# Patient Record
Sex: MALE | Race: WHITE | NOT HISPANIC OR LATINO | Employment: OTHER | URBAN - METROPOLITAN AREA
[De-identification: names, ages, dates, MRNs, and addresses within clinical notes are randomized per-mention and may not be internally consistent; named-entity substitution may affect disease eponyms.]

---

## 2022-09-20 ENCOUNTER — HOSPITAL ENCOUNTER (OUTPATIENT)
Facility: HOSPITAL | Age: 37
Setting detail: OBSERVATION
Discharge: HOME | End: 2022-09-23
Attending: EMERGENCY MEDICINE | Admitting: INTERNAL MEDICINE

## 2022-09-20 DIAGNOSIS — R74.8 ELEVATED LIVER ENZYMES: Primary | ICD-10-CM

## 2022-09-20 DIAGNOSIS — R60.1 ANASARCA: ICD-10-CM

## 2022-09-20 LAB
ALBUMIN SERPL-MCNC: 3 G/DL (ref 3.5–5.2)
ALBUMIN/GLOB SERPL: 1.7 G/DL
ALP SERPL-CCNC: 166 U/L (ref 39–117)
ALT SERPL W P-5'-P-CCNC: 32 U/L (ref 1–41)
AMPHET+METHAMPHET UR QL: POSITIVE
ANION GAP SERPL CALCULATED.3IONS-SCNC: 10 MMOL/L (ref 5–15)
AST SERPL-CCNC: 50 U/L (ref 1–40)
BACTERIA UR QL AUTO: ABNORMAL /HPF
BARBITURATES UR QL SCN: NEGATIVE
BASOPHILS # BLD AUTO: 0.1 10*3/MM3 (ref 0–0.2)
BASOPHILS NFR BLD AUTO: 0.7 % (ref 0–1.5)
BENZODIAZ UR QL SCN: POSITIVE
BILIRUB SERPL-MCNC: 8.1 MG/DL (ref 0–1.2)
BILIRUB UR QL STRIP: ABNORMAL
BUN SERPL-MCNC: 7 MG/DL (ref 6–20)
BUN/CREAT SERPL: 9.2 (ref 7–25)
CALCIUM SPEC-SCNC: 7.9 MG/DL (ref 8.6–10.5)
CANNABINOIDS SERPL QL: POSITIVE
CHLORIDE SERPL-SCNC: 103 MMOL/L (ref 98–107)
CLARITY UR: CLEAR
CO2 SERPL-SCNC: 21 MMOL/L (ref 22–29)
COCAINE UR QL: NEGATIVE
COD CRY URNS QL: ABNORMAL /HPF
COLOR UR: ABNORMAL
CREAT SERPL-MCNC: 0.76 MG/DL (ref 0.76–1.27)
DEPRECATED RDW RBC AUTO: 53.4 FL (ref 37–54)
EGFRCR SERPLBLD CKD-EPI 2021: 118.7 ML/MIN/1.73
EOSINOPHIL # BLD AUTO: 0.3 10*3/MM3 (ref 0–0.4)
EOSINOPHIL NFR BLD AUTO: 2.7 % (ref 0.3–6.2)
ERYTHROCYTE [DISTWIDTH] IN BLOOD BY AUTOMATED COUNT: 14.3 % (ref 12.3–15.4)
ETHANOL UR QL: <0.01 %
GLOBULIN UR ELPH-MCNC: 1.8 GM/DL
GLUCOSE SERPL-MCNC: 124 MG/DL (ref 65–99)
GLUCOSE UR STRIP-MCNC: ABNORMAL MG/DL
HCT VFR BLD AUTO: 41.5 % (ref 37.5–51)
HGB BLD-MCNC: 14 G/DL (ref 13–17.7)
HGB UR QL STRIP.AUTO: NEGATIVE
HYALINE CASTS UR QL AUTO: ABNORMAL /LPF
INR PPP: 1.46 (ref 0.93–1.1)
KETONES UR QL STRIP: ABNORMAL
LEUKOCYTE ESTERASE UR QL STRIP.AUTO: NEGATIVE
LIPASE SERPL-CCNC: 72 U/L (ref 13–60)
LYMPHOCYTES # BLD AUTO: 1.5 10*3/MM3 (ref 0.7–3.1)
LYMPHOCYTES NFR BLD AUTO: 16.2 % (ref 19.6–45.3)
MAGNESIUM SERPL-MCNC: 2 MG/DL (ref 1.6–2.6)
MCH RBC QN AUTO: 36.1 PG (ref 26.6–33)
MCHC RBC AUTO-ENTMCNC: 33.8 G/DL (ref 31.5–35.7)
MCV RBC AUTO: 106.8 FL (ref 79–97)
METHADONE UR QL SCN: POSITIVE
MONOCYTES # BLD AUTO: 1.3 10*3/MM3 (ref 0.1–0.9)
MONOCYTES NFR BLD AUTO: 13.8 % (ref 5–12)
MUCOUS THREADS URNS QL MICRO: ABNORMAL /HPF
NEUTROPHILS NFR BLD AUTO: 6.2 10*3/MM3 (ref 1.7–7)
NEUTROPHILS NFR BLD AUTO: 66.6 % (ref 42.7–76)
NITRITE UR QL STRIP: POSITIVE
NRBC BLD AUTO-RTO: 0.1 /100 WBC (ref 0–0.2)
NT-PROBNP SERPL-MCNC: 101.4 PG/ML (ref 0–450)
OPIATES UR QL: POSITIVE
OXYCODONE UR QL SCN: POSITIVE
PH UR STRIP.AUTO: 5.5 [PH] (ref 5–8)
PLATELET # BLD AUTO: 94 10*3/MM3 (ref 140–450)
PMV BLD AUTO: 10.8 FL (ref 6–12)
POTASSIUM SERPL-SCNC: 3.5 MMOL/L (ref 3.5–5.2)
PROT SERPL-MCNC: 4.8 G/DL (ref 6–8.5)
PROT UR QL STRIP: ABNORMAL
PROTHROMBIN TIME: 14.7 SECONDS (ref 9.6–11.7)
RBC # BLD AUTO: 3.88 10*6/MM3 (ref 4.14–5.8)
RBC # UR STRIP: ABNORMAL /HPF
REF LAB TEST METHOD: ABNORMAL
SODIUM SERPL-SCNC: 134 MMOL/L (ref 136–145)
SP GR UR STRIP: >=1.03 (ref 1–1.03)
SQUAMOUS #/AREA URNS HPF: ABNORMAL /HPF
UROBILINOGEN UR QL STRIP: ABNORMAL
WBC # UR STRIP: ABNORMAL /HPF
WBC NRBC COR # BLD: 9.2 10*3/MM3 (ref 3.4–10.8)

## 2022-09-20 PROCEDURE — 80307 DRUG TEST PRSMV CHEM ANLYZR: CPT | Performed by: NURSE PRACTITIONER

## 2022-09-20 PROCEDURE — 80053 COMPREHEN METABOLIC PANEL: CPT | Performed by: NURSE PRACTITIONER

## 2022-09-20 PROCEDURE — 99284 EMERGENCY DEPT VISIT MOD MDM: CPT

## 2022-09-20 PROCEDURE — 85025 COMPLETE CBC W/AUTO DIFF WBC: CPT | Performed by: NURSE PRACTITIONER

## 2022-09-20 PROCEDURE — 83690 ASSAY OF LIPASE: CPT | Performed by: NURSE PRACTITIONER

## 2022-09-20 PROCEDURE — 85610 PROTHROMBIN TIME: CPT | Performed by: NURSE PRACTITIONER

## 2022-09-20 PROCEDURE — G0378 HOSPITAL OBSERVATION PER HR: HCPCS

## 2022-09-20 PROCEDURE — 82077 ASSAY SPEC XCP UR&BREATH IA: CPT | Performed by: NURSE PRACTITIONER

## 2022-09-20 PROCEDURE — 36415 COLL VENOUS BLD VENIPUNCTURE: CPT

## 2022-09-20 PROCEDURE — 83880 ASSAY OF NATRIURETIC PEPTIDE: CPT | Performed by: NURSE PRACTITIONER

## 2022-09-20 PROCEDURE — 96374 THER/PROPH/DIAG INJ IV PUSH: CPT

## 2022-09-20 PROCEDURE — 81001 URINALYSIS AUTO W/SCOPE: CPT | Performed by: NURSE PRACTITIONER

## 2022-09-20 PROCEDURE — 83735 ASSAY OF MAGNESIUM: CPT | Performed by: NURSE PRACTITIONER

## 2022-09-20 PROCEDURE — 25010000002 FUROSEMIDE PER 20 MG: Performed by: NURSE PRACTITIONER

## 2022-09-20 RX ORDER — ACETAMINOPHEN 325 MG/1
650 TABLET ORAL EVERY 4 HOURS PRN
Status: DISCONTINUED | OUTPATIENT
Start: 2022-09-20 | End: 2022-09-23 | Stop reason: HOSPADM

## 2022-09-20 RX ORDER — HYDROMORPHONE HYDROCHLORIDE 8 MG/1
8 TABLET ORAL EVERY 6 HOURS PRN
COMMUNITY

## 2022-09-20 RX ORDER — FUROSEMIDE 10 MG/ML
80 INJECTION INTRAMUSCULAR; INTRAVENOUS ONCE
Status: COMPLETED | OUTPATIENT
Start: 2022-09-20 | End: 2022-09-20

## 2022-09-20 RX ORDER — OXYCODONE HYDROCHLORIDE 20 MG/1
20 TABLET ORAL
COMMUNITY
End: 2022-09-23 | Stop reason: HOSPADM

## 2022-09-20 RX ORDER — SODIUM CHLORIDE 0.9 % (FLUSH) 0.9 %
10 SYRINGE (ML) INJECTION EVERY 12 HOURS SCHEDULED
Status: DISCONTINUED | OUTPATIENT
Start: 2022-09-20 | End: 2022-09-23 | Stop reason: HOSPADM

## 2022-09-20 RX ORDER — ONDANSETRON 2 MG/ML
4 INJECTION INTRAMUSCULAR; INTRAVENOUS EVERY 6 HOURS PRN
Status: DISCONTINUED | OUTPATIENT
Start: 2022-09-20 | End: 2022-09-23 | Stop reason: HOSPADM

## 2022-09-20 RX ORDER — ALPRAZOLAM 1 MG/1
1 TABLET ORAL 2 TIMES DAILY PRN
COMMUNITY

## 2022-09-20 RX ORDER — CHOLECALCIFEROL (VITAMIN D3) 125 MCG
5 CAPSULE ORAL NIGHTLY PRN
COMMUNITY

## 2022-09-20 RX ORDER — SODIUM CHLORIDE 0.9 % (FLUSH) 0.9 %
10 SYRINGE (ML) INJECTION AS NEEDED
Status: DISCONTINUED | OUTPATIENT
Start: 2022-09-20 | End: 2022-09-23 | Stop reason: HOSPADM

## 2022-09-20 RX ORDER — CHOLECALCIFEROL (VITAMIN D3) 125 MCG
5 CAPSULE ORAL NIGHTLY PRN
Status: DISCONTINUED | OUTPATIENT
Start: 2022-09-20 | End: 2022-09-23 | Stop reason: HOSPADM

## 2022-09-20 RX ORDER — GABAPENTIN 400 MG/1
400 CAPSULE ORAL 4 TIMES DAILY
COMMUNITY

## 2022-09-20 RX ORDER — METHADONE HYDROCHLORIDE 10 MG/1
10 TABLET ORAL EVERY 8 HOURS
COMMUNITY
End: 2022-09-23 | Stop reason: HOSPADM

## 2022-09-20 RX ADMIN — Medication 10 ML: at 21:45

## 2022-09-20 RX ADMIN — FUROSEMIDE 80 MG: 10 INJECTION, SOLUTION INTRAMUSCULAR; INTRAVENOUS at 16:54

## 2022-09-20 NOTE — ED NOTES
Pt reports abdominal distention, shortness of breath, swelling in bilateral lower extremities. 2+ pitting edema noted on bilateral lower extremities. sclera show yellow appearance.

## 2022-09-20 NOTE — CASE MANAGEMENT/SOCIAL WORK
Discharge Planning Assessment  [facility]     Patient Name: Daquan Xiong  MRN: [id number]  Today's Date: 9/20/2022    Admit Date: 9/20/2022    Plan: Anticipate Routine Home   Discharge Needs Assessment     Row Name 09/20/22 1820       Living Environment    People in Home spouse    Current Living Arrangements home    Primary Care Provided by self    Provides Primary Care For no one    Able to Return to Prior Arrangements yes       Resource/Environmental Concerns    Resource/Environmental Concerns none    Transportation Concerns none       Transition Planning    Patient/Family Anticipates Transition to home with family    Patient/Family Anticipated Services at Transition none    Transportation Anticipated family or friend will provide       Discharge Needs Assessment    Equipment Currently Used at Home cane, straight    Concerns to be Addressed discharge planning    Anticipated Changes Related to Illness none    Equipment Needed After Discharge none               Discharge Plan     Row Name 09/20/22 1820       Plan    Plan Anticipate Routine Home    Patient/Family in Agreement with Plan yes    Plan Comments Met with Patient at bedside Lives at home with family. IADL's Mother will provide transportation. PCP (Dr Lacey in [place], in) and Pharmacy verified, able to afford medications. Wants to use meds to beds program. d/c barriers: GI Consult                  Expected Discharge Date and Time     Expected Discharge Date Expected Discharge Time    Sep 21, 2022          Demographic Summary     Row Name 09/20/22 1820       General Information    Admission Type observation    Arrived From emergency department    Referral Source admission list    Reason for Consult discharge planning    Preferred Language English               Functional Status     Row Name 09/20/22 1820       Functional Status    Usual Activity Tolerance good    Current Activity Tolerance good       Functional Status, IADL    Medications independent     Meal Preparation independent    Housekeeping independent    Laundry independent    Shopping independent       Mental Status    General Appearance WDL WDL       Mental Status Summary    Recent Changes in Mental Status/Cognitive Functioning no changes              Met with patient at bedside wearing mask and goggles, Spent less than 15 minutes in room at greater than 6 feet distance.       Loren Hensley RN

## 2022-09-20 NOTE — ED PROVIDER NOTES
"Subjective   History of Present Illness  Patient is a 36-year-old white male who presents with complaints of abdominal swelling, lower extremity swelling and general unwell feeling.  States he has been ill for couple of weeks.  He states he was just released from [facility].  He states he was admitted there for the same complaints.  He states he was told that he had a bad liver and a bad gallbladder.  He states he was told that he also had hepatitis.  He states he was told he also needed his gallbladder removed.  He states he was awaiting transfer to a hospital in [place] to see \"a liver specialist\" but states that they did not have a room for him so they discharged him.  States he has been out of the hospital for 4 days and continues to have the symptoms.  He denies any significant abdominal pain.  He denies any nausea vomiting diarrhea black or bloody stools.  He denies any urinary complaint.  He states prior to his admission to the hospital he would drink approximately 1 pint of hard liquor daily.  He admits to occasional marijuana use but denies any other illicit drug use.        Review of Systems   Constitutional: Negative for chills and fever.   HENT: Negative.    Eyes: Negative.    Respiratory: Negative.    Cardiovascular: Positive for leg swelling. Negative for chest pain and palpitations.   Gastrointestinal: Positive for abdominal distention. Negative for abdominal pain, blood in stool, constipation, diarrhea, nausea and vomiting.   Genitourinary: Negative.    Musculoskeletal: Negative.    Skin: Negative.    Neurological: Positive for weakness.       No past medical history on file.    No Known Allergies    No past surgical history on file.    No family history on file.    Social History     Socioeconomic History   • Marital status:            Objective   Physical Exam  Vital signs and triage nurse note reviewed.  Constitutional: Awake, alert; well-developed and well-nourished. No acute " distress is noted.  Obese.  HEENT: Normocephalic, atraumatic; pupils are PERRL with intact EOM; oropharynx is pink and moist without exudate or erythema.  No drooling or pooling of oral secretions.  Scleral icterus.  Neck: Supple, full range of motion without pain; no cervical lymphadenopathy. Normal phonation.  Cardiovascular: Mildly tachycardic rate and rhythm, normal S1-S2.  No murmur noted.  Pulmonary: Respiratory effort regular nonlabored, breath sounds clear to auscultation all fields.  Abdomen: Soft, nontender, mildly distended with normoactive bowel sounds; no rebound or guarding.  There is pitting edema noted to the abdominal wall.  There is no erythema.  Musculoskeletal: Independent range of motion of all extremities with no palpable tenderness.  There is pitting edema noted in both lower extremities from the waist down through the feet.  There is no redness.  Negative Edward.  Neuro: Alert oriented x3, speech is clear and appropriate, GCS 15.    Skin: Flesh tone, warm, dry, intact; no erythematous or petechial rash or lesion.      Procedures           ED Course      Labs Reviewed   COMPREHENSIVE METABOLIC PANEL - Abnormal; Notable for the following components:       Result Value    Glucose 124 (*)     Sodium 134 (*)     CO2 21.0 (*)     Calcium 7.9 (*)     Total Protein 4.8 (*)     Albumin 3.00 (*)     AST (SGOT) 50 (*)     Alkaline Phosphatase 166 (*)     Total Bilirubin 8.1 (*)     All other components within normal limits    Narrative:     GFR Normal >60  Chronic Kidney Disease <60  Kidney Failure <15     PROTIME-INR - Abnormal; Notable for the following components:    Protime 14.7 (*)     INR 1.46 (*)     All other components within normal limits   LIPASE - Abnormal; Notable for the following components:    Lipase 72 (*)     All other components within normal limits   URINALYSIS W/ MICROSCOPIC IF INDICATED (NO CULTURE) - Abnormal; Notable for the following components:    Color, UA Shyla (*)      Glucose,  mg/dL (Trace) (*)     Ketones, UA Trace (*)     Bilirubin, UA Large (3+) (*)     Protein, UA 30 mg/dL (1+) (*)     Nitrite, UA Positive (*)     Urobilinogen, UA 4.0 E.U./dL (*)     All other components within normal limits   URINE DRUG SCREEN - Abnormal; Notable for the following components:    Amphet/Methamphet, Screen Positive (*)     Benzodiazepine Screen, Urine Positive (*)     Opiate Screen Positive (*)     THC, Screen, Urine Positive (*)     Methadone Screen, Urine Positive (*)     Oxycodone Screen, Urine Positive (*)     All other components within normal limits    Narrative:     Negative Thresholds Per Drugs Screened:    Amphetamines                 500 ng/ml  Barbiturates                 200 ng/ml  Benzodiazepines              100 ng/ml  Cocaine                      300 ng/ml  Methadone                    300 ng/ml  Opiates                      300 ng/ml  Oxycodone                    100 ng/ml  THC                           50 ng/ml    The Normal Value for all drugs tested is negative. This report includes final unconfirmed screening results to be used for medical treatment purposes only. Unconfirmed results must not be used for non-medical purposes such as employment or legal testing. Clinical consideration should be applied to any drug of abuse test, particularly when unconfirmed results are used.          All urine drugs of abuse requests without chain of custody are for medical screening purposes only.  False positives are possible.     CBC WITH AUTO DIFFERENTIAL - Abnormal; Notable for the following components:    RBC 3.88 (*)     .8 (*)     MCH 36.1 (*)     Platelets 94 (*)     Lymphocyte % 16.2 (*)     Monocyte % 13.8 (*)     Monocytes, Absolute 1.30 (*)     All other components within normal limits   URINALYSIS, MICROSCOPIC ONLY - Abnormal; Notable for the following components:    WBC, UA 0-2 (*)     Mucus, UA Small/1+ (*)     All other components within normal limits    MAGNESIUM - Normal   BNP (IN-HOUSE) - Normal    Narrative:     Among patients with dyspnea, NT-proBNP is highly sensitive for the detection of acute congestive heart failure. In addition NT-proBNP of <300 pg/ml effectively rules out acute congestive heart failure with 99% negative predictive value.    Results may be falsely decreased if patient taking Biotin.     ETHANOL    Narrative:     Plasma Ethanol Clinical Symptoms:    ETOH (%)               Clinical Symptom  .01-.05              No apparent influence  .03-.12              Euphoria, Diminished judgment and attention   .09-.25              Impaired comprehension, Muscle incoordination  .18-.30              Confusion, Staggered gait, Slurred speech  .25-.40              Markedly decreased response to stimuli, unable to stand or                        walk, vomitting, sleep or stupor  .35-.50              Comatose, Anesthesia, Subnormal body temperature       CBC AND DIFFERENTIAL    Narrative:     The following orders were created for panel order CBC & Differential.  Procedure                               Abnormality         Status                     ---------                               -----------         ------                     CBC Auto Differential[[id number]]        Abnormal            Final result               Scan Slide[[id number]]                                                                    Please view results for these tests on the individual orders.     No radiology results for the last day  Medications   sodium chloride 0.9 % flush 10 mL (has no administration in time range)   sodium chloride 0.9 % flush 10 mL (has no administration in time range)   sodium chloride 0.9 % flush 10 mL (has no administration in time range)   acetaminophen (TYLENOL) tablet 650 mg (has no administration in time range)   ondansetron (ZOFRAN) injection 4 mg (has no administration in time range)   melatonin tablet 5 mg (has no administration in time range)    furosemide (LASIX) injection 80 mg (80 mg Intravenous Given 9/20/22 7807)                                          MDM  Number of Diagnoses or Management Options  Anasarca  Elevated liver enzymes  Diagnosis management comments: Comorbidities: Hepatitis, chronic back pain  Differentials: Hepatitis, liver cirrhosis and failure, cholecystitis, cholelithiasis, pancreatitis, ascites;this list is not all inclusive and does not constitute the entirety of considered causes  Discussion with provider:  Radiology interpretation: X-rays reviewed by me and interpreted by radiologist:   Lab interpretation: Labs viewed by me significant for: As above    Patient had IV established.  He had labs obtained.    Work-up: CBC significant for platelets of 94.  Metabolic panel significant for AST 50, alk phos 166, total bili 8.1.  proBNP within normal limits.  Urinalysis shows sakina-colored urine with 100 glucose, trace ketones, moderate bilirubin, trace protein, positive nitrites, 0-2 WBCs.  Urine drug screen positive for amphetamines, benzodiazepines, opiates, THC, methadone, oxycodone.    Medical records were obtained from patient's visit at [facility] and revealed the following results: Platelets 71, potassium 3.2, alk phos 219, AST 70, total bili 12.1.  CT of the abdomen pelvis performed on 9/14/2022 showed gallbladder distention and intrahepatic biliary ductal dilation suspicious for acute cholecystitis with biliary obstruction in the setting of jaundice.  Moderate volume ascites.  Edematous appearing small and large bowel in the upper abdomen which is likely secondary to the presence of ascites.  MRCP performed on 9/15/2022 showed mild parenchymal heterogeneity in the liver with mild steatosis.  No evidence for common duct stone.  Mild to moderate ascites.    On reexamination, the patient is resting comfortably and in no distress.  He has no new complaints.  His abdomen is soft and nontender.  He has a normal white blood cell  count.  He is afebrile and hemodynamically stable.  States has not had any alcohol in 8 days.  He reports no history of alcohol withdrawal though states he is on chronic benzodiazepines and narcotics for his chronic back pain.  He has no complaints of feeling anxious.  He is not tremulous.  Mildly tachycardic with a heart rate around 110.  Blood pressure stable and he is afebrile.    Discussed patient's findings with him today.  Lab work appears improved from labs that were done at outlying facility a few days ago.  He was given a dose of IV Lasix for his edema which appears to be his greatest complaint here today.  He has no chest pain or shortness of breath.    He will be placed in observation for GI consultation and diuretics.      Diagnosis and treatment plan discussed with patient.  Patient agreeable to plan.            Amount and/or Complexity of Data Reviewed  Clinical lab tests: ordered and reviewed    Patient Progress  Patient progress: stable      Final diagnoses:   Elevated liver enzymes   Anasarca       ED Disposition  ED Disposition     ED Disposition   Decision to Admit    Condition   --    Comment   --             No follow-up provider specified.       Medication List      No changes were made to your prescriptions during this visit.          Karen Godinez, MONICA  09/20/22 7297

## 2022-09-21 ENCOUNTER — INPATIENT HOSPITAL (OUTPATIENT)
Dept: URBAN - METROPOLITAN AREA HOSPITAL 84 | Facility: HOSPITAL | Age: 37
End: 2022-09-21
Payer: COMMERCIAL

## 2022-09-21 ENCOUNTER — APPOINTMENT (OUTPATIENT)
Dept: ULTRASOUND IMAGING | Facility: HOSPITAL | Age: 37
End: 2022-09-21

## 2022-09-21 DIAGNOSIS — R17 UNSPECIFIED JAUNDICE: ICD-10-CM

## 2022-09-21 DIAGNOSIS — K59.00 CONSTIPATION, UNSPECIFIED: ICD-10-CM

## 2022-09-21 DIAGNOSIS — K70.31 ALCOHOLIC CIRRHOSIS OF LIVER WITH ASCITES: ICD-10-CM

## 2022-09-21 DIAGNOSIS — F19.10 OTHER PSYCHOACTIVE SUBSTANCE ABUSE, UNCOMPLICATED: ICD-10-CM

## 2022-09-21 DIAGNOSIS — K70.11 ALCOHOLIC HEPATITIS WITH ASCITES: ICD-10-CM

## 2022-09-21 DIAGNOSIS — E87.1 HYPO-OSMOLALITY AND HYPONATREMIA: ICD-10-CM

## 2022-09-21 DIAGNOSIS — R11.0 NAUSEA: ICD-10-CM

## 2022-09-21 DIAGNOSIS — R74.01 ELEVATION OF LEVELS OF LIVER TRANSAMINASE LEVELS: ICD-10-CM

## 2022-09-21 DIAGNOSIS — F10.10 ALCOHOL ABUSE, UNCOMPLICATED: ICD-10-CM

## 2022-09-21 DIAGNOSIS — D69.59 OTHER SECONDARY THROMBOCYTOPENIA: ICD-10-CM

## 2022-09-21 DIAGNOSIS — D53.9 NUTRITIONAL ANEMIA, UNSPECIFIED: ICD-10-CM

## 2022-09-21 DIAGNOSIS — E87.6 HYPOKALEMIA: ICD-10-CM

## 2022-09-21 LAB
ALBUMIN SERPL-MCNC: 2.9 G/DL (ref 3.5–5.2)
ALBUMIN/GLOB SERPL: 1.5 G/DL
ALP SERPL-CCNC: 160 U/L (ref 39–117)
ALPHA-FETOPROTEIN: 8.04 NG/ML (ref 0–8.3)
ALPHA1 GLOB MFR UR ELPH: 187 MG/DL (ref 90–200)
ALT SERPL W P-5'-P-CCNC: 31 U/L (ref 1–41)
ANION GAP SERPL CALCULATED.3IONS-SCNC: 9 MMOL/L (ref 5–15)
AST SERPL-CCNC: 47 U/L (ref 1–40)
BASOPHILS # BLD AUTO: 0 10*3/MM3 (ref 0–0.2)
BASOPHILS NFR BLD AUTO: 0.6 % (ref 0–1.5)
BILIRUB SERPL-MCNC: 8 MG/DL (ref 0–1.2)
BUN SERPL-MCNC: 8 MG/DL (ref 6–20)
BUN/CREAT SERPL: 8.8 (ref 7–25)
CALCIUM SPEC-SCNC: 7.9 MG/DL (ref 8.6–10.5)
CERULOPLASMIN SERPL-MCNC: 13 MG/DL (ref 16–31)
CHLORIDE SERPL-SCNC: 99 MMOL/L (ref 98–107)
CO2 SERPL-SCNC: 27 MMOL/L (ref 22–29)
CREAT SERPL-MCNC: 0.91 MG/DL (ref 0.76–1.27)
DEPRECATED RDW RBC AUTO: 56 FL (ref 37–54)
EGFRCR SERPLBLD CKD-EPI 2021: 111.3 ML/MIN/1.73
EOSINOPHIL # BLD AUTO: 0.4 10*3/MM3 (ref 0–0.4)
EOSINOPHIL NFR BLD AUTO: 4.5 % (ref 0.3–6.2)
ERYTHROCYTE [DISTWIDTH] IN BLOOD BY AUTOMATED COUNT: 14.7 % (ref 12.3–15.4)
FERRITIN SERPL-MCNC: 666.8 NG/ML (ref 30–400)
GGT SERPL-CCNC: 68 U/L (ref 8–61)
GLOBULIN UR ELPH-MCNC: 1.9 GM/DL
GLUCOSE SERPL-MCNC: 101 MG/DL (ref 65–99)
HAV IGM SERPL QL IA: NORMAL
HBV CORE IGM SERPL QL IA: NORMAL
HBV SURFACE AG SERPL QL IA: NORMAL
HCT VFR BLD AUTO: 37 % (ref 37.5–51)
HCV AB SER DONR QL: NORMAL
HGB BLD-MCNC: 12.4 G/DL (ref 13–17.7)
HOLD SPECIMEN: NORMAL
LYMPHOCYTES # BLD AUTO: 2.3 10*3/MM3 (ref 0.7–3.1)
LYMPHOCYTES NFR BLD AUTO: 27.6 % (ref 19.6–45.3)
MCH RBC QN AUTO: 36.6 PG (ref 26.6–33)
MCHC RBC AUTO-ENTMCNC: 33.5 G/DL (ref 31.5–35.7)
MCV RBC AUTO: 109.3 FL (ref 79–97)
MONOCYTES # BLD AUTO: 1.2 10*3/MM3 (ref 0.1–0.9)
MONOCYTES NFR BLD AUTO: 14.4 % (ref 5–12)
NEUTROPHILS NFR BLD AUTO: 4.3 10*3/MM3 (ref 1.7–7)
NEUTROPHILS NFR BLD AUTO: 52.9 % (ref 42.7–76)
NRBC BLD AUTO-RTO: 0.1 /100 WBC (ref 0–0.2)
PLATELET # BLD AUTO: 64 10*3/MM3 (ref 140–450)
PMV BLD AUTO: 10.8 FL (ref 6–12)
POTASSIUM SERPL-SCNC: 3.3 MMOL/L (ref 3.5–5.2)
PROT SERPL-MCNC: 4.8 G/DL (ref 6–8.5)
RBC # BLD AUTO: 3.38 10*6/MM3 (ref 4.14–5.8)
SODIUM SERPL-SCNC: 135 MMOL/L (ref 136–145)
WBC NRBC COR # BLD: 8.2 10*3/MM3 (ref 3.4–10.8)

## 2022-09-21 PROCEDURE — 76705 ECHO EXAM OF ABDOMEN: CPT

## 2022-09-21 PROCEDURE — G0378 HOSPITAL OBSERVATION PER HR: HCPCS

## 2022-09-21 PROCEDURE — 86381 MITOCHONDRIAL ANTIBODY EACH: CPT | Performed by: NURSE PRACTITIONER

## 2022-09-21 PROCEDURE — 82390 ASSAY OF CERULOPLASMIN: CPT | Performed by: NURSE PRACTITIONER

## 2022-09-21 PROCEDURE — 82105 ALPHA-FETOPROTEIN SERUM: CPT | Performed by: NURSE PRACTITIONER

## 2022-09-21 PROCEDURE — 82103 ALPHA-1-ANTITRYPSIN TOTAL: CPT | Performed by: NURSE PRACTITIONER

## 2022-09-21 PROCEDURE — 99223 1ST HOSP IP/OBS HIGH 75: CPT | Performed by: NURSE PRACTITIONER

## 2022-09-21 PROCEDURE — 86015 ACTIN ANTIBODY EACH: CPT | Performed by: NURSE PRACTITIONER

## 2022-09-21 PROCEDURE — 86038 ANTINUCLEAR ANTIBODIES: CPT | Performed by: NURSE PRACTITIONER

## 2022-09-21 PROCEDURE — 82728 ASSAY OF FERRITIN: CPT | Performed by: NURSE PRACTITIONER

## 2022-09-21 PROCEDURE — 36415 COLL VENOUS BLD VENIPUNCTURE: CPT | Performed by: EMERGENCY MEDICINE

## 2022-09-21 PROCEDURE — 82977 ASSAY OF GGT: CPT | Performed by: NURSE PRACTITIONER

## 2022-09-21 PROCEDURE — 85025 COMPLETE CBC W/AUTO DIFF WBC: CPT | Performed by: EMERGENCY MEDICINE

## 2022-09-21 PROCEDURE — 63710000001 PREDNISOLONE PER 5 MG: Performed by: NURSE PRACTITIONER

## 2022-09-21 PROCEDURE — 80074 ACUTE HEPATITIS PANEL: CPT | Performed by: NURSE PRACTITIONER

## 2022-09-21 PROCEDURE — 80053 COMPREHEN METABOLIC PANEL: CPT | Performed by: EMERGENCY MEDICINE

## 2022-09-21 RX ORDER — LACTULOSE 10 G/15ML
30 SOLUTION ORAL 2 TIMES DAILY
Status: DISCONTINUED | OUTPATIENT
Start: 2022-09-21 | End: 2022-09-22

## 2022-09-21 RX ORDER — PREDNISOLONE SODIUM PHOSPHATE 15 MG/5ML
40 SOLUTION ORAL DAILY
Status: DISCONTINUED | OUTPATIENT
Start: 2022-09-21 | End: 2022-09-23 | Stop reason: HOSPADM

## 2022-09-21 RX ORDER — NICOTINE 21 MG/24HR
1 PATCH, TRANSDERMAL 24 HOURS TRANSDERMAL EVERY 24 HOURS
Status: DISCONTINUED | OUTPATIENT
Start: 2022-09-21 | End: 2022-09-23 | Stop reason: HOSPADM

## 2022-09-21 RX ORDER — FUROSEMIDE 40 MG/1
40 TABLET ORAL DAILY
Status: DISCONTINUED | OUTPATIENT
Start: 2022-09-21 | End: 2022-09-23 | Stop reason: HOSPADM

## 2022-09-21 RX ORDER — FOLIC ACID 1 MG/1
1 TABLET ORAL DAILY
Status: DISCONTINUED | OUTPATIENT
Start: 2022-09-21 | End: 2022-09-23 | Stop reason: HOSPADM

## 2022-09-21 RX ORDER — ZINC SULFATE 50(220)MG
220 CAPSULE ORAL DAILY
Status: DISCONTINUED | OUTPATIENT
Start: 2022-09-21 | End: 2022-09-23 | Stop reason: HOSPADM

## 2022-09-21 RX ORDER — GABAPENTIN 400 MG/1
400 CAPSULE ORAL 4 TIMES DAILY
Status: DISCONTINUED | OUTPATIENT
Start: 2022-09-21 | End: 2022-09-23 | Stop reason: HOSPADM

## 2022-09-21 RX ORDER — DIPHENOXYLATE HYDROCHLORIDE AND ATROPINE SULFATE 2.5; .025 MG/1; MG/1
1 TABLET ORAL DAILY
Status: DISCONTINUED | OUTPATIENT
Start: 2022-09-21 | End: 2022-09-23 | Stop reason: HOSPADM

## 2022-09-21 RX ORDER — LACTULOSE 10 G/15ML
30 SOLUTION ORAL 2 TIMES DAILY
Qty: 2700 ML | Refills: 0 | Status: CANCELLED | OUTPATIENT
Start: 2022-09-21 | End: 2022-10-21

## 2022-09-21 RX ORDER — PANTOPRAZOLE SODIUM 40 MG/1
40 TABLET, DELAYED RELEASE ORAL
Status: DISCONTINUED | OUTPATIENT
Start: 2022-09-21 | End: 2022-09-23 | Stop reason: HOSPADM

## 2022-09-21 RX ORDER — ALPRAZOLAM 1 MG/1
1 TABLET ORAL 2 TIMES DAILY PRN
Status: DISCONTINUED | OUTPATIENT
Start: 2022-09-21 | End: 2022-09-23 | Stop reason: HOSPADM

## 2022-09-21 RX ORDER — HYDROMORPHONE HYDROCHLORIDE 4 MG/1
8 TABLET ORAL EVERY 6 HOURS PRN
Status: DISCONTINUED | OUTPATIENT
Start: 2022-09-21 | End: 2022-09-23 | Stop reason: HOSPADM

## 2022-09-21 RX ORDER — SPIRONOLACTONE 100 MG/1
100 TABLET, FILM COATED ORAL DAILY
Status: DISCONTINUED | OUTPATIENT
Start: 2022-09-21 | End: 2022-09-23 | Stop reason: HOSPADM

## 2022-09-21 RX ADMIN — HYDROMORPHONE HYDROCHLORIDE 8 MG: 4 TABLET ORAL at 07:15

## 2022-09-21 RX ADMIN — PREDNISOLONE SODIUM PHOSPHATE 40 MG: 15 SOLUTION ORAL at 15:06

## 2022-09-21 RX ADMIN — PANTOPRAZOLE SODIUM 40 MG: 40 TABLET, DELAYED RELEASE ORAL at 16:30

## 2022-09-21 RX ADMIN — LACTULOSE 30 G: 20 SOLUTION ORAL at 19:51

## 2022-09-21 RX ADMIN — ZINC SULFATE 220 MG (50 MG) CAPSULE 220 MG: CAPSULE at 11:14

## 2022-09-21 RX ADMIN — HYDROMORPHONE HYDROCHLORIDE 8 MG: 4 TABLET ORAL at 16:25

## 2022-09-21 RX ADMIN — LACTULOSE 30 G: 20 SOLUTION ORAL at 11:13

## 2022-09-21 RX ADMIN — THERA TABS 1 TABLET: TAB at 11:14

## 2022-09-21 RX ADMIN — FOLIC ACID 1 MG: 1 TABLET ORAL at 11:14

## 2022-09-21 RX ADMIN — FUROSEMIDE 40 MG: 40 TABLET ORAL at 11:13

## 2022-09-21 RX ADMIN — Medication 10 ML: at 19:50

## 2022-09-21 RX ADMIN — RIFAXIMIN 550 MG: 550 TABLET ORAL at 19:50

## 2022-09-21 RX ADMIN — RIFAXIMIN 550 MG: 550 TABLET ORAL at 11:13

## 2022-09-21 RX ADMIN — Medication 10 ML: at 08:22

## 2022-09-21 RX ADMIN — SPIRONOLACTONE 100 MG: 100 TABLET ORAL at 11:14

## 2022-09-21 RX ADMIN — Medication 100 MG: at 11:13

## 2022-09-21 RX ADMIN — ALPRAZOLAM 1 MG: 1 TABLET ORAL at 17:13

## 2022-09-21 RX ADMIN — NICOTINE 1 PATCH: 21 PATCH, EXTENDED RELEASE TRANSDERMAL at 14:29

## 2022-09-21 RX ADMIN — ALPRAZOLAM 1 MG: 1 TABLET ORAL at 07:15

## 2022-09-21 NOTE — H&P
"On license of [facility] Observation Unit H&P    Patient Name: Daquan Xiong  : 1985  MRN: [id number]  Primary Care Physician: Provider, No Known  Date of admission: 2022     Patient Care Team:  Provider, No Known as PCP - General          Subjective   History Present Illness     Chief Complaint:   Chief Complaint   Patient presents with   • Abdominal Pain     Abd swelling, leg swelling, soa states he was in [facility] and they didn't have beds so he was released.  Pt states his liver/glallbladder is going bad.           History of Present Illness  Obtained from ED provider HPI on 2022:  Patient is a 36-year-old white male who presents with complaints of abdominal swelling, lower extremity swelling and general unwell feeling.  States he has been ill for couple of weeks.  He states he was just released from [facility].  He states he was admitted there for the same complaints.  He states he was told that he had a bad liver and a bad gallbladder.  He states he was told that he also had hepatitis.  He states he was told he also needed his gallbladder removed.  He states he was awaiting transfer to a hospital in [place] to see \"a liver specialist\" but states that they did not have a room for him so they discharged him.  States he has been out of the hospital for 4 days and continues to have the symptoms.  He denies any significant abdominal pain.  He denies any nausea vomiting diarrhea black or bloody stools.  He denies any urinary complaint.  He states prior to his admission to the hospital he would drink approximately 1 pint of hard liquor daily.  He admits to occasional marijuana use but denies any other illicit drug use.     2022:  Patient confirms the HPI noted above including a several week history of increasing edema primarily in his lower extremities with some abdominal distention present as well.  He was seen at an outlying facility for this with initial plans for transfer to tertiary " care facility however no bed was available and patient was discharged with minimal improvement noted so I decided to seek help at this facility.  He notes that he has had nothing to drink for approximately the past week and a half and intends to remain abstinent from alcohol.  He does continue to smoke approximately half pack of cigarettes per day.  His lower extremity edema remains present though he feels it is improved significantly following treatment in the ED and he also feels his abdominal distention is improving somewhat.  He has had a significant amount of urination following treatment.  He does report a sensation of abdominal fullness as well as bloating and also notes that his distention had caused some difficulty with respiration which is improved following treatment.      ROS   Review of Systems   Constitutional: Negative.   HENT: Negative.    Eyes: Negative.    Cardiovascular: Positive for leg swelling.   Respiratory: Positive for shortness of breath.    Skin: Negative.    Musculoskeletal: Negative.    Gastrointestinal: Positive for bloating and abdominal pain.   Genitourinary: Negative.    Neurological: Negative.    Psychiatric/Behavioral: Negative        Personal History     Past Medical History:   Past Medical History:   Diagnosis Date   • Alcohol abuse     1/2-1 pint a day, sober since 9/12/2022   • Arthritis    • Hypertension    • Sleep apnea        Surgical History:      Past Surgical History:   Procedure Laterality Date   • BACK SURGERY     • WRIST SURGERY             Family History: family history is not on file. Otherwise pertinent FHx was reviewed and unremarkable.     Social History:  reports that he has been smoking. He has been smoking about 0.50 packs per day. He has never used smokeless tobacco. He reports previous alcohol use. He reports previous drug use. Drug: Marijuana.      Medications:  Prior to Admission medications    Medication Sig Start Date End Date Taking? Authorizing Provider    ALPRAZolam (XANAX) 1 MG tablet Take 1 mg by mouth 2 (Two) Times a Day As Needed for Anxiety.   Yes Nathaly Pop MD   HYDROmorphone (DILAUDID) 8 MG tablet Take 8 mg by mouth Every 6 (Six) Hours As Needed for Moderate Pain.   Yes Nathaly Pop MD   gabapentin (NEURONTIN) 400 MG capsule Take 400 mg by mouth 4 (Four) Times a Day.    Nathaly Pop MD   melatonin 5 MG tablet tablet Take 5 mg by mouth At Night As Needed.    Nathaly Pop MD   methadone (DOLOPHINE) 10 MG tablet Take 10 mg by mouth Every 8 (Eight) Hours,    Nathaly Pop MD   oxyCODONE (ROXICODONE) 20 MG tablet Take 20 mg by mouth 5 (Five) Times a Day As Needed for Moderate Pain.    Nathaly Pop MD       Allergies:    Allergies   Allergen Reactions   • Bee Venom Anaphylaxis   • Scopolamine Anaphylaxis       Objective   Objective     Vital Signs  Temp:  [97.9 °F (36.6 °C)-98.6 °F (37 °C)] 98.4 °F (36.9 °C)  Heart Rate:  [] 95  Resp:  [14-17] 16  BP: (115-151)/(64-82) 149/70  SpO2:  [93 %-98 %] 94 %  on   ;   Device (Oxygen Therapy): room air  Body mass index is 34.28 kg/m².    Physical Exam  Physical Exam  Vitals reviewed.   Constitutional:       General: He is not in acute distress.     Appearance: Normal appearance. He is obese. He is not ill-appearing, toxic-appearing or diaphoretic.   HENT:      Head: Normocephalic.      Right Ear: External ear normal.      Left Ear: External ear normal.      Nose: Nose normal.      Mouth/Throat:      Mouth: Mucous membranes are moist.   Eyes:      General: Scleral icterus present.      Extraocular Movements: Extraocular movements intact.   Cardiovascular:      Rate and Rhythm: Normal rate and regular rhythm.      Pulses: Normal pulses.      Heart sounds: Normal heart sounds.   Pulmonary:      Effort: Pulmonary effort is normal.      Breath sounds: Normal breath sounds.   Abdominal:      General: Bowel sounds are normal. There is distension.      Palpations:  Abdomen is soft.      Tenderness: There is abdominal tenderness.   Musculoskeletal:         General: Normal range of motion.      Cervical back: Normal range of motion.      Right lower leg: Edema present.      Left lower leg: Edema present.   Skin:     General: Skin is warm and dry.      Capillary Refill: Capillary refill takes less than 2 seconds.      Coloration: Skin is jaundiced.   Neurological:      General: No focal deficit present.      Mental Status: He is alert and oriented to person, place, and time.   Psychiatric:         Mood and Affect: Mood normal.         Behavior: Behavior normal.         Thought Content: Thought content normal.         Judgment: Judgment normal.     Results Review:  I have personally reviewed most recent lab results and radiology images and interpretations and agree with findings, most notably: CMP, CBC, urine tox and ultrasound of abdomen.    Results from last 7 days   Lab Units 09/21/22  0223 09/20/22  1509   WBC 10*3/mm3 8.20 9.20   HEMOGLOBIN g/dL 12.4* 14.0   HEMATOCRIT % 37.0* 41.5   PLATELETS 10*3/mm3 64* 94*   INR   --  1.46*     Results from last 7 days   Lab Units 09/21/22  0223 09/20/22  1555 09/20/22  1509   SODIUM mmol/L 135*   < >  --    POTASSIUM mmol/L 3.3*   < >  --    CHLORIDE mmol/L 99   < >  --    CO2 mmol/L 27.0   < >  --    BUN mg/dL 8   < >  --    CREATININE mg/dL 0.91   < >  --    GLUCOSE mg/dL 101*   < >  --    CALCIUM mg/dL 7.9*   < >  --    ALT (SGPT) U/L 31   < >  --    AST (SGOT) U/L 47*   < >  --    PROBNP pg/mL  --   --  101.4    < > = values in this interval not displayed.     Estimated Creatinine Clearance: 149.5 mL/min (by C-G formula based on SCr of 0.91 mg/dL).  Brief Urine Lab Results  (Last result in the past 365 days)      Color   Clarity   Blood   Leuk Est   Nitrite   Protein   CREAT   Urine HCG        09/20/22 1536 Shyla   Clear   Negative   Negative   Positive   30 mg/dL (1+)                 Microbiology Results (last 10 days)     ** No  results found for the last 240 hours. **          ECG/EMG Results (most recent)     None                  US Liver    Result Date: 9/21/2022   1. Cirrhotic liver morphology. No focal liver lesion is identified. 2. Turbulent flow is documented within the portal vein. The portal vein remains patent. 3. Small quantity ascites in the right upper quadrant.  Electronically Signed By-Chanda Clark MD On:9/21/2022 12:05 PM This report was finalized on [id number] by  Chanda Clark MD.        Estimated Creatinine Clearance: 149.5 mL/min (by C-G formula based on SCr of 0.91 mg/dL).    Assessment & Plan   Assessment/Plan       Active Hospital Problems    Diagnosis  POA   • Elevated liver enzymes [R74.8]  Yes      Resolved Hospital Problems   No resolved problems to display.     Cirrhosis with acute alcoholic hepatitis  -Sodium: 135, potassium: 3.3  -ALT: 31, AST: 47, albumin: 2.90, bilirubin: 8.0  -Urine tox screen positive for amphetamines, benzodiazepines, methadone, opiates, oxycodone and THC  -Ultrasound of the liver showed a cirrhotic liver morphology with no focal lesion identified and turbulent flow documented within the portal vein though the portal vein was noted as patent with small quantity of ascites in the right upper quadrant  -INR: 1.46  -Patient given IV Lasix in the ED  -GI consulted who recommended initiation of p.o. Lasix as well as spironolactone along with lactulose, zinc, folic acid, thiamine, multivitamin and PPI with a low-sodium diet also recommended  -EGD planned as outpatient            VTE Prophylaxis -   Mechanical Order History:      Ordered        09/20/22 1758  Place Sequential Compression Device  Once            09/20/22 1758  Maintain Sequential Compression Device  Continuous                    Pharmalogical Order History:     None          CODE STATUS:    Code Status and Medical Interventions:   Ordered at: 09/21/22 0651     Code Status (Patient has no pulse and is not breathing):    CPR  (Attempt to Resuscitate)     Medical Interventions (Patient has pulse or is breathing):    Full Support     Release to patient:    Routine Release       This patient has been examined wearing personal protective equipment.     I discussed the patient's findings and my recommendations with patient and nursing staff.      Signature:Electronically signed by Greg Marcial PA-C, 09/21/22, 2:37 PM EDT.

## 2022-09-21 NOTE — CASE MANAGEMENT/SOCIAL WORK
Social Work Assessment   Nathan     Patient Name: Daquan Xiong  MRN: [id number]  Today's Date: 9/21/2022    Admit Date: 9/20/2022     Substance Abuse     Row Name 09/21/22 1418       Substance Use    Substance Use Status current alcohol use;current tobacco use;current street drug/inhalant/medication abuse    Substance Use Comment SW was consulted 2/2 drug abuse. SW met with pt in room 111 to complete MH and drug screen. Pt lying in bed, no family present. SW introduced self and role. Pt reports being diagnosed with Bipolar (in 5th - 6th grade?), but does not currently take meds. He reports previously being on Wellbutrin, Depakote, and Vraylar. He is also diagnosed with Anxiety and takes Xanax. He lives in [place], [place], but sees a PCP in [facility] [place]. In regards to UDS, pt reports being Rx’d Methadone and Oxycodone, but the Oxycodone was d/c’d within the past week and he was switched to Dilaudid. He is waiting to hear back on whether he should continue taking the methadone or not. These meds are Rx’d for chronic back pain, as he has had several back surgeries. Pt reports smoking marijuana 1-2 times/month for restlessness, but prefers to use the CBD oil. 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 Pt drink 0.5-1 pint of liquor/day for the past 8 years, with preference for bourbon and/or whiskey. He has been in AA and drug court after receiving 2 DUIs - one at 14 y.o. and the other at 15 y.o. His DL is currently in tact per pt report. Pt reports previously quitting etoh from age [age] y.o., but reports starting again 2/2 depression, stating, “I hit a slump,” and described issues within his family at that time. Pt declined a list of local treatment centers, stating he would prefer to quit on his own as he had done before; however, he is aware of the treatment centers close to him in his  [place].  Pt began smoking at 10 y.o. and now smokes 1ppd. Pt stated, “I plan to (quit) one day.” SW provided pt with a Quit Now card. His main priority is getting his SSDI reinstated once he is d/c’d. Pt stated it was suspended several months ago when pt failed to show up for a medical evaluation, but his family is assisting him with this endeavor. Pt denies further needs at this time.              Met with patient in room wearing PPE: mask.      Maintained distance greater than six feet and spent less than 15 minutes in the room.      DAKOTAH Burciaga, [facility]  Medical Social Worker  Ph [phone]  Fax [phone]  [email or web address]

## 2022-09-21 NOTE — CONSULTS
GI CONSULT  NOTE:    Referring Provider:  Dr. Rogers    Chief complaint: Jaundice, elevated LFTs    Subjective .     History of present illness: Daquan Xiong is a 36 y.o. male with history of EtOH abuse, hypertension, substance abuse, sleep apnea, and multiple back surgeries who presents with complaints of abdominal swelling and lower extremity edema.  The patient had a recent admission at [facility] for the same complaints.  He did undergo a fairly extensive work-up at that time which included CT abdomen/pelvis, paracentesis, and MRCP.  Results are below.  The patient states that they tried to transfer him to [place] to see a liver specialist, but states that no bed was available so they discharged him.  He was discharged approximately 4 days ago.  He states that he has continued to feel generally unwell which prompted him to present to [facility].  The patient reports that he had no previous knowledge of liver disease prior to his admission at [facility].  He states that he is believes that he may have had some jaundice last year, but did not have any further evaluation at that time.  He has been drinking one half to 1 pint of liquor daily for the past 8 years.  He states that his last drink was on 2022.  He denies any recent new medications.  He does have tattoos.  He believes that he may have been told that he had hepatitis C during his admission at [facility], but we do not have any record of an acute hepatitis panel.  He states that his father  from alcoholic liver disease.  The patient reports that his last bowel movement occurred approximately 8 days ago.  He has seen scant amounts of bright red blood per rectum, but denies any melena.  He does complain of some mild generalized abdominal pain that he describes as sore in nature.  Also complains of abdominal distention.  He has had intermittent nausea, but denies any vomiting.  No heartburn or dysphagia.    9/15/2022 MRCP at [facility]  -liver appears normal, mild hepatic steatosis, no hepatic mass, negative for acute cholecystitis, CBD appears normal however, no evidence of choledocholithiasis, normal pancreas, mild splenomegaly  9/15/2022 paracentesis -unsuccessful due to small volume of ascites  9/14/2022 CT abdomen/pelvis at [facility] -gallbladder distention and intrahepatic biliary duct dilation suspicious for acute cholecystitis, moderate volume ascites, edematous appearing small and large bowel in the upper abdomen which is likely due to ascites      Endo History:  No record of previous endoscopy.    Past Medical History:  Past Medical History:   Diagnosis Date   • Alcohol abuse     1/2-1 pint a day, sober since 9/12/2022   • Arthritis    • Hypertension    • Sleep apnea        Past Surgical History:  Past Surgical History:   Procedure Laterality Date   • BACK SURGERY     • WRIST SURGERY         Social History:  Social History     Tobacco Use   • Smoking status: Current Every Day Smoker     Packs/day: 0.50   • Smokeless tobacco: Never Used   Vaping Use   • Vaping Use: Never used   Substance Use Topics   • Alcohol use: Not Currently     Comment: sober since 9/12/22   • Drug use: Not Currently     Types: Marijuana       Family History:  History reviewed. No pertinent family history.    Medications:  Medications Prior to Admission   Medication Sig Dispense Refill Last Dose   • ALPRAZolam (XANAX) 1 MG tablet Take 1 mg by mouth 2 (Two) Times a Day As Needed for Anxiety.   9/20/2022 at 2100   • HYDROmorphone (DILAUDID) 8 MG tablet Take 8 mg by mouth Every 6 (Six) Hours As Needed for Moderate Pain.   9/20/2022 at 1800   • gabapentin (NEURONTIN) 400 MG capsule Take 400 mg by mouth 4 (Four) Times a Day.   9/16/2022   • melatonin 5 MG tablet tablet Take 5 mg by mouth At Night As Needed.   9/13/2022   • methadone (DOLOPHINE) 10 MG tablet Take 10 mg by mouth Every 8 (Eight) Hours,   9/15/2022   • oxyCODONE (ROXICODONE) 20 MG tablet Take 20 mg by mouth 5  (Five) Times a Day As Needed for Moderate Pain.          Scheduled Meds:gabapentin, 400 mg, Oral, 4x Daily  sodium chloride, 10 mL, Intravenous, Q12H      Continuous Infusions:   PRN Meds:.•  acetaminophen  •  ALPRAZolam  •  HYDROmorphone  •  melatonin  •  ondansetron  •  [COMPLETED] Insert peripheral IV **AND** sodium chloride  •  sodium chloride    ALLERGIES:  Bee venom and Scopolamine    ROS:  The following systems were reviewed and negative;   Constitution:  No fevers, chills, no unintentional weight loss  Skin: no rash, jaundice  Eyes:  No blurry vision, no eye pain  HENT:  No change in hearing or smell  Resp:  No dyspnea or cough  CV:  No chest pain or palpitations  :  No dysuria, hematuria  Musculoskeletal:  No leg cramps or arthralgias  Neuro:  No tremor, no numbness  Psych:  No depression or confusion    Objective     Vital Signs:   Vitals:    09/20/22 1900 09/20/22 2047 09/20/22 2240 09/21/22 0550   BP: 141/77 132/78 128/69 115/69   BP Location:  Right arm Right arm Right arm   Patient Position:  Lying Lying Lying   Pulse: 113 102 97 89   Resp:  17 17 15   Temp:  98.6 °F (37 °C) 98.6 °F (37 °C) 98.1 °F (36.7 °C)   TempSrc:  Oral Oral Oral   SpO2: 94% 95% 93% 94%   Weight:  118 kg (259 lb 12.8 oz)     Height:           Physical Exam:       General Appearance:    Awake and alert, in no acute distress   Head:    Normocephalic, without obvious abnormality, atraumatic   Throat:   No oral lesions, no thrush, oral mucosa moist   Lungs:     Respirations regular, even and unlabored   Chest Wall:    No abnormalities observed   Abdomen:     Soft, non-tender, no rebound or guarding, mild distention   Rectal:     Deferred   Extremities:   Moves all extremities, bilateral lower extremity edema, no cyanosis   Pulses:   Pulses palpable and equal bilaterally   Skin:   No rash, jaundice, normal palpation   Lymph nodes:   No cervical, supraclavicular or submandibular palpable adenopathy   Neurologic:   Cranial nerves 2 -  12 grossly intact, no asterixis       Results Review:   I reviewed the patient's labs and imaging.  CBC    Results from last 7 days   Lab Units 09/21/22  0223 09/20/22  1509   WBC 10*3/mm3 8.20 9.20   HEMOGLOBIN g/dL 12.4* 14.0   PLATELETS 10*3/mm3 64* 94*     CMP   Results from last 7 days   Lab Units 09/21/22  0223 09/20/22  1555 09/20/22  1509   SODIUM mmol/L 135* 134*  --    POTASSIUM mmol/L 3.3* 3.5  --    CHLORIDE mmol/L 99 103  --    CO2 mmol/L 27.0 21.0*  --    BUN mg/dL 8 7  --    CREATININE mg/dL 0.91 0.76  --    GLUCOSE mg/dL 101* 124*  --    ALBUMIN g/dL 2.90* 3.00*  --    BILIRUBIN mg/dL 8.0* 8.1*  --    ALK PHOS U/L 160* 166*  --    AST (SGOT) U/L 47* 50*  --    ALT (SGPT) U/L 31 32  --    MAGNESIUM mg/dL  --   --  2.0   LIPASE U/L  --   --  72*     Cr Clearance Estimated Creatinine Clearance: 149.5 mL/min (by C-G formula based on SCr of 0.91 mg/dL).  Coag   Results from last 7 days   Lab Units 09/20/22  1509   INR  1.46*     HbA1C No results found for: HGBA1C  Blood Glucose No results found for: POCGLU  Infection     UA    Results from last 7 days   Lab Units 09/20/22  1536   NITRITE UA  Positive*   WBC UA /HPF 0-2*   BACTERIA UA /HPF None Seen   SQUAM EPITHEL UA /HPF 0-2     Radiology(recent) No radiology results for the last day       ASSESSMENT:  -Acute EtOH hepatitis  -Decompensated cirrhosis -suspect due to EtOH use.  Rule out other etiology.  -Ascites  -Jaundice/elevated LFTs  -Nausea  -Constipation  -Hyponatremia/hypokalemia  -Mild macrocytic anemia  -Thrombocytopenia -suspect due to cirrhosis  -EtOH abuse  -Substance abuse  -Hypertension  -Sleep apnea  -History of multiple back surgeries    PLAN:  Patient is a 36-year-old male with history of EtOH abuse who presents with complaints of abdominal distention, lower extremity edema, and jaundice.  The patient was recently seen during an admission at [facility].  When he was unable to be transferred to [place] to see a liver specialist, he was  discharged.  MRCP at that time was negative for choledocholithiasis.    Review of records shows that total bilirubin and LFTs are trending down from his previous admission at [facility].  Continue to monitor.  He reports that last EtOH use was on 9/12/2022.  Complete EtOH cessation indefinitely has been recommended.  We will plan RUQ US with Doppler.  Check AFP.  Send full liver serologies and check acute hepatitis panel.  Paracentesis was attempted at [facility] on 9/15, but was not completed due to insufficient ascites fluid present.  Start 2 g sodium diet.  Start Lasix and spironolactone.  Monitor renal function closely.  Creatinine is currently normal at 0.91.  We will start lactulose, zinc, folic acid, thiamine, multivitamin, and PPI.  Nutrition was encouraged.  MELD-NA 21. Maddrey's DF 24.  No need for prednisolone at this time.  Patient will need outpatient EGD to screen for esophageal varices.  Supportive care.      I discussed the patients findings and my recommendations with the patient.  I will discuss case with Dr. Calderon and change the plan accordingly.    We appreciate the referral.    Electronically signed by MONICA Avalos, 09/21/22, 9:45 AM EDT.

## 2022-09-21 NOTE — PLAN OF CARE
Problem: Adult Inpatient Plan of Care  Goal: Plan of Care Review  Outcome: Ongoing, Progressing  Flowsheets (Taken 9/21/2022 1343)  Plan of Care Reviewed With: patient  Outcome Evaluation: Pt admitted for elevated liver enzymes, VSS, awaiting test results per GI may discharge home tomorrow, pt requested nicotine patch, PO dilaudid for pain control PRN. Liver US showed cirrhotic liver, with small ascites.  Goal: Patient-Specific Goal (Individualized)  Outcome: Ongoing, Progressing  Goal: Absence of Hospital-Acquired Illness or Injury  Outcome: Ongoing, Progressing  Intervention: Identify and Manage Fall Risk  Recent Flowsheet Documentation  Taken 9/21/2022 1200 by Suzy Olivares RN  Safety Promotion/Fall Prevention: safety round/check completed  Taken 9/21/2022 1000 by Suzy Olivares RN  Safety Promotion/Fall Prevention: safety round/check completed  Taken 9/21/2022 0800 by Suzy Olivares RN  Safety Promotion/Fall Prevention: safety round/check completed  Intervention: Prevent Skin Injury  Recent Flowsheet Documentation  Taken 9/21/2022 0800 by Suzy Olivares RN  Body Position:   supine   position changed independently  Intervention: Prevent and Manage VTE (Venous Thromboembolism) Risk  Recent Flowsheet Documentation  Taken 9/21/2022 0800 by Suzy Olivares RN  Activity Management:   up ad elisha   activity adjusted per tolerance  Goal: Optimal Comfort and Wellbeing  Outcome: Ongoing, Progressing  Intervention: Monitor Pain and Promote Comfort  Recent Flowsheet Documentation  Taken 9/21/2022 0800 by Suzy Olivares RN  Pain Management Interventions: see MAR  Intervention: Provide Person-Centered Care  Recent Flowsheet Documentation  Taken 9/21/2022 1200 by Suzy Olivares RN  Trust Relationship/Rapport: care explained  Taken 9/21/2022 0800 by Suzy Olivares RN  Trust Relationship/Rapport: care explained  Goal: Readiness for Transition of Care  Outcome: Ongoing, Progressing     Problem: Hypertension  Comorbidity  Goal: Blood Pressure in Desired Range  Outcome: Ongoing, Progressing  Intervention: Maintain Blood Pressure Management  Recent Flowsheet Documentation  Taken 9/21/2022 0800 by Suzy Olivares RN  Medication Review/Management: medications reviewed     Problem: Pain Chronic (Persistent) (Comorbidity Management)  Goal: Acceptable Pain Control and Functional Ability  Outcome: Ongoing, Progressing  Intervention: Manage Persistent Pain  Recent Flowsheet Documentation  Taken 9/21/2022 0800 by Suzy Olivares RN  Medication Review/Management: medications reviewed  Intervention: Develop Pain Management Plan  Recent Flowsheet Documentation  Taken 9/21/2022 0800 by Suzy Olivares RN  Pain Management Interventions: see MAR  Intervention: Optimize Psychosocial Wellbeing  Recent Flowsheet Documentation  Taken 9/21/2022 1200 by Suzy Olivares RN  Supportive Measures: active listening utilized  Diversional Activities:   television   smartphone  Family/Support System Care: self-care encouraged  Taken 9/21/2022 0800 by Suzy Olivares RN  Supportive Measures: active listening utilized  Diversional Activities:   smartphone   television     Problem: Pain Chronic (Persistent)  Goal: Acceptable Pain Control and Functional Ability  Outcome: Ongoing, Progressing  Intervention: Manage Persistent Pain  Recent Flowsheet Documentation  Taken 9/21/2022 0800 by Suzy Olivares RN  Medication Review/Management: medications reviewed  Intervention: Develop Pain Management Plan  Recent Flowsheet Documentation  Taken 9/21/2022 0800 by Suzy Olivares RN  Pain Management Interventions: see MAR  Intervention: Optimize Psychosocial Wellbeing  Recent Flowsheet Documentation  Taken 9/21/2022 1200 by Suzy Olivares RN  Supportive Measures: active listening utilized  Diversional Activities:   television   smartphone  Family/Support System Care: self-care encouraged  Taken 9/21/2022 0800 by Suzy Olivares RN  Supportive Measures: active  listening utilized  Diversional Activities:   smartphone   television     Problem: Fluid Volume Excess  Goal: Fluid Balance  Outcome: Ongoing, Progressing   Goal Outcome Evaluation:  Plan of Care Reviewed With: patient           Outcome Evaluation: Pt admitted for elevated liver enzymes, VSS, awaiting test results per GI may discharge home tomorrow, pt requested nicotine patch, PO dilaudid for pain control PRN. Liver US showed cirrhotic liver, with small ascites.

## 2022-09-21 NOTE — PLAN OF CARE
Problem: Adult Inpatient Plan of Care  Goal: Plan of Care Review  Outcome: Ongoing, Not Progressing  Flowsheets (Taken 9/21/2022 0056)  Progress: no change  Plan of Care Reviewed With: patient  Outcome Evaluation: patient to have GI consult in the AM, VSS, not on tele, home meds in pharmacy, will monitor, pt stable  Goal: Patient-Specific Goal (Individualized)  Outcome: Ongoing, Not Progressing  Goal: Absence of Hospital-Acquired Illness or Injury  Outcome: Ongoing, Not Progressing  Intervention: Identify and Manage Fall Risk  Recent Flowsheet Documentation  Taken 9/21/2022 0000 by Jada Kennedy, RN  Safety Promotion/Fall Prevention: safety round/check completed  Taken 9/20/2022 2200 by Jada Kennedy RN  Safety Promotion/Fall Prevention: safety round/check completed  Taken 9/20/2022 1955 by Jada Kennedy, RN  Safety Promotion/Fall Prevention:   safety round/check completed   assistive device/personal items within reach   clutter free environment maintained   lighting adjusted   nonskid shoes/slippers when out of bed   room organization consistent  Intervention: Prevent Skin Injury  Recent Flowsheet Documentation  Taken 9/20/2022 1955 by Jada Kennedy, RN  Body Position:   supine   sitting up in bed   position changed independently  Intervention: Prevent and Manage VTE (Venous Thromboembolism) Risk  Recent Flowsheet Documentation  Taken 9/20/2022 1955 by Jada Kennedy RN  Activity Management:   up ad elisha   activity adjusted per tolerance  Goal: Optimal Comfort and Wellbeing  Outcome: Ongoing, Not Progressing  Intervention: Provide Person-Centered Care  Recent Flowsheet Documentation  Taken 9/20/2022 1955 by Jada Kennedy RN  Trust Relationship/Rapport:   care explained   choices provided   emotional support provided   empathic listening provided   questions answered   questions encouraged   reassurance provided   thoughts/feelings acknowledged  Goal: Readiness for Transition of Care  Outcome:  Ongoing, Not Progressing  Intervention: Mutually Develop Transition Plan  Recent Flowsheet Documentation  Taken 9/20/2022 2223 by Jada Kennedy RN  Transportation Anticipated: family or friend will provide  Patient/Family Anticipated Services at Transition: none  Patient/Family Anticipates Transition to: home with family  Taken 9/20/2022 2221 by Jada Kennedy RN  Equipment Currently Used at Home: cane, straight     Problem: Hypertension Comorbidity  Goal: Blood Pressure in Desired Range  Outcome: Ongoing, Not Progressing  Intervention: Maintain Blood Pressure Management  Recent Flowsheet Documentation  Taken 9/20/2022 1955 by Jada Kennedy RN  Medication Review/Management: medications reviewed     Problem: Pain Chronic (Persistent) (Comorbidity Management)  Goal: Acceptable Pain Control and Functional Ability  Outcome: Ongoing, Not Progressing  Intervention: Manage Persistent Pain  Recent Flowsheet Documentation  Taken 9/20/2022 1955 by Jada Kennedy RN  Sleep/Rest Enhancement: awakenings minimized  Medication Review/Management: medications reviewed  Intervention: Optimize Psychosocial Wellbeing  Recent Flowsheet Documentation  Taken 9/20/2022 1955 by Jada Kennedy RN  Supportive Measures: active listening utilized  Diversional Activities:   smartphone   tablet   television     Problem: Pain Chronic (Persistent)  Goal: Acceptable Pain Control and Functional Ability  Outcome: Ongoing, Not Progressing  Intervention: Manage Persistent Pain  Recent Flowsheet Documentation  Taken 9/20/2022 1955 by Jada Kennedy RN  Sleep/Rest Enhancement: awakenings minimized  Medication Review/Management: medications reviewed  Intervention: Optimize Psychosocial Wellbeing  Recent Flowsheet Documentation  Taken 9/20/2022 1955 by Jada Kennedy RN  Supportive Measures: active listening utilized  Diversional Activities:   smartphone   tablet   television     Problem: Fluid Volume Excess  Goal: Fluid Balance  Outcome:  Ongoing, Not Progressing   Goal Outcome Evaluation:  Plan of Care Reviewed With: patient        Progress: no change  Outcome Evaluation: patient to have GI consult in the AM, VSS, not on tele, home meds in pharmacy, will monitor, pt stable

## 2022-09-21 NOTE — NURSING NOTE
2 nurse count of home prescription narcotics with this RN--Jada STARR And Rosi FLOWERS In front of patient, sent to pharmacy in secured bag and will be brought up at discharge:    PO Dilaudid 106 pills  PO Methadone 81.5 pills  PO Alprazolam 27.5 pills

## 2022-09-22 ENCOUNTER — INPATIENT HOSPITAL (OUTPATIENT)
Dept: URBAN - METROPOLITAN AREA HOSPITAL 84 | Facility: HOSPITAL | Age: 37
End: 2022-09-22
Payer: COMMERCIAL

## 2022-09-22 DIAGNOSIS — E87.1 HYPO-OSMOLALITY AND HYPONATREMIA: ICD-10-CM

## 2022-09-22 DIAGNOSIS — E87.6 HYPOKALEMIA: ICD-10-CM

## 2022-09-22 DIAGNOSIS — D69.59 OTHER SECONDARY THROMBOCYTOPENIA: ICD-10-CM

## 2022-09-22 DIAGNOSIS — K70.31 ALCOHOLIC CIRRHOSIS OF LIVER WITH ASCITES: ICD-10-CM

## 2022-09-22 DIAGNOSIS — F19.10 OTHER PSYCHOACTIVE SUBSTANCE ABUSE, UNCOMPLICATED: ICD-10-CM

## 2022-09-22 DIAGNOSIS — D53.9 NUTRITIONAL ANEMIA, UNSPECIFIED: ICD-10-CM

## 2022-09-22 DIAGNOSIS — R74.01 ELEVATION OF LEVELS OF LIVER TRANSAMINASE LEVELS: ICD-10-CM

## 2022-09-22 DIAGNOSIS — K70.11 ALCOHOLIC HEPATITIS WITH ASCITES: ICD-10-CM

## 2022-09-22 DIAGNOSIS — R17 UNSPECIFIED JAUNDICE: ICD-10-CM

## 2022-09-22 DIAGNOSIS — K59.00 CONSTIPATION, UNSPECIFIED: ICD-10-CM

## 2022-09-22 DIAGNOSIS — F10.10 ALCOHOL ABUSE, UNCOMPLICATED: ICD-10-CM

## 2022-09-22 DIAGNOSIS — R11.0 NAUSEA: ICD-10-CM

## 2022-09-22 PROBLEM — F31.9 BIPOLAR AFFECTIVE DISORDER: Chronic | Status: ACTIVE | Noted: 2022-09-22

## 2022-09-22 PROBLEM — K74.60 CIRRHOSIS OF LIVER: Status: ACTIVE | Noted: 2022-09-22

## 2022-09-22 PROBLEM — M19.90 OSTEOARTHRITIS: Status: ACTIVE | Noted: 2022-09-22

## 2022-09-22 PROBLEM — I10 HYPERTENSION, BENIGN: Status: ACTIVE | Noted: 2022-09-22

## 2022-09-22 PROBLEM — F41.9 ANXIETY: Chronic | Status: ACTIVE | Noted: 2022-09-22

## 2022-09-22 PROBLEM — F41.9 ANXIETY: Status: ACTIVE | Noted: 2022-09-22

## 2022-09-22 PROBLEM — F31.9 BIPOLAR AFFECTIVE DISORDER: Status: ACTIVE | Noted: 2022-09-22

## 2022-09-22 PROBLEM — M54.50 LOW BACK PAIN: Chronic | Status: ACTIVE | Noted: 2022-09-22

## 2022-09-22 PROBLEM — K21.9 GERD (GASTROESOPHAGEAL REFLUX DISEASE): Status: ACTIVE | Noted: 2022-09-22

## 2022-09-22 PROBLEM — M19.90 OSTEOARTHRITIS: Chronic | Status: ACTIVE | Noted: 2022-09-22

## 2022-09-22 PROBLEM — G47.33 OSA (OBSTRUCTIVE SLEEP APNEA): Chronic | Status: ACTIVE | Noted: 2022-09-22

## 2022-09-22 PROBLEM — K21.9 GERD (GASTROESOPHAGEAL REFLUX DISEASE): Chronic | Status: ACTIVE | Noted: 2022-09-22

## 2022-09-22 PROBLEM — M54.50 LOW BACK PAIN: Status: ACTIVE | Noted: 2022-09-22

## 2022-09-22 PROBLEM — F17.200 TOBACCO USE DISORDER: Chronic | Status: ACTIVE | Noted: 2022-09-22

## 2022-09-22 PROBLEM — F17.200 TOBACCO USE DISORDER: Status: ACTIVE | Noted: 2022-09-22

## 2022-09-22 PROBLEM — I10 HYPERTENSION, BENIGN: Chronic | Status: ACTIVE | Noted: 2022-09-22

## 2022-09-22 LAB
ALBUMIN SERPL-MCNC: 3 G/DL (ref 3.5–5.2)
ALBUMIN/GLOB SERPL: 1.4 G/DL
ALP SERPL-CCNC: 171 U/L (ref 39–117)
ALT SERPL W P-5'-P-CCNC: 33 U/L (ref 1–41)
AMMONIA BLD-SCNC: 75 UMOL/L (ref 16–60)
ANION GAP SERPL CALCULATED.3IONS-SCNC: 11 MMOL/L (ref 5–15)
AST SERPL-CCNC: 50 U/L (ref 1–40)
BASOPHILS # BLD AUTO: 0 10*3/MM3 (ref 0–0.2)
BASOPHILS NFR BLD AUTO: 0.4 % (ref 0–1.5)
BILIRUB SERPL-MCNC: 6.7 MG/DL (ref 0–1.2)
BUN SERPL-MCNC: 8 MG/DL (ref 6–20)
BUN/CREAT SERPL: 8.4 (ref 7–25)
CALCIUM SPEC-SCNC: 8.3 MG/DL (ref 8.6–10.5)
CHLORIDE SERPL-SCNC: 99 MMOL/L (ref 98–107)
CO2 SERPL-SCNC: 24 MMOL/L (ref 22–29)
CREAT SERPL-MCNC: 0.95 MG/DL (ref 0.76–1.27)
DEPRECATED RDW RBC AUTO: 54.3 FL (ref 37–54)
EGFRCR SERPLBLD CKD-EPI 2021: 105.7 ML/MIN/1.73
EOSINOPHIL # BLD AUTO: 0 10*3/MM3 (ref 0–0.4)
EOSINOPHIL NFR BLD AUTO: 0.2 % (ref 0.3–6.2)
ERYTHROCYTE [DISTWIDTH] IN BLOOD BY AUTOMATED COUNT: 14.5 % (ref 12.3–15.4)
GLOBULIN UR ELPH-MCNC: 2.2 GM/DL
GLUCOSE SERPL-MCNC: 155 MG/DL (ref 65–99)
HCT VFR BLD AUTO: 36.9 % (ref 37.5–51)
HGB BLD-MCNC: 12.5 G/DL (ref 13–17.7)
INR PPP: 1.5 (ref 0.93–1.1)
LIPASE SERPL-CCNC: 50 U/L (ref 13–60)
LYMPHOCYTES # BLD AUTO: 0.7 10*3/MM3 (ref 0.7–3.1)
LYMPHOCYTES NFR BLD AUTO: 13 % (ref 19.6–45.3)
MCH RBC QN AUTO: 36 PG (ref 26.6–33)
MCHC RBC AUTO-ENTMCNC: 33.8 G/DL (ref 31.5–35.7)
MCV RBC AUTO: 106.5 FL (ref 79–97)
MITOCHONDRIA M2 IGG SER-ACNC: <20 UNITS (ref 0–20)
MONOCYTES # BLD AUTO: 0.6 10*3/MM3 (ref 0.1–0.9)
MONOCYTES NFR BLD AUTO: 11.5 % (ref 5–12)
NEUTROPHILS NFR BLD AUTO: 3.8 10*3/MM3 (ref 1.7–7)
NEUTROPHILS NFR BLD AUTO: 74.9 % (ref 42.7–76)
NRBC BLD AUTO-RTO: 0.1 /100 WBC (ref 0–0.2)
PLATELET # BLD AUTO: 67 10*3/MM3 (ref 140–450)
PMV BLD AUTO: 10.3 FL (ref 6–12)
POTASSIUM SERPL-SCNC: 4.1 MMOL/L (ref 3.5–5.2)
PROT SERPL-MCNC: 5.2 G/DL (ref 6–8.5)
PROTHROMBIN TIME: 15.1 SECONDS (ref 9.6–11.7)
RBC # BLD AUTO: 3.46 10*6/MM3 (ref 4.14–5.8)
SMA IGG SER-ACNC: 7 UNITS (ref 0–19)
SODIUM SERPL-SCNC: 134 MMOL/L (ref 136–145)
WBC NRBC COR # BLD: 5 10*3/MM3 (ref 3.4–10.8)

## 2022-09-22 PROCEDURE — G0378 HOSPITAL OBSERVATION PER HR: HCPCS

## 2022-09-22 PROCEDURE — 99232 SBSQ HOSP IP/OBS MODERATE 35: CPT | Performed by: NURSE PRACTITIONER

## 2022-09-22 PROCEDURE — 82140 ASSAY OF AMMONIA: CPT | Performed by: NURSE PRACTITIONER

## 2022-09-22 PROCEDURE — 83690 ASSAY OF LIPASE: CPT | Performed by: NURSE PRACTITIONER

## 2022-09-22 PROCEDURE — 80053 COMPREHEN METABOLIC PANEL: CPT | Performed by: NURSE PRACTITIONER

## 2022-09-22 PROCEDURE — 85025 COMPLETE CBC W/AUTO DIFF WBC: CPT | Performed by: NURSE PRACTITIONER

## 2022-09-22 PROCEDURE — 85610 PROTHROMBIN TIME: CPT | Performed by: NURSE PRACTITIONER

## 2022-09-22 PROCEDURE — 63710000001 PREDNISOLONE PER 5 MG: Performed by: NURSE PRACTITIONER

## 2022-09-22 RX ORDER — MIDODRINE HYDROCHLORIDE 5 MG/1
10 TABLET ORAL
Status: DISCONTINUED | OUTPATIENT
Start: 2022-09-22 | End: 2022-09-23 | Stop reason: HOSPADM

## 2022-09-22 RX ORDER — LACTULOSE 10 G/15ML
30 SOLUTION ORAL 3 TIMES DAILY
Status: DISCONTINUED | OUTPATIENT
Start: 2022-09-22 | End: 2022-09-23 | Stop reason: HOSPADM

## 2022-09-22 RX ADMIN — FOLIC ACID 1 MG: 1 TABLET ORAL at 08:13

## 2022-09-22 RX ADMIN — SPIRONOLACTONE 100 MG: 100 TABLET ORAL at 08:07

## 2022-09-22 RX ADMIN — NICOTINE 1 PATCH: 21 PATCH, EXTENDED RELEASE TRANSDERMAL at 15:15

## 2022-09-22 RX ADMIN — LACTULOSE 30 G: 20 SOLUTION ORAL at 21:43

## 2022-09-22 RX ADMIN — Medication 100 MG: at 08:07

## 2022-09-22 RX ADMIN — RIFAXIMIN 550 MG: 550 TABLET ORAL at 21:44

## 2022-09-22 RX ADMIN — RIFAXIMIN 550 MG: 550 TABLET ORAL at 08:06

## 2022-09-22 RX ADMIN — PREDNISOLONE SODIUM PHOSPHATE 40 MG: 15 SOLUTION ORAL at 08:04

## 2022-09-22 RX ADMIN — PANTOPRAZOLE SODIUM 40 MG: 40 TABLET, DELAYED RELEASE ORAL at 17:37

## 2022-09-22 RX ADMIN — HYDROMORPHONE HYDROCHLORIDE 8 MG: 4 TABLET ORAL at 01:21

## 2022-09-22 RX ADMIN — LACTULOSE 30 G: 20 SOLUTION ORAL at 17:37

## 2022-09-22 RX ADMIN — FUROSEMIDE 40 MG: 40 TABLET ORAL at 08:07

## 2022-09-22 RX ADMIN — MIDODRINE HYDROCHLORIDE 10 MG: 5 TABLET ORAL at 17:37

## 2022-09-22 RX ADMIN — THERA TABS 1 TABLET: TAB at 08:06

## 2022-09-22 RX ADMIN — ALPRAZOLAM 1 MG: 1 TABLET ORAL at 08:06

## 2022-09-22 RX ADMIN — PANTOPRAZOLE SODIUM 40 MG: 40 TABLET, DELAYED RELEASE ORAL at 08:06

## 2022-09-22 RX ADMIN — MIDODRINE HYDROCHLORIDE 10 MG: 5 TABLET ORAL at 12:16

## 2022-09-22 RX ADMIN — LACTULOSE 30 G: 20 SOLUTION ORAL at 08:02

## 2022-09-22 RX ADMIN — HYDROMORPHONE HYDROCHLORIDE 8 MG: 4 TABLET ORAL at 21:44

## 2022-09-22 RX ADMIN — Medication 10 ML: at 08:14

## 2022-09-22 RX ADMIN — Medication 10 ML: at 21:44

## 2022-09-22 RX ADMIN — HYDROMORPHONE HYDROCHLORIDE 8 MG: 4 TABLET ORAL at 15:16

## 2022-09-22 RX ADMIN — HYDROMORPHONE HYDROCHLORIDE 8 MG: 4 TABLET ORAL at 08:06

## 2022-09-22 RX ADMIN — ALPRAZOLAM 1 MG: 1 TABLET ORAL at 21:44

## 2022-09-22 RX ADMIN — ZINC SULFATE 220 MG (50 MG) CAPSULE 220 MG: CAPSULE at 08:08

## 2022-09-22 NOTE — PLAN OF CARE
Goal Outcome Evaluation:  Plan of Care Reviewed With: patient           Outcome Evaluation: Pt followed by GI and continues on medications.  PO dilaudid every 6 hours.  Vitals WNL.  Will continue to monitor.

## 2022-09-22 NOTE — PAYOR COMM NOTE
This is a clinical information for Daquan Xiong ERMA  Reference/Auth # [id number]    INPATIENT AUTHORIZATION PENDING:     Please call or fax determination to contact below.   Thank you.    Brandy Mercedes RN, BSN  Utilization Review Nurse  [facility]  Direct & confidential phone # [phone]  Fax # [phone]  ===================================  Liver Disease Complications RRG - Inpatient Care         Criteria Review      Inpatient Care    Most Recent : Brandy Mercedes Most Recent Date: 9/22/2022 13:21:09 EDST    (X) Admission is indicated for  1 or more  of the following :       (X) Alcoholic hepatitis [A] requiring treatment, as indicated by  ALL  of the following :          (X) Alcohol as most likely etiology of liver disease (eg, history of heavy alcohol use [A])          (X) Manifestations of liver disease, as indicated by  1 or more  of the following :             (X) Maddrey Discriminant Function score greater than or equal to 32 [B]       ( ) Acute exacerbation of chronic liver disease (liver disease of any type for more than 26 weeks       or cirrhosis), as indicated by  ALL  of the following :          (X) Total serum bilirubin of 5 mg/dL (86 micromoles/L) or more          (X) Acute hepatic decompensation, as indicated by  1 or more  of the following :             (X) Acute (recent) onset of ascites      ========================================    OBSERVATION STATUS ON 9/20 AND CHANGED TO INPATIENT STATUS ON 9/22      Daquan Xiong (36 y.o. Male)             Date of Birth   1985    Social Security Number       Address   [address] [place] [address] [place] [facility]    Home Phone   [phone]    MRN   [id number]       [facility]    Marital Status                               Admission Date   9/20/22    Admission Type   Emergency    Admitting Provider   Phill Rogers MD    Attending Provider   Chris Lees MD    Department, Room/Bed  "  [facility]BS[facility], 111/       Discharge Date       Discharge Disposition       Discharge Destination                               Attending Provider: Chris Lees MD    Allergies: Bee Venom, Scopolamine    Isolation: None   Infection: None   Code Status: CPR   Advance Care Planning Activity    Ht: 185.4 cm (73\")   Wt: 119 kg (263 lb)    Admission Cmt: None   Principal Problem: Cirrhosis of liver (HCC) [K74.60]                 Active Insurance as of 2022     Primary Coverage     Payor Plan Insurance Group Employer/Plan Group    [employer]HEM BLUE CROSS ANTHEM BLUE CROSS BLUE SHIELD PPO [id number]     Payor Plan Address Payor Plan Phone Number Payor Plan Fax Number Effective Dates    [address] [phone]  2015 - None Entered    [place] [address]       Subscriber Name Subscriber Birth Date Member ID       XIONGANA 3/25/1986 [id number]                 Emergency Contacts      (Rel.) Home Phone Work Phone Mobile Phone    Ana Xiong (Spouse) [phone] -- [phone]               History & Physical      Greg Marcial PA-C at 22 1434          [facility] H&P    Patient Name: Daquan Xiong  : 1985  MRN: [id number]  Primary Care Physician: Provider, No Known  Date of admission: 2022     Patient Care Team:  Provider, No Known as PCP - General          Subjective   History Present Illness     Chief Complaint:   Chief Complaint   Patient presents with   • Abdominal Pain     Abd swelling, leg swelling, soa states he was in [facility] and they didn't have beds so he was released.  Pt states his liver/glallbladder is going bad.           History of Present Illness  Obtained from ED provider HPI on 2022:  Patient is a 36-year-old white male who presents with complaints of abdominal swelling, lower extremity swelling and general unwell feeling.  States he has been ill for couple of weeks.  He states he was just " "released from [facility].  He states he was admitted there for the same complaints.  He states he was told that he had a bad liver and a bad gallbladder.  He states he was told that he also had hepatitis.  He states he was told he also needed his gallbladder removed.  He states he was awaiting transfer to a hospital in [place] to see \"a liver specialist\" but states that they did not have a room for him so they discharged him.  States he has been out of the hospital for 4 days and continues to have the symptoms.  He denies any significant abdominal pain.  He denies any nausea vomiting diarrhea black or bloody stools.  He denies any urinary complaint.  He states prior to his admission to the hospital he would drink approximately 1 pint of hard liquor daily.  He admits to occasional marijuana use but denies any other illicit drug use.     9/21/2022:  Patient confirms the HPI noted above including a several week history of increasing edema primarily in his lower extremities with some abdominal distention present as well.  He was seen at an outlying facility for this with initial plans for transfer to tertiary care facility however no bed was available and patient was discharged with minimal improvement noted so I decided to seek help at this facility.  He notes that he has had nothing to drink for approximately the past week and a half and intends to remain abstinent from alcohol.  He does continue to smoke approximately half pack of cigarettes per day.  His lower extremity edema remains present though he feels it is improved significantly following treatment in the ED and he also feels his abdominal distention is improving somewhat.  He has had a significant amount of urination following treatment.  He does report a sensation of abdominal fullness as well as bloating and also notes that his distention had caused some difficulty with respiration which is improved following treatment.      ROS   Review of " Systems   Constitutional: Negative.   HENT: Negative.    Eyes: Negative.    Cardiovascular: Positive for leg swelling.   Respiratory: Positive for shortness of breath.    Skin: Negative.    Musculoskeletal: Negative.    Gastrointestinal: Positive for bloating and abdominal pain.   Genitourinary: Negative.    Neurological: Negative.    Psychiatric/Behavioral: Negative        Personal History     Past Medical History:   Past Medical History:   Diagnosis Date   • Alcohol abuse     1/2-1 pint a day, sober since 9/12/2022   • Arthritis    • Hypertension    • Sleep apnea        Surgical History:      Past Surgical History:   Procedure Laterality Date   • BACK SURGERY     • WRIST SURGERY             Family History: family history is not on file. Otherwise pertinent FHx was reviewed and unremarkable.     Social History:  reports that he has been smoking. He has been smoking about 0.50 packs per day. He has never used smokeless tobacco. He reports previous alcohol use. He reports previous drug use. Drug: Marijuana.      Medications:  Prior to Admission medications    Medication Sig Start Date End Date Taking? Authorizing Provider   ALPRAZolam (XANAX) 1 MG tablet Take 1 mg by mouth 2 (Two) Times a Day As Needed for Anxiety.   Yes Nathaly Pop MD   HYDROmorphone (DILAUDID) 8 MG tablet Take 8 mg by mouth Every 6 (Six) Hours As Needed for Moderate Pain.   Yes Nathaly Pop MD   gabapentin (NEURONTIN) 400 MG capsule Take 400 mg by mouth 4 (Four) Times a Day.    Nathaly Pop MD   melatonin 5 MG tablet tablet Take 5 mg by mouth At Night As Needed.    Nathaly Pop MD   methadone (DOLOPHINE) 10 MG tablet Take 10 mg by mouth Every 8 (Eight) Hours,    Nathaly Pop MD   oxyCODONE (ROXICODONE) 20 MG tablet Take 20 mg by mouth 5 (Five) Times a Day As Needed for Moderate Pain.    Nathaly Pop MD       Allergies:    Allergies   Allergen Reactions   • Bee Venom Anaphylaxis   • Scopolamine  Anaphylaxis       Objective   Objective     Vital Signs  Temp:  [97.9 °F (36.6 °C)-98.6 °F (37 °C)] 98.4 °F (36.9 °C)  Heart Rate:  [] 95  Resp:  [14-17] 16  BP: (115-151)/(64-82) 149/70  SpO2:  [93 %-98 %] 94 %  on   ;   Device (Oxygen Therapy): room air  Body mass index is 34.28 kg/m².    Physical Exam  Physical Exam  Vitals reviewed.   Constitutional:       General: He is not in acute distress.     Appearance: Normal appearance. He is obese. He is not ill-appearing, toxic-appearing or diaphoretic.   HENT:      Head: Normocephalic.      Right Ear: External ear normal.      Left Ear: External ear normal.      Nose: Nose normal.      Mouth/Throat:      Mouth: Mucous membranes are moist.   Eyes:      General: Scleral icterus present.      Extraocular Movements: Extraocular movements intact.   Cardiovascular:      Rate and Rhythm: Normal rate and regular rhythm.      Pulses: Normal pulses.      Heart sounds: Normal heart sounds.   Pulmonary:      Effort: Pulmonary effort is normal.      Breath sounds: Normal breath sounds.   Abdominal:      General: Bowel sounds are normal. There is distension.      Palpations: Abdomen is soft.      Tenderness: There is abdominal tenderness.   Musculoskeletal:         General: Normal range of motion.      Cervical back: Normal range of motion.      Right lower leg: Edema present.      Left lower leg: Edema present.   Skin:     General: Skin is warm and dry.      Capillary Refill: Capillary refill takes less than 2 seconds.      Coloration: Skin is jaundiced.   Neurological:      General: No focal deficit present.      Mental Status: He is alert and oriented to person, place, and time.   Psychiatric:         Mood and Affect: Mood normal.         Behavior: Behavior normal.         Thought Content: Thought content normal.         Judgment: Judgment normal.     Results Review:  I have personally reviewed most recent lab results and radiology images and interpretations and agree with  findings, most notably: CMP, CBC, urine tox and ultrasound of abdomen.    Results from last 7 days   Lab Units 09/21/22  0223 09/20/22  1509   WBC 10*3/mm3 8.20 9.20   HEMOGLOBIN g/dL 12.4* 14.0   HEMATOCRIT % 37.0* 41.5   PLATELETS 10*3/mm3 64* 94*   INR   --  1.46*     Results from last 7 days   Lab Units 09/21/22  0223 09/20/22  1555 09/20/22  1509   SODIUM mmol/L 135*   < >  --    POTASSIUM mmol/L 3.3*   < >  --    CHLORIDE mmol/L 99   < >  --    CO2 mmol/L 27.0   < >  --    BUN mg/dL 8   < >  --    CREATININE mg/dL 0.91   < >  --    GLUCOSE mg/dL 101*   < >  --    CALCIUM mg/dL 7.9*   < >  --    ALT (SGPT) U/L 31   < >  --    AST (SGOT) U/L 47*   < >  --    PROBNP pg/mL  --   --  101.4    < > = values in this interval not displayed.     Estimated Creatinine Clearance: 149.5 mL/min (by C-G formula based on SCr of 0.91 mg/dL).  Brief Urine Lab Results  (Last result in the past 365 days)      Color   Clarity   Blood   Leuk Est   Nitrite   Protein   CREAT   Urine HCG        09/20/22 1536 Shyla   Clear   Negative   Negative   Positive   30 mg/dL (1+)                 Microbiology Results (last 10 days)     ** No results found for the last 240 hours. **          ECG/EMG Results (most recent)     None                  US Liver    Result Date: 9/21/2022   1. Cirrhotic liver morphology. No focal liver lesion is identified. 2. Turbulent flow is documented within the portal vein. The portal vein remains patent. 3. Small quantity ascites in the right upper quadrant.  Electronically Signed By-Chanda Clark MD On:9/21/2022 12:05 PM This report was finalized on [id number] by  Chanda Clark MD.        Estimated Creatinine Clearance: 149.5 mL/min (by C-G formula based on SCr of 0.91 mg/dL).    Assessment & Plan   Assessment/Plan       Active Hospital Problems    Diagnosis  POA   • Elevated liver enzymes [R74.8]  Yes      Resolved Hospital Problems   No resolved problems to display.     Cirrhosis with acute alcoholic  hepatitis  -Sodium: 135, potassium: 3.3  -ALT: 31, AST: 47, albumin: 2.90, bilirubin: 8.0  -Urine tox screen positive for amphetamines, benzodiazepines, methadone, opiates, oxycodone and THC  -Ultrasound of the liver showed a cirrhotic liver morphology with no focal lesion identified and turbulent flow documented within the portal vein though the portal vein was noted as patent with small quantity of ascites in the right upper quadrant  -INR: 1.46  -Patient given IV Lasix in the ED  -GI consulted who recommended initiation of p.o. Lasix as well as spironolactone along with lactulose, zinc, folic acid, thiamine, multivitamin and PPI with a low-sodium diet also recommended  -EGD planned as outpatient            VTE Prophylaxis -   Mechanical Order History:      Ordered        09/20/22 1758  Place Sequential Compression Device  Once            09/20/22 1758  Maintain Sequential Compression Device  Continuous                    Pharmalogical Order History:     None          CODE STATUS:    Code Status and Medical Interventions:   Ordered at: 09/21/22 0651     Code Status (Patient has no pulse and is not breathing):    CPR (Attempt to Resuscitate)     Medical Interventions (Patient has pulse or is breathing):    Full Support     Release to patient:    Routine Release       This patient has been examined wearing personal protective equipment.     I discussed the patient's findings and my recommendations with patient and nursing staff.      Signature:Electronically signed by Greg Marcial PA-C, 09/21/22, 2:37 PM EDT.              Electronically signed by Greg Marcial PA-C at 09/21/22 1438          Emergency Department Notes      Karen Godinez APRN at 09/20/22 1445          Subjective   History of Present Illness  Patient is a 36-year-old white male who presents with complaints of abdominal swelling, lower extremity swelling and general unwell feeling.  States he has been ill for couple of weeks.  He states  "he was just released from [facility] [place]note 2[facility]  He states he was admitted there for the same complaints.  He states he was told that he had a bad liver and a bad gallbladder.  He states he was told that he also had hepatitis.  He states he was told he also needed his gallbladder removed.  He states he was awaiting transfer to a hospital in [place] to see \"a liver specialist\" but states that they did not have a room for him so they discharged him.  States he has been out of the hospital for 4 days and continues to have the symptoms.  He denies any significant abdominal pain.  He denies any nausea vomiting diarrhea black or bloody stools.  He denies any urinary complaint.  He states prior to his admission to the hospital he would drink approximately 1 pint of hard liquor daily.  He admits to occasional marijuana use but denies any other illicit drug use.        Review of Systems   Constitutional: Negative for chills and fever.   HENT: Negative.    Eyes: Negative.    Respiratory: Negative.    Cardiovascular: Positive for leg swelling. Negative for chest pain and palpitations.   Gastrointestinal: Positive for abdominal distention. Negative for abdominal pain, blood in stool, constipation, diarrhea, nausea and vomiting.   Genitourinary: Negative.    Musculoskeletal: Negative.    Skin: Negative.    Neurological: Positive for weakness.       No past medical history on file.    No Known Allergies    No past surgical history on file.    No family history on file.    Social History     Socioeconomic History   • Marital status:            Objective   Physical Exam  Vital signs and triage nurse note reviewed.  Constitutional: Awake, alert; well-developed and well-nourished. No acute distress is noted.  Obese.  HEENT: Normocephalic, atraumatic; pupils are PERRL with intact EOM; oropharynx is pink and moist without exudate or erythema.  No drooling or pooling of oral secretions.  Scleral icterus.  Neck: Supple, full " range of motion without pain; no cervical lymphadenopathy. Normal phonation.  Cardiovascular: Mildly tachycardic rate and rhythm, normal S1-S2.  No murmur noted.  Pulmonary: Respiratory effort regular nonlabored, breath sounds clear to auscultation all fields.  Abdomen: Soft, nontender, mildly distended with normoactive bowel sounds; no rebound or guarding.  There is pitting edema noted to the abdominal wall.  There is no erythema.  Musculoskeletal: Independent range of motion of all extremities with no palpable tenderness.  There is pitting edema noted in both lower extremities from the waist down through the feet.  There is no redness.  Negative Edward.  Neuro: Alert oriented x3, speech is clear and appropriate, GCS 15.    Skin: Flesh tone, warm, dry, intact; no erythematous or petechial rash or lesion.      Procedures          ED Course      Labs Reviewed   COMPREHENSIVE METABOLIC PANEL - Abnormal; Notable for the following components:       Result Value    Glucose 124 (*)     Sodium 134 (*)     CO2 21.0 (*)     Calcium 7.9 (*)     Total Protein 4.8 (*)     Albumin 3.00 (*)     AST (SGOT) 50 (*)     Alkaline Phosphatase 166 (*)     Total Bilirubin 8.1 (*)     All other components within normal limits    Narrative:     GFR Normal >60  Chronic Kidney Disease <60  Kidney Failure <15     PROTIME-INR - Abnormal; Notable for the following components:    Protime 14.7 (*)     INR 1.46 (*)     All other components within normal limits   LIPASE - Abnormal; Notable for the following components:    Lipase 72 (*)     All other components within normal limits   URINALYSIS W/ MICROSCOPIC IF INDICATED (NO CULTURE) - Abnormal; Notable for the following components:    Color, UA Shyla (*)     Glucose,  mg/dL (Trace) (*)     Ketones, UA Trace (*)     Bilirubin, UA Large (3+) (*)     Protein, UA 30 mg/dL (1+) (*)     Nitrite, UA Positive (*)     Urobilinogen, UA 4.0 E.U./dL (*)     All other components within normal limits    URINE DRUG SCREEN - Abnormal; Notable for the following components:    Amphet/Methamphet, Screen Positive (*)     Benzodiazepine Screen, Urine Positive (*)     Opiate Screen Positive (*)     THC, Screen, Urine Positive (*)     Methadone Screen, Urine Positive (*)     Oxycodone Screen, Urine Positive (*)     All other components within normal limits    Narrative:     Negative Thresholds Per Drugs Screened:    Amphetamines                 500 ng/ml  Barbiturates                 200 ng/ml  Benzodiazepines              100 ng/ml  Cocaine                      300 ng/ml  Methadone                    300 ng/ml  Opiates                      300 ng/ml  Oxycodone                    100 ng/ml  THC                           50 ng/ml    The Normal Value for all drugs tested is negative. This report includes final unconfirmed screening results to be used for medical treatment purposes only. Unconfirmed results must not be used for non-medical purposes such as employment or legal testing. Clinical consideration should be applied to any drug of abuse test, particularly when unconfirmed results are used.          All urine drugs of abuse requests without chain of custody are for medical screening purposes only.  False positives are possible.     CBC WITH AUTO DIFFERENTIAL - Abnormal; Notable for the following components:    RBC 3.88 (*)     .8 (*)     MCH 36.1 (*)     Platelets 94 (*)     Lymphocyte % 16.2 (*)     Monocyte % 13.8 (*)     Monocytes, Absolute 1.30 (*)     All other components within normal limits   URINALYSIS, MICROSCOPIC ONLY - Abnormal; Notable for the following components:    WBC, UA 0-2 (*)     Mucus, UA Small/1+ (*)     All other components within normal limits   MAGNESIUM - Normal   BNP (IN-HOUSE) - Normal    Narrative:     Among patients with dyspnea, NT-proBNP is highly sensitive for the detection of acute congestive heart failure. In addition NT-proBNP of <300 pg/ml effectively rules out acute  congestive heart failure with 99% negative predictive value.    Results may be falsely decreased if patient taking Biotin.     ETHANOL    Narrative:     Plasma Ethanol Clinical Symptoms:    ETOH (%)               Clinical Symptom  .01-.05              No apparent influence  .03-.12              Euphoria, Diminished judgment and attention   .09-.25              Impaired comprehension, Muscle incoordination  .18-.30              Confusion, Staggered gait, Slurred speech  .25-.40              Markedly decreased response to stimuli, unable to stand or                        walk, vomitting, sleep or stupor  .35-.50              Comatose, Anesthesia, Subnormal body temperature       CBC AND DIFFERENTIAL    Narrative:     The following orders were created for panel order CBC & Differential.  Procedure                               Abnormality         Status                     ---------                               -----------         ------                     CBC Auto Differential[[id number]]        Abnormal            Final result               Scan Slide[[id number]]                                                                    Please view results for these tests on the individual orders.     No radiology results for the last day  Medications   sodium chloride 0.9 % flush 10 mL (has no administration in time range)   sodium chloride 0.9 % flush 10 mL (has no administration in time range)   sodium chloride 0.9 % flush 10 mL (has no administration in time range)   acetaminophen (TYLENOL) tablet 650 mg (has no administration in time range)   ondansetron (ZOFRAN) injection 4 mg (has no administration in time range)   melatonin tablet 5 mg (has no administration in time range)   furosemide (LASIX) injection 80 mg (80 mg Intravenous Given 9/20/22 8894)                                          MDM  Number of Diagnoses or Management Options  Anasarca  Elevated liver enzymes  Diagnosis management comments: Comorbidities:  Hepatitis, chronic back pain  Differentials: Hepatitis, liver cirrhosis and failure, cholecystitis, cholelithiasis, pancreatitis, ascites;this list is not all inclusive and does not constitute the entirety of considered causes  Discussion with provider:  Radiology interpretation: X-rays reviewed by me and interpreted by radiologist:   Lab interpretation: Labs viewed by me significant for: As above    Patient had IV established.  He had labs obtained.    Work-up: CBC significant for platelets of 94.  Metabolic panel significant for AST 50, alk phos 166, total bili 8.1.  proBNP within normal limits.  Urinalysis shows sakina-colored urine with 100 glucose, trace ketones, moderate bilirubin, trace protein, positive nitrites, 0-2 WBCs.  Urine drug screen positive for amphetamines, benzodiazepines, opiates, THC, methadone, oxycodone.    Medical records were obtained from patient's visit at [facility] and revealed the following results: Platelets 71, potassium 3.2, alk phos 219, AST 70, total bili 12.1.  CT of the abdomen pelvis performed on 9/14/2022 showed gallbladder distention and intrahepatic biliary ductal dilation suspicious for acute cholecystitis with biliary obstruction in the setting of jaundice.  Moderate volume ascites.  Edematous appearing small and large bowel in the upper abdomen which is likely secondary to the presence of ascites.  MRCP performed on 9/15/2022 showed mild parenchymal heterogeneity in the liver with mild steatosis.  No evidence for common duct stone.  Mild to moderate ascites.    On reexamination, the patient is resting comfortably and in no distress.  He has no new complaints.  His abdomen is soft and nontender.  He has a normal white blood cell count.  He is afebrile and hemodynamically stable.  States has not had any alcohol in 8 days.  He reports no history of alcohol withdrawal though states he is on chronic benzodiazepines and narcotics for his chronic back pain.  He has no complaints  of feeling anxious.  He is not tremulous.  Mildly tachycardic with a heart rate around 110.  Blood pressure stable and he is afebrile.    Discussed patient's findings with him today.  Lab work appears improved from labs that were done at outlying facility a few days ago.  He was given a dose of IV Lasix for his edema which appears to be his greatest complaint here today.  He has no chest pain or shortness of breath.    He will be placed in observation for GI consultation and diuretics.      Diagnosis and treatment plan discussed with patient.  Patient agreeable to plan.            Amount and/or Complexity of Data Reviewed  Clinical lab tests: ordered and reviewed    Patient Progress  Patient progress: stable      Final diagnoses:   Elevated liver enzymes   Anasarca       ED Disposition  ED Disposition     ED Disposition   Decision to Admit    Condition   --    Comment   --             No follow-up provider specified.       Medication List      No changes were made to your prescriptions during this visit.          Kaern Godinez APRN  09/20/22 1908      Electronically signed by Karen Godinez APRN at 09/20/22 1908     Stacey Steward RN at 09/20/22 1541        Pt reports abdominal distention, shortness of breath, swelling in bilateral lower extremities. 2+ pitting edema noted on bilateral lower extremities. sclera show yellow appearance.     Electronically signed by Stacey Steward RN at 09/20/22 1915       Vital Signs (last 2 days)     Date/Time Temp Temp src Pulse Resp BP Patient Position SpO2    09/22/22 1424 98.3 (36.8) Oral 103 16 133/67 Lying 96    09/22/22 1000 97.7 (36.5) Oral 96 16 134/68 Lying 94    09/22/22 0559 97.7 (36.5) Oral 92 17 131/74 Lying 95    09/21/22 2216 98.6 (37) Oral 92 17 130/68 Lying 94    09/21/22 1817 98.3 (36.8) Oral 86 16 141/73 Lying 95    09/21/22 1427 98.4 (36.9) Oral 95 16 149/70 Lying 94    09/21/22 1035 97.9 (36.6) Oral 90 14 116/64 Lying 98    09/21/22 0550 98.1 (36.7)  Oral 89 15 115/69 Lying 94    09/20/22 2240 98.6 (37) Oral 97 17 128/69 Lying 93    09/20/22 2047 98.6 (37) Oral 102 17 132/78 Lying 95    09/20/22 1900 -- -- 113 -- 141/77 -- 94    09/20/22 18:08:43 -- -- 108 -- 151/82 -- 96    09/20/22 16:53:47 -- -- 105 -- 134/73 -- 95    09/20/22 15:37:05 -- -- 107 -- 132/68 -- 96    09/20/22 14:40:31 -- -- 117 -- 130/70 -- 97    09/20/22 1331 98.2 (36.8) Oral 132 26 149/84 Sitting 96        Oxygen Therapy (last 2 days)     Date/Time SpO2 Device (Oxygen Therapy) Flow (L/min) Oxygen Concentration (%) ETCO2 (mmHg)    09/22/22 1600 -- room air -- -- --    09/22/22 1424 96 -- -- -- --    09/22/22 1000 94 -- -- -- --    09/22/22 0800 -- room air -- -- --    09/22/22 0559 95 room air -- -- --    09/22/22 0400 -- room air -- -- --    09/22/22 0000 -- room air -- -- --    09/21/22 2216 94 room air -- -- --    09/21/22 2000 -- room air -- -- --    09/21/22 1817 95 room air -- -- --    09/21/22 1600 -- room air -- -- --    09/21/22 1427 94 room air -- -- --    09/21/22 1200 -- room air -- -- --    09/21/22 1035 98 room air -- -- --    09/21/22 0800 -- room air -- -- --    09/21/22 0550 94 room air -- -- --    09/21/22 0400 -- room air -- -- --    09/21/22 0000 -- room air -- -- --    09/20/22 2240 93 room air -- -- --    09/20/22 2047 95 room air -- -- --    09/20/22 1955 -- room air -- -- --    09/20/22 1900 94 room air -- -- --    09/20/22 18:08:43 96 room air -- -- --    09/20/22 16:53:47 95 -- -- -- --    09/20/22 15:37:05 96 -- -- -- --    09/20/22 14:41:11 -- room air -- -- --    09/20/22 14:40:31 97 room air -- -- --    09/20/22 1331 96 -- -- -- --        Lab Results (last 72 hours)     Procedure Component Value Units Date/Time    Anti-Smooth Muscle Antibody Titer [[id number]] Collected: 09/21/22 1023    Specimen: Blood Updated: 09/22/22 1411     Smooth Muscle Ab 7 Units      Comment:                  Negative                     0 - 19                   Weak positive                20 - 30                   Moderate to strong positive     >30   Actin Antibodies are found in 52-85% of patients with   autoimmune hepatitis or chronic active hepatitis and   in 22% of patients with primary biliary cirrhosis.       Narrative:      Performed at:  01 - [facility]  [id number]  : Michael Ward PhD, Phone:  [phone]    Mitochondrial Antibodies, M2 [[id number]] Collected: 09/21/22 1023    Specimen: Blood Updated: 09/22/22 1411     Mitochondrial Ab <20.0 Units      Comment:                                 Negative    0.0 - 20.0                                  Equivocal  20.1 - 24.9                                  Positive         >24.9  Mitochondrial (M2) Antibodies are found in 90-96% of  patients with primary biliary cirrhosis.       Narrative:      Performed at:  01 - [facility] [address]  [id number]  : Michael Ward PhD, Phone:  [phone]    Copper, Urine - Urine, Clean Catch [[id number]] Updated: 09/22/22 1102    Specimen: 24 Hour Urine from Urine, Clean Catch     Comprehensive Metabolic Panel [[id number]]  (Abnormal) Collected: 09/22/22 0332    Specimen: Blood Updated: 09/22/22 0423     Glucose 155 mg/dL      BUN 8 mg/dL      Creatinine 0.95 mg/dL      Sodium 134 mmol/L      Potassium 4.1 mmol/L      Chloride 99 mmol/L      CO2 24.0 mmol/L      Calcium 8.3 mg/dL      Total Protein 5.2 g/dL      Albumin 3.00 g/dL      ALT (SGPT) 33 U/L      AST (SGOT) 50 U/L      Alkaline Phosphatase 171 U/L      Total Bilirubin 6.7 mg/dL      Globulin 2.2 gm/dL      A/G Ratio 1.4 g/dL      BUN/Creatinine Ratio 8.4     Anion Gap 11.0 mmol/L      eGFR 105.7 mL/min/1.73      Comment: National Kidney Foundation and American Society of Nephrology (ASN) Task Force recommended calculation based on the Chronic Kidney Disease Epidemiology Collaboration (CKD-EPI) equation refit without adjustment for race.       Narrative:      GFR Normal  >60  Chronic Kidney Disease <60  Kidney Failure <15      Ammonia [[id number]]  (Abnormal) Collected: 09/22/22 0332    Specimen: Blood Updated: 09/22/22 0415     Ammonia 75 umol/L     Lipase [[id number]]  (Normal) Collected: 09/22/22 0332    Specimen: Blood Updated: 09/22/22 0413     Lipase 50 U/L     Protime-INR [[id number]]  (Abnormal) Collected: 09/22/22 0332    Specimen: Blood Updated: 09/22/22 0359     Protime 15.1 Seconds      INR 1.50    CBC & Differential [[id number]]  (Abnormal) Collected: 09/22/22 0332    Specimen: Blood Updated: 09/22/22 0353    Narrative:      The following orders were created for panel order CBC & Differential.  Procedure                               Abnormality         Status                     ---------                               -----------         ------                     CBC Auto Differential[[id number]]        Abnormal            Final result               Scan Slide[[id number]]                                                                    Please view results for these tests on the individual orders.    CBC Auto Differential [[id number]]  (Abnormal) Collected: 09/22/22 0332    Specimen: Blood Updated: 09/22/22 0353     WBC 5.00 10*3/mm3      RBC 3.46 10*6/mm3      Hemoglobin 12.5 g/dL      Hematocrit 36.9 %      .5 fL      MCH 36.0 pg      MCHC 33.8 g/dL      RDW 14.5 %      RDW-SD 54.3 fl      MPV 10.3 fL      Platelets 67 10*3/mm3      Neutrophil % 74.9 %      Lymphocyte % 13.0 %      Monocyte % 11.5 %      Eosinophil % 0.2 %      Basophil % 0.4 %      Neutrophils, Absolute 3.80 10*3/mm3      Lymphocytes, Absolute 0.70 10*3/mm3      Monocytes, Absolute 0.60 10*3/mm3      Eosinophils, Absolute 0.00 10*3/mm3      Basophils, Absolute 0.00 10*3/mm3      nRBC 0.1 /100 WBC     Hepatitis Panel, Acute [[id number]]  (Normal) Collected: 09/21/22 1023    Specimen: Blood Updated: 09/21/22 1921     Hepatitis B Surface Ag Non-Reactive     Hep A IgM Non-Reactive     Hep B C IgM  Non-Reactive     Hepatitis C Ab Non-Reactive    Narrative:      Results may be falsely decreased if patient taking Biotin.     AFP Tumor Marker [[id number]]  (Normal) Collected: 09/21/22 1023    Specimen: Blood Updated: 09/21/22 1702     ALPHA-FETOPROTEIN 8.04 ng/mL     Narrative:      Alpha Fetoprotein Tumor Marker Reference Range:    0.0-8.3 ng/mL    Note: Normal values apply only to males and nonpregnant females. These results are not interpretable for pregnant females.    Results may be falsely decreased if patient taking Biotin.      Alpha - 1 - Antitrypsin [[id number]]  (Normal) Collected: 09/21/22 0850    Specimen: Blood Updated: 09/21/22 1643     ALPHA -1 ANTITRYPSIN 187 mg/dL     Ceruloplasmin [[id number]]  (Abnormal) Collected: 09/21/22 0223    Specimen: Blood Updated: 09/21/22 1633     Ceruloplasmin 13 mg/dL     Gamma GT [[id number]]  (Abnormal) Collected: 09/21/22 0223    Specimen: Blood Updated: 09/21/22 1633     GGT 68 U/L     Ferritin [[id number]]  (Abnormal) Collected: 09/21/22 0223    Specimen: Blood Updated: 09/21/22 1135     Ferritin 666.80 ng/mL     Narrative:      Results may be falsely decreased if patient taking Biotin.      Extra Tubes [[id number]] Collected: 09/21/22 0850    Specimen: Blood Updated: 09/21/22 1102    Narrative:      The following orders were created for panel order Extra Tubes.  Procedure                               Abnormality         Status                     ---------                               -----------         ------                     Gold Top - SST[[id number]]                                   Final result                 Please view results for these tests on the individual orders.    Gold Top - SST [[id number]] Collected: 09/21/22 0850    Specimen: Blood Updated: 09/21/22 1102     Extra Tube Hold for add-ons.     Comment: Auto resulted.       MELITON [[id number]] Collected: 09/21/22 1023    Specimen: Blood Updated: 09/21/22 1047    Comprehensive Metabolic Panel  [[id number]]  (Abnormal) Collected: 09/21/22 0223    Specimen: Blood Updated: 09/21/22 0317     Glucose 101 mg/dL      BUN 8 mg/dL      Creatinine 0.91 mg/dL      Sodium 135 mmol/L      Potassium 3.3 mmol/L      Chloride 99 mmol/L      CO2 27.0 mmol/L      Calcium 7.9 mg/dL      Total Protein 4.8 g/dL      Albumin 2.90 g/dL      ALT (SGPT) 31 U/L      AST (SGOT) 47 U/L      Alkaline Phosphatase 160 U/L      Total Bilirubin 8.0 mg/dL      Globulin 1.9 gm/dL      A/G Ratio 1.5 g/dL      BUN/Creatinine Ratio 8.8     Anion Gap 9.0 mmol/L      eGFR 111.3 mL/min/1.73      Comment: National Kidney Foundation and American Society of Nephrology (ASN) Task Force recommended calculation based on the Chronic Kidney Disease Epidemiology Collaboration (CKD-EPI) equation refit without adjustment for race.       Narrative:      GFR Normal >60  Chronic Kidney Disease <60  Kidney Failure <15      CBC Auto Differential [[id number]]  (Abnormal) Collected: 09/21/22 0223    Specimen: Blood Updated: 09/21/22 0315     WBC 8.20 10*3/mm3      RBC 3.38 10*6/mm3      Hemoglobin 12.4 g/dL      Hematocrit 37.0 %      .3 fL      MCH 36.6 pg      MCHC 33.5 g/dL      RDW 14.7 %      RDW-SD 56.0 fl      MPV 10.8 fL      Platelets 64 10*3/mm3      Neutrophil % 52.9 %      Lymphocyte % 27.6 %      Monocyte % 14.4 %      Eosinophil % 4.5 %      Basophil % 0.6 %      Neutrophils, Absolute 4.30 10*3/mm3      Lymphocytes, Absolute 2.30 10*3/mm3      Monocytes, Absolute 1.20 10*3/mm3      Eosinophils, Absolute 0.40 10*3/mm3      Basophils, Absolute 0.00 10*3/mm3      nRBC 0.1 /100 WBC     Urine Drug Screen - Urine, Clean Catch [[id number]]  (Abnormal) Collected: 09/20/22 1536    Specimen: Urine, Clean Catch Updated: 09/20/22 1720     Amphet/Methamphet, Screen Positive     Barbiturates Screen, Urine Negative     Benzodiazepine Screen, Urine Positive     Cocaine Screen, Urine Negative     Opiate Screen Positive     THC, Screen, Urine Positive      Methadone Screen, Urine Positive     Oxycodone Screen, Urine Positive    Narrative:      Negative Thresholds Per Drugs Screened:    Amphetamines                 500 ng/ml  Barbiturates                 200 ng/ml  Benzodiazepines              100 ng/ml  Cocaine                      300 ng/ml  Methadone                    300 ng/ml  Opiates                      300 ng/ml  Oxycodone                    100 ng/ml  THC                           50 ng/ml    The Normal Value for all drugs tested is negative. This report includes final unconfirmed screening results to be used for medical treatment purposes only. Unconfirmed results must not be used for non-medical purposes such as employment or legal testing. Clinical consideration should be applied to any drug of abuse test, particularly when unconfirmed results are used.          All urine drugs of abuse requests without chain of custody are for medical screening purposes only.  False positives are possible.      Comprehensive Metabolic Panel [[id number]]  (Abnormal) Collected: 09/20/22 1555    Specimen: Blood Updated: 09/20/22 1622     Glucose 124 mg/dL      BUN 7 mg/dL      Creatinine 0.76 mg/dL      Sodium 134 mmol/L      Potassium 3.5 mmol/L      Chloride 103 mmol/L      CO2 21.0 mmol/L      Calcium 7.9 mg/dL      Total Protein 4.8 g/dL      Albumin 3.00 g/dL      ALT (SGPT) 32 U/L      AST (SGOT) 50 U/L      Alkaline Phosphatase 166 U/L      Total Bilirubin 8.1 mg/dL      Globulin 1.8 gm/dL      A/G Ratio 1.7 g/dL      BUN/Creatinine Ratio 9.2     Anion Gap 10.0 mmol/L      eGFR 118.7 mL/min/1.73      Comment: National Kidney Foundation and American Society of Nephrology (ASN) Task Force recommended calculation based on the Chronic Kidney Disease Epidemiology Collaboration (CKD-EPI) equation refit without adjustment for race.       Narrative:      GFR Normal >60  Chronic Kidney Disease <60  Kidney Failure <15      Urinalysis, Microscopic Only - Urine, Clean Catch  [[id number]]  (Abnormal) Collected: 09/20/22 1536    Specimen: Urine, Clean Catch Updated: 09/20/22 1609     RBC, UA None Seen /HPF      WBC, UA 0-2 /HPF      Bacteria, UA None Seen /HPF      Squamous Epithelial Cells, UA 0-2 /HPF      Hyaline Casts, UA None Seen /LPF      Calcium Oxalate Crystals, UA Moderate/2+ /HPF      Mucus, UA Small/1+ /HPF      Methodology Manual Light Microscopy    BNP [[id number]]  (Normal) Collected: 09/20/22 1509    Specimen: Blood Updated: 09/20/22 1559     proBNP 101.4 pg/mL     Narrative:      Among patients with dyspnea, NT-proBNP is highly sensitive for the detection of acute congestive heart failure. In addition NT-proBNP of <300 pg/ml effectively rules out acute congestive heart failure with 99% negative predictive value.    Results may be falsely decreased if patient taking Biotin.      Urinalysis With Microscopic If Indicated (No Culture) - Urine, Clean Catch [[id number]]  (Abnormal) Collected: 09/20/22 1536    Specimen: Urine, Clean Catch Updated: 09/20/22 1551     Color, UA Shyla     Appearance, UA Clear     pH, UA 5.5     Specific Gravity, UA >=1.030     Glucose,  mg/dL (Trace)     Ketones, UA Trace     Bilirubin, UA Large (3+)     Comment: Confirmation testing is unavailable.  A serum bilirubin is recommended for further assessment.        Blood, UA Negative     Protein, UA 30 mg/dL (1+)     Leuk Esterase, UA Negative     Nitrite, UA Positive     Urobilinogen, UA 4.0 E.U./dL    CBC & Differential [[id number]]  (Abnormal) Collected: 09/20/22 1509    Specimen: Blood Updated: 09/20/22 1538    Narrative:      The following orders were created for panel order CBC & Differential.  Procedure                               Abnormality         Status                     ---------                               -----------         ------                     CBC Auto Differential[[id number]]        Abnormal            Final result               Scan Slide[[id number]]                                                                     Please view results for these tests on the individual orders.    CBC Auto Differential [[id number]]  (Abnormal) Collected: 09/20/22 1509    Specimen: Blood Updated: 09/20/22 1538     WBC 9.20 10*3/mm3      RBC 3.88 10*6/mm3      Hemoglobin 14.0 g/dL      Hematocrit 41.5 %      .8 fL      MCH 36.1 pg      MCHC 33.8 g/dL      RDW 14.3 %      RDW-SD 53.4 fl      MPV 10.8 fL      Platelets 94 10*3/mm3      Neutrophil % 66.6 %      Lymphocyte % 16.2 %      Monocyte % 13.8 %      Eosinophil % 2.7 %      Basophil % 0.7 %      Neutrophils, Absolute 6.20 10*3/mm3      Lymphocytes, Absolute 1.50 10*3/mm3      Monocytes, Absolute 1.30 10*3/mm3      Eosinophils, Absolute 0.30 10*3/mm3      Basophils, Absolute 0.10 10*3/mm3      nRBC 0.1 /100 WBC     Lipase [[id number]]  (Abnormal) Collected: 09/20/22 1509    Specimen: Blood Updated: 09/20/22 1538     Lipase 72 U/L     Ethanol [[id number]] Collected: 09/20/22 1509    Specimen: Blood Updated: 09/20/22 1538     Ethanol % <0.010 %     Narrative:      Plasma Ethanol Clinical Symptoms:    ETOH (%)               Clinical Symptom  .01-.05              No apparent influence  .03-.12              Euphoria, Diminished judgment and attention   .09-.25              Impaired comprehension, Muscle incoordination  .18-.30              Confusion, Staggered gait, Slurred speech  .25-.40              Markedly decreased response to stimuli, unable to stand or                        walk, vomitting, sleep or stupor  .35-.50              Comatose, Anesthesia, Subnormal body temperature        Magnesium [[id number]]  (Normal) Collected: 09/20/22 1509    Specimen: Blood Updated: 09/20/22 1538     Magnesium 2.0 mg/dL     Protime-INR [[id number]]  (Abnormal) Collected: 09/20/22 1509    Specimen: Blood Updated: 09/20/22 1526     Protime 14.7 Seconds      INR 1.46          Imaging Results (Last 48 Hours)     Procedure Component Value Units Date/Time    US  Liver [[id number]] Collected: 22 1204     Updated: 22 1207    Narrative:      DATE OF EXAM:  2022 10:41 AM     PROCEDURE:  US LIVER-     INDICATIONS:  cirrhosis, assess for portal vein thrombus; R74.8-Abnormal levels of  other serum enzymes; R60.1-Generalized edema     COMPARISON:  No Comparisons Available     TECHNIQUE:   Grayscale and color Doppler ultrasound evaluation of the limited abdomen  was performed.     FINDINGS:  The liver size is normal, 17.37 m. The liver echotexture is coarsened  with a surface nodular contour, compatible with the provided history of  cirrhosis. No focal liver lesion is identified. Small quantity ascites  is seen in the right upper quadrant of the abdomen.     There is turbulent flow within the main portal vein. Color flow is seen  within the main portal vein. Portal vein remains patent. The imaged  hepatic veins remain patent.     Common bile duct caliber is normal at 4 mm. No intrahepatic biliary  ductal dilation is seen.        Impression:         1. Cirrhotic liver morphology. No focal liver lesion is identified.  2. Turbulent flow is documented within the portal vein. The portal vein  remains patent.  3. Small quantity ascites in the right upper quadrant.     Electronically Signed By-Chanda Clark MD On:2022 12:05 PM  This report was finalized on  by  Chanda Clark MD.           Physician Progress Notes (all)      Greg Marcial PA-C at 22 1311          [facility]    Patient Name: Daquan Xiong  : 1985  MRN: [id number]  Primary Care Physician: Provider, No Known  Date of admission: 2022     Patient Care Team:  Provider, No Known as PCP - General          Subjective   History Present Illness     Chief Complaint:   Chief Complaint   Patient presents with   • Abdominal Pain     Abd swelling, leg swelling, soa states he was in [facility] and they didn't have beds so he was released.  Pt  "states his liver/glallbladder is going bad.           History of Present Illness  History of Present Illness  Obtained from ED provider HPI on 9/20/2022:  Patient is a 36-year-old white male who presents with complaints of abdominal swelling, lower extremity swelling and general unwell feeling.  States he has been ill for couple of weeks.  He states he was just released from [facility].  He states he was admitted there for the same complaints.  He states he was told that he had a bad liver and a bad gallbladder.  He states he was told that he also had hepatitis.  He states he was told he also needed his gallbladder removed.  He states he was awaiting transfer to a hospital in [place] to see \"a liver specialist\" but states that they did not have a room for him so they discharged him.  States he has been out of the hospital for 4 days and continues to have the symptoms.  He denies any significant abdominal pain.  He denies any nausea vomiting diarrhea black or bloody stools.  He denies any urinary complaint.  He states prior to his admission to the hospital he would drink approximately 1 pint of hard liquor daily.  He admits to occasional marijuana use but denies any other illicit drug use.     9/21/2022:  Patient confirms the HPI noted above including a several week history of increasing edema primarily in his lower extremities with some abdominal distention present as well.  He was seen at an outlying facility for this with initial plans for transfer to tertiary care facility however no bed was available and patient was discharged with minimal improvement noted so I decided to seek help at this facility.  He notes that he has had nothing to drink for approximately the past week and a half and intends to remain abstinent from alcohol.  He does continue to smoke approximately half pack of cigarettes per day.  His lower extremity edema remains present though he feels it is improved significantly following " treatment in the ED and he also feels his abdominal distention is improving somewhat.  He has had a significant amount of urination following treatment.  He does report a sensation of abdominal fullness as well as bloating and also notes that his distention had caused some difficulty with respiration which is improved following treatment.     9/20/2022:  Patient reports he did generally well throughout the night with some improvement in edema felt and no new or acute complaints noted        ROS   ROS   Review of Systems   Constitutional: Negative.   HENT: Negative.    Eyes: Negative.    Cardiovascular: Positive for leg swelling.   Respiratory: Positive for shortness of breath.    Skin: Negative.    Musculoskeletal: Negative.    Gastrointestinal: Positive for bloating and abdominal pain.   Genitourinary: Negative.    Neurological: Negative.    Psychiatric/Behavioral: Negative        Personal History     Past Medical History:   Past Medical History:   Diagnosis Date   • Alcohol abuse     1/2-1 pint a day, sober since 9/12/2022   • Arthritis    • Hypertension    • Sleep apnea        Surgical History:      Past Surgical History:   Procedure Laterality Date   • BACK SURGERY     • WRIST SURGERY             Family History: family history is not on file. Otherwise pertinent FHx was reviewed and unremarkable.     Social History:  reports that he has been smoking. He has been smoking about 0.50 packs per day. He has never used smokeless tobacco. He reports previous alcohol use. He reports previous drug use. Drug: Marijuana.      Medications:  Prior to Admission medications    Medication Sig Start Date End Date Taking? Authorizing Provider   ALPRAZolam (XANAX) 1 MG tablet Take 1 mg by mouth 2 (Two) Times a Day As Needed for Anxiety.   Yes ProviderNathaly MD   HYDROmorphone (DILAUDID) 8 MG tablet Take 8 mg by mouth Every 6 (Six) Hours As Needed for Moderate Pain.   Yes ProviderNathaly MD   gabapentin (NEURONTIN) 400  MG capsule Take 400 mg by mouth 4 (Four) Times a Day.    Provider, MD Nathaly   melatonin 5 MG tablet tablet Take 5 mg by mouth At Night As Needed.    ProviderNathaly MD   methadone (DOLOPHINE) 10 MG tablet Take 10 mg by mouth Every 8 (Eight) Hours,    Nathaly Pop MD   oxyCODONE (ROXICODONE) 20 MG tablet Take 20 mg by mouth 5 (Five) Times a Day As Needed for Moderate Pain.    ProviderNathaly MD       Allergies:    Allergies   Allergen Reactions   • Bee Venom Anaphylaxis   • Scopolamine Anaphylaxis       Objective   Objective     Vital Signs  Temp:  [97.7 °F (36.5 °C)-98.6 °F (37 °C)] 97.7 °F (36.5 °C)  Heart Rate:  [86-96] 96  Resp:  [16-17] 16  BP: (130-149)/(68-74) 134/68  SpO2:  [94 %-95 %] 94 %  on   ;   Device (Oxygen Therapy): room air  Body mass index is 34.7 kg/m².    Physical Exam  Vitals reviewed.   Constitutional:       General: He is not in acute distress.     Appearance: Normal appearance. He is obese. He is not ill-appearing, toxic-appearing or diaphoretic.   HENT:      Head: Normocephalic.      Right Ear: External ear normal.      Left Ear: External ear normal.      Nose: Nose normal.      Mouth/Throat:      Mouth: Mucous membranes are moist.   Eyes:      General: Scleral icterus present.      Extraocular Movements: Extraocular movements intact.   Cardiovascular:      Rate and Rhythm: Normal rate and regular rhythm.      Pulses: Normal pulses.      Heart sounds: Normal heart sounds.   Pulmonary:      Effort: Pulmonary effort is normal.      Breath sounds: Normal breath sounds.   Abdominal:      General: Bowel sounds are normal. There is distension.      Palpations: Abdomen is soft.      Tenderness: There is no abdominal tenderness.   Musculoskeletal:         General: Normal range of motion.      Cervical back: Normal range of motion.      Right lower leg: Edema present.      Left lower leg: Edema present.   Skin:     General: Skin is warm and dry.      Capillary Refill:  Capillary refill takes less than 2 seconds.   Neurological:      General: No focal deficit present.      Mental Status: He is alert and oriented to person, place, and time.   Psychiatric:         Mood and Affect: Mood normal.         Behavior: Behavior normal.         Thought Content: Thought content normal.         Judgment: Judgment normal.           Results Review:  I have personally reviewed most recent  lab results and radiology images and interpretations and agree with findings, most notably: CMP, CBC, urine tox and ultrasound of abdomen.    Results from last 7 days   Lab Units 09/22/22  0332   WBC 10*3/mm3 5.00   HEMOGLOBIN g/dL 12.5*   HEMATOCRIT % 36.9*   PLATELETS 10*3/mm3 67*   INR  1.50*     Results from last 7 days   Lab Units 09/22/22  0332 09/20/22  1555 09/20/22  1509   SODIUM mmol/L 134*   < >  --    POTASSIUM mmol/L 4.1   < >  --    CHLORIDE mmol/L 99   < >  --    CO2 mmol/L 24.0   < >  --    BUN mg/dL 8   < >  --    CREATININE mg/dL 0.95   < >  --    GLUCOSE mg/dL 155*   < >  --    CALCIUM mg/dL 8.3*   < >  --    ALT (SGPT) U/L 33   < >  --    AST (SGOT) U/L 50*   < >  --    PROBNP pg/mL  --   --  101.4    < > = values in this interval not displayed.     Estimated Creatinine Clearance: 143.8 mL/min (by C-G formula based on SCr of 0.95 mg/dL).  Brief Urine Lab Results  (Last result in the past 365 days)      Color   Clarity   Blood   Leuk Est   Nitrite   Protein   CREAT   Urine HCG        09/20/22 1536 Shyla   Clear   Negative   Negative   Positive   30 mg/dL (1+)                 Microbiology Results (last 10 days)     ** No results found for the last 240 hours. **          ECG/EMG Results (most recent)     None                  US Liver    Result Date: 9/21/2022   1. Cirrhotic liver morphology. No focal liver lesion is identified. 2. Turbulent flow is documented within the portal vein. The portal vein remains patent. 3. Small quantity ascites in the right upper quadrant.  Electronically Signed  By-Chanda Clark MD On:9/21/2022 12:05 PM This report was finalized on [id number] by  Chanda Clark MD.        Estimated Creatinine Clearance: 143.8 mL/min (by C-G formula based on SCr of 0.95 mg/dL).    Assessment & Plan   Assessment/Plan       Active Hospital Problems    Diagnosis  POA   • Elevated liver enzymes [R74.8]  Yes      Resolved Hospital Problems   No resolved problems to display.     Cirrhosis with acute alcoholic hepatitis  -Sodium: 135, potassium: 3.3  -ALT: 31, AST: 47, albumin: 2.90, bilirubin: 8.0  -Urine tox screen positive for amphetamines, benzodiazepines, methadone, opiates, oxycodone and THC  -Ultrasound of the liver showed a cirrhotic liver morphology with no focal lesion identified and turbulent flow documented within the portal vein though the portal vein was noted as patent with small quantity of ascites in the right upper quadrant  -INR: 1.46  -Patient given IV Lasix in the ED  -GI consulted who recommended initiation of p.o. Lasix as well as spironolactone along with lactulose, zinc, folic acid, thiamine, multivitamin and PPI with a low-sodium diet also recommended  -EGD planned as outpatient  -GI initiated prednisone which they recommend for a 3-week course and also ordered 24-hour urine to be collected due to low ceruloplasmin with lactulose also added    Obesity (BMI: 34.70)  -Encourage diet lifestyle modifications            VTE Prophylaxis -   Mechanical Order History:      Ordered        09/20/22 1758  Place Sequential Compression Device  Once            09/20/22 1758  Maintain Sequential Compression Device  Continuous                    Pharmalogical Order History:     None          CODE STATUS:    Code Status and Medical Interventions:   Ordered at: 09/21/22 0651     Code Status (Patient has no pulse and is not breathing):    CPR (Attempt to Resuscitate)     Medical Interventions (Patient has pulse or is breathing):    Full Support     Release to patient:    Routine Release        This patient has been examined wearing personal protective equipment.     I discussed the patient's findings and my recommendations with patient and nursing staff.      Signature:Electronically signed by Greg Marcial PA-C, 09/22/22, 1:15 PM EDT.              Electronically signed by Greg Marcial PA-C at 09/22/22 1315     Leilani Mata APRN at 09/22/22 0917     Attestation signed by Haley Calderon MD at 09/22/22 1118    I, the attending physician, have performed the medical decision making for this patient.  Patient seen and examined.  Nurse practitioner findings verified.  Feeling much better.  Appetite has significantly improved ate almost 100% of his food yesterday.  Denying any nausea vomiting.    On examination abdomen is soft nondistended minimally tender.  Labs showed WBC 5 hemoglobin 12 platelets 67.  Bilirubin down to 6.7.  Alk phos 171 AST 50.  Ammonia level 75.  INR 1.5.    Patient with a severe alcoholic hepatitis with underlying end-stage liver disease, fluid negative for SBP.  Clinically doing much better improving on prednisone which will be continued total 3-week 40 mg and subsequently once a day.  Because of a low ceruloplasmin, we will check 24-hour urine once that is collected till tomorrow patient will be discharged on prednisone.  Add lactulose to 3 times a day.  Continue with zinc folic acid thiamine and multivitamin along with the PPI.  We will follow.                   LOS: 0 days   Patient Care Team:  Provider, No Known as PCP - General      Subjective     Interval History:     Subjective: Patient reports that he is feeling some better today.  Denies any abdominal pain.  Reports that he has been eating well without nausea/vomiting.  No bowel movement.      ROS:   No chest pain, shortness of breath, or cough.        Medication Review:     Current Facility-Administered Medications:   •  acetaminophen (TYLENOL) tablet 650 mg, 650 mg, Oral, Q4H PRN, Phill Rogers MD  •   ALPRAZolam (XANAX) tablet 1 mg, 1 mg, Oral, BID PRN, Greg Marcial PA-C, 1 mg at 09/22/22 0806  •  folic acid (FOLVITE) tablet 1 mg, 1 mg, Oral, Daily, Yasmin Matasa D, APRN, 1 mg at 09/22/22 0813  •  furosemide (LASIX) tablet 40 mg, 40 mg, Oral, Daily, Yasmin Matasa D, APRN, 40 mg at 09/22/22 0807  •  gabapentin (NEURONTIN) capsule 400 mg, 400 mg, Oral, 4x Daily, Greg Marcial PA-C  •  HYDROmorphone (DILAUDID) tablet 8 mg, 8 mg, Oral, Q6H PRN, Greg Marcial PA-C, 8 mg at 09/22/22 0806  •  lactulose (CHRONULAC) 10 GM/15ML solution 30 g, 30 g, Oral, BID, Leilani Mata, APRN, 30 g at 09/22/22 0802  •  melatonin tablet 5 mg, 5 mg, Oral, Nightly PRN, Phill Rogers MD  •  multivitamin (THERAGRAN) tablet 1 tablet, 1 tablet, Oral, Daily, Leilani Mata, APRN, 1 tablet at 09/22/22 0806  •  nicotine (NICODERM CQ) 21 MG/24HR patch 1 patch, 1 patch, Transdermal, Q24H, Greg Marcial PA-C, 1 patch at 09/21/22 1429  •  ondansetron (ZOFRAN) injection 4 mg, 4 mg, Intravenous, Q6H PRN, Phill Rogers MD  •  pantoprazole (PROTONIX) EC tablet 40 mg, 40 mg, Oral, BID AC, Flo Matayssa D, APRN, 40 mg at 09/22/22 0806  •  prednisoLONE (ORAPRED) 15 MG/5ML oral solution 40 mg, 40 mg, Oral, Daily, Flo Matayssa D, APRN, 40 mg at 09/22/22 0804  •  riFAXIMin (XIFAXAN) tablet 550 mg, 550 mg, Oral, Q12H, Leilani Mata, APRN, 550 mg at 09/22/22 0806  •  [COMPLETED] Insert peripheral IV, , , Once **AND** sodium chloride 0.9 % flush 10 mL, 10 mL, Intravenous, PRN, Karen Godinez APRN  •  sodium chloride 0.9 % flush 10 mL, 10 mL, Intravenous, Q12H, Phill Rogers MD, 10 mL at 09/22/22 0814  •  sodium chloride 0.9 % flush 10 mL, 10 mL, Intravenous, PRN, Phill Rogers MD  •  spironolactone (ALDACTONE) tablet 100 mg, 100 mg, Oral, Daily, Leilani Mata APRN, 100 mg at 09/22/22 0807  •  thiamine (VITAMIN B-1) tablet 100 mg, 100 mg, Oral, Daily, Leilani Mata APRN, 100 mg at 09/22/22 0807  •  zinc  sulfate (ZINCATE) capsule 220 mg, 220 mg, Oral, Daily, Leilani Mata, APRN, 220 mg at 09/22/22 0808      Objective     Vital Signs  Vitals:    09/21/22 1427 09/21/22 1817 09/21/22 2216 09/22/22 0559   BP: 149/70 141/73 130/68 131/74   BP Location: Right arm Right arm Right arm Right arm   Patient Position: Lying Lying Lying Lying   Pulse: 95 86 92 92   Resp: 16 16 17 17   Temp: 98.4 °F (36.9 °C) 98.3 °F (36.8 °C) 98.6 °F (37 °C) 97.7 °F (36.5 °C)   TempSrc: Oral Oral Oral Oral   SpO2: 94% 95% 94% 95%   Weight:    119 kg (263 lb)   Height:           Physical Exam:     General Appearance:    Awake and alert, in no acute distress   Head:    Normocephalic, without obvious abnormality   Eyes:          Conjunctivae normal, icteric sclera   Throat:   No oral lesions, no thrush, oral mucosa moist   Neck:   No adenopathy, supple, no JVD   Lungs:     respirations regular, even and unlabored   Abdomen:     Soft, non-tender, no rebound or guarding, non-distended   Rectal:     Deferred   Extremities:   No edema, no cyanosis   Skin:   No bruising or rash,  jaundice        Results Review:    CBC    Results from last 7 days   Lab Units 09/22/22  0332 09/21/22  0223 09/20/22  1509   WBC 10*3/mm3 5.00 8.20 9.20   HEMOGLOBIN g/dL 12.5* 12.4* 14.0   PLATELETS 10*3/mm3 67* 64* 94*     CMP   Results from last 7 days   Lab Units 09/22/22  0332 09/21/22  0223 09/20/22  1555 09/20/22  1509   SODIUM mmol/L 134* 135* 134*  --    POTASSIUM mmol/L 4.1 3.3* 3.5  --    CHLORIDE mmol/L 99 99 103  --    CO2 mmol/L 24.0 27.0 21.0*  --    BUN mg/dL 8 8 7  --    CREATININE mg/dL 0.95 0.91 0.76  --    GLUCOSE mg/dL 155* 101* 124*  --    ALBUMIN g/dL 3.00* 2.90* 3.00*  --    BILIRUBIN mg/dL 6.7* 8.0* 8.1*  --    ALK PHOS U/L 171* 160* 166*  --    AST (SGOT) U/L 50* 47* 50*  --    ALT (SGPT) U/L 33 31 32  --    MAGNESIUM mg/dL  --   --   --  2.0   LIPASE U/L 50  --   --  72*   AMMONIA umol/L 75*  --   --   --      Cr Clearance Estimated Creatinine  Clearance: 143.8 mL/min (by C-G formula based on SCr of 0.95 mg/dL).  Coag   Results from last 7 days   Lab Units 09/22/22  0332 09/20/22  1509   INR  1.50* 1.46*     HbA1C No results found for: HGBA1C  Blood Glucose No results found for: POCGLU  Infection     UA    Results from last 7 days   Lab Units 09/20/22  1536   NITRITE UA  Positive*   WBC UA /HPF 0-2*   BACTERIA UA /HPF None Seen   SQUAM EPITHEL UA /HPF 0-2     Radiology(recent) US Liver    Result Date: 9/21/2022   1. Cirrhotic liver morphology. No focal liver lesion is identified. 2. Turbulent flow is documented within the portal vein. The portal vein remains patent. 3. Small quantity ascites in the right upper quadrant.  Electronically Signed By-Chanda Clark MD On:9/21/2022 12:05 PM This report was finalized on [id number] by  Chanda Clark MD.         Assessment & Plan     ASSESSMENT:  -Acute EtOH hepatitis  -Decompensated cirrhosis -suspect due to EtOH use.  Rule out other etiology.  -Ascites  -Jaundice/elevated LFTs  -Nausea  -Constipation  -Hyponatremia/hypokalemia  -Mild macrocytic anemia  -Thrombocytopenia -suspect due to cirrhosis  -EtOH abuse  -Substance abuse  -Hypertension  -Sleep apnea  -History of multiple back surgeries     PLAN:  Patient is a 36-year-old male with history of EtOH abuse who presents with complaints of abdominal distention, lower extremity edema, and jaundice.  The patient was recently seen during an admission at [facility].  When he was unable to be transferred to [place] to see a liver specialist, he was discharged.  MRCP at that time was negative for choledocholithiasis.    Patient reports that he is feeling some better today.  Denies any nausea/vomiting.  Has been eating well.  No bowel movements.  Total bilirubin down to 6.7 today from 8.0 yesterday.  INR 1.5 today from 1.46 yesterday.  Continue prednisolone as patient had been on this during recent admission at [facility].  RUQ US shows cirrhosis, no focal  "liver lesion, small amount of ascites, and patent portal vein.  AFP normal.  Ceruloplasmin is low at 13.  We will plan 24-hour urine copper.  Acute hepatitis panel negative.  Further liver serologies pending.  Await results.  Continue 2 g sodium diet.  Creatinine normal at 0.95.  Continue Lasix, spironolactone, and midodrine.  Ammonia level slightly elevated.  Increase lactulose to 3 times daily dosing as the patient denies any bowel movements overnight.  Continue zinc, folic acid, thiamine, multivitamin, and PPI.  Patient will need outpatient EGD to screen for esophageal varices.  Continue supportive care.      Electronically signed by MONICA Avalos, 22, 9:45 AM EDT.             Electronically signed by Haley Calderon MD at 22 1118          Consult Notes (all)      Denise Gutierrez APRN at 22 1408              [facility] Medicine Services - Consult Note    Patient Name: Daquan Xiong  : 1985  MRN: [id number]  Primary Care Physician:  Provider, No Known  Referring Physician: No Known Provider  Date of admission: 2022    Consults    Subjective      Reason for Consult/ Chief Complaint:abdominal swelling    History of Present Illness: Patient is a 36-year-old white male with a past medical history of GERD, bipolar, anxiety, osteoarthritis, polysubstance abuse, and tobacco abuse who presented to [facility] on 2022 with complaints of abdominal swelling, lower extremity swelling and general unwell feeling.  Stated he has been ill for couple of weeks.  He stated he was just released from [facility].  He stated he was admitted there for the same complaints.  He states he was told that he had a bad liver and a bad gallbladder.  He states he was told that he also had hepatitis.  He states he was told he also needed his gallbladder removed.  He states he was awaiting transfer to a hospital in [place] to see \"a liver specialist\" but states " that they did not have a room for him so they discharged him.  States he has been out of the hospital for 4 days and continues to have the symptoms.  He denies any significant abdominal pain.  He denies any nausea vomiting diarrhea black or bloody stools.  He denies any urinary complaint.  He states prior to his admission to the hospital he would drink approximately 1 pint of hard liquor daily.  He admits to occasional marijuana use but denies any other illicit drug use.    9/21/2022: GI was consulted.  Right upper quadrant ultrasound was ordered.  Patient was started on Lasix and spironolactone.  Plan for outpatient EGD to screen for esophageal varices.    9/22/2022: Hospitalist were contacted to admit patient for further care and management.  Patient currently undergoing urine copper study per GI.  Patient denies any pain at this time.    Review of Systems   Constitutional: Negative.   HENT: Negative.    Cardiovascular: Negative.    Respiratory: Negative.    Skin: Negative.    Musculoskeletal: Negative.    Gastrointestinal: Negative.    Genitourinary: Negative.    Neurological: Negative.    Psychiatric/Behavioral: Negative.         Personal History     Past Medical History:   Diagnosis Date   • Alcohol abuse     1/2-1 pint a day, sober since 9/12/2022   • Arthritis    • Hypertension    • Sleep apnea        Past Surgical History:   Procedure Laterality Date   • BACK SURGERY     • WRIST SURGERY         Family History: family history includes Cirrhosis in his father; Diabetes in his maternal grandfather, maternal grandmother, paternal grandfather, and paternal grandmother; Heart disease in his maternal grandmother. Otherwise pertinent FHx was reviewed and not pertinent to current issue.    Social History:  reports that he has been smoking. He has been smoking about 0.50 packs per day. He has never used smokeless tobacco. He reports previous alcohol use. He reports previous drug use. Drug: Marijuana.    Home  Medications:   ALPRAZolam, HYDROmorphone, gabapentin, melatonin, methadone, and oxyCODONE    Allergies:  Allergies   Allergen Reactions   • Bee Venom Anaphylaxis   • Scopolamine Anaphylaxis         Objective      Vitals:  Temp:  [97.7 °F (36.5 °C)-98.6 °F (37 °C)] 98.3 °F (36.8 °C)  Heart Rate:  [] 103  Resp:  [16-17] 16  BP: (130-141)/(67-74) 133/67    Physical Exam  Vitals reviewed.   Constitutional:       Appearance: Normal appearance. He is obese.   HENT:      Head: Normocephalic and atraumatic.      Nose: Nose normal.      Mouth/Throat:      Mouth: Mucous membranes are moist.      Pharynx: Oropharynx is clear.   Eyes:      Extraocular Movements: Extraocular movements intact.      Conjunctiva/sclera: Conjunctivae normal.      Pupils: Pupils are equal, round, and reactive to light.   Cardiovascular:      Rate and Rhythm: Normal rate and regular rhythm.      Pulses: Normal pulses.      Heart sounds: Normal heart sounds.      Comments: S1, S2 audible  Pulmonary:      Effort: Pulmonary effort is normal.      Breath sounds: Normal breath sounds.      Comments: On room air   Abdominal:      General: Bowel sounds are normal. There is distension.      Palpations: Abdomen is soft.   Musculoskeletal:         General: Normal range of motion.      Cervical back: Normal range of motion.   Skin:     General: Skin is warm and dry.   Neurological:      General: No focal deficit present.      Mental Status: He is alert and oriented to person, place, and time. Mental status is at baseline.   Psychiatric:         Mood and Affect: Mood normal.         Behavior: Behavior normal.         Thought Content: Thought content normal.         Judgment: Judgment normal.          Result Review    Result Review:  I have personally reviewed the results from the time of this admission to 9/22/2022 16:11 EDT and agree with these findings:  [x]  Laboratory  []  Microbiology  []  Radiology  []  EKG/Telemetry   []  Cardiology/Vascular   []   Pathology  []  Old records  []  Other:        Assessment & Plan        Active Hospital Problems:  Active Hospital Problems    Diagnosis    • **Cirrhosis of liver (HCC)    • Elevated liver enzymes    • Tobacco use disorder    • Osteoarthritis    • GERD (gastroesophageal reflux disease)      Doing well with pepcid.     • Anxiety    • Bipolar affective disorder (HCC)    • Polysubstance abuse (HCC)    • BASILIO (obstructive sleep apnea)      Plan:   New onset cirrhosis with acute alcoholic hepatitis   - Alcohol  induced  - Liver US reviewed   -Monitor LFTs  -Ammonia 75, monitor  -Continue Lasix, prednisolone, lactulose, midodrine, Xifaxan, spironolactone, thiamine, and zinc  -EGD planned as outpatient  - 24 hour urine copper ordered per GI   - GI following    Anxiety with bipolar  - Stable  -Continue Xanax    GERD  -Continue PPI    Osteoarthritis  Chronic back pain   - Fall risk precautions   -Continue gabapentin and Dilaudid    Polysubstance abuse  -Encourage cessation  -Patient's urine drug screen positive for amphetamine/methamphetamine, benzos, methadone, opioids, oxycodone, and THC    Obesity  - BMI 34.7  - Ascites likely contributing     This patient has been examined wearing appropriate Personal Protective Equipment. 09/22/22      Signature: Electronically signed by MONICA Clayton, 09/22/22, 4:09 PM EDT.    Electronically signed by Denise Gutierrez APRN at 09/22/22 1611     Leilani Mata APRN at 09/21/22 0945      Consult Orders    1. Inpatient Gastroenterology Consult [[id number]] ordered by Phill Rogers MD at 09/20/22 1750          Attestation signed by Haley Calderon MD at 09/21/22 1336    I, the attending physician, have performed the medical decision making for this patient.  Patient seen and examined.  Nurse practitioner findings verified.  36 years old male with a history of alcohol abuse has been drinking full 8 to 9-year 1 pint almost every day or so initially got admitted at [facility] and have undergone fairly extensive work-up including CT scan of the abdomen MRCP showing ascites with negative SBP as well as MRCP without any biliary ductal dilation was started on a steroid due to increased DF factor.  Patient reports no previous history of any liver disease has not taken any antibiotic or other medication.    Today he is feeling somewhat better still very fatigued tired and nauseous.  On examination abdomen is significantly distended he is tender right upper quadrant area with marked hepatomegaly.  Labs showed WBC 8 hemoglobin 12 platelets 64 AST 47 ALT 37 lipase 72.  INR 1.4.    36 years old male who has end-stage liver disease due to drinking alcohol decompensated with ascites now got admitted with alcoholic hepatitis with elevated DF of more than 42, he has been appropriately started on a steroid which will be continued.  We will check right upper quadrant Doppler.  His ascites note has been negative for SBP.  He will be placed on low-sodium diet and will be started on Lasix and Aldactone as well as we will add midodrine.  Patient was encouraged to avoid drinking in future and he will be started on nutritional supplements.  Follow-up with the labs.                  GI CONSULT  NOTE:    Referring Provider:  Dr. Rogers    Chief complaint: Jaundice, elevated LFTs    Subjective .     History of present illness: Daquan Xiong is a 36 y.o. male with history of EtOH abuse, hypertension, substance abuse, sleep apnea, and multiple back surgeries who presents with complaints of abdominal swelling and lower extremity edema.  The patient had a recent admission at [facility] for the same complaints.  He did undergo a fairly extensive work-up at that time which included CT abdomen/pelvis, paracentesis, and MRCP.  Results are below.  The patient states that they tried to transfer him to [place] to see a liver specialist, but states that no bed was available so they discharged him.  He was  discharged approximately 4 days ago.  He states that he has continued to feel generally unwell which prompted him to present to [facility].  The patient reports that he had no previous knowledge of liver disease prior to his admission at [facility].  He states that he is believes that he may have had some jaundice last year, but did not have any further evaluation at that time.  He has been drinking one half to 1 pint of liquor daily for the past 8 years.  He states that his last drink was on 2022.  He denies any recent new medications.  He does have tattoos.  He believes that he may have been told that he had hepatitis C during his admission at [facility], but we do not have any record of an acute hepatitis panel.  He states that his father  from alcoholic liver disease.  The patient reports that his last bowel movement occurred approximately 8 days ago.  He has seen scant amounts of bright red blood per rectum, but denies any melena.  He does complain of some mild generalized abdominal pain that he describes as sore in nature.  Also complains of abdominal distention.  He has had intermittent nausea, but denies any vomiting.  No heartburn or dysphagia.    9/15/2022 MRCP at [facility] -liver appears normal, mild hepatic steatosis, no hepatic mass, negative for acute cholecystitis, CBD appears normal however, no evidence of choledocholithiasis, normal pancreas, mild splenomegaly  9/15/2022 paracentesis -unsuccessful due to small volume of ascites  2022 CT abdomen/pelvis at [facility] -gallbladder distention and intrahepatic biliary duct dilation suspicious for acute cholecystitis, moderate volume ascites, edematous appearing small and large bowel in the upper abdomen which is likely due to ascites      Endo History:  No record of previous endoscopy.    Past Medical History:  Past Medical History:   Diagnosis Date   • Alcohol abuse     1/2-1 pint a day, sober since 2022   • Arthritis    •  Hypertension    • Sleep apnea        Past Surgical History:  Past Surgical History:   Procedure Laterality Date   • BACK SURGERY     • WRIST SURGERY         Social History:  Social History     Tobacco Use   • Smoking status: Current Every Day Smoker     Packs/day: 0.50   • Smokeless tobacco: Never Used   Vaping Use   • Vaping Use: Never used   Substance Use Topics   • Alcohol use: Not Currently     Comment: sober since 9/12/22   • Drug use: Not Currently     Types: Marijuana       Family History:  History reviewed. No pertinent family history.    Medications:  Medications Prior to Admission   Medication Sig Dispense Refill Last Dose   • ALPRAZolam (XANAX) 1 MG tablet Take 1 mg by mouth 2 (Two) Times a Day As Needed for Anxiety.   9/20/2022 at 2100   • HYDROmorphone (DILAUDID) 8 MG tablet Take 8 mg by mouth Every 6 (Six) Hours As Needed for Moderate Pain.   9/20/2022 at 1800   • gabapentin (NEURONTIN) 400 MG capsule Take 400 mg by mouth 4 (Four) Times a Day.   9/16/2022   • melatonin 5 MG tablet tablet Take 5 mg by mouth At Night As Needed.   9/13/2022   • methadone (DOLOPHINE) 10 MG tablet Take 10 mg by mouth Every 8 (Eight) Hours,   9/15/2022   • oxyCODONE (ROXICODONE) 20 MG tablet Take 20 mg by mouth 5 (Five) Times a Day As Needed for Moderate Pain.          Scheduled Meds:gabapentin, 400 mg, Oral, 4x Daily  sodium chloride, 10 mL, Intravenous, Q12H      Continuous Infusions:   PRN Meds:.•  acetaminophen  •  ALPRAZolam  •  HYDROmorphone  •  melatonin  •  ondansetron  •  [COMPLETED] Insert peripheral IV **AND** sodium chloride  •  sodium chloride    ALLERGIES:  Bee venom and Scopolamine    ROS:  The following systems were reviewed and negative;   Constitution:  No fevers, chills, no unintentional weight loss  Skin: no rash, jaundice  Eyes:  No blurry vision, no eye pain  HENT:  No change in hearing or smell  Resp:  No dyspnea or cough  CV:  No chest pain or palpitations  :  No dysuria, hematuria  Musculoskeletal:   No leg cramps or arthralgias  Neuro:  No tremor, no numbness  Psych:  No depression or confusion    Objective     Vital Signs:   Vitals:    09/20/22 1900 09/20/22 2047 09/20/22 2240 09/21/22 0550   BP: 141/77 132/78 128/69 115/69   BP Location:  Right arm Right arm Right arm   Patient Position:  Lying Lying Lying   Pulse: 113 102 97 89   Resp:  17 17 15   Temp:  98.6 °F (37 °C) 98.6 °F (37 °C) 98.1 °F (36.7 °C)   TempSrc:  Oral Oral Oral   SpO2: 94% 95% 93% 94%   Weight:  118 kg (259 lb 12.8 oz)     Height:           Physical Exam:       General Appearance:    Awake and alert, in no acute distress   Head:    Normocephalic, without obvious abnormality, atraumatic   Throat:   No oral lesions, no thrush, oral mucosa moist   Lungs:     Respirations regular, even and unlabored   Chest Wall:    No abnormalities observed   Abdomen:     Soft, non-tender, no rebound or guarding, mild distention   Rectal:     Deferred   Extremities:   Moves all extremities, bilateral lower extremity edema, no cyanosis   Pulses:   Pulses palpable and equal bilaterally   Skin:   No rash, jaundice, normal palpation   Lymph nodes:   No cervical, supraclavicular or submandibular palpable adenopathy   Neurologic:   Cranial nerves 2 - 12 grossly intact, no asterixis       Results Review:   I reviewed the patient's labs and imaging.  CBC    Results from last 7 days   Lab Units 09/21/22  0223 09/20/22  1509   WBC 10*3/mm3 8.20 9.20   HEMOGLOBIN g/dL 12.4* 14.0   PLATELETS 10*3/mm3 64* 94*     CMP   Results from last 7 days   Lab Units 09/21/22  0223 09/20/22  1555 09/20/22  1509   SODIUM mmol/L 135* 134*  --    POTASSIUM mmol/L 3.3* 3.5  --    CHLORIDE mmol/L 99 103  --    CO2 mmol/L 27.0 21.0*  --    BUN mg/dL 8 7  --    CREATININE mg/dL 0.91 0.76  --    GLUCOSE mg/dL 101* 124*  --    ALBUMIN g/dL 2.90* 3.00*  --    BILIRUBIN mg/dL 8.0* 8.1*  --    ALK PHOS U/L 160* 166*  --    AST (SGOT) U/L 47* 50*  --    ALT (SGPT) U/L 31 32  --    MAGNESIUM  mg/dL  --   --  2.0   LIPASE U/L  --   --  72*     Cr Clearance Estimated Creatinine Clearance: 149.5 mL/min (by C-G formula based on SCr of 0.91 mg/dL).  Coag   Results from last 7 days   Lab Units 09/20/22  1509   INR  1.46*     HbA1C No results found for: HGBA1C  Blood Glucose No results found for: POCGLU  Infection     UA    Results from last 7 days   Lab Units 09/20/22  1536   NITRITE UA  Positive*   WBC UA /HPF 0-2*   BACTERIA UA /HPF None Seen   SQUAM EPITHEL UA /HPF 0-2     Radiology(recent) No radiology results for the last day       ASSESSMENT:  -Acute EtOH hepatitis  -Decompensated cirrhosis -suspect due to EtOH use.  Rule out other etiology.  -Ascites  -Jaundice/elevated LFTs  -Nausea  -Constipation  -Hyponatremia/hypokalemia  -Mild macrocytic anemia  -Thrombocytopenia -suspect due to cirrhosis  -EtOH abuse  -Substance abuse  -Hypertension  -Sleep apnea  -History of multiple back surgeries    PLAN:  Patient is a 36-year-old male with history of EtOH abuse who presents with complaints of abdominal distention, lower extremity edema, and jaundice.  The patient was recently seen during an admission at [facility].  When he was unable to be transferred to [place] to see a liver specialist, he was discharged.  MRCP at that time was negative for choledocholithiasis.    Review of records shows that total bilirubin and LFTs are trending down from his previous admission at [facility].  Continue to monitor.  He reports that last EtOH use was on 9/12/2022.  Complete EtOH cessation indefinitely has been recommended.  We will plan RUQ US with Doppler.  Check AFP.  Send full liver serologies and check acute hepatitis panel.  Paracentesis was attempted at [facility] on 9/15, but was not completed due to insufficient ascites fluid present.  Start 2 g sodium diet.  Start Lasix and spironolactone.  Monitor renal function closely.  Creatinine is currently normal at 0.91.  We will start lactulose, zinc, folic acid,  thiamine, multivitamin, and PPI.  Nutrition was encouraged.  MELD-NA 21. Maddrey's DF 24.  No need for prednisolone at this time.  Patient will need outpatient EGD to screen for esophageal varices.  Supportive care.      I discussed the patients findings and my recommendations with the patient.  I will discuss case with Dr. Calderon and change the plan accordingly.    We appreciate the referral.    Electronically signed by MONICA Avalos, 09/21/22, 9:45 AM EDT.                  Electronically signed by Haley Calderon MD at 09/21/22 5123

## 2022-09-22 NOTE — CONSULTS
"    [facility] Medicine Services - Consult Note    Patient Name: Daquan Xiong  : 1985  MRN: [id number]  Primary Care Physician:  Provider, No Known  Referring Physician: No Known Provider  Date of admission: 2022    Consults    Subjective      Reason for Consult/ Chief Complaint:abdominal swelling    History of Present Illness: Patient is a 36-year-old white male with a past medical history of GERD, bipolar, anxiety, osteoarthritis, polysubstance abuse, and tobacco abuse who presented to [facility] on 2022 with complaints of abdominal swelling, lower extremity swelling and general unwell feeling.  Stated he has been ill for couple of weeks.  He stated he was just released from [facility].  He stated he was admitted there for the same complaints.  He states he was told that he had a bad liver and a bad gallbladder.  He states he was told that he also had hepatitis.  He states he was told he also needed his gallbladder removed.  He states he was awaiting transfer to a hospital in [place] to see \"a liver specialist\" but states that they did not have a room for him so they discharged him.  States he has been out of the hospital for 4 days and continues to have the symptoms.  He denies any significant abdominal pain.  He denies any nausea vomiting diarrhea black or bloody stools.  He denies any urinary complaint.  He states prior to his admission to the hospital he would drink approximately 1 pint of hard liquor daily.  He admits to occasional marijuana use but denies any other illicit drug use.    2022: GI was consulted.  Right upper quadrant ultrasound was ordered.  Patient was started on Lasix and spironolactone.  Plan for outpatient EGD to screen for esophageal varices.    2022: Hospitalist were contacted to admit patient for further care and management.  Patient currently undergoing urine copper study per GI.  Patient denies any pain at this " time.    Review of Systems   Constitutional: Negative.   HENT: Negative.    Cardiovascular: Negative.    Respiratory: Negative.    Skin: Negative.    Musculoskeletal: Negative.    Gastrointestinal: Negative.    Genitourinary: Negative.    Neurological: Negative.    Psychiatric/Behavioral: Negative.         Personal History     Past Medical History:   Diagnosis Date   • Alcohol abuse     1/2-1 pint a day, sober since 9/12/2022   • Arthritis    • Hypertension    • Sleep apnea        Past Surgical History:   Procedure Laterality Date   • BACK SURGERY     • WRIST SURGERY         Family History: family history includes Cirrhosis in his father; Diabetes in his maternal grandfather, maternal grandmother, paternal grandfather, and paternal grandmother; Heart disease in his maternal grandmother. Otherwise pertinent FHx was reviewed and not pertinent to current issue.    Social History:  reports that he has been smoking. He has been smoking about 0.50 packs per day. He has never used smokeless tobacco. He reports previous alcohol use. He reports previous drug use. Drug: Marijuana.    Home Medications:   ALPRAZolam, HYDROmorphone, gabapentin, melatonin, methadone, and oxyCODONE    Allergies:  Allergies   Allergen Reactions   • Bee Venom Anaphylaxis   • Scopolamine Anaphylaxis         Objective      Vitals:  Temp:  [97.7 °F (36.5 °C)-98.6 °F (37 °C)] 98.3 °F (36.8 °C)  Heart Rate:  [] 103  Resp:  [16-17] 16  BP: (130-141)/(67-74) 133/67    Physical Exam  Vitals reviewed.   Constitutional:       Appearance: Normal appearance. He is obese.   HENT:      Head: Normocephalic and atraumatic.      Nose: Nose normal.      Mouth/Throat:      Mouth: Mucous membranes are moist.      Pharynx: Oropharynx is clear.   Eyes:      Extraocular Movements: Extraocular movements intact.      Conjunctiva/sclera: Conjunctivae normal.      Pupils: Pupils are equal, round, and reactive to light.   Cardiovascular:      Rate and Rhythm: Normal rate  and regular rhythm.      Pulses: Normal pulses.      Heart sounds: Normal heart sounds.      Comments: S1, S2 audible  Pulmonary:      Effort: Pulmonary effort is normal.      Breath sounds: Normal breath sounds.      Comments: On room air   Abdominal:      General: Bowel sounds are normal. There is distension.      Palpations: Abdomen is soft.   Musculoskeletal:         General: Normal range of motion.      Cervical back: Normal range of motion.   Skin:     General: Skin is warm and dry.   Neurological:      General: No focal deficit present.      Mental Status: He is alert and oriented to person, place, and time. Mental status is at baseline.   Psychiatric:         Mood and Affect: Mood normal.         Behavior: Behavior normal.         Thought Content: Thought content normal.         Judgment: Judgment normal.          Result Review    Result Review:  I have personally reviewed the results from the time of this admission to 9/22/2022 16:11 EDT and agree with these findings:  [x]  Laboratory  []  Microbiology  []  Radiology  []  EKG/Telemetry   []  Cardiology/Vascular   []  Pathology  []  Old records  []  Other:        Assessment & Plan        Active Hospital Problems:  Active Hospital Problems    Diagnosis    • **Cirrhosis of liver (HCC)    • Elevated liver enzymes    • Tobacco use disorder    • Osteoarthritis    • GERD (gastroesophageal reflux disease)      Doing well with pepcid.     • Anxiety    • Bipolar affective disorder (HCC)    • Polysubstance abuse (HCC)    • BASILIO (obstructive sleep apnea)      Plan:   New onset cirrhosis with acute alcoholic hepatitis   - Alcohol  induced  - Liver US reviewed   -Monitor LFTs  -Ammonia 75, monitor  -Continue Lasix, prednisolone, lactulose, midodrine, Xifaxan, spironolactone, thiamine, and zinc  -EGD planned as outpatient  - 24 hour urine copper ordered per GI   - GI following    Anxiety with bipolar  - Stable  -Continue Xanax    GERD  -Continue  PPI    Osteoarthritis  Chronic back pain   - Fall risk precautions   -Continue gabapentin and Dilaudid    Polysubstance abuse  -Encourage cessation  -Patient's urine drug screen positive for amphetamine/methamphetamine, benzos, methadone, opioids, oxycodone, and THC    Obesity  - BMI 34.7  - Ascites likely contributing     This patient has been examined wearing appropriate Personal Protective Equipment. 09/22/22      Signature: Electronically signed by MONICA Clayton, 09/22/22, 4:09 PM EDT.

## 2022-09-22 NOTE — PROGRESS NOTES
"[facility] Observation Unit progress note    Patient Name: Daquan Xiong  : 1985  MRN: [id number]  Primary Care Physician: Provider, No Known  Date of admission: 2022     Patient Care Team:  Provider, No Known as PCP - General          Subjective   History Present Illness     Chief Complaint:   Chief Complaint   Patient presents with   • Abdominal Pain     Abd swelling, leg swelling, soa states he was in [facility] and they didn't have beds so he was released.  Pt states his liver/glallbladder is going bad.           History of Present Illness  History of Present Illness  Obtained from ED provider HPI on 2022:  Patient is a 36-year-old white male who presents with complaints of abdominal swelling, lower extremity swelling and general unwell feeling.  States he has been ill for couple of weeks.  He states he was just released from [facility].  He states he was admitted there for the same complaints.  He states he was told that he had a bad liver and a bad gallbladder.  He states he was told that he also had hepatitis.  He states he was told he also needed his gallbladder removed.  He states he was awaiting transfer to a hospital in [place] to see \"a liver specialist\" but states that they did not have a room for him so they discharged him.  States he has been out of the hospital for 4 days and continues to have the symptoms.  He denies any significant abdominal pain.  He denies any nausea vomiting diarrhea black or bloody stools.  He denies any urinary complaint.  He states prior to his admission to the hospital he would drink approximately 1 pint of hard liquor daily.  He admits to occasional marijuana use but denies any other illicit drug use.     2022:  Patient confirms the HPI noted above including a several week history of increasing edema primarily in his lower extremities with some abdominal distention present as well.  He was seen at an outlying facility for this with " initial plans for transfer to tertiary care facility however no bed was available and patient was discharged with minimal improvement noted so I decided to seek help at this facility.  He notes that he has had nothing to drink for approximately the past week and a half and intends to remain abstinent from alcohol.  He does continue to smoke approximately half pack of cigarettes per day.  His lower extremity edema remains present though he feels it is improved significantly following treatment in the ED and he also feels his abdominal distention is improving somewhat.  He has had a significant amount of urination following treatment.  He does report a sensation of abdominal fullness as well as bloating and also notes that his distention had caused some difficulty with respiration which is improved following treatment.     9/20/2022:  Patient reports he did generally well throughout the night with some improvement in edema felt and no new or acute complaints noted        ROS   ROS   Review of Systems   Constitutional: Negative.   HENT: Negative.    Eyes: Negative.    Cardiovascular: Positive for leg swelling.   Respiratory: Positive for shortness of breath.    Skin: Negative.    Musculoskeletal: Negative.    Gastrointestinal: Positive for bloating and abdominal pain.   Genitourinary: Negative.    Neurological: Negative.    Psychiatric/Behavioral: Negative        Personal History     Past Medical History:   Past Medical History:   Diagnosis Date   • Alcohol abuse     1/2-1 pint a day, sober since 9/12/2022   • Arthritis    • Hypertension    • Sleep apnea        Surgical History:      Past Surgical History:   Procedure Laterality Date   • BACK SURGERY     • WRIST SURGERY             Family History: family history is not on file. Otherwise pertinent FHx was reviewed and unremarkable.     Social History:  reports that he has been smoking. He has been smoking about 0.50 packs per day. He has never used smokeless tobacco.  He reports previous alcohol use. He reports previous drug use. Drug: Marijuana.      Medications:  Prior to Admission medications    Medication Sig Start Date End Date Taking? Authorizing Provider   ALPRAZolam (XANAX) 1 MG tablet Take 1 mg by mouth 2 (Two) Times a Day As Needed for Anxiety.   Yes Nathaly Pop MD   HYDROmorphone (DILAUDID) 8 MG tablet Take 8 mg by mouth Every 6 (Six) Hours As Needed for Moderate Pain.   Yes Nathaly Pop MD   gabapentin (NEURONTIN) 400 MG capsule Take 400 mg by mouth 4 (Four) Times a Day.    Nathaly Pop MD   melatonin 5 MG tablet tablet Take 5 mg by mouth At Night As Needed.    Nathaly Pop MD   methadone (DOLOPHINE) 10 MG tablet Take 10 mg by mouth Every 8 (Eight) Hours,    Nathaly Pop MD   oxyCODONE (ROXICODONE) 20 MG tablet Take 20 mg by mouth 5 (Five) Times a Day As Needed for Moderate Pain.    Nathaly Pop MD       Allergies:    Allergies   Allergen Reactions   • Bee Venom Anaphylaxis   • Scopolamine Anaphylaxis       Objective   Objective     Vital Signs  Temp:  [97.7 °F (36.5 °C)-98.6 °F (37 °C)] 97.7 °F (36.5 °C)  Heart Rate:  [86-96] 96  Resp:  [16-17] 16  BP: (130-149)/(68-74) 134/68  SpO2:  [94 %-95 %] 94 %  on   ;   Device (Oxygen Therapy): room air  Body mass index is 34.7 kg/m².    Physical Exam  Vitals reviewed.   Constitutional:       General: He is not in acute distress.     Appearance: Normal appearance. He is obese. He is not ill-appearing, toxic-appearing or diaphoretic.   HENT:      Head: Normocephalic.      Right Ear: External ear normal.      Left Ear: External ear normal.      Nose: Nose normal.      Mouth/Throat:      Mouth: Mucous membranes are moist.   Eyes:      General: Scleral icterus present.      Extraocular Movements: Extraocular movements intact.   Cardiovascular:      Rate and Rhythm: Normal rate and regular rhythm.      Pulses: Normal pulses.      Heart sounds: Normal heart sounds.   Pulmonary:       Effort: Pulmonary effort is normal.      Breath sounds: Normal breath sounds.   Abdominal:      General: Bowel sounds are normal. There is distension.      Palpations: Abdomen is soft.      Tenderness: There is no abdominal tenderness.   Musculoskeletal:         General: Normal range of motion.      Cervical back: Normal range of motion.      Right lower leg: Edema present.      Left lower leg: Edema present.   Skin:     General: Skin is warm and dry.      Capillary Refill: Capillary refill takes less than 2 seconds.   Neurological:      General: No focal deficit present.      Mental Status: He is alert and oriented to person, place, and time.   Psychiatric:         Mood and Affect: Mood normal.         Behavior: Behavior normal.         Thought Content: Thought content normal.         Judgment: Judgment normal.           Results Review:  I have personally reviewed most recent lab results and radiology images and interpretations and agree with findings, most notably: CMP, CBC, urine tox and ultrasound of abdomen.    Results from last 7 days   Lab Units 09/22/22  0332   WBC 10*3/mm3 5.00   HEMOGLOBIN g/dL 12.5*   HEMATOCRIT % 36.9*   PLATELETS 10*3/mm3 67*   INR  1.50*     Results from last 7 days   Lab Units 09/22/22  0332 09/20/22  1555 09/20/22  1509   SODIUM mmol/L 134*   < >  --    POTASSIUM mmol/L 4.1   < >  --    CHLORIDE mmol/L 99   < >  --    CO2 mmol/L 24.0   < >  --    BUN mg/dL 8   < >  --    CREATININE mg/dL 0.95   < >  --    GLUCOSE mg/dL 155*   < >  --    CALCIUM mg/dL 8.3*   < >  --    ALT (SGPT) U/L 33   < >  --    AST (SGOT) U/L 50*   < >  --    PROBNP pg/mL  --   --  101.4    < > = values in this interval not displayed.     Estimated Creatinine Clearance: 143.8 mL/min (by C-G formula based on SCr of 0.95 mg/dL).  Brief Urine Lab Results  (Last result in the past 365 days)      Color   Clarity   Blood   Leuk Est   Nitrite   Protein   CREAT   Urine HCG        09/20/22 1536 Shyla   Clear    Negative   Negative   Positive   30 mg/dL (1+)                 Microbiology Results (last 10 days)     ** No results found for the last 240 hours. **          ECG/EMG Results (most recent)     None                  US Liver    Result Date: 9/21/2022   1. Cirrhotic liver morphology. No focal liver lesion is identified. 2. Turbulent flow is documented within the portal vein. The portal vein remains patent. 3. Small quantity ascites in the right upper quadrant.  Electronically Signed By-Chanda Clark MD On:9/21/2022 12:05 PM This report was finalized on [id number] by  Chanda Clark MD.        Estimated Creatinine Clearance: 143.8 mL/min (by C-G formula based on SCr of 0.95 mg/dL).    Assessment & Plan   Assessment/Plan       Active Hospital Problems    Diagnosis  POA   • Elevated liver enzymes [R74.8]  Yes      Resolved Hospital Problems   No resolved problems to display.     Cirrhosis with acute alcoholic hepatitis  -Sodium: 135, potassium: 3.3  -ALT: 31, AST: 47, albumin: 2.90, bilirubin: 8.0  -Urine tox screen positive for amphetamines, benzodiazepines, methadone, opiates, oxycodone and THC  -Ultrasound of the liver showed a cirrhotic liver morphology with no focal lesion identified and turbulent flow documented within the portal vein though the portal vein was noted as patent with small quantity of ascites in the right upper quadrant  -INR: 1.46  -Patient given IV Lasix in the ED  -GI consulted who recommended initiation of p.o. Lasix as well as spironolactone along with lactulose, zinc, folic acid, thiamine, multivitamin and PPI with a low-sodium diet also recommended  -EGD planned as outpatient  -GI initiated prednisone which they recommend for a 3-week course and also ordered 24-hour urine to be collected due to low ceruloplasmin with lactulose also added    Obesity (BMI: 34.70)  -Encourage diet lifestyle modifications            VTE Prophylaxis -   Mechanical Order History:      Ordered        09/20/22  1758  Place Sequential Compression Device  Once            09/20/22 1758  Maintain Sequential Compression Device  Continuous                    Pharmalogical Order History:     None          CODE STATUS:    Code Status and Medical Interventions:   Ordered at: 09/21/22 0651     Code Status (Patient has no pulse and is not breathing):    CPR (Attempt to Resuscitate)     Medical Interventions (Patient has pulse or is breathing):    Full Support     Release to patient:    Routine Release       This patient has been examined wearing personal protective equipment.     I discussed the patient's findings and my recommendations with patient and nursing staff.      Signature:Electronically signed by Greg Marcial PA-C, 09/22/22, 1:15 PM EDT.

## 2022-09-22 NOTE — PLAN OF CARE
Problem: Adult Inpatient Plan of Care  Goal: Plan of Care Review  Outcome: Ongoing, Not Progressing  Flowsheets (Taken 9/22/2022 0055)  Progress: no change  Plan of Care Reviewed With: patient  Outcome Evaluation: Patient being followed by GI, started on new meds, PRN PO dilaudid-pt takes at home, home meds in pharmacy when pt is discharged, VSS, not on tele, will monitor, VSS, pt stable  Goal: Patient-Specific Goal (Individualized)  Outcome: Ongoing, Not Progressing  Goal: Absence of Hospital-Acquired Illness or Injury  Outcome: Ongoing, Not Progressing  Intervention: Identify and Manage Fall Risk  Recent Flowsheet Documentation  Taken 9/22/2022 0000 by Jada Kennedy, RN  Safety Promotion/Fall Prevention: safety round/check completed  Taken 9/21/2022 2200 by Jada Kennedy RN  Safety Promotion/Fall Prevention: safety round/check completed  Taken 9/21/2022 2000 by Jada Kennedy RN  Safety Promotion/Fall Prevention:   safety round/check completed   assistive device/personal items within reach   clutter free environment maintained   lighting adjusted   nonskid shoes/slippers when out of bed   room organization consistent  Intervention: Prevent Skin Injury  Recent Flowsheet Documentation  Taken 9/21/2022 2000 by Jada Kennedy RN  Body Position:   supine   sitting up in bed   position changed independently  Intervention: Prevent and Manage VTE (Venous Thromboembolism) Risk  Recent Flowsheet Documentation  Taken 9/21/2022 2000 by Jada Kennedy RN  Activity Management:   up ad elisha   activity adjusted per tolerance   activity encouraged  Goal: Optimal Comfort and Wellbeing  Outcome: Ongoing, Not Progressing  Intervention: Provide Person-Centered Care  Recent Flowsheet Documentation  Taken 9/21/2022 2000 by Jada Kennedy RN  Trust Relationship/Rapport:   care explained   choices provided   emotional support provided   empathic listening provided   questions answered   questions encouraged   reassurance  provided   thoughts/feelings acknowledged  Goal: Readiness for Transition of Care  Outcome: Ongoing, Not Progressing     Problem: Hypertension Comorbidity  Goal: Blood Pressure in Desired Range  Outcome: Ongoing, Not Progressing  Intervention: Maintain Blood Pressure Management  Recent Flowsheet Documentation  Taken 9/21/2022 2000 by Jada Kennedy RN  Medication Review/Management: medications reviewed     Problem: Pain Chronic (Persistent) (Comorbidity Management)  Goal: Acceptable Pain Control and Functional Ability  Outcome: Ongoing, Not Progressing  Intervention: Manage Persistent Pain  Recent Flowsheet Documentation  Taken 9/21/2022 2000 by Jada Kennedy RN  Sleep/Rest Enhancement: awakenings minimized  Medication Review/Management: medications reviewed  Intervention: Optimize Psychosocial Wellbeing  Recent Flowsheet Documentation  Taken 9/21/2022 2000 by Jada Kennedy RN  Supportive Measures: active listening utilized  Diversional Activities:   television   smartphone     Problem: Pain Chronic (Persistent)  Goal: Acceptable Pain Control and Functional Ability  Outcome: Ongoing, Not Progressing  Intervention: Manage Persistent Pain  Recent Flowsheet Documentation  Taken 9/21/2022 2000 by Jada Kennedy RN  Sleep/Rest Enhancement: awakenings minimized  Medication Review/Management: medications reviewed  Intervention: Optimize Psychosocial Wellbeing  Recent Flowsheet Documentation  Taken 9/21/2022 2000 by Jada Kennedy RN  Supportive Measures: active listening utilized  Diversional Activities:   television   smartphone     Problem: Fluid Volume Excess  Goal: Fluid Balance  Outcome: Ongoing, Not Progressing   Goal Outcome Evaluation:  Plan of Care Reviewed With: patient        Progress: no change  Outcome Evaluation: Patient being followed by GI, started on new meds, PRN PO dilaudid-pt takes at home, home meds in pharmacy when pt is discharged, VSS, not on tele, will monitor, VSS, pt stable

## 2022-09-22 NOTE — CASE MANAGEMENT/SOCIAL WORK
Continued Stay Note  REJI Evans     Patient Name: Daquan Xiong  MRN: [id number]  Today's Date: 9/22/2022    Admit Date: 9/20/2022    Plan: Home with spouse   Discharge Plan     Row Name 09/22/22 1343       Plan    Plan Home with spouse    Plan Comments Pt denies dc needs at this time,but does voice concern about potential cost of discharge medications. Unknown at this time what those will entail. Secure chat sent to  Karolina and PA Chema Marcial to inform them of  patient's concerns.               Discharge Codes    No documentation.               Expected Discharge Date and Time     Expected Discharge Date Expected Discharge Time    Sep 23, 2022         Thea Kang RN, [facility]  Office: [phone]  Fax: [phone]  [email or web address]      I met with patient in room wearing PPE: mask and goggles.     Maintained distance greater than six feet and spent </=15 minutes in the room      Thea Kang RN

## 2022-09-22 NOTE — PROGRESS NOTES
LOS: 0 days   Patient Care Team:  Provider, No Known as PCP - General      Subjective     Interval History:     Subjective: Patient reports that he is feeling some better today.  Denies any abdominal pain.  Reports that he has been eating well without nausea/vomiting.  No bowel movement.      ROS:   No chest pain, shortness of breath, or cough.        Medication Review:     Current Facility-Administered Medications:   •  acetaminophen (TYLENOL) tablet 650 mg, 650 mg, Oral, Q4H PRN, Phill Rogers MD  •  ALPRAZolam (XANAX) tablet 1 mg, 1 mg, Oral, BID PRN, Greg Marcial PA-C, 1 mg at 09/22/22 0806  •  folic acid (FOLVITE) tablet 1 mg, 1 mg, Oral, Daily, Leilani Mata, APRN, 1 mg at 09/22/22 0813  •  furosemide (LASIX) tablet 40 mg, 40 mg, Oral, Daily, Leilani Mata, APRN, 40 mg at 09/22/22 0807  •  gabapentin (NEURONTIN) capsule 400 mg, 400 mg, Oral, 4x Daily, Greg Marcial PA-C  •  HYDROmorphone (DILAUDID) tablet 8 mg, 8 mg, Oral, Q6H PRN, Greg Marcial PA-C, 8 mg at 09/22/22 0806  •  lactulose (CHRONULAC) 10 GM/15ML solution 30 g, 30 g, Oral, BID, Leilani Mata, APRN, 30 g at 09/22/22 0802  •  melatonin tablet 5 mg, 5 mg, Oral, Nightly PRN, Phill Rogers MD  •  multivitamin (THERAGRAN) tablet 1 tablet, 1 tablet, Oral, Daily, Leilani Mata, APRN, 1 tablet at 09/22/22 0806  •  nicotine (NICODERM CQ) 21 MG/24HR patch 1 patch, 1 patch, Transdermal, Q24H, Greg Marcial PA-C, 1 patch at 09/21/22 1429  •  ondansetron (ZOFRAN) injection 4 mg, 4 mg, Intravenous, Q6H PRN, Phill Rogers MD  •  pantoprazole (PROTONIX) EC tablet 40 mg, 40 mg, Oral, BID AC, Leilani Mata D, APRN, 40 mg at 09/22/22 0806  •  prednisoLONE (ORAPRED) 15 MG/5ML oral solution 40 mg, 40 mg, Oral, Daily, Leilani Mata D, APRN, 40 mg at 09/22/22 0804  •  riFAXIMin (XIFAXAN) tablet 550 mg, 550 mg, Oral, Q12H, Leilani Mata, APRN, 550 mg at 09/22/22 0806  •  [COMPLETED] Insert peripheral IV, , , Once **AND**  Health Maintenance Due   Topic Date Due   • HIB Vaccine (3 of 3 - PRP-OMP Series) 05/24/2019   • Pneumococcal Vaccine 0-64 (4 of 4) 05/24/2019   • DTaP/Tdap/Td Vaccine (4 - DTaP) 08/24/2019   • Influenza Vaccine (1 of 2) 09/01/2019       Patient is due for topics as listed above but is not proceeding with Immunization(s) Influenza at this time.     Parent declined Influenza vaccine.    Vaccine Information Statement(s) was given today. This has been reviewed, questions answered, and verbal consent given by Parent for injection(s) and administration of Diphtheria/Tetanus/Pertussis (Dtap), Haemophilus Influenza type b (Hib) and Pneumococcal Conjugate (PCV13).    Patient tolerated without incident. See immunization grid for documentation.             sodium chloride 0.9 % flush 10 mL, 10 mL, Intravenous, PRN, Karen Godinez, APRN  •  sodium chloride 0.9 % flush 10 mL, 10 mL, Intravenous, Q12H, Phill Rogers MD, 10 mL at 09/22/22 0814  •  sodium chloride 0.9 % flush 10 mL, 10 mL, Intravenous, PRN, Phill Rogers MD  •  spironolactone (ALDACTONE) tablet 100 mg, 100 mg, Oral, Daily, Adolfo, Leilani D, APRN, 100 mg at 09/22/22 0807  •  thiamine (VITAMIN B-1) tablet 100 mg, 100 mg, Oral, Daily, Wells, Leilani D, APRN, 100 mg at 09/22/22 0807  •  zinc sulfate (ZINCATE) capsule 220 mg, 220 mg, Oral, Daily, Wells, Leilani D, APRN, 220 mg at 09/22/22 0808      Objective     Vital Signs  Vitals:    09/21/22 1427 09/21/22 1817 09/21/22 2216 09/22/22 0559   BP: 149/70 141/73 130/68 131/74   BP Location: Right arm Right arm Right arm Right arm   Patient Position: Lying Lying Lying Lying   Pulse: 95 86 92 92   Resp: 16 16 17 17   Temp: 98.4 °F (36.9 °C) 98.3 °F (36.8 °C) 98.6 °F (37 °C) 97.7 °F (36.5 °C)   TempSrc: Oral Oral Oral Oral   SpO2: 94% 95% 94% 95%   Weight:    119 kg (263 lb)   Height:           Physical Exam:     General Appearance:    Awake and alert, in no acute distress   Head:    Normocephalic, without obvious abnormality   Eyes:          Conjunctivae normal, icteric sclera   Throat:   No oral lesions, no thrush, oral mucosa moist   Neck:   No adenopathy, supple, no JVD   Lungs:     respirations regular, even and unlabored   Abdomen:     Soft, non-tender, no rebound or guarding, non-distended   Rectal:     Deferred   Extremities:   No edema, no cyanosis   Skin:   No bruising or rash,  jaundice        Results Review:    CBC    Results from last 7 days   Lab Units 09/22/22  0332 09/21/22  0223 09/20/22  1509   WBC 10*3/mm3 5.00 8.20 9.20   HEMOGLOBIN g/dL 12.5* 12.4* 14.0   PLATELETS 10*3/mm3 67* 64* 94*     CMP   Results from last 7 days   Lab Units 09/22/22  0332 09/21/22  0223 09/20/22  1555 09/20/22  1509   SODIUM mmol/L 134* 135* 134*  --    POTASSIUM  mmol/L 4.1 3.3* 3.5  --    CHLORIDE mmol/L 99 99 103  --    CO2 mmol/L 24.0 27.0 21.0*  --    BUN mg/dL 8 8 7  --    CREATININE mg/dL 0.95 0.91 0.76  --    GLUCOSE mg/dL 155* 101* 124*  --    ALBUMIN g/dL 3.00* 2.90* 3.00*  --    BILIRUBIN mg/dL 6.7* 8.0* 8.1*  --    ALK PHOS U/L 171* 160* 166*  --    AST (SGOT) U/L 50* 47* 50*  --    ALT (SGPT) U/L 33 31 32  --    MAGNESIUM mg/dL  --   --   --  2.0   LIPASE U/L 50  --   --  72*   AMMONIA umol/L 75*  --   --   --      Cr Clearance Estimated Creatinine Clearance: 143.8 mL/min (by C-G formula based on SCr of 0.95 mg/dL).  Coag   Results from last 7 days   Lab Units 09/22/22  0332 09/20/22  1509   INR  1.50* 1.46*     HbA1C No results found for: HGBA1C  Blood Glucose No results found for: POCGLU  Infection     UA    Results from last 7 days   Lab Units 09/20/22  1536   NITRITE UA  Positive*   WBC UA /HPF 0-2*   BACTERIA UA /HPF None Seen   SQUAM EPITHEL UA /HPF 0-2     Radiology(recent) US Liver    Result Date: 9/21/2022   1. Cirrhotic liver morphology. No focal liver lesion is identified. 2. Turbulent flow is documented within the portal vein. The portal vein remains patent. 3. Small quantity ascites in the right upper quadrant.  Electronically Signed By-Chanda Clark MD On:9/21/2022 12:05 PM This report was finalized on [id number] by  Chanda Clark MD.         Assessment & Plan     ASSESSMENT:  -Acute EtOH hepatitis  -Decompensated cirrhosis -suspect due to EtOH use.  Rule out other etiology.  -Ascites  -Jaundice/elevated LFTs  -Nausea  -Constipation  -Hyponatremia/hypokalemia  -Mild macrocytic anemia  -Thrombocytopenia -suspect due to cirrhosis  -EtOH abuse  -Substance abuse  -Hypertension  -Sleep apnea  -History of multiple back surgeries     PLAN:  Patient is a 36-year-old male with history of EtOH abuse who presents with complaints of abdominal distention, lower extremity edema, and jaundice.  The patient was recently seen during an admission at [facility].   When he was unable to be transferred to [place] to see a liver specialist, he was discharged.  MRCP at that time was negative for choledocholithiasis.    Patient reports that he is feeling some better today.  Denies any nausea/vomiting.  Has been eating well.  No bowel movements.  Total bilirubin down to 6.7 today from 8.0 yesterday.  INR 1.5 today from 1.46 yesterday.  Continue prednisolone as patient had been on this during recent admission at [facility].  RUQ US shows cirrhosis, no focal liver lesion, small amount of ascites, and patent portal vein.  AFP normal.  Ceruloplasmin is low at 13.  We will plan 24-hour urine copper.  Acute hepatitis panel negative.  Further liver serologies pending.  Await results.  Continue 2 g sodium diet.  Creatinine normal at 0.95.  Continue Lasix, spironolactone, and midodrine.  Ammonia level slightly elevated.  Increase lactulose to 3 times daily dosing as the patient denies any bowel movements overnight.  Continue zinc, folic acid, thiamine, multivitamin, and PPI.  Patient will need outpatient EGD to screen for esophageal varices.  Continue supportive care.      Electronically signed by MONICA Avalos, 09/22/22, 9:45 AM EDT.

## 2022-09-23 ENCOUNTER — READMISSION MANAGEMENT (OUTPATIENT)
Dept: CALL CENTER | Facility: HOSPITAL | Age: 37
End: 2022-09-23

## 2022-09-23 VITALS
DIASTOLIC BLOOD PRESSURE: 61 MMHG | SYSTOLIC BLOOD PRESSURE: 113 MMHG | HEART RATE: 91 BPM | BODY MASS INDEX: 34.96 KG/M2 | WEIGHT: 263.8 LBS | HEIGHT: 73 IN | OXYGEN SATURATION: 95 % | RESPIRATION RATE: 16 BRPM | TEMPERATURE: 97.6 F

## 2022-09-23 LAB
ALBUMIN SERPL-MCNC: 3.3 G/DL (ref 3.5–5.2)
ALBUMIN/GLOB SERPL: 1.6 G/DL
ALP SERPL-CCNC: 179 U/L (ref 39–117)
ALT SERPL W P-5'-P-CCNC: 36 U/L (ref 1–41)
ANA SER QL: NEGATIVE
ANION GAP SERPL CALCULATED.3IONS-SCNC: 8 MMOL/L (ref 5–15)
AST SERPL-CCNC: 56 U/L (ref 1–40)
BASOPHILS # BLD AUTO: 0.1 10*3/MM3 (ref 0–0.2)
BASOPHILS NFR BLD AUTO: 0.6 % (ref 0–1.5)
BILIRUB SERPL-MCNC: 5 MG/DL (ref 0–1.2)
BUN SERPL-MCNC: 7 MG/DL (ref 6–20)
BUN/CREAT SERPL: 8.1 (ref 7–25)
CALCIUM SPEC-SCNC: 8.3 MG/DL (ref 8.6–10.5)
CHLORIDE SERPL-SCNC: 102 MMOL/L (ref 98–107)
CO2 SERPL-SCNC: 25 MMOL/L (ref 22–29)
CREAT SERPL-MCNC: 0.86 MG/DL (ref 0.76–1.27)
DEPRECATED RDW RBC AUTO: 54.3 FL (ref 37–54)
EGFRCR SERPLBLD CKD-EPI 2021: 114.4 ML/MIN/1.73
EOSINOPHIL # BLD AUTO: 0.2 10*3/MM3 (ref 0–0.4)
EOSINOPHIL NFR BLD AUTO: 1.9 % (ref 0.3–6.2)
ERYTHROCYTE [DISTWIDTH] IN BLOOD BY AUTOMATED COUNT: 14.3 % (ref 12.3–15.4)
GLOBULIN UR ELPH-MCNC: 2.1 GM/DL
GLUCOSE SERPL-MCNC: 152 MG/DL (ref 65–99)
HCT VFR BLD AUTO: 37.6 % (ref 37.5–51)
HGB BLD-MCNC: 12.5 G/DL (ref 13–17.7)
INR PPP: 1.54 (ref 0.93–1.1)
LYMPHOCYTES # BLD AUTO: 1.8 10*3/MM3 (ref 0.7–3.1)
LYMPHOCYTES NFR BLD AUTO: 18.9 % (ref 19.6–45.3)
MCH RBC QN AUTO: 36.5 PG (ref 26.6–33)
MCHC RBC AUTO-ENTMCNC: 33.2 G/DL (ref 31.5–35.7)
MCV RBC AUTO: 110.1 FL (ref 79–97)
MONOCYTES # BLD AUTO: 1.3 10*3/MM3 (ref 0.1–0.9)
MONOCYTES NFR BLD AUTO: 13.1 % (ref 5–12)
NEUTROPHILS NFR BLD AUTO: 6.3 10*3/MM3 (ref 1.7–7)
NEUTROPHILS NFR BLD AUTO: 65.5 % (ref 42.7–76)
NRBC BLD AUTO-RTO: 0 /100 WBC (ref 0–0.2)
PLATELET # BLD AUTO: 75 10*3/MM3 (ref 140–450)
PMV BLD AUTO: 10.4 FL (ref 6–12)
POTASSIUM SERPL-SCNC: 4.3 MMOL/L (ref 3.5–5.2)
PROT SERPL-MCNC: 5.4 G/DL (ref 6–8.5)
PROTHROMBIN TIME: 15.5 SECONDS (ref 9.6–11.7)
RBC # BLD AUTO: 3.42 10*6/MM3 (ref 4.14–5.8)
SODIUM SERPL-SCNC: 135 MMOL/L (ref 136–145)
WBC NRBC COR # BLD: 9.6 10*3/MM3 (ref 3.4–10.8)

## 2022-09-23 PROCEDURE — 80053 COMPREHEN METABOLIC PANEL: CPT | Performed by: NURSE PRACTITIONER

## 2022-09-23 PROCEDURE — 85610 PROTHROMBIN TIME: CPT | Performed by: NURSE PRACTITIONER

## 2022-09-23 PROCEDURE — 82525 ASSAY OF COPPER: CPT | Performed by: NURSE PRACTITIONER

## 2022-09-23 PROCEDURE — 82570 ASSAY OF URINE CREATININE: CPT | Performed by: NURSE PRACTITIONER

## 2022-09-23 PROCEDURE — 63710000001 PREDNISOLONE PER 5 MG: Performed by: NURSE PRACTITIONER

## 2022-09-23 PROCEDURE — 81050 URINALYSIS VOLUME MEASURE: CPT | Performed by: NURSE PRACTITIONER

## 2022-09-23 PROCEDURE — 85025 COMPLETE CBC W/AUTO DIFF WBC: CPT | Performed by: NURSE PRACTITIONER

## 2022-09-23 PROCEDURE — G0378 HOSPITAL OBSERVATION PER HR: HCPCS

## 2022-09-23 RX ORDER — THIAMINE MONONITRATE (VIT B1) 100 MG
100 TABLET ORAL DAILY
Qty: 30 TABLET | Refills: 0 | Status: SHIPPED | OUTPATIENT
Start: 2022-09-23 | End: 2022-10-23

## 2022-09-23 RX ORDER — SPIRONOLACTONE 100 MG/1
100 TABLET, FILM COATED ORAL DAILY
Qty: 30 TABLET | Refills: 0 | Status: SHIPPED | OUTPATIENT
Start: 2022-09-23 | End: 2022-10-23

## 2022-09-23 RX ORDER — CHOLECALCIFEROL (VITAMIN D3) 50 MCG
40 CAPSULE ORAL
Qty: 120 CAPSULE | Refills: 0 | Status: SHIPPED | OUTPATIENT
Start: 2022-09-23 | End: 2022-10-23

## 2022-09-23 RX ORDER — FUROSEMIDE 40 MG/1
40 TABLET ORAL DAILY
Qty: 30 TABLET | Refills: 0 | Status: SHIPPED | OUTPATIENT
Start: 2022-09-23

## 2022-09-23 RX ORDER — LACTULOSE 10 G/15ML
30 SOLUTION ORAL 3 TIMES DAILY
Qty: 4257 ML | Refills: 1 | Status: SHIPPED | OUTPATIENT
Start: 2022-09-23

## 2022-09-23 RX ORDER — MULTIPLE VITAMINS W/ MINERALS TAB 9MG-400MCG
1 TAB ORAL DAILY
Qty: 30 TABLET | Refills: 0 | Status: SHIPPED | OUTPATIENT
Start: 2022-09-23

## 2022-09-23 RX ORDER — FOLIC ACID 1 MG/1
1 TABLET ORAL DAILY
Qty: 30 TABLET | Refills: 0 | Status: SHIPPED | OUTPATIENT
Start: 2022-09-23

## 2022-09-23 RX ORDER — PREDNISOLONE 15 MG/5ML
40 SOLUTION ORAL DAILY
Qty: 399 ML | Refills: 0 | Status: SHIPPED | OUTPATIENT
Start: 2022-09-23 | End: 2022-10-23

## 2022-09-23 RX ORDER — ZINC SULFATE 50(220)MG
220 CAPSULE ORAL DAILY
Qty: 30 CAPSULE | Refills: 0 | Status: SHIPPED | OUTPATIENT
Start: 2022-09-23 | End: 2022-10-23

## 2022-09-23 RX ORDER — MIDODRINE HYDROCHLORIDE 10 MG/1
10 TABLET ORAL
Qty: 90 TABLET | Refills: 0 | Status: SHIPPED | OUTPATIENT
Start: 2022-09-23 | End: 2022-10-23

## 2022-09-23 RX ADMIN — MIDODRINE HYDROCHLORIDE 10 MG: 5 TABLET ORAL at 08:44

## 2022-09-23 RX ADMIN — PREDNISOLONE SODIUM PHOSPHATE 40 MG: 15 SOLUTION ORAL at 08:44

## 2022-09-23 RX ADMIN — THERA TABS 1 TABLET: TAB at 08:44

## 2022-09-23 RX ADMIN — ZINC SULFATE 220 MG (50 MG) CAPSULE 220 MG: CAPSULE at 08:44

## 2022-09-23 RX ADMIN — Medication 10 ML: at 08:51

## 2022-09-23 RX ADMIN — Medication 100 MG: at 08:44

## 2022-09-23 RX ADMIN — ALPRAZOLAM 1 MG: 1 TABLET ORAL at 08:50

## 2022-09-23 RX ADMIN — RIFAXIMIN 550 MG: 550 TABLET ORAL at 08:44

## 2022-09-23 RX ADMIN — PANTOPRAZOLE SODIUM 40 MG: 40 TABLET, DELAYED RELEASE ORAL at 08:43

## 2022-09-23 RX ADMIN — FOLIC ACID 1 MG: 1 TABLET ORAL at 08:44

## 2022-09-23 RX ADMIN — HYDROMORPHONE HYDROCHLORIDE 8 MG: 4 TABLET ORAL at 06:04

## 2022-09-23 NOTE — PROGRESS NOTES
LOS: 1 day   Patient Care Team:  Provider, No Known as PCP - General      Subjective     Interval History:     Subjective: Patient reports that he is feeling much better today.  He has been tolerating his diet without nausea/vomiting.  No abdominal pain.  No overt GI bleeding.      ROS:   No chest pain, shortness of breath, or cough.        Medication Review:     Current Facility-Administered Medications:   •  acetaminophen (TYLENOL) tablet 650 mg, 650 mg, Oral, Q4H PRN, Phill Rogers MD  •  ALPRAZolam (XANAX) tablet 1 mg, 1 mg, Oral, BID PRN, Greg Marcial PA-C, 1 mg at 09/22/22 2144  •  folic acid (FOLVITE) tablet 1 mg, 1 mg, Oral, Daily, Lielani Mata, APRN, 1 mg at 09/22/22 0813  •  furosemide (LASIX) tablet 40 mg, 40 mg, Oral, Daily, Leilani Mata, APRN, 40 mg at 09/22/22 0807  •  gabapentin (NEURONTIN) capsule 400 mg, 400 mg, Oral, 4x Daily, Greg Marcial PA-C  •  HYDROmorphone (DILAUDID) tablet 8 mg, 8 mg, Oral, Q6H PRN, Greg Marcial PA-C, 8 mg at 09/23/22 0604  •  lactulose (CHRONULAC) 10 GM/15ML solution 30 g, 30 g, Oral, TID, Leilani Mata, APRN, 30 g at 09/22/22 2143  •  melatonin tablet 5 mg, 5 mg, Oral, Nightly PRN, Phill Rogers MD  •  midodrine (PROAMATINE) tablet 10 mg, 10 mg, Oral, TID AC, Leilani Mata, APRN, 10 mg at 09/22/22 1737  •  multivitamin (THERAGRAN) tablet 1 tablet, 1 tablet, Oral, Daily, Leilani Mata, APRN, 1 tablet at 09/22/22 0806  •  nicotine (NICODERM CQ) 21 MG/24HR patch 1 patch, 1 patch, Transdermal, Q24H, Greg Marcial PA-C, 1 patch at 09/22/22 1515  •  ondansetron (ZOFRAN) injection 4 mg, 4 mg, Intravenous, Q6H PRN, Phill Rogers MD  •  pantoprazole (PROTONIX) EC tablet 40 mg, 40 mg, Oral, BID AC, Leilani Mata APRN, 40 mg at 09/22/22 1737  •  prednisoLONE (ORAPRED) 15 MG/5ML oral solution 40 mg, 40 mg, Oral, Daily, Leilani Mata APRN, 40 mg at 09/22/22 0804  •  riFAXIMin (XIFAXAN) tablet 550 mg, 550 mg, Oral, Q12H,  Leilani Mata, APRN, 550 mg at 09/22/22 2144  •  [COMPLETED] Insert peripheral IV, , , Once **AND** sodium chloride 0.9 % flush 10 mL, 10 mL, Intravenous, PRN, Karen Godinez APRN  •  sodium chloride 0.9 % flush 10 mL, 10 mL, Intravenous, Q12H, Phill Rogers MD, 10 mL at 09/22/22 2144  •  sodium chloride 0.9 % flush 10 mL, 10 mL, Intravenous, PRN, Phill Rogers MD  •  spironolactone (ALDACTONE) tablet 100 mg, 100 mg, Oral, Daily, Yasmin Matasa MOISES, APRN, 100 mg at 09/22/22 0807  •  thiamine (VITAMIN B-1) tablet 100 mg, 100 mg, Oral, Daily, Leilani Mata, APRN, 100 mg at 09/22/22 0807  •  zinc sulfate (ZINCATE) capsule 220 mg, 220 mg, Oral, Daily, Leilani Mata, APRN, 220 mg at 09/22/22 0808      Objective     Vital Signs  Vitals:    09/22/22 1424 09/22/22 1823 09/22/22 2235 09/23/22 0549   BP: 133/67 122/63 133/73 113/61   BP Location: Right arm Right arm Right arm Right arm   Patient Position: Lying Lying Lying Lying   Pulse: 103 96 93 91   Resp: 16 16 17 16   Temp: 98.3 °F (36.8 °C) 98.2 °F (36.8 °C) 97.7 °F (36.5 °C) 97.6 °F (36.4 °C)   TempSrc: Oral Oral Oral Oral   SpO2: 96% 96% 97% 95%   Weight:    120 kg (263 lb 12.8 oz)   Height:           Physical Exam:     General Appearance:    Awake and alert, in no acute distress   Head:    Normocephalic, without obvious abnormality   Eyes:          Conjunctivae normal, icteric sclera   Throat:   No oral lesions, no thrush, oral mucosa moist   Neck:   No adenopathy, supple, no JVD   Lungs:     respirations regular, even and unlabored   Abdomen:     Soft, non-tender, no rebound or guarding, non-distended   Rectal:     Deferred   Extremities:   No edema, no cyanosis   Skin:   No bruising or rash, jaundice        Results Review:    CBC    Results from last 7 days   Lab Units 09/23/22  0335 09/22/22  0332 09/21/22  0223 09/20/22  1509   WBC 10*3/mm3 9.60 5.00 8.20 9.20   HEMOGLOBIN g/dL 12.5* 12.5* 12.4* 14.0   PLATELETS 10*3/mm3 75* 67* 64* 94*     CMP    Results from last 7 days   Lab Units 09/23/22  0335 09/22/22  0332 09/21/22  0223 09/20/22  1555 09/20/22  1509   SODIUM mmol/L 135* 134* 135* 134*  --    POTASSIUM mmol/L 4.3 4.1 3.3* 3.5  --    CHLORIDE mmol/L 102 99 99 103  --    CO2 mmol/L 25.0 24.0 27.0 21.0*  --    BUN mg/dL 7 8 8 7  --    CREATININE mg/dL 0.86 0.95 0.91 0.76  --    GLUCOSE mg/dL 152* 155* 101* 124*  --    ALBUMIN g/dL 3.30* 3.00* 2.90* 3.00*  --    BILIRUBIN mg/dL 5.0* 6.7* 8.0* 8.1*  --    ALK PHOS U/L 179* 171* 160* 166*  --    AST (SGOT) U/L 56* 50* 47* 50*  --    ALT (SGPT) U/L 36 33 31 32  --    MAGNESIUM mg/dL  --   --   --   --  2.0   LIPASE U/L  --  50  --   --  72*   AMMONIA umol/L  --  75*  --   --   --      Cr Clearance Estimated Creatinine Clearance: 159.5 mL/min (by C-G formula based on SCr of 0.86 mg/dL).  Coag   Results from last 7 days   Lab Units 09/23/22  0335 09/22/22  0332 09/20/22  1509   INR  1.54* 1.50* 1.46*     HbA1C No results found for: HGBA1C  Blood Glucose No results found for: POCGLU  Infection     UA    Results from last 7 days   Lab Units 09/20/22  1536   NITRITE UA  Positive*   WBC UA /HPF 0-2*   BACTERIA UA /HPF None Seen   SQUAM EPITHEL UA /HPF 0-2     Radiology(recent) US Liver    Result Date: 9/21/2022   1. Cirrhotic liver morphology. No focal liver lesion is identified. 2. Turbulent flow is documented within the portal vein. The portal vein remains patent. 3. Small quantity ascites in the right upper quadrant.  Electronically Signed By-Chanda Clark MD On:9/21/2022 12:05 PM This report was finalized on [id number] by  Chanda Clark MD.         Assessment & Plan     ASSESSMENT:  -Acute EtOH hepatitis  -Decompensated cirrhosis -suspect due to EtOH use.  Rule out other etiology.  -Ascites  -Jaundice/elevated LFTs  -Nausea  -Constipation  -Hyponatremia/hypokalemia  -Mild macrocytic anemia  -Thrombocytopenia -suspect due to cirrhosis  -EtOH abuse  -Substance abuse  -Hypertension  -Sleep apnea  -History  of multiple back surgeries     PLAN:  Patient is a 36-year-old male with history of EtOH abuse who presents with complaints of abdominal distention, lower extremity edema, and jaundice.  The patient was recently seen during an admission at [facility].  When he was unable to be transferred to [place] to see a liver specialist, he was discharged.  MRCP at that time was negative for choledocholithiasis.    Patient reports that he is feeling much better today.  Tolerating diet without nausea/vomiting.  No overt GI bleeding.  Denies abdominal pain.  Total bilirubin continues to trend down.  5.0 today from 6.7 yesterday.  Continue prednisolone for total of 30 days.  Ceruloplasmin was low at 13.  24-hour urine copper is in progress.  Pending results, patient may need to see ophthalmology as an outpatient.  Acute hepatitis panel negative.  Antimitochondrial antibody and smooth muscle antibody negative.  AFP normal.  RUQ US shows cirrhosis, no focal liver lesion, small amount of ascites, and patent portal vein.  Continue 2 g sodium diet.  Creatinine remains normal at 0.86.  Continue Lasix 40 mg daily, spironolactone 100 mg daily, and midodrine 10 mg 3 times daily.  Also continue lactulose.  Titrate for goal of 2-3 bowel movements daily.  Continue zinc, folic acid, thiamine, multivitamin, and PPI.  Patient will need outpatient EGD to screen for esophageal varices.  Complete EtOH cessation recommended.  Okay for discharge home from GI standpoint.  He can follow-up in our office in 2 weeks after discharge.  GI will see as inpatient as needed.      Electronically signed by MONICA Avalos, 09/23/22, 8:41 AM EDT.

## 2022-09-23 NOTE — PLAN OF CARE
Goal Outcome Evaluation:  Plan of Care Reviewed With: patient           Outcome Evaluation: Pt d/c to home.

## 2022-09-23 NOTE — DISCHARGE SUMMARY
"             [facility]  DISCHARGE SUMMARY    Patient Name: Daquan Xiong  : 1985  MRN: [id number]    Date of Admission: 2022  Discharge Diagnosis: Cirrhosis  Date of Discharge: 2022  Condition stable  Primary Care Physician: Provider, No Known     the intent for the admission was observation status and the inpatient order was entered in error.\"    Presenting Problem:   Anasarca [R60.1]  Elevated liver enzymes [R74.8]    Active and Resolved Hospital Problems:  Active Hospital Problems    Diagnosis POA   • **Cirrhosis of liver (HCC) [K74.60] Yes   • Tobacco use disorder [F17.200] Yes   • Osteoarthritis [M19.90] Yes   • GERD (gastroesophageal reflux disease) [K21.9] Yes   • Anxiety [F41.9] Yes   • Bipolar affective disorder (HCC) [F31.9] Yes   • Polysubstance abuse (HCC) [F19.10] Yes   • BASILIO (obstructive sleep apnea) [G47.33] Yes   • Elevated liver enzymes [R74.8] Yes      Resolved Hospital Problems   No resolved problems to display.         Hospital Course     Hospital Course:  Daquan Xiong is a 36 y.o. male       History of Present Illness: Patient is a 36-year-old white male with a past medical history of GERD, bipolar, anxiety, osteoarthritis, polysubstance abuse, and tobacco abuse who presented to [facility] on 2022 with complaints of abdominal swelling, lower extremity swelling and general unwell feeling.  Stated he has been ill for couple of weeks.  He stated he was just released from [facility].  He stated he was admitted there for the same complaints.  He states he was told that he had a bad liver and a bad gallbladder.  He states he was told that he also had hepatitis.  He states he was told he also needed his gallbladder removed.  He states he was awaiting transfer to a hospital in [place] to see \"a liver specialist\" but states that they did not have a room for him so they discharged him.  States he has been out of the " hospital for 4 days and continues to have the symptoms.  He denies any significant abdominal pain.  He denies any nausea vomiting diarrhea black or bloody stools.  He denies any urinary complaint.  He states prior to his admission to the hospital he would drink approximately 1 pint of hard liquor daily.  He admits to occasional marijuana use but denies any other illicit drug use.     9/21/2022: GI was consulted.  Right upper quadrant ultrasound was ordered.  Patient was started on Lasix and spironolactone.  Plan for outpatient EGD to screen for esophageal varices.     9/22/2022: Hospitalist were contacted to admit patient for further care and management.  Patient currently undergoing urine copper study per GI.  Patient denies any pain at this time.      Assessment/plan    New onset cirrhosis with acute alcoholic hepatitis   - Alcohol  induced  - Liver US reviewed   -Monitor LFTs  -Ammonia 75, monitor  -Continue Lasix, prednisolone, lactulose, midodrine, Xifaxan, spironolactone, thiamine, and zinc  -EGD planned as outpatient  - 24 hour urine copper ordered per GI   - GI recommendation  Patient with a severe alcoholic hepatitis with underlying end-stage liver disease, fluid negative for SBP.  Clinically doing much better improving on prednisone which will be continued total 3-week 40 mg and subsequently once a day.  Because of a low ceruloplasmin, we will check 24-hour urine once that is collected till tomorrow patient will be discharged on prednisone.  Add lactulose to 3 times a day.  Continue with zinc folic acid thiamine and multivitamin along with the PPI.  We will follow.    Low ceruloplasmin  W patient had 24-hour collection for urinary copper and awaiting results  Follow-up for the final results with GI/PCP        Anxiety with bipolar  - Stable  -Continue Xanax     GERD  -Continue PPI     Osteoarthritis  Chronic back pain   - Fall risk precautions   -Continue gabapentin and Dilaudid     Polysubstance  abuse  -Encourage cessation  -Patient's urine drug screen positive for amphetamine/methamphetamine, benzos, methadone, opioids, oxycodone, and THC     Obesity  - BMI 34.7  - Ascites likely contributing .      DISCHARGE Follow Up Recommendations for labs and diagnostics:   Follow-up with PCP soon as possible  Follow-up with GI in 2 to 3 weeks  Minimize opioids and Xanax        Reasons For Change In Medications and Indications for New Medications:      Day of Discharge     Vital Signs:  Temp:  [97.6 °F (36.4 °C)-98.3 °F (36.8 °C)] 97.6 °F (36.4 °C)  Heart Rate:  [] 91  Resp:  [16-17] 16  BP: (113-134)/(61-73) 113/61    Physical Exam:  Physical Exam  Awake alert oriented x3 without distress        Pertinent  and/or Most Recent Results     LAB RESULTS:      Lab 09/23/22 0335 09/22/22 0332 09/21/22 0223 09/20/22  1509   WBC 9.60 5.00 8.20 9.20   HEMOGLOBIN 12.5* 12.5* 12.4* 14.0   HEMATOCRIT 37.6 36.9* 37.0* 41.5   PLATELETS 75* 67* 64* 94*   NEUTROS ABS 6.30 3.80 4.30 6.20   LYMPHS ABS 1.80 0.70 2.30 1.50   MONOS ABS 1.30* 0.60 1.20* 1.30*   EOS ABS 0.20 0.00 0.40 0.30   .1* 106.5* 109.3* 106.8*   PROTIME 15.5* 15.1*  --  14.7*         Lab 09/23/22 0335 09/22/22 0332 09/21/22 0223 09/20/22  1555 09/20/22  1509   SODIUM 135* 134* 135* 134*  --    POTASSIUM 4.3 4.1 3.3* 3.5  --    CHLORIDE 102 99 99 103  --    CO2 25.0 24.0 27.0 21.0*  --    ANION GAP 8.0 11.0 9.0 10.0  --    BUN 7 8 8 7  --    CREATININE 0.86 0.95 0.91 0.76  --    EGFR 114.4 105.7 111.3 118.7  --    GLUCOSE 152* 155* 101* 124*  --    CALCIUM 8.3* 8.3* 7.9* 7.9*  --    MAGNESIUM  --   --   --   --  2.0         Lab 09/23/22  0335 09/22/22  0332 09/21/22 0223 09/20/22  1555 09/20/22  1509   TOTAL PROTEIN 5.4* 5.2* 4.8* 4.8*  --    ALBUMIN 3.30* 3.00* 2.90* 3.00*  --    GLOBULIN 2.1 2.2 1.9 1.8  --    ALT (SGPT) 36 33 31 32  --    AST (SGOT) 56* 50* 47* 50*  --    BILIRUBIN 5.0* 6.7* 8.0* 8.1*  --    ALK PHOS 179* 171* 160* 166*  --     LIPASE  --  50  --   --  72*         Lab 09/23/22  0335 09/22/22  0332 09/20/22  1509   PROBNP  --   --  101.4   PROTIME 15.5* 15.1* 14.7*   INR 1.54* 1.50* 1.46*             Lab 09/21/22  0223   FERRITIN 666.80*         Brief Urine Lab Results  (Last result in the past 365 days)      Color   Clarity   Blood   Leuk Est   Nitrite   Protein   CREAT   Urine HCG        09/20/22 1536 Shyla   Clear   Negative   Negative   Positive   30 mg/dL (1+)               Microbiology Results (last 10 days)     ** No results found for the last 240 hours. **          US Liver    Result Date: 9/21/2022  Impression:  1. Cirrhotic liver morphology. No focal liver lesion is identified. 2. Turbulent flow is documented within the portal vein. The portal vein remains patent. 3. Small quantity ascites in the right upper quadrant.  Electronically Signed By-Chanda Clark MD On:9/21/2022 12:05 PM This report was finalized on [id number] by  Chanda Clark MD.                  Labs Pending at Discharge:  Pending Labs     Order Current Status    MELITON In process          Procedures Performed           Consults:   Consults     Date and Time Order Name Status Description    9/22/2022  1:54 PM Inpatient Hospitalist Consult      9/20/2022  5:58 PM Inpatient Gastroenterology Consult Completed             Discharge Details        Discharge Medications      New Medications      Instructions Start Date   folic acid 1 MG tablet  Commonly known as: FOLVITE   1 mg, Oral, Daily      furosemide 40 MG tablet  Commonly known as: LASIX   40 mg, Oral, Daily      lactulose 10 GM/15ML solution  Commonly known as: CHRONULAC   30 g, Oral, 3 Times Daily      midodrine 10 MG tablet  Commonly known as: PROAMATINE   10 mg, Oral, 3 Times Daily Before Meals      multivitamin tablet tablet   1 tablet, Oral, Daily      pantoprazole 40 MG EC tablet  Commonly known as: PROTONIX   40 mg, Oral, 2 Times Daily Before Meals      prednisoLONE 15 MG/5ML solution  Commonly known  as: ORAPRED   40 mg, Oral, Daily      riFAXIMin 550 MG tablet  Commonly known as: XIFAXAN   550 mg, Oral, Every 12 Hours Scheduled      spironolactone 100 MG tablet  Commonly known as: ALDACTONE   100 mg, Oral, Daily      thiamine 100 MG tablet  Commonly known as: VITAMIN B-1   100 mg, Oral, Daily      zinc sulfate 220 (50 Zn) MG capsule  Commonly known as: ZINCATE   220 mg, Oral, Daily         Continue These Medications      Instructions Start Date   ALPRAZolam 1 MG tablet  Commonly known as: XANAX   1 mg, Oral, 2 Times Daily PRN      gabapentin 400 MG capsule  Commonly known as: NEURONTIN   400 mg, Oral, 4 Times Daily      HYDROmorphone 8 MG tablet  Commonly known as: DILAUDID   8 mg, Oral, Every 6 Hours PRN      melatonin 5 MG tablet tablet   5 mg, Oral, Nightly PRN         Stop These Medications    methadone 10 MG tablet  Commonly known as: DOLOPHINE     oxyCODONE 20 MG tablet  Commonly known as: ROXICODONE            Allergies   Allergen Reactions   • Bee Venom Anaphylaxis   • Scopolamine Anaphylaxis         Discharge Disposition:    Home or Self Care    Diet:  Hospital:  Diet Order   Procedures   • Diet Cardiac; 2gm Na+         Discharge Activity:         CODE STATUS:  Code Status and Medical Interventions:   Ordered at: 09/21/22 0651     Code Status (Patient has no pulse and is not breathing):    CPR (Attempt to Resuscitate)     Medical Interventions (Patient has pulse or is breathing):    Full Support     Release to patient:    Routine Release         No future appointments.    Additional Instructions for the Follow-ups that You Need to Schedule     Discharge Follow-up with PCP   As directed       Currently Documented PCP:    Provider, No Known    PCP Phone Number:    None     Follow Up Details: 5 to 7 days         Discharge Follow-up with Specified Provider: GI   As directed      To: GI               Time spent on Discharge including face to face service:  33 minutes    This patient has been examined  wearing appropriate Personal Protective Equipment and discussed with hospital infection control department and [facility] department. 09/23/22      Signature:     Electronically signed by Chris Lees MD, 09/23/22, 8:27 AM EDT.  [facility] Hospitalist Team

## 2022-09-23 NOTE — DISCHARGE INSTRUCTIONS
Follow-up with PCP soon as possible  Follow-up with GI in 2 to 3 weeks  Minimize opioids and Xanax

## 2022-09-23 NOTE — PLAN OF CARE
Problem: Adult Inpatient Plan of Care  Goal: Plan of Care Review  Outcome: Ongoing, Not Progressing  Flowsheets (Taken 9/23/2022 0035)  Progress: no change  Plan of Care Reviewed With: patient  Outcome Evaluation: Patient still being followed by GI, doing a 24 hour copper urine collection this evening, PRN PO dilaudid, has home meds in pharmacy, VSS, not on tele, will monitor, pt stable  Goal: Patient-Specific Goal (Individualized)  Outcome: Ongoing, Not Progressing  Goal: Absence of Hospital-Acquired Illness or Injury  Outcome: Ongoing, Not Progressing  Intervention: Identify and Manage Fall Risk  Recent Flowsheet Documentation  Taken 9/23/2022 0005 by Jada Kennedy, RN  Safety Promotion/Fall Prevention: safety round/check completed  Taken 9/22/2022 2200 by Jada Kennedy RN  Safety Promotion/Fall Prevention: safety round/check completed  Taken 9/22/2022 2000 by Jada Kennedy RN  Safety Promotion/Fall Prevention:   safety round/check completed   assistive device/personal items within reach   clutter free environment maintained   lighting adjusted   mobility aid in reach   nonskid shoes/slippers when out of bed   room organization consistent  Intervention: Prevent Skin Injury  Recent Flowsheet Documentation  Taken 9/22/2022 2000 by Jada Kennedy, RN  Body Position:   supine   sitting up in bed   position changed independently  Intervention: Prevent and Manage VTE (Venous Thromboembolism) Risk  Recent Flowsheet Documentation  Taken 9/22/2022 2000 by Jada Kennedy, RN  Activity Management:   up ad elisha   activity adjusted per tolerance  Goal: Optimal Comfort and Wellbeing  Outcome: Ongoing, Not Progressing  Intervention: Monitor Pain and Promote Comfort  Recent Flowsheet Documentation  Taken 9/22/2022 2144 by Jada Kennedy, RN  Pain Management Interventions: see MAR  Taken 9/22/2022 2000 by Jada Kennedy, RN  Pain Management Interventions: medication offered but refused  Intervention: Provide  Person-Centered Care  Recent Flowsheet Documentation  Taken 9/22/2022 2000 by Jada Kennedy RN  Trust Relationship/Rapport:   care explained   choices provided   emotional support provided   empathic listening provided   questions answered   questions encouraged   reassurance provided   thoughts/feelings acknowledged  Goal: Readiness for Transition of Care  Outcome: Ongoing, Not Progressing     Problem: Hypertension Comorbidity  Goal: Blood Pressure in Desired Range  Outcome: Ongoing, Not Progressing  Intervention: Maintain Blood Pressure Management  Recent Flowsheet Documentation  Taken 9/22/2022 2000 by Jada Kennedy RN  Medication Review/Management: medications reviewed     Problem: Pain Chronic (Persistent) (Comorbidity Management)  Goal: Acceptable Pain Control and Functional Ability  Outcome: Ongoing, Not Progressing  Intervention: Manage Persistent Pain  Recent Flowsheet Documentation  Taken 9/22/2022 2000 by Jada Kennedy RN  Sleep/Rest Enhancement: awakenings minimized  Medication Review/Management: medications reviewed  Intervention: Develop Pain Management Plan  Recent Flowsheet Documentation  Taken 9/22/2022 2144 by Jada Kennedy, RN  Pain Management Interventions: see MAR  Taken 9/22/2022 2000 by Jada Kennedy RN  Pain Management Interventions: medication offered but refused  Intervention: Optimize Psychosocial Wellbeing  Recent Flowsheet Documentation  Taken 9/22/2022 2000 by Jada Kennedy, RN  Supportive Measures: active listening utilized  Diversional Activities:   smartphone   tablet   television     Problem: Pain Chronic (Persistent)  Goal: Acceptable Pain Control and Functional Ability  Outcome: Ongoing, Not Progressing  Intervention: Manage Persistent Pain  Recent Flowsheet Documentation  Taken 9/22/2022 2000 by Jada Kennedy RN  Sleep/Rest Enhancement: awakenings minimized  Medication Review/Management: medications reviewed  Intervention: Develop Pain Management Plan  Recent  Flowsheet Documentation  Taken 9/22/2022 2144 by Jada Kennedy, RN  Pain Management Interventions: see MAR  Taken 9/22/2022 2000 by Jada Kennedy, RN  Pain Management Interventions: medication offered but refused  Intervention: Optimize Psychosocial Wellbeing  Recent Flowsheet Documentation  Taken 9/22/2022 2000 by Jada Kennedy, RN  Supportive Measures: active listening utilized  Diversional Activities:   smartphone   tablet   television     Problem: Fluid Volume Excess  Goal: Fluid Balance  Outcome: Ongoing, Not Progressing   Goal Outcome Evaluation:  Plan of Care Reviewed With: patient        Progress: no change  Outcome Evaluation: Patient still being followed by GI, doing a 24 hour copper urine collection this evening, PRN PO dilaudid, has home meds in pharmacy, VSS, not on tele, will monitor, pt stable

## 2022-09-24 NOTE — OUTREACH NOTE
Prep Survey    Flowsheet Row Responses   [facility] facility patient discharged from? [facility]   Is LACE score < 7 ? No   Emergency Room discharge w/ pulse ox? No   Eligibility Not Eligible   What are the reasons patient is not eligible? --  [drug abuse]   Does the patient have one of the following disease processes/diagnoses(primary or secondary)? Other   Prep survey completed? Yes          SEMAJ Spence Registered Nurse

## 2022-09-26 NOTE — CASE MANAGEMENT/SOCIAL WORK
Case Management Discharge Note                Selected Continued Care - Discharged on 9/23/2022 Admission date: 9/20/2022 - Discharge disposition: Home or Self Care          Final Discharge Disposition Code: 01 - home or self-care     Transportation Services: Private Car

## 2022-09-27 LAB
COPPER 24H UR-MRATE: 21 UG/24 HR (ref 3–35)
COPPER UR-MCNC: 18 UG/L
COPPER/CREAT UR: 17 UG/G CREAT (ref 0–49)
CREAT UR-MCNC: 1.06 G/L (ref 0.3–3)

## 2022-09-27 NOTE — PAYOR COMM NOTE
"This is discharge notification for Daquan Xiong  Reference/Auth # [id number]  Pt discharged on 9/23/22    PENDING OBSERVATION AUTHORIZATION  PLEASE CALL OR FAX DETERMINATION TO CONTACT LISTED BELOW    Brandy Mercedes RN, BSN  Utilization Review Nurse  [facility]  Direct & confidential phone # [phone]  Fax # [phone]      Daquan Xiong (36 y.o. Male)             Date of Birth   1985    Social Security Number       Address   [address] [place] [address] [place] [facility]    Home Phone   [phone]    MRN   [id number]       [facility]    Marital Status                               Admission Date   9/20/22    Admission Type   Emergency    Admitting Provider   Phill Rogers MD    Attending Provider       Department, Room/Bed   [facility] OBSERVATION, 111/1       Discharge Date   9/23/2022    Discharge Disposition   Home or Self Care    Discharge Destination                               Attending Provider: (none)   Allergies: Bee Venom, Scopolamine    Isolation: None   Infection: None   Code Status: Prior   Advance Care Planning Activity    Ht: 185.4 cm (73\")   Wt: 120 kg (263 lb 12.8 oz)    Admission Cmt: None   Principal Problem: Cirrhosis of liver (HCC) [K74.60]                 Active Insurance as of 9/20/2022     Primary Coverage     Payor Plan Insurance Group Employer/Plan Group    [facility] [employer] [facility] [employer] BLUE [facility] PPO [id number]     Payor Plan Address Payor Plan Phone Number Payor Plan Fax Number Effective Dates    [address] [phone]  1/1/2015 - None Entered    [place] [address]       Subscriber Name Subscriber Birth Date Member ID       ANA XIONG 3/25/1986 [id number]                 Emergency Contacts      (Rel.) Home Phone Work Phone Mobile Phone    Ana Xinog (Spouse) [phone] -- [phone]               Discharge Summary      Chris Lees MD at 09/23/22 0827                       [facility] " "[facility]  DISCHARGE SUMMARY    Patient Name: Daquan Xiong  : 1985  MRN: [id number]    Date of Admission: 2022  Discharge Diagnosis: Cirrhosis  Date of Discharge: 2022  Condition stable  Primary Care Physician: Provider, No Known     the intent for the admission was observation status and the inpatient order was entered in error.\"    Presenting Problem:   Anasarca [R60.1]  Elevated liver enzymes [R74.8]    Active and Resolved Hospital Problems:  Active Hospital Problems    Diagnosis POA   • **Cirrhosis of liver (HCC) [K74.60] Yes   • Tobacco use disorder [F17.200] Yes   • Osteoarthritis [M19.90] Yes   • GERD (gastroesophageal reflux disease) [K21.9] Yes   • Anxiety [F41.9] Yes   • Bipolar affective disorder (HCC) [F31.9] Yes   • Polysubstance abuse (HCC) [F19.10] Yes   • BASILIO (obstructive sleep apnea) [G47.33] Yes   • Elevated liver enzymes [R74.8] Yes      Resolved Hospital Problems   No resolved problems to display.         Hospital Course     Hospital Course:  Daquan Xiong is a 36 y.o. male       History of Present Illness: Patient is a 36-year-old white male with a past medical history of GERD, bipolar, anxiety, osteoarthritis, polysubstance abuse, and tobacco abuse who presented to [facility] on 2022 with complaints of abdominal swelling, lower extremity swelling and general unwell feeling.  Stated he has been ill for couple of weeks.  He stated he was just released from [facility].  He stated he was admitted there for the same complaints.  He states he was told that he had a bad liver and a bad gallbladder.  He states he was told that he also had hepatitis.  He states he was told he also needed his gallbladder removed.  He states he was awaiting transfer to a hospital in [place] to see \"a liver specialist\" but states that they did not have a room for him so they discharged him.  States he has been out of the hospital for 4 days and " continues to have the symptoms.  He denies any significant abdominal pain.  He denies any nausea vomiting diarrhea black or bloody stools.  He denies any urinary complaint.  He states prior to his admission to the hospital he would drink approximately 1 pint of hard liquor daily.  He admits to occasional marijuana use but denies any other illicit drug use.     9/21/2022: GI was consulted.  Right upper quadrant ultrasound was ordered.  Patient was started on Lasix and spironolactone.  Plan for outpatient EGD to screen for esophageal varices.     9/22/2022: Hospitalist were contacted to admit patient for further care and management.  Patient currently undergoing urine copper study per GI.  Patient denies any pain at this time.      Assessment/plan    New onset cirrhosis with acute alcoholic hepatitis   - Alcohol  induced  - Liver US reviewed   -Monitor LFTs  -Ammonia 75, monitor  -Continue Lasix, prednisolone, lactulose, midodrine, Xifaxan, spironolactone, thiamine, and zinc  -EGD planned as outpatient  - 24 hour urine copper ordered per GI   - GI recommendation  Patient with a severe alcoholic hepatitis with underlying end-stage liver disease, fluid negative for SBP.  Clinically doing much better improving on prednisone which will be continued total 3-week 40 mg and subsequently once a day.  Because of a low ceruloplasmin, we will check 24-hour urine once that is collected till tomorrow patient will be discharged on prednisone.  Add lactulose to 3 times a day.  Continue with zinc folic acid thiamine and multivitamin along with the PPI.  We will follow.    Low ceruloplasmin  W patient had 24-hour collection for urinary copper and awaiting results  Follow-up for the final results with GI/PCP        Anxiety with bipolar  - Stable  -Continue Xanax     GERD  -Continue PPI     Osteoarthritis  Chronic back pain   - Fall risk precautions   -Continue gabapentin and Dilaudid     Polysubstance abuse  -Encourage  cessation  -Patient's urine drug screen positive for amphetamine/methamphetamine, benzos, methadone, opioids, oxycodone, and THC     Obesity  - BMI 34.7  - Ascites likely contributing .      DISCHARGE Follow Up Recommendations for labs and diagnostics:   Follow-up with PCP soon as possible  Follow-up with GI in 2 to 3 weeks  Minimize opioids and Xanax        Reasons For Change In Medications and Indications for New Medications:      Day of Discharge     Vital Signs:  Temp:  [97.6 °F (36.4 °C)-98.3 °F (36.8 °C)] 97.6 °F (36.4 °C)  Heart Rate:  [] 91  Resp:  [16-17] 16  BP: (113-134)/(61-73) 113/61    Physical Exam:  Physical Exam  Awake alert oriented x3 without distress        Pertinent  and/or Most Recent Results     LAB RESULTS:      Lab 09/23/22 0335 09/22/22 0332 09/21/22 0223 09/20/22  1509   WBC 9.60 5.00 8.20 9.20   HEMOGLOBIN 12.5* 12.5* 12.4* 14.0   HEMATOCRIT 37.6 36.9* 37.0* 41.5   PLATELETS 75* 67* 64* 94*   NEUTROS ABS 6.30 3.80 4.30 6.20   LYMPHS ABS 1.80 0.70 2.30 1.50   MONOS ABS 1.30* 0.60 1.20* 1.30*   EOS ABS 0.20 0.00 0.40 0.30   .1* 106.5* 109.3* 106.8*   PROTIME 15.5* 15.1*  --  14.7*         Lab 09/23/22 0335 09/22/22 0332 09/21/22 0223 09/20/22  1555 09/20/22  1509   SODIUM 135* 134* 135* 134*  --    POTASSIUM 4.3 4.1 3.3* 3.5  --    CHLORIDE 102 99 99 103  --    CO2 25.0 24.0 27.0 21.0*  --    ANION GAP 8.0 11.0 9.0 10.0  --    BUN 7 8 8 7  --    CREATININE 0.86 0.95 0.91 0.76  --    EGFR 114.4 105.7 111.3 118.7  --    GLUCOSE 152* 155* 101* 124*  --    CALCIUM 8.3* 8.3* 7.9* 7.9*  --    MAGNESIUM  --   --   --   --  2.0         Lab 09/23/22  0335 09/22/22  0332 09/21/22 0223 09/20/22  1555 09/20/22  1509   TOTAL PROTEIN 5.4* 5.2* 4.8* 4.8*  --    ALBUMIN 3.30* 3.00* 2.90* 3.00*  --    GLOBULIN 2.1 2.2 1.9 1.8  --    ALT (SGPT) 36 33 31 32  --    AST (SGOT) 56* 50* 47* 50*  --    BILIRUBIN 5.0* 6.7* 8.0* 8.1*  --    ALK PHOS 179* 171* 160* 166*  --    LIPASE  --  50  --    --  72*         Lab 09/23/22  0335 09/22/22  0332 09/20/22  1509   PROBNP  --   --  101.4   PROTIME 15.5* 15.1* 14.7*   INR 1.54* 1.50* 1.46*             Lab 09/21/22  0223   FERRITIN 666.80*         Brief Urine Lab Results  (Last result in the past 365 days)      Color   Clarity   Blood   Leuk Est   Nitrite   Protein   CREAT   Urine HCG        09/20/22 1536 Shyla   Clear   Negative   Negative   Positive   30 mg/dL (1+)               Microbiology Results (last 10 days)     ** No results found for the last 240 hours. **          US Liver    Result Date: 9/21/2022  Impression:  1. Cirrhotic liver morphology. No focal liver lesion is identified. 2. Turbulent flow is documented within the portal vein. The portal vein remains patent. 3. Small quantity ascites in the right upper quadrant.  Electronically Signed By-Chanda Clark MD On:9/21/2022 12:05 PM This report was finalized on [id number] by  Chanda Clark MD.                  Labs Pending at Discharge:  Pending Labs     Order Current Status    MELITON In process          Procedures Performed           Consults:   Consults     Date and Time Order Name Status Description    9/22/2022  1:54 PM Inpatient Hospitalist Consult      9/20/2022  5:58 PM Inpatient Gastroenterology Consult Completed             Discharge Details        Discharge Medications      New Medications      Instructions Start Date   folic acid 1 MG tablet  Commonly known as: FOLVITE   1 mg, Oral, Daily      furosemide 40 MG tablet  Commonly known as: LASIX   40 mg, Oral, Daily      lactulose 10 GM/15ML solution  Commonly known as: CHRONULAC   30 g, Oral, 3 Times Daily      midodrine 10 MG tablet  Commonly known as: PROAMATINE   10 mg, Oral, 3 Times Daily Before Meals      multivitamin tablet tablet   1 tablet, Oral, Daily      pantoprazole 40 MG EC tablet  Commonly known as: PROTONIX   40 mg, Oral, 2 Times Daily Before Meals      prednisoLONE 15 MG/5ML solution  Commonly known as: ORAPRED   40 mg,  Oral, Daily      riFAXIMin 550 MG tablet  Commonly known as: XIFAXAN   550 mg, Oral, Every 12 Hours Scheduled      spironolactone 100 MG tablet  Commonly known as: ALDACTONE   100 mg, Oral, Daily      thiamine 100 MG tablet  Commonly known as: VITAMIN B-1   100 mg, Oral, Daily      zinc sulfate 220 (50 Zn) MG capsule  Commonly known as: ZINCATE   220 mg, Oral, Daily         Continue These Medications      Instructions Start Date   ALPRAZolam 1 MG tablet  Commonly known as: XANAX   1 mg, Oral, 2 Times Daily PRN      gabapentin 400 MG capsule  Commonly known as: NEURONTIN   400 mg, Oral, 4 Times Daily      HYDROmorphone 8 MG tablet  Commonly known as: DILAUDID   8 mg, Oral, Every 6 Hours PRN      melatonin 5 MG tablet tablet   5 mg, Oral, Nightly PRN         Stop These Medications    methadone 10 MG tablet  Commonly known as: DOLOPHINE     oxyCODONE 20 MG tablet  Commonly known as: ROXICODONE            Allergies   Allergen Reactions   • Bee Venom Anaphylaxis   • Scopolamine Anaphylaxis         Discharge Disposition:    Home or Self Care    Diet:  Hospital:  Diet Order   Procedures   • Diet Cardiac; 2gm Na+         Discharge Activity:         CODE STATUS:  Code Status and Medical Interventions:   Ordered at: 09/21/22 0651     Code Status (Patient has no pulse and is not breathing):    CPR (Attempt to Resuscitate)     Medical Interventions (Patient has pulse or is breathing):    Full Support     Release to patient:    Routine Release         No future appointments.    Additional Instructions for the Follow-ups that You Need to Schedule     Discharge Follow-up with PCP   As directed       Currently Documented PCP:    Provider, No Known    PCP Phone Number:    None     Follow Up Details: 5 to 7 days         Discharge Follow-up with Specified Provider: GI   As directed      To: GI               Time spent on Discharge including face to face service:  33 minutes    This patient has been examined wearing appropriate  Personal Protective Equipment and discussed with hospital infection control department and [facility]. 09/23/22      Signature:     Electronically signed by Chris Lees MD, 09/23/22, 8:27 AM EDT.  [facility] Hospitalist Team      Electronically signed by Chris Lees MD at 09/23/22 7399

## 2022-10-19 ENCOUNTER — OFFICE (OUTPATIENT)
Dept: URBAN - METROPOLITAN AREA CLINIC 64 | Facility: CLINIC | Age: 37
End: 2022-10-19
Payer: COMMERCIAL

## 2022-10-19 VITALS
HEIGHT: 73 IN | WEIGHT: 239 LBS | HEART RATE: 87 BPM | DIASTOLIC BLOOD PRESSURE: 76 MMHG | SYSTOLIC BLOOD PRESSURE: 115 MMHG

## 2022-10-19 DIAGNOSIS — K70.30 ALCOHOLIC CIRRHOSIS OF LIVER WITHOUT ASCITES: ICD-10-CM

## 2022-10-19 PROCEDURE — 99214 OFFICE O/P EST MOD 30 MIN: CPT | Performed by: INTERNAL MEDICINE

## 2022-10-19 RX ORDER — PREDNISOLONE 15 MG/5ML
SOLUTION ORAL
Qty: 130 | Refills: 1 | Status: COMPLETED
Start: 2022-10-19 | End: 2022-11-29

## 2022-10-19 RX ORDER — LACTULOSE 10 G/15ML
SOLUTION ORAL; RECTAL
Qty: 1900 | Refills: 3 | Status: ACTIVE

## 2022-10-19 RX ORDER — ZINC GLUCONATE 50 MG
50 TABLET ORAL
Qty: 30 | Refills: 11 | Status: ACTIVE
Start: 2022-10-19

## 2022-10-19 RX ORDER — FUROSEMIDE 40 MG/1
40 TABLET ORAL
Qty: 90 | Refills: 3 | Status: ACTIVE

## 2022-10-19 RX ORDER — SPIRONOLACTONE 100 MG/1
100 TABLET, FILM COATED ORAL
Qty: 90 | Refills: 3 | Status: COMPLETED
End: 2022-12-05

## 2022-11-29 ENCOUNTER — OFFICE (OUTPATIENT)
Dept: URBAN - METROPOLITAN AREA CLINIC 64 | Facility: CLINIC | Age: 37
End: 2022-11-29
Payer: COMMERCIAL

## 2022-11-29 VITALS
SYSTOLIC BLOOD PRESSURE: 130 MMHG | WEIGHT: 242 LBS | HEART RATE: 105 BPM | DIASTOLIC BLOOD PRESSURE: 78 MMHG | HEIGHT: 73 IN

## 2022-11-29 DIAGNOSIS — K72.90 HEPATIC FAILURE, UNSPECIFIED WITHOUT COMA: ICD-10-CM

## 2022-11-29 DIAGNOSIS — K70.30 ALCOHOLIC CIRRHOSIS OF LIVER WITHOUT ASCITES: ICD-10-CM

## 2022-11-29 DIAGNOSIS — R11.2 NAUSEA WITH VOMITING, UNSPECIFIED: ICD-10-CM

## 2022-11-29 PROCEDURE — 99214 OFFICE O/P EST MOD 30 MIN: CPT

## 2022-11-29 RX ORDER — PANTOPRAZOLE SODIUM 40 MG/1
40 TABLET, DELAYED RELEASE ORAL
Qty: 30 | Refills: 11 | Status: COMPLETED
Start: 2022-11-29 | End: 2022-12-05

## 2022-11-29 RX ORDER — RIFAXIMIN 550 MG/1
TABLET ORAL
Qty: 60 | Refills: 11 | Status: COMPLETED
Start: 2022-11-29 | End: 2022-12-05

## 2022-12-06 ENCOUNTER — OFFICE (OUTPATIENT)
Dept: URBAN - METROPOLITAN AREA PATHOLOGY 4 | Facility: PATHOLOGY | Age: 37
End: 2022-12-06
Payer: COMMERCIAL

## 2022-12-06 ENCOUNTER — ON CAMPUS - OUTPATIENT (OUTPATIENT)
Dept: URBAN - METROPOLITAN AREA HOSPITAL 2 | Facility: HOSPITAL | Age: 37
End: 2022-12-06
Payer: COMMERCIAL

## 2022-12-06 VITALS
RESPIRATION RATE: 17 BRPM | OXYGEN SATURATION: 98 % | DIASTOLIC BLOOD PRESSURE: 70 MMHG | OXYGEN SATURATION: 99 % | DIASTOLIC BLOOD PRESSURE: 96 MMHG | DIASTOLIC BLOOD PRESSURE: 71 MMHG | RESPIRATION RATE: 16 BRPM | WEIGHT: 248 LBS | HEART RATE: 119 BPM | SYSTOLIC BLOOD PRESSURE: 138 MMHG | SYSTOLIC BLOOD PRESSURE: 137 MMHG | DIASTOLIC BLOOD PRESSURE: 72 MMHG | SYSTOLIC BLOOD PRESSURE: 145 MMHG | HEIGHT: 73 IN | HEART RATE: 113 BPM | TEMPERATURE: 97.6 F | HEART RATE: 118 BPM | OXYGEN SATURATION: 97 % | HEART RATE: 116 BPM

## 2022-12-06 DIAGNOSIS — K70.30 ALCOHOLIC CIRRHOSIS OF LIVER WITHOUT ASCITES: ICD-10-CM

## 2022-12-06 DIAGNOSIS — K31.89 OTHER DISEASES OF STOMACH AND DUODENUM: ICD-10-CM

## 2022-12-06 DIAGNOSIS — I85.00 ESOPHAGEAL VARICES WITHOUT BLEEDING: ICD-10-CM

## 2022-12-06 LAB
GI HISTOLOGY: A. SELECT: (no result)
GI HISTOLOGY: PDF REPORT: (no result)

## 2022-12-06 PROCEDURE — 88305 TISSUE EXAM BY PATHOLOGIST: CPT | Performed by: INTERNAL MEDICINE

## 2022-12-06 PROCEDURE — 43239 EGD BIOPSY SINGLE/MULTIPLE: CPT | Performed by: INTERNAL MEDICINE

## 2022-12-06 RX ORDER — NADOLOL 40 MG/1
40 TABLET ORAL
Qty: 90 | Refills: 3 | Status: ACTIVE
Start: 2022-12-06

## 2022-12-06 RX ADMIN — ONDANSETRON HYDROCHLORIDE 4 MG: 2 INJECTION, SOLUTION INTRAMUSCULAR; INTRAVENOUS at 09:28

## 2023-02-03 ENCOUNTER — OFFICE (OUTPATIENT)
Dept: URBAN - METROPOLITAN AREA CLINIC 64 | Facility: CLINIC | Age: 38
End: 2023-02-03

## 2023-02-03 DIAGNOSIS — K31.89 OTHER DISEASES OF STOMACH AND DUODENUM: ICD-10-CM

## 2023-02-28 ENCOUNTER — OFFICE (OUTPATIENT)
Dept: URBAN - METROPOLITAN AREA CLINIC 64 | Facility: CLINIC | Age: 38
End: 2023-02-28
Payer: COMMERCIAL

## 2023-02-28 VITALS
WEIGHT: 244 LBS | HEART RATE: 69 BPM | DIASTOLIC BLOOD PRESSURE: 52 MMHG | HEIGHT: 73 IN | SYSTOLIC BLOOD PRESSURE: 120 MMHG

## 2023-02-28 DIAGNOSIS — K72.90 HEPATIC FAILURE, UNSPECIFIED WITHOUT COMA: ICD-10-CM

## 2023-02-28 DIAGNOSIS — K70.31 ALCOHOLIC CIRRHOSIS OF LIVER WITH ASCITES: ICD-10-CM

## 2023-02-28 DIAGNOSIS — I85.00 ESOPHAGEAL VARICES WITHOUT BLEEDING: ICD-10-CM

## 2023-02-28 DIAGNOSIS — R77.2 ABNORMALITY OF ALPHAFETOPROTEIN: ICD-10-CM

## 2023-02-28 PROCEDURE — 99213 OFFICE O/P EST LOW 20 MIN: CPT

## 2023-03-01 DIAGNOSIS — K70.30 ALCOHOLIC CIRRHOSIS, UNSPECIFIED WHETHER ASCITES PRESENT: Primary | ICD-10-CM

## 2023-03-01 DIAGNOSIS — R18.8 OTHER ASCITES: ICD-10-CM

## 2023-03-01 RX ORDER — LIDOCAINE HYDROCHLORIDE 10 MG/ML
10 INJECTION, SOLUTION INFILTRATION; PERINEURAL AS NEEDED
Status: CANCELLED | OUTPATIENT
Start: 2023-03-02

## 2023-03-01 RX ORDER — ALBUMIN (HUMAN) 12.5 G/50ML
12.5 SOLUTION INTRAVENOUS DAILY PRN
Status: CANCELLED | OUTPATIENT
Start: 2023-03-02

## 2023-03-01 RX ORDER — ALBUMIN (HUMAN) 12.5 G/50ML
25 SOLUTION INTRAVENOUS DAILY PRN
Status: CANCELLED | OUTPATIENT
Start: 2023-03-02

## 2023-03-01 RX ORDER — LIDOCAINE HYDROCHLORIDE 20 MG/ML
10 INJECTION, SOLUTION INFILTRATION; PERINEURAL AS NEEDED
Status: CANCELLED | OUTPATIENT
Start: 2023-03-02

## 2023-03-02 ENCOUNTER — HOSPITAL ENCOUNTER (OUTPATIENT)
Dept: INFUSION THERAPY | Facility: HOSPITAL | Age: 38
Discharge: HOME | End: 2023-03-02
Payer: COMMERCIAL

## 2023-03-02 VITALS
SYSTOLIC BLOOD PRESSURE: 100 MMHG | OXYGEN SATURATION: 99 % | RESPIRATION RATE: 18 BRPM | HEART RATE: 75 BPM | DIASTOLIC BLOOD PRESSURE: 41 MMHG

## 2023-03-02 DIAGNOSIS — R18.8 OTHER ASCITES: ICD-10-CM

## 2023-03-02 DIAGNOSIS — K70.30 ALCOHOLIC CIRRHOSIS, UNSPECIFIED WHETHER ASCITES PRESENT: ICD-10-CM

## 2023-03-02 LAB
ALBUMIN FLD-MCNC: 1.2 G/DL
ALBUMIN SERPL-MCNC: 3.5 G/DL (ref 3.5–5.2)
ALBUMIN/GLOB SERPL: 1 G/DL
ALP SERPL-CCNC: 146 U/L (ref 39–117)
ALT SERPL W P-5'-P-CCNC: 36 U/L (ref 1–41)
ANION GAP SERPL CALCULATED.3IONS-SCNC: 11 MMOL/L (ref 5–15)
APPEARANCE FLD: CLEAR
AST SERPL-CCNC: 72 U/L (ref 1–40)
BILIRUB SERPL-MCNC: 3.4 MG/DL (ref 0–1.2)
BUN SERPL-MCNC: 27 MG/DL (ref 6–20)
BUN/CREAT SERPL: 17.9 (ref 7–25)
CALCIUM SPEC-SCNC: 9.4 MG/DL (ref 8.6–10.5)
CHLORIDE SERPL-SCNC: 93 MMOL/L (ref 98–107)
CO2 SERPL-SCNC: 27 MMOL/L (ref 22–29)
COLOR FLD: YELLOW
CREAT SERPL-MCNC: 1.51 MG/DL (ref 0.76–1.27)
DEPRECATED RDW RBC AUTO: 53.8 FL (ref 37–54)
EGFRCR SERPLBLD CKD-EPI 2021: 60.3 ML/MIN/1.73
ERYTHROCYTE [DISTWIDTH] IN BLOOD BY AUTOMATED COUNT: 14 % (ref 12.3–15.4)
GLOBULIN UR ELPH-MCNC: 3.6 GM/DL
GLUCOSE SERPL-MCNC: 114 MG/DL (ref 65–99)
HCT VFR BLD AUTO: 41.7 % (ref 37.5–51)
HGB BLD-MCNC: 14.3 G/DL (ref 13–17.7)
INR PPP: 1.35 (ref 0.93–1.1)
LYMPHOCYTES NFR FLD MANUAL: 54 %
MCH RBC QN AUTO: 36.4 PG (ref 26.6–33)
MCHC RBC AUTO-ENTMCNC: 34.3 G/DL (ref 31.5–35.7)
MCV RBC AUTO: 106.2 FL (ref 79–97)
MESOTHL CELL NFR FLD MANUAL: 2 %
METHOD: NORMAL
MONOCYTES NFR FLD: 41 %
NEUTROPHILS NFR FLD MANUAL: 3 %
NUC CELL # FLD: 238 /MM3
PLATELET # BLD AUTO: 152 10*3/MM3 (ref 140–450)
PMV BLD AUTO: 8.7 FL (ref 6–12)
POTASSIUM SERPL-SCNC: 4.4 MMOL/L (ref 3.5–5.2)
PROT FLD-MCNC: 2.1 G/DL
PROT SERPL-MCNC: 7.1 G/DL (ref 6–8.5)
PROTHROMBIN TIME: 13.7 SECONDS (ref 9.6–11.7)
RBC # BLD AUTO: 3.92 10*6/MM3 (ref 4.14–5.8)
SODIUM SERPL-SCNC: 131 MMOL/L (ref 136–145)
WBC NRBC COR # BLD: 6.4 10*3/MM3 (ref 3.4–10.8)

## 2023-03-02 PROCEDURE — 85027 COMPLETE CBC AUTOMATED: CPT

## 2023-03-02 PROCEDURE — 88108 CYTOPATH CONCENTRATE TECH: CPT

## 2023-03-02 PROCEDURE — 82042 OTHER SOURCE ALBUMIN QUAN EA: CPT

## 2023-03-02 PROCEDURE — 96366 THER/PROPH/DIAG IV INF ADDON: CPT

## 2023-03-02 PROCEDURE — 80053 COMPREHEN METABOLIC PANEL: CPT

## 2023-03-02 PROCEDURE — 36415 COLL VENOUS BLD VENIPUNCTURE: CPT

## 2023-03-02 PROCEDURE — 87205 SMEAR GRAM STAIN: CPT

## 2023-03-02 PROCEDURE — 85610 PROTHROMBIN TIME: CPT

## 2023-03-02 PROCEDURE — 84157 ASSAY OF PROTEIN OTHER: CPT

## 2023-03-02 PROCEDURE — 96365 THER/PROPH/DIAG IV INF INIT: CPT

## 2023-03-02 PROCEDURE — 25010000002 ALBUMIN HUMAN 25% PER 50 ML

## 2023-03-02 PROCEDURE — 87070 CULTURE OTHR SPECIMN AEROBIC: CPT

## 2023-03-02 PROCEDURE — P9047 ALBUMIN (HUMAN), 25%, 50ML: HCPCS

## 2023-03-02 PROCEDURE — 89051 BODY FLUID CELL COUNT: CPT

## 2023-03-02 PROCEDURE — 76942 ECHO GUIDE FOR BIOPSY: CPT

## 2023-03-02 RX ORDER — ALBUMIN (HUMAN) 12.5 G/50ML
12.5 SOLUTION INTRAVENOUS DAILY PRN
Status: DISCONTINUED | OUTPATIENT
Start: 2023-03-02 | End: 2023-03-04 | Stop reason: HOSPADM

## 2023-03-02 RX ORDER — ALBUMIN (HUMAN) 12.5 G/50ML
25 SOLUTION INTRAVENOUS DAILY PRN
Status: COMPLETED | OUTPATIENT
Start: 2023-03-02 | End: 2023-03-02

## 2023-03-02 RX ORDER — LIDOCAINE HYDROCHLORIDE 20 MG/ML
10 INJECTION, SOLUTION INFILTRATION; PERINEURAL AS NEEDED
Status: DISCONTINUED | OUTPATIENT
Start: 2023-03-02 | End: 2023-03-04 | Stop reason: HOSPADM

## 2023-03-02 RX ORDER — LIDOCAINE HYDROCHLORIDE 10 MG/ML
10 INJECTION, SOLUTION INFILTRATION; PERINEURAL AS NEEDED
Status: DISCONTINUED | OUTPATIENT
Start: 2023-03-02 | End: 2023-03-04 | Stop reason: HOSPADM

## 2023-03-02 RX ADMIN — ALBUMIN (HUMAN) 25 G: 0.25 INJECTION, SOLUTION INTRAVENOUS at 15:31

## 2023-03-02 RX ADMIN — LIDOCAINE HYDROCHLORIDE 10 ML: 20 INJECTION, SOLUTION EPIDURAL; INFILTRATION; INTRACAUDAL; PERINEURAL at 13:27

## 2023-03-02 RX ADMIN — ALBUMIN HUMAN 12.5 G: 0.25 SOLUTION INTRAVENOUS at 16:10

## 2023-03-02 RX ADMIN — ALBUMIN (HUMAN) 25 G: 0.25 INJECTION, SOLUTION INTRAVENOUS at 14:15

## 2023-03-02 RX ADMIN — ALBUMIN (HUMAN) 25 G: 0.25 INJECTION, SOLUTION INTRAVENOUS at 14:49

## 2023-03-02 RX ADMIN — ALBUMIN (HUMAN) 25 G: 0.25 INJECTION, SOLUTION INTRAVENOUS at 13:39

## 2023-03-03 NOTE — PROCEDURES
"Diagnostic & Therapeutic Bedside Paracentesis With Ultrasound Guidance    Date/Time: 3/2/2023 12:30 PM  Performed by: Dylan Staley APRN  Authorized by: Mamie Ayers APRN   Consent: Written consent obtained.  Risks and benefits: risks, benefits and alternatives were discussed  Consent given by: patient  Patient understanding: patient states understanding of the procedure being performed  Patient consent: the patient's understanding of the procedure matches consent given  Procedure consent: procedure consent matches procedure scheduled  Relevant documents: relevant documents present and verified  Test results: test results available and properly labeled  Site marked: the operative site was marked  Imaging studies: imaging studies available  Required items: required blood products, implants, devices, and special equipment available  Patient identity confirmed: verbally with patient and provided demographic data  Time out: Immediately prior to procedure a \"time out\" was called to verify the correct patient, procedure, equipment, support staff and site/side marked as required.  Initial or subsequent exam: subsequent  Procedure purpose: diagnostic and therapeutic  Indications: abdominal discomfort secondary to ascites  Anesthesia: local infiltration    Anesthesia:  Local Anesthetic: lidocaine 2% without epinephrine  Anesthetic total: 15 mL    Sedation:  Patient sedated: no    Preparation: Patient was prepped and draped in the usual sterile fashion (All five maximal sterile barriers used- gloves, gown, cap, mask and large sterile sheet).  Needle gauge: 22  Ultrasound guidance: yes  Puncture site: right lower quadrant  Aspirate amount (ml): 21627.  Fluid characteristics: Clear, yellow.  Dressing: 4x4 sterile gauze (adhesive bandage)  Patient tolerance: patient tolerated the procedure well with no immediate complications        Electronically signed by MONICA Brandon, 03/02/23, 9:25 PM EST.    "

## 2023-03-05 LAB
BACTERIA FLD CULT: NORMAL
GRAM STN SPEC: NORMAL
GRAM STN SPEC: NORMAL

## 2023-03-06 ENCOUNTER — TRANSCRIBE ORDERS (OUTPATIENT)
Dept: ADMINISTRATIVE | Facility: HOSPITAL | Age: 38
End: 2023-03-06
Payer: COMMERCIAL

## 2023-03-06 DIAGNOSIS — K70.30 CIRRHOSIS WITH ALCOHOLISM: ICD-10-CM

## 2023-03-06 DIAGNOSIS — R77.2 ELEVATED AFP: Primary | ICD-10-CM

## 2023-03-06 LAB
LAB AP CASE REPORT: NORMAL
PATH REPORT.FINAL DX SPEC: NORMAL
PATH REPORT.GROSS SPEC: NORMAL

## 2023-03-21 ENCOUNTER — HOSPITAL ENCOUNTER (OUTPATIENT)
Dept: INFUSION THERAPY | Facility: HOSPITAL | Age: 38
Discharge: HOME | End: 2023-03-21
Payer: COMMERCIAL

## 2023-03-21 VITALS
SYSTOLIC BLOOD PRESSURE: 94 MMHG | DIASTOLIC BLOOD PRESSURE: 42 MMHG | RESPIRATION RATE: 12 BRPM | OXYGEN SATURATION: 99 % | HEART RATE: 67 BPM

## 2023-03-21 PROCEDURE — 25010000002 ALBUMIN HUMAN 25% PER 50 ML

## 2023-03-21 PROCEDURE — 76942 ECHO GUIDE FOR BIOPSY: CPT

## 2023-03-21 PROCEDURE — 96365 THER/PROPH/DIAG IV INF INIT: CPT

## 2023-03-21 PROCEDURE — 96366 THER/PROPH/DIAG IV INF ADDON: CPT

## 2023-03-21 PROCEDURE — P9047 ALBUMIN (HUMAN), 25%, 50ML: HCPCS

## 2023-03-21 RX ORDER — ALBUMIN (HUMAN) 12.5 G/50ML
12.5 SOLUTION INTRAVENOUS DAILY PRN
Status: DISCONTINUED | OUTPATIENT
Start: 2023-03-21 | End: 2023-03-23 | Stop reason: HOSPADM

## 2023-03-21 RX ORDER — LIDOCAINE HYDROCHLORIDE 20 MG/ML
10 INJECTION, SOLUTION INFILTRATION; PERINEURAL AS NEEDED
Status: DISCONTINUED | OUTPATIENT
Start: 2023-03-21 | End: 2023-03-23 | Stop reason: HOSPADM

## 2023-03-21 RX ORDER — ALBUMIN (HUMAN) 12.5 G/50ML
25 SOLUTION INTRAVENOUS DAILY PRN
Status: CANCELLED | OUTPATIENT
Start: 2023-03-21

## 2023-03-21 RX ORDER — ALBUMIN (HUMAN) 12.5 G/50ML
12.5 SOLUTION INTRAVENOUS DAILY PRN
Status: CANCELLED | OUTPATIENT
Start: 2023-03-21

## 2023-03-21 RX ORDER — ALBUMIN (HUMAN) 12.5 G/50ML
25 SOLUTION INTRAVENOUS DAILY PRN
Status: DISCONTINUED | OUTPATIENT
Start: 2023-03-21 | End: 2023-03-23 | Stop reason: HOSPADM

## 2023-03-21 RX ORDER — LIDOCAINE HYDROCHLORIDE 10 MG/ML
10 INJECTION, SOLUTION INFILTRATION; PERINEURAL AS NEEDED
Status: CANCELLED | OUTPATIENT
Start: 2023-03-21

## 2023-03-21 RX ORDER — LIDOCAINE HYDROCHLORIDE 10 MG/ML
10 INJECTION, SOLUTION INFILTRATION; PERINEURAL AS NEEDED
Status: DISCONTINUED | OUTPATIENT
Start: 2023-03-21 | End: 2023-03-23 | Stop reason: HOSPADM

## 2023-03-21 RX ORDER — LIDOCAINE HYDROCHLORIDE 20 MG/ML
10 INJECTION, SOLUTION INFILTRATION; PERINEURAL AS NEEDED
Status: CANCELLED | OUTPATIENT
Start: 2023-03-21

## 2023-03-21 RX ADMIN — ALBUMIN (HUMAN) 25 G: 0.25 INJECTION, SOLUTION INTRAVENOUS at 14:16

## 2023-03-21 RX ADMIN — ALBUMIN (HUMAN) 25 G: 0.25 INJECTION, SOLUTION INTRAVENOUS at 14:53

## 2023-03-21 RX ADMIN — LIDOCAINE HYDROCHLORIDE 10 ML: 20 INJECTION, SOLUTION EPIDURAL; INFILTRATION; INTRACAUDAL; PERINEURAL at 13:33

## 2023-03-21 RX ADMIN — ALBUMIN (HUMAN) 25 G: 0.25 INJECTION, SOLUTION INTRAVENOUS at 13:42

## 2023-03-21 RX ADMIN — ALBUMIN (HUMAN) 12.5 G: 0.25 INJECTION, SOLUTION INTRAVENOUS at 15:34

## 2023-03-22 NOTE — PROCEDURES
Procedure:Ultrasound-guided Paracentesis    Consent: Informed    Pre-op diagnosis: Cirrhosis, Massive ascites    Post-op diagnosis: Cirrhosis, Massive ascites    Anesthesia Provider: .Luis Enrique Byrne MD    Anesthesia type: Local infiltration with 1% Xylocaine    Surgeon: .Luis Enrique Byrne MD    Assistant: none    Procedure summary:  Of Note;  Time out  was done at the bedside with RN and patient  Allergies , labs and medications were reviewed. Patient was made to lie supine and the area of interest was imaged with ultrasound and fluid verified and marked.  Area of interest was cleaned and draped in a sterile fashion. Subsequently local infiltration with Xylocaine. Trocar and cannula were advanced. Upon verification of the fluid, trocar was steadied and cannula advanced further. Trocar was drawn out. Cannula was connected to extension tubing and to the vacuum bottle. Subsequently the cannula was removed after completion of the procedure.     Findings: Clear straw yellow fluid was obtained. See nursing documentation for volume drained.    Lab Specimen:     Complication: none    EBL: 0 mL    Post-op condition: Stable. Albumin was given by protocol if needed.    Post-op plan: Discharge back to the referring physician.  Electronically signed by Luis Enrique Byrne MD, 03/22/23, 3:35 PM EDT.

## 2023-03-29 ENCOUNTER — HOSPITAL ENCOUNTER (OUTPATIENT)
Dept: MRI IMAGING | Facility: HOSPITAL | Age: 38
Discharge: HOME | End: 2023-03-29
Payer: COMMERCIAL

## 2023-03-29 DIAGNOSIS — K70.30 CIRRHOSIS WITH ALCOHOLISM: ICD-10-CM

## 2023-03-29 DIAGNOSIS — R77.2 ELEVATED AFP: ICD-10-CM

## 2023-03-29 PROCEDURE — 74183 MRI ABD W/O CNTR FLWD CNTR: CPT

## 2023-03-29 PROCEDURE — A9579 GAD-BASE MR CONTRAST NOS,1ML: HCPCS

## 2023-03-29 PROCEDURE — 25010000002 GADOTERIDOL PER 1 ML

## 2023-03-29 RX ADMIN — GADOTERIDOL 20 ML: 279.3 INJECTION, SOLUTION INTRAVENOUS at 16:42

## 2023-04-04 DIAGNOSIS — K70.30 ALCOHOLIC CIRRHOSIS, UNSPECIFIED WHETHER ASCITES PRESENT: Primary | ICD-10-CM

## 2023-04-04 RX ORDER — ALBUMIN (HUMAN) 12.5 G/50ML
12.5 SOLUTION INTRAVENOUS DAILY PRN
Status: CANCELLED | OUTPATIENT
Start: 2023-04-05

## 2023-04-04 RX ORDER — LIDOCAINE HYDROCHLORIDE 10 MG/ML
10 INJECTION, SOLUTION INFILTRATION; PERINEURAL AS NEEDED
Status: CANCELLED | OUTPATIENT
Start: 2023-04-05

## 2023-04-04 RX ORDER — ALBUMIN (HUMAN) 12.5 G/50ML
25 SOLUTION INTRAVENOUS DAILY PRN
Status: CANCELLED | OUTPATIENT
Start: 2023-04-05

## 2023-04-04 RX ORDER — LIDOCAINE HYDROCHLORIDE 20 MG/ML
10 INJECTION, SOLUTION INFILTRATION; PERINEURAL AS NEEDED
Status: CANCELLED | OUTPATIENT
Start: 2023-04-05

## 2023-04-05 ENCOUNTER — HOSPITAL ENCOUNTER (OUTPATIENT)
Dept: INFUSION THERAPY | Facility: HOSPITAL | Age: 38
Discharge: HOME | End: 2023-04-05
Payer: COMMERCIAL

## 2023-04-05 ENCOUNTER — HOSPITAL ENCOUNTER (OUTPATIENT)
Dept: INTERVENTIONAL RADIOLOGY/VASCULAR | Facility: HOSPITAL | Age: 38
Discharge: HOME | End: 2023-04-05
Payer: COMMERCIAL

## 2023-04-05 VITALS
OXYGEN SATURATION: 100 % | SYSTOLIC BLOOD PRESSURE: 88 MMHG | DIASTOLIC BLOOD PRESSURE: 34 MMHG | RESPIRATION RATE: 15 BRPM | HEART RATE: 64 BPM

## 2023-04-05 DIAGNOSIS — Z87.898 HX OF ASCITES: ICD-10-CM

## 2023-04-05 DIAGNOSIS — K70.30 ALCOHOLIC CIRRHOSIS, UNSPECIFIED WHETHER ASCITES PRESENT: ICD-10-CM

## 2023-04-05 LAB
DEPRECATED RDW RBC AUTO: 55.6 FL (ref 37–54)
ERYTHROCYTE [DISTWIDTH] IN BLOOD BY AUTOMATED COUNT: 15.1 % (ref 12.3–15.4)
HCT VFR BLD AUTO: 39.3 % (ref 37.5–51)
HGB BLD-MCNC: 12.9 G/DL (ref 13–17.7)
INR PPP: 1.27 (ref 0.93–1.1)
MCH RBC QN AUTO: 35.3 PG (ref 26.6–33)
MCHC RBC AUTO-ENTMCNC: 32.9 G/DL (ref 31.5–35.7)
MCV RBC AUTO: 107.4 FL (ref 79–97)
PLATELET # BLD AUTO: 124 10*3/MM3 (ref 140–450)
PMV BLD AUTO: 9.7 FL (ref 6–12)
PROTHROMBIN TIME: 12.9 SECONDS (ref 9.6–11.7)
RBC # BLD AUTO: 3.66 10*6/MM3 (ref 4.14–5.8)
WBC NRBC COR # BLD: 7 10*3/MM3 (ref 3.4–10.8)

## 2023-04-05 PROCEDURE — 85610 PROTHROMBIN TIME: CPT

## 2023-04-05 PROCEDURE — 36415 COLL VENOUS BLD VENIPUNCTURE: CPT

## 2023-04-05 PROCEDURE — 25010000002 ALBUMIN HUMAN 25% PER 50 ML

## 2023-04-05 PROCEDURE — 96366 THER/PROPH/DIAG IV INF ADDON: CPT

## 2023-04-05 PROCEDURE — 76942 ECHO GUIDE FOR BIOPSY: CPT

## 2023-04-05 PROCEDURE — 96365 THER/PROPH/DIAG IV INF INIT: CPT

## 2023-04-05 PROCEDURE — P9047 ALBUMIN (HUMAN), 25%, 50ML: HCPCS

## 2023-04-05 PROCEDURE — 85027 COMPLETE CBC AUTOMATED: CPT

## 2023-04-05 RX ORDER — ALBUMIN (HUMAN) 12.5 G/50ML
12.5 SOLUTION INTRAVENOUS DAILY PRN
Status: DISCONTINUED | OUTPATIENT
Start: 2023-04-05 | End: 2023-04-07 | Stop reason: HOSPADM

## 2023-04-05 RX ORDER — LIDOCAINE HYDROCHLORIDE 10 MG/ML
10 INJECTION, SOLUTION INFILTRATION; PERINEURAL AS NEEDED
Status: DISCONTINUED | OUTPATIENT
Start: 2023-04-05 | End: 2023-04-07 | Stop reason: HOSPADM

## 2023-04-05 RX ORDER — ALBUMIN (HUMAN) 12.5 G/50ML
25 SOLUTION INTRAVENOUS DAILY PRN
Status: DISCONTINUED | OUTPATIENT
Start: 2023-04-05 | End: 2023-04-07 | Stop reason: HOSPADM

## 2023-04-05 RX ORDER — LIDOCAINE HYDROCHLORIDE 20 MG/ML
10 INJECTION, SOLUTION INFILTRATION; PERINEURAL AS NEEDED
Status: DISCONTINUED | OUTPATIENT
Start: 2023-04-05 | End: 2023-04-07 | Stop reason: HOSPADM

## 2023-04-05 RX ADMIN — ALBUMIN (HUMAN) 25 G: 0.25 INJECTION, SOLUTION INTRAVENOUS at 12:39

## 2023-04-05 RX ADMIN — LIDOCAINE HYDROCHLORIDE 10 ML: 20 INJECTION, SOLUTION EPIDURAL; INFILTRATION; INTRACAUDAL; PERINEURAL at 12:00

## 2023-04-05 RX ADMIN — ALBUMIN (HUMAN) 12.5 G: 0.25 INJECTION, SOLUTION INTRAVENOUS at 13:17

## 2023-04-05 RX ADMIN — ALBUMIN (HUMAN) 25 G: 0.25 INJECTION, SOLUTION INTRAVENOUS at 12:06

## 2023-04-05 NOTE — H&P
.    [facility]   Interventional Radiology H&P    Patient Name: Daquan Xiong  : 1985  MRN: [id number]  Primary Care Physician:  Wolf Carvalho MD  Referring Physician: MONICA Bates  Date of admission: 2023    Subjective   Subjective     HPI:  Daquan Xiong is a 37 y.o. male with ascites    Review of Systems:   Constitutional no fever,  no weight loss       Otolaryngeal no difficulty swallowing   Cardiovascular no chest pain   Pulmonary no cough, no sputum production   Gastrointestinal no constipation, no diarrhea                         Personal History       Past Medical/Surgical History:   Past Medical History:   Diagnosis Date   • Alcohol abuse     1/2-1 pint a day, sober since 2022   • Arthritis    • Hypertension    • Sleep apnea      Past Surgical History:   Procedure Laterality Date   • BACK SURGERY     • WRIST SURGERY         Social History:  reports that he has been smoking. He has been smoking an average of .5 packs per day. He has never used smokeless tobacco. He reports that he does not currently use alcohol. He reports that he does not currently use drugs after having used the following drugs: Marijuana.    Medications:  (Not in a hospital admission)    Current medications:     Current IV drips:       Allergies:  Allergies   Allergen Reactions   • Bee Venom Anaphylaxis   • Scopolamine Anaphylaxis       Objective    Objective     Vitals:   Heart Rate:  [61] 61  Resp:  [17] 17  BP: (90)/(43) 90/43      Physical Exam:   Constitutional: Awake, alert, No acute distress    Respiratory: Clear to auscultation bilaterally, nonlabored respirations    Cardiovascular: RRR, no murmurs, rubs, or gallops, palpable pedal pulses bilaterally   Gastrointestinal: Positive bowel sounds, soft, nontender, nondistended        ASA SCALE ASSESSMENT:  2-Mild to moderate systemic disease, medically well controlled, with no functional limitation    MALLAMPATI CLASSIFICATION:  2-Able to visualize the soft  palate, fauces, uvula. The anterior & posterior tonsilar pillars are hidden by the tongue.       Result Review        Result Review:     No results found for: NA    No results found for: K    No results found for: CL    No results found for: PLASMABICARB    No results found for: BUN    No results found for: CREATININE    No results found for: CALCIUM        No components found for: GLUCOSE.*  Results from last 7 days   Lab Units 04/05/23  1037   WBC 10*3/mm3 7.00   HEMOGLOBIN g/dL 12.9*   HEMATOCRIT % 39.3   PLATELETS 10*3/mm3 124*                Assessment / Plan     Assesment:   Ascites      Plan:   Paracentesis    The risks and benefits of the procedure were discussed with the patient.    Electronically signed by Salvador Back III, MD, 04/05/23, 11:24 AM EDT.

## 2023-04-18 RX ORDER — ALBUMIN (HUMAN) 12.5 G/50ML
25 SOLUTION INTRAVENOUS DAILY PRN
Status: CANCELLED | OUTPATIENT
Start: 2023-04-19

## 2023-04-18 RX ORDER — ALBUMIN (HUMAN) 12.5 G/50ML
12.5 SOLUTION INTRAVENOUS DAILY PRN
Status: CANCELLED | OUTPATIENT
Start: 2023-04-19

## 2023-04-18 RX ORDER — LIDOCAINE HYDROCHLORIDE 20 MG/ML
10 INJECTION, SOLUTION INFILTRATION; PERINEURAL AS NEEDED
Status: CANCELLED | OUTPATIENT
Start: 2023-04-19

## 2023-04-18 RX ORDER — LIDOCAINE HYDROCHLORIDE 10 MG/ML
10 INJECTION, SOLUTION INFILTRATION; PERINEURAL AS NEEDED
Status: CANCELLED | OUTPATIENT
Start: 2023-04-19

## 2023-04-19 ENCOUNTER — HOSPITAL ENCOUNTER (OUTPATIENT)
Dept: INTERVENTIONAL RADIOLOGY/VASCULAR | Facility: HOSPITAL | Age: 38
Discharge: HOME | End: 2023-04-19
Payer: COMMERCIAL

## 2023-04-19 ENCOUNTER — HOSPITAL ENCOUNTER (OUTPATIENT)
Dept: INFUSION THERAPY | Facility: HOSPITAL | Age: 38
Discharge: HOME | End: 2023-04-19
Payer: COMMERCIAL

## 2023-04-19 VITALS
OXYGEN SATURATION: 99 % | SYSTOLIC BLOOD PRESSURE: 110 MMHG | HEART RATE: 96 BPM | DIASTOLIC BLOOD PRESSURE: 42 MMHG | RESPIRATION RATE: 16 BRPM

## 2023-04-19 DIAGNOSIS — K70.30 ALCOHOLIC CIRRHOSIS, UNSPECIFIED WHETHER ASCITES PRESENT: ICD-10-CM

## 2023-04-19 DIAGNOSIS — Z87.898 HX OF ASCITES: ICD-10-CM

## 2023-04-19 PROCEDURE — 25010000002 ALBUMIN HUMAN 25% PER 50 ML

## 2023-04-19 PROCEDURE — 96366 THER/PROPH/DIAG IV INF ADDON: CPT

## 2023-04-19 PROCEDURE — 96365 THER/PROPH/DIAG IV INF INIT: CPT

## 2023-04-19 PROCEDURE — 76942 ECHO GUIDE FOR BIOPSY: CPT

## 2023-04-19 PROCEDURE — 96361 HYDRATE IV INFUSION ADD-ON: CPT

## 2023-04-19 PROCEDURE — P9047 ALBUMIN (HUMAN), 25%, 50ML: HCPCS

## 2023-04-19 RX ORDER — LIDOCAINE HYDROCHLORIDE 20 MG/ML
10 INJECTION, SOLUTION INFILTRATION; PERINEURAL AS NEEDED
Status: DISCONTINUED | OUTPATIENT
Start: 2023-04-19 | End: 2023-04-21 | Stop reason: HOSPADM

## 2023-04-19 RX ORDER — ALBUMIN (HUMAN) 12.5 G/50ML
25 SOLUTION INTRAVENOUS DAILY PRN
Status: COMPLETED | OUTPATIENT
Start: 2023-04-19 | End: 2023-04-19

## 2023-04-19 RX ORDER — MIDODRINE HYDROCHLORIDE 5 MG/1
5 TABLET ORAL
COMMUNITY

## 2023-04-19 RX ORDER — ALBUMIN (HUMAN) 12.5 G/50ML
12.5 SOLUTION INTRAVENOUS DAILY PRN
Status: DISCONTINUED | OUTPATIENT
Start: 2023-04-19 | End: 2023-04-21 | Stop reason: HOSPADM

## 2023-04-19 RX ORDER — LIDOCAINE HYDROCHLORIDE 10 MG/ML
10 INJECTION, SOLUTION INFILTRATION; PERINEURAL AS NEEDED
Status: DISCONTINUED | OUTPATIENT
Start: 2023-04-19 | End: 2023-04-21 | Stop reason: HOSPADM

## 2023-04-19 RX ADMIN — ALBUMIN (HUMAN) 25 G: 0.25 INJECTION, SOLUTION INTRAVENOUS at 10:16

## 2023-04-19 RX ADMIN — ALBUMIN (HUMAN) 25 G: 0.25 INJECTION, SOLUTION INTRAVENOUS at 09:03

## 2023-04-19 RX ADMIN — ALBUMIN (HUMAN) 25 G: 0.25 INJECTION, SOLUTION INTRAVENOUS at 08:31

## 2023-04-19 RX ADMIN — LIDOCAINE HYDROCHLORIDE 10 ML: 20 INJECTION, SOLUTION EPIDURAL; INFILTRATION; INTRACAUDAL; PERINEURAL at 08:10

## 2023-04-19 RX ADMIN — ALBUMIN (HUMAN) 25 G: 0.25 INJECTION, SOLUTION INTRAVENOUS at 09:38

## 2023-04-19 NOTE — PROGRESS NOTES
PARACENTESIS    Procedure: Paracentesis    Time Out Completed: yes    Time Out: 0810    Prep Site 1: Right Lateral Abdomen      Prep: Chlorhexidine and Sterile drape    Procedure Position: Right Side and Semi Fowlers    Guidance: Ultrasound    Fluid Quality: Graham Yellow and Other: hazy    Procedure Note: Procedure start at 0810. 2% lidocaine used for local anesthetic. No complications noted. Will continue to monitor.    Post-Procedure Position: Supine and HOB Elevated    Dressing Site 1: 2x2, 4x4 and Tegaderm    Post Procedure Status:  Alert and Oriented, returned to baseline, Dressing Dry/ Intact, IV documented (see flowsheet), Stable Condition and Dressing properly labeled    Specimen To Lab: no    Total Fluid Removed: 14.5 liters    Post Procedure Note:  Procedure complete at 0930. 100 grams of albumin given per MD order. No complications noted.

## 2023-04-27 DIAGNOSIS — R18.8 OTHER ASCITES: ICD-10-CM

## 2023-04-27 DIAGNOSIS — K70.30 ALCOHOLIC CIRRHOSIS, UNSPECIFIED WHETHER ASCITES PRESENT: Primary | ICD-10-CM

## 2023-04-27 RX ORDER — LIDOCAINE HYDROCHLORIDE 10 MG/ML
10 INJECTION, SOLUTION INFILTRATION; PERINEURAL AS NEEDED
Status: CANCELLED | OUTPATIENT
Start: 2023-04-28

## 2023-04-27 RX ORDER — ALBUMIN (HUMAN) 12.5 G/50ML
25 SOLUTION INTRAVENOUS DAILY PRN
Status: CANCELLED | OUTPATIENT
Start: 2023-04-28

## 2023-04-27 RX ORDER — ALBUMIN (HUMAN) 12.5 G/50ML
12.5 SOLUTION INTRAVENOUS DAILY PRN
Status: CANCELLED | OUTPATIENT
Start: 2023-04-28

## 2023-04-27 RX ORDER — LIDOCAINE HYDROCHLORIDE 20 MG/ML
10 INJECTION, SOLUTION INFILTRATION; PERINEURAL AS NEEDED
Status: CANCELLED | OUTPATIENT
Start: 2023-04-28

## 2023-04-28 ENCOUNTER — HOSPITAL ENCOUNTER (OUTPATIENT)
Dept: INTERVENTIONAL RADIOLOGY/VASCULAR | Facility: HOSPITAL | Age: 38
Discharge: HOME | End: 2023-04-28
Payer: COMMERCIAL

## 2023-04-28 ENCOUNTER — HOSPITAL ENCOUNTER (OUTPATIENT)
Dept: INFUSION THERAPY | Facility: HOSPITAL | Age: 38
Discharge: HOME | End: 2023-04-28
Payer: COMMERCIAL

## 2023-04-28 VITALS
HEART RATE: 67 BPM | DIASTOLIC BLOOD PRESSURE: 47 MMHG | RESPIRATION RATE: 20 BRPM | OXYGEN SATURATION: 100 % | SYSTOLIC BLOOD PRESSURE: 101 MMHG

## 2023-04-28 DIAGNOSIS — Z87.898 HX OF ASCITES: ICD-10-CM

## 2023-04-28 DIAGNOSIS — R18.8 OTHER ASCITES: ICD-10-CM

## 2023-04-28 DIAGNOSIS — K70.30 ALCOHOLIC CIRRHOSIS, UNSPECIFIED WHETHER ASCITES PRESENT: ICD-10-CM

## 2023-04-28 PROBLEM — Z72.0 TOBACCO USER: Status: ACTIVE | Noted: 2023-04-28

## 2023-04-28 LAB
DEPRECATED RDW RBC AUTO: 52.5 FL (ref 37–54)
ERYTHROCYTE [DISTWIDTH] IN BLOOD BY AUTOMATED COUNT: 13.9 % (ref 12.3–15.4)
HCT VFR BLD AUTO: 36.4 % (ref 37.5–51)
HGB BLD-MCNC: 12.4 G/DL (ref 13–17.7)
INR PPP: 1.09 (ref 0.93–1.1)
MCH RBC QN AUTO: 36.1 PG (ref 26.6–33)
MCHC RBC AUTO-ENTMCNC: 34.1 G/DL (ref 31.5–35.7)
MCV RBC AUTO: 105.6 FL (ref 79–97)
PLATELET # BLD AUTO: 157 10*3/MM3 (ref 140–450)
PMV BLD AUTO: 8.9 FL (ref 6–12)
PROTHROMBIN TIME: 11.6 SECONDS (ref 9.6–11.7)
RBC # BLD AUTO: 3.44 10*6/MM3 (ref 4.14–5.8)
WBC NRBC COR # BLD: 7.8 10*3/MM3 (ref 3.4–10.8)

## 2023-04-28 PROCEDURE — 96367 TX/PROPH/DG ADDL SEQ IV INF: CPT

## 2023-04-28 PROCEDURE — 96366 THER/PROPH/DIAG IV INF ADDON: CPT

## 2023-04-28 PROCEDURE — P9047 ALBUMIN (HUMAN), 25%, 50ML: HCPCS

## 2023-04-28 PROCEDURE — 25010000002 ALBUMIN HUMAN 25% PER 50 ML

## 2023-04-28 PROCEDURE — 85027 COMPLETE CBC AUTOMATED: CPT

## 2023-04-28 PROCEDURE — 85610 PROTHROMBIN TIME: CPT

## 2023-04-28 PROCEDURE — 36415 COLL VENOUS BLD VENIPUNCTURE: CPT

## 2023-04-28 PROCEDURE — 76942 ECHO GUIDE FOR BIOPSY: CPT

## 2023-04-28 PROCEDURE — 96365 THER/PROPH/DIAG IV INF INIT: CPT

## 2023-04-28 RX ORDER — LIDOCAINE HYDROCHLORIDE 20 MG/ML
10 INJECTION, SOLUTION INFILTRATION; PERINEURAL AS NEEDED
Status: DISCONTINUED | OUTPATIENT
Start: 2023-04-28 | End: 2023-04-30 | Stop reason: HOSPADM

## 2023-04-28 RX ORDER — ALBUMIN (HUMAN) 12.5 G/50ML
25 SOLUTION INTRAVENOUS DAILY PRN
Status: DISCONTINUED | OUTPATIENT
Start: 2023-04-28 | End: 2023-04-30 | Stop reason: HOSPADM

## 2023-04-28 RX ORDER — LIDOCAINE HYDROCHLORIDE 10 MG/ML
10 INJECTION, SOLUTION INFILTRATION; PERINEURAL AS NEEDED
Status: DISCONTINUED | OUTPATIENT
Start: 2023-04-28 | End: 2023-04-30 | Stop reason: HOSPADM

## 2023-04-28 RX ORDER — ALBUMIN (HUMAN) 12.5 G/50ML
12.5 SOLUTION INTRAVENOUS DAILY PRN
Status: DISCONTINUED | OUTPATIENT
Start: 2023-04-28 | End: 2023-04-30 | Stop reason: HOSPADM

## 2023-04-28 RX ADMIN — LIDOCAINE HYDROCHLORIDE 10 ML: 20 INJECTION, SOLUTION EPIDURAL; INFILTRATION; INTRACAUDAL; PERINEURAL at 08:00

## 2023-04-28 RX ADMIN — ALBUMIN (HUMAN) 25 G: 0.25 INJECTION, SOLUTION INTRAVENOUS at 08:45

## 2023-04-28 RX ADMIN — ALBUMIN (HUMAN) 25 G: 0.25 INJECTION, SOLUTION INTRAVENOUS at 09:24

## 2023-04-28 RX ADMIN — ALBUMIN (HUMAN) 25 G: 0.25 INJECTION, SOLUTION INTRAVENOUS at 08:14

## 2023-04-28 NOTE — CODE DOCUMENTATION
Lab specimen obtained via IV start upon arrival per protocol and without difficulty.  Patient tolerated well.

## 2023-05-05 RX ORDER — ALBUMIN (HUMAN) 12.5 G/50ML
12.5 SOLUTION INTRAVENOUS DAILY PRN
Status: CANCELLED | OUTPATIENT
Start: 2023-05-08

## 2023-05-05 RX ORDER — LIDOCAINE HYDROCHLORIDE 20 MG/ML
10 INJECTION, SOLUTION INFILTRATION; PERINEURAL AS NEEDED
Status: CANCELLED | OUTPATIENT
Start: 2023-05-08

## 2023-05-05 RX ORDER — ALBUMIN (HUMAN) 12.5 G/50ML
25 SOLUTION INTRAVENOUS DAILY PRN
Status: CANCELLED | OUTPATIENT
Start: 2023-05-08

## 2023-05-08 ENCOUNTER — HOSPITAL ENCOUNTER (OUTPATIENT)
Dept: INTERVENTIONAL RADIOLOGY/VASCULAR | Facility: HOSPITAL | Age: 38
Discharge: HOME | End: 2023-05-08
Payer: COMMERCIAL

## 2023-05-08 ENCOUNTER — HOSPITAL ENCOUNTER (OUTPATIENT)
Dept: INFUSION THERAPY | Facility: HOSPITAL | Age: 38
Discharge: HOME | End: 2023-05-08
Payer: COMMERCIAL

## 2023-05-08 VITALS
HEART RATE: 80 BPM | RESPIRATION RATE: 12 BRPM | OXYGEN SATURATION: 100 % | DIASTOLIC BLOOD PRESSURE: 46 MMHG | SYSTOLIC BLOOD PRESSURE: 102 MMHG

## 2023-05-08 DIAGNOSIS — R74.8 ELEVATED LIVER ENZYMES: ICD-10-CM

## 2023-05-08 DIAGNOSIS — K70.30 ALCOHOLIC CIRRHOSIS, UNSPECIFIED WHETHER ASCITES PRESENT: Primary | ICD-10-CM

## 2023-05-08 DIAGNOSIS — Z87.898 H/O ASCITES: ICD-10-CM

## 2023-05-08 LAB
DEPRECATED RDW RBC AUTO: 52.9 FL (ref 37–54)
ERYTHROCYTE [DISTWIDTH] IN BLOOD BY AUTOMATED COUNT: 13.7 % (ref 12.3–15.4)
HCT VFR BLD AUTO: 39.3 % (ref 37.5–51)
HGB BLD-MCNC: 13.2 G/DL (ref 13–17.7)
INR PPP: 1.12 (ref 0.93–1.1)
MCH RBC QN AUTO: 36.1 PG (ref 26.6–33)
MCHC RBC AUTO-ENTMCNC: 33.8 G/DL (ref 31.5–35.7)
MCV RBC AUTO: 107 FL (ref 79–97)
PLATELET # BLD AUTO: 126 10*3/MM3 (ref 140–450)
PMV BLD AUTO: 9.1 FL (ref 6–12)
PROTHROMBIN TIME: 11.9 SECONDS (ref 9.6–11.7)
RBC # BLD AUTO: 3.67 10*6/MM3 (ref 4.14–5.8)
WBC NRBC COR # BLD: 7 10*3/MM3 (ref 3.4–10.8)

## 2023-05-08 PROCEDURE — 85610 PROTHROMBIN TIME: CPT

## 2023-05-08 PROCEDURE — 76942 ECHO GUIDE FOR BIOPSY: CPT

## 2023-05-08 PROCEDURE — 85027 COMPLETE CBC AUTOMATED: CPT

## 2023-05-08 PROCEDURE — 96365 THER/PROPH/DIAG IV INF INIT: CPT

## 2023-05-08 PROCEDURE — 36415 COLL VENOUS BLD VENIPUNCTURE: CPT

## 2023-05-08 PROCEDURE — P9047 ALBUMIN (HUMAN), 25%, 50ML: HCPCS

## 2023-05-08 PROCEDURE — 25010000002 ALBUMIN HUMAN 25% PER 50 ML

## 2023-05-08 RX ORDER — ALBUMIN (HUMAN) 12.5 G/50ML
25 SOLUTION INTRAVENOUS DAILY PRN
Status: DISCONTINUED | OUTPATIENT
Start: 2023-05-08 | End: 2023-05-10 | Stop reason: HOSPADM

## 2023-05-08 RX ORDER — ALBUMIN (HUMAN) 12.5 G/50ML
12.5 SOLUTION INTRAVENOUS DAILY PRN
Status: DISCONTINUED | OUTPATIENT
Start: 2023-05-08 | End: 2023-05-10 | Stop reason: HOSPADM

## 2023-05-08 RX ORDER — LIDOCAINE HYDROCHLORIDE 20 MG/ML
10 INJECTION, SOLUTION INFILTRATION; PERINEURAL AS NEEDED
Status: DISCONTINUED | OUTPATIENT
Start: 2023-05-08 | End: 2023-05-10 | Stop reason: HOSPADM

## 2023-05-08 RX ADMIN — ALBUMIN (HUMAN) 62.5 G: 0.25 INJECTION, SOLUTION INTRAVENOUS at 13:05

## 2023-05-08 RX ADMIN — LIDOCAINE HYDROCHLORIDE 10 ML: 20 INJECTION, SOLUTION EPIDURAL; INFILTRATION; INTRACAUDAL; PERINEURAL at 12:40

## 2023-05-16 RX ORDER — LIDOCAINE HYDROCHLORIDE 10 MG/ML
10 INJECTION, SOLUTION INFILTRATION; PERINEURAL AS NEEDED
Status: CANCELLED | OUTPATIENT
Start: 2023-05-16

## 2023-05-16 RX ORDER — ALBUMIN (HUMAN) 12.5 G/50ML
12.5 SOLUTION INTRAVENOUS DAILY PRN
Status: CANCELLED | OUTPATIENT
Start: 2023-05-16

## 2023-05-16 RX ORDER — ALBUMIN (HUMAN) 12.5 G/50ML
25 SOLUTION INTRAVENOUS DAILY PRN
Status: CANCELLED | OUTPATIENT
Start: 2023-05-16

## 2023-05-16 RX ORDER — LIDOCAINE HYDROCHLORIDE 20 MG/ML
10 INJECTION, SOLUTION INFILTRATION; PERINEURAL AS NEEDED
Status: CANCELLED | OUTPATIENT
Start: 2023-05-16

## 2023-05-17 ENCOUNTER — HOSPITAL ENCOUNTER (OUTPATIENT)
Dept: INFUSION THERAPY | Facility: HOSPITAL | Age: 38
Discharge: HOME | End: 2023-05-17
Payer: COMMERCIAL

## 2023-05-17 VITALS
SYSTOLIC BLOOD PRESSURE: 113 MMHG | DIASTOLIC BLOOD PRESSURE: 43 MMHG | RESPIRATION RATE: 15 BRPM | HEART RATE: 75 BPM | OXYGEN SATURATION: 100 %

## 2023-05-17 PROCEDURE — P9047 ALBUMIN (HUMAN), 25%, 50ML: HCPCS

## 2023-05-17 PROCEDURE — 76942 ECHO GUIDE FOR BIOPSY: CPT

## 2023-05-17 PROCEDURE — 36415 COLL VENOUS BLD VENIPUNCTURE: CPT

## 2023-05-17 PROCEDURE — 96366 THER/PROPH/DIAG IV INF ADDON: CPT

## 2023-05-17 PROCEDURE — 96365 THER/PROPH/DIAG IV INF INIT: CPT

## 2023-05-17 PROCEDURE — 25010000002 ALBUMIN HUMAN 25% PER 50 ML

## 2023-05-17 RX ORDER — ALBUMIN (HUMAN) 12.5 G/50ML
12.5 SOLUTION INTRAVENOUS DAILY PRN
Status: DISCONTINUED | OUTPATIENT
Start: 2023-05-17 | End: 2023-05-19 | Stop reason: HOSPADM

## 2023-05-17 RX ORDER — LIDOCAINE HYDROCHLORIDE 10 MG/ML
10 INJECTION, SOLUTION INFILTRATION; PERINEURAL AS NEEDED
Status: DISCONTINUED | OUTPATIENT
Start: 2023-05-17 | End: 2023-05-19 | Stop reason: HOSPADM

## 2023-05-17 RX ORDER — ALBUMIN (HUMAN) 12.5 G/50ML
25 SOLUTION INTRAVENOUS DAILY PRN
Status: DISCONTINUED | OUTPATIENT
Start: 2023-05-17 | End: 2023-05-19 | Stop reason: HOSPADM

## 2023-05-17 RX ORDER — LIDOCAINE HYDROCHLORIDE 20 MG/ML
10 INJECTION, SOLUTION INFILTRATION; PERINEURAL AS NEEDED
Status: DISCONTINUED | OUTPATIENT
Start: 2023-05-17 | End: 2023-05-19 | Stop reason: HOSPADM

## 2023-05-17 RX ADMIN — ALBUMIN HUMAN 12.5 G: 0.25 SOLUTION INTRAVENOUS at 09:42

## 2023-05-17 RX ADMIN — ALBUMIN (HUMAN) 25 G: 0.25 INJECTION, SOLUTION INTRAVENOUS at 09:00

## 2023-05-17 RX ADMIN — LIDOCAINE HYDROCHLORIDE 10 ML: 20 INJECTION, SOLUTION EPIDURAL; INFILTRATION; INTRACAUDAL; PERINEURAL at 08:17

## 2023-05-17 RX ADMIN — ALBUMIN (HUMAN) 25 G: 0.25 INJECTION, SOLUTION INTRAVENOUS at 08:28

## 2023-05-25 RX ORDER — LIDOCAINE HYDROCHLORIDE 20 MG/ML
10 INJECTION, SOLUTION INFILTRATION; PERINEURAL AS NEEDED
Status: CANCELLED | OUTPATIENT
Start: 2023-05-26

## 2023-05-25 RX ORDER — ALBUMIN (HUMAN) 12.5 G/50ML
25 SOLUTION INTRAVENOUS DAILY PRN
Status: CANCELLED | OUTPATIENT
Start: 2023-05-25

## 2023-05-25 RX ORDER — ALBUMIN (HUMAN) 12.5 G/50ML
12.5 SOLUTION INTRAVENOUS DAILY PRN
Status: CANCELLED | OUTPATIENT
Start: 2023-05-25

## 2023-05-26 ENCOUNTER — HOSPITAL ENCOUNTER (OUTPATIENT)
Dept: INFUSION THERAPY | Facility: HOSPITAL | Age: 38
Discharge: HOME | End: 2023-05-26
Payer: COMMERCIAL

## 2023-05-26 VITALS
TEMPERATURE: 97.8 F | SYSTOLIC BLOOD PRESSURE: 110 MMHG | OXYGEN SATURATION: 100 % | RESPIRATION RATE: 17 BRPM | DIASTOLIC BLOOD PRESSURE: 35 MMHG | HEART RATE: 92 BPM

## 2023-05-26 PROCEDURE — P9047 ALBUMIN (HUMAN), 25%, 50ML: HCPCS

## 2023-05-26 PROCEDURE — 76942 ECHO GUIDE FOR BIOPSY: CPT

## 2023-05-26 PROCEDURE — 25010000002 ALBUMIN HUMAN 25% PER 50 ML

## 2023-05-26 PROCEDURE — 96366 THER/PROPH/DIAG IV INF ADDON: CPT

## 2023-05-26 PROCEDURE — 36415 COLL VENOUS BLD VENIPUNCTURE: CPT

## 2023-05-26 PROCEDURE — 96365 THER/PROPH/DIAG IV INF INIT: CPT

## 2023-05-26 RX ORDER — LIDOCAINE HYDROCHLORIDE 20 MG/ML
10 INJECTION, SOLUTION INFILTRATION; PERINEURAL AS NEEDED
Status: DISCONTINUED | OUTPATIENT
Start: 2023-05-26 | End: 2023-05-28 | Stop reason: HOSPADM

## 2023-05-26 RX ORDER — ALBUMIN (HUMAN) 12.5 G/50ML
12.5 SOLUTION INTRAVENOUS DAILY PRN
Status: DISCONTINUED | OUTPATIENT
Start: 2023-05-26 | End: 2023-05-28 | Stop reason: HOSPADM

## 2023-05-26 RX ORDER — ALBUMIN (HUMAN) 12.5 G/50ML
25 SOLUTION INTRAVENOUS DAILY PRN
Status: DISCONTINUED | OUTPATIENT
Start: 2023-05-26 | End: 2023-05-28 | Stop reason: HOSPADM

## 2023-05-26 RX ADMIN — LIDOCAINE HYDROCHLORIDE 10 ML: 20 INJECTION, SOLUTION EPIDURAL; INFILTRATION; INTRACAUDAL; PERINEURAL at 11:40

## 2023-05-26 RX ADMIN — ALBUMIN (HUMAN) 62.5 G: 0.25 INJECTION, SOLUTION INTRAVENOUS at 12:00

## 2023-05-31 ENCOUNTER — OFFICE (OUTPATIENT)
Dept: URBAN - METROPOLITAN AREA CLINIC 64 | Facility: CLINIC | Age: 38
End: 2023-05-31
Payer: COMMERCIAL

## 2023-05-31 VITALS
WEIGHT: 280 LBS | HEART RATE: 88 BPM | HEIGHT: 73 IN | DIASTOLIC BLOOD PRESSURE: 56 MMHG | SYSTOLIC BLOOD PRESSURE: 107 MMHG

## 2023-05-31 DIAGNOSIS — K72.90 HEPATIC FAILURE, UNSPECIFIED WITHOUT COMA: ICD-10-CM

## 2023-05-31 DIAGNOSIS — K70.31 ALCOHOLIC CIRRHOSIS OF LIVER WITH ASCITES: ICD-10-CM

## 2023-05-31 PROCEDURE — 99214 OFFICE O/P EST MOD 30 MIN: CPT

## 2023-05-31 RX ORDER — RIFAXIMIN 550 MG/1
TABLET ORAL
Qty: 60 | Refills: 11 | Status: ACTIVE
Start: 2023-05-31

## 2023-06-01 DIAGNOSIS — K70.30 ALCOHOLIC CIRRHOSIS, UNSPECIFIED WHETHER ASCITES PRESENT: Primary | ICD-10-CM

## 2023-06-01 RX ORDER — LIDOCAINE HYDROCHLORIDE 10 MG/ML
10 INJECTION, SOLUTION INFILTRATION; PERINEURAL AS NEEDED
Status: CANCELLED | OUTPATIENT
Start: 2023-06-01

## 2023-06-01 RX ORDER — LIDOCAINE HYDROCHLORIDE 20 MG/ML
10 INJECTION, SOLUTION INFILTRATION; PERINEURAL AS NEEDED
Status: CANCELLED | OUTPATIENT
Start: 2023-06-01

## 2023-06-01 RX ORDER — ALBUMIN (HUMAN) 12.5 G/50ML
12.5 SOLUTION INTRAVENOUS DAILY PRN
Status: CANCELLED | OUTPATIENT
Start: 2023-06-01

## 2023-06-01 RX ORDER — LIDOCAINE HYDROCHLORIDE 20 MG/ML
10 INJECTION, SOLUTION EPIDURAL; INFILTRATION; INTRACAUDAL; PERINEURAL AS NEEDED
Status: CANCELLED | OUTPATIENT
Start: 2023-06-01

## 2023-06-01 RX ORDER — ALBUMIN (HUMAN) 12.5 G/50ML
25 SOLUTION INTRAVENOUS DAILY PRN
Status: CANCELLED | OUTPATIENT
Start: 2023-06-01

## 2023-06-05 ENCOUNTER — HOSPITAL ENCOUNTER (OUTPATIENT)
Dept: INFUSION THERAPY | Facility: HOSPITAL | Age: 38
Discharge: HOME | End: 2023-06-05
Payer: COMMERCIAL

## 2023-06-05 ENCOUNTER — HOSPITAL ENCOUNTER (OUTPATIENT)
Facility: HOSPITAL | Age: 38
Setting detail: OBSERVATION
Discharge: HOME | End: 2023-06-07
Attending: EMERGENCY MEDICINE | Admitting: INTERNAL MEDICINE
Payer: COMMERCIAL

## 2023-06-05 ENCOUNTER — APPOINTMENT (OUTPATIENT)
Dept: ULTRASOUND IMAGING | Facility: HOSPITAL | Age: 38
End: 2023-06-05
Payer: COMMERCIAL

## 2023-06-05 VITALS
HEART RATE: 83 BPM | SYSTOLIC BLOOD PRESSURE: 111 MMHG | RESPIRATION RATE: 19 BRPM | OXYGEN SATURATION: 100 % | DIASTOLIC BLOOD PRESSURE: 40 MMHG

## 2023-06-05 DIAGNOSIS — K70.30 ALCOHOLIC CIRRHOSIS, UNSPECIFIED WHETHER ASCITES PRESENT: ICD-10-CM

## 2023-06-05 DIAGNOSIS — E87.1 LOW SODIUM LEVELS: ICD-10-CM

## 2023-06-05 DIAGNOSIS — E87.1 HYPONATREMIA: Primary | ICD-10-CM

## 2023-06-05 DIAGNOSIS — R53.1 WEAKNESS: ICD-10-CM

## 2023-06-05 DIAGNOSIS — N17.9 AKI (ACUTE KIDNEY INJURY): ICD-10-CM

## 2023-06-05 DIAGNOSIS — E87.1 LOW SODIUM LEVELS: Primary | ICD-10-CM

## 2023-06-05 LAB
ALBUMIN SERPL-MCNC: 3.5 G/DL (ref 3.5–5.2)
ALBUMIN SERPL-MCNC: 3.8 G/DL (ref 3.5–5.2)
ALBUMIN SERPL-MCNC: 4 G/DL (ref 3.5–5.2)
ALBUMIN/GLOB SERPL: 1.9 G/DL
ALBUMIN/GLOB SERPL: 2.5 G/DL
ALBUMIN/GLOB SERPL: 2.7 G/DL
ALP SERPL-CCNC: 105 U/L (ref 39–117)
ALP SERPL-CCNC: 121 U/L (ref 39–117)
ALP SERPL-CCNC: 162 U/L (ref 39–117)
ALT SERPL W P-5'-P-CCNC: 28 U/L (ref 1–41)
ALT SERPL W P-5'-P-CCNC: 31 U/L (ref 1–41)
ALT SERPL W P-5'-P-CCNC: 39 U/L (ref 1–41)
AMMONIA BLD-SCNC: 29 UMOL/L (ref 16–60)
AMMONIA BLD-SCNC: 46 UMOL/L (ref 16–60)
ANION GAP SERPL CALCULATED.3IONS-SCNC: 13 MMOL/L (ref 5–15)
ANION GAP SERPL CALCULATED.3IONS-SCNC: 15 MMOL/L (ref 5–15)
ANION GAP SERPL CALCULATED.3IONS-SCNC: 8 MMOL/L (ref 5–15)
APTT PPP: 36.9 SECONDS (ref 61–76.5)
AST SERPL-CCNC: 58 U/L (ref 1–40)
AST SERPL-CCNC: 61 U/L (ref 1–40)
AST SERPL-CCNC: 81 U/L (ref 1–40)
BASOPHILS # BLD AUTO: 0.1 10*3/MM3 (ref 0–0.2)
BASOPHILS NFR BLD AUTO: 1 % (ref 0–1.5)
BILIRUB SERPL-MCNC: 1.8 MG/DL (ref 0–1.2)
BILIRUB SERPL-MCNC: 2.5 MG/DL (ref 0–1.2)
BILIRUB SERPL-MCNC: 2.9 MG/DL (ref 0–1.2)
BILIRUB UR QL STRIP: ABNORMAL
BUN SERPL-MCNC: 34 MG/DL (ref 6–20)
BUN SERPL-MCNC: 35 MG/DL (ref 6–20)
BUN SERPL-MCNC: 39 MG/DL (ref 6–20)
BUN/CREAT SERPL: 12.1 (ref 7–25)
BUN/CREAT SERPL: 12.4 (ref 7–25)
BUN/CREAT SERPL: 13 (ref 7–25)
CA-I SERPL ISE-MCNC: 1.18 MMOL/L (ref 1.2–1.3)
CALCIUM SPEC-SCNC: 8.6 MG/DL (ref 8.6–10.5)
CALCIUM SPEC-SCNC: 9.3 MG/DL (ref 8.6–10.5)
CALCIUM SPEC-SCNC: 9.4 MG/DL (ref 8.6–10.5)
CHLORIDE SERPL-SCNC: 85 MMOL/L (ref 98–107)
CHLORIDE SERPL-SCNC: 85 MMOL/L (ref 98–107)
CHLORIDE SERPL-SCNC: 91 MMOL/L (ref 98–107)
CK SERPL-CCNC: 58 U/L (ref 20–200)
CLARITY UR: CLEAR
CO2 SERPL-SCNC: 20 MMOL/L (ref 22–29)
CO2 SERPL-SCNC: 22 MMOL/L (ref 22–29)
CO2 SERPL-SCNC: 28 MMOL/L (ref 22–29)
COLOR UR: ABNORMAL
CORTIS SERPL-MCNC: 6.67 MCG/DL
CREAT SERPL-MCNC: 2.82 MG/DL (ref 0.76–1.27)
CREAT SERPL-MCNC: 2.82 MG/DL (ref 0.76–1.27)
CREAT SERPL-MCNC: 3 MG/DL (ref 0.76–1.27)
D-LACTATE SERPL-SCNC: 1.3 MMOL/L (ref 0.5–2)
DEPRECATED RDW RBC AUTO: 46.8 FL (ref 37–54)
DEPRECATED RDW RBC AUTO: 47.7 FL (ref 37–54)
DEPRECATED RDW RBC AUTO: 49.4 FL (ref 37–54)
EGFRCR SERPLBLD CKD-EPI 2021: 26.4 ML/MIN/1.73
EGFRCR SERPLBLD CKD-EPI 2021: 28.5 ML/MIN/1.73
EGFRCR SERPLBLD CKD-EPI 2021: 28.5 ML/MIN/1.73
EOSINOPHIL # BLD AUTO: 0.2 10*3/MM3 (ref 0–0.4)
EOSINOPHIL NFR BLD AUTO: 2.9 % (ref 0.3–6.2)
EOSINOPHIL SPEC QL MICRO: 0 % EOS/100 CELLS (ref 0–0)
ERYTHROCYTE [DISTWIDTH] IN BLOOD BY AUTOMATED COUNT: 12.5 % (ref 12.3–15.4)
ERYTHROCYTE [DISTWIDTH] IN BLOOD BY AUTOMATED COUNT: 12.6 % (ref 12.3–15.4)
ERYTHROCYTE [DISTWIDTH] IN BLOOD BY AUTOMATED COUNT: 12.9 % (ref 12.3–15.4)
GLOBULIN UR ELPH-MCNC: 1.3 GM/DL
GLOBULIN UR ELPH-MCNC: 1.6 GM/DL
GLOBULIN UR ELPH-MCNC: 2 GM/DL
GLUCOSE SERPL-MCNC: 108 MG/DL (ref 65–99)
GLUCOSE SERPL-MCNC: 114 MG/DL (ref 65–99)
GLUCOSE SERPL-MCNC: 132 MG/DL (ref 65–99)
GLUCOSE UR STRIP-MCNC: NEGATIVE MG/DL
HCT VFR BLD AUTO: 33.4 % (ref 37.5–51)
HCT VFR BLD AUTO: 33.5 % (ref 37.5–51)
HCT VFR BLD AUTO: 36.7 % (ref 37.5–51)
HGB BLD-MCNC: 11.2 G/DL (ref 13–17.7)
HGB BLD-MCNC: 11.7 G/DL (ref 13–17.7)
HGB BLD-MCNC: 12.5 G/DL (ref 13–17.7)
HGB UR QL STRIP.AUTO: NEGATIVE
INR PPP: 1.17 (ref 0.93–1.1)
IRON 24H UR-MRATE: 60 MCG/DL (ref 59–158)
KETONES UR QL STRIP: ABNORMAL
L PNEUMO1 AG UR QL IA: NEGATIVE
LEUKOCYTE ESTERASE UR QL STRIP.AUTO: NEGATIVE
LYMPHOCYTES # BLD AUTO: 1.9 10*3/MM3 (ref 0.7–3.1)
LYMPHOCYTES NFR BLD AUTO: 23.5 % (ref 19.6–45.3)
MAGNESIUM SERPL-MCNC: 2.1 MG/DL (ref 1.6–2.6)
MAGNESIUM SERPL-MCNC: 2.2 MG/DL (ref 1.6–2.6)
MCH RBC QN AUTO: 35.4 PG (ref 26.6–33)
MCH RBC QN AUTO: 36.3 PG (ref 26.6–33)
MCH RBC QN AUTO: 36.5 PG (ref 26.6–33)
MCHC RBC AUTO-ENTMCNC: 33.6 G/DL (ref 31.5–35.7)
MCHC RBC AUTO-ENTMCNC: 34.2 G/DL (ref 31.5–35.7)
MCHC RBC AUTO-ENTMCNC: 34.9 G/DL (ref 31.5–35.7)
MCV RBC AUTO: 104.6 FL (ref 79–97)
MCV RBC AUTO: 105.4 FL (ref 79–97)
MCV RBC AUTO: 106.2 FL (ref 79–97)
MONOCYTES # BLD AUTO: 1 10*3/MM3 (ref 0.1–0.9)
MONOCYTES NFR BLD AUTO: 12.2 % (ref 5–12)
NEUTROPHILS NFR BLD AUTO: 4.8 10*3/MM3 (ref 1.7–7)
NEUTROPHILS NFR BLD AUTO: 60.4 % (ref 42.7–76)
NITRITE UR QL STRIP: NEGATIVE
NRBC BLD AUTO-RTO: 0.2 /100 WBC (ref 0–0.2)
OSMOLALITY UR: 268 MOSM/KG (ref 300–800)
PH UR STRIP.AUTO: <=5 [PH] (ref 5–8)
PHOSPHATE SERPL-MCNC: 4.9 MG/DL (ref 2.5–4.5)
PHOSPHATE SERPL-MCNC: 6.1 MG/DL (ref 2.5–4.5)
PLATELET # BLD AUTO: 120 10*3/MM3 (ref 140–450)
PLATELET # BLD AUTO: 124 10*3/MM3 (ref 140–450)
PLATELET # BLD AUTO: 144 10*3/MM3 (ref 140–450)
PMV BLD AUTO: 8.3 FL (ref 6–12)
PMV BLD AUTO: 8.4 FL (ref 6–12)
PMV BLD AUTO: 8.6 FL (ref 6–12)
POTASSIUM SERPL-SCNC: 4.7 MMOL/L (ref 3.5–5.2)
POTASSIUM SERPL-SCNC: 5.2 MMOL/L (ref 3.5–5.2)
POTASSIUM SERPL-SCNC: 5.3 MMOL/L (ref 3.5–5.2)
PROT SERPL-MCNC: 4.8 G/DL (ref 6–8.5)
PROT SERPL-MCNC: 5.6 G/DL (ref 6–8.5)
PROT SERPL-MCNC: 5.8 G/DL (ref 6–8.5)
PROT UR QL STRIP: ABNORMAL
PROTHROMBIN TIME: 12.4 SECONDS (ref 9.6–11.7)
RBC # BLD AUTO: 3.17 10*6/MM3 (ref 4.14–5.8)
RBC # BLD AUTO: 3.2 10*6/MM3 (ref 4.14–5.8)
RBC # BLD AUTO: 3.45 10*6/MM3 (ref 4.14–5.8)
SODIUM SERPL-SCNC: 120 MMOL/L (ref 136–145)
SODIUM SERPL-SCNC: 120 MMOL/L (ref 136–145)
SODIUM SERPL-SCNC: 127 MMOL/L (ref 136–145)
SODIUM UR-SCNC: <20 MMOL/L
SP GR UR STRIP: 1.01 (ref 1–1.03)
TSH SERPL DL<=0.05 MIU/L-ACNC: 2.12 UIU/ML (ref 0.27–4.2)
URATE SERPL-MCNC: 8.4 MG/DL (ref 3.4–7)
UROBILINOGEN UR QL STRIP: ABNORMAL
WBC NRBC COR # BLD: 8 10*3/MM3 (ref 3.4–10.8)
WBC NRBC COR # BLD: 8.5 10*3/MM3 (ref 3.4–10.8)
WBC NRBC COR # BLD: 9.7 10*3/MM3 (ref 3.4–10.8)

## 2023-06-05 PROCEDURE — 83735 ASSAY OF MAGNESIUM: CPT | Performed by: INTERNAL MEDICINE

## 2023-06-05 PROCEDURE — 80053 COMPREHEN METABOLIC PANEL: CPT | Performed by: NURSE PRACTITIONER

## 2023-06-05 PROCEDURE — 96361 HYDRATE IV INFUSION ADD-ON: CPT

## 2023-06-05 PROCEDURE — 76942 ECHO GUIDE FOR BIOPSY: CPT

## 2023-06-05 PROCEDURE — 85027 COMPLETE CBC AUTOMATED: CPT | Performed by: INTERNAL MEDICINE

## 2023-06-05 PROCEDURE — 85027 COMPLETE CBC AUTOMATED: CPT

## 2023-06-05 PROCEDURE — G0378 HOSPITAL OBSERVATION PER HR: HCPCS

## 2023-06-05 PROCEDURE — P9047 ALBUMIN (HUMAN), 25%, 50ML: HCPCS | Performed by: STUDENT IN AN ORGANIZED HEALTH CARE EDUCATION/TRAINING PROGRAM

## 2023-06-05 PROCEDURE — 96365 THER/PROPH/DIAG IV INF INIT: CPT

## 2023-06-05 PROCEDURE — 82550 ASSAY OF CK (CPK): CPT | Performed by: INTERNAL MEDICINE

## 2023-06-05 PROCEDURE — 36415 COLL VENOUS BLD VENIPUNCTURE: CPT

## 2023-06-05 PROCEDURE — P9047 ALBUMIN (HUMAN), 25%, 50ML: HCPCS

## 2023-06-05 PROCEDURE — 87449 NOS EACH ORGANISM AG IA: CPT | Performed by: INTERNAL MEDICINE

## 2023-06-05 PROCEDURE — 85730 THROMBOPLASTIN TIME PARTIAL: CPT | Performed by: NURSE PRACTITIONER

## 2023-06-05 PROCEDURE — 82533 TOTAL CORTISOL: CPT | Performed by: INTERNAL MEDICINE

## 2023-06-05 PROCEDURE — 36415 COLL VENOUS BLD VENIPUNCTURE: CPT | Performed by: INTERNAL MEDICINE

## 2023-06-05 PROCEDURE — 84100 ASSAY OF PHOSPHORUS: CPT | Performed by: INTERNAL MEDICINE

## 2023-06-05 PROCEDURE — 82140 ASSAY OF AMMONIA: CPT | Performed by: NURSE PRACTITIONER

## 2023-06-05 PROCEDURE — 83735 ASSAY OF MAGNESIUM: CPT | Performed by: NURSE PRACTITIONER

## 2023-06-05 PROCEDURE — 81003 URINALYSIS AUTO W/O SCOPE: CPT | Performed by: NURSE PRACTITIONER

## 2023-06-05 PROCEDURE — 76775 US EXAM ABDO BACK WALL LIM: CPT

## 2023-06-05 PROCEDURE — 93005 ELECTROCARDIOGRAM TRACING: CPT | Performed by: NURSE PRACTITIONER

## 2023-06-05 PROCEDURE — 80050 GENERAL HEALTH PANEL: CPT | Performed by: INTERNAL MEDICINE

## 2023-06-05 PROCEDURE — 82140 ASSAY OF AMMONIA: CPT | Performed by: INTERNAL MEDICINE

## 2023-06-05 PROCEDURE — 84550 ASSAY OF BLOOD/URIC ACID: CPT | Performed by: INTERNAL MEDICINE

## 2023-06-05 PROCEDURE — 83540 ASSAY OF IRON: CPT | Performed by: INTERNAL MEDICINE

## 2023-06-05 PROCEDURE — 83605 ASSAY OF LACTIC ACID: CPT | Performed by: INTERNAL MEDICINE

## 2023-06-05 PROCEDURE — 82330 ASSAY OF CALCIUM: CPT | Performed by: INTERNAL MEDICINE

## 2023-06-05 PROCEDURE — 96375 TX/PRO/DX INJ NEW DRUG ADDON: CPT

## 2023-06-05 PROCEDURE — 84165 PROTEIN E-PHORESIS SERUM: CPT | Performed by: INTERNAL MEDICINE

## 2023-06-05 PROCEDURE — 85610 PROTHROMBIN TIME: CPT

## 2023-06-05 PROCEDURE — 87205 SMEAR GRAM STAIN: CPT | Performed by: INTERNAL MEDICINE

## 2023-06-05 PROCEDURE — 96366 THER/PROPH/DIAG IV INF ADDON: CPT

## 2023-06-05 PROCEDURE — 80053 COMPREHEN METABOLIC PANEL: CPT | Performed by: INTERNAL MEDICINE

## 2023-06-05 PROCEDURE — 84300 ASSAY OF URINE SODIUM: CPT | Performed by: INTERNAL MEDICINE

## 2023-06-05 PROCEDURE — 99285 EMERGENCY DEPT VISIT HI MDM: CPT

## 2023-06-05 PROCEDURE — 84100 ASSAY OF PHOSPHORUS: CPT | Performed by: NURSE PRACTITIONER

## 2023-06-05 PROCEDURE — 25010000002 ALBUMIN HUMAN 25% PER 50 ML

## 2023-06-05 PROCEDURE — 96374 THER/PROPH/DIAG INJ IV PUSH: CPT

## 2023-06-05 PROCEDURE — 25010000002 ALBUMIN HUMAN 25% PER 50 ML: Performed by: STUDENT IN AN ORGANIZED HEALTH CARE EDUCATION/TRAINING PROGRAM

## 2023-06-05 PROCEDURE — 83935 ASSAY OF URINE OSMOLALITY: CPT | Performed by: INTERNAL MEDICINE

## 2023-06-05 RX ORDER — NADOLOL 40 MG/1
40 TABLET ORAL DAILY PRN
COMMUNITY

## 2023-06-05 RX ORDER — SODIUM CHLORIDE 1 G/1
1 TABLET ORAL
Status: DISCONTINUED | OUTPATIENT
Start: 2023-06-05 | End: 2023-06-06

## 2023-06-05 RX ORDER — SODIUM CHLORIDE 9 MG/ML
75 INJECTION, SOLUTION INTRAVENOUS CONTINUOUS
Status: DISCONTINUED | OUTPATIENT
Start: 2023-06-05 | End: 2023-06-07 | Stop reason: HOSPADM

## 2023-06-05 RX ORDER — ALBUMIN (HUMAN) 12.5 G/50ML
12.5 SOLUTION INTRAVENOUS DAILY PRN
Status: DISCONTINUED | OUTPATIENT
Start: 2023-06-05 | End: 2023-06-07 | Stop reason: HOSPADM

## 2023-06-05 RX ORDER — NADOLOL 20 MG/1
40 TABLET ORAL
Status: DISCONTINUED | OUTPATIENT
Start: 2023-06-05 | End: 2023-06-07

## 2023-06-05 RX ORDER — CYCLOBENZAPRINE HCL 10 MG
10 TABLET ORAL NIGHTLY
COMMUNITY

## 2023-06-05 RX ORDER — NITROGLYCERIN 0.4 MG/1
0.4 TABLET SUBLINGUAL
Status: DISCONTINUED | OUTPATIENT
Start: 2023-06-05 | End: 2023-06-07 | Stop reason: HOSPADM

## 2023-06-05 RX ORDER — ONDANSETRON 4 MG/1
4 TABLET, ORALLY DISINTEGRATING ORAL EVERY 8 HOURS PRN
COMMUNITY

## 2023-06-05 RX ORDER — MIDODRINE HYDROCHLORIDE 5 MG/1
10 TABLET ORAL ONCE
Status: COMPLETED | OUTPATIENT
Start: 2023-06-05 | End: 2023-06-05

## 2023-06-05 RX ORDER — ALBUMIN (HUMAN) 12.5 G/50ML
25 SOLUTION INTRAVENOUS ONCE
Status: COMPLETED | OUTPATIENT
Start: 2023-06-05 | End: 2023-06-05

## 2023-06-05 RX ORDER — LIDOCAINE HYDROCHLORIDE 10 MG/ML
10 INJECTION, SOLUTION INFILTRATION; PERINEURAL AS NEEDED
Status: DISCONTINUED | OUTPATIENT
Start: 2023-06-05 | End: 2023-06-07 | Stop reason: HOSPADM

## 2023-06-05 RX ORDER — ALBUMIN (HUMAN) 12.5 G/50ML
25 SOLUTION INTRAVENOUS DAILY PRN
Status: DISCONTINUED | OUTPATIENT
Start: 2023-06-05 | End: 2023-06-07 | Stop reason: HOSPADM

## 2023-06-05 RX ORDER — OXYCODONE HYDROCHLORIDE 5 MG/1
5 TABLET ORAL EVERY 4 HOURS PRN
COMMUNITY

## 2023-06-05 RX ORDER — LACTULOSE 10 G/15ML
20 SOLUTION ORAL 3 TIMES DAILY PRN
COMMUNITY

## 2023-06-05 RX ORDER — NICOTINE 21 MG/24HR
1 PATCH, TRANSDERMAL 24 HOURS TRANSDERMAL DAILY PRN
COMMUNITY

## 2023-06-05 RX ORDER — LIDOCAINE HYDROCHLORIDE 20 MG/ML
10 INJECTION, SOLUTION INFILTRATION; PERINEURAL AS NEEDED
Status: DISCONTINUED | OUTPATIENT
Start: 2023-06-05 | End: 2023-06-07 | Stop reason: HOSPADM

## 2023-06-05 RX ORDER — NICOTINE 21 MG/24HR
1 PATCH, TRANSDERMAL 24 HOURS TRANSDERMAL
Status: DISCONTINUED | OUTPATIENT
Start: 2023-06-05 | End: 2023-06-06

## 2023-06-05 RX ORDER — SODIUM BICARBONATE 650 MG/1
650 TABLET ORAL 2 TIMES DAILY
Status: DISCONTINUED | OUTPATIENT
Start: 2023-06-05 | End: 2023-06-07 | Stop reason: HOSPADM

## 2023-06-05 RX ORDER — PANTOPRAZOLE SODIUM 40 MG/1
40 TABLET, DELAYED RELEASE ORAL DAILY
Status: DISCONTINUED | OUTPATIENT
Start: 2023-06-05 | End: 2023-06-07 | Stop reason: HOSPADM

## 2023-06-05 RX ORDER — ONDANSETRON 2 MG/ML
4 INJECTION INTRAMUSCULAR; INTRAVENOUS EVERY 6 HOURS PRN
Status: DISCONTINUED | OUTPATIENT
Start: 2023-06-05 | End: 2023-06-07 | Stop reason: HOSPADM

## 2023-06-05 RX ORDER — SPIRONOLACTONE 50 MG/1
50 TABLET, FILM COATED ORAL DAILY
COMMUNITY
End: 2023-06-07 | Stop reason: HOSPADM

## 2023-06-05 RX ORDER — LIDOCAINE HYDROCHLORIDE 20 MG/ML
10 INJECTION, SOLUTION EPIDURAL; INFILTRATION; INTRACAUDAL; PERINEURAL AS NEEDED
Status: DISCONTINUED | OUTPATIENT
Start: 2023-06-05 | End: 2023-06-07 | Stop reason: HOSPADM

## 2023-06-05 RX ORDER — PANTOPRAZOLE SODIUM 40 MG/1
40 TABLET, DELAYED RELEASE ORAL DAILY
COMMUNITY

## 2023-06-05 RX ORDER — MIDODRINE HYDROCHLORIDE 5 MG/1
10 TABLET ORAL
Status: DISCONTINUED | OUTPATIENT
Start: 2023-06-05 | End: 2023-06-07 | Stop reason: HOSPADM

## 2023-06-05 RX ORDER — LACTULOSE 10 G/15ML
20 SOLUTION ORAL 3 TIMES DAILY
Status: DISCONTINUED | OUTPATIENT
Start: 2023-06-05 | End: 2023-06-07 | Stop reason: HOSPADM

## 2023-06-05 RX ORDER — SODIUM CHLORIDE 0.9 % (FLUSH) 0.9 %
10 SYRINGE (ML) INJECTION AS NEEDED
Status: DISCONTINUED | OUTPATIENT
Start: 2023-06-05 | End: 2023-06-07 | Stop reason: HOSPADM

## 2023-06-05 RX ADMIN — SODIUM CHLORIDE 100 ML/HR: 9 INJECTION, SOLUTION INTRAVENOUS at 17:00

## 2023-06-05 RX ADMIN — PANTOPRAZOLE SODIUM 40 MG: 40 TABLET, DELAYED RELEASE ORAL at 18:45

## 2023-06-05 RX ADMIN — SODIUM BICARBONATE 650 MG: 650 TABLET ORAL at 20:20

## 2023-06-05 RX ADMIN — LIDOCAINE HYDROCHLORIDE 10 ML: 20 INJECTION, SOLUTION EPIDURAL; INFILTRATION; INTRACAUDAL; PERINEURAL at 12:04

## 2023-06-05 RX ADMIN — SODIUM CHLORIDE 100 ML/HR: 9 INJECTION, SOLUTION INTRAVENOUS at 20:20

## 2023-06-05 RX ADMIN — ALBUMIN (HUMAN) 25 G: 0.25 INJECTION, SOLUTION INTRAVENOUS at 12:13

## 2023-06-05 RX ADMIN — Medication 10 ML: at 20:19

## 2023-06-05 RX ADMIN — ALBUMIN (HUMAN) 12.5 G: 0.25 INJECTION, SOLUTION INTRAVENOUS at 13:21

## 2023-06-05 RX ADMIN — LACTULOSE 20 G: 20 SOLUTION ORAL at 20:19

## 2023-06-05 RX ADMIN — MIDODRINE HYDROCHLORIDE 10 MG: 5 TABLET ORAL at 22:23

## 2023-06-05 RX ADMIN — ALBUMIN (HUMAN) 25 G: 0.25 INJECTION, SOLUTION INTRAVENOUS at 12:44

## 2023-06-05 RX ADMIN — NICOTINE 1 PATCH: 21 PATCH, EXTENDED RELEASE TRANSDERMAL at 21:41

## 2023-06-05 RX ADMIN — NADOLOL 40 MG: 20 TABLET ORAL at 20:19

## 2023-06-05 RX ADMIN — SODIUM CHLORIDE 1 G: 1 TABLET ORAL at 20:24

## 2023-06-05 RX ADMIN — SODIUM BICARBONATE 50 MEQ: 84 INJECTION, SOLUTION INTRAVENOUS at 17:00

## 2023-06-05 RX ADMIN — ALBUMIN (HUMAN) 25 G: 0.25 INJECTION, SOLUTION INTRAVENOUS at 22:23

## 2023-06-05 RX ADMIN — RIFAXIMIN 550 MG: 550 TABLET ORAL at 20:19

## 2023-06-05 RX ADMIN — MIDODRINE HYDROCHLORIDE 10 MG: 5 TABLET ORAL at 18:45

## 2023-06-05 NOTE — H&P
[facility] Medicine Services  History & Physical    Patient Name: Daquan Xiong  : 1985  MRN: [id number]  Primary Care Physician:  Wolf Carvalho MD  Date of admission: 2023  Date and Time of Service: 2023 at 1808    Subjective      Chief Complaint: Abnormal labs    History of Present Illness: Daquan Xiong is a 37 y.o. male who presented to [facility] on 2023 after undergoing a paracentesis here with Dr. Calderon.  Patient was found to have abnormal labs and was advised to present to the ED.  Patient sodium was 120 today.  Patient states he was recently at [facility].  Last Thursday Dr. Calderon told him to present to an ED and he went to [facility] for evaluation of his abnormal labs.  Patient states that [facility] his sodium got as low as 116 and he was placed in the ICU, per his report.  Patient states he signed himself out on .  Patient presented here today for paracentesis with Dr. Calderon.  Patient complains of fatigue, decreased appetite, abdominal pain, nausea, decreased urine volume, dysuria, and weakness for the past 2 weeks.  Patient states his abdominal pain is localized across his rib cage and he describes it as a burning pain.  Patient states the abdominal pain is chronic for him but it has gotten more intense in the past 2 weeks.  Patient states he has chronic back pain for which he sees pain management.  Patient denies drinking any alcohol.  Patient states he used to smoke marijuana but quit when he started seeing pain management.  Patient states he currently smokes approximately 8 cigarettes/day.  Patient states he has dark urine but denies any blood in his stool or dark stool.    In the ED,  All vitals stable on admission except BP 94/49. All labs unremarkable except sodium 120, chloride 85, BUN 34, creatinine 2.82 EGFR 28.5 glucose 132 phosphorus 4.9 alkaline phosphatase 121 total protein 5.6 AST 58 total bilirubin 2.5 PTT 36.9 hemoglobin 11.2,  hematocrit 33.4, platelets 120.  Urinalysis shows sakina color, trace ketones, trace protein, 1+ bilirubin. Patient received sodium bicarbonate injection and IV fluids in ED. Hospitalist was contacted to admit patient for further care and management.     ROS 12 point ROS reviewed and negative except as mentioned above.      Personal History     Past Medical History:   Diagnosis Date    Alcohol abuse     1/2-1 pint a day, sober since 9/12/2022    Arthritis     Hypertension     Sleep apnea        Past Surgical History:   Procedure Laterality Date    BACK SURGERY      WRIST SURGERY         Family History: family history includes Cirrhosis in his father; Diabetes in his maternal grandfather, maternal grandmother, paternal grandfather, and paternal grandmother; Heart disease in his maternal grandmother. Otherwise pertinent FHx was reviewed and not pertinent to current issue.    Social History:  reports that he has been smoking. He has been smoking an average of .5 packs per day. He has never used smokeless tobacco. He reports that he does not currently use alcohol. He reports that he does not currently use drugs after having used the following drugs: Marijuana.    Home Medications:  Prior to Admission Medications       Prescriptions Last Dose Informant Patient Reported? Taking?    ALPRAZolam (XANAX) 1 MG tablet   Yes Yes    Take 1 tablet by mouth 2 (Two) Times a Day As Needed for Anxiety.    cyclobenzaprine (FLEXERIL) 10 MG tablet   Yes Yes    Take 1 tablet by mouth Every Night.    furosemide (LASIX) 40 MG tablet   No Yes    Take 1 tablet by mouth Daily.    lactulose (CHRONULAC) 10 GM/15ML solution   Yes Yes    Take 30 mL by mouth 3 (Three) Times a Day As Needed.    melatonin 5 MG tablet tablet   Yes Yes    Take 1 tablet by mouth At Night As Needed.    midodrine (PROAMATINE) 5 MG tablet  Self Yes Yes    Take 1 tablet by mouth 3 (Three) Times a Day As Needed.    nadolol (CORGARD) 40 MG tablet   Yes Yes    Take 1 tablet by  mouth Daily As Needed.    nicotine (NICODERM CQ) 14 MG/24HR patch   Yes Yes    Place 1 patch on the skin as directed by provider Daily As Needed.    nicotine polacrilex (NICORETTE) 2 MG gum   Yes Yes    Chew 1 each As Needed for Smoking Cessation.    ondansetron ODT (ZOFRAN-ODT) 4 MG disintegrating tablet   Yes Yes    Place 1 tablet on the tongue Every 8 (Eight) Hours As Needed for Nausea or Vomiting.    oxyCODONE (ROXICODONE) 5 MG immediate release tablet   Yes Yes    Take 1 tablet by mouth Every 4 (Four) Hours As Needed for Moderate Pain.    pantoprazole (PROTONIX) 40 MG EC tablet   Yes Yes    Take 1 tablet by mouth Daily.    riFAXIMin (XIFAXAN) 550 MG tablet   Yes Yes    Take 1 tablet by mouth 2 (Two) Times a Day.    spironolactone (ALDACTONE) 50 MG tablet   Yes Yes    Take 1 tablet by mouth Daily.              Allergies:  Allergies   Allergen Reactions    Bee Venom Anaphylaxis    Scopolamine Anaphylaxis       Objective      Vitals:   Temp:  [98.4 °F (36.9 °C)] 98.4 °F (36.9 °C)  Heart Rate:  [] 96  Resp:  [10-20] 18  BP: ()/(27-63) 114/63    Physical Exam  Vitals and nursing note reviewed.   HENT:      Head: Normocephalic.      Mouth/Throat:      Mouth: Mucous membranes are dry.   Eyes:      Extraocular Movements: Extraocular movements intact.      Pupils: Pupils are equal, round, and reactive to light.   Cardiovascular:      Rate and Rhythm: Normal rate and regular rhythm.   Pulmonary:      Effort: Pulmonary effort is normal.      Breath sounds: Normal breath sounds.   Abdominal:      General: Bowel sounds are normal.      Palpations: Abdomen is soft.      Tenderness: There is no abdominal tenderness.      Comments: Dressing in place over paracentesis site   Musculoskeletal:         General: Normal range of motion.   Skin:     General: Skin is warm and dry.   Neurological:      Mental Status: He is alert and oriented to person, place, and time.   Psychiatric:         Mood and Affect: Mood normal.         Result Review    Result Review:  I have personally reviewed the results from the time of this admission to 6/5/2023 19:38 EDT and agree with these findings:  [x]  Laboratory  [x]  Microbiology  [x]  Radiology  [x]  EKG/Telemetry   []  Cardiology/Vascular   []  Pathology  []  Old records  []  Other:  Most notable findings include:   CBC:      Lab 06/05/23  1526 06/05/23  1116   WBC 8.00 9.70   HEMOGLOBIN 11.2* 12.5*   HEMATOCRIT 33.4* 36.7*   PLATELETS 120* 144   NEUTROS ABS 4.80  --    LYMPHS ABS 1.90  --    MONOS ABS 1.00*  --    EOS ABS 0.20  --    .4* 106.2*     Lab Results   Component Value Date    GLUCOSE 132 (H) 06/05/2023    BUN 34 (H) 06/05/2023    CREATININE 2.82 (H) 06/05/2023    EGFR 28.5 (L) 06/05/2023    BCR 12.1 06/05/2023    K 4.7 06/05/2023    CO2 22.0 06/05/2023    CALCIUM 9.3 06/05/2023    ALBUMIN 4.0 06/05/2023    BILITOT 2.5 (H) 06/05/2023    AST 58 (H) 06/05/2023    ALT 31 06/05/2023         Assessment & Plan        Active Hospital Problems:  Active Hospital Problems    Diagnosis     **Hyponatremia     Tobacco user     Anxiety     GERD (gastroesophageal reflux disease)     Low back pain     Cirrhosis of liver      Plan:   Alcoholic cirrhosis  Status post paracentesis today per patient  Ammonia 46  AST 58  ALT 31  Continue Xifaxan, lactulose, nadolol per nephrology    Acute kidney injury  Creatinine 2.82  BUN 34  EGFR 28.5  Avoid nephrotoxic medications and IV contrast dye and possible  Renal ultrasound and PVR ordered  Nephrology consulted-URIEL/ARF is secondary to prerenal state/intravascular volume depletion with concomitant Aldactone/Lasix use and ATN from hypotension, avoid hypotension and hydrate with normal saline per nephrology note    Hyponatremia  EKG showed normal sinus rhythm  Sodium 120  Hold Lasix, Aldactone, nicotine patch  Seizure fall precautions ordered  Telemetry ordered  Neurochecks ordered  Hyponatremia may be an acute exacerbation of chronic hyponatremia. Chronic  hyponatremia is likely secondary to ESLD and acute exacerbation is clinically hypotonic and hypovolemic secondary to intravascular volume depletion with lasix and aldactone use, per nephrology note.  Check urine and serum studies to further assess, P.o. fluid restriction at 1500 cc/day, follow serial sodium levels, per nephrology note    Anemia  Hemoglobin 11.2  Hematocrit 33.4  No overt signs of bleeding  Iron studies and SPEP ordered by nephrology  Continue to monitor with labs    Hypotension  Midodrine and IV fluids ordered by nephrology  BP currently 100/48  Continue to monitor    Chronic back pain  Oxycodone held by nephrology  Pain medications and Ativan to be held until sodium is above 127 to avoid MS changes that could mimic hyponatremia related symptoms, per nephrology note    Anxiety  Xanax held by nephrology  Pain medications and Ativan to be held until sodium is above 127 to avoid MS changes that could mimic hyponatremia related symptoms, per nephrology note    GERD  Continue home Protonix    Tobacco use  Encourage tobacco cessation      DVT prophylaxis:  Mechanical DVT prophylaxis orders are present.    CODE STATUS:    Level Of Support Discussed With: Patient  Code Status (Patient has no pulse and is not breathing): CPR (Attempt to Resuscitate)  Medical Interventions (Patient has pulse or is breathing): Full Support  Release to patient: Routine Release    Admission Status:  I believe this patient meets observation status.    I discussed the patient's findings and my recommendations with patient and family.    This patient has been examined wearing appropriate Personal Protective Equipment and discussed with  physician . 06/05/23    Signature: Electronically signed by Siri Mercado PA-C, 06/05/23, 19:38 EDT.  [facility] Hospitalist Team

## 2023-06-05 NOTE — LETTER
[facility] CASE MANAGEMENT  [address] [place] [address]  [phone]        June 7, 2023      Patient: Daquan Xiong  YOB: 1985  Date of Visit: 6/5/2023      Leelee Chopra RN     Office Phone: [phone]  Office Cell: [phone]

## 2023-06-05 NOTE — CONSULTS
NEPHROLOGY CONSULTATION-----[facility]    [facility]  [phone]  Saji Waldron MD    Patient Care Team:  Wolf Carvalho MD as PCP - General (Geriatric Medicine)  Fatou Waldron MD as Consulting Physician (Nephrology)    CC/REASON FOR CONSULTATION: RENAL FAILURE/HYPONATREMIA    PROVIDER REQUESTING CONSULTATION: ALICIA MON (ER)    History of Present Illness    HPI    Patient is a 37 y.o. WM whom I was asked to see in consultation for evaluation and management of renal failure/elevated serum creatinine and hyponatremia.  Patient was admitted after 9L paracentesis because of abnormal labs. Recently patient signed himself out AMA from [facility] ICU where he had sodium as low as 116 and also had URIEL there (that renal function/creatinine was supposedly better according to patient by time of signing out AMA but that sodium was still at 122). Patient denies prior knowledge of functional kidney disease, proteinuria, or hematuria. No NSAIDs or recent IV dye exposure. No known h/o TB, rheumatic fever,  SLE. +EtoH related cirrhosis and does bleed/bruise easily. Reports some decreased urine output and urinary hesitancy.  Some dysuria. No edema or fluid retention.  +Compliance with home meds. Was on diuretics in the form of  Lasix and  prior to admission. Was not on ACE-I/ARB prior to admission. No herbal med use. No seizures, tremors, twitches, HA, blurry vision. No polyuria or polydypsia. Was using Nicotine patches and Nicotine gum prior to admission    Review of Systems   Constitutional:  Positive for activity change, appetite change and fatigue. Negative for chills, diaphoresis, fever and unexpected weight change.   HENT:  Negative for congestion, dental problem, drooling, ear discharge, ear pain, facial swelling, hearing loss, mouth sores, nosebleeds, postnasal drip, rhinorrhea, sinus pressure, sinus pain, sneezing, sore throat,  tinnitus, trouble swallowing and voice change.    Eyes:  Negative for photophobia, pain, discharge, redness, itching and visual disturbance.   Respiratory:  Negative for apnea, cough, choking, chest tightness, shortness of breath, wheezing and stridor.    Cardiovascular:  Negative for chest pain, palpitations and leg swelling.   Gastrointestinal:  Positive for abdominal pain and nausea. Negative for abdominal distention, anal bleeding, blood in stool, constipation, diarrhea, rectal pain and vomiting.   Endocrine: Negative for cold intolerance, heat intolerance, polydipsia, polyphagia and polyuria.   Genitourinary:  Positive for decreased urine volume, difficulty urinating and dysuria. Negative for enuresis, flank pain, frequency, genital sores, hematuria and urgency.   Musculoskeletal:  Positive for arthralgias, back pain and myalgias. Negative for gait problem, joint swelling, neck pain and neck stiffness.   Skin:  Negative for color change, pallor, rash and wound.   Allergic/Immunologic: Negative for environmental allergies, food allergies and immunocompromised state.   Neurological:  Positive for weakness. Negative for dizziness, tremors, seizures, syncope, facial asymmetry, speech difficulty, light-headedness, numbness and headaches.   Hematological:  Negative for adenopathy. Does not bruise/bleed easily.   Psychiatric/Behavioral:  Positive for dysphoric mood. Negative for agitation, behavioral problems, confusion, decreased concentration, hallucinations, self-injury, sleep disturbance and suicidal ideas. The patient is not nervous/anxious and is not hyperactive.         Past Medical History:   Diagnosis Date    Alcohol abuse     1/2-1 pint a day, sober since 9/12/2022    Arthritis     Hypertension     Sleep apnea        Past Surgical History:   Procedure Laterality Date    BACK SURGERY      WRIST SURGERY         Family History   Problem Relation Age of Onset    Cirrhosis Father     Heart disease Maternal  Grandmother     Diabetes Maternal Grandmother     Diabetes Maternal Grandfather     Diabetes Paternal Grandmother     Diabetes Paternal Grandfather        Social History     Tobacco Use    Smoking status: Every Day     Packs/day: 0.50     Types: Cigarettes    Smokeless tobacco: Never   Vaping Use    Vaping Use: Never used   Substance Use Topics    Alcohol use: Not Currently     Comment: sober since 9/12/22    Drug use: Not Currently     Types: Marijuana       Home Meds: (Not in a hospital admission)      Scheduled Meds:  pharmacy, 1 each, Does not apply, Once        Continuous Infusions:  hold, 1 each  sodium chloride, 100 mL/hr, Last Rate: 100 mL/hr (06/05/23 1700)        PRN Meds:    hold    [COMPLETED] Insert Peripheral IV **AND** sodium chloride    Allergies:  Bee venom and Scopolamine    OBJECTIVE    Vital Signs  Temp:  [98.4 °F (36.9 °C)] 98.4 °F (36.9 °C)  Heart Rate:  [] 87  Resp:  [10-20] 18  BP: ()/(27-50) 100/48    I/O this shift:  In: -   Out: 50 [Urine:50]  No intake/output data recorded.    Physical Exam:  General Appearance: alert, appears stated age and cooperative  Head: normocephalic, without obvious abnormality and atraumatic +DRY OP WITH POOR DENTITION  Eyes: conjunctivae and sclerae normal and no icterus  Neck: supple and no JVD  Lungs: +FEW SCATTERED RHONCHI  Heart: regular rhythm & normal rate and normal S1, S2  Chest Wall: no abnormalities observed  Abdomen: normal bowel sounds and soft, nontender +PARACENTESIS SITE DRESSED  Extremities: moves extremities well, no edema, no cyanosis  Skin: no bleeding, bruising or rash +DECREASD SKIN TURGOR  Neurologic: Alert, and oriented. No focal deficits    Results Review:    I reviewed the patient's new clinical results.    WBC WBC   Date Value Ref Range Status   06/05/2023 8.00 3.40 - 10.80 10*3/mm3 Final   06/05/2023 9.70 3.40 - 10.80 10*3/mm3 Final      HGB Hemoglobin   Date Value Ref Range Status   06/05/2023 11.2 (L) 13.0 - 17.7 g/dL  Final   06/05/2023 12.5 (L) 13.0 - 17.7 g/dL Final      HCT Hematocrit   Date Value Ref Range Status   06/05/2023 33.4 (L) 37.5 - 51.0 % Final   06/05/2023 36.7 (L) 37.5 - 51.0 % Final      Platelets No results found for: LABPLAT   MCV MCV   Date Value Ref Range Status   06/05/2023 105.4 (H) 79.0 - 97.0 fL Final   06/05/2023 106.2 (H) 79.0 - 97.0 fL Final          Sodium Sodium   Date Value Ref Range Status   06/05/2023 120 (L) 136 - 145 mmol/L Final   06/05/2023 120 (L) 136 - 145 mmol/L Final      Potassium Potassium   Date Value Ref Range Status   06/05/2023 4.7 3.5 - 5.2 mmol/L Final   06/05/2023 5.2 3.5 - 5.2 mmol/L Final     Comment:     Slight hemolysis detected by analyzer. Results may be affected.      Chloride Chloride   Date Value Ref Range Status   06/05/2023 85 (L) 98 - 107 mmol/L Final   06/05/2023 85 (L) 98 - 107 mmol/L Final      CO2 CO2   Date Value Ref Range Status   06/05/2023 22.0 22.0 - 29.0 mmol/L Final   06/05/2023 20.0 (L) 22.0 - 29.0 mmol/L Final      BUN BUN   Date Value Ref Range Status   06/05/2023 34 (H) 6 - 20 mg/dL Final   06/05/2023 35 (H) 6 - 20 mg/dL Final      Creatinine Creatinine   Date Value Ref Range Status   06/05/2023 2.82 (H) 0.76 - 1.27 mg/dL Final   06/05/2023 2.82 (H) 0.76 - 1.27 mg/dL Final      Calcium Calcium   Date Value Ref Range Status   06/05/2023 9.3 8.6 - 10.5 mg/dL Final   06/05/2023 9.4 8.6 - 10.5 mg/dL Final      PO4 No results found for: CAPO4   Albumin Albumin   Date Value Ref Range Status   06/05/2023 4.0 3.5 - 5.2 g/dL Final   06/05/2023 3.8 3.5 - 5.2 g/dL Final      Magnesium Magnesium   Date Value Ref Range Status   06/05/2023 2.1 1.6 - 2.6 mg/dL Final      Uric Acid No results found for: URICACID       Imaging Results (Last 72 Hours)       ** No results found for the last 72 hours. **                        ASSESSMENT / PLAN      * No active hospital problems. *    1.RENAL FAILURE--------Oliguric. +ARF/URIEL with historically normal baseline renal function  with baseline serum creatinine of about 0.95 based on review of records available to me. +ARF/URIEL is secondary to prerenal state/intravascular volume depletion with concomitant Aldactone/Lasix use and ATN from hypotension. Doubt HRS but will see how he responds with fluid challenge. Avoid hypotension. Hydrate with NS. Check urine and serum studies and renal US and PVR to further assess ARF/URIEL. No NSAIDs or IV dye. Dose meds for CrCl less than 10 cc/min    2. HYPONATREMIA-----Acute exacerbation of chronic hyponatremia with baseline serum sodium levels of about 131-135 based on review of records. Chronic hyponatremia is likely secondary to ESLD. Acute exacerbation is clinically hypotonic and hypovolemic and secondary to intravascular volume depletion with concomitant Lasix/Aldactone use. Also exacerbated by poor solute intake and Nicotine patch/gum use. D/C Lasix, Aldactone, Nicotine patch. Seizure/fall precautions. Telemetry and neuro checks. Follow serial sodium levels. PO fluid restrict at 1500 cc/day. Check urine and serum studies to further assess. Add salt tabs temporarily for solute    3. ANEMIA-------Check Fe and SPEP    4. ETOH CIRRHOSIS/HEPATIC ENCEPHALOPATHY/ASCITES-----S/P 9L Paracentesis today per GI. Ammonia okay. Continue Xifaxan and Lactulose and Nadalol    5. HYPOTENSION-------IV NS and Midodrine. Follow for need to hold Nadalol. No Lasix or Aldactone    6. GERD/PUD PROPHYLAXIS_-------PPI    7. THROMBOCYTOPENIA------Secondary to ESLD. No heparin. Check SPEP    8. DVT PROPHYLAXIS-------SCDs    9. OA/CHRONIC PAIN/ANXIETY------Hold pain meds and Ativan until sodium up above 127 to avoid MS changes that could mimic hyponatremia related sx. No NSAIDs. Check uric acid level    10. VERY MILD RF ASSOCIATED HYPERPHOSPHATEMIA-----Follow phos for binder need    I discussed the patient's findings and my recommendations with patient, family, nursing staff, and consulting provider    Will follow along closely.  Thank you for allowing us to see this patient in renal consultation.    [facility]  [phone]  MD Saji Tang MD  06/05/23  18:03 EDT

## 2023-06-05 NOTE — ED PROVIDER NOTES
Subjective   History of Present Illness  Chief complaint: Abnormal labs      Context: Patient is a 37-year-old male past medical history significant for anxiety bipolar GERD sleep apnea alcoholic cirrhosis and chronic pain currently in pain management presents ambulatory to the emergency department today for reported abnormal labs.  States he was recently admitted at [facility] in the intensive care unit and signed himself out on Ashley 3 to come down for a scheduled paracentesis today.  He states he had 9 L removed by Dr. Calderon and was instructed to come to the ER due to an elevated kidney function and low sodium.  States he has been having some lightheadedness and dizziness with generalized weakness and fatigue.  He denies any chest abdominal pain or shortness of breath.  He states he did take 2 doses of his prescribed midodrine today and hold his nadolol when his blood pressure is low.  Has been taking his lactulose as needed but denies any overt confusion with occasional fogginess.  Denies any urinary complaints.  States  No alcohol since September 2022  Denies any melena hematochezia.  No hematemesis  Using a cane        PCP: kerri Calderon  Pain management  Liver transplant consultation in [facility] scheduled for Aug     Home meds: Oxycodone nadolol for esophageal varices Xifaxan and Protonix lactulose Aldactone 50 mg daily Xanax Lasix 40 midodrine multivitamin    Review of Systems   Constitutional:  Negative for fever.   Neurological:  Positive for dizziness.     Past Medical History:   Diagnosis Date    Alcohol abuse     1/2-1 pint a day, sober since 9/12/2022    Arthritis     Hypertension     Sleep apnea        Allergies   Allergen Reactions    Bee Venom Anaphylaxis    Scopolamine Anaphylaxis       Past Surgical History:   Procedure Laterality Date    BACK SURGERY      WRIST SURGERY         Family History   Problem Relation Age of Onset    Cirrhosis Father     Heart disease Maternal Grandmother     Diabetes  Maternal Grandmother     Diabetes Maternal Grandfather     Diabetes Paternal Grandmother     Diabetes Paternal Grandfather        Social History     Socioeconomic History    Marital status:    Tobacco Use    Smoking status: Every Day     Packs/day: 0.50     Types: Cigarettes    Smokeless tobacco: Never   Vaping Use    Vaping Use: Never used   Substance and Sexual Activity    Alcohol use: Not Currently     Comment: sober since 9/12/22    Drug use: Not Currently     Types: Marijuana    Sexual activity: Defer           Objective   Physical Exam    Vital signs and triage nurse note reviewed.  Constitutional: Awake, alert; chronically ill-appearing  HEENT: Normocephalic, atraumatic; pupils are PERRL with intact EOM; oropharynx is pink and moist without exudate or erythema.  Jaundice  Neck: Supple, full range of motion without pain; no JVD  Cardiovascular: Regular rate and rhythm, normal S1-S2.  Pulmonary: Respiratory effort regular nonlabored, breath sounds clear to auscultation all fields.  Abdomen: Soft, nontender nondistended with normoactive bowel sounds; no rebound or guarding.  Musculoskeletal: Independent range of motion of all extremities with no palpable tenderness or edema.  Neuro: Alert oriented x3, speech is clear and appropriate, GCS 15  Skin:  Fleshtone warm, dry, intact; senile bruising      Procedures           ED Course  ED Course as of 06/05/23 1636   Mon Jun 05, 2023   1613 Spoke with Dr. Waldron [JW]      ED Course User Index  [JW] Lauren Fuentes APRN      Labs Reviewed   COMPREHENSIVE METABOLIC PANEL - Abnormal; Notable for the following components:       Result Value    Glucose 132 (*)     BUN 34 (*)     Creatinine 2.82 (*)     Sodium 120 (*)     Chloride 85 (*)     Total Protein 5.6 (*)     AST (SGOT) 58 (*)     Alkaline Phosphatase 121 (*)     Total Bilirubin 2.5 (*)     eGFR 28.5 (*)     All other components within normal limits    Narrative:     GFR Normal >60  Chronic Kidney Disease  <60  Kidney Failure <15     APTT - Abnormal; Notable for the following components:    PTT 36.9 (*)     All other components within normal limits   URINALYSIS W/ MICROSCOPIC IF INDICATED (NO CULTURE) - Abnormal; Notable for the following components:    Color, UA Shyla (*)     Ketones, UA Trace (*)     Bilirubin, UA Small (1+) (*)     Protein, UA Trace (*)     All other components within normal limits    Narrative:     Urine microscopic not indicated.   PHOSPHORUS - Abnormal; Notable for the following components:    Phosphorus 4.9 (*)     All other components within normal limits   CBC WITH AUTO DIFFERENTIAL - Abnormal; Notable for the following components:    RBC 3.17 (*)     Hemoglobin 11.2 (*)     Hematocrit 33.4 (*)     .4 (*)     MCH 35.4 (*)     Platelets 120 (*)     Monocyte % 12.2 (*)     Monocytes, Absolute 1.00 (*)     All other components within normal limits   AMMONIA - Normal   MAGNESIUM - Normal   CBC AND DIFFERENTIAL    Narrative:     The following orders were created for panel order CBC & Differential.  Procedure                               Abnormality         Status                     ---------                               -----------         ------                     CBC Auto Differential[[id number]]        Abnormal            Final result               Scan Slide[[id number]]                                                                    Please view results for these tests on the individual orders.     Medications   sodium chloride 0.9 % flush 10 mL (has no administration in time range)   sodium bicarbonate injection 8.4% 50 mEq (has no administration in time range)   sodium chloride 0.9 % infusion (has no administration in time range)   Pharmacy Message (has no administration in time range)   Hold medication (has no administration in time range)     No radiology results for the last day  Prior to Admission medications    Medication Sig Start Date End Date Taking? Authorizing Provider  "  ALPRAZolam (XANAX) 1 MG tablet Take 1 mg by mouth 2 (Two) Times a Day As Needed for Anxiety.    Nathaly Pop MD   folic acid (FOLVITE) 1 MG tablet Take 1 tablet by mouth Daily. 9/23/22   Chris Lees MD   furosemide (LASIX) 40 MG tablet Take 1 tablet by mouth Daily. 9/23/22   Chris Lees MD   gabapentin (NEURONTIN) 400 MG capsule Take 400 mg by mouth 4 (Four) Times a Day.    Nathaly Pop MD   HYDROmorphone (DILAUDID) 8 MG tablet Take 8 mg by mouth Every 6 (Six) Hours As Needed for Moderate Pain.    Nathaly Pop MD   lactulose (CHRONULAC) 10 GM/15ML solution Take 45 mL by mouth 3 (Three) Times a Day. 9/23/22   Crhis Lees MD   melatonin 5 MG tablet tablet Take 5 mg by mouth At Night As Needed.    Nathaly Pop MD   midodrine (PROAMATINE) 5 MG tablet Take 1 tablet by mouth 3 (Three) Times a Day Before Meals.    Nathaly Pop MD   multivitamin with minerals (Multivitamin Adult) tablet tablet Take 1 tablet by mouth Daily. 9/23/22   Chris Lees MD   spironolactone (ALDACTONE) 100 MG tablet Take 1 tablet by mouth Daily for 30 days. 9/23/22 10/23/22  Chris Lees MD                                          Medical Decision Making  Chart review: 9/2022: creat 0.8  3/2/23: creat 1.5  6/2-6/3/23:  vanessa Elizalde-  hgb 11.5 plt 134 ast 64 creat 1.06 bili 3.9   6/5/23: creat 2.8, bun 35, na120, bilirubin 2.9 ast 81        /43 (BP Location: Left arm, Patient Position: Sitting)   Pulse 100   Temp 98.4 °F (36.9 °C) (Oral)   Resp 18   Ht 185.4 cm (73\")   Wt 74.3 kg (163 lb 12.8 oz)   SpO2 99%   BMI 21.61 kg/m²           Radiology interpretation: CT of the abdomen not felt to be emergently warranted  Lab interpretation:  Labs all viewed by me and significant for, BUN 34 creatinine 2.8 AST 58 alk phos 121 bilirubin 2.5 phosphorus 4.9 hemoglobin 11.2 " platelets 120 ammonia 46     EKG viewed by me and interpreted by Dr. Luciano, sinus rhythm rate of 84  comparison: None      Appropriate PPE worn during exam.  Patient had an iv established and labs obtained to evaluate for electrolyte abnormalities anemia infection; no findings consistent with SBP.  Patient was placed on seizure precautions.  He reportedly had 9 L removed from his paracentesis with Dr. Calderon today.  He states he gets a paracentesis every 10 days.  I discussed with nephrology and recommendations for bicarb and normal saline infusion were given and ordered with instructions to hold Lasix and Aldactone.  Orthostatic vitals: Supine 102/39 heart rate 86  Sitting 94/49 heart rate 81  Standing 94/48 heart rate 56  Discussed with hospitalist for admission he will be placed in PCU.           i discussed findings with patient who voices understanding of admission  This document is intended for medical expert use only. Reading of this document by patients and/or patient's family without participating medical staff guidance may result in misinterpretation and unintended morbidity.  Any interpretation of such data is the responsibility of the patient and/or family member responsible for the patient in concert with their primary or specialist providers, not to be left for sources of online searches such as [employer], [employer] or similar queries. Relying on these approaches to knowledge may result in misinterpretation, misguided goals of care and even death should patients or family members try recommendations outside of the realm of professional medical care in a supervised inpatient environment.         Amount and/or Complexity of Data Reviewed  Labs: ordered.    Risk  Prescription drug management.        Final diagnoses:   Hyponatremia   URIEL (acute kidney injury)   Weakness       ED Disposition  ED Disposition       ED Disposition   Decision to Admit    Condition   --    Comment   Level of Care: Telemetry [5]    Admitting Physician: THAD ARAYA [[id number]]   Attending Physician: THAD ARAYA [[id number]]   Bed Request Comments: pcu                 No follow-up provider specified.       Medication List        ASK your doctor about these medications      lactulose 10 GM/15ML solution  Commonly known as: CHRONULAC  Ask about: Which instructions should I use?     spironolactone 50 MG tablet  Commonly known as: ALDACTONE  Ask about: Which instructions should I use?                 Lauren Fuentes, APRN  06/05/23 4744

## 2023-06-05 NOTE — PROGRESS NOTES
[facility]     Progress Note    Patient Name: Daquan Xiong  : 1985  MRN: [id number]  Primary Care Physician:  Wolf Carvalho MD  Date of admission: 2023    Subjective   Subjective     Chief Complaint: ***    History of Present Illness  Patient Reports ***    Review of Systems    Objective   Objective     Vitals:   Temp:  [98.4 °F (36.9 °C)] 98.4 °F (36.9 °C)  Heart Rate:  [] 100  Resp:  [10-20] 18  BP: ()/(27-50) 109/43    Physical Exam     Result Review    [unfilled]    Assessment & Plan   Assessment / Plan     Brief Patient Summary:  Daquan Xiong is a 37 y.o. male who ***    Active Hospital Problems:  There are no active hospital problems to display for this patient.    Plan:       DVT prophylaxis:  No DVT prophylaxis order currently exists.    CODE STATUS:       Disposition:  I expect patient to be discharged ***.    Teresa Sandoval RN

## 2023-06-05 NOTE — Clinical Note
Level of Care: Telemetry [5]   Admitting Physician: THAD ARAYA [[id number]]   Attending Physician: THAD ARAYA [[id number]]   Bed Request Comments: pcu

## 2023-06-05 NOTE — ED NOTES
"Nursing report ED to floor  Daquan Xiong  37 y.o.  male    HPI:   Chief Complaint   Patient presents with    Abnormal Lab       Admitting doctor:   Luis Enrique Byrne MD    Admitting diagnosis:   The primary encounter diagnosis was Hyponatremia. Diagnoses of URIEL (acute kidney injury) and Weakness were also pertinent to this visit.    Code status:   Current Code Status       Date Active Code Status Order ID Comments User Context       Prior            Allergies:   Bee venom and Scopolamine    Isolation:  No active isolations     Fall Risk:  Fall Risk Assessment was completed, and patient is at high risk for falls.   Predictive Model Details   No score data available for Risk of Fall        Weight:       06/05/23  1500   Weight: 74.3 kg (163 lb 12.8 oz)       Intake and Output    Intake/Output Summary (Last 24 hours) at 6/5/2023 1712  Last data filed at 6/5/2023 1534  Gross per 24 hour   Intake --   Output 50 ml   Net -50 ml       Diet:        Most recent vitals:   Vitals:    06/05/23 1500 06/05/23 1509 06/05/23 1608 06/05/23 1616   BP: 109/43 94/49 94/48 100/48   BP Location: Left arm      Patient Position: Sitting Sitting Lying    Pulse: 100 81 56 87   Resp: 18      Temp: 98.4 °F (36.9 °C)      TempSrc: Oral      SpO2: 99%   100%   Weight: 74.3 kg (163 lb 12.8 oz)      Height: 185.4 cm (73\")          Active LDAs/IV Access:   Lines, Drains & Airways       Active LDAs       Name Placement date Placement time Site Days    Peripheral IV 06/05/23 1526 Anterior;Right Forearm 06/05/23  1526  Forearm  less than 1                    Skin Condition:   Skin Assessments (last day)       None             Labs (abnormal labs have a star):   Labs Reviewed   COMPREHENSIVE METABOLIC PANEL - Abnormal; Notable for the following components:       Result Value    Glucose 132 (*)     BUN 34 (*)     Creatinine 2.82 (*)     Sodium 120 (*)     Chloride 85 (*)     Total Protein 5.6 (*)     AST (SGOT) 58 (*)     Alkaline Phosphatase 121 (*)  "    Total Bilirubin 2.5 (*)     eGFR 28.5 (*)     All other components within normal limits    Narrative:     GFR Normal >60  Chronic Kidney Disease <60  Kidney Failure <15     APTT - Abnormal; Notable for the following components:    PTT 36.9 (*)     All other components within normal limits   URINALYSIS W/ MICROSCOPIC IF INDICATED (NO CULTURE) - Abnormal; Notable for the following components:    Color, UA Shyla (*)     Ketones, UA Trace (*)     Bilirubin, UA Small (1+) (*)     Protein, UA Trace (*)     All other components within normal limits    Narrative:     Urine microscopic not indicated.   PHOSPHORUS - Abnormal; Notable for the following components:    Phosphorus 4.9 (*)     All other components within normal limits   CBC WITH AUTO DIFFERENTIAL - Abnormal; Notable for the following components:    RBC 3.17 (*)     Hemoglobin 11.2 (*)     Hematocrit 33.4 (*)     .4 (*)     MCH 35.4 (*)     Platelets 120 (*)     Monocyte % 12.2 (*)     Monocytes, Absolute 1.00 (*)     All other components within normal limits   AMMONIA - Normal   MAGNESIUM - Normal   CBC AND DIFFERENTIAL    Narrative:     The following orders were created for panel order CBC & Differential.  Procedure                               Abnormality         Status                     ---------                               -----------         ------                     CBC Auto Differential[[id number]]        Abnormal            Final result               Scan Slide[[id number]]                                                                    Please view results for these tests on the individual orders.       LOC: Person, Place, Time, and Situation    Telemetry:  Telemetry    Cardiac Monitoring Ordered: yes    EKG:   ECG 12 Lead Rhythm Change   Preliminary Result   HEART RATE= 84  bpm   RR Interval= 712  ms   WI Interval= 162  ms   P Horizontal Axis= -13  deg   P Front Axis= 72  deg   QRSD Interval= 88  ms   QT Interval= 380  ms   QRS Axis= 11   deg   T Wave Axis= 49  deg   - NORMAL ECG -   Sinus rhythm   No previous ECG available for comparison   Electronically Signed By:    Date and Time of Study: 2023-06-05 16:14:22          Medications Given in the ED:   Medications   sodium chloride 0.9 % flush 10 mL (has no administration in time range)   sodium bicarbonate injection 8.4% 50 mEq (has no administration in time range)   sodium chloride 0.9 % infusion (has no administration in time range)   Pharmacy Message (has no administration in time range)   Hold medication (has no administration in time range)       Imaging results:  No radiology results for the last day    Social issues:   Social History     Socioeconomic History    Marital status:    Tobacco Use    Smoking status: Every Day     Packs/day: 0.50     Types: Cigarettes    Smokeless tobacco: Never   Vaping Use    Vaping Use: Never used   Substance and Sexual Activity    Alcohol use: Not Currently     Comment: sober since 9/12/22    Drug use: Not Currently     Types: Marijuana    Sexual activity: Defer       NIH Stroke Scale:  Interval: (not recorded)  1a. Level of Consciousness: (not recorded)  1b. LOC Questions: (not recorded)  1c. LOC Commands: (not recorded)  2. Best Gaze: (not recorded)  3. Visual: (not recorded)  4. Facial Palsy: (not recorded)  5a. Motor Arm, Left: (not recorded)  5b. Motor Arm, Right: (not recorded)  6a. Motor Leg, Left: (not recorded)  6b. Motor Leg, Right: (not recorded)  7. Limb Ataxia: (not recorded)  8. Sensory: (not recorded)  9. Best Language: (not recorded)  10. Dysarthria: (not recorded)  11. Extinction and Inattention (formerly Neglect): (not recorded)    Total (NIH Stroke Scale): (not recorded)     Additional notable assessment information:     Nursing report ED to floor:  SHELIA Michel RN   06/05/23 17:12 EDT

## 2023-06-05 NOTE — LETTER
[facility] CASE MANAGEMENT  [address] [place] [address] [place] [address] [phone]        June 8, 2023      Patient: Daquan Xiong  YOB: 1985  Date of Visit: 6/5/2023    [facility] referral. D/C'd 6/7/23. Please let us know if you can accept.     Thanks!    BLADE Milligan, RN    [address] [place] [address]    Office: [phone]  Fax: [phone]

## 2023-06-06 LAB
ALBUMIN SERPL-MCNC: 4.1 G/DL (ref 3.5–5.2)
ALBUMIN/GLOB SERPL: 2.6 G/DL
ALP SERPL-CCNC: 121 U/L (ref 39–117)
ALT SERPL W P-5'-P-CCNC: 31 U/L (ref 1–41)
ANION GAP SERPL CALCULATED.3IONS-SCNC: 15 MMOL/L (ref 5–15)
AST SERPL-CCNC: 63 U/L (ref 1–40)
BILIRUB SERPL-MCNC: 2.8 MG/DL (ref 0–1.2)
BUN SERPL-MCNC: 41 MG/DL (ref 6–20)
BUN/CREAT SERPL: 15.8 (ref 7–25)
CALCIUM SPEC-SCNC: 9.8 MG/DL (ref 8.6–10.5)
CHLORIDE SERPL-SCNC: 91 MMOL/L (ref 98–107)
CO2 SERPL-SCNC: 20 MMOL/L (ref 22–29)
CREAT SERPL-MCNC: 2.6 MG/DL (ref 0.76–1.27)
EGFRCR SERPLBLD CKD-EPI 2021: 31.4 ML/MIN/1.73
FOLATE SERPL-MCNC: 12.2 NG/ML (ref 4.78–24.2)
GLOBULIN UR ELPH-MCNC: 1.6 GM/DL
GLUCOSE SERPL-MCNC: 93 MG/DL (ref 65–99)
MAGNESIUM SERPL-MCNC: 2.3 MG/DL (ref 1.6–2.6)
POTASSIUM SERPL-SCNC: 4.9 MMOL/L (ref 3.5–5.2)
PROT SERPL-MCNC: 5.7 G/DL (ref 6–8.5)
QT INTERVAL: 380 MS
SODIUM SERPL-SCNC: 126 MMOL/L (ref 136–145)
VIT B12 BLD-MCNC: 1487 PG/ML (ref 211–946)

## 2023-06-06 PROCEDURE — 83735 ASSAY OF MAGNESIUM: CPT | Performed by: INTERNAL MEDICINE

## 2023-06-06 PROCEDURE — 82607 VITAMIN B-12: CPT | Performed by: INTERNAL MEDICINE

## 2023-06-06 PROCEDURE — 82746 ASSAY OF FOLIC ACID SERUM: CPT | Performed by: INTERNAL MEDICINE

## 2023-06-06 PROCEDURE — 85027 COMPLETE CBC AUTOMATED: CPT | Performed by: INTERNAL MEDICINE

## 2023-06-06 PROCEDURE — G0378 HOSPITAL OBSERVATION PER HR: HCPCS

## 2023-06-06 PROCEDURE — 96361 HYDRATE IV INFUSION ADD-ON: CPT

## 2023-06-06 PROCEDURE — 80053 COMPREHEN METABOLIC PANEL: CPT | Performed by: INTERNAL MEDICINE

## 2023-06-06 PROCEDURE — 97165 OT EVAL LOW COMPLEX 30 MIN: CPT

## 2023-06-06 PROCEDURE — 96375 TX/PRO/DX INJ NEW DRUG ADDON: CPT

## 2023-06-06 PROCEDURE — 84100 ASSAY OF PHOSPHORUS: CPT | Performed by: INTERNAL MEDICINE

## 2023-06-06 PROCEDURE — 97161 PT EVAL LOW COMPLEX 20 MIN: CPT

## 2023-06-06 PROCEDURE — 25010000002 CALCIUM GLUCONATE-NACL 1-0.675 GM/50ML-% SOLUTION: Performed by: INTERNAL MEDICINE

## 2023-06-06 PROCEDURE — 25010000002 ONDANSETRON PER 1 MG: Performed by: INTERNAL MEDICINE

## 2023-06-06 RX ORDER — CALCIUM GLUCONATE 20 MG/ML
1 INJECTION, SOLUTION INTRAVENOUS ONCE
Status: COMPLETED | OUTPATIENT
Start: 2023-06-06 | End: 2023-06-06

## 2023-06-06 RX ORDER — OXYCODONE HYDROCHLORIDE AND ACETAMINOPHEN 5; 325 MG/1; MG/1
1 TABLET ORAL EVERY 4 HOURS PRN
Status: DISCONTINUED | OUTPATIENT
Start: 2023-06-06 | End: 2023-06-06

## 2023-06-06 RX ORDER — ALPRAZOLAM 1 MG/1
1 TABLET ORAL 2 TIMES DAILY PRN
Status: DISCONTINUED | OUTPATIENT
Start: 2023-06-06 | End: 2023-06-07 | Stop reason: HOSPADM

## 2023-06-06 RX ORDER — OXYCODONE HYDROCHLORIDE 5 MG/1
5 TABLET ORAL EVERY 4 HOURS PRN
Status: DISCONTINUED | OUTPATIENT
Start: 2023-06-06 | End: 2023-06-07 | Stop reason: HOSPADM

## 2023-06-06 RX ORDER — ALLOPURINOL 100 MG/1
100 TABLET ORAL DAILY
Status: DISCONTINUED | OUTPATIENT
Start: 2023-06-06 | End: 2023-06-07 | Stop reason: HOSPADM

## 2023-06-06 RX ORDER — OXYCODONE HCL 5 MG/5 ML
5 SOLUTION, ORAL ORAL EVERY 4 HOURS
Status: DISCONTINUED | OUTPATIENT
Start: 2023-06-06 | End: 2023-06-06

## 2023-06-06 RX ADMIN — LACTULOSE 20 G: 20 SOLUTION ORAL at 20:21

## 2023-06-06 RX ADMIN — ALLOPURINOL 100 MG: 100 TABLET ORAL at 08:14

## 2023-06-06 RX ADMIN — MIDODRINE HYDROCHLORIDE 10 MG: 5 TABLET ORAL at 17:02

## 2023-06-06 RX ADMIN — SODIUM CHLORIDE 100 ML/HR: 9 INJECTION, SOLUTION INTRAVENOUS at 06:40

## 2023-06-06 RX ADMIN — MIDODRINE HYDROCHLORIDE 10 MG: 5 TABLET ORAL at 10:48

## 2023-06-06 RX ADMIN — ONDANSETRON 4 MG: 2 INJECTION INTRAMUSCULAR; INTRAVENOUS at 13:31

## 2023-06-06 RX ADMIN — RIFAXIMIN 550 MG: 550 TABLET ORAL at 20:21

## 2023-06-06 RX ADMIN — CALCIUM GLUCONATE 1 G: 20 INJECTION, SOLUTION INTRAVENOUS at 08:13

## 2023-06-06 RX ADMIN — SODIUM BICARBONATE 650 MG: 650 TABLET ORAL at 08:19

## 2023-06-06 RX ADMIN — OXYCODONE 5 MG: 5 TABLET ORAL at 13:25

## 2023-06-06 RX ADMIN — ALPRAZOLAM 1 MG: 1 TABLET ORAL at 13:25

## 2023-06-06 RX ADMIN — OXYCODONE 5 MG: 5 TABLET ORAL at 22:18

## 2023-06-06 RX ADMIN — SODIUM BICARBONATE 650 MG: 650 TABLET ORAL at 20:22

## 2023-06-06 RX ADMIN — MIDODRINE HYDROCHLORIDE 10 MG: 5 TABLET ORAL at 08:14

## 2023-06-06 RX ADMIN — RIFAXIMIN 550 MG: 550 TABLET ORAL at 08:19

## 2023-06-06 RX ADMIN — LACTULOSE 20 G: 20 SOLUTION ORAL at 08:14

## 2023-06-06 RX ADMIN — SODIUM ZIRCONIUM CYCLOSILICATE 10 G: 10 POWDER, FOR SUSPENSION ORAL at 08:20

## 2023-06-06 RX ADMIN — SODIUM CHLORIDE 500 ML: 9 INJECTION, SOLUTION INTRAVENOUS at 19:35

## 2023-06-06 RX ADMIN — PANTOPRAZOLE SODIUM 40 MG: 40 TABLET, DELAYED RELEASE ORAL at 08:19

## 2023-06-06 RX ADMIN — LACTULOSE 20 G: 20 SOLUTION ORAL at 17:04

## 2023-06-06 NOTE — PLAN OF CARE
Goal Outcome Evaluation:  Plan of Care Reviewed With: patient           Outcome Evaluation: Pt is a 38 y/o male who presented after undergoing a paracentesis here with Dr. Camilo and found to be hyponatremic. He c/o fatigue, decreased appetite, abdominal pain, nausea, decreased urine volume, dysuria, and weakness. PMHx includes anxiety, bipolar, GERD, sleep apnea, alcoholic cirrhosis and chronic pain. At baseline, pt lives with his spouse in a single level home with flat entry. He is typically independent using a straight cane, requiring occassional assist with bathing/dressing. He does report a history of several falls. Currently, pt appears to be functioning near baseline. He demonstrates decreased strength but is transferring and ambulating greater than a household distance with CGA and use of RW. Pt may benefit from home health PT at d/c. Will continue to follow and progress as tolerated.

## 2023-06-06 NOTE — PLAN OF CARE
Goal Outcome Evaluation:              Outcome Evaluation: Patient is currently continuing to require management of electrolytes with iv replacements. Patient is tolerating plan of care well at this time. Mental status is baseline, patient is alert and oriented x 4 at this time.

## 2023-06-06 NOTE — DISCHARGE PLACEMENT REQUEST
"Amish Xiong (37 y.o. Male)       Date of Birth   1985    Social Security Number       Address   [address] [place] [address]    Home Phone   [phone]    MRN   [id number]       [facility]   [facility]    Marital Status                               Admission Date   6/5/23    Admission Type   Emergency    Admitting Provider   Luis Enrique Byrne MD    Attending Provider   Luis Enrique Byrne MD    Department, Room/Bed   [facility] CARE, 2115/1       Discharge Date       Discharge Disposition       Discharge Destination                                 Attending Provider: Luis Enrique Byrne MD    Allergies: Bee Venom, Scopolamine    Isolation: None   Infection: None   Code Status: CPR    Ht: 185.4 cm (73\")   Wt: 75.5 kg (166 lb 7.2 oz)    Admission Cmt: None   Principal Problem: Hyponatremia [E87.1]                   Active Insurance as of 6/5/2023       Primary Coverage       Payor Plan Insurance Group Employer/Plan Group    ANTHEM BLUE CROSS ANTHEM BLUE CROSS BLUE SHIELD PPO [id number]       Payor Plan Address Payor Plan Phone Number Payor Plan Fax Number Effective Dates    [address] [phone]  1/1/2023 - None Entered    [facility] [id number]         Subscriber Name Subscriber Birth Date Member ID       ANA XIONG [birth year]1986 [id number]               Secondary Coverage       Payor Plan Insurance Group Employer/Plan Group    [id number]       Payor Plan Address Payor Plan Phone Number Payor Plan Fax Number Effective Dates    MAIL STOP:   7/1/2022 - None Entered    [address] [address]       [facility] [id number]         Subscriber Name Subscriber Birth Date Member ID       AMISH XIONG 1985 [id number]                     Emergency Contacts        (Rel.) Home Phone Work Phone Mobile Phone    Ana Xiong (Spouse) [phone] -- [phone]                 History & Physical        MercadoSiri bull PA-C at " 23 1806       Attestation signed by Luis Enrique Byrne MD at 23 0910    I have reviewed this documentation and agree.                      [facility] Medicine Services  History & Physical    Patient Name: Daquan Xiong  : 1985  MRN: [id number]  Primary Care Physician:  Wolf Carvalho MD  Date of admission: 2023  Date and Time of Service: 2023 at 1808    Subjective      Chief Complaint: Abnormal labs    History of Present Illness: Daquan Xiong is a 37 y.o. male who presented to [facility] on 2023 after undergoing a paracentesis here with Dr. Calderon.  Patient was found to have abnormal labs and was advised to present to the ED.  Patient sodium was 120 today.  [facility]  Last Thursday Dr. Calderon told him to present to an ED and he went to [place] for evaluation of his abnormal labs.  Patient states that [place] his sodium got as low as 116 and he was placed in the ICU, per his report.  Patient states he signed himself out on .  Patient presented here today for paracentesis with Dr. Calderon.  Patient complains of fatigue, decreased appetite, abdominal pain, nausea, decreased urine volume, dysuria, and weakness for the past 2 weeks.  Patient states his abdominal pain is localized across his rib cage and he describes it as a burning pain.  Patient states the abdominal pain is chronic for him but it has gotten more intense in the past 2 weeks.  Patient states he has chronic back pain for which he sees pain management.  Patient denies drinking any alcohol.  Patient states he used to smoke marijuana but quit when he started seeing pain management.  Patient states he currently smokes approximately 8 cigarettes/day.  Patient states he has dark urine but denies any blood in his stool or dark stool.    In the ED,  All vitals stable on admission except BP 94/49. All labs unremarkable except sodium 120, chloride 85, BUN 34,  creatinine 2.82 EGFR 28.5 glucose 132 phosphorus 4.9 alkaline phosphatase 121 total protein 5.6 AST 58 total bilirubin 2.5 PTT 36.9 hemoglobin 11.2, hematocrit 33.4, platelets 120.  Urinalysis shows sakina color, trace ketones, trace protein, 1+ bilirubin. Patient received sodium bicarbonate injection and IV fluids in ED. Hospitalist was contacted to admit patient for further care and management.     ROS 12 point ROS reviewed and negative except as mentioned above.      Personal History     Past Medical History:   Diagnosis Date    Alcohol abuse     1/2-1 pint a day, sober since 9/12/2022    Arthritis     Hypertension     Sleep apnea        Past Surgical History:   Procedure Laterality Date    BACK SURGERY      WRIST SURGERY         Family History: family history includes Cirrhosis in his father; Diabetes in his maternal grandfather, maternal grandmother, paternal grandfather, and paternal grandmother; Heart disease in his maternal grandmother. Otherwise pertinent FHx was reviewed and not pertinent to current issue.    Social History:  reports that he has been smoking. He has been smoking an average of .5 packs per day. He has never used smokeless tobacco. He reports that he does not currently use alcohol. He reports that he does not currently use drugs after having used the following drugs: Marijuana.    Home Medications:  Prior to Admission Medications       Prescriptions Last Dose Informant Patient Reported? Taking?    ALPRAZolam (XANAX) 1 MG tablet   Yes Yes    Take 1 tablet by mouth 2 (Two) Times a Day As Needed for Anxiety.    cyclobenzaprine (FLEXERIL) 10 MG tablet   Yes Yes    Take 1 tablet by mouth Every Night.    furosemide (LASIX) 40 MG tablet   No Yes    Take 1 tablet by mouth Daily.    lactulose (CHRONULAC) 10 GM/15ML solution   Yes Yes    Take 30 mL by mouth 3 (Three) Times a Day As Needed.    melatonin 5 MG tablet tablet   Yes Yes    Take 1 tablet by mouth At Night As Needed.    midodrine (PROAMATINE)  5 MG tablet  Self Yes Yes    Take 1 tablet by mouth 3 (Three) Times a Day As Needed.    nadolol (CORGARD) 40 MG tablet   Yes Yes    Take 1 tablet by mouth Daily As Needed.    nicotine (NICODERM CQ) 14 MG/24HR patch   Yes Yes    Place 1 patch on the skin as directed by provider Daily As Needed.    nicotine polacrilex (NICORETTE) 2 MG gum   Yes Yes    Chew 1 each As Needed for Smoking Cessation.    ondansetron ODT (ZOFRAN-ODT) 4 MG disintegrating tablet   Yes Yes    Place 1 tablet on the tongue Every 8 (Eight) Hours As Needed for Nausea or Vomiting.    oxyCODONE (ROXICODONE) 5 MG immediate release tablet   Yes Yes    Take 1 tablet by mouth Every 4 (Four) Hours As Needed for Moderate Pain.    pantoprazole (PROTONIX) 40 MG EC tablet   Yes Yes    Take 1 tablet by mouth Daily.    riFAXIMin (XIFAXAN) 550 MG tablet   Yes Yes    Take 1 tablet by mouth 2 (Two) Times a Day.    spironolactone (ALDACTONE) 50 MG tablet   Yes Yes    Take 1 tablet by mouth Daily.              Allergies:  Allergies   Allergen Reactions    Bee Venom Anaphylaxis    Scopolamine Anaphylaxis       Objective      Vitals:   Temp:  [98.4 °F (36.9 °C)] 98.4 °F (36.9 °C)  Heart Rate:  [] 96  Resp:  [10-20] 18  BP: ()/(27-63) 114/63    Physical Exam  Vitals and nursing note reviewed.   HENT:      Head: Normocephalic.      Mouth/Throat:      Mouth: Mucous membranes are dry.   Eyes:      Extraocular Movements: Extraocular movements intact.      Pupils: Pupils are equal, round, and reactive to light.   Cardiovascular:      Rate and Rhythm: Normal rate and regular rhythm.   Pulmonary:      Effort: Pulmonary effort is normal.      Breath sounds: Normal breath sounds.   Abdominal:      General: Bowel sounds are normal.      Palpations: Abdomen is soft.      Tenderness: There is no abdominal tenderness.      Comments: Dressing in place over paracentesis site   Musculoskeletal:         General: Normal range of motion.   Skin:     General: Skin is warm and  dry.   Neurological:      Mental Status: He is alert and oriented to person, place, and time.   Psychiatric:         Mood and Affect: Mood normal.        Result Review    Result Review:  I have personally reviewed the results from the time of this admission to 6/5/2023 19:38 EDT and agree with these findings:  [x]  Laboratory  [x]  Microbiology  [x]  Radiology  [x]  EKG/Telemetry   []  Cardiology/Vascular   []  Pathology  []  Old records  []  Other:  Most notable findings include:   CBC:      Lab 06/05/23  1526 06/05/23  1116   WBC 8.00 9.70   HEMOGLOBIN 11.2* 12.5*   HEMATOCRIT 33.4* 36.7*   PLATELETS 120* 144   NEUTROS ABS 4.80  --    LYMPHS ABS 1.90  --    MONOS ABS 1.00*  --    EOS ABS 0.20  --    .4* 106.2*     Lab Results   Component Value Date    GLUCOSE 132 (H) 06/05/2023    BUN 34 (H) 06/05/2023    CREATININE 2.82 (H) 06/05/2023    EGFR 28.5 (L) 06/05/2023    BCR 12.1 06/05/2023    K 4.7 06/05/2023    CO2 22.0 06/05/2023    CALCIUM 9.3 06/05/2023    ALBUMIN 4.0 06/05/2023    BILITOT 2.5 (H) 06/05/2023    AST 58 (H) 06/05/2023    ALT 31 06/05/2023         Assessment & Plan        Active Hospital Problems:  Active Hospital Problems    Diagnosis     **Hyponatremia     Tobacco user     Anxiety     GERD (gastroesophageal reflux disease)     Low back pain     Cirrhosis of liver      Plan:   Alcoholic cirrhosis  Status post paracentesis today per patient  Ammonia 46  AST 58  ALT 31  Continue Xifaxan, lactulose, nadolol per nephrology    Acute kidney injury  Creatinine 2.82  BUN 34  EGFR 28.5  Avoid nephrotoxic medications and IV contrast dye and possible  Renal ultrasound and PVR ordered  Nephrology consulted-URIEL/ARF is secondary to prerenal state/intravascular volume depletion with concomitant Aldactone/Lasix use and ATN from hypotension, avoid hypotension and hydrate with normal saline per nephrology note    Hyponatremia  EKG showed normal sinus rhythm  Sodium 120  Hold Lasix, Aldactone, nicotine  patch  Seizure fall precautions ordered  Telemetry ordered  Neurochecks ordered  Hyponatremia may be an acute exacerbation of chronic hyponatremia. Chronic hyponatremia is likely secondary to ESLD and acute exacerbation is clinically hypotonic and hypovolemic secondary to intravascular volume depletion with lasix and aldactone use, per nephrology note.  Check urine and serum studies to further assess, P.o. fluid restriction at 1500 cc/day, follow serial sodium levels, per nephrology note    Anemia  Hemoglobin 11.2  Hematocrit 33.4  No overt signs of bleeding  Iron studies and SPEP ordered by nephrology  Continue to monitor with labs    Hypotension  Midodrine and IV fluids ordered by nephrology  BP currently 100/48  Continue to monitor    Chronic back pain  Oxycodone held by nephrology  Pain medications and Ativan to be held until sodium is above 127 to avoid MS changes that could mimic hyponatremia related symptoms, per nephrology note    Anxiety  Xanax held by nephrology  Pain medications and Ativan to be held until sodium is above 127 to avoid MS changes that could mimic hyponatremia related symptoms, per nephrology note    GERD  Continue home Protonix    Tobacco use  Encourage tobacco cessation      DVT prophylaxis:  Mechanical DVT prophylaxis orders are present.    CODE STATUS:    Level Of Support Discussed With: Patient  Code Status (Patient has no pulse and is not breathing): CPR (Attempt to Resuscitate)  Medical Interventions (Patient has pulse or is breathing): Full Support  Release to patient: Routine Release    Admission Status:  I believe this patient meets observation status.    I discussed the patient's findings and my recommendations with patient and family.    This patient has been examined wearing appropriate Personal Protective Equipment and discussed with  physician . 06/05/23    Signature: Electronically signed by Siri Mercado PA-C, 06/05/23, 19:38 EDT.  [facility]    Electronically signed by Luis Enrique Byrne MD at 06/06/23 0910       Current Facility-Administered Medications   Medication Dose Route Frequency Provider Last Rate Last Admin    allopurinol (ZYLOPRIM) tablet 100 mg  100 mg Oral Daily Fatou Waldron MD   100 mg at 06/06/23 0814    ALPRAZolam (XANAX) tablet 1 mg  1 mg Oral BID PRN Luis Enrique Byrne MD   1 mg at 06/06/23 1325    Hold medication  1 each Does not apply Continuous PRN Lauren Fuentes APRN        lactulose (CHRONULAC) 10 GM/15ML solution 20 g  20 g Oral TID Fatou Waldron MD   20 g at 06/06/23 0814    midodrine (PROAMATINE) tablet 10 mg  10 mg Oral TID AC Fatou Waldron MD   10 mg at 06/06/23 1048    nadolol (CORGARD) tablet 40 mg  40 mg Oral Q24H Fatou Waldron MD   40 mg at 06/05/23 2019    nitroglycerin (NITROSTAT) SL tablet 0.4 mg  0.4 mg Sublingual Q5 Min PRN Fatou Waldron MD        ondansetron (ZOFRAN) injection 4 mg  4 mg Intravenous Q6H PRN Fatou Waldron MD   4 mg at 06/06/23 1331    oxyCODONE (ROXICODONE) immediate release tablet 5 mg  5 mg Oral Q4H PRN Luis Enrique Byrne MD   5 mg at 06/06/23 1325    pantoprazole (PROTONIX) EC tablet 40 mg  40 mg Oral Daily Fatou Waldron MD   40 mg at 06/06/23 0819    Pharmacy Message  1 each Does not apply Once Lauren Fuentes APRN        riFAXIMin (XIFAXAN) tablet 550 mg  550 mg Oral BID Fatou Waldron MD   550 mg at 06/06/23 0819    sodium bicarbonate tablet 650 mg  650 mg Oral BID Fatou Waldron MD   650 mg at 06/06/23 0819    sodium chloride 0.9 % flush 10 mL  10 mL Intravenous PRN Lauren Fuentes APRN   10 mL at 06/05/23 2019    sodium chloride 0.9 % infusion  75 mL/hr Intravenous Continuous Fatou Waldron MD 75 mL/hr at 06/06/23 0820 75 mL/hr at 06/06/23 0820     Facility-Administered Medications Ordered in Other Encounters   Medication Dose Route  Frequency Provider Last Rate Last Admin    albumin human 25 % IV SOLN 12.5 g  12.5 g Intravenous Daily PRN Mamie Ayers, APRN   Stopped at 06/05/23 1344    albumin human 25 % IV SOLN 25 g  25 g Intravenous Daily PRN Mamie Ayers, APRN   Stopped at 06/05/23 1321    lidocaine (XYLOCAINE) 1 % injection 10 mL  10 mL Injection PRN Mamie Ayers, APRN        lidocaine (XYLOCAINE) 2% injection 10 mL  10 mL Injection PRN Mamie Ayers, APRN   10 mL at 06/05/23 1204    lidocaine PF 2% (XYLOCAINE) injection 10 mL  10 mL Injection PRN Mamie Ayers, APRN

## 2023-06-06 NOTE — THERAPY EVALUATION
Patient Name: Daquan Xiong  : 1985    MRN: [id number]                              Today's Date: 2023       Admit Date: 2023    Visit Dx:     ICD-10-CM ICD-9-CM   1. Hyponatremia  E87.1 276.1   2. URIEL (acute kidney injury)  N17.9 584.9   3. Weakness  R53.1 780.79     Patient Active Problem List   Diagnosis    Elevated liver enzymes    Anxiety    Bipolar affective disorder    GERD (gastroesophageal reflux disease)    Low back pain    Osteoarthritis    Tobacco use disorder    Cirrhosis of liver    Polysubstance abuse    BASILIO (obstructive sleep apnea)    Tobacco user    Hyponatremia     Past Medical History:   Diagnosis Date    Alcohol abuse     1/2-1 pint a day, sober since 2022    Arthritis     Hypertension     Sleep apnea      Past Surgical History:   Procedure Laterality Date    BACK SURGERY      WRIST SURGERY        General Information       Row Name 23 1534          OT Time and Intention    Document Type evaluation  -LS     Mode of Treatment occupational therapy  -LS       Row Name 23 1534          General Information    Patient Profile Reviewed yes  -LS     Prior Level of Function min assist:;ADL's;independent:;driving;all household mobility;community mobility  Straight cane  -LS     Existing Precautions/Restrictions fall  -LS     Barriers to Rehab medically complex  -LS       Row Name 23 1534          Living Environment    People in Home spouse  -LS       Row Name 23 1534          Home Main Entrance    Number of Stairs, Main Entrance none  -LS       Row Name 23 1534          Stairs Within Home, Primary    Number of Stairs, Within Home, Primary none  -LS       Row Name 23 1534          Cognition    Orientation Status (Cognition) oriented x 4  -LS       Row Name 23 1534          Safety Issues, Functional Mobility    Impairments Affecting Function (Mobility) balance;endurance/activity tolerance;strength;pain  -LS               User Key  (r) = Recorded  By, (t) = Taken By, (c) = Cosigned By      Initials Name Provider Type    LS Saulo Villa OT Occupational Therapist                     Mobility/ADL's       Row Name 06/06/23 1536          Bed Mobility    Bed Mobility supine-sit  -LS     Supine-Sit [place] (Bed Mobility) standby assist  -LS     Assistive Device (Bed Mobility) head of bed elevated;bed rails  -LS       Row Name 06/06/23 1536          Transfers    Transfers bed-chair transfer;sit-stand transfer  -LS       Row Name 06/06/23 1536          Bed-Chair Transfer    Bed-Chair [place] (Transfers) contact guard  -LS       Row Name 06/06/23 1536          Sit-Stand Transfer    Sit-Stand [place] (Transfers) contact guard  -LS     Assistive Device (Sit-Stand Transfers) cane, straight  -LS       Row Name 06/06/23 1536          Activities of Daily Living    BADL Assessment/Intervention other (see comments)  Setup required for ADLs  -LS               User Key  (r) = Recorded By, (t) = Taken By, (c) = Cosigned By      Initials Name Provider Type    LS Saulo Villa OT Occupational Therapist                   Obj/Interventions       Row Name 06/06/23 1538          Range of Motion Comprehensive    General Range of Motion no range of motion deficits identified  -       Row Name 06/06/23 1538          Strength Comprehensive (MMT)    Comment, General Manual Muscle Testing (MMT) Assessment BUE grossly 4/5  -       Row Name 06/06/23 1538          Balance    Balance Assessment sitting static balance;sitting dynamic balance;standing static balance;standing dynamic balance  -LS     Static Sitting Balance independent  -LS     Dynamic Sitting Balance independent  -LS     Position, Sitting Balance sitting edge of bed  -LS     Static Standing Balance standby assist  -LS     Dynamic Standing Balance contact guard  -LS     Position/Device Used, Standing Balance supported;cane, straight  -LS               User Key  (r) = Recorded By, (t) = Taken By, (c) = Cosigned  By      Initials Name Provider Type    LS Saulo Villa OT Occupational Therapist                   Goals/Plan       Row Name 06/06/23 1545          Transfer Goal 1 (OT)    Activity/Assistive Device (Transfer Goal 1, OT) transfers, all  -LS     Bath Level/Cues Needed (Transfer Goal 1, OT) modified independence  -LS     Time Frame (Transfer Goal 1, OT) long term goal (LTG);2 weeks  -LS       Row Name 06/06/23 1545          Bathing Goal 1 (OT)    Activity/Device (Bathing Goal 1, OT) bathing skills, all  -LS     Bath Level/Cues Needed (Bathing Goal 1, OT) modified independence  -LS     Time Frame (Bathing Goal 1, OT) long term goal (LTG);2 weeks  -LS       Row Name 06/06/23 1545          Dressing Goal 1 (OT)    Activity/Device (Dressing Goal 1, OT) dressing skills, all  -LS     Bath/Cues Needed (Dressing Goal 1, OT) modified independence  -LS     Time Frame (Dressing Goal 1, OT) long term goal (LTG);2 weeks  -LS       Row Name 06/06/23 1545          Therapy Assessment/Plan (OT)    Planned Therapy Interventions (OT) activity tolerance training;BADL retraining;functional balance retraining;IADL retraining;occupation/activity based interventions;ROM/therapeutic exercise;strengthening exercise;transfer/mobility retraining  -               User Key  (r) = Recorded By, (t) = Taken By, (c) = Cosigned By      Initials Name Provider Type    LS Saulo Villa OT Occupational Therapist                   Clinical Impression       Row Name 06/06/23 1539          Pain Assessment    Pretreatment Pain Rating 6/10  -LS     Posttreatment Pain Rating 6/10  -LS     Pain Location generalized  -LS       Row Name 06/06/23 1539          Plan of Care Review    Plan of Care Reviewed With patient  -LS     Outcome Evaluation 37 y.o. male who presented to [facility] on 6/5/2023 after undergoing a paracentesis here with Dr. Calderon.  Patient was found to have abnormal labs and was advised to present to the ED.  PMHx includes anxiety, bipolar, GERD, sleep apnea, alcoholic cirrhosis and chronic pain. At baseline, pt lives with his spouse in a single level home with flat entry. He is typically independent using a straight cane, requiring occassional assist with bathing/dressing. He does report a history of several falls. Today, patient requires Min A for lower body dressing, otherwise setup for ADLs. SBA to come to EOB and stand, and CGA for ambulation. OT feels pt will be safe to d/c home, recommending HHOT. WIll follow.  -       Row Name 06/06/23 1539          Therapy Assessment/Plan (OT)    Rehab Potential (OT) good, to achieve stated therapy goals  -LS     Criteria for Skilled Therapeutic Interventions Met (OT) yes;skilled treatment is necessary  -LS     Therapy Frequency (OT) 3 times/wk  -LS     Predicted Duration of Therapy Intervention (OT) until dc  -LS       Row Name 06/06/23 1539          Therapy Plan Review/Discharge Plan (OT)    Anticipated Discharge Disposition (OT) home with home health  -       Row Name 06/06/23 1539          Vital Signs    O2 Delivery Pre Treatment room air  -LS     O2 Delivery Intra Treatment room air  -LS     O2 Delivery Post Treatment room air  -LS     Pre Patient Position Supine  -LS     Intra Patient Position Standing  -LS     Post Patient Position Sitting  -LS       Row Name 06/06/23 1539          Positioning and Restraints    Pre-Treatment Position in bed  -LS     Post Treatment Position chair  -LS     In Chair notified nsg;sitting;call light within reach;encouraged to call for assist;exit alarm on  -LS               User Key  (r) = Recorded By, (t) = Taken By, (c) = Cosigned By      Initials Name Provider Type    Saulo Huff OT Occupational Therapist                   Outcome Measures       Row Name 06/06/23 2886          How much help from another is currently needed...    Putting on and taking off regular lower body clothing? 3  -LS     Bathing (including washing, rinsing,  and drying) 3  -LS     Toileting (which includes using toilet bed pan or urinal) 3  -LS     Putting on and taking off regular upper body clothing 4  -LS     Taking care of personal grooming (such as brushing teeth) 4  -LS     Eating meals 4  -LS     AM-PAC 6 Clicks Score (OT) 21  -LS       Row Name 06/06/23 1415          How much help from another person do you currently need...    Turning from your back to your side while in flat bed without using bedrails? 4  -NS     Moving from lying on back to sitting on the side of a flat bed without bedrails? 4  -NS     Moving to and from a bed to a chair (including a wheelchair)? 3  -NS     Standing up from a chair using your arms (e.g., wheelchair, bedside chair)? 3  -NS     Climbing 3-5 steps with a railing? 3  -NS     To walk in hospital room? 3  -NS     AM-PAC 6 Clicks Score (PT) 20  -NS     Highest level of mobility 6 --> Walked 10 steps or more  -NS       Row Name 06/06/23 1546 06/06/23 1415       Functional Assessment    Outcome Measure Options AM-PAC 6 Clicks Daily Activity (OT)  -LS AM-PAC 6 Clicks Basic Mobility (PT)  -NS              User Key  (r) = Recorded By, (t) = Taken By, (c) = Cosigned By      Initials Name Provider Type     Saulo Villa OT Occupational Therapist    Lee Ann Valentin PT Physical Therapist                    Occupational Therapy Education       Title: PT OT SLP Therapies (In Progress)       Topic: Occupational Therapy (Done)       Point: ADL training (Done)       Description:   Instruct learner(s) on proper safety adaptation and remediation techniques during self care or transfers.   Instruct in proper use of assistive devices.                  Learning Progress Summary             Patient Acceptance, E,TB, VU by  at 6/6/2023 1546                                         User Key       Initials Effective Dates Name Provider Type Discipline     09/22/22 -  Saulo Villa OT Occupational Therapist OT                  OT  Recommendation and Plan  Planned Therapy Interventions (OT): activity tolerance training, BADL retraining, functional balance retraining, IADL retraining, occupation/activity based interventions, ROM/therapeutic exercise, strengthening exercise, transfer/mobility retraining  Therapy Frequency (OT): 3 times/wk  Plan of Care Review  Plan of Care Reviewed With: patient  Outcome Evaluation: 37 y.o. male who presented to [facility] on 6/5/2023 after undergoing a paracentesis here with Dr. Calderon.  Patient was found to have abnormal labs and was advised to present to the ED. PMHx includes anxiety, bipolar, GERD, sleep apnea, alcoholic cirrhosis and chronic pain. At baseline, pt lives with his spouse in a single level home with flat entry. He is typically independent using a straight cane, requiring occassional assist with bathing/dressing. He does report a history of several falls. Today, patient requires Min A for lower body dressing, otherwise setup for ADLs. SBA to come to EOB and stand, and CGA for ambulation. OT feels pt will be safe to d/c home, recommending HHOT. WIll follow.     Time Calculation:    Time Calculation- OT       Row Name 06/06/23 1547             Time Calculation- OT    OT Start Time 1045  -LS      OT Stop Time 1105  -LS      OT Time Calculation (min) 20 min  -LS      OT Received On 06/06/23  -LS      OT - Next Appointment 06/08/23  -      OT Goal Re-Cert Due Date 06/20/23  -         Untimed Charges    OT Eval/Re-eval Minutes 20  -LS         Total Minutes    Untimed Charges Total Minutes 20  -LS       Total Minutes 20  -LS                User Key  (r) = Recorded By, (t) = Taken By, (c) = Cosigned By      Initials Name Provider Type    LS Saulo Villa OT Occupational Therapist                  Therapy Charges for Today       Code Description Service Date Service Provider Modifiers Qty    25363147551 HC OT EVAL LOW COMPLEXITY 4 6/6/2023 Saulo Villa OT GO 1                 Saulo  Daisy, OT  6/6/2023

## 2023-06-06 NOTE — PROGRESS NOTES
[facility] Medicine Services Daily Progress Note    Patient Name: Daquan Xiong  : 1985  MRN: [id number]  Primary Care Physician:  Wolf Carvalho MD  Date of admission: 2023      Subjective      Chief Complaint: Abnormal labs     History of Present Illness: Daquan Xiong is a 37 y.o. male who presented to [facility] on 2023 after undergoing a paracentesis here with Dr. Calderon.  Patient was found to have abnormal labs and was advised to present to the ED.  Patient sodium was 120 today.  Patient states he was recently at [facility].  Last Thursday Dr. Calderon told him to present to an ED and he went to [facility] for evaluation of his abnormal labs.  Patient states that [facility] his sodium got as low as 116 and he was placed in the ICU, per his report.  Patient states he signed himself out on .  Patient presented here today for paracentesis with Dr. Calderon.  Patient complains of fatigue, decreased appetite, abdominal pain, nausea, decreased urine volume, dysuria, and weakness for the past 2 weeks.  Patient states his abdominal pain is localized across his rib cage and he describes it as a burning pain.  Patient states the abdominal pain is chronic for him but it has gotten more intense in the past 2 weeks.  Patient states he has chronic back pain for which he sees pain management.  Patient denies drinking any alcohol.  Patient states he used to smoke marijuana but quit when he started seeing pain management.  Patient states he currently smokes approximately 8 cigarettes/day.  Patient states he has dark urine but denies any blood in his stool or dark stool.     In the ED,  All vitals stable on admission except BP 94/49. All labs unremarkable except sodium 120, chloride 85, BUN 34, creatinine 2.82 EGFR 28.5 glucose 132 phosphorus 4.9 alkaline phosphatase 121 total protein 5.6 AST 58 total bilirubin 2.5 PTT 36.9 hemoglobin 11.2, hematocrit 33.4, platelets 120.   Urinalysis shows sakina color, trace ketones, trace protein, 1+ bilirubin. Patient received sodium bicarbonate injection and IV fluids in ED. Hospitalist was contacted to admit patient for further care and management.        6/6/23 doing better today, blood pressure 94/34.  Afebrile, discussed with RN.  Patient requesting his pain meds.  Sodium 126.    ROS 12 point ROS reviewed and negative except as mentioned above.         Objective      Vitals:   Temp:  [97.4 °F (36.3 °C)-98.4 °F (36.9 °C)] 97.4 °F (36.3 °C)  Heart Rate:  [] 63  Resp:  [12-19] 16  BP: ()/(27-63) 109/49    Physical Exam itals and nursing note reviewed.   HENT:      Head: Normocephalic.      Mouth/Throat:      Mouth: Mucous membranes are dry.   Eyes:      Extraocular Movements: Extraocular movements intact.      Pupils: Pupils are equal, round, and reactive to light.   Cardiovascular:      Rate and Rhythm: Normal rate and regular rhythm.   Pulmonary:      Effort: Pulmonary effort is normal.      Breath sounds: Normal breath sounds.   Abdominal:      General: Bowel sounds are normal.      Palpations: Abdomen is soft.      Tenderness: There is no abdominal tenderness.      Comments: Dressing in place over paracentesis site   Musculoskeletal:         General: Normal range of motion.   Skin:     General: Skin is warm and dry.   Neurological:      Mental Status: He is alert and oriented to person, place, and time.   Psychiatric:         Mood and Affect: Mood normal.       Result Review    Result Review:  I have personally reviewed the results from the time of this admission to 6/6/2023 12:21 EDT and agree with these findings:  []  Laboratory  []  Microbiology  []  Radiology  []  EKG/Telemetry   []  Cardiology/Vascular   []  Pathology  []  Old records  []  Other:  Most notable findings include:     Wounds (last 24 hours)       LDA Wound       Row Name 06/05/23 2001 06/05/23 2000          Rash 06/05/23 1958 Left distal thigh    Rash -  Properties Group Placement Date: 06/05/23  -AS Placement Time: 1958 -AS Side: Left  -AS Orientation: distal  -AS Location: thigh  -AS    Wound Image Images linked: 1  -AS --     Color -- red  -LM     Retired Rash - Properties Group Placement Date: 06/05/23  -AS Placement Time: 1958 -AS Side: Left  -AS Orientation: distal  -AS Location: thigh  -AS    Retired Rash - Properties Group Date first assessed: 06/05/23  -AS Time first assessed: 1958  -AS Side: Left  -AS Orientation: distal  -AS Location: thigh  -AS       Rash 06/05/23 1958 Right distal thigh    Rash - Properties Group Placement Date: 06/05/23  -AS Placement Time: 1958 -AS Side: Right  -AS Orientation: distal  -AS Location: thigh  -AS    Wound Image Images linked: 1  -AS --     Color -- red  -LM     Retired Rash - Properties Group Placement Date: 06/05/23  -AS Placement Time: 1958  -AS Side: Right  -AS Orientation: distal  -AS Location: thigh  -AS    Retired Rash - Properties Group Date first assessed: 06/05/23  -AS Time first assessed: 1958  -AS Side: Right  -AS Orientation: distal  -AS Location: thigh  -AS              User Key  (r) = Recorded By, (t) = Taken By, (c) = Cosigned By      Initials Name Provider Type    AS Leilani Martinez, RN Registered Nurse    Lavern Blue RN Registered Nurse                    CBC:      Lab 06/05/23 2236 06/05/23  1526 06/05/23  1116   WBC 8.50 8.00 9.70   HEMOGLOBIN 11.7* 11.2* 12.5*   HEMATOCRIT 33.5* 33.4* 36.7*   PLATELETS 124* 120* 144   NEUTROS ABS  --  4.80  --    LYMPHS ABS  --  1.90  --    MONOS ABS  --  1.00*  --    EOS ABS  --  0.20  --    .6* 105.4* 106.2*     CMP:        Lab 06/06/23  0953 06/05/23 2237 06/05/23 2236 06/05/23  1526 06/05/23  1116   SODIUM 126*  --  127* 120* 120*   POTASSIUM 4.9  --  5.3* 4.7 5.2   CHLORIDE 91*  --  91* 85* 85*   CO2 20.0*  --  28.0 22.0 20.0*   ANION GAP 15.0  --  8.0 13.0 15.0   BUN 41*  --  39* 34* 35*   CREATININE 2.60*  --  3.00* 2.82* 2.82*   EGFR  31.4*  --  26.4* 28.5* 28.5*   GLUCOSE 93  --  114* 132* 108*   CALCIUM 9.8  --  8.6 9.3 9.4   IONIZED CALCIUM  --  1.18*  --   --   --    MAGNESIUM 2.3  --  2.2 2.1  --    PHOSPHORUS  --   --  6.1* 4.9*  --    TOTAL PROTEIN 5.7*  --  4.8* 5.6* 5.8*   ALBUMIN 4.1  --  3.5 4.0 3.8   GLOBULIN 1.6  --  1.3 1.6 2.0   ALT (SGPT) 31  --  28 31 39   AST (SGOT) 63*  --  61* 58* 81*   BILIRUBIN 2.8*  --  1.8* 2.5* 2.9*   ALK PHOS 121*  --  105 121* 162*     No results found for: ACANTHNAEG, AFBCX, BPERTUSSISCX, BLOODCX  No results found for: BCIDPCR, CXREFLEX, CSFCX, CULTURETIS  No results found for: CULTURES, HSVCX, URCX  No results found for: EYECULTURE, GCCX, HSVCULTURE, LABHSV  No results found for: LEGIONELLA, MRSACX, MUMPSCX, MYCOPLASCX  No results found for: NOCARDIACX, STOOLCX  No results found for: THROATCX, UNSTIMCULT, URINECX, CULTURE, VZVCULTUR  No results found for: VIRALCULTU, WOUNDCX      Assessment & Plan      Brief Patient Summary:  Daquan Xiong is a 37 y.o. male who       allopurinol, 100 mg, Oral, Daily  lactulose, 20 g, Oral, TID  midodrine, 10 mg, Oral, TID AC  nadolol, 40 mg, Oral, Q24H  pantoprazole, 40 mg, Oral, Daily  pharmacy, 1 each, Does not apply, Once  riFAXIMin, 550 mg, Oral, BID  sodium bicarbonate, 650 mg, Oral, BID       hold, 1 each  sodium chloride, 75 mL/hr, Last Rate: 75 mL/hr (06/06/23 0820)         Active Hospital Problems:  Active Hospital Problems    Diagnosis     **Hyponatremia     Tobacco user     Anxiety     GERD (gastroesophageal reflux disease)     Low back pain     Cirrhosis of liver      Alcoholic cirrhosis  Status post paracentesis 6/4/23 per patient  Ammonia 46  AST 58  ALT 31  Continue Xifaxan, lactulose, nadolol per nephrology     Acute kidney injury  Creatinine 2.82>2.6  BUN 34  EGFR 28.5  Avoid nephrotoxic medications and IV contrast dye and possible  Renal ultrasound and PVR ordered  Nephrology consulted-URIEL/ARF is secondary to prerenal state/intravascular volume  depletion with concomitant Aldactone/Lasix use and ATN from hypotension, avoid hypotension and hydrate with normal saline per nephrology note     Hyponatremia  EKG showed normal sinus rhythm  Sodium 120  Hold Lasix, Aldactone, nicotine patch  Seizure fall precautions ordered  Telemetry ordered  Neurochecks ordered  Hyponatremia may be an acute exacerbation of chronic hyponatremia. Chronic hyponatremia is likely secondary to ESLD and acute exacerbation is clinically hypotonic and hypovolemic secondary to intravascular volume depletion with lasix and aldactone use, per nephrology note.  Check urine and serum studies to further assess, P.o. fluid restriction at 1500 cc/day, follow serial sodium levels, per nephrology note     Anemia  Hemoglobin 11.2  Hematocrit 33.4  No overt signs of bleeding  Iron studies and SPEP ordered by nephrology  Continue to monitor with labs     Hypotension  Midodrine and IV fluids ordered by nephrology  BP currently 100/48  Continue to monitor     Chronic back pain  Oxycodone held by nephrology  Pain medications and Ativan to be held until sodium is above 127 to avoid MS changes that could mimic hyponatremia related symptoms, per nephrology note     Anxiety  Xanax held by nephrology  Pain medications and Ativan to be held until sodium is above 127 to avoid MS changes that could mimic hyponatremia related symptoms, per nephrology note     GERD  Continue home Protonix     Tobacco use  Encourage tobacco cessation      :       DVT prophylaxis:  Mechanical DVT prophylaxis orders are present.    CODE STATUS:    Level Of Support Discussed With: Patient  Code Status (Patient has no pulse and is not breathing): CPR (Attempt to Resuscitate)  Medical Interventions (Patient has pulse or is breathing): Full Support  Release to patient: Routine Release      Disposition:  I expect patient to be discharged nh.    I have utilized all available, immediate resources to obtain, update, or review the patient's  current medications including all prescriptions, over-the-counter products, herbals, cannabis/cannabidiol products, and vitamin.mineral/dietary (nutritional) supplements.      Level Of Support Discussed With: Patient  Code Status (Patient has no pulse and is not breathing): CPR (Attempt to Resuscitate)  Medical Interventions (Patient has pulse or is breathing): Full Support  Release to patient: Routine Release        Admission Status:  I believe this patient meets admit status.              This patient has been examined wearing appropriate Personal Protective Equipment and discussed with  rn . 06/06/23      Electronically signed by Luis Enrique Byrne MD, 06/06/23, 12:21 EDT.  [facility] Hospitalist Team

## 2023-06-06 NOTE — CASE MANAGEMENT/SOCIAL WORK
Discharge Planning Assessment   Nathan     Patient Name: Daquan Xiong  MRN: [id number]  Today's Date: 6/6/2023    Admit Date: 6/5/2023    Plan: Anticipate routine home with spouse and [facility] (referral pending acceptance, order per MD).   Discharge Needs Assessment       Row Name 06/06/23 1554       Living Environment    People in Home spouse    Name(s) of People in Home Spouse- Lana    Current Living Arrangements home    Potentially Unsafe Housing Conditions none    Primary Care Provided by self    Provides Primary Care For no one    Family Caregiver if Needed spouse    Quality of Family Relationships helpful;involved;supportive    Able to Return to Prior Arrangements yes       Resource/Environmental Concerns    Resource/Environmental Concerns none    Transportation Concerns none       Transition Planning    Patient/Family Anticipates Transition to home with family;home with help/services    Patient/Family Anticipated Services at Transition home health care    Transportation Anticipated family or friend will provide       Discharge Needs Assessment    Readmission Within the Last 30 Days no previous admission in last 30 days    Equipment Currently Used at Home cane, straight;shower chair;wheelchair    Concerns to be Addressed care coordination/care conferences;discharge planning    Anticipated Changes Related to Illness none    Equipment Needed After Discharge none    Discharge Facility/Level of Care Needs home with home health    Provided Post Acute Provider List? Yes    Post Acute Provider List Home Health    Provided Post Acute Provider Quality & Resource List? Yes    Post Acute Provider Quality and Resource List Home Health    Delivered To Patient    Method of Delivery In person                     Discharge Plan       Row Name 06/06/23 5269       Plan    Plan Anticipate routine home with spouse and [facility] (referral pending acceptance, order per MD).    Patient/Family in Agreement with  Plan yes    Plan Comments CM met with patient at bedside to discuss dc planning. PCP and pharmacy confirmed, reported that his PCP is a first year resident so she goes under the Dr. Carvalho that is listed. Reported no trouble affording medications. Reported that his wife works and does not get home until around 4pm. Reported that his 21-year-old daughter is an LPN. Reported that his mom or sister provide transportation for him when needed. Reported that he was interested in having home health at home. CM provided list. Patient reported that he lives in [place], [place]. New referral added in [place]. Patient inquired about information regarding living will. LSW notified.                  Continued Care and Services - Admitted Since 6/5/2023       Home Medical Care Patient indicates having no preference.      Service Provider Request Status Selected Services Address Phone Fax Patient Preferred    [email or web address] Pending - Request Sent N/A [address] [facility] [place] [address] [phone] --                   Demographic Summary       Row Name 06/06/23 1553       General Information    Admission Type observation    Referral Source admission list    Reason for Consult discharge planning    Preferred Language English       Contact Information    Permission Granted to Share Info With                    Functional Status       Row Name 06/06/23 1553       Functional Status    Usual Activity Tolerance moderate    Current Activity Tolerance moderate       Functional Status, IADL    Medications independent    Meal Preparation assistive equipment    Housekeeping assistive equipment and person    Laundry assistive equipment and person    Shopping assistive equipment and person       Employment/    Employment Status disabled                Leelee Chopra RN     Office Phone: [phone]  Office Cell: [phone]

## 2023-06-06 NOTE — PROGRESS NOTES
"NEPHROLOGY PROGRESS NOTE------[facility]    [facility]  [phone]  Saji Waldron MD      Patient Care Team:  Wolf Carvalho MD as PCP - General (Geriatric Medicine)  Fatou Waldron MD as Consulting Physician (Nephrology)      Provider:  Saji Waldron MD  Patient Name: Daquan Xiong  :  1985    SUBJECTIVE:    F/U ARF/URIEL/HYPONATREMIA      No neuro sx. No SOB, CP. Anxious. No dysuria.     Medication:  lactulose, 20 g, Oral, TID  midodrine, 10 mg, Oral, TID AC  nadolol, 40 mg, Oral, Q24H  nicotine, 1 patch, Transdermal, Q24H  pantoprazole, 40 mg, Oral, Daily  pharmacy, 1 each, Does not apply, Once  riFAXIMin, 550 mg, Oral, BID  sodium bicarbonate, 650 mg, Oral, BID  sodium chloride, 1 g, Oral, TID With Meals      hold, 1 each  sodium chloride, 100 mL/hr, Last Rate: 100 mL/hr (23 0640)        OBJECTIVE    Vital Sign Min/Max for last 24 hours  Temp  Min: 97.4 °F (36.3 °C)  Max: 98.4 °F (36.9 °C)   BP  Min: 74/33  Max: 118/54   Pulse  Min: 56  Max: 119   Resp  Min: 10  Max: 20   SpO2  Min: 93 %  Max: 100 %   No data recorded   Weight  Min: 74.3 kg (163 lb 12.8 oz)  Max: 75.5 kg (166 lb 7.2 oz)     Flowsheet Rows      Flowsheet Row First Filed Value   Admission Height 185.4 cm (73\") Documented at 2023 1500   Admission Weight 74.3 kg (163 lb 12.8 oz) Documented at 2023 1500            No intake/output data recorded.  I/O last 3 completed shifts:  In: 2460 [P.O.:360; I.V.:2000; IV Piggyback:100]  Out: 250 [Urine:250]    Physical Exam:  General Appearance: alert, appears stated age and cooperative  Head: normocephalic, without obvious abnormality and atraumatic +DRY OP WITH POOR DENTITION  Eyes: conjunctivae and sclerae normal and no icterus  Neck: supple and no JVD  Lungs: +FEW SCATTERED RHONCHI  Heart: regular rhythm & normal rate and normal S1, S2  Chest Wall: no abnormalities observed  Abdomen: normal bowel " sounds and soft, nontender +PARACENTESIS SITE DRESSED  Extremities: moves extremities well, no edema, no cyanosis  Skin: no bleeding, bruising or rash +DECREASD SKIN TURGOR  Neurologic: Alert, and oriented. No focal deficits    Labs:    WBC WBC   Date Value Ref Range Status   06/05/2023 8.50 3.40 - 10.80 10*3/mm3 Final   06/05/2023 8.00 3.40 - 10.80 10*3/mm3 Final   06/05/2023 9.70 3.40 - 10.80 10*3/mm3 Final      HGB Hemoglobin   Date Value Ref Range Status   06/05/2023 11.7 (L) 13.0 - 17.7 g/dL Final   06/05/2023 11.2 (L) 13.0 - 17.7 g/dL Final   06/05/2023 12.5 (L) 13.0 - 17.7 g/dL Final      HCT Hematocrit   Date Value Ref Range Status   06/05/2023 33.5 (L) 37.5 - 51.0 % Final   06/05/2023 33.4 (L) 37.5 - 51.0 % Final   06/05/2023 36.7 (L) 37.5 - 51.0 % Final      Platelets No results found for: LABPLAT   MCV MCV   Date Value Ref Range Status   06/05/2023 104.6 (H) 79.0 - 97.0 fL Final   06/05/2023 105.4 (H) 79.0 - 97.0 fL Final   06/05/2023 106.2 (H) 79.0 - 97.0 fL Final          Sodium Sodium   Date Value Ref Range Status   06/05/2023 127 (L) 136 - 145 mmol/L Final   06/05/2023 120 (L) 136 - 145 mmol/L Final   06/05/2023 120 (L) 136 - 145 mmol/L Final      Potassium Potassium   Date Value Ref Range Status   06/05/2023 5.3 (H) 3.5 - 5.2 mmol/L Final   06/05/2023 4.7 3.5 - 5.2 mmol/L Final   06/05/2023 5.2 3.5 - 5.2 mmol/L Final     Comment:     Slight hemolysis detected by analyzer. Results may be affected.      Chloride Chloride   Date Value Ref Range Status   06/05/2023 91 (L) 98 - 107 mmol/L Final   06/05/2023 85 (L) 98 - 107 mmol/L Final   06/05/2023 85 (L) 98 - 107 mmol/L Final      CO2 CO2   Date Value Ref Range Status   06/05/2023 28.0 22.0 - 29.0 mmol/L Final   06/05/2023 22.0 22.0 - 29.0 mmol/L Final   06/05/2023 20.0 (L) 22.0 - 29.0 mmol/L Final      BUN BUN   Date Value Ref Range Status   06/05/2023 39 (H) 6 - 20 mg/dL Final   06/05/2023 34 (H) 6 - 20 mg/dL Final   06/05/2023 35 (H) 6 - 20 mg/dL  Final      Creatinine Creatinine   Date Value Ref Range Status   06/05/2023 3.00 (H) 0.76 - 1.27 mg/dL Final   06/05/2023 2.82 (H) 0.76 - 1.27 mg/dL Final   06/05/2023 2.82 (H) 0.76 - 1.27 mg/dL Final      Calcium Calcium   Date Value Ref Range Status   06/05/2023 8.6 8.6 - 10.5 mg/dL Final   06/05/2023 9.3 8.6 - 10.5 mg/dL Final   06/05/2023 9.4 8.6 - 10.5 mg/dL Final      PO4 No components found for: PO4   Albumin Albumin   Date Value Ref Range Status   06/05/2023 3.5 3.5 - 5.2 g/dL Final   06/05/2023 4.0 3.5 - 5.2 g/dL Final   06/05/2023 3.8 3.5 - 5.2 g/dL Final      Magnesium Magnesium   Date Value Ref Range Status   06/05/2023 2.2 1.6 - 2.6 mg/dL Final   06/05/2023 2.1 1.6 - 2.6 mg/dL Final      Uric Acid No components found for: URIC ACID     Imaging Results (Last 72 Hours)       Procedure Component Value Units Date/Time    US Renal Bilateral [[id number]] Collected: 06/05/23 1832     Updated: 06/05/23 1837    Narrative:      US RENAL BILATERAL    Date of Exam: 6/5/2023 6:11 PM EDT    Indication: uriel.    Comparison: Paracentesis 6/5/2023    Technique: Grayscale and color Doppler ultrasound evaluation of the kidneys and urinary bladder was performed.      Findings:  There is no hydronephrosis. The right kidney measures 9.4 x 4.4 x 4.5 cm. The left kidney measures 8.3 x 3.6 x 6.7 cm. There is ascites present. The liver is cirrhotic and nodular. Urinary bladder not well evaluated.    Impression:      Impression:  No hydronephrosis. Ascites. Hepatic cirrhosis.        Electronically Signed: Leona Mcdonnell    6/5/2023 6:35 PM EDT    Workstation ID: [id number]                      ASSESSMENT / PLAN      Hyponatremia    Anxiety    GERD (gastroesophageal reflux disease)    Low back pain    Cirrhosis of liver    Tobacco user      1.ARF/URIEL--------Nonoliguric. BUN/Cr leveled off.  +ARF/URIEL with historically normal baseline renal function with baseline serum creatinine of about 0.95 based on review of records available to me.  +ARF/URIEL is secondary to prerenal state/intravascular volume depletion with concomitant Aldactone/Lasix use and ATN from hypotension. Doubt HRS but will see how he responds with fluid challenge. Avoid hypotension. Hydrate with NS. No NSAIDs or IV dye. Dose meds for CrCl less than 10 cc/min     2. HYPONATREMIA-----Better. Acute exacerbation of chronic hyponatremia with baseline serum sodium levels of about 131-135 based on review of records. Chronic hyponatremia is likely secondary to ESLD. Acute exacerbation is clinically hypotonic and hypovolemic and secondary to intravascular volume depletion with concomitant Lasix/Aldactone use. Also exacerbated by poor solute intake and Nicotine patch/gum use. D/C Lasix, Aldactone, Nicotine patch. Seizure/fall precautions. Telemetry and neuro checks. Follow serial sodium levels. PO fluid restrict at 1500 cc/day. D/C Salt tabs this AM     3. ANEMIA-------Fe normal     4. ETOH CIRRHOSIS/HEPATIC ENCEPHALOPATHY/ASCITES-----S/P 9L Paracentesis today per GI. Ammonia okay. Continue Xifaxan and Lactulose and Nadalol     5. HYPOTENSION-------IV NS and Midodrine. Follow for need to hold Nadalol. No Lasix or Aldactone     6. GERD/PUD PROPHYLAXIS-------PPI     7. THROMBOCYTOPENIA------Secondary to ESLD. No heparin.       8. DVT PROPHYLAXIS-------SCDs     9. OA/CHRONIC PAIN/ANXIETY/HYPERURICEMIA------Hold pain meds and Ativan until sodium up above 127 to avoid MS changes that could mimic hyponatremia related sx. No NSAIDs. Add Allopurinol cautiously and follow platelet count     10.  MILD RF ASSOCIATED HYPERPHOSPHATEMIA-----Follow phos for binder need    11. HYPOCALCEMIA------Replace IV    12. MILD HYPERKALEMIA-----K+ restrict diet, po NaHCO3 and Lokelma x one.      Saji Waldron MD  [facility]  [phone]  06/06/23  07:42 EDT

## 2023-06-06 NOTE — THERAPY EVALUATION
Patient Name: Daquan Xiong  : 1985    MRN: [id number]                              Today's Date: 2023       Admit Date: 2023    Visit Dx:     ICD-10-CM ICD-9-CM   1. Hyponatremia  E87.1 276.1   2. URIEL (acute kidney injury)  N17.9 584.9   3. Weakness  R53.1 780.79     Patient Active Problem List   Diagnosis    Elevated liver enzymes    Anxiety    Bipolar affective disorder    GERD (gastroesophageal reflux disease)    Low back pain    Osteoarthritis    Tobacco use disorder    Cirrhosis of liver    Polysubstance abuse    BASILIO (obstructive sleep apnea)    Tobacco user    Hyponatremia     Past Medical History:   Diagnosis Date    Alcohol abuse     1/2-1 pint a day, sober since 2022    Arthritis     Hypertension     Sleep apnea      Past Surgical History:   Procedure Laterality Date    BACK SURGERY      WRIST SURGERY        General Information       Row Name 23 1329          Physical Therapy Time and Intention    Document Type evaluation  -NS     Mode of Treatment co-treatment;physical therapy  -NS       Row Name 23 1329          General Information    Patient Profile Reviewed yes  -NS     Prior Level of Function independent:  with use of straight cane; occassionally requiring assist with bathing/dressing  -NS     Existing Precautions/Restrictions fall  -NS     Barriers to Rehab medically complex  -NS       Row Name 23 1329          Living Environment    People in Home spouse  -NS       Row Name 23 1329          Home Main Entrance    Number of Stairs, Main Entrance none  -NS       Row Name 23 1329          Stairs Within Home, Primary    Number of Stairs, Within Home, Primary none  -NS       Row Name 23 1329          Cognition    Orientation Status (Cognition) oriented x 4  -NS       Row Name 23 1329          Safety Issues, Functional Mobility    Impairments Affecting Function (Mobility) balance;endurance/activity tolerance;strength;pain  -NS               User  Key  (r) = Recorded By, (t) = Taken By, (c) = Cosigned By      Initials Name Provider Type    Lee Ann Valentin, PT Physical Therapist                   Mobility       Row Name 06/06/23 1330          Bed Mobility    Bed Mobility supine-sit  -NS     Supine-Sit [place] (Bed Mobility) standby assist  -NS     Assistive Device (Bed Mobility) head of bed elevated;bed rails  -NS       Row Name 06/06/23 1330          Sit-Stand Transfer    Sit-Stand [place] (Transfers) contact guard  -NS     Assistive Device (Sit-Stand Transfers) cane, straight  -NS       Row Name 06/06/23 1330          Gait/Stairs (Locomotion)    [place] Level (Gait) contact guard  -NS     Assistive Device (Gait) cane, straight  -NS     Distance in Feet (Gait) 120  -NS     Deviations/Abnormal Patterns (Gait) gait speed decreased  -NS               User Key  (r) = Recorded By, (t) = Taken By, (c) = Cosigned By      Initials Name Provider Type    NS Lee Ann Bernardo PT Physical Therapist                   Obj/Interventions       Row Name 06/06/23 1331          Range of Motion Comprehensive    General Range of Motion no range of motion deficits identified  -NS       Row Name 06/06/23 1331          Strength Comprehensive (MMT)    Comment, General Manual Muscle Testing (MMT) Assessment BLE grossly 4-/5  -NS       Row Name 06/06/23 1331          Balance    Balance Assessment sitting static balance;sitting dynamic balance;sit to stand dynamic balance;standing static balance;standing dynamic balance  -NS     Static Sitting Balance independent  -NS     Dynamic Sitting Balance independent  -NS     Position, Sitting Balance unsupported;sitting edge of bed  -NS     Sit to Stand Dynamic Balance contact guard  -NS     Static Standing Balance standby assist  -NS     Dynamic Standing Balance contact guard  -NS     Position/Device Used, Standing Balance supported;cane, straight  -NS               User Key  (r) = Recorded By, (t) = Taken By, (c) =  "Cosigned By      Initials Name Provider Type    Lee Ann Valentin, PT Physical Therapist                   Goals/Plan       Row Name 06/06/23 1414          Bed Mobility Goal 1 (PT)    Activity/Assistive Device (Bed Mobility Goal 1, PT) bed mobility activities, all  -NS     [place] Level/Cues Needed (Bed Mobility Goal 1, PT) independent  -NS     Time Frame (Bed Mobility Goal 1, PT) long term goal (LTG);2 weeks  -NS       Row Name 06/06/23 1414          Transfer Goal 1 (PT)    Activity/Assistive Device (Transfer Goal 1, PT) transfers, all;cane, straight  -NS     [place] Level/Cues Needed (Transfer Goal 1, PT) modified independence  -NS     Time Frame (Transfer Goal 1, PT) long term goal (LTG);2 weeks  -NS       Row Name 06/06/23 1414          Gait Training Goal 1 (PT)    Activity/Assistive Device (Gait Training Goal 1, PT) gait (walking locomotion);cane, straight  -NS     [place] Level (Gait Training Goal 1, PT) modified independence  -NS     Distance (Gait Training Goal 1, PT) 200  -NS     Time Frame (Gait Training Goal 1, PT) long term goal (LTG);2 weeks  -NS       Row Name 06/06/23 1414          Therapy Assessment/Plan (PT)    Planned Therapy Interventions (PT) balance training;bed mobility training;gait training;home exercise program;patient/family education;strengthening;transfer training  -NS               User Key  (r) = Recorded By, (t) = Taken By, (c) = Cosigned By      Initials Name Provider Type    Lee Ann Valentin PT Physical Therapist                   Clinical Impression       Row Name 06/06/23 1331          Pain    Pretreatment Pain Rating 6/10  -NS     Posttreatment Pain Rating 6/10  -NS     Pre/Posttreatment Pain Comment \"all over\"  -NS     Pain Intervention(s) Distraction;Ambulation/increased activity;Repositioned  -NS       Row Name 06/06/23 1331          Plan of Care Review    Plan of Care Reviewed With patient  -NS     Outcome Evaluation Pt is a 38 y/o male who presented " after undergoing a paracentesis here with Dr. Camilo and found to be hyponatremic. He c/o fatigue, decreased appetite, abdominal pain, nausea, decreased urine volume, dysuria, and weakness. PMHx includes anxiety, bipolar, GERD, sleep apnea, alcoholic cirrhosis and chronic pain. At baseline, pt lives with his spouse in a single level home with flat entry. He is typically independent using a straight cane, requiring occassional assist with bathing/dressing. He does report a history of several falls. Currently, pt appears to be functioning near baseline. He demonstrates decreased strength but is transferring and ambulating greater than a household distance with CGA and use of RW. Pt may benefit from home health PT at d/c. Will continue to follow and progress as tolerated.  -NS       Row Name 06/06/23 1331          Therapy Assessment/Plan (PT)    Criteria for Skilled Interventions Met (PT) yes;meets criteria;skilled treatment is necessary  -NS     Therapy Frequency (PT) 3 times/wk  -NS     Predicted Duration of Therapy Intervention (PT) until d/c  -NS       Row Name 06/06/23 1331          Vital Signs    Pre Patient Position Supine  -NS     Post Patient Position Sitting  -NS       Row Name 06/06/23 1331          Positioning and Restraints    Pre-Treatment Position in bed  -NS     Post Treatment Position chair  -NS     In Chair reclined;sitting;call light within reach;encouraged to call for assist;exit alarm on  -NS               User Key  (r) = Recorded By, (t) = Taken By, (c) = Cosigned By      Initials Name Provider Type    Lee Ann Valentin, PT Physical Therapist                   Outcome Measures       Row Name 06/06/23 3759          How much help from another person do you currently need...    Turning from your back to your side while in flat bed without using bedrails? 4  -NS     Moving from lying on back to sitting on the side of a flat bed without bedrails? 4  -NS     Moving to and from a bed to a chair  (including a wheelchair)? 3  -NS     Standing up from a chair using your arms (e.g., wheelchair, bedside chair)? 3  -NS     Climbing 3-5 steps with a railing? 3  -NS     To walk in hospital room? 3  -NS     AM-PAC 6 Clicks Score (PT) 20  -NS     Highest level of mobility 6 --> Walked 10 steps or more  -NS       Row Name 06/06/23 1415          Functional Assessment    Outcome Measure Options AM-PAC 6 Clicks Basic Mobility (PT)  -NS               User Key  (r) = Recorded By, (t) = Taken By, (c) = Cosigned By      Initials Name Provider Type    NS Lee Ann Bernardo PT Physical Therapist                                 Physical Therapy Education       Title: PT OT SLP Therapies (In Progress)       Topic: Physical Therapy (In Progress)       Point: Mobility training (Done)       Learning Progress Summary             Patient Acceptance, E, VU by NS at 6/6/2023 1415                         Point: Home exercise program (Not Started)       Learner Progress:  Not documented in this visit.              Point: Body mechanics (Not Started)       Learner Progress:  Not documented in this visit.              Point: Precautions (Not Started)       Learner Progress:  Not documented in this visit.                              User Key       Initials Effective Dates Name Provider Type Discipline    NS 06/30/22 -  Lee Ann Bernardo PT Physical Therapist PT                  PT Recommendation and Plan  Planned Therapy Interventions (PT): balance training, bed mobility training, gait training, home exercise program, patient/family education, strengthening, transfer training  Plan of Care Reviewed With: patient  Outcome Evaluation: Pt is a 38 y/o male who presented after undergoing a paracentesis here with Dr. Camilo and found to be hyponatremic. He c/o fatigue, decreased appetite, abdominal pain, nausea, decreased urine volume, dysuria, and weakness. PMHx includes anxiety, bipolar, GERD, sleep apnea, alcoholic cirrhosis and chronic pain.  At baseline, pt lives with his spouse in a single level home with flat entry. He is typically independent using a straight cane, requiring occassional assist with bathing/dressing. He does report a history of several falls. Currently, pt appears to be functioning near baseline. He demonstrates decreased strength but is transferring and ambulating greater than a household distance with CGA and use of RW. Pt may benefit from home health PT at d/c. Will continue to follow and progress as tolerated.     Time Calculation:    PT Charges       Row Name 06/06/23 1415             Time Calculation    Start Time 1045  -NS      Stop Time 1105  -NS      Time Calculation (min) 20 min  -NS      PT Received On 06/06/23  -NS      PT - Next Appointment 06/08/23  -NS      PT Goal Re-Cert Due Date 06/20/23  -NS         Time Calculation- PT    Total Timed Code Minutes- PT 0 minute(s)  -NS                User Key  (r) = Recorded By, (t) = Taken By, (c) = Cosigned By      Initials Name Provider Type    NS Lee Ann Bernardo, PT Physical Therapist                  Therapy Charges for Today       Code Description Service Date Service Provider Modifiers Qty    [id number] HC PT EVAL LOW COMPLEXITY 3 6/6/2023 Lee Ann Bernardo, PT GP 1            PT G-Codes  Outcome Measure Options: AM-PAC 6 Clicks Basic Mobility (PT)  AM-PAC 6 Clicks Score (PT): 20  PT Discharge Summary  Anticipated Discharge Disposition (PT): home with assist, home with home health    Lee Ann Bernardo PT  6/6/2023

## 2023-06-06 NOTE — CASE MANAGEMENT/SOCIAL WORK
Social Work Assessment  [facility]     Patient Name: Daquan Xiong  MRN: [id number]  Today's Date: 6/6/2023    Admit Date: 6/5/2023     Discharge Needs Assessment       Row Name 06/06/23 1524       Discharge Needs Assessment    Concerns to be Addressed decision-making    Concerns Comments LSW consulted re: ACP consult/living will. LSW met with patient at bedside to discuss. Patient reports he has an appointment regarding a possible transplant. Expressed interest in learning more about documents available. Discussed Health Care Representative, Living Will, POA, POST, Last Will & Testament, etc. LSW provided patient with a form able to be completed in the hospital, if desired. Patient and LSW reviewed form together. Elected to review with his spouse & will reach out if they desire completing it while at hospital. Reviewed hierarchy for decision-making (House Bill 1119) in [place] & noted that patient’s spouse is his legal NOK in the event he’s ever unable to make his own decisions.             EMILY Herman    Phone: [phone]  Cell: [phone]  Fax: [phone]  [email or web address]

## 2023-06-06 NOTE — PLAN OF CARE
Goal Outcome Evaluation:  Plan of Care Reviewed With: patient           Outcome Evaluation: 37 y.o. male who presented to [facility] on 6/5/2023 after undergoing a paracentesis here with Dr. Calderon.  Patient was found to have abnormal labs and was advised to present to the ED. PMHx includes anxiety, bipolar, GERD, sleep apnea, alcoholic cirrhosis and chronic pain. At baseline, pt lives with his spouse in a single level home with flat entry. He is typically independent using a straight cane, requiring occassional assist with bathing/dressing. He does report a history of several falls. Today, patient requires Min A for lower body dressing, otherwise setup for ADLs. SBA to come to EOB and stand, and CGA for ambulation. OT feels pt will be safe to d/c home, recommending HHOT. WIll follow.

## 2023-06-07 VITALS
OXYGEN SATURATION: 91 % | HEART RATE: 50 BPM | TEMPERATURE: 97.3 F | BODY MASS INDEX: 22.06 KG/M2 | SYSTOLIC BLOOD PRESSURE: 98 MMHG | HEIGHT: 73 IN | DIASTOLIC BLOOD PRESSURE: 51 MMHG | WEIGHT: 166.45 LBS | RESPIRATION RATE: 14 BRPM

## 2023-06-07 LAB
ALBUMIN SERPL ELPH-MCNC: 2.9 G/DL (ref 2.9–4.4)
ALBUMIN/GLOB SERPL: 2.1 {RATIO} (ref 0.7–1.7)
ALPHA1 GLOB SERPL ELPH-MCNC: 0.2 G/DL (ref 0–0.4)
ALPHA2 GLOB SERPL ELPH-MCNC: 0.4 G/DL (ref 0.4–1)
ANION GAP SERPL CALCULATED.3IONS-SCNC: 11 MMOL/L (ref 5–15)
B-GLOBULIN SERPL ELPH-MCNC: 0.5 G/DL (ref 0.7–1.3)
BUN SERPL-MCNC: 43 MG/DL (ref 6–20)
BUN/CREAT SERPL: 16.5 (ref 7–25)
CALCIUM SPEC-SCNC: 8.5 MG/DL (ref 8.6–10.5)
CHLORIDE SERPL-SCNC: 96 MMOL/L (ref 98–107)
CO2 SERPL-SCNC: 22 MMOL/L (ref 22–29)
CREAT SERPL-MCNC: 2.6 MG/DL (ref 0.76–1.27)
DEPRECATED RDW RBC AUTO: 48.6 FL (ref 37–54)
EGFRCR SERPLBLD CKD-EPI 2021: 31.4 ML/MIN/1.73
ERYTHROCYTE [DISTWIDTH] IN BLOOD BY AUTOMATED COUNT: 12.9 % (ref 12.3–15.4)
GAMMA GLOB SERPL ELPH-MCNC: 0.3 G/DL (ref 0.4–1.8)
GLOBULIN SER CALC-MCNC: 1.4 G/DL (ref 2.2–3.9)
GLUCOSE SERPL-MCNC: 114 MG/DL (ref 65–99)
HCT VFR BLD AUTO: 32 % (ref 37.5–51)
HGB BLD-MCNC: 11.3 G/DL (ref 13–17.7)
LABORATORY COMMENT REPORT: ABNORMAL
M PROTEIN SERPL ELPH-MCNC: ABNORMAL G/DL
MAGNESIUM SERPL-MCNC: 2.2 MG/DL (ref 1.6–2.6)
MCH RBC QN AUTO: 36.5 PG (ref 26.6–33)
MCHC RBC AUTO-ENTMCNC: 35.2 G/DL (ref 31.5–35.7)
MCV RBC AUTO: 103.5 FL (ref 79–97)
PHOSPHATE SERPL-MCNC: 4.9 MG/DL (ref 2.5–4.5)
PLATELET # BLD AUTO: 117 10*3/MM3 (ref 140–450)
PMV BLD AUTO: 9.3 FL (ref 6–12)
POTASSIUM SERPL-SCNC: 4.8 MMOL/L (ref 3.5–5.2)
PROT PATTERN SERPL ELPH-IMP: ABNORMAL
PROT SERPL-MCNC: 4.3 G/DL (ref 6–8.5)
RBC # BLD AUTO: 3.09 10*6/MM3 (ref 4.14–5.8)
SODIUM SERPL-SCNC: 129 MMOL/L (ref 136–145)
WBC NRBC COR # BLD: 8.3 10*3/MM3 (ref 3.4–10.8)

## 2023-06-07 PROCEDURE — P9047 ALBUMIN (HUMAN), 25%, 50ML: HCPCS | Performed by: INTERNAL MEDICINE

## 2023-06-07 PROCEDURE — G0378 HOSPITAL OBSERVATION PER HR: HCPCS

## 2023-06-07 PROCEDURE — 25010000002 CALCIUM GLUCONATE-NACL 1-0.675 GM/50ML-% SOLUTION: Performed by: INTERNAL MEDICINE

## 2023-06-07 PROCEDURE — 25010000002 ALBUMIN HUMAN 25% PER 50 ML: Performed by: INTERNAL MEDICINE

## 2023-06-07 RX ORDER — ALBUMIN (HUMAN) 12.5 G/50ML
25 SOLUTION INTRAVENOUS ONCE
Status: COMPLETED | OUTPATIENT
Start: 2023-06-07 | End: 2023-06-07

## 2023-06-07 RX ORDER — NADOLOL 20 MG/1
20 TABLET ORAL
Status: DISCONTINUED | OUTPATIENT
Start: 2023-06-07 | End: 2023-06-07 | Stop reason: HOSPADM

## 2023-06-07 RX ORDER — CALCIUM GLUCONATE 20 MG/ML
1 INJECTION, SOLUTION INTRAVENOUS ONCE
Status: COMPLETED | OUTPATIENT
Start: 2023-06-07 | End: 2023-06-07

## 2023-06-07 RX ORDER — ALLOPURINOL 100 MG/1
100 TABLET ORAL DAILY
Qty: 30 TABLET | Refills: 0 | Status: SHIPPED | OUTPATIENT
Start: 2023-06-08

## 2023-06-07 RX ADMIN — SODIUM CHLORIDE 1000 ML: 9 INJECTION, SOLUTION INTRAVENOUS at 09:48

## 2023-06-07 RX ADMIN — MIDODRINE HYDROCHLORIDE 10 MG: 5 TABLET ORAL at 11:01

## 2023-06-07 RX ADMIN — OXYCODONE 5 MG: 5 TABLET ORAL at 04:26

## 2023-06-07 RX ADMIN — CALCIUM GLUCONATE 1 G: 20 INJECTION, SOLUTION INTRAVENOUS at 09:52

## 2023-06-07 RX ADMIN — LACTULOSE 20 G: 20 SOLUTION ORAL at 09:49

## 2023-06-07 RX ADMIN — ALPRAZOLAM 1 MG: 1 TABLET ORAL at 09:56

## 2023-06-07 RX ADMIN — OXYCODONE 5 MG: 5 TABLET ORAL at 11:01

## 2023-06-07 RX ADMIN — SODIUM BICARBONATE 650 MG: 650 TABLET ORAL at 09:49

## 2023-06-07 RX ADMIN — ALBUMIN (HUMAN) 25 G: 0.25 INJECTION, SOLUTION INTRAVENOUS at 11:01

## 2023-06-07 RX ADMIN — Medication 10 ML: at 09:49

## 2023-06-07 RX ADMIN — ALLOPURINOL 100 MG: 100 TABLET ORAL at 09:49

## 2023-06-07 RX ADMIN — RIFAXIMIN 550 MG: 550 TABLET ORAL at 09:49

## 2023-06-07 RX ADMIN — MIDODRINE HYDROCHLORIDE 10 MG: 5 TABLET ORAL at 05:49

## 2023-06-07 RX ADMIN — PANTOPRAZOLE SODIUM 40 MG: 40 TABLET, DELAYED RELEASE ORAL at 09:49

## 2023-06-07 RX ADMIN — SODIUM CHLORIDE 75 ML/HR: 9 INJECTION, SOLUTION INTRAVENOUS at 02:51

## 2023-06-07 NOTE — PROGRESS NOTES
"NEPHROLOGY PROGRESS NOTE------[facility]    [facility]  [phone]  Saji Waldron MD      Patient Care Team:  Wolf Carvalho MD as PCP - General (Geriatric Medicine)  Fatou Waldron MD as Consulting Physician (Nephrology)      Provider:  Saji Waldron MD  Patient Name: Daquan Xiong  :  1985    SUBJECTIVE:    F/U ARF/URIEL/HYPONATREMIA      No neuro sx. No SOB, CP. No edema. Appetite ok. No dysuria.     Medication:  allopurinol, 100 mg, Oral, Daily  lactulose, 20 g, Oral, TID  midodrine, 10 mg, Oral, TID AC  nadolol, 40 mg, Oral, Q24H  pantoprazole, 40 mg, Oral, Daily  pharmacy, 1 each, Does not apply, Once  riFAXIMin, 550 mg, Oral, BID  sodium bicarbonate, 650 mg, Oral, BID      hold, 1 each  sodium chloride, 75 mL/hr, Last Rate: 75 mL/hr (23 0548)        OBJECTIVE    Vital Sign Min/Max for last 24 hours  Temp  Min: 97.4 °F (36.3 °C)  Max: 98.5 °F (36.9 °C)   BP  Min: 73/39  Max: 109/49   Pulse  Min: 48  Max: 72   Resp  Min: 14  Max: 18   SpO2  Min: 91 %  Max: 100 %   No data recorded   No data recorded     Flowsheet Rows      Flowsheet Row First Filed Value   Admission Height 185.4 cm (73\") Documented at 2023 1500   Admission Weight 74.3 kg (163 lb 12.8 oz) Documented at 2023 1500            No intake/output data recorded.  I/O last 3 completed shifts:  In: 3480 [P.O.:1380; I.V.:2000; IV Piggyback:100]  Out: 200 [Urine:200]    Physical Exam:  General Appearance: alert, appears stated age and cooperative  Head: normocephalic, without obvious abnormality and atraumatic +DRY OP WITH POOR DENTITION  Eyes: conjunctivae and sclerae normal and no icterus  Neck: supple and no JVD  Lungs: +FEW SCATTERED RHONCHI  Heart: regular rhythm & normal rate and normal S1, S2  Chest Wall: no abnormalities observed  Abdomen: normal bowel sounds and soft, nontender +PARACENTESIS SITE DRESSED  Extremities: moves extremities " well, no edema, no cyanosis  Skin: no bleeding, bruising or rash +DECREASD SKIN TURGOR  Neurologic: Alert, and oriented. No focal deficits    Labs:    WBC WBC   Date Value Ref Range Status   06/06/2023 8.30 3.40 - 10.80 10*3/mm3 Final   06/05/2023 8.50 3.40 - 10.80 10*3/mm3 Final   06/05/2023 8.00 3.40 - 10.80 10*3/mm3 Final   06/05/2023 9.70 3.40 - 10.80 10*3/mm3 Final      HGB Hemoglobin   Date Value Ref Range Status   06/06/2023 11.3 (L) 13.0 - 17.7 g/dL Final   06/05/2023 11.7 (L) 13.0 - 17.7 g/dL Final   06/05/2023 11.2 (L) 13.0 - 17.7 g/dL Final   06/05/2023 12.5 (L) 13.0 - 17.7 g/dL Final      HCT Hematocrit   Date Value Ref Range Status   06/06/2023 32.0 (L) 37.5 - 51.0 % Final   06/05/2023 33.5 (L) 37.5 - 51.0 % Final   06/05/2023 33.4 (L) 37.5 - 51.0 % Final   06/05/2023 36.7 (L) 37.5 - 51.0 % Final      Platelets No results found for: LABPLAT   MCV MCV   Date Value Ref Range Status   06/06/2023 103.5 (H) 79.0 - 97.0 fL Final   06/05/2023 104.6 (H) 79.0 - 97.0 fL Final   06/05/2023 105.4 (H) 79.0 - 97.0 fL Final   06/05/2023 106.2 (H) 79.0 - 97.0 fL Final          Sodium Sodium   Date Value Ref Range Status   06/06/2023 129 (L) 136 - 145 mmol/L Final   06/06/2023 126 (L) 136 - 145 mmol/L Final   06/05/2023 127 (L) 136 - 145 mmol/L Final   06/05/2023 120 (L) 136 - 145 mmol/L Final   06/05/2023 120 (L) 136 - 145 mmol/L Final      Potassium Potassium   Date Value Ref Range Status   06/06/2023 4.8 3.5 - 5.2 mmol/L Final   06/06/2023 4.9 3.5 - 5.2 mmol/L Final     Comment:     Slight hemolysis detected by analyzer. Results may be affected.   06/05/2023 5.3 (H) 3.5 - 5.2 mmol/L Final   06/05/2023 4.7 3.5 - 5.2 mmol/L Final   06/05/2023 5.2 3.5 - 5.2 mmol/L Final     Comment:     Slight hemolysis detected by analyzer. Results may be affected.      Chloride Chloride   Date Value Ref Range Status   06/06/2023 96 (L) 98 - 107 mmol/L Final   06/06/2023 91 (L) 98 - 107 mmol/L Final   06/05/2023 91 (L) 98 - 107 mmol/L  Final   06/05/2023 85 (L) 98 - 107 mmol/L Final   06/05/2023 85 (L) 98 - 107 mmol/L Final      CO2 CO2   Date Value Ref Range Status   06/06/2023 22.0 22.0 - 29.0 mmol/L Final   06/06/2023 20.0 (L) 22.0 - 29.0 mmol/L Final   06/05/2023 28.0 22.0 - 29.0 mmol/L Final   06/05/2023 22.0 22.0 - 29.0 mmol/L Final   06/05/2023 20.0 (L) 22.0 - 29.0 mmol/L Final      BUN BUN   Date Value Ref Range Status   06/06/2023 43 (H) 6 - 20 mg/dL Final   06/06/2023 41 (H) 6 - 20 mg/dL Final   06/05/2023 39 (H) 6 - 20 mg/dL Final   06/05/2023 34 (H) 6 - 20 mg/dL Final   06/05/2023 35 (H) 6 - 20 mg/dL Final      Creatinine Creatinine   Date Value Ref Range Status   06/06/2023 2.60 (H) 0.76 - 1.27 mg/dL Final   06/06/2023 2.60 (H) 0.76 - 1.27 mg/dL Final   06/05/2023 3.00 (H) 0.76 - 1.27 mg/dL Final   06/05/2023 2.82 (H) 0.76 - 1.27 mg/dL Final   06/05/2023 2.82 (H) 0.76 - 1.27 mg/dL Final      Calcium Calcium   Date Value Ref Range Status   06/06/2023 8.5 (L) 8.6 - 10.5 mg/dL Final   06/06/2023 9.8 8.6 - 10.5 mg/dL Final   06/05/2023 8.6 8.6 - 10.5 mg/dL Final   06/05/2023 9.3 8.6 - 10.5 mg/dL Final   06/05/2023 9.4 8.6 - 10.5 mg/dL Final      PO4 No components found for: PO4   Albumin Albumin   Date Value Ref Range Status   06/06/2023 4.1 3.5 - 5.2 g/dL Final   06/05/2023 3.5 3.5 - 5.2 g/dL Final   06/05/2023 4.0 3.5 - 5.2 g/dL Final   06/05/2023 3.8 3.5 - 5.2 g/dL Final      Magnesium Magnesium   Date Value Ref Range Status   06/06/2023 2.2 1.6 - 2.6 mg/dL Final   06/06/2023 2.3 1.6 - 2.6 mg/dL Final   06/05/2023 2.2 1.6 - 2.6 mg/dL Final   06/05/2023 2.1 1.6 - 2.6 mg/dL Final      Uric Acid No components found for: URIC ACID     Imaging Results (Last 72 Hours)       Procedure Component Value Units Date/Time    US Renal Bilateral [[id number]] Collected: 06/05/23 1832     Updated: 06/05/23 1837    Narrative:      US RENAL BILATERAL    Date of Exam: 6/5/2023 6:11 PM EDT    Indication: agustin.    Comparison: Paracentesis  6/5/2023    Technique: Grayscale and color Doppler ultrasound evaluation of the kidneys and urinary bladder was performed.      Findings:  There is no hydronephrosis. The right kidney measures 9.4 x 4.4 x 4.5 cm. The left kidney measures 8.3 x 3.6 x 6.7 cm. There is ascites present. The liver is cirrhotic and nodular. Urinary bladder not well evaluated.    Impression:      Impression:  No hydronephrosis. Ascites. Hepatic cirrhosis.        Electronically Signed: Leona Mcdonnell    6/5/2023 6:35 PM EDT    Workstation ID: [id number]                      ASSESSMENT / PLAN      Hyponatremia    Anxiety    GERD (gastroesophageal reflux disease)    Low back pain    Cirrhosis of liver    Tobacco user      1.ARF/URIEL--------Nonoliguric. BUN/Cr leveled off.  +ARF/URIEL with historically normal baseline renal function with baseline serum creatinine of about 0.95 based on review of records available to me. +ARF/URIEL is secondary to prerenal state/intravascular volume depletion with concomitant Aldactone/Lasix use and ATN from hypotension. Doubt HRS but will see how he responds with fluid challenge. Avoid hypotension. Continue to hydrate with NS. No NSAIDs or IV dye. Dose meds for CrCl less than 10 cc/min     2. HYPONATREMIA-----Better. Acute exacerbation of chronic hyponatremia with baseline serum sodium levels of about 131-135 based on review of records. Chronic hyponatremia is likely secondary to ESLD. Acute exacerbation is clinically hypotonic and hypovolemic and secondary to intravascular volume depletion with concomitant Lasix/Aldactone use. Also exacerbated by poor solute intake and Nicotine patch/gum use. D/C Lasix, Aldactone, Nicotine patch. Can d/c seizure precautions today. Telemetry and neuro checks. Follow serial sodium levels. PO fluid restrict at 1500 cc/day.       3. ANEMIA-------Fe normal     4. ETOH CIRRHOSIS/HEPATIC ENCEPHALOPATHY/ASCITES-----S/P 9L Paracentesis today per GI. Ammonia okay. Continue Xifaxan and Lactulose  and Nadalol     5. HYPOTENSION-------IV NS and Midodrine. Back down Nadalol a little.  No Lasix or Aldactone     6. GERD/PUD PROPHYLAXIS-------PPI     7. THROMBOCYTOPENIA------Secondary to ESLD. No heparin.       8. DVT PROPHYLAXIS-------SCDs     9. OA/CHRONIC PAIN/ANXIETY/HYPERURICEMIA      10.  MILD RF ASSOCIATED HYPERPHOSPHATEMIA-----Better    11. HYPOCALCEMIA------Replace IV    12. MILD HYPERKALEMIA-----Better with K+ restrict diet, po NaHCO3 and Lokelma x one.      Saji Waldron MD  [facility]  [phone]  06/07/23  08:05 EDT

## 2023-06-07 NOTE — NURSING NOTE
Pt remains hypotensive, but asymptomatic and walking around room. Pt states his BP is always low and will be leaving AMA if not discharged.     Pt does not want RN to make follow up appointments for him due to his schedule being very busy. RN provided name of doctors, phone numbers and when to follow up to pt.

## 2023-06-07 NOTE — DISCHARGE PLACEMENT REQUEST
"Amish Xiong (37 y.o. Male)       Date of Birth   1985    Social Security Number       Address   [address] [place] [address]    Home Phone   [phone]    MRN   [id number]       Oriental orthodox   [facility]    Marital Status                               Admission Date   6/5/23    Admission Type   Emergency    Admitting Provider   Luis Enrique Byrne MD    Attending Provider   Luis Enrique Byrne MD    Department, Room/Bed   [facility], 2115/1       Discharge Date       Discharge Disposition   Home or Self Care    Discharge Destination                                 Attending Provider: Luis Enrique Byrne MD    Allergies: Bee Venom, Scopolamine    Isolation: None   Infection: None   Code Status: CPR    Ht: 185.4 cm (73\")   Wt: 75.5 kg (166 lb 7.2 oz)    Admission Cmt: None   Principal Problem: Hyponatremia [E87.1]                   Active Insurance as of 6/5/2023       Primary Coverage       Payor Plan Insurance Group Employer/Plan Group    ANTHEM BLUE CROSS ANTHEM BLUE CROSS BLUE SHIELD PPO [id number]       Payor Plan Address Payor Plan Phone Number Payor Plan Fax Number Effective Dates    [address] [phone]  1/1/2023 - None Entered    [facility] [id number]         Subscriber Name Subscriber Birth Date Member ID       ANA XIONG 3/25/1986 [id number]               Secondary Coverage       Payor Plan Insurance Group Employer/Plan Group    ANTHEM MEDICAID [facility] [id number]       Payor Plan Address Payor Plan Phone Number Payor Plan Fax Number Effective Dates    MAIL STOP:   7/1/2022 - None Entered    [address] [address]       [facility] [id number]         Subscriber Name Subscriber Birth Date Member ID       AMISH XIONG 1985 [id number]                     Emergency Contacts        (Rel.) Home Phone Work Phone Mobile Phone    Ana Xiong (Spouse) [phone] -- [phone]                 History & Physical        Mercado, " Siri RITCHIE PA-C at 23 1806       Attestation signed by Luis Enrique Byrne MD at 23 0910    I have reviewed this documentation and agree.                      [facility] Medicine Services  History & Physical    Patient Name: Daquan Xiong  : 1985  MRN: [id number]  Primary Care Physician:  Wolf Carvalho MD  Date of admission: 2023  Date and Time of Service: 2023 at 1808    Subjective      Chief Complaint: Abnormal labs    History of Present Illness: Daquan Xiong is a 37 y.o. male who presented to [facility] on 2023 after undergoing a paracentesis here with Dr. Calderon.  Patient was found to have abnormal labs and was advised to present to the ED.  Patient sodium was 120 today.  Patient states he was recently at [facility].  Last Thursday Dr. Calderon told him to present to an ED and he went to [place] for evaluation of his abnormal labs.  Patient states that [place] his sodium got as low as 116 and he was placed in the ICU, per his report.  Patient states he signed himself out on Saturday AM.  Patient presented here today for paracentesis with Dr. Calderon.  Patient complains of fatigue, decreased appetite, abdominal pain, nausea, decreased urine volume, dysuria, and weakness for the past 2 weeks.  Patient states his abdominal pain is localized across his rib cage and he describes it as a burning pain.  Patient states the abdominal pain is chronic for him but it has gotten more intense in the past 2 weeks.  Patient states he has chronic back pain for which he sees pain management.  Patient denies drinking any alcohol.  Patient states he used to smoke marijuana but quit when he started seeing pain management.  Patient states he currently smokes approximately 8 cigarettes/day.  Patient states he has dark urine but denies any blood in his stool or dark stool.    In the ED,  All vitals stable on admission except BP 94/49. All labs unremarkable except sodium 120, chloride 85,  BUN 34, creatinine 2.82 EGFR 28.5 glucose 132 phosphorus 4.9 alkaline phosphatase 121 total protein 5.6 AST 58 total bilirubin 2.5 PTT 36.9 hemoglobin 11.2, hematocrit 33.4, platelets 120.  Urinalysis shows sakina color, trace ketones, trace protein, 1+ bilirubin. Patient received sodium bicarbonate injection and IV fluids in ED. Hospitalist was contacted to admit patient for further care and management.     ROS 12 point ROS reviewed and negative except as mentioned above.      Personal History     Past Medical History:   Diagnosis Date    Alcohol abuse     1/2-1 pint a day, sober since 9/12/2022    Arthritis     Hypertension     Sleep apnea        Past Surgical History:   Procedure Laterality Date    BACK SURGERY      WRIST SURGERY         Family History: family history includes Cirrhosis in his father; Diabetes in his maternal grandfather, maternal grandmother, paternal grandfather, and paternal grandmother; Heart disease in his maternal grandmother. Otherwise pertinent FHx was reviewed and not pertinent to current issue.    Social History:  reports that he has been smoking. He has been smoking an average of .5 packs per day. He has never used smokeless tobacco. He reports that he does not currently use alcohol. He reports that he does not currently use drugs after having used the following drugs: Marijuana.    Home Medications:  Prior to Admission Medications       Prescriptions Last Dose Informant Patient Reported? Taking?    ALPRAZolam (XANAX) 1 MG tablet   Yes Yes    Take 1 tablet by mouth 2 (Two) Times a Day As Needed for Anxiety.    cyclobenzaprine (FLEXERIL) 10 MG tablet   Yes Yes    Take 1 tablet by mouth Every Night.    furosemide (LASIX) 40 MG tablet   No Yes    Take 1 tablet by mouth Daily.    lactulose (CHRONULAC) 10 GM/15ML solution   Yes Yes    Take 30 mL by mouth 3 (Three) Times a Day As Needed.    melatonin 5 MG tablet tablet   Yes Yes    Take 1 tablet by mouth At Night As Needed.    midodrine  (PROAMATINE) 5 MG tablet  Self Yes Yes    Take 1 tablet by mouth 3 (Three) Times a Day As Needed.    nadolol (CORGARD) 40 MG tablet   Yes Yes    Take 1 tablet by mouth Daily As Needed.    nicotine (NICODERM CQ) 14 MG/24HR patch   Yes Yes    Place 1 patch on the skin as directed by provider Daily As Needed.    nicotine polacrilex (NICORETTE) 2 MG gum   Yes Yes    Chew 1 each As Needed for Smoking Cessation.    ondansetron ODT (ZOFRAN-ODT) 4 MG disintegrating tablet   Yes Yes    Place 1 tablet on the tongue Every 8 (Eight) Hours As Needed for Nausea or Vomiting.    oxyCODONE (ROXICODONE) 5 MG immediate release tablet   Yes Yes    Take 1 tablet by mouth Every 4 (Four) Hours As Needed for Moderate Pain.    pantoprazole (PROTONIX) 40 MG EC tablet   Yes Yes    Take 1 tablet by mouth Daily.    riFAXIMin (XIFAXAN) 550 MG tablet   Yes Yes    Take 1 tablet by mouth 2 (Two) Times a Day.    spironolactone (ALDACTONE) 50 MG tablet   Yes Yes    Take 1 tablet by mouth Daily.              Allergies:  Allergies   Allergen Reactions    Bee Venom Anaphylaxis    Scopolamine Anaphylaxis       Objective      Vitals:   Temp:  [98.4 °F (36.9 °C)] 98.4 °F (36.9 °C)  Heart Rate:  [] 96  Resp:  [10-20] 18  BP: ()/(27-63) 114/63    Physical Exam  Vitals and nursing note reviewed.   HENT:      Head: Normocephalic.      Mouth/Throat:      Mouth: Mucous membranes are dry.   Eyes:      Extraocular Movements: Extraocular movements intact.      Pupils: Pupils are equal, round, and reactive to light.   Cardiovascular:      Rate and Rhythm: Normal rate and regular rhythm.   Pulmonary:      Effort: Pulmonary effort is normal.      Breath sounds: Normal breath sounds.   Abdominal:      General: Bowel sounds are normal.      Palpations: Abdomen is soft.      Tenderness: There is no abdominal tenderness.      Comments: Dressing in place over paracentesis site   Musculoskeletal:         General: Normal range of motion.   Skin:     General: Skin  is warm and dry.   Neurological:      Mental Status: He is alert and oriented to person, place, and time.   Psychiatric:         Mood and Affect: Mood normal.        Result Review    Result Review:  I have personally reviewed the results from the time of this admission to 6/5/2023 19:38 EDT and agree with these findings:  [x]  Laboratory  [x]  Microbiology  [x]  Radiology  [x]  EKG/Telemetry   []  Cardiology/Vascular   []  Pathology  []  Old records  []  Other:  Most notable findings include:   CBC:      Lab 06/05/23  1526 06/05/23  1116   WBC 8.00 9.70   HEMOGLOBIN 11.2* 12.5*   HEMATOCRIT 33.4* 36.7*   PLATELETS 120* 144   NEUTROS ABS 4.80  --    LYMPHS ABS 1.90  --    MONOS ABS 1.00*  --    EOS ABS 0.20  --    .4* 106.2*     Lab Results   Component Value Date    GLUCOSE 132 (H) 06/05/2023    BUN 34 (H) 06/05/2023    CREATININE 2.82 (H) 06/05/2023    EGFR 28.5 (L) 06/05/2023    BCR 12.1 06/05/2023    K 4.7 06/05/2023    CO2 22.0 06/05/2023    CALCIUM 9.3 06/05/2023    ALBUMIN 4.0 06/05/2023    BILITOT 2.5 (H) 06/05/2023    AST 58 (H) 06/05/2023    ALT 31 06/05/2023         Assessment & Plan        Active Hospital Problems:  Active Hospital Problems    Diagnosis     **Hyponatremia     Tobacco user     Anxiety     GERD (gastroesophageal reflux disease)     Low back pain     Cirrhosis of liver      Plan:   Alcoholic cirrhosis  Status post paracentesis today per patient  Ammonia 46  AST 58  ALT 31  Continue Xifaxan, lactulose, nadolol per nephrology    Acute kidney injury  Creatinine 2.82  BUN 34  EGFR 28.5  Avoid nephrotoxic medications and IV contrast dye and possible  Renal ultrasound and PVR ordered  Nephrology consulted-URIEL/ARF is secondary to prerenal state/intravascular volume depletion with concomitant Aldactone/Lasix use and ATN from hypotension, avoid hypotension and hydrate with normal saline per nephrology note    Hyponatremia  EKG showed normal sinus rhythm  Sodium 120  Hold Lasix, Aldactone,  nicotine patch  Seizure fall precautions ordered  Telemetry ordered  Neurochecks ordered  Hyponatremia may be an acute exacerbation of chronic hyponatremia. Chronic hyponatremia is likely secondary to ESLD and acute exacerbation is clinically hypotonic and hypovolemic secondary to intravascular volume depletion with lasix and aldactone use, per nephrology note.  Check urine and serum studies to further assess, P.o. fluid restriction at 1500 cc/day, follow serial sodium levels, per nephrology note    Anemia  Hemoglobin 11.2  Hematocrit 33.4  No overt signs of bleeding  Iron studies and SPEP ordered by nephrology  Continue to monitor with labs    Hypotension  Midodrine and IV fluids ordered by nephrology  BP currently 100/48  Continue to monitor    Chronic back pain  Oxycodone held by nephrology  Pain medications and Ativan to be held until sodium is above 127 to avoid MS changes that could mimic hyponatremia related symptoms, per nephrology note    Anxiety  Xanax held by nephrology  Pain medications and Ativan to be held until sodium is above 127 to avoid MS changes that could mimic hyponatremia related symptoms, per nephrology note    GERD  Continue home Protonix    Tobacco use  Encourage tobacco cessation      DVT prophylaxis:  Mechanical DVT prophylaxis orders are present.    CODE STATUS:    Level Of Support Discussed With: Patient  Code Status (Patient has no pulse and is not breathing): CPR (Attempt to Resuscitate)  Medical Interventions (Patient has pulse or is breathing): Full Support  Release to patient: Routine Release    Admission Status:  I believe this patient meets observation status.    I discussed the patient's findings and my recommendations with patient and family.    This patient has been examined wearing appropriate Personal Protective Equipment and discussed with  physician . 06/05/23    Signature: Electronically signed by Siri Mercado PA-C, 06/05/23, 19:38 EDT.  [facility]    Electronically signed by Luis Enrique Byrne MD at 23 0910          Physician Progress Notes (last 24 hours)        Luis Enrique Byrne MD at 23 0817              [facility] Medicine Services Daily Progress Note    Patient Name: Daquan Xiong  : 1985  MRN: [id number]  Primary Care Physician:  Wolf Carvalho MD  Date of admission: 2023      Subjective      Chief Complaint: Abnormal labs     History of Present Illness: Daquan Xiong is a 37 y.o. male who presented to [facility] on 2023 after undergoing a paracentesis here with Dr. Calderon.  Patient was found to have abnormal labs and was advised to present to the ED.  Patient sodium was 120 today.  Patient states he was recently at [facility].  Last Thursday Dr. Calderon told him to present to an ED and he went to [place] for evaluation of his abnormal labs.  Patient states that [place] his sodium got as low as 116 and he was placed in the ICU, per his report.  Patient states he signed himself out on .  Patient presented here today for paracentesis with Dr. Calderon.  Patient complains of fatigue, decreased appetite, abdominal pain, nausea, decreased urine volume, dysuria, and weakness for the past 2 weeks.  Patient states his abdominal pain is localized across his rib cage and he describes it as a burning pain.  Patient states the abdominal pain is chronic for him but it has gotten more intense in the past 2 weeks.  Patient states he has chronic back pain for which he sees pain management.  Patient denies drinking any alcohol.  Patient states he used to smoke marijuana but quit when he started seeing pain management.  Patient states he currently smokes approximately 8 cigarettes/day.  Patient states he has dark urine but denies any blood in his stool or dark stool.     In the ED,  All vitals stable on admission except BP 94/49. All labs unremarkable except sodium 120, chloride 85, BUN 34,  creatinine 2.82 EGFR 28.5 glucose 132 phosphorus 4.9 alkaline phosphatase 121 total protein 5.6 AST 58 total bilirubin 2.5 PTT 36.9 hemoglobin 11.2, hematocrit 33.4, platelets 120.  Urinalysis shows sakina color, trace ketones, trace protein, 1+ bilirubin. Patient received sodium bicarbonate injection and IV fluids in ED. Hospitalist was contacted to admit patient for further care and management.        6/6/23 doing better today, blood pressure 94/34.  Afebrile, discussed with RN.  Patient requesting his pain meds.  Sodium 126.  6/7/23 doing better today, BP low this am  DW RN, will give fluid bolus    ROS 12 point ROS reviewed and negative except as mentioned above.         Objective      Vitals:   Temp:  [97.4 °F (36.3 °C)-98.5 °F (36.9 °C)] 98.1 °F (36.7 °C)  Heart Rate:  [48-72] 62  Resp:  [14-18] 18  BP: ()/(25-49) 85/43    Physical Exam itals and nursing note reviewed.   HENT:      Head: Normocephalic.      Mouth/Throat:      Mouth: Mucous membranes are dry.   Eyes:      Extraocular Movements: Extraocular movements intact.      Pupils: Pupils are equal, round, and reactive to light.   Cardiovascular:      Rate and Rhythm: Normal rate and regular rhythm.   Pulmonary:      Effort: Pulmonary effort is normal.      Breath sounds: Normal breath sounds.   Abdominal:      General: Bowel sounds are normal.      Palpations: Abdomen is soft.      Tenderness: There is no abdominal tenderness.      Comments: Dressing in place over paracentesis site   Musculoskeletal:         General: Normal range of motion.   Skin:     General: Skin is warm and dry.   Neurological:      Mental Status: He is alert and oriented to person, place, and time.   Psychiatric:         Mood and Affect: Mood normal.       Result Review    Result Review:  I have personally reviewed the results from the time of this admission to 6/7/2023 08:17 EDT and agree with these findings:  []  Laboratory  []  Microbiology  []  Radiology  []  EKG/Telemetry    []  Cardiology/Vascular   []  Pathology  []  Old records  []  Other:  Most notable findings include:     Wounds (last 24 hours)       LDA Wound       Row Name 06/07/23 0400 06/07/23 0000 06/06/23 2000       Rash 06/05/23 1958 Left distal thigh    Rash - Properties Group Placement Date: 06/05/23  -AS Placement Time: 1958 -AS Side: Left  -AS Orientation: distal  -AS Location: thigh  -AS    Distribution localized  -AS localized  -AS localized  -AS    Color red;pink  -AS red;pink  -AS red;pink  -AS    Characteristics dry;itching  -AS dry;itching  -AS dry;itching  -AS    Retired Rash - Properties Group Placement Date: 06/05/23  -AS Placement Time: 1958 -AS Side: Left  -AS Orientation: distal  -AS Location: thigh  -AS    Retired Rash - Properties Group Date first assessed: 06/05/23  -AS Time first assessed: 1958  -AS Side: Left  -AS Orientation: distal  -AS Location: thigh  -AS       Rash 06/05/23 1958 Right distal thigh    Rash - Properties Group Placement Date: 06/05/23  -AS Placement Time: 1958 -AS Side: Right  -AS Orientation: distal  -AS Location: thigh  -AS    Distribution localized  -AS localized  -AS localized  -AS    Color red;pink  -AS red;pink  -AS red;pink  -AS    Characteristics dry;itching  -AS dry;itching  -AS dry;itching  -AS    Retired Rash - Properties Group Placement Date: 06/05/23  -AS Placement Time: 1958 -AS Side: Right  -AS Orientation: distal  -AS Location: thigh  -AS    Retired Rash - Properties Group Date first assessed: 06/05/23  -AS Time first assessed: 1958  -AS Side: Right  -AS Orientation: distal  -AS Location: thigh  -AS              User Key  (r) = Recorded By, (t) = Taken By, (c) = Cosigned By      Initials Name Provider Type    AS Leilani Martinez RN Registered Nurse                    CBC:      Lab 06/06/23 2258 06/05/23  2236 06/05/23  1526 06/05/23  1116   WBC 8.30 8.50 8.00 9.70   HEMOGLOBIN 11.3* 11.7* 11.2* 12.5*   HEMATOCRIT 32.0* 33.5* 33.4* 36.7*   PLATELETS 117* 124*  120* 144   NEUTROS ABS  --   --  4.80  --    LYMPHS ABS  --   --  1.90  --    MONOS ABS  --   --  1.00*  --    EOS ABS  --   --  0.20  --    .5* 104.6* 105.4* 106.2*       CMP:        Lab 06/06/23  2258 06/06/23  0953 06/05/23 2237 06/05/23  2236 06/05/23  1526 06/05/23  1116   SODIUM 129* 126*  --  127* 120* 120*   POTASSIUM 4.8 4.9  --  5.3* 4.7 5.2   CHLORIDE 96* 91*  --  91* 85* 85*   CO2 22.0 20.0*  --  28.0 22.0 20.0*   ANION GAP 11.0 15.0  --  8.0 13.0 15.0   BUN 43* 41*  --  39* 34* 35*   CREATININE 2.60* 2.60*  --  3.00* 2.82* 2.82*   EGFR 31.4* 31.4*  --  26.4* 28.5* 28.5*   GLUCOSE 114* 93  --  114* 132* 108*   CALCIUM 8.5* 9.8  --  8.6 9.3 9.4   IONIZED CALCIUM  --   --  1.18*  --   --   --    MAGNESIUM 2.2 2.3  --  2.2 2.1  --    PHOSPHORUS 4.9*  --   --  6.1* 4.9*  --    TOTAL PROTEIN  --  5.7*  --  4.8* 5.6* 5.8*   ALBUMIN  --  4.1  --  3.5 4.0 3.8   GLOBULIN  --  1.6  --  1.3 1.6 2.0   ALT (SGPT)  --  31  --  28 31 39   AST (SGOT)  --  63*  --  61* 58* 81*   BILIRUBIN  --  2.8*  --  1.8* 2.5* 2.9*   ALK PHOS  --  121*  --  105 121* 162*       No results found for: ACANTHNAEG, AFBCX, BPERTUSSISCX, BLOODCX  No results found for: BCIDPCR, CXREFLEX, CSFCX, CULTURETIS  No results found for: CULTURES, HSVCX, URCX  No results found for: EYECULTURE, GCCX, HSVCULTURE, LABHSV  No results found for: LEGIONELLA, MRSACX, MUMPSCX, MYCOPLASCX  No results found for: NOCARDIACX, STOOLCX  No results found for: THROATCX, UNSTIMCULT, URINECX, CULTURE, VZVCULTUR  No results found for: VIRALCULTU, WOUNDCX      Assessment & Plan      Brief Patient Summary:  Daquan Xiong is a 37 y.o. male who       albumin human, 25 g, Intravenous, Once  allopurinol, 100 mg, Oral, Daily  calcium gluconate, 1 g, Intravenous, Once  lactulose, 20 g, Oral, TID  midodrine, 10 mg, Oral, TID AC  nadolol, 20 mg, Oral, Q24H  pantoprazole, 40 mg, Oral, Daily  pharmacy, 1 each, Does not apply, Once  riFAXIMin, 550 mg, Oral, BID  sodium  bicarbonate, 650 mg, Oral, BID       hold, 1 each  sodium chloride, 75 mL/hr, Last Rate: 75 mL/hr (06/07/23 0548)         Active Hospital Problems:  Active Hospital Problems    Diagnosis     **Hyponatremia     Tobacco user     Anxiety     GERD (gastroesophageal reflux disease)     Low back pain     Cirrhosis of liver      Alcoholic cirrhosis  Status post paracentesis 6/4/23 per patient  Ammonia 46  AST 58  ALT 31  Continue Xifaxan, lactulose, nadolol per nephrology     Acute kidney injury  Creatinine 2.82>2.6  BUN 34  EGFR 28.5  Avoid nephrotoxic medications and IV contrast dye and possible  Renal ultrasound and PVR ordered  Nephrology consulted-URIEL/ARF is secondary to prerenal state/intravascular volume depletion with concomitant Aldactone/Lasix use and ATN from hypotension, avoid hypotension and hydrate with normal saline per nephrology note     Hyponatremia  EKG showed normal sinus rhythm  Sodium 120  Hold Lasix, Aldactone, nicotine patch  Seizure fall precautions ordered  Telemetry ordered  Neurochecks ordered  Hyponatremia may be an acute exacerbation of chronic hyponatremia. Chronic hyponatremia is likely secondary to ESLD and acute exacerbation is clinically hypotonic and hypovolemic secondary to intravascular volume depletion with lasix and aldactone use, per nephrology note.  Check urine and serum studies to further assess, P.o. fluid restriction at 1500 cc/day, follow serial sodium levels, per nephrology note     Anemia  Hemoglobin 11.2  Hematocrit 33.4  No overt signs of bleeding  Iron studies and SPEP ordered by nephrology  Continue to monitor with labs     Hypotension  Midodrine and IV fluids ordered by nephrology  BP currently 100/48  Continue to monitor     Chronic back pain  Oxycodone held by nephrology  Pain medications and Ativan to be held until sodium is above 127 to avoid MS changes that could mimic hyponatremia related symptoms, per nephrology note     Anxiety  Xanax held by nephrology  Pain  medications and Ativan to be held until sodium is above 127 to avoid MS changes that could mimic hyponatremia related symptoms, per nephrology note     GERD  Continue home Protonix     Tobacco use  Encourage tobacco cessation      :       DVT prophylaxis:  Mechanical DVT prophylaxis orders are present.    CODE STATUS:    Level Of Support Discussed With: Patient  Code Status (Patient has no pulse and is not breathing): CPR (Attempt to Resuscitate)  Medical Interventions (Patient has pulse or is breathing): Full Support  Release to patient: Routine Release      Disposition:  I expect patient to be discharged nh.    I have utilized all available, immediate resources to obtain, update, or review the patient's current medications including all prescriptions, over-the-counter products, herbals, cannabis/cannabidiol products, and vitamin.mineral/dietary (nutritional) supplements.      Level Of Support Discussed With: Patient  Code Status (Patient has no pulse and is not breathing): CPR (Attempt to Resuscitate)  Medical Interventions (Patient has pulse or is breathing): Full Support  Release to patient: Routine Release        Admission Status:  I believe this patient meets admit status.              This patient has been examined wearing appropriate Personal Protective Equipment and discussed with  rn . 06/07/23      Electronically signed by Luis Enrique Byrne MD, 06/07/23, 08:17 EDT.  [facility] Hospitalist Team             Electronically signed by Luis Enrique Byrne MD at 06/07/23 0823       Fatou Waldron MD at 06/07/23 0805          NEPHROLOGY PROGRESS NOTE------[facility]    [facility]  [phone]  Saji Waldron MD      Patient Care Team:  Wolf Carvalho MD as PCP - General (Geriatric Medicine)  Fatou Waldron MD as Consulting Physician (Nephrology)      Provider:  Saji Waldron MD  Patient Name: Daquan RITCHIE  "Tyrese  :  1985    SUBJECTIVE:    F/U ARF/URIEL/HYPONATREMIA      No neuro sx. No SOB, CP. No edema. Appetite ok. No dysuria.     Medication:  allopurinol, 100 mg, Oral, Daily  lactulose, 20 g, Oral, TID  midodrine, 10 mg, Oral, TID AC  nadolol, 40 mg, Oral, Q24H  pantoprazole, 40 mg, Oral, Daily  pharmacy, 1 each, Does not apply, Once  riFAXIMin, 550 mg, Oral, BID  sodium bicarbonate, 650 mg, Oral, BID      hold, 1 each  sodium chloride, 75 mL/hr, Last Rate: 75 mL/hr (23 0548)        OBJECTIVE    Vital Sign Min/Max for last 24 hours  Temp  Min: 97.4 °F (36.3 °C)  Max: 98.5 °F (36.9 °C)   BP  Min: 73/39  Max: 109/49   Pulse  Min: 48  Max: 72   Resp  Min: 14  Max: 18   SpO2  Min: 91 %  Max: 100 %   No data recorded   No data recorded     Flowsheet Rows      Flowsheet Row First Filed Value   Admission Height 185.4 cm (73\") Documented at 2023 1500   Admission Weight 74.3 kg (163 lb 12.8 oz) Documented at 2023 1500            No intake/output data recorded.  I/O last 3 completed shifts:  In: 3480 [P.O.:1380; I.V.:2000; IV Piggyback:100]  Out: 200 [Urine:200]    Physical Exam:  General Appearance: alert, appears stated age and cooperative  Head: normocephalic, without obvious abnormality and atraumatic +DRY OP WITH POOR DENTITION  Eyes: conjunctivae and sclerae normal and no icterus  Neck: supple and no JVD  Lungs: +FEW SCATTERED RHONCHI  Heart: regular rhythm & normal rate and normal S1, S2  Chest Wall: no abnormalities observed  Abdomen: normal bowel sounds and soft, nontender +PARACENTESIS SITE DRESSED  Extremities: moves extremities well, no edema, no cyanosis  Skin: no bleeding, bruising or rash +DECREASD SKIN TURGOR  Neurologic: Alert, and oriented. No focal deficits    Labs:    WBC WBC   Date Value Ref Range Status   2023 8.30 3.40 - 10.80 10*3/mm3 Final   2023 8.50 3.40 - 10.80 10*3/mm3 Final   2023 8.00 3.40 - 10.80 10*3/mm3 Final   2023 9.70 3.40 - 10.80 10*3/mm3 " Final      HGB Hemoglobin   Date Value Ref Range Status   06/06/2023 11.3 (L) 13.0 - 17.7 g/dL Final   06/05/2023 11.7 (L) 13.0 - 17.7 g/dL Final   06/05/2023 11.2 (L) 13.0 - 17.7 g/dL Final   06/05/2023 12.5 (L) 13.0 - 17.7 g/dL Final      HCT Hematocrit   Date Value Ref Range Status   06/06/2023 32.0 (L) 37.5 - 51.0 % Final   06/05/2023 33.5 (L) 37.5 - 51.0 % Final   06/05/2023 33.4 (L) 37.5 - 51.0 % Final   06/05/2023 36.7 (L) 37.5 - 51.0 % Final      Platelets No results found for: LABPLAT   MCV MCV   Date Value Ref Range Status   06/06/2023 103.5 (H) 79.0 - 97.0 fL Final   06/05/2023 104.6 (H) 79.0 - 97.0 fL Final   06/05/2023 105.4 (H) 79.0 - 97.0 fL Final   06/05/2023 106.2 (H) 79.0 - 97.0 fL Final          Sodium Sodium   Date Value Ref Range Status   06/06/2023 129 (L) 136 - 145 mmol/L Final   06/06/2023 126 (L) 136 - 145 mmol/L Final   06/05/2023 127 (L) 136 - 145 mmol/L Final   06/05/2023 120 (L) 136 - 145 mmol/L Final   06/05/2023 120 (L) 136 - 145 mmol/L Final      Potassium Potassium   Date Value Ref Range Status   06/06/2023 4.8 3.5 - 5.2 mmol/L Final   06/06/2023 4.9 3.5 - 5.2 mmol/L Final     Comment:     Slight hemolysis detected by analyzer. Results may be affected.   06/05/2023 5.3 (H) 3.5 - 5.2 mmol/L Final   06/05/2023 4.7 3.5 - 5.2 mmol/L Final   06/05/2023 5.2 3.5 - 5.2 mmol/L Final     Comment:     Slight hemolysis detected by analyzer. Results may be affected.      Chloride Chloride   Date Value Ref Range Status   06/06/2023 96 (L) 98 - 107 mmol/L Final   06/06/2023 91 (L) 98 - 107 mmol/L Final   06/05/2023 91 (L) 98 - 107 mmol/L Final   06/05/2023 85 (L) 98 - 107 mmol/L Final   06/05/2023 85 (L) 98 - 107 mmol/L Final      CO2 CO2   Date Value Ref Range Status   06/06/2023 22.0 22.0 - 29.0 mmol/L Final   06/06/2023 20.0 (L) 22.0 - 29.0 mmol/L Final   06/05/2023 28.0 22.0 - 29.0 mmol/L Final   06/05/2023 22.0 22.0 - 29.0 mmol/L Final   06/05/2023 20.0 (L) 22.0 - 29.0 mmol/L Final      BUN  BUN   Date Value Ref Range Status   06/06/2023 43 (H) 6 - 20 mg/dL Final   06/06/2023 41 (H) 6 - 20 mg/dL Final   06/05/2023 39 (H) 6 - 20 mg/dL Final   06/05/2023 34 (H) 6 - 20 mg/dL Final   06/05/2023 35 (H) 6 - 20 mg/dL Final      Creatinine Creatinine   Date Value Ref Range Status   06/06/2023 2.60 (H) 0.76 - 1.27 mg/dL Final   06/06/2023 2.60 (H) 0.76 - 1.27 mg/dL Final   06/05/2023 3.00 (H) 0.76 - 1.27 mg/dL Final   06/05/2023 2.82 (H) 0.76 - 1.27 mg/dL Final   06/05/2023 2.82 (H) 0.76 - 1.27 mg/dL Final      Calcium Calcium   Date Value Ref Range Status   06/06/2023 8.5 (L) 8.6 - 10.5 mg/dL Final   06/06/2023 9.8 8.6 - 10.5 mg/dL Final   06/05/2023 8.6 8.6 - 10.5 mg/dL Final   06/05/2023 9.3 8.6 - 10.5 mg/dL Final   06/05/2023 9.4 8.6 - 10.5 mg/dL Final      PO4 No components found for: PO4   Albumin Albumin   Date Value Ref Range Status   06/06/2023 4.1 3.5 - 5.2 g/dL Final   06/05/2023 3.5 3.5 - 5.2 g/dL Final   06/05/2023 4.0 3.5 - 5.2 g/dL Final   06/05/2023 3.8 3.5 - 5.2 g/dL Final      Magnesium Magnesium   Date Value Ref Range Status   06/06/2023 2.2 1.6 - 2.6 mg/dL Final   06/06/2023 2.3 1.6 - 2.6 mg/dL Final   06/05/2023 2.2 1.6 - 2.6 mg/dL Final   06/05/2023 2.1 1.6 - 2.6 mg/dL Final      Uric Acid No components found for: URIC ACID     Imaging Results (Last 72 Hours)       Procedure Component Value Units Date/Time    US Renal Bilateral [[id number]] Collected: 06/05/23 1832     Updated: 06/05/23 1837    Narrative:      US RENAL BILATERAL    Date of Exam: 6/5/2023 6:11 PM EDT    Indication: agustin.    Comparison: Paracentesis 6/5/2023    Technique: Grayscale and color Doppler ultrasound evaluation of the kidneys and urinary bladder was performed.      Findings:  There is no hydronephrosis. The right kidney measures 9.4 x 4.4 x 4.5 cm. The left kidney measures 8.3 x 3.6 x 6.7 cm. There is ascites present. The liver is cirrhotic and nodular. Urinary bladder not well evaluated.    Impression:       Impression:  No hydronephrosis. Ascites. Hepatic cirrhosis.        Electronically Signed: Leona Mcdonnell    6/5/2023 6:35 PM EDT    Workstation ID: [id number]                      ASSESSMENT / PLAN      Hyponatremia    Anxiety    GERD (gastroesophageal reflux disease)    Low back pain    Cirrhosis of liver    Tobacco user      1.ARF/URIEL--------Nonoliguric. BUN/Cr leveled off.  +ARF/URIEL with historically normal baseline renal function with baseline serum creatinine of about 0.95 based on review of records available to me. +ARF/URIEL is secondary to prerenal state/intravascular volume depletion with concomitant Aldactone/Lasix use and ATN from hypotension. Doubt HRS but will see how he responds with fluid challenge. Avoid hypotension. Continue to hydrate with NS. No NSAIDs or IV dye. Dose meds for CrCl less than 10 cc/min     2. HYPONATREMIA-----Better. Acute exacerbation of chronic hyponatremia with baseline serum sodium levels of about 131-135 based on review of records. Chronic hyponatremia is likely secondary to ESLD. Acute exacerbation is clinically hypotonic and hypovolemic and secondary to intravascular volume depletion with concomitant Lasix/Aldactone use. Also exacerbated by poor solute intake and Nicotine patch/gum use. D/C Lasix, Aldactone, Nicotine patch. Can d/c seizure precautions today. Telemetry and neuro checks. Follow serial sodium levels. PO fluid restrict at 1500 cc/day.       3. ANEMIA-------Fe normal     4. ETOH CIRRHOSIS/HEPATIC ENCEPHALOPATHY/ASCITES-----S/P 9L Paracentesis today per GI. Ammonia okay. Continue Xifaxan and Lactulose and Nadalol     5. HYPOTENSION-------IV NS and Midodrine. Back down Nadalol a little.  No Lasix or Aldactone     6. GERD/PUD PROPHYLAXIS-------PPI     7. THROMBOCYTOPENIA------Secondary to ESLD. No heparin.       8. DVT PROPHYLAXIS-------SCDs     9. OA/CHRONIC PAIN/ANXIETY/HYPERURICEMIA      10.  MILD RF ASSOCIATED HYPERPHOSPHATEMIA-----Better    11.  HYPOCALCEMIA------Replace IV    12. MILD HYPERKALEMIA-----Better with K+ restrict diet, po NaHCO3 and Lokelma x one.      Saji Waldron MD  [facility]  [phone]  23  08:05 EDT      Electronically signed by Fatou Waldron MD at 23 0915          Physical Therapy Notes (last 48 hours)        Lee Ann Bernardo, PT at 23 1105  Version 1 of          Goal Outcome Evaluation:  Plan of Care Reviewed With: patient           Outcome Evaluation: Pt is a 38 y/o male who presented after undergoing a paracentesis here with Dr. Camilo and found to be hyponatremic. He c/o fatigue, decreased appetite, abdominal pain, nausea, decreased urine volume, dysuria, and weakness. PMHx includes anxiety, bipolar, GERD, sleep apnea, alcoholic cirrhosis and chronic pain. At baseline, pt lives with his spouse in a single level home with flat entry. He is typically independent using a straight cane, requiring occassional assist with bathing/dressing. He does report a history of several falls. Currently, pt appears to be functioning near baseline. He demonstrates decreased strength but is transferring and ambulating greater than a household distance with CGA and use of RW. Pt may benefit from home health PT at d/c. Will continue to follow and progress as tolerated.           Electronically signed by Lee Ann Bernardo PT at 23 2395       Lee Ann Bernardo PT at 23 1105  Version 1 of 1         Patient Name: Daquan Xiong  : 1985    MRN: [id number]                              Today's Date: 2023       Admit Date: 2023    Visit Dx:     ICD-10-CM ICD-9-CM   1. Hyponatremia  E87.1 276.1   2. URIEL (acute kidney injury)  N17.9 584.9   3. Weakness  R53.1 780.79     Patient Active Problem List   Diagnosis    Elevated liver enzymes    Anxiety    Bipolar affective disorder    GERD (gastroesophageal reflux disease)    Low back pain    Osteoarthritis    Tobacco  use disorder    Cirrhosis of liver    Polysubstance abuse    BASILIO (obstructive sleep apnea)    Tobacco user    Hyponatremia     Past Medical History:   Diagnosis Date    Alcohol abuse     1/2-1 pint a day, sober since 9/12/2022    Arthritis     Hypertension     Sleep apnea      Past Surgical History:   Procedure Laterality Date    BACK SURGERY      WRIST SURGERY        General Information       Row Name 06/06/23 1329          Physical Therapy Time and Intention    Document Type evaluation  -NS     Mode of Treatment co-treatment;physical therapy  -NS       Row Name 06/06/23 1329          General Information    Patient Profile Reviewed yes  -NS     Prior Level of Function independent:  with use of straight cane; occassionally requiring assist with bathing/dressing  -NS     Existing Precautions/Restrictions fall  -NS     Barriers to Rehab medically complex  -NS       Row Name 06/06/23 1329          Living Environment    People in Home spouse  -NS       Row Name 06/06/23 1329          Home Main Entrance    Number of Stairs, Main Entrance none  -NS       Row Name 06/06/23 1329          Stairs Within Home, Primary    Number of Stairs, Within Home, Primary none  -NS       Row Name 06/06/23 1329          Cognition    Orientation Status (Cognition) oriented x 4  -NS       [facility] Name 06/06/23 1329          Safety Issues, Functional Mobility    Impairments Affecting Function (Mobility) balance;endurance/activity tolerance;strength;pain  -NS               User Key  (r) = Recorded By, (t) = Taken By, (c) = Cosigned By      Initials Name Provider Type    Lee Ann Valentin PT Physical Therapist                   Mobility       Row Name 06/06/23 1330          Bed Mobility    Bed Mobility supine-sit  -NS     Supine-Sit [place] (Bed Mobility) standby assist  -NS     Assistive Device (Bed Mobility) head of bed elevated;bed rails  -NS       Row Name 06/06/23 1330          Sit-Stand Transfer    Sit-Stand [place] (Transfers)  contact guard  -NS     Assistive Device (Sit-Stand Transfers) cane, straight  -NS       Row Name 06/06/23 1330          Gait/Stairs (Locomotion)    [place] Level (Gait) contact guard  -NS     Assistive Device (Gait) cane, straight  -NS     Distance in Feet (Gait) 120  -NS     Deviations/Abnormal Patterns (Gait) gait speed decreased  -NS               User Key  (r) = Recorded By, (t) = Taken By, (c) = Cosigned By      Initials Name Provider Type    Lee Ann Valentin PT Physical Therapist                   Obj/Interventions       Row Name 06/06/23 1331          Range of Motion Comprehensive    General Range of Motion no range of motion deficits identified  -NS       Row Name 06/06/23 1331          Strength Comprehensive (MMT)    Comment, General Manual Muscle Testing (MMT) Assessment BLE grossly 4-/5  -NS       Row Name 06/06/23 1331          Balance    Balance Assessment sitting static balance;sitting dynamic balance;sit to stand dynamic balance;standing static balance;standing dynamic balance  -NS     Static Sitting Balance independent  -NS     Dynamic Sitting Balance independent  -NS     Position, Sitting Balance unsupported;sitting edge of bed  -NS     Sit to Stand Dynamic Balance contact guard  -NS     Static Standing Balance standby assist  -NS     Dynamic Standing Balance contact guard  -NS     Position/Device Used, Standing Balance supported;cane, straight  -NS               User Key  (r) = Recorded By, (t) = Taken By, (c) = Cosigned By      Initials Name Provider Type    Lee Ann Valentin PT Physical Therapist                   Goals/Plan       Row Name 06/06/23 1418          Bed Mobility Goal 1 (PT)    Activity/Assistive Device (Bed Mobility Goal 1, PT) bed mobility activities, all  -NS     [place] Level/Cues Needed (Bed Mobility Goal 1, PT) independent  -NS     Time Frame (Bed Mobility Goal 1, PT) long term goal (LTG);2 weeks  -NS       Row Name 06/06/23 1414          Transfer Goal 1 (PT)  "   Activity/Assistive Device (Transfer Goal 1, PT) transfers, all;cane, straight  -NS     [place] Level/Cues Needed (Transfer Goal 1, PT) modified independence  -NS     Time Frame (Transfer Goal 1, PT) long term goal (LTG);2 weeks  -NS       Row Name 06/06/23 1414          Gait Training Goal 1 (PT)    Activity/Assistive Device (Gait Training Goal 1, PT) gait (walking locomotion);cane, straight  -NS     [place] Level (Gait Training Goal 1, PT) modified independence  -NS     Distance (Gait Training Goal 1, PT) 200  -NS     Time Frame (Gait Training Goal 1, PT) long term goal (LTG);2 weeks  -NS       Row Name 06/06/23 1414          Therapy Assessment/Plan (PT)    Planned Therapy Interventions (PT) balance training;bed mobility training;gait training;home exercise program;patient/family education;strengthening;transfer training  -NS               User Key  (r) = Recorded By, (t) = Taken By, (c) = Cosigned By      Initials Name Provider Type    Lee Ann Valentin, PT Physical Therapist                   Clinical Impression       Row Name 06/06/23 8801          Pain    Pretreatment Pain Rating 6/10  -NS     Posttreatment Pain Rating 6/10  -NS     Pre/Posttreatment Pain Comment \"all over\"  -NS     Pain Intervention(s) Distraction;Ambulation/increased activity;Repositioned  -NS       Row Name 06/06/23 2650          Plan of Care Review    Plan of Care Reviewed With patient  -NS     Outcome Evaluation Pt is a 36 y/o male who presented after undergoing a paracentesis here with Dr. Camilo and found to be hyponatremic. He c/o fatigue, decreased appetite, abdominal pain, nausea, decreased urine volume, dysuria, and weakness. PMHx includes anxiety, bipolar, GERD, sleep apnea, alcoholic cirrhosis and chronic pain. At baseline, pt lives with his spouse in a single level home with flat entry. He is typically independent using a straight cane, requiring occassional assist with bathing/dressing. He does report a history of " several falls. Currently, pt appears to be functioning near baseline. He demonstrates decreased strength but is transferring and ambulating greater than a household distance with CGA and use of RW. Pt may benefit from home health PT at d/c. Will continue to follow and progress as tolerated.  -NS       Row Name 06/06/23 1331          Therapy Assessment/Plan (PT)    Criteria for Skilled Interventions Met (PT) yes;meets criteria;skilled treatment is necessary  -NS     Therapy Frequency (PT) 3 times/wk  -NS     Predicted Duration of Therapy Intervention (PT) until d/c  -NS       Row Name 06/06/23 1331          Vital Signs    Pre Patient Position Supine  -NS     Post Patient Position Sitting  -NS       Row Name 06/06/23 1331          Positioning and Restraints    Pre-Treatment Position in bed  -NS     Post Treatment Position chair  -NS     In Chair reclined;sitting;call light within reach;encouraged to call for assist;exit alarm on  -NS               User Key  (r) = Recorded By, (t) = Taken By, (c) = Cosigned By      Initials Name Provider Type    Lee Ann Valentin, PT Physical Therapist                   Outcome Measures       Row Name 06/06/23 1415          How much help from another person do you currently need...    Turning from your back to your side while in flat bed without using bedrails? 4  -NS     Moving from lying on back to sitting on the side of a flat bed without bedrails? 4  -NS     Moving to and from a bed to a chair (including a wheelchair)? 3  -NS     Standing up from a chair using your arms (e.g., wheelchair, bedside chair)? 3  -NS     Climbing 3-5 steps with a railing? 3  -NS     To walk in hospital room? 3  -NS     AM-PAC 6 Clicks Score (PT) 20  -NS     Highest level of mobility 6 --> Walked 10 steps or more  -NS       Row Name 06/06/23 1415          Functional Assessment    Outcome Measure Options AM-PAC 6 Clicks Basic Mobility (PT)  -NS               User Key  (r) = Recorded By, (t) = Taken By,  (c) = Cosigned By      Initials Name Provider Type    Lee Ann Valentin PT Physical Therapist                                 Physical Therapy Education       Title: PT OT SLP Therapies (In Progress)       Topic: Physical Therapy (In Progress)       Point: Mobility training (Done)       Learning Progress Summary             Patient Acceptance, E, VU by NS at 6/6/2023 9095                         Point: Home exercise program (Not Started)       Learner Progress:  Not documented in this visit.              Point: Body mechanics (Not Started)       Learner Progress:  Not documented in this visit.              Point: Precautions (Not Started)       Learner Progress:  Not documented in this visit.                              User Key       Initials Effective Dates Name Provider Type Discipline    NS 06/30/22 -  Lee Ann Bernardo PT Physical Therapist PT                  PT Recommendation and Plan  Planned Therapy Interventions (PT): balance training, bed mobility training, gait training, home exercise program, patient/family education, strengthening, transfer training  Plan of Care Reviewed With: patient  Outcome Evaluation: Pt is a 38 y/o male who presented after undergoing a paracentesis here with Dr. Camilo and found to be hyponatremic. He c/o fatigue, decreased appetite, abdominal pain, nausea, decreased urine volume, dysuria, and weakness. PMHx includes anxiety, bipolar, GERD, sleep apnea, alcoholic cirrhosis and chronic pain. At baseline, pt lives with his spouse in a single level home with flat entry. He is typically independent using a straight cane, requiring occassional assist with bathing/dressing. He does report a history of several falls. Currently, pt appears to be functioning near baseline. He demonstrates decreased strength but is transferring and ambulating greater than a household distance with CGA and use of RW. Pt may benefit from home health PT at d/c. Will continue to follow and progress as  tolerated.     Time Calculation:    PT Charges       Row Name 23 1415             Time Calculation    Start Time 1045  -NS      Stop Time 1105  -NS      Time Calculation (min) 20 min  -NS      PT Received On 23  -NS      PT - Next Appointment 23  -NS      PT Goal Re-Cert Due Date 23  -NS         Time Calculation- PT    Total Timed Code Minutes- PT 0 minute(s)  -NS                User Key  (r) = Recorded By, (t) = Taken By, (c) = Cosigned By      Initials Name Provider Type    Lee Ann Valentin, PT Physical Therapist                  Therapy Charges for Today       Code Description Service Date Service Provider Modifiers Qty    69158662253 HC PT EVAL LOW COMPLEXITY 3 2023 Lee Ann Bernardo, PT GP 1            PT G-Codes  Outcome Measure Options: AM-PAC 6 Clicks Basic Mobility (PT)  AM-PAC 6 Clicks Score (PT): 20  PT Discharge Summary  Anticipated Discharge Disposition (PT): home with assist, home with home health    Lee Ann Bernardo PT  2023      Electronically signed by Lee Ann Bernardo, [email or web address] at 23 1416          Occupational Therapy Notes (last 48 hours)        Saulo Villa, OT at 23 1547          Patient Name: Daquan Xiong  : 1985    MRN: [id number]                              Today's Date: 2023       Admit Date: 2023    Visit Dx:     ICD-10-CM ICD-9-CM   1. Hyponatremia  E87.1 276.1   2. URIEL (acute kidney injury)  N17.9 584.9   3. Weakness  R53.1 780.79     Patient Active Problem List   Diagnosis    Elevated liver enzymes    Anxiety    Bipolar affective disorder    GERD (gastroesophageal reflux disease)    Low back pain    Osteoarthritis    Tobacco use disorder    Cirrhosis of liver    Polysubstance abuse    BASILIO (obstructive sleep apnea)    Tobacco user    Hyponatremia     Past Medical History:   Diagnosis Date    Alcohol abuse     1/2-1 pint a day, sober since 2022    Arthritis     Hypertension     Sleep apnea      Past Surgical History:    Procedure Laterality Date    BACK SURGERY      WRIST SURGERY        General Information       Row Name 06/06/23 1534          OT Time and Intention    Document Type evaluation  -LS     Mode of Treatment occupational therapy  -LS       Row Name 06/06/23 1534          General Information    Patient Profile Reviewed yes  -LS     Prior Level of Function min assist:;ADL's;independent:;driving;all household mobility;community mobility  Straight cane  -LS     Existing Precautions/Restrictions fall  -LS     Barriers to Rehab medically complex  -LS       Row Name 06/06/23 1534          Living Environment    People in Home spouse  -LS       Row Name 06/06/23 1534          Home Main Entrance    Number of Stairs, Main Entrance none  -LS       Row Name 06/06/23 1534          Stairs Within Home, Primary    Number of Stairs, Within Home, Primary none  -LS       Row Name 06/06/23 1534          Cognition    Orientation Status (Cognition) oriented x 4  -LS       Row Name 06/06/23 1534          Safety Issues, Functional Mobility    Impairments Affecting Function (Mobility) balance;endurance/activity tolerance;strength;pain  -LS               User Key  (r) = Recorded By, (t) = Taken By, (c) = Cosigned By      Initials Name Provider Type    LS Saulo Villa OT Occupational Therapist                     Mobility/ADL's       Row Name 06/06/23 1536          Bed Mobility    Bed Mobility supine-sit  -LS     Supine-Sit [place] (Bed Mobility) standby assist  -LS     Assistive Device (Bed Mobility) head of bed elevated;bed rails  -LS       Row Name 06/06/23 1536          Transfers    Transfers bed-chair transfer;sit-stand transfer  -LS       Row Name 06/06/23 1536          Bed-Chair Transfer    Bed-Chair [place] (Transfers) contact guard  -LS       Row Name 06/06/23 1536          Sit-Stand Transfer    Sit-Stand [place] (Transfers) contact guard  -LS     Assistive Device (Sit-Stand Transfers) cane, straight  -       Row Name  06/06/23 1536          Activities of Daily Living    BADL Assessment/Intervention other (see comments)  Setup required for ADLs  -LS               User Key  (r) = Recorded By, (t) = Taken By, (c) = Cosigned By      Initials Name Provider Type    Saulo Huff OT Occupational Therapist                   Obj/Interventions       Row Name 06/06/23 1538          Range of Motion Comprehensive    General Range of Motion no range of motion deficits identified  -LS       Row Name 06/06/23 1538          Strength Comprehensive (MMT)    Comment, General Manual Muscle Testing (MMT) Assessment BUE grossly 4/5  -       Row Name 06/06/23 1538          Balance    Balance Assessment sitting static balance;sitting dynamic balance;standing static balance;standing dynamic balance  -LS     Static Sitting Balance independent  -LS     Dynamic Sitting Balance independent  -LS     Position, Sitting Balance sitting edge of bed  -LS     Static Standing Balance standby assist  -LS     Dynamic Standing Balance contact guard  -LS     Position/Device Used, Standing Balance supported;cane, straight  -LS               User Key  (r) = Recorded By, (t) = Taken By, (c) = Cosigned By      Initials Name Provider Type    LS Saulo Villa OT Occupational Therapist                   Goals/Plan       [facility] Name 06/06/23 1545          Transfer Goal 1 (OT)    Activity/Assistive Device (Transfer Goal 1, OT) transfers, all  -LS     [place] Level/Cues Needed (Transfer Goal 1, OT) modified independence  -LS     Time Frame (Transfer Goal 1, OT) long term goal (LTG);2 weeks  -[facility] Name 06/06/23 154          Bathing Goal 1 (OT)    Activity/Device (Bathing Goal 1, OT) bathing skills, all  -LS     [place] Level/Cues Needed (Bathing Goal 1, OT) modified independence  -LS     Time Frame (Bathing Goal 1, OT) long term goal (LTG);2 weeks  -[facility] Name 06/06/23 1542          Dressing Goal 1 (OT)    Activity/Device (Dressing Goal 1, OT) dressing  skills, all  -LS     [place]/Cues Needed (Dressing Goal 1, OT) modified independence  -LS     Time Frame (Dressing Goal 1, OT) long term goal (LTG);2 weeks  -       Row Name 06/06/23 1541          Therapy Assessment/Plan (OT)    Planned Therapy Interventions (OT) activity tolerance training;BADL retraining;functional balance retraining;IADL retraining;occupation/activity based interventions;ROM/therapeutic exercise;strengthening exercise;transfer/mobility retraining  -               User Key  (r) = Recorded By, (t) = Taken By, (c) = Cosigned By      Initials Name Provider Type    LS Saulo Villa OT Occupational Therapist                   Clinical Impression       Row Name 06/06/23 1539          Pain Assessment    Pretreatment Pain Rating 6/10  -LS     Posttreatment Pain Rating 6/10  -LS     Pain Location generalized  -LS       Row Name 06/06/23 1539          Plan of Care Review    Plan of Care Reviewed With patient  -LS     Outcome Evaluation 37 y.o. male who presented to [facility] on 6/5/2023 after undergoing a paracentesis here with Dr. Calderon.  Patient was found to have abnormal labs and was advised to present to the ED. PMHx includes anxiety, bipolar, GERD, sleep apnea, alcoholic cirrhosis and chronic pain. At baseline, pt lives with his spouse in a single level home with flat entry. He is typically independent using a straight cane, requiring occassional assist with bathing/dressing. He does report a history of several falls. Today, patient requires Min A for lower body dressing, otherwise setup for ADLs. SBA to come to EOB and stand, and CGA for ambulation. OT feels pt will be safe to d/c home, recommending HHOT. WIll follow.  -       Row Name 06/06/23 1539          Therapy Assessment/Plan (OT)    Rehab Potential (OT) good, to achieve stated therapy goals  -LS     Criteria for Skilled Therapeutic Interventions Met (OT) yes;skilled treatment is necessary  -LS     Therapy Frequency (OT)  3 times/wk  -LS     Predicted Duration of Therapy Intervention (OT) until dc  -LS       Row Name 06/06/23 1539          Therapy Plan Review/Discharge Plan (OT)    Anticipated Discharge Disposition (OT) home with home [facility]  -       Row Name 06/06/23 1539          Vital Signs    O2 Delivery Pre Treatment room air  -LS     O2 Delivery Intra Treatment room air  -LS     O2 Delivery Post Treatment room air  -LS     Pre Patient Position Supine  -LS     Intra Patient Position Standing  -LS     Post Patient Position Sitting  -LS       Row Name 06/06/23 1539          Positioning and Restraints    Pre-Treatment Position in bed  -LS     Post Treatment Position chair  -LS     In Chair notified nsg;sitting;call light within reach;encouraged to call for assist;exit alarm on  -LS               User Key  (r) = Recorded By, (t) = Taken By, (c) = Cosigned By      Initials Name Provider Type    Saulo Huff, TABITHA Occupational Therapist                   Outcome Measures       Row Name 06/06/23 1546          How much help from another is currently needed...    Putting on and taking off regular lower body clothing? 3  -LS     Bathing (including washing, rinsing, and drying) 3  -LS     Toileting (which includes using toilet bed pan or urinal) 3  -LS     Putting on and taking off regular upper body clothing 4  -LS     Taking care of personal grooming (such as brushing teeth) 4  -LS     Eating meals 4  -LS     AM-PAC 6 Clicks Score (OT) 21  -LS       Row Name 06/06/23 1415          How much help from another person do you currently need...    Turning from your back to your side while in flat bed without using bedrails? 4  -NS     Moving from lying on back to sitting on the side of a flat bed without bedrails? 4  -NS     Moving to and from a bed to a chair (including a wheelchair)? 3  -NS     Standing up from a chair using your arms (e.g., wheelchair, bedside chair)? 3  -NS     Climbing 3-5 steps with a railing? 3  -NS     To walk in  hospital room? 3  -NS     AM-PAC 6 Clicks Score (PT) 20  -NS     Highest level of mobility 6 --> Walked 10 steps or more  -NS       Row Name 06/06/23 1546 06/06/23 1415       Functional Assessment    Outcome Measure Options AM-PAC 6 Clicks Daily Activity (OT)  -LS AM-PAC 6 Clicks Basic Mobility (PT)  -NS              User Key  (r) = Recorded By, (t) = Taken By, (c) = Cosigned By      Initials Name Provider Type    LS Saulo Villa OT Occupational Therapist    Lee Ann Valentin, REBEKAH Physical Therapist                    Occupational Therapy Education       Title: PT OT SLP Therapies (In Progress)       Topic: Occupational Therapy (Done)       Point: ADL training (Done)       Description:   Instruct learner(s) on proper safety adaptation and remediation techniques during self care or transfers.   Instruct in proper use of assistive devices.                  Learning Progress Summary             Patient Acceptance, E,TB, VU by  at 6/6/2023 1546                                         User Key       Initials Effective Dates Name Provider Type Discipline     09/22/22 -  Saulo Villa OT Occupational Therapist OT                  OT Recommendation and Plan  Planned Therapy Interventions (OT): activity tolerance training, BADL retraining, functional balance retraining, IADL retraining, occupation/activity based interventions, ROM/therapeutic exercise, strengthening exercise, transfer/mobility retraining  Therapy Frequency (OT): 3 times/wk  Plan of Care Review  Plan of Care Reviewed With: patient  Outcome Evaluation: 37 y.o. male who presented to [facility] on 6/5/2023 after undergoing a paracentesis here with Dr. Calderon.  Patient was found to have abnormal labs and was advised to present to the ED. PMHx includes anxiety, bipolar, GERD, sleep apnea, alcoholic cirrhosis and chronic pain. At baseline, pt lives with his spouse in a single level home with flat entry. He is typically independent using a  straight cane, requiring occassional assist with bathing/dressing. He does report a history of several falls. Today, patient requires Min A for lower body dressing, otherwise setup for ADLs. SBA to come to EOB and stand, and CGA for ambulation. OT feels pt will be safe to d/c home, recommending HHOT. WIll follow.     Time Calculation:    Time Calculation- OT       Row Name 06/06/23 1547             Time Calculation- OT    OT Start Time 1045  -LS      OT Stop Time 1105  -LS      OT Time Calculation (min) 20 min  -LS      OT Received On 06/06/23  -LS      OT - Next Appointment 06/08/23  -LS      OT Goal Re-Cert Due Date 06/20/23  -         Untimed Charges    OT Eval/Re-eval Minutes 20  -LS         Total Minutes    Untimed Charges Total Minutes 20  -LS       Total Minutes 20  -LS                User Key  (r) = Recorded By, (t) = Taken By, (c) = Cosigned By      Initials Name Provider Type     Saulo Villa OT Occupational Therapist                  Therapy Charges for Today       Code Description Service Date Service Provider Modifiers Qty    [id number]  OT EVAL LOW COMPLEXITY 4 6/6/2023 Saulo Villa OT GO 1                 Saulo Villa OT  6/6/2023    Electronically signed by Saulo Villa OT at 06/06/23 1547       Saulo Villa OT at 06/06/23 1547          Goal Outcome Evaluation:  Plan of Care Reviewed With: patient           Outcome Evaluation: 37 y.o. male who presented to [facility] on [facility]/5/2023 after undergoing a paracentesis here with Dr. Calderon.  Patient was found to have abnormal labs and was advised to present to the ED. PMHx includes anxiety, bipolar, GERD, sleep apnea, alcoholic cirrhosis and chronic pain. At baseline, pt lives with his spouse in a single level home with flat entry. He is typically independent using a straight cane, requiring occassional assist with bathing/dressing. He does report a history of several falls. Today, patient requires Min A for lower body dressing,  otherwise setup for ADLs. SBA to come to EOB and stand, and CGA for ambulation. OT feels pt will be safe to d/c home, recommending HHOT. WIll follow.           Electronically signed by Saulo Villa OT at 06/06/23 4532

## 2023-06-07 NOTE — CASE MANAGEMENT/SOCIAL WORK
Continued Stay Note   Nathan     Patient Name: Daquan Xiong  MRN: [id number]  Today's Date: 6/7/2023    Admit Date: 6/5/2023    Plan: Anticipate routine home with spouse and [place] [facility] ([facility]) (referral pending acceptance, order per MD).   Discharge Plan       Row Name 06/07/23 1332       Plan    Plan Anticipate routine home with spouse and [facility] [facility] ([facility]) (referral pending acceptance, order per MD).    Plan Comments DC orders noted. CM contacted [facility], [facility] who declined and reported that they did not have any availability. CM sent new referral to [facility] ([facility]). Called [phone] and received fax number [phone] to send referral.                  Leelee Chopra RN     Office Phone: [phone]  Office Cell: [phone]

## 2023-06-07 NOTE — PLAN OF CARE
Goal Outcome Evaluation:              Outcome Evaluation: Pt resting well in bed overnight on RA. Hypotensive overnight, on call MD aware. 500cc bolus given with increased BP, but MAP remain <65. C/o pain in back, hip, and legs. PRN roxicodone given when MAP >60 consistently for 1 hour to prevent pt from leaving AMA. NS infusing at 75. Na result at 129 on am labs, almost at baseline for pt. Pt hopeful to be discharged today but understanding of BP concerns.

## 2023-06-07 NOTE — PLAN OF CARE
Problem: Adult Inpatient Plan of Care  Goal: Plan of Care Review  Outcome: Met  Goal: Patient-Specific Goal (Individualized)  Outcome: Met  Goal: Absence of Hospital-Acquired Illness or Injury  Outcome: Met  Intervention: Identify and Manage Fall Risk  Recent Flowsheet Documentation  Taken 6/7/2023 1000 by Jailene Kirby, RN  Safety Promotion/Fall Prevention:   safety round/check completed   clutter free environment maintained   assistive device/personal items within reach  Taken 6/7/2023 0800 by Jailene Kirby RN  Safety Promotion/Fall Prevention:   safety round/check completed   clutter free environment maintained   assistive device/personal items within reach  Intervention: Prevent Infection  Recent Flowsheet Documentation  Taken 6/7/2023 1000 by Jailene Kirby RN  Infection Prevention: personal protective equipment utilized  Taken 6/7/2023 0800 by Jailene Kirby RN  Infection Prevention: personal protective equipment utilized  Goal: Optimal Comfort and Wellbeing  Outcome: Met  Intervention: Provide Person-Centered Care  Recent Flowsheet Documentation  Taken 6/7/2023 0800 by Jailene Kirby RN  Trust Relationship/Rapport:   choices provided   care explained   questions encouraged   reassurance provided   thoughts/feelings acknowledged  Goal: Readiness for Transition of Care  Outcome: Met   Goal Outcome Evaluation:

## 2023-06-08 NOTE — DISCHARGE SUMMARY
[facility] Medicine Services  DISCHARGE SUMMARY    Patient Name: Daquan Xiong  : 1985  MRN: [id number]    Date of Admission: 2023  Discharge Diagnosis: Alcoholic cirrhosis/hyponatremia  Date of Discharge:  23  Primary Care Physician: Wolf Carvalho MD      Presenting Problem:   Hyponatremia [E87.1]  Weakness [R53.1]  URIEL (acute kidney injury) [N17.9]    Active and Resolved Hospital Problems:  Active Hospital Problems    Diagnosis POA    **Hyponatremia [E87.1] Yes    Tobacco user [Z72.0] Yes    Anxiety [F41.9] Yes    GERD (gastroesophageal reflux disease) [K21.9] Yes    Low back pain [M54.50] Yes    Cirrhosis of liver [K74.60] Yes      Resolved Hospital Problems   No resolved problems to display.     Alcoholic cirrhosis  Status post paracentesis 23 per patient  Ammonia 46  AST 58  ALT 31  Continue Xifaxan, lactulose, nadolol per nephrology     Acute kidney injury  Creatinine 2.82>2.6  BUN 34  EGFR 28.5  Avoid nephrotoxic medications and IV contrast dye and possible  Renal ultrasound and PVR ordered  Nephrology consulted-URIEL/ARF is secondary to prerenal state/intravascular volume depletion with concomitant Aldactone/Lasix use and ATN from hypotension, avoid hypotension and hydrate with normal saline per nephrology note     Hyponatremia  EKG showed normal sinus rhythm  Sodium 120  Hold Lasix, Aldactone, nicotine patch  Seizure fall precautions ordered  Telemetry ordered  Neurochecks ordered  Hyponatremia may be an acute exacerbation of chronic hyponatremia. Chronic hyponatremia is likely secondary to ESLD and acute exacerbation is clinically hypotonic and hypovolemic secondary to intravascular volume depletion with lasix and aldactone use, per nephrology note.  Check urine and serum studies to further assess, P.o. fluid restriction at 1500 cc/day, follow serial sodium levels, per nephrology note     Anemia  Hemoglobin 11.2  Hematocrit 33.4  No overt signs of  bleeding  Iron studies and SPEP ordered by nephrology  Continue to monitor with labs     Hypotension  Midodrine and IV fluids ordered by nephrology  BP currently 100/48  Continue to monitor     Chronic back pain  Oxycodone held by nephrology  Pain medications and Ativan to be held until sodium is above 127 to avoid MS changes that could mimic hyponatremia related symptoms, per nephrology note     Anxiety  Xanax held by nephrology  Pain medications and Ativan to be held until sodium is above 127 to avoid MS changes that could mimic hyponatremia related symptoms, per nephrology note     GERD  Continue home Protonix     Tobacco use  Encourage tobacco cessation      :     Hospital Course     Hospital Course:  Daquan Xiong is a 37 y.o. male who presented to [facility] on 6/5/2023 after undergoing a paracentesis here with Dr. Calderon.  Patient was found to have abnormal labs and was advised to present to the ED.  Patient sodium was 120 today.  Patient states he was recently at [facility].  Last Thursday Dr. Calderon told him to present to an ED and he went to [facility] for evaluation of his abnormal labs.  Patient states that [facility] his sodium got as low as 116 and he was placed in the ICU, per his report.  Patient states he signed himself out on Saturday AMA.  Patient presented here today for paracentesis with Dr. Calderon.  Patient complains of fatigue, decreased appetite, abdominal pain, nausea, decreased urine volume, dysuria, and weakness for the past 2 weeks.  Patient states his abdominal pain is localized across his rib cage and he describes it as a burning pain.  Patient states the abdominal pain is chronic for him but it has gotten more intense in the past 2 weeks.  Patient states he has chronic back pain for which he sees pain management.  Patient denies drinking any alcohol.  Patient states he used to smoke marijuana but quit when he started seeing pain management.  Patient states he currently smokes  approximately 8 cigarettes/day.  Patient states he has dark urine but denies any blood in his stool or dark stool.     In the ED,  All vitals stable on admission except BP 94/49. All labs unremarkable except sodium 120, chloride 85, BUN 34, creatinine 2.82 EGFR 28.5 glucose 132 phosphorus 4.9 alkaline phosphatase 121 total protein 5.6 AST 58 total bilirubin 2.5 PTT 36.9 hemoglobin 11.2, hematocrit 33.4, platelets 120.  Urinalysis shows sakina color, trace ketones, trace protein, 1+ bilirubin. Patient received sodium bicarbonate injection and IV fluids in ED. Hospitalist was contacted to admit patient for further care and management.         6/6/23 doing better today, blood pressure 94/34.  Afebrile, discussed with RN.  Patient requesting his pain meds.  Sodium 126.  6/7/23 doing better today, BP low this am  DW RN, will give fluid bolus, patient very stressed to go home.  Sepsis, leave AMA.  Will discharge patient home.  Condition at discharge stable.      DISCHARGE Follow Up Recommendations for labs and diagnostics: Follow with PCP in a week.      Reasons For Change In Medications and Indications for New Medications:      Day of Discharge     Vital Signs:       Physical Exam HENT:      Head: Normocephalic.      Mouth/Throat:      Mouth: Mucous membranes are dry.   Eyes:      Extraocular Movements: Extraocular movements intact.      Pupils: Pupils are equal, round, and reactive to light.   Cardiovascular:      Rate and Rhythm: Normal rate and regular rhythm.   Pulmonary:      Effort: Pulmonary effort is normal.      Breath sounds: Normal breath sounds.   Abdominal:      General: Bowel sounds are normal.      Palpations: Abdomen is soft.      Tenderness: There is no abdominal tenderness.      Comments: Dressing in place over paracentesis site   Musculoskeletal:         General: Normal range of motion.   Skin:     General: Skin is warm and dry.   Neurological:      Mental Status: He is alert and oriented to person,  place, and time.   Psychiatric:         Mood and Affect: Mood normal.      Pertinent  and/or Most Recent Results     LAB RESULTS:      Lab 06/06/23 2258 06/05/23 2236 06/05/23 1526 06/05/23  1116   WBC 8.30 8.50 8.00 9.70   HEMOGLOBIN 11.3* 11.7* 11.2* 12.5*   HEMATOCRIT 32.0* 33.5* 33.4* 36.7*   PLATELETS 117* 124* 120* 144   NEUTROS ABS  --   --  4.80  --    LYMPHS ABS  --   --  1.90  --    MONOS ABS  --   --  1.00*  --    EOS ABS  --   --  0.20  --    .5* 104.6* 105.4* 106.2*   LACTATE  --  1.3  --   --    PROTIME  --   --   --  12.4*   APTT  --   --  36.9*  --          Lab 06/06/23 2258 06/06/23 0953 06/05/23 2237 06/05/23 2236 06/05/23 1526 06/05/23  1116   SODIUM 129* 126*  --  127* 120* 120*   POTASSIUM 4.8 4.9  --  5.3* 4.7 5.2   CHLORIDE 96* 91*  --  91* 85* 85*   CO2 22.0 20.0*  --  28.0 22.0 20.0*   ANION GAP 11.0 15.0  --  8.0 13.0 15.0   BUN 43* 41*  --  39* 34* 35*   CREATININE 2.60* 2.60*  --  3.00* 2.82* 2.82*   EGFR 31.4* 31.4*  --  26.4* 28.5* 28.5*   GLUCOSE 114* 93  --  114* 132* 108*   CALCIUM 8.5* 9.8  --  8.6 9.3 9.4   IONIZED CALCIUM  --   --  1.18*  --   --   --    MAGNESIUM 2.2 2.3  --  2.2 2.1  --    PHOSPHORUS 4.9*  --   --  6.1* 4.9*  --    TSH  --   --   --   --  2.120  --          Lab 06/06/23 0953 06/05/23 2237 06/05/23 2236 06/05/23  1526 06/05/23  1116   TOTAL PROTEIN 5.7*  --  4.8* 5.6* 5.8*   ALBUMIN 4.1 2.9 3.5 4.0 3.8   GLOBULIN 1.6  --  1.3 1.6 2.0   ALT (SGPT) 31  --  28 31 39   AST (SGOT) 63*  --  61* 58* 81*   BILIRUBIN 2.8*  --  1.8* 2.5* 2.9*   ALK PHOS 121*  --  105 121* 162*         Lab 06/05/23  1116   PROTIME 12.4*   INR 1.17*             Lab 06/06/23  0953 06/05/23  1526   IRON  --  60   FOLATE 12.20  --    VITAMIN B 12 1,487*  --          Brief Urine Lab Results  (Last result in the past 365 days)        Color   Clarity   Blood   Leuk Est   Nitrite   Protein   CREAT   Urine HCG        06/05/23 1533 Shyla  Comment: Result checked     Clear    Negative   Negative   Negative   Trace                 Microbiology Results (last 10 days)       Procedure Component Value - Date/Time    Eosinophil Smear - Urine, Urine, Clean Catch [[id number]]  (Normal) Collected: 06/05/23 1533    Lab Status: Final result Specimen: Urine, Clean Catch Updated: 06/05/23 1954     Eosinophil Smear 0 % EOS/100 Cells     Legionella Antigen, Urine - Urine, Urine, Clean Catch [[id number]]  (Normal) Collected: 06/05/23 1533    Lab Status: Final result Specimen: Urine, Clean Catch Updated: 06/05/23 1911     LEGIONELLA ANTIGEN, URINE Negative            US Renal Bilateral    Result Date: 6/5/2023  Impression: Impression: No hydronephrosis. Ascites. Hepatic cirrhosis. Electronically Signed: Leona Mcdonnell  6/5/2023 6:35 PM EDT  Workstation ID: [id number]    Therapeutic Paracentesis With Radiology    Result Date: 5/30/2023  Impression: Impression: Technically successful US Guided Paracentesis. Please see Ambulatory nursing notes for intra-procedure vitals and information. Electronically Signed: Ismael Ramirez  5/30/2023 4:27 PM EDT  Workstation ID: [id number]    Therapeutic Paracentesis With Radiology    Result Date: 5/17/2023  Impression: Impression: Technically successful US Guided Paracentesis. Please see Ambulatory nursing notes for intra-procedure vitals and information. Electronically Signed: Ismael Ramirez  5/17/2023 4:17 PM EDT  Workstation ID: [id number]                 Labs Pending at Discharge:      Procedures Performed           Consults:   Consults       Date and Time Order Name Status Description    6/5/2023  4:04 PM Nephrology (on -call MD unless specified) Completed               Discharge Details        Discharge Medications        New Medications        Instructions Start Date   allopurinol 100 MG tablet  Commonly known as: ZYLOPRIM   100 mg, Oral, Daily             Continue These Medications        Instructions Start Date   ALPRAZolam 1 MG tablet  Commonly known as: XANAX   1 mg, Oral, 2  Times Daily PRN      cyclobenzaprine 10 MG tablet  Commonly known as: FLEXERIL   10 mg, Oral, Nightly      lactulose 10 GM/15ML solution  Commonly known as: CHRONULAC   20 g, Oral, 3 Times Daily PRN      melatonin 5 MG tablet tablet   5 mg, Oral, Nightly PRN      midodrine 5 MG tablet  Commonly known as: PROAMATINE   5 mg, Oral, 3 Times Daily PRN      nadolol 40 MG tablet  Commonly known as: CORGARD   40 mg, Oral, Daily PRN      nicotine 14 MG/24HR patch  Commonly known as: NICODERM CQ   1 patch, Transdermal, Daily PRN      nicotine polacrilex 2 MG gum  Commonly known as: NICORETTE   2 mg, Mouth/Throat, As Needed      ondansetron ODT 4 MG disintegrating tablet  Commonly known as: ZOFRAN-ODT   4 mg, Translingual, Every 8 Hours PRN      oxyCODONE 5 MG immediate release tablet  Commonly known as: ROXICODONE   5 mg, Oral, Every 4 Hours PRN      pantoprazole 40 MG EC tablet  Commonly known as: PROTONIX   40 mg, Oral, Daily      riFAXIMin 550 MG tablet  Commonly known as: XIFAXAN   550 mg, Oral, 2 Times Daily             Stop These Medications      furosemide 40 MG tablet  Commonly known as: LASIX     spironolactone 50 MG tablet  Commonly known as: ALDACTONE              Allergies   Allergen Reactions    Bee Venom Anaphylaxis    Scopolamine Anaphylaxis         Discharge Disposition:   Home or Self Care    Diet:  Hospital:No active diet order        Discharge Activity:   Activity Instructions       Gradually Increase Activity Until at Pre-Hospitalization Level                CODE STATUS:  Code Status and Medical Interventions:   Ordered at: 06/05/23 1806     Level Of Support Discussed With:    Patient     Code Status (Patient has no pulse and is not breathing):    CPR (Attempt to Resuscitate)     Medical Interventions (Patient has pulse or is breathing):    Full Support     Release to patient:    Routine Release     I have utilized all available, immediate resources to obtain, update, or review the patient's current  medications including all prescriptions, over-the-counter products, herbals, cannabis/cannabidiol products, and vitamin.mineral/dietary (nutritional) supplements.      Level Of Support Discussed With: Patient  Code Status (Patient has no pulse and is not breathing): CPR (Attempt to Resuscitate)  Medical Interventions (Patient has pulse or is breathing): Full Support  Release to patient: Routine Release    Next of kin of Power of :         Admission Status:  I believe this patient meets admit status.                Future Appointments   Date Time Provider Department Center   6/16/2023 11:15 AM ROOM 09 - [facility] None   6/28/2023 11:15 AM ROOM 09 - [facility]  [facility] None   7/7/2023 11:15 AM ROOM 09 - [facility] None       Additional Instructions for the Follow-ups that You Need to Schedule       Ambulatory Referral to Home Health ([facility])   As directed      Face to Face Visit Date: 6/8/2023    Follow-up provider for Plan of Care?: I treated the patient in an acute care facility and will not continue treatment after discharge.    Follow-up provider: WOLF CARVALHO [[id number]]    Reason/Clinical Findings: weakness    Describe mobility limitations that make leaving home difficult: weakness, chronic pain    Nursing/Therapeutic Services Requested: Physical Therapy Occupational Therapy    Frequency: 1 Week 1         Discharge Follow-up with PCP   As directed       Currently Documented PCP:    Wolf Carvalho MD    PCP Phone Number:    [phone]     Follow Up Details: 1 week         Discharge Follow-up with Specified Provider: GI; 2 Weeks   As directed      To: GI    Follow Up: 2 Weeks         Discharge Follow-up with Specified Provider: renal; 1 Week   As directed      To: renal    Follow Up: 1 Week         Basic Metabolic Panel    Jun 14, 2023 (Approximate)      Release to patient: Routine Release                 Time spent on Discharge including face to face  service:  34 minutes    This patient has been examined wearing appropriate Personal Protective Equipment and discussed with  rn . 06/08/23      Signature: Electronically signed by Luis Enrique Byrne MD, 06/08/23, 6:17 PM EDT.

## 2023-06-08 NOTE — CASE MANAGEMENT/SOCIAL WORK
Continued Stay Note  [facility]     Patient Name: Daquan Xiong  MRN: [id number]  Today's Date: 6/8/2023    Admit Date: 6/5/2023    Plan: Anticipate routine home with spouse and [facility] ([facility]) (referral pending acceptance, order per MD).   Discharge Plan       Row Name 06/08/23 1509       Plan    Plan Comments CM contacted [facility]-unable to accept due to staffing. CM made referrals to [facility] (OON with insurance), [facility] (does not cover patient's area) and  [facility] (patient not home bound, unable to accept). CM contacted patient by phone. Patient states he will follow up with nephrology and GI about possible IVFs at home through infusion company at follow up appts. Denies any d/c needs at this time.    Final Discharge Disposition Code 01 - home or self-care    Final Note Home with family                      Expected Discharge Date and Time       Expected Discharge Date Expected Discharge Time    Jun 7, 2023           Case Management Discharge Note      Final Note: Home with family    Provided Post Acute Provider List?: Yes  Post Acute Provider List: Home Health  Provided Post Acute Provider Quality & Resource List?: Yes  Post Acute Provider Quality and Resource List: Home Health  Delivered To: Patient  Method of Delivery: In person    Selected Continued Care - Discharged on 6/7/2023 Admission date: 6/5/2023 - Discharge disposition: Home or Self Care         Transportation Services  Private: Car    Final Discharge Disposition Code: 01 - home or self-care  Phone communication or documentation only - no physical contact with patient or family.   ADAM MilliganN, [place] [address]    Office: [phone]  Fax: [phone]

## 2023-06-08 NOTE — DISCHARGE PLACEMENT REQUEST
"New home health referral. Please let me know if you can accept. D/c'd 6/7/23.    Thanks!  BLADE Milligan, RN    [id number] [place] [address]    Office: [phone]  Fax: [phone]          Amish Xiong (37 y.o. Male)       Date of Birth   1985    Social Security Number       Address   [address] [place] [address]    Home Phone   [phone]    MRN   [id number]       [facility]ain   [facility]    Marital Status                               Admission Date   6/5/23    Admission Type   Emergency    Admitting Provider   Luis Enrique Byrne MD    Attending Provider       Department, Room/Bed   [facility] PROGRESS CARE, 2115/1       Discharge Date   6/7/2023    Discharge Disposition   Home or Self Care    Discharge Destination                                 Attending Provider: (none)   Allergies: Bee Venom, Scopolamine    Isolation: None   Infection: None   Code Status: Prior    Ht: 185.4 cm (73\")   Wt: 75.5 kg (166 lb 7.2 oz)    Admission Cmt: None   Principal Problem: Hyponatremia [E87.1]                   Active Insurance as of 6/5/2023       Primary Coverage       Payor Plan Insurance Group Employer/Plan Group    ANTHEM BLUE CROSS ANTHEM BLUE CROSS BLUE SHIELD PPO [id number]       Payor Plan Address Payor Plan Phone Number Payor Plan Fax Number Effective Dates    [address] [phone]  1/1/2023 - None Entered    [facility] [address]         Subscriber Name Subscriber Birth Date Member ID       ANA XIONG 3/25/1986 [id number]               Secondary Coverage       Payor Plan Insurance Group Employer/Plan Group    ANTHEM MEDICAID [facility] -ANTHEM [id number]       Payor Plan Address Payor Plan Phone Number Payor Plan Fax Number Effective Dates    MAIL STOP:   7/1/2022 - None Entered    [address] [address]       [facility] [address]         Subscriber Name Subscriber Birth Date Member ID       AMISH XIONG 1985 " [id number]                     Emergency Contacts        (Rel.) Home Phone Work Phone Mobile Phone    Lana Xiong (Spouse) [phone] -- [phone]              Referral Orders (last 24 hours) (24h ago, onward)       Start     Ordered    23 0000  Ambulatory Referral to Home Health (Hospital)        Question Answer Comment   Face to Face Visit Date: 2023    Follow-up provider for Plan of Care? I treated the patient in an acute care facility and will not continue treatment after discharge.    Follow-up provider: VIRAL HERNANDEZ    Reason/Clinical Findings weakness    Describe mobility limitations that make leaving home difficult: weakness, chronic pain    Nursing/Therapeutic Services Requested Physical Therapy    Nursing/Therapeutic Services Requested Occupational Therapy    Frequency: 1 Week 1        23 1211                     Physical Therapy Notes (most recent note)        Lee Ann Bernardo, PT at 23 1105  Version 1 of 1         Patient Name: Daquan Xiong  : 1985    MRN: [id number]                              Today's Date: 2023       Admit Date: 2023    Visit Dx:     ICD-10-CM ICD-9-CM   1. Hyponatremia  E87.1 276.1   2. URIEL (acute kidney injury)  N17.9 584.9   3. Weakness  R53.1 780.79     Patient Active Problem List   Diagnosis    Elevated liver enzymes    Anxiety    Bipolar affective disorder    GERD (gastroesophageal reflux disease)    Low back pain    Osteoarthritis    Tobacco use disorder    Cirrhosis of liver    Polysubstance abuse    BASILIO (obstructive sleep apnea)    Tobacco user    Hyponatremia     Past Medical History:   Diagnosis Date    Alcohol abuse     1/2-1 pint a day, sober since 2022    Arthritis     Hypertension     Sleep apnea      Past Surgical History:   Procedure Laterality Date    BACK SURGERY      WRIST SURGERY        General Information       Row Name 23 1329          Physical Therapy Time and Intention    Document Type  evaluation  -NS     Mode of Treatment co-treatment;physical therapy  -NS       Row Name 06/06/23 1329          General Information    Patient Profile Reviewed yes  -NS     Prior Level of Function independent:  with use of straight cane; occassionally requiring assist with bathing/dressing  -NS     Existing Precautions/Restrictions fall  -NS     Barriers to Rehab medically complex  -NS       Row Name 06/06/23 1329          Living Environment    People in Home spouse  -NS       Row Name 06/06/23 1329          Home Main Entrance    Number of Stairs, Main Entrance none  -NS       Row Name 06/06/23 1329          Stairs Within Home, Primary    Number of Stairs, Within Home, Primary none  -NS       Row Name 06/06/23 1329          Cognition    Orientation Status (Cognition) oriented x 4  -NS       Row Name 06/06/23 1329          Safety Issues, Functional Mobility    Impairments Affecting Function (Mobility) balance;endurance/activity tolerance;strength;pain  -NS               User Key  (r) = Recorded By, (t) = Taken By, (c) = Cosigned By      Initials Name Provider Type    Lee Ann Valentin PT Physical Therapist                   Mobility       Row Name 06/06/23 1330          Bed Mobility    Bed Mobility supine-sit  -NS     Supine-Sit [place] (Bed Mobility) standby assist  -NS     Assistive Device (Bed Mobility) head of bed elevated;bed rails  -NS       Row Name 06/06/23 1330          Sit-Stand Transfer    Sit-Stand [place] (Transfers) contact guard  -NS     Assistive Device (Sit-Stand Transfers) cane, straight  -NS       Row Name 06/06/23 1330          Gait/Stairs (Locomotion)    [place] Level (Gait) contact guard  -NS     Assistive Device (Gait) cane, straight  -NS     Distance in Feet (Gait) 120  -NS     Deviations/Abnormal Patterns (Gait) gait speed decreased  -NS               User Key  (r) = Recorded By, (t) = Taken By, (c) = Cosigned By      Initials Name Provider Type    Lee Ann Valentin, PT  Physical Therapist                   Obj/Interventions       Row Name 06/06/23 1331          Range of Motion Comprehensive    General Range of Motion no range of motion deficits identified  -NS       Row Name 06/06/23 1331          Strength Comprehensive (MMT)    Comment, General Manual Muscle Testing (MMT) Assessment BLE grossly 4-/5  -NS       Row Name 06/06/23 1331          Balance    Balance Assessment sitting static balance;sitting dynamic balance;sit to stand dynamic balance;standing static balance;standing dynamic balance  -NS     Static Sitting Balance independent  -NS     Dynamic Sitting Balance independent  -NS     Position, Sitting Balance unsupported;sitting edge of bed  -NS     Sit to Stand Dynamic Balance contact guard  -NS     Static Standing Balance standby assist  -NS     Dynamic Standing Balance contact guard  -NS     Position/Device Used, Standing Balance supported;cane, straight  -NS               User Key  (r) = Recorded By, (t) = Taken By, (c) = Cosigned By      Initials Name Provider Type    Lee Ann Valentin, PT Physical Therapist                   Goals/Plan       Row Name 06/06/23 1414          Bed Mobility Goal 1 (PT)    Activity/Assistive Device (Bed Mobility Goal 1, PT) bed mobility activities, all  -NS     [place] Level/Cues Needed (Bed Mobility Goal 1, PT) independent  -NS     Time Frame (Bed Mobility Goal 1, PT) long term goal (LTG);2 weeks  -NS       Row Name 06/06/23 1414          Transfer Goal 1 (PT)    Activity/Assistive Device (Transfer Goal 1, PT) transfers, all;cane, straight  -NS     [place] Level/Cues Needed (Transfer Goal 1, PT) modified independence  -NS     Time Frame (Transfer Goal 1, PT) long term goal (LTG);2 weeks  -NS       Row Name 06/06/23 1414          Gait Training Goal 1 (PT)    Activity/Assistive Device (Gait Training Goal 1, PT) gait (walking locomotion);cane, straight  -NS     [place] Level (Gait Training Goal 1, PT) modified independence   "-NS     Distance (Gait Training Goal 1, PT) 200  -NS     Time Frame (Gait Training Goal 1, PT) long term goal (LTG);2 weeks  -NS       Row Name 06/06/23 1414          Therapy Assessment/Plan (PT)    Planned Therapy Interventions (PT) balance training;bed mobility training;gait training;home exercise program;patient/family education;strengthening;transfer training  -NS               User Key  (r) = Recorded By, (t) = Taken By, (c) = Cosigned By      Initials Name Provider Type    Lee Ann Valentin, PT Physical Therapist                   Clinical Impression       Row Name 06/06/23 1331          Pain    Pretreatment Pain Rating 6/10  -NS     Posttreatment Pain Rating 6/10  -NS     Pre/Posttreatment Pain Comment \"all over\"  -NS     Pain Intervention(s) Distraction;Ambulation/increased activity;Repositioned  -NS       Row Name 06/06/23 8561          Plan of Care Review    Plan of Care Reviewed With patient  -NS     Outcome Evaluation Pt is a 36 y/o male who presented after undergoing a paracentesis here with Dr. Camilo and found to be hyponatremic. He c/o fatigue, decreased appetite, abdominal pain, nausea, decreased urine volume, dysuria, and weakness. PMHx includes anxiety, bipolar, GERD, sleep apnea, alcoholic cirrhosis and chronic pain. At baseline, pt lives with his spouse in a single level home with flat entry. He is typically independent using a straight cane, requiring occassional assist with bathing/dressing. He does report a history of several falls. Currently, pt appears to be functioning near baseline. He demonstrates decreased strength but is transferring and ambulating greater than a household distance with CGA and use of RW. Pt may benefit from home health PT at d/c. Will continue to follow and progress as tolerated.  -NS       Row Name 06/06/23 4537          Therapy Assessment/Plan (PT)    Criteria for Skilled Interventions Met (PT) yes;meets criteria;skilled treatment is necessary  -NS     Therapy " Frequency (PT) 3 times/wk  -NS     Predicted Duration of Therapy Intervention (PT) until d/c  -NS       Row Name 06/06/23 1331          Vital Signs    Pre Patient Position Supine  -NS     Post Patient Position Sitting  -NS       Row Name 06/06/23 1331          Positioning and Restraints    Pre-Treatment Position in bed  -NS     Post Treatment Position chair  -NS     In Chair reclined;sitting;call light within reach;encouraged to call for assist;exit alarm on  -NS               User Key  (r) = Recorded By, (t) = Taken By, (c) = Cosigned By      Initials Name Provider Type    Lee Ann Valentin PT Physical Therapist                   Outcome Measures       Row Name 06/06/23 1415          How much help from another person do you currently need...    Turning from your back to your side while in flat bed without using bedrails? 4  -NS     Moving from lying on back to sitting on the side of a flat bed without bedrails? 4  -NS     Moving to and from a bed to a chair (including a wheelchair)? 3  -NS     Standing up from a chair using your arms (e.g., wheelchair, bedside chair)? 3  -NS     Climbing 3-5 steps with a railing? 3  -NS     To walk in hospital room? 3  -NS     AM-PAC 6 Clicks Score (PT) 20  -NS     Highest level of mobility 6 --> Walked 10 steps or more  -NS       Row Name 06/06/23 1415          Functional Assessment    Outcome Measure Options AM-PAC 6 Clicks Basic Mobility (PT)  -NS               User Key  (r) = Recorded By, (t) = Taken By, (c) = Cosigned By      Initials Name Provider Type    Lee Ann Valentin PT Physical Therapist                                 Physical Therapy Education       Title: PT OT SLP Therapies (In Progress)       Topic: Physical Therapy (In Progress)       Point: Mobility training (Done)       Learning Progress Summary             Patient Acceptance, E, VU by NS at 6/6/2023 1415                         Point: Home exercise program (Not Started)       Learner Progress:  Not  documented in this visit.              Point: Body mechanics (Not Started)       Learner Progress:  Not documented in this visit.              Point: Precautions (Not Started)       Learner Progress:  Not documented in this visit.                              User Key       Initials Effective Dates Name Provider Type Discipline    NS 06/30/22 -  Lee Ann Bernardo PT Physical Therapist PT                  PT Recommendation and Plan  Planned Therapy Interventions (PT): balance training, bed mobility training, gait training, home exercise program, patient/family education, strengthening, transfer training  Plan of Care Reviewed With: patient  Outcome Evaluation: Pt is a 38 y/o male who presented after undergoing a paracentesis here with Dr. Camilo and found to be hyponatremic. He c/o fatigue, decreased appetite, abdominal pain, nausea, decreased urine volume, dysuria, and weakness. PMHx includes anxiety, bipolar, GERD, sleep apnea, alcoholic cirrhosis and chronic pain. At baseline, pt lives with his spouse in a single level home with flat entry. He is typically independent using a straight cane, requiring occassional assist with bathing/dressing. He does report a history of several falls. Currently, pt appears to be functioning near baseline. He demonstrates decreased strength but is transferring and ambulating greater than a household distance with CGA and use of RW. Pt may benefit from home health PT at d/c. Will continue to follow and progress as tolerated.     Time Calculation:    PT Charges       Row Name 06/06/23 1415             Time Calculation    Start Time 1045  -NS      Stop Time 1105  -NS      Time Calculation (min) 20 min  -NS      PT Received On 06/06/23  -NS      PT - Next Appointment 06/08/23  -NS      PT Goal Re-Cert Due Date 06/20/23  -NS         Time Calculation- PT    Total Timed Code Minutes- PT 0 minute(s)  -NS                User Key  (r) = Recorded By, (t) = Taken By, (c) = Cosigned By       Initials Name Provider Type    NS Lee Ann Bernardo, REBEKAH Physical Therapist                  Therapy Charges for Today       Code Description Service Date Service Provider Modifiers Qty    57204100571 HC PT EVAL LOW COMPLEXITY 3 2023 Lee Ann Bernardo, PT GP 1            PT G-Codes  Outcome Measure Options: AM-PAC 6 Clicks Basic Mobility (PT)  AM-PAC 6 Clicks Score (PT): 20  PT Discharge Summary  Anticipated Discharge Disposition (PT): home with assist, home with home health    Lee Ann Bernardo PT  2023      Electronically signed by Lee Ann Bernardo PT at 23 1416       Luis Enrique Byrne MD   Physician  Hospitalist     Progress Notes     Signed     Date of Service: 23  Creation Time: 23     Signed       Expand All Collapse All         [facility] Medicine Services Daily Progress Note     Patient Name: Daquan Xiong  : 1985  MRN: [id number]  Primary Care Physician:  Wolf Carvalho MD  Date of admission: 2023        Subjective       Chief Complaint: Abnormal labs     History of Present Illness: Daquan Xiong is a 37 y.o. male who presented to [facility] on 2023 after undergoing a paracentesis here with Dr. Calderon.  Patient was found to have abnormal labs and was advised to present to the ED.  Patient sodium was 120 today.  Patient states he was recently at [facility].  Last Thursday Dr. Calderon told him to present to an ED and he went to [facility] for evaluation of his abnormal labs.  Patient states that [facility] his sodium got as low as 116 and he was placed in the ICU, per his report.  Patient states he signed himself out on .  Patient presented here today for paracentesis with Dr. Calderon.  Patient complains of fatigue, decreased appetite, abdominal pain, nausea, decreased urine volume, dysuria, and weakness for the past 2 weeks.  Patient states his abdominal pain is localized across his rib cage and he describes it as a  burning pain.  Patient states the abdominal pain is chronic for him but it has gotten more intense in the past 2 weeks.  Patient states he has chronic back pain for which he sees pain management.  Patient denies drinking any alcohol.  Patient states he used to smoke marijuana but quit when he started seeing pain management.  Patient states he currently smokes approximately 8 cigarettes/day.  Patient states he has dark urine but denies any blood in his stool or dark stool.     In the ED,  All vitals stable on admission except BP 94/49. All labs unremarkable except sodium 120, chloride 85, BUN 34, creatinine 2.82 EGFR 28.5 glucose 132 phosphorus 4.9 alkaline phosphatase 121 total protein 5.6 AST 58 total bilirubin 2.5 PTT 36.9 hemoglobin 11.2, hematocrit 33.4, platelets 120.  Urinalysis shows sakina color, trace ketones, trace protein, 1+ bilirubin. Patient received sodium bicarbonate injection and IV fluids in ED. Hospitalist was contacted to admit patient for further care and management.         6/6/23 doing better today, blood pressure 94/34.  Afebrile, discussed with RN.  Patient requesting his pain meds.  Sodium 126.  6/7/23 doing better today, BP low this am  DW RN, will give fluid bolus     ROS 12 point ROS reviewed and negative except as mentioned above.           Objective       Vitals:   Temp:  [97.4 °F (36.3 °C)-98.5 °F (36.9 °C)] 98.1 °F (36.7 °C)  Heart Rate:  [48-72] 62  Resp:  [14-18] 18  BP: ()/(25-49) 85/43     Physical Exam itals and nursing note reviewed.   HENT:      Head: Normocephalic.      Mouth/Throat:      Mouth: Mucous membranes are dry.   Eyes:      Extraocular Movements: Extraocular movements intact.      Pupils: Pupils are equal, round, and reactive to light.   Cardiovascular:      Rate and Rhythm: Normal rate and regular rhythm.   Pulmonary:      Effort: Pulmonary effort is normal.      Breath sounds: Normal breath sounds.   Abdominal:      General: Bowel sounds are normal.       Palpations: Abdomen is soft.      Tenderness: There is no abdominal tenderness.      Comments: Dressing in place over paracentesis site   Musculoskeletal:         General: Normal range of motion.   Skin:     General: Skin is warm and dry.   Neurological:      Mental Status: He is alert and oriented to person, place, and time.   Psychiatric:         Mood and Affect: Mood normal.                      Result Review       Result Review:  I have personally reviewed the results from the time of this admission to 6/7/2023 08:17 EDT and agree with these findings:  []  Laboratory  []  Microbiology  []  Radiology  []  EKG/Telemetry   []  Cardiology/Vascular   []  Pathology  []  Old records  []  Other:  Most notable findings include:      Wounds (last 24 hours)         LDA Wound         Row Name 06/07/23 0400 06/07/23 0000 06/06/23 2000             Rash 06/05/23 1958 Left distal thigh     Rash - Properties Group Placement Date: 06/05/23 -AS Placement Time: 1958  -AS Side: Left  -AS Orientation: distal  -AS Location: thigh  -AS     Distribution localized  -AS localized  -AS localized  -AS     Color red;pink  -AS red;pink  -AS red;pink  -AS     Characteristics dry;itching  -AS dry;itching  -AS dry;itching  -AS     Retired Rash - Properties Group Placement Date: 06/05/23  -AS Placement Time: 1958 -AS Side: Left  -AS Orientation: distal  -AS Location: thigh  -AS     Retired Rash - Properties Group Date first assessed: 06/05/23  -AS Time first assessed: 1958  -AS Side: Left  -AS Orientation: distal  -AS Location: thigh  -AS             Rash 06/05/23 1958 Right distal thigh     Rash - Properties Group Placement Date: 06/05/23  -AS Placement Time: 1958  -AS Side: Right  -AS Orientation: distal  -AS Location: thigh  -AS     Distribution localized  -AS localized  -AS localized  -AS     Color red;pink  -AS red;pink  -AS red;pink  -AS     Characteristics dry;itching  -AS dry;itching  -AS dry;itching  -AS     Retired Rash - Properties  Group Placement Date: 06/05/23  -AS Placement Time: 1958  -AS Side: Right  -AS Orientation: distal  -AS Location: thigh  -AS     Retired Rash - Properties Group Date first assessed: 06/05/23  -AS Time first assessed: 1958  -AS Side: Right  -AS Orientation: distal  -AS Location: thigh  -AS                  User Key  (r) = Recorded By, (t) = Taken By, (c) = Cosigned By        Initials Name Provider Type     AS Leilani Martinez RN Registered Nurse                          CBC:             Lab 06/06/23 2258 06/05/23 2236 06/05/23 1526 06/05/23  1116   WBC 8.30 8.50 8.00 9.70   HEMOGLOBIN 11.3* 11.7* 11.2* 12.5*   HEMATOCRIT 32.0* 33.5* 33.4* 36.7*   PLATELETS 117* 124* 120* 144   NEUTROS ABS  --   --  4.80  --    LYMPHS ABS  --   --  1.90  --    MONOS ABS  --   --  1.00*  --    EOS ABS  --   --  0.20  --    .5* 104.6* 105.4* 106.2*         CMP:                  Lab 06/06/23 2258 06/06/23  0953 06/05/23 2237 06/05/23 2236 06/05/23 1526 06/05/23  1116   SODIUM 129* 126*  --  127* 120* 120*   POTASSIUM 4.8 4.9  --  5.3* 4.7 5.2   CHLORIDE 96* 91*  --  91* 85* 85*   CO2 22.0 20.0*  --  28.0 22.0 20.0*   ANION GAP 11.0 15.0  --  8.0 13.0 15.0   BUN 43* 41*  --  39* 34* 35*   CREATININE 2.60* 2.60*  --  3.00* 2.82* 2.82*   EGFR 31.4* 31.4*  --  26.4* 28.5* 28.5*   GLUCOSE 114* 93  --  114* 132* 108*   CALCIUM 8.5* 9.8  --  8.6 9.3 9.4   IONIZED CALCIUM  --   --  1.18*  --   --   --    MAGNESIUM 2.2 2.3  --  2.2 2.1  --    PHOSPHORUS 4.9*  --   --  6.1* 4.9*  --    TOTAL PROTEIN  --  5.7*  --  4.8* 5.6* 5.8*   ALBUMIN  --  4.1  --  3.5 4.0 3.8   GLOBULIN  --  1.6  --  1.3 1.6 2.0   ALT (SGPT)  --  31  --  28 31 39   AST (SGOT)  --  63*  --  61* 58* 81*   BILIRUBIN  --  2.8*  --  1.8* 2.5* 2.9*   ALK PHOS  --  121*  --  105 121* 162*         No results found for: ACANTHNAEG, AFBCX, BPERTUSSISCX, BLOODCX  No results found for: BCIDPCR, CXREFLEX, CSFCX, CULTURETIS  No results found for: CULTURES, HSVCX, URCX  No  results found for: EYECULTURE, GCCX, HSVCULTURE, LABHSV  No results found for: LEGIONELLA, MRSACX, MUMPSCX, MYCOPLASCX  No results found for: NOCARDIACX, STOOLCX  No results found for: THROATCX, UNSTIMCULT, URINECX, CULTURE, VZVCULTUR  No results found for: VIRALCULTU, WOUNDCX        Assessment & Plan       Brief Patient Summary:  Daquan Xiong is a 37 y.o. male who         albumin human, 25 g, Intravenous, Once  allopurinol, 100 mg, Oral, Daily  calcium gluconate, 1 g, Intravenous, Once  lactulose, 20 g, Oral, TID  midodrine, 10 mg, Oral, TID AC  nadolol, 20 mg, Oral, Q24H  pantoprazole, 40 mg, Oral, Daily  pharmacy, 1 each, Does not apply, Once  riFAXIMin, 550 mg, Oral, BID  sodium bicarbonate, 650 mg, Oral, BID        hold, 1 each  sodium chloride, 75 mL/hr, Last Rate: 75 mL/hr (06/07/23 0548)           Active Hospital Problems:       Active Hospital Problems     Diagnosis      **Hyponatremia      Tobacco user      Anxiety      GERD (gastroesophageal reflux disease)      Low back pain      Cirrhosis of liver        Alcoholic cirrhosis  Status post paracentesis 6/4/23 per patient  Ammonia 46  AST 58  ALT 31  Continue Xifaxan, lactulose, nadolol per nephrology     Acute kidney injury  Creatinine 2.82>2.6  BUN 34  EGFR 28.5  Avoid nephrotoxic medications and IV contrast dye and possible  Renal ultrasound and PVR ordered  Nephrology consulted-URIEL/ARF is secondary to prerenal state/intravascular volume depletion with concomitant Aldactone/Lasix use and ATN from hypotension, avoid hypotension and hydrate with normal saline per nephrology note     Hyponatremia  EKG showed normal sinus rhythm  Sodium 120  Hold Lasix, Aldactone, nicotine patch  Seizure fall precautions ordered  Telemetry ordered  Neurochecks ordered  Hyponatremia may be an acute exacerbation of chronic hyponatremia. Chronic hyponatremia is likely secondary to ESLD and acute exacerbation is clinically hypotonic and hypovolemic secondary to intravascular  volume depletion with lasix and aldactone use, per nephrology note.  Check urine and serum studies to further assess, P.o. fluid restriction at 1500 cc/day, follow serial sodium levels, per nephrology note     Anemia  Hemoglobin 11.2  Hematocrit 33.4  No overt signs of bleeding  Iron studies and SPEP ordered by nephrology  Continue to monitor with labs     Hypotension  Midodrine and IV fluids ordered by nephrology  BP currently 100/48  Continue to monitor     Chronic back pain  Oxycodone held by nephrology  Pain medications and Ativan to be held until sodium is above 127 to avoid MS changes that could mimic hyponatremia related symptoms, per nephrology note     Anxiety  Xanax held by nephrology  Pain medications and Ativan to be held until sodium is above 127 to avoid MS changes that could mimic hyponatremia related symptoms, per nephrology note     GERD  Continue home Protonix     Tobacco use  Encourage tobacco cessation      :         DVT prophylaxis:  Mechanical DVT prophylaxis orders are present.     CODE STATUS:    Level Of Support Discussed With: Patient  Code Status (Patient has no pulse and is not breathing): CPR (Attempt to Resuscitate)  Medical Interventions (Patient has pulse or is breathing): Full Support  Release to patient: Routine Release        Disposition:  I expect patient to be discharged nh.     I have utilized all available, immediate resources to obtain, update, or review the patient's current medications including all prescriptions, over-the-counter products, herbals, cannabis/cannabidiol products, and vitamin.mineral/dietary (nutritional) supplements.        Level Of Support Discussed With: Patient  Code Status (Patient has no pulse and is not breathing): CPR (Attempt to Resuscitate)  Medical Interventions (Patient has pulse or is breathing): Full Support  Release to patient: Routine Release           Admission Status:  I believe this patient meets admit status.                    This  patient has been examined wearing appropriate Personal Protective Equipment and discussed with  rn . 06/07/23        Electronically signed by Luis Enrique Byrne MD, 06/07/23, 08:17 EDT.  [facility] Hospitalist Team

## 2023-06-14 DIAGNOSIS — K70.30 ALCOHOLIC CIRRHOSIS, UNSPECIFIED WHETHER ASCITES PRESENT: Primary | ICD-10-CM

## 2023-06-14 DIAGNOSIS — R18.8 OTHER ASCITES: ICD-10-CM

## 2023-06-14 RX ORDER — ALBUMIN (HUMAN) 12.5 G/50ML
25 SOLUTION INTRAVENOUS DAILY PRN
Status: CANCELLED | OUTPATIENT
Start: 2023-06-14

## 2023-06-14 RX ORDER — ALBUMIN (HUMAN) 12.5 G/50ML
12.5 SOLUTION INTRAVENOUS DAILY PRN
Status: CANCELLED | OUTPATIENT
Start: 2023-06-14

## 2023-06-14 RX ORDER — LIDOCAINE HYDROCHLORIDE 20 MG/ML
10 INJECTION, SOLUTION INFILTRATION; PERINEURAL AS NEEDED
Status: CANCELLED | OUTPATIENT
Start: 2023-06-14

## 2023-06-14 RX ORDER — LIDOCAINE HYDROCHLORIDE 10 MG/ML
10 INJECTION, SOLUTION INFILTRATION; PERINEURAL AS NEEDED
OUTPATIENT
Start: 2023-06-14

## 2023-06-16 ENCOUNTER — HOSPITAL ENCOUNTER (OUTPATIENT)
Dept: INFUSION THERAPY | Facility: HOSPITAL | Age: 38
Discharge: HOME | End: 2023-06-16
Payer: COMMERCIAL

## 2023-06-16 VITALS
OXYGEN SATURATION: 100 % | RESPIRATION RATE: 11 BRPM | SYSTOLIC BLOOD PRESSURE: 89 MMHG | HEART RATE: 66 BPM | DIASTOLIC BLOOD PRESSURE: 39 MMHG

## 2023-06-16 DIAGNOSIS — K70.30 ALCOHOLIC CIRRHOSIS, UNSPECIFIED WHETHER ASCITES PRESENT: ICD-10-CM

## 2023-06-16 DIAGNOSIS — R18.8 OTHER ASCITES: ICD-10-CM

## 2023-06-16 PROCEDURE — P9047 ALBUMIN (HUMAN), 25%, 50ML: HCPCS | Performed by: NURSE PRACTITIONER

## 2023-06-16 PROCEDURE — 96365 THER/PROPH/DIAG IV INF INIT: CPT

## 2023-06-16 PROCEDURE — 76942 ECHO GUIDE FOR BIOPSY: CPT

## 2023-06-16 PROCEDURE — 25010000002 ALBUMIN HUMAN 25% PER 50 ML: Performed by: NURSE PRACTITIONER

## 2023-06-16 RX ORDER — LIDOCAINE HYDROCHLORIDE 20 MG/ML
10 INJECTION, SOLUTION INFILTRATION; PERINEURAL AS NEEDED
Status: DISCONTINUED | OUTPATIENT
Start: 2023-06-16 | End: 2023-06-18 | Stop reason: HOSPADM

## 2023-06-16 RX ORDER — ALBUMIN (HUMAN) 12.5 G/50ML
25 SOLUTION INTRAVENOUS DAILY PRN
Status: DISCONTINUED | OUTPATIENT
Start: 2023-06-16 | End: 2023-06-18 | Stop reason: HOSPADM

## 2023-06-16 RX ORDER — ALBUMIN (HUMAN) 12.5 G/50ML
12.5 SOLUTION INTRAVENOUS DAILY PRN
Status: DISCONTINUED | OUTPATIENT
Start: 2023-06-16 | End: 2023-06-18 | Stop reason: HOSPADM

## 2023-06-16 RX ADMIN — ALBUMIN (HUMAN) 25 G: 0.25 INJECTION, SOLUTION INTRAVENOUS at 12:51

## 2023-06-16 RX ADMIN — LIDOCAINE HYDROCHLORIDE 10 ML: 20 INJECTION, SOLUTION INFILTRATION; PERINEURAL at 12:30

## 2023-06-16 NOTE — PROGRESS NOTES
Patient refused last 25mg Albumin. Patient's BP was low and per Dr. Scott, pull drain at 7 liters. Patient was educated and had

## 2023-06-23 ENCOUNTER — INPATIENT HOSPITAL (OUTPATIENT)
Dept: URBAN - METROPOLITAN AREA HOSPITAL 84 | Facility: HOSPITAL | Age: 38
End: 2023-06-23
Payer: COMMERCIAL

## 2023-06-23 DIAGNOSIS — K70.10 ALCOHOLIC HEPATITIS WITHOUT ASCITES: ICD-10-CM

## 2023-06-23 PROBLEM — E87.1 HYPONATREMIA: Status: ACTIVE | Noted: 2023-06-02

## 2023-06-23 PROBLEM — K81.9 CHOLECYSTITIS: Status: ACTIVE | Noted: 2022-11-08

## 2023-06-23 PROBLEM — K92.1 HEMATOCHEZIA: Status: ACTIVE | Noted: 2017-09-05

## 2023-06-23 PROBLEM — N17.9 ACUTE RENAL FAILURE, UNSPECIFIED ACUTE RENAL FAILURE TYPE: Status: ACTIVE | Noted: 2023-06-23

## 2023-06-23 PROBLEM — K59.09 CHRONIC CONSTIPATION: Status: ACTIVE | Noted: 2017-09-05

## 2023-06-23 PROBLEM — K70.30 ALCOHOLIC CIRRHOSIS: Chronic | Status: ACTIVE | Noted: 2022-09-01

## 2023-06-23 PROBLEM — A41.9 SEVERE SEPSIS: Status: ACTIVE | Noted: 2022-11-08

## 2023-06-23 PROBLEM — R65.20 SEVERE SEPSIS: Status: ACTIVE | Noted: 2022-11-08

## 2023-06-23 PROBLEM — K70.30 ALCOHOLIC CIRRHOSIS: Status: ACTIVE | Noted: 2022-09-01

## 2023-06-23 PROCEDURE — 99222 1ST HOSP IP/OBS MODERATE 55: CPT

## 2023-06-24 ENCOUNTER — INPATIENT HOSPITAL (OUTPATIENT)
Dept: URBAN - METROPOLITAN AREA HOSPITAL 84 | Facility: HOSPITAL | Age: 38
End: 2023-06-24
Payer: COMMERCIAL

## 2023-06-24 DIAGNOSIS — K70.10 ALCOHOLIC HEPATITIS WITHOUT ASCITES: ICD-10-CM

## 2023-06-24 DIAGNOSIS — R53.83 OTHER FATIGUE: ICD-10-CM

## 2023-06-24 DIAGNOSIS — R94.5 ABNORMAL RESULTS OF LIVER FUNCTION STUDIES: ICD-10-CM

## 2023-06-24 DIAGNOSIS — I85.00 ESOPHAGEAL VARICES WITHOUT BLEEDING: ICD-10-CM

## 2023-06-24 DIAGNOSIS — K70.31 ALCOHOLIC CIRRHOSIS OF LIVER WITH ASCITES: ICD-10-CM

## 2023-06-24 PROCEDURE — 99232 SBSQ HOSP IP/OBS MODERATE 35: CPT | Performed by: NURSE PRACTITIONER

## 2023-06-25 PROCEDURE — 99232 SBSQ HOSP IP/OBS MODERATE 35: CPT | Performed by: NURSE PRACTITIONER

## 2023-06-26 ENCOUNTER — INPATIENT HOSPITAL (OUTPATIENT)
Dept: URBAN - METROPOLITAN AREA HOSPITAL 84 | Facility: HOSPITAL | Age: 38
End: 2023-06-26
Payer: COMMERCIAL

## 2023-06-26 DIAGNOSIS — K70.10 ALCOHOLIC HEPATITIS WITHOUT ASCITES: ICD-10-CM

## 2023-06-26 PROCEDURE — 99233 SBSQ HOSP IP/OBS HIGH 50: CPT | Performed by: NURSE PRACTITIONER

## 2023-06-27 ENCOUNTER — INPATIENT HOSPITAL (OUTPATIENT)
Dept: URBAN - METROPOLITAN AREA HOSPITAL 84 | Facility: HOSPITAL | Age: 38
End: 2023-06-27
Payer: COMMERCIAL

## 2023-06-27 DIAGNOSIS — K70.11 ALCOHOLIC HEPATITIS WITH ASCITES: ICD-10-CM

## 2023-06-27 DIAGNOSIS — I85.00 ESOPHAGEAL VARICES WITHOUT BLEEDING: ICD-10-CM

## 2023-06-27 DIAGNOSIS — R74.01 ELEVATION OF LEVELS OF LIVER TRANSAMINASE LEVELS: ICD-10-CM

## 2023-06-27 DIAGNOSIS — K70.31 ALCOHOLIC CIRRHOSIS OF LIVER WITH ASCITES: ICD-10-CM

## 2023-06-27 DIAGNOSIS — F10.10 ALCOHOL ABUSE, UNCOMPLICATED: ICD-10-CM

## 2023-06-27 PROBLEM — E43 SEVERE MALNUTRITION: Status: ACTIVE | Noted: 2023-06-27

## 2023-06-27 PROCEDURE — 99233 SBSQ HOSP IP/OBS HIGH 50: CPT | Performed by: NURSE PRACTITIONER

## 2023-06-28 ENCOUNTER — INPATIENT HOSPITAL (OUTPATIENT)
Dept: URBAN - METROPOLITAN AREA HOSPITAL 84 | Facility: HOSPITAL | Age: 38
End: 2023-06-28
Payer: COMMERCIAL

## 2023-06-28 DIAGNOSIS — I85.00 ESOPHAGEAL VARICES WITHOUT BLEEDING: ICD-10-CM

## 2023-06-28 DIAGNOSIS — F10.10 ALCOHOL ABUSE, UNCOMPLICATED: ICD-10-CM

## 2023-06-28 DIAGNOSIS — K70.31 ALCOHOLIC CIRRHOSIS OF LIVER WITH ASCITES: ICD-10-CM

## 2023-06-28 DIAGNOSIS — R74.01 ELEVATION OF LEVELS OF LIVER TRANSAMINASE LEVELS: ICD-10-CM

## 2023-06-28 DIAGNOSIS — K70.11 ALCOHOLIC HEPATITIS WITH ASCITES: ICD-10-CM

## 2023-06-28 PROCEDURE — 99233 SBSQ HOSP IP/OBS HIGH 50: CPT | Performed by: NURSE PRACTITIONER

## 2023-07-03 ENCOUNTER — APPOINTMENT (OUTPATIENT)
Dept: CT IMAGING | Facility: HOSPITAL | Age: 38
DRG: 441 | End: 2023-07-03
Payer: COMMERCIAL

## 2023-07-03 ENCOUNTER — HOSPITAL ENCOUNTER (INPATIENT)
Facility: HOSPITAL | Age: 38
LOS: 4 days | Discharge: LEFT BEFORE DISCHARGE | DRG: 441 | End: 2023-07-07
Attending: EMERGENCY MEDICINE | Admitting: INTERNAL MEDICINE
Payer: COMMERCIAL

## 2023-07-03 ENCOUNTER — INPATIENT HOSPITAL (OUTPATIENT)
Dept: URBAN - METROPOLITAN AREA HOSPITAL 84 | Facility: HOSPITAL | Age: 38
End: 2023-07-03
Payer: COMMERCIAL

## 2023-07-03 ENCOUNTER — APPOINTMENT (OUTPATIENT)
Dept: GENERAL RADIOLOGY | Facility: HOSPITAL | Age: 38
DRG: 441 | End: 2023-07-03
Payer: COMMERCIAL

## 2023-07-03 DIAGNOSIS — K70.11 ALCOHOLIC HEPATITIS WITH ASCITES: ICD-10-CM

## 2023-07-03 DIAGNOSIS — R94.5 ABNORMAL RESULTS OF LIVER FUNCTION STUDIES: ICD-10-CM

## 2023-07-03 DIAGNOSIS — I85.10 SECONDARY ESOPHAGEAL VARICES WITHOUT BLEEDING: ICD-10-CM

## 2023-07-03 DIAGNOSIS — E87.5 HYPERKALEMIA: ICD-10-CM

## 2023-07-03 DIAGNOSIS — S00.81XA ABRASION OF FACE, INITIAL ENCOUNTER: ICD-10-CM

## 2023-07-03 DIAGNOSIS — K70.31 ASCITES DUE TO ALCOHOLIC CIRRHOSIS: ICD-10-CM

## 2023-07-03 DIAGNOSIS — F10.11 ALCOHOL ABUSE, IN REMISSION: ICD-10-CM

## 2023-07-03 DIAGNOSIS — N17.9 AKI (ACUTE KIDNEY INJURY): ICD-10-CM

## 2023-07-03 DIAGNOSIS — K76.82 HEPATIC ENCEPHALOPATHY: ICD-10-CM

## 2023-07-03 DIAGNOSIS — K70.31 ALCOHOLIC CIRRHOSIS OF LIVER WITH ASCITES: ICD-10-CM

## 2023-07-03 DIAGNOSIS — K76.82 HEPATIC ENCEPHALOPATHY: Primary | ICD-10-CM

## 2023-07-03 DIAGNOSIS — K59.00 CONSTIPATION, UNSPECIFIED: ICD-10-CM
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
## 2023-07-03 PROBLEM — Z72.0 TOBACCO USER: Status: RESOLVED | Noted: 2023-04-28 | Resolved: 2023-07-03

## 2023-07-03 PROBLEM — G47.33 OBSTRUCTIVE SLEEP APNEA: Status: ACTIVE | Noted: 2022-09-22

## 2023-07-03 PROBLEM — K92.1 HEMATOCHEZIA: Status: RESOLVED | Noted: 2017-09-05 | Resolved: 2023-07-03

## 2023-07-03 PROBLEM — I10 ESSENTIAL HYPERTENSION: Chronic | Status: ACTIVE | Noted: 2023-07-03

## 2023-07-03 PROBLEM — K59.09 CHRONIC CONSTIPATION: Chronic | Status: ACTIVE | Noted: 2017-09-05

## 2023-07-03 PROBLEM — R65.20 SEVERE SEPSIS: Status: RESOLVED | Noted: 2022-11-08 | Resolved: 2023-07-03

## 2023-07-03 PROBLEM — F41.9 ANXIETY: Chronic | Status: RESOLVED | Noted: 2022-09-22 | Resolved: 2023-07-03

## 2023-07-03 PROBLEM — A41.9 SEVERE SEPSIS: Status: RESOLVED | Noted: 2022-11-08 | Resolved: 2023-07-03

## 2023-07-03 PROBLEM — K81.9 CHOLECYSTITIS: Status: RESOLVED | Noted: 2022-11-08 | Resolved: 2023-07-03

## 2023-07-03 PROBLEM — E43 SEVERE MALNUTRITION: Chronic | Status: ACTIVE | Noted: 2023-06-27

## 2023-07-03 PROBLEM — E87.1 HYPONATREMIA: Status: RESOLVED | Noted: 2023-06-02 | Resolved: 2023-07-03

## 2023-07-03 PROBLEM — N18.30 CKD (CHRONIC KIDNEY DISEASE) STAGE 3, GFR 30-59 ML/MIN: Chronic | Status: ACTIVE | Noted: 2023-07-03

## 2023-07-03 PROBLEM — R74.8 ELEVATED LIVER ENZYMES: Status: RESOLVED | Noted: 2022-09-20 | Resolved: 2023-07-03

## 2023-07-03 PROBLEM — M54.50 LOW BACK PAIN: Chronic | Status: RESOLVED | Noted: 2022-09-22 | Resolved: 2023-07-03

## 2023-07-03 PROBLEM — K74.60 CIRRHOSIS OF LIVER: Chronic | Status: RESOLVED | Noted: 2022-09-22 | Resolved: 2023-07-03

## 2023-07-03 PROBLEM — K74.60 CIRRHOSIS OF LIVER: Chronic | Status: ACTIVE | Noted: 2022-09-22

## 2023-07-03 LAB
ALBUMIN SERPL-MCNC: 3.3 G/DL (ref 3.5–5.2)
ALBUMIN/GLOB SERPL: 2.1 G/DL
ALP SERPL-CCNC: 153 U/L (ref 39–117)
ALT SERPL W P-5'-P-CCNC: 28 U/L (ref 1–41)
AMMONIA BLD-SCNC: 157 UMOL/L (ref 16–60)
ANION GAP SERPL CALCULATED.3IONS-SCNC: 16 MMOL/L (ref 5–15)
ANISOCYTOSIS BLD QL: ABNORMAL
AST SERPL-CCNC: 66 U/L (ref 1–40)
BACTERIA UR QL AUTO: ABNORMAL /HPF
BASOPHILS # BLD MANUAL: 0.56 10*3/MM3 (ref 0–0.2)
BASOPHILS NFR BLD MANUAL: 7 % (ref 0–1.5)
BILIRUB SERPL-MCNC: 3.7 MG/DL (ref 0–1.2)
BILIRUB UR QL STRIP: ABNORMAL
BUN SERPL-MCNC: 51 MG/DL (ref 6–20)
BUN/CREAT SERPL: 10.7 (ref 7–25)
CALCIUM SPEC-SCNC: 8.7 MG/DL (ref 8.6–10.5)
CHLORIDE SERPL-SCNC: 92 MMOL/L (ref 98–107)
CLARITY UR: CLEAR
CO2 SERPL-SCNC: 25 MMOL/L (ref 22–29)
COLOR UR: ABNORMAL
CREAT SERPL-MCNC: 4.75 MG/DL (ref 0.76–1.27)
D-LACTATE SERPL-SCNC: 2.1 MMOL/L (ref 0.3–2)
D-LACTATE SERPL-SCNC: 2.4 MMOL/L (ref 0.5–2)
DEPRECATED RDW RBC AUTO: 51.6 FL (ref 37–54)
EGFRCR SERPLBLD CKD-EPI 2021: 15.2 ML/MIN/1.73
EOSINOPHIL # BLD MANUAL: 0.64 10*3/MM3 (ref 0–0.4)
EOSINOPHIL NFR BLD MANUAL: 8 % (ref 0.3–6.2)
ERYTHROCYTE [DISTWIDTH] IN BLOOD BY AUTOMATED COUNT: 14.5 % (ref 12.3–15.4)
GLOBULIN UR ELPH-MCNC: 1.6 GM/DL
GLUCOSE SERPL-MCNC: 112 MG/DL (ref 65–99)
GLUCOSE UR STRIP-MCNC: NEGATIVE MG/DL
HCT VFR BLD AUTO: 31.3 % (ref 37.5–51)
HGB BLD-MCNC: 10.5 G/DL (ref 13–17.7)
HGB UR QL STRIP.AUTO: NEGATIVE
HYALINE CASTS UR QL AUTO: ABNORMAL /LPF
KETONES UR QL STRIP: ABNORMAL
LEUKOCYTE ESTERASE UR QL STRIP.AUTO: ABNORMAL
LYMPHOCYTES # BLD MANUAL: 2.88 10*3/MM3 (ref 0.7–3.1)
LYMPHOCYTES NFR BLD MANUAL: 8 % (ref 5–12)
MAGNESIUM SERPL-MCNC: 2.1 MG/DL (ref 1.6–2.6)
MCH RBC QN AUTO: 35.1 PG (ref 26.6–33)
MCHC RBC AUTO-ENTMCNC: 33.6 G/DL (ref 31.5–35.7)
MCV RBC AUTO: 104.5 FL (ref 79–97)
MONOCYTES # BLD: 0.64 10*3/MM3 (ref 0.1–0.9)
MRSA DNA SPEC QL NAA+PROBE: NORMAL
NEUTROPHILS # BLD AUTO: 3.28 10*3/MM3 (ref 1.7–7)
NEUTROPHILS NFR BLD MANUAL: 41 % (ref 42.7–76)
NITRITE UR QL STRIP: POSITIVE
PATHOLOGY REVIEW: YES
PH UR STRIP.AUTO: <=5 [PH] (ref 5–8)
PHOSPHATE SERPL-MCNC: 5.4 MG/DL (ref 2.5–4.5)
PLAT MORPH BLD: NORMAL
PLATELET # BLD AUTO: 158 10*3/MM3 (ref 140–450)
PMV BLD AUTO: 10.1 FL (ref 6–12)
POTASSIUM SERPL-SCNC: 4.3 MMOL/L (ref 3.5–5.2)
PROT SERPL-MCNC: 4.9 G/DL (ref 6–8.5)
PROT UR QL STRIP: ABNORMAL
RBC # BLD AUTO: 2.99 10*6/MM3 (ref 4.14–5.8)
RBC # UR STRIP: ABNORMAL /HPF
REF LAB TEST METHOD: ABNORMAL
SCAN SLIDE: NORMAL
SODIUM SERPL-SCNC: 133 MMOL/L (ref 136–145)
SP GR UR STRIP: 1.02 (ref 1–1.03)
SQUAMOUS #/AREA URNS HPF: ABNORMAL /HPF
TROPONIN T SERPL HS-MCNC: 28 NG/L
UROBILINOGEN UR QL STRIP: ABNORMAL
VARIANT LYMPHS NFR BLD MANUAL: 36 % (ref 19.6–45.3)
WBC # UR STRIP: ABNORMAL /HPF
WBC MORPH BLD: NORMAL
WBC NRBC COR # BLD: 8 10*3/MM3 (ref 3.4–10.8)

## 2023-07-03 PROCEDURE — 85025 COMPLETE CBC W/AUTO DIFF WBC: CPT | Performed by: PHYSICIAN ASSISTANT

## 2023-07-03 PROCEDURE — 70450 CT HEAD/BRAIN W/O DYE: CPT

## 2023-07-03 PROCEDURE — 84484 ASSAY OF TROPONIN QUANT: CPT | Performed by: PHYSICIAN ASSISTANT

## 2023-07-03 PROCEDURE — 25010000002 ONDANSETRON PER 1 MG: Performed by: NURSE PRACTITIONER

## 2023-07-03 PROCEDURE — 25010000002 ALBUMIN HUMAN 25% PER 50 ML

## 2023-07-03 PROCEDURE — 87641 MR-STAPH DNA AMP PROBE: CPT | Performed by: PHYSICIAN ASSISTANT

## 2023-07-03 PROCEDURE — 85007 BL SMEAR W/DIFF WBC COUNT: CPT | Performed by: PHYSICIAN ASSISTANT

## 2023-07-03 PROCEDURE — P9612 CATHETERIZE FOR URINE SPEC: HCPCS

## 2023-07-03 PROCEDURE — 02HV33Z INSERTION OF INFUSION DEVICE INTO SUPERIOR VENA CAVA, PERCUTANEOUS APPROACH: ICD-10-PCS | Performed by: HOSPITALIST

## 2023-07-03 PROCEDURE — 71045 X-RAY EXAM CHEST 1 VIEW: CPT

## 2023-07-03 PROCEDURE — P9047 ALBUMIN (HUMAN), 25%, 50ML: HCPCS

## 2023-07-03 PROCEDURE — 25010000002 CEFTRIAXONE PER 250 MG: Performed by: PHYSICIAN ASSISTANT

## 2023-07-03 PROCEDURE — 82140 ASSAY OF AMMONIA: CPT | Performed by: PHYSICIAN ASSISTANT

## 2023-07-03 PROCEDURE — 93005 ELECTROCARDIOGRAM TRACING: CPT | Performed by: PHYSICIAN ASSISTANT

## 2023-07-03 PROCEDURE — 99233 SBSQ HOSP IP/OBS HIGH 50: CPT

## 2023-07-03 PROCEDURE — 83735 ASSAY OF MAGNESIUM: CPT | Performed by: NURSE PRACTITIONER

## 2023-07-03 PROCEDURE — 81001 URINALYSIS AUTO W/SCOPE: CPT | Performed by: PHYSICIAN ASSISTANT

## 2023-07-03 PROCEDURE — 25010000002 OCTREOTIDE PER 25 MCG: Performed by: INTERNAL MEDICINE

## 2023-07-03 PROCEDURE — C1751 CATH, INF, PER/CENT/MIDLINE: HCPCS

## 2023-07-03 PROCEDURE — 74176 CT ABD & PELVIS W/O CONTRAST: CPT

## 2023-07-03 PROCEDURE — 83605 ASSAY OF LACTIC ACID: CPT

## 2023-07-03 PROCEDURE — 72125 CT NECK SPINE W/O DYE: CPT

## 2023-07-03 PROCEDURE — 99285 EMERGENCY DEPT VISIT HI MDM: CPT

## 2023-07-03 PROCEDURE — 70486 CT MAXILLOFACIAL W/O DYE: CPT

## 2023-07-03 PROCEDURE — 84100 ASSAY OF PHOSPHORUS: CPT | Performed by: NURSE PRACTITIONER

## 2023-07-03 PROCEDURE — 87040 BLOOD CULTURE FOR BACTERIA: CPT | Performed by: PHYSICIAN ASSISTANT

## 2023-07-03 PROCEDURE — 80053 COMPREHEN METABOLIC PANEL: CPT | Performed by: PHYSICIAN ASSISTANT

## 2023-07-03 RX ORDER — SPIRONOLACTONE 25 MG/1
50 TABLET ORAL DAILY
Status: DISCONTINUED | OUTPATIENT
Start: 2023-07-03 | End: 2023-07-03

## 2023-07-03 RX ORDER — SODIUM CHLORIDE 0.9 % (FLUSH) 0.9 %
10 SYRINGE (ML) INJECTION EVERY 12 HOURS SCHEDULED
Status: DISCONTINUED | OUTPATIENT
Start: 2023-07-03 | End: 2023-07-07 | Stop reason: HOSPADM

## 2023-07-03 RX ORDER — DOPAMINE HYDROCHLORIDE 160 MG/100ML
2-20 INJECTION, SOLUTION INTRAVENOUS
Status: DISCONTINUED | OUTPATIENT
Start: 2023-07-03 | End: 2023-07-04

## 2023-07-03 RX ORDER — SODIUM CHLORIDE 0.9 % (FLUSH) 0.9 %
20 SYRINGE (ML) INJECTION AS NEEDED
Status: DISCONTINUED | OUTPATIENT
Start: 2023-07-03 | End: 2023-07-07 | Stop reason: HOSPADM

## 2023-07-03 RX ORDER — ALUMINA, MAGNESIA, AND SIMETHICONE 2400; 2400; 240 MG/30ML; MG/30ML; MG/30ML
15 SUSPENSION ORAL EVERY 6 HOURS PRN
Status: DISCONTINUED | OUTPATIENT
Start: 2023-07-03 | End: 2023-07-03

## 2023-07-03 RX ORDER — LACTULOSE 10 G/15ML
20 SOLUTION ORAL 3 TIMES DAILY
Status: DISCONTINUED | OUTPATIENT
Start: 2023-07-03 | End: 2023-07-03

## 2023-07-03 RX ORDER — NICOTINE 21 MG/24HR
1 PATCH, TRANSDERMAL 24 HOURS TRANSDERMAL EVERY 24 HOURS
Status: DISCONTINUED | OUTPATIENT
Start: 2023-07-03 | End: 2023-07-03

## 2023-07-03 RX ORDER — PANTOPRAZOLE SODIUM 40 MG/10ML
40 INJECTION, POWDER, LYOPHILIZED, FOR SOLUTION INTRAVENOUS
Status: DISCONTINUED | OUTPATIENT
Start: 2023-07-03 | End: 2023-07-04

## 2023-07-03 RX ORDER — POLYETHYLENE GLYCOL 3350 17 G/17G
17 POWDER, FOR SOLUTION ORAL DAILY PRN
Status: DISCONTINUED | OUTPATIENT
Start: 2023-07-03 | End: 2023-07-04

## 2023-07-03 RX ORDER — SODIUM CHLORIDE 0.9 % (FLUSH) 0.9 %
10 SYRINGE (ML) INJECTION AS NEEDED
Status: DISCONTINUED | OUTPATIENT
Start: 2023-07-03 | End: 2023-07-07 | Stop reason: HOSPADM

## 2023-07-03 RX ORDER — NITROGLYCERIN 0.4 MG/1
0.4 TABLET SUBLINGUAL
Status: DISCONTINUED | OUTPATIENT
Start: 2023-07-03 | End: 2023-07-07 | Stop reason: HOSPADM

## 2023-07-03 RX ORDER — BISACODYL 10 MG
10 SUPPOSITORY, RECTAL RECTAL DAILY PRN
Status: DISCONTINUED | OUTPATIENT
Start: 2023-07-03 | End: 2023-07-03

## 2023-07-03 RX ORDER — LACTULOSE 10 G/15ML
30 SOLUTION ORAL EVERY 6 HOURS SCHEDULED
Status: DISCONTINUED | OUTPATIENT
Start: 2023-07-04 | End: 2023-07-05

## 2023-07-03 RX ORDER — ACETAMINOPHEN 650 MG/1
650 SUPPOSITORY RECTAL EVERY 4 HOURS PRN
Status: DISCONTINUED | OUTPATIENT
Start: 2023-07-03 | End: 2023-07-03

## 2023-07-03 RX ORDER — NOREPINEPHRINE BITARTRATE 0.03 MG/ML
.02-.3 INJECTION, SOLUTION INTRAVENOUS
Status: DISCONTINUED | OUTPATIENT
Start: 2023-07-03 | End: 2023-07-04

## 2023-07-03 RX ORDER — PANTOPRAZOLE SODIUM 40 MG/1
40 TABLET, DELAYED RELEASE ORAL DAILY
Status: DISCONTINUED | OUTPATIENT
Start: 2023-07-03 | End: 2023-07-03

## 2023-07-03 RX ORDER — ONDANSETRON 2 MG/ML
4 INJECTION INTRAMUSCULAR; INTRAVENOUS EVERY 6 HOURS PRN
Status: DISCONTINUED | OUTPATIENT
Start: 2023-07-03 | End: 2023-07-07 | Stop reason: HOSPADM

## 2023-07-03 RX ORDER — AMOXICILLIN 250 MG
2 CAPSULE ORAL NIGHTLY PRN
Status: DISCONTINUED | OUTPATIENT
Start: 2023-07-03 | End: 2023-07-04

## 2023-07-03 RX ORDER — AMOXICILLIN 250 MG
2 CAPSULE ORAL 2 TIMES DAILY
Status: DISCONTINUED | OUTPATIENT
Start: 2023-07-03 | End: 2023-07-03

## 2023-07-03 RX ORDER — ALBUMIN (HUMAN) 12.5 G/50ML
SOLUTION INTRAVENOUS
Status: DISPENSED
Start: 2023-07-03 | End: 2023-07-04

## 2023-07-03 RX ORDER — POLYETHYLENE GLYCOL 3350 17 G/17G
17 POWDER, FOR SOLUTION ORAL DAILY PRN
Status: DISCONTINUED | OUTPATIENT
Start: 2023-07-03 | End: 2023-07-03

## 2023-07-03 RX ORDER — SODIUM CHLORIDE 9 MG/ML
40 INJECTION, SOLUTION INTRAVENOUS AS NEEDED
Status: DISCONTINUED | OUTPATIENT
Start: 2023-07-03 | End: 2023-07-07 | Stop reason: HOSPADM

## 2023-07-03 RX ORDER — ACETAMINOPHEN 325 MG/1
650 TABLET ORAL EVERY 4 HOURS PRN
Status: DISCONTINUED | OUTPATIENT
Start: 2023-07-03 | End: 2023-07-03

## 2023-07-03 RX ORDER — ALBUMIN (HUMAN) 12.5 G/50ML
SOLUTION INTRAVENOUS
Status: COMPLETED
Start: 2023-07-03 | End: 2023-07-03

## 2023-07-03 RX ORDER — BISACODYL 5 MG/1
5 TABLET, DELAYED RELEASE ORAL DAILY PRN
Status: DISCONTINUED | OUTPATIENT
Start: 2023-07-03 | End: 2023-07-03

## 2023-07-03 RX ORDER — BISACODYL 10 MG
10 SUPPOSITORY, RECTAL RECTAL DAILY PRN
Status: DISCONTINUED | OUTPATIENT
Start: 2023-07-03 | End: 2023-07-04

## 2023-07-03 RX ORDER — ONDANSETRON 4 MG/1
4 TABLET, FILM COATED ORAL EVERY 6 HOURS PRN
Status: DISCONTINUED | OUTPATIENT
Start: 2023-07-03 | End: 2023-07-07 | Stop reason: HOSPADM

## 2023-07-03 RX ORDER — ACETAMINOPHEN 160 MG/5ML
650 SOLUTION ORAL EVERY 4 HOURS PRN
Status: DISCONTINUED | OUTPATIENT
Start: 2023-07-03 | End: 2023-07-03

## 2023-07-03 RX ORDER — ALLOPURINOL 100 MG/1
100 TABLET ORAL DAILY
Status: DISCONTINUED | OUTPATIENT
Start: 2023-07-03 | End: 2023-07-03

## 2023-07-03 RX ORDER — CHOLECALCIFEROL (VITAMIN D3) 125 MCG
5 CAPSULE ORAL NIGHTLY PRN
Status: DISCONTINUED | OUTPATIENT
Start: 2023-07-03 | End: 2023-07-03

## 2023-07-03 RX ORDER — BISACODYL 5 MG/1
5 TABLET, DELAYED RELEASE ORAL DAILY PRN
Status: DISCONTINUED | OUTPATIENT
Start: 2023-07-03 | End: 2023-07-04

## 2023-07-03 RX ORDER — LACTULOSE 10 G/15ML
300 SOLUTION ORAL ONCE
Status: COMPLETED | OUTPATIENT
Start: 2023-07-03 | End: 2023-07-03

## 2023-07-03 RX ORDER — MIDODRINE HYDROCHLORIDE 5 MG/1
20 TABLET ORAL ONCE
Status: DISCONTINUED | OUTPATIENT
Start: 2023-07-03 | End: 2023-07-03

## 2023-07-03 RX ORDER — OCTREOTIDE ACETATE 100 UG/ML
100 INJECTION, SOLUTION INTRAVENOUS; SUBCUTANEOUS EVERY 8 HOURS SCHEDULED
Status: DISCONTINUED | OUTPATIENT
Start: 2023-07-03 | End: 2023-07-04

## 2023-07-03 RX ORDER — IPRATROPIUM BROMIDE AND ALBUTEROL SULFATE 2.5; .5 MG/3ML; MG/3ML
3 SOLUTION RESPIRATORY (INHALATION) EVERY 6 HOURS PRN
Status: DISCONTINUED | OUTPATIENT
Start: 2023-07-03 | End: 2023-07-07 | Stop reason: HOSPADM

## 2023-07-03 RX ORDER — ZINC SULFATE 50(220)MG
220 CAPSULE ORAL DAILY
Status: DISCONTINUED | OUTPATIENT
Start: 2023-07-04 | End: 2023-07-03

## 2023-07-03 RX ORDER — ALBUMIN (HUMAN) 12.5 G/50ML
50 SOLUTION INTRAVENOUS EVERY MORNING
Status: DISCONTINUED | OUTPATIENT
Start: 2023-07-03 | End: 2023-07-07 | Stop reason: HOSPADM

## 2023-07-03 RX ORDER — MIDODRINE HYDROCHLORIDE 5 MG/1
20 TABLET ORAL
Status: DISCONTINUED | OUTPATIENT
Start: 2023-07-03 | End: 2023-07-03

## 2023-07-03 RX ORDER — MIDODRINE HYDROCHLORIDE 5 MG/1
20 TABLET ORAL EVERY 8 HOURS SCHEDULED
Status: DISCONTINUED | OUTPATIENT
Start: 2023-07-03 | End: 2023-07-07 | Stop reason: HOSPADM

## 2023-07-03 RX ADMIN — SODIUM CHLORIDE 1000 ML: 9 INJECTION, SOLUTION INTRAVENOUS at 15:07

## 2023-07-03 RX ADMIN — ALBUMIN (HUMAN) 50 G: 0.25 INJECTION, SOLUTION INTRAVENOUS at 21:45

## 2023-07-03 RX ADMIN — MIDODRINE HYDROCHLORIDE 20 MG: 5 TABLET ORAL at 22:01

## 2023-07-03 RX ADMIN — RIFAXIMIN 550 MG: 550 TABLET ORAL at 22:56

## 2023-07-03 RX ADMIN — Medication 10 ML: at 22:49

## 2023-07-03 RX ADMIN — LACTULOSE 30 G: 20 SOLUTION ORAL at 23:00

## 2023-07-03 RX ADMIN — PANTOPRAZOLE SODIUM 40 MG: 40 INJECTION, POWDER, LYOPHILIZED, FOR SOLUTION INTRAVENOUS at 22:01

## 2023-07-03 RX ADMIN — CEFTRIAXONE 2 G: 2 INJECTION, POWDER, FOR SOLUTION INTRAMUSCULAR; INTRAVENOUS at 16:09

## 2023-07-03 RX ADMIN — Medication 100 MG: at 22:02

## 2023-07-03 RX ADMIN — ONDANSETRON 4 MG: 2 INJECTION INTRAMUSCULAR; INTRAVENOUS at 23:59

## 2023-07-03 RX ADMIN — LACTULOSE 300 ML: 20 SOLUTION ORAL at 16:22

## 2023-07-03 RX ADMIN — Medication 0.02 MCG/KG/MIN: at 18:35

## 2023-07-03 RX ADMIN — Medication 10 ML: at 22:50

## 2023-07-03 RX ADMIN — OCTREOTIDE ACETATE 100 MCG: 100 INJECTION, SOLUTION INTRAVENOUS; SUBCUTANEOUS at 23:51

## 2023-07-03 NOTE — PROGRESS NOTES
LOS: 0 days   Patient Care Team:  Wolf Carvalho MD as PCP - General (Geriatric Medicine)  Fatou Waldron MD as Consulting Physician (Nephrology)      Subjective   Interval history: Patient was discharged on 6/28 and presented again to [facility] ER today with complaints of altered mental status, ascites, BLE edema, and hypotension.  History is limited secondary to patient's mental status.  He is awake, but is confused and does not provide verbal responses at this time.  Therefore, history was supplemented via chart review and report from bedside RN as well as patient's wife who is at bedside.  Per patient's wife, she is not sure if he has been compliant with lactulose at home.  She states that she encourages him to take lactulose as prescribed, but she is not sure if he is actually taking this.  She also reports that the patient has had recent mood swings and gets angry with her when she tries to encourage medication compliance.  Wife reports he has had no alcohol use since 9/2022.  Patient fell at home last night and has bruising on his face.  CT of the head showed no acute findings.  CT abdomen/pelvis showed large amount of ascites, cirrhosis, and moderate to large stool burden.      ROS:   Review of Systems   Constitutional:  Negative for chills and fever.   Respiratory:  Negative for cough and shortness of breath.    Cardiovascular:  Negative for chest pain and palpitations.   Gastrointestinal:  Positive for abdominal distention. Negative for abdominal pain, nausea and vomiting.   Neurological:  Positive for weakness. Negative for dizziness.   Psychiatric/Behavioral:  Negative for agitation and confusion.       Medication Review:   Scheduled Meds:cefTRIAXone, 2 g, Intravenous, Once  lactulose, 300 mL, Rectal, Once  midodrine, 20 mg, Oral, Once      Continuous Infusions:Pharmacy to dose vancomycin,     PRN Meds:.  Pharmacy to dose vancomycin    [COMPLETED] Insert Peripheral IV **AND**  sodium chloride    [COMPLETED] Insert Peripheral IV **AND** sodium chloride      Objective     Vital Signs  Temp:  [98.3 °F (36.8 °C)] 98.3 °F (36.8 °C)  Heart Rate:  [65-67] 67  Resp:  [20] 20  BP: (88-94)/(30-41) 94/41    Physical Exam:    General Appearance:    Awake, confused   Head:    Normocephalic, without obvious abnormality   Eyes:          Conjunctivae normal, icteric sclera   Ears:    Hearing intact   Throat:   No oral lesions, no thrush, oral mucosa moist   Neck:   No adenopathy, supple, no JVD   Lungs:     Respirations regular, even and unlabored       Abdomen:     Soft, distended with fluid wave, nontender   Rectal:     Deferred   Extremities:   3+ BLE edema, no cyanosis   Skin:   No bleeding, bruising or rash, + jaundice   Neurologic:   Sensation intact        Results Review:    CBC  Results from last 7 days   Lab Units 07/03/23  1504 06/28/23  0954 06/27/23  2247 06/26/23  2344   RBC 10*6/mm3 2.99* 3.21* 3.14* 3.19*   WBC 10*3/mm3 8.00 7.90 9.10 8.90   HEMOGLOBIN g/dL 10.5* 11.2* 11.4* 11.2*   PLATELETS 10*3/mm3 158 148 135* 133*         CMP  Results from last 7 days   Lab Units 07/03/23  1504 06/27/23 2247 06/26/23  2344   SODIUM mmol/L 133* 139 142   POTASSIUM mmol/L 4.3 2.9* 3.1*   CHLORIDE mmol/L 92* 97* 100   CO2 mmol/L 25.0 32.0* 30.0*   BUN mg/dL 51* 22* 25*   CREATININE mg/dL 4.75* 1.57* 1.77*   GLUCOSE mg/dL 112* 109* 115*   ALBUMIN g/dL 3.3* 2.9* 3.0*   BILIRUBIN mg/dL 3.7* 6.1* 3.8*   ALK PHOS U/L 153* 146* 152*   AST (SGOT) U/L 66* 46* 60*   ALT (SGPT) U/L 28 25 32   AMMONIA umol/L 157*  --   --          Amylase and Lipase        CRP         Imaging Results (Last 24 Hours)       Procedure Component Value Units Date/Time    XR Chest 1 View [[id number]] Resulted: 07/03/23 1615     Updated: 07/03/23 1615    CT Head Without Contrast [[id number]] Collected: 07/03/23 1540     Updated: 07/03/23 1547    Narrative:      CT HEAD WO CONTRAST, CT FACIAL BONES WO CONTRAST    Date of Exam: 7/3/2023 3:36  PM EDT    Indication: fall last night with head injury now AMS.    Comparison: None available.    Technique: Axial CT images were obtained of the head without contrast administration.  Coronal reconstructions were performed.  Automated exposure control and iterative reconstruction methods were used.    Findings:  Gray-white differentiation is maintained and there is no evidence of intracranial hemorrhage, mass or mass effect. The ventricles are normal in size and configuration. The orbits are normal. The paranasal sinuses are grossly clear. The calvarium is   intact. Additional dedicated evaluation of the facial bones demonstrates no evidence of acute fracture. The mastoid air cells are clear. Rightward nasal septal deviation is noted the lamina papyracea are intact.      Impression:      Impression:  No acute intracranial abnormality or acute facial bone fracture.      Electronically Signed: True Allen    7/3/2023 3:45 PM EDT    Workstation ID: [id number]    CT Facial Bones Without Contrast [[id number]] Collected: 07/03/23 1540     Updated: 07/03/23 1547    Narrative:      CT HEAD WO CONTRAST, CT FACIAL BONES WO CONTRAST    Date of Exam: 7/3/2023 3:36 PM EDT    Indication: fall last night with head injury now AMS.    Comparison: None available.    Technique: Axial CT images were obtained of the head without contrast administration.  Coronal reconstructions were performed.  Automated exposure control and iterative reconstruction methods were used.    Findings:  Gray-white differentiation is maintained and there is no evidence of intracranial hemorrhage, mass or mass effect. The ventricles are normal in size and configuration. The orbits are normal. The paranasal sinuses are grossly clear. The calvarium is   intact. Additional dedicated evaluation of the facial bones demonstrates no evidence of acute fracture. The mastoid air cells are clear. Rightward nasal septal deviation is noted the lamina papyracea are  intact.      Impression:      Impression:  No acute intracranial abnormality or acute facial bone fracture.      Electronically Signed: True Allen    7/3/2023 3:45 PM EDT    Workstation ID: [id number]    CT Cervical Spine Without Contrast [[id number]] Collected: 07/03/23 1541     Updated: 07/03/23 1546    Narrative:        CT CERVICAL SPINE WO CONTRAST    Date of Exam: 7/3/2023 3:36 PM EDT    Indication: fall last night with AMS.    Comparison: None available.    Technique: Axial CT images were obtained of the cervical spine without contrast administration.  Sagittal and coronal reconstructions were performed.  Automated exposure control and iterative reconstruction methods were used.      Findings:  No evidence of fracture or traumatic subluxation. Mild degenerative disc disease at C5-C6. No prevertebral soft tissue swelling. Minimal fluid in the left mastoid air cells. 1 cm groundglass opacity in the right lung apex      Impression:      Impression:    1. No evidence of cervical spine fracture  2. Minimal left mastoid effusion  3. 1 cm right apical groundglass opacity. Recommend follow-up chest CT in 6      Electronically Signed: Dio Murrell    7/3/2023 3:44 PM EDT    Workstation ID: [id number]    CT Abdomen Pelvis Without Contrast [[id number]] Collected: 07/03/23 1539     Updated: 07/03/23 1544    Narrative:      CT ABDOMEN PELVIS WO CONTRAST    Date of Exam: 7/3/2023 3:36 PM EDT    Indication: abdominal distention.    Comparison: Ultrasound-guided paracentesis 6/23/2023. No prior CT abdomen and pelvis for comparison at this institution. Liver ultrasound 6/25/2023.    Technique: Axial CT images were obtained of the abdomen and pelvis without the administration of contrast. Sagittal and coronal reconstructions were performed.  Automated exposure control and iterative reconstruction methods were used.      Findings:  Large quantity abdominal pelvic ascites is present. There is mild to moderate generalized body wall  edema. Small left, trace right basilar pleural effusions are present with mild posterior bibasilar atelectasis and mild to right middle lobe atelectasis.   There is mild right hemidiaphragm elevation.    The liver is diminutive in size. It demonstrates a subtle surface nodular contour, suggestive of cirrhosis. No focal liver lesion is identified on this noncontrast examination. The spleen size is within normal limits. There is a small esophageal hiatal   hernia which contains ascites fluid.    The gallbladder, pancreas, adrenals, and kidneys demonstrate a normal noncontrast appearance. Moderate to large stool burden is present in the ascending, transverse, and descending colon. The bowel does not appear abnormally dilated or inflamed. The   appendix is normal.    The urinary bladder, prostate, and rectum are normal.    No acute or suspicious osseous abnormalities are identified. There is moderate degenerative disc change at the L4-5 vertebral level.      Impression:      Impression:    1. Large volume abdominal pelvic ascites.  2. Cirrhotic liver morphology.  3. Moderate to large colonic stool burden. Correlate for constipation.  4. Scattered mild bibasilar atelectasis. Small left and trace right pleural effusions are present.            Electronically Signed: Chanda Clark    7/3/2023 3:42 PM EDT    Workstation ID: [id number]              Assessment & Plan     Assessment:  37-year-old male with history of alcoholic cirrhosis complicated by HE, ascites, and esophageal varices discharged on 6/28 and presented again to [facility] ER today with complaints of altered mental status, ascites, BLE edema, and hypotension.      -Hepatic encephalopathy/hyperammonemia  -Alcohol hepatitis and cirrhosis complicated by HE, ascites, and esophageal varices  -Hyponatremia  -Acute renal failure  -Hypotension  -Elevated LFTs  -Hyperkalemia     Plan:  Agree with lactulose enemas and repeat until more alert and oriented.    Restart oral lactulose and Xifaxan once able to tolerate p.o.  We will plan for paracentesis with fluid studies.  Will add zinc.  MELD sodium score 30.  Once he is more alert and oriented, recommend 2 g sodium diet with Boost/Ensure supplements. Encourage his nutrition.  Supportive care.  GI will follow.    Mamie Ayers, MONICA  07/03/23  16:28 EDT

## 2023-07-03 NOTE — H&P
[facility] Medicine Services  History & Physical    Patient Name: Daquan Xiong  : 1985  MRN: [id number]  Primary Care Physician:  Wolf Carvalho MD  Date of admission: 7/3/2023  Date and Time of Service: 2023 at 1730 hrs.    Subjective      Chief Complaint: Altered mental status, BLE edema, status post fall    History of Present Illness: Daquan Xiong is a 37 y.o. male with a past medical history significant for decompensated cirrhosis, hepatic encephalopathy, alcoholic hepatitis presenting to the ED with his wife who is main historian states he has had low blood pressure and altered mental status over the past 2 days. Patient's wife states he was hospitalized and discharged last week with similar complaints she states when he got discharged they went to [place] and his symptoms worsened over the weekend. She states he also fell yesterday when getting out of the car. He states today now he is not able to ambulate well. She reports he has had increased bilateral lower extremity edema now going up into his scrotum and abdomen. She reports he last had a paracentesis on 2023 and is not scheduled for another 1 until 2023. She reports chronic nausea and cough without significant change. No reports of fever or vomiting. No reports of loss of consciousness. Patient's wife states she has been giving him lactulose but she is not sure if he is actually taking it. She is not sure when his last bowel movement was.  Work-up in the ED revealed hyperammonemia 157, no leukocytosis, lactic 2.4, creatinine 4.7, LFTs elevated, he was pan imaged and negative for acute intracranial pathology or any fractures.  Patient was given lactulose and rifaximin.  He was given a 1 L bolus in the ED for hypotension and blood pressures were soft in the low 100s.  Patient was admitted to medicine and and GI was consulted.  Patient seen and examined in ED with family members present.  Patient responsive to verbal  and painful stimuli but lethargic.  Unable to ascertain any complaints or information from patient due to current altered mental status.  Approximately 30 to 45 minutes later I was contacted by his ER bedside nurse stating that he was hypotensive again in the low 80s.  I advised her to give another 500 mL fluid bolus and reassess.  She reached out 15 minutes later stating that his BP has gone even further down into the 70s.  Patient was started on norepinephrine infusion.  Spoke with intensivist MIRI Villarreal and she accepted the patient for ICU level care.  Dr. Vidal was notified.    ROS   Unable to review due to current altered mental status.    Personal History     Past Medical History:   Diagnosis Date    Alcohol abuse     1/2-1 pint a day, sober since 9/12/2022    Alcoholic cirrhosis 09/01/2022    Arthritis     Hypertension     Sleep apnea        Past Surgical History:   Procedure Laterality Date    BACK SURGERY      WRIST SURGERY         Family History: family history includes Cirrhosis in his father; Diabetes in his maternal grandfather, maternal grandmother, paternal grandfather, and paternal grandmother; Heart disease in his maternal grandmother. Otherwise pertinent FHx was reviewed and not pertinent to current issue.    Social History:  reports that he has been smoking cigarettes. He has been smoking an average of .5 packs per day. He has never used smokeless tobacco. He reports that he does not currently use alcohol. He reports that he does not currently use drugs after having used the following drugs: Marijuana.    Home Medications:  Prior to Admission Medications       Prescriptions Last Dose Informant Patient Reported? Taking?    allopurinol (ZYLOPRIM) 100 MG tablet   No No    Take 1 tablet by mouth Daily.    ALPRAZolam (Xanax) 0.5 MG tablet   No No    Take 1 tablet by mouth 2 (Two) Times a Day As Needed for Anxiety for up to 5 days.    baclofen (LIORESAL) 10 MG tablet   Yes No    Take 5-10 mg by mouth 2  (Two) Times a Day As Needed for Muscle Spasms.    baclofen (LIORESAL) 10 MG tablet   Yes No    Take 1 tablet by mouth Daily. With stretching    calcium carbonate (TUMS) 500 MG chewable tablet   No No    Chew 2 tablets Daily for 14 days.    cyclobenzaprine (FLEXERIL) 10 MG tablet   Yes No    Take 5-10 mg by mouth 3 (Three) Times a Day As Needed.    furosemide (LASIX) 40 MG tablet   Yes No    Take 1 tablet by mouth Daily.    lactulose (CHRONULAC) 10 GM/15ML solution   No No    Take 30 mL by mouth 3 (Three) Times a Day for 30 days.    magnesium oxide (MAG-OX) 400 tablet tablet   No No    Take 1 tablet by mouth Daily for 14 days.    melatonin 5 MG tablet tablet   Yes No    Take 1 tablet by mouth At Night As Needed.    midodrine (PROAMATINE) 10 MG tablet   No No    Take 2 tablets (20 mg) by mouth 3 (Three) Times a Day Before Meals    nadolol (CORGARD) 40 MG tablet   Yes No    Take 1 tablet by mouth Daily As Needed.    nicotine (NICODERM CQ) 14 MG/24HR patch   Yes No    Place 1 patch on the skin as directed by provider Daily As Needed.    nicotine polacrilex (NICORETTE) 2 MG gum   Yes No    Chew 1 each As Needed for Smoking Cessation.    ondansetron ODT (ZOFRAN-ODT) 4 MG disintegrating tablet   No No    Place 1 tablet on the tongue Every 8 (Eight) Hours As Needed for Nausea or Vomiting for up to 30 days.    oxyCODONE (ROXICODONE) 10 MG tablet   Yes No    Take 1 tablet by mouth Every 4 (Four) to 6 (Six) Hours As Needed for Moderate Pain.    pantoprazole (PROTONIX) 40 MG EC tablet   Yes No    Take 1 tablet by mouth Daily.    riFAXIMin (XIFAXAN) 550 MG tablet   Yes No    Take 1 tablet by mouth 2 (Two) Times a Day.    sodium bicarbonate 325 MG tablet   Yes No    Take 1 tablet by mouth Daily.    spironolactone (ALDACTONE) 50 MG tablet   Yes No    Take 1 tablet by mouth Daily.    thiamine (VITAMIN B-1) 100 MG tablet   No No    take 1 tablet daily              Allergies:  Allergies   Allergen Reactions    Bee Venom Anaphylaxis     Scopolamine Anaphylaxis       Objective      Vitals:   Temp:  [98.3 °F (36.8 °C)] 98.3 °F (36.8 °C)  Heart Rate:  [56-76] 60  Resp:  [20] 20  BP: ()/(30-52) 72/33    Physical Exam  Vitals reviewed.   Constitutional:       General: He is in acute distress.      Appearance: He is ill-appearing and toxic-appearing.   HENT:      Head: Normocephalic.      Comments: Facial trauma with abrasions and bruising noted.  No crepitus or instability on physical exam.     Right Ear: Tympanic membrane and ear canal normal.      Left Ear: Tympanic membrane and ear canal normal.      Nose: Nose normal.      Mouth/Throat:      Mouth: Mucous membranes are dry.      Pharynx: Oropharynx is clear.   Eyes:      General: Scleral icterus present.      Pupils: Pupils are equal, round, and reactive to light.   Cardiovascular:      Rate and Rhythm: Normal rate and regular rhythm.      Pulses: Normal pulses.      Heart sounds: Normal heart sounds. No murmur heard.  Pulmonary:      Effort: Pulmonary effort is normal.      Breath sounds: Normal breath sounds.   Abdominal:      General: Bowel sounds are normal. There is distension.      Palpations: Abdomen is soft.      Tenderness: There is no abdominal tenderness. There is no guarding or rebound.   Genitourinary:     Comments: Penis and testes with edema  Musculoskeletal:         General: Swelling present.      Cervical back: Normal range of motion and neck supple. No rigidity or tenderness.      Right lower leg: Edema present.      Left lower leg: Edema present.   Skin:     General: Skin is warm and dry.      Capillary Refill: Capillary refill takes less than 2 seconds.      Coloration: Skin is jaundiced.   Neurological:      Mental Status: He is disoriented.      Motor: Weakness present.      Comments: Somnolent and lethargic          Result Review    Result Review:  I have personally reviewed the results from the time of this admission to 7/3/2023 18:33 EDT and agree with these  findings:  [x]  Laboratory  [x]  Microbiology  [x]  Radiology  [x]  EKG/Telemetry   []  Cardiology/Vascular   []  Pathology  []  Old records  []  Other:  Most notable findings include:       Assessment & Plan        Active Hospital Problems:  Active Hospital Problems    Diagnosis     **Hepatic encephalopathy      Plan:   AMS secondary to hepatic encephalopathy from hyperammonemia  -Long history of EtOH abuse  -Ammonia at 157  -CT of the brain negative for acute intracranial pathology  -Lactulose given rectally.  Initiate lactulose and rifaximin p.o. once able to tolerate  -Maintain n.p.o. status until SLP can assess for swallow capability  -MELD sodium score 30    -GI consulted.  Planning on doing a paracentesis in the next 1 to 2 days    Hypotension  -Persistent hypotension not responding to IVF hydration  -Lactic at 2.4  -Norepinephrine infusion initiated  -Intensivist MIRI Villarreal consulted for ICU admission and accepted    Status post ground-level fall  -Pan imaging negative for acute intracranial pathology, cervical/spinal or facial fractures    Acute renal failure  -Creatinine at 4.75  -Refrain from nephrotoxic medications, NSAIDs and IV contrast  -Nephrology consulted    PMH: Hepatic encephalopathy, alcoholic hepatitis and cirrhosis, esophageal varices, HTN  -Resume home meds when able to tolerate p.o. with exception of HTN meds     DVT prophylaxis:  Mechanical DVT prophylaxis orders are present.    CODE STATUS:    Code Status (Patient has no pulse and is not breathing): CPR (Attempt to Resuscitate)  Medical Interventions (Patient has pulse or is breathing): Full Support    Admission Status:  I believe this patient meets inpatient status.    I discussed the patient's findings and my recommendations with patient and family.    This patient has been examined wearing appropriate Personal Protective Equipment and discussed with  patient and nurse . 07/03/23      Signature: Electronically signed by Jose Ruffin Jr,  MONICA, 07/03/23, 18:33 EDT.  [facility] Hospitalist Team

## 2023-07-03 NOTE — Clinical Note
Level of Care: Critical Care [6]   Diagnosis: Hepatic encephalopathy [572.2.ICD-9-CM]   Admitting Physician: TAVO STONER [[id number]]   Attending Physician: TAVO STONER [[id number]]   See HPI (+) Umbilical hernia - bulging, reducible, (+) mild tenderness to palpation

## 2023-07-03 NOTE — ED PROVIDER NOTES
Subjective   History of Present Illness  Patient is a 37-year-old male [facility] significant for Decompensated cirrhosis, hepatic encephalopathy, alcoholic hepatitis presenting to the ED with wife who is main historian with complaints of low blood pressure and altered mental status over the past 2 days.  Patient's wife states he was hospitalized and discharged last week with similar complaints she states when he got discharged they went to [place] and his symptoms worsened over the weekend.  She states he also fell yesterday when getting out of the car.  He states today now he is not able to ambulate well.  She reports he has had increased bilateral lower extremity edema now going up into his scrotum and abdomen.  She reports he last had a paracentesis on 6/23/2023 and is not scheduled for another 1 until 7/7/2023.  She reports chronic nausea and cough without significant change.  No reports of fever or vomiting.  No reports of loss of consciousness.  Patient's wife states she has been giving him lactulose but she is not sure if he is actually taking it.  She is not sure when his last bowel movement was.      History provided by:  Spouse    Review of Systems   Unable to perform ROS: Mental status change     Past Medical History:   Diagnosis Date    Alcohol abuse     1/2-1 pint a day, sober since 9/12/2022    Alcoholic cirrhosis 09/01/2022    Arthritis     Hypertension     Sleep apnea        Allergies   Allergen Reactions    Bee Venom Anaphylaxis    Scopolamine Anaphylaxis       Past Surgical History:   Procedure Laterality Date    BACK SURGERY      WRIST SURGERY         Family History   Problem Relation Age of Onset    Cirrhosis Father     Heart disease Maternal Grandmother     Diabetes Maternal Grandmother     Diabetes Maternal Grandfather     Diabetes Paternal Grandmother     Diabetes Paternal Grandfather        Social History     Socioeconomic History    Marital status:    Tobacco Use    Smoking status: Every  Day     Packs/day: 0.50     Types: Cigarettes    Smokeless tobacco: Never   Vaping Use    Vaping Use: Never used   Substance and Sexual Activity    Alcohol use: Not Currently     Comment: sober since 21    Drug use: Not Currently     Types: Marijuana    Sexual activity: Defer           Objective   Physical Exam  Vitals and nursing note reviewed.   Constitutional:       General: He is not in acute distress.     Appearance: He is well-developed. He is ill-appearing. He is not toxic-appearing or diaphoretic.   HENT:      Head: Normocephalic and atraumatic.      Mouth/Throat:      Mouth: Mucous membranes are moist.      Pharynx: Oropharynx is clear.   Eyes:      General: Scleral icterus present.      Extraocular Movements: Extraocular movements intact.      Pupils: Pupils are equal, round, and reactive to light.   Cardiovascular:      Rate and Rhythm: Normal rate and regular rhythm.      Pulses: Normal pulses.      Heart sounds: No murmur heard.    No friction rub. No gallop.   Pulmonary:      Effort: Pulmonary effort is normal. No tachypnea or accessory muscle usage.      Breath sounds: No decreased breath sounds, wheezing, rhonchi or rales.   Chest:      Chest wall: No mass, deformity, tenderness or crepitus.   Abdominal:      General: Bowel sounds are decreased. There is distension.      Tenderness: There is no abdominal tenderness. There is no guarding or rebound.   Musculoskeletal:      Right lower le+      Left lower le+   Skin:     General: Skin is warm.      Capillary Refill: Capillary refill takes less than 2 seconds.      Findings: No rash.   Neurological:      Mental Status: He is alert.      Comments: Patient will look at me when asked but will not follow any other commands.  No facial droop noted   Psychiatric:         Mood and Affect: Mood normal.         Behavior: Behavior normal.       Procedures           ED Course  ED Course as of 23 1656      4157 Spoke to Regina  "NP with GI [AA]   5166 Spoke to Tai [facility] recommends holding off on vancomycin until MRSA PCR results [AA]      ED Course User Index  [AA] Ivan Oropeza PA      BP 94/41   Pulse 67   Temp 98.3 °F (36.8 °C) (Oral)   Resp 20   Ht 182.9 cm (72\")   Wt 92.8 kg (204 lb 9.4 oz)   SpO2 98%   BMI 27.75 kg/m²   Medications   sodium chloride 0.9 % flush 10 mL (has no administration in time range)   midodrine (PROAMATINE) tablet 20 mg (20 mg Oral Not Given 7/3/23 1503)   sodium chloride 0.9 % flush 10 mL (has no administration in time range)   Pharmacy to dose vancomycin (has no administration in time range)   zinc sulfate (ZINCATE) capsule 220 mg (has no administration in time range)   lactulose (CHRONULAC) 10 GM/15ML solution 20 g (has no administration in time range)   riFAXIMin (XIFAXAN) tablet 550 mg (has no administration in time range)   sodium chloride 0.9 % bolus 1,000 mL (1,000 mL Intravenous New Bag 7/3/23 1507)   lactulose (CHRONULAC) solution for enema 300 mL (300 mL Rectal Given 7/3/23 1622)   cefTRIAXone (ROCEPHIN) 2 g in sodium chloride 0.9 % 100 mL IVPB (2 g Intravenous New Bag 7/3/23 1609)     Labs Reviewed   AMMONIA - Abnormal; Notable for the following components:       Result Value    Ammonia 157 (*)     All other components within normal limits   COMPREHENSIVE METABOLIC PANEL - Abnormal; Notable for the following components:    Glucose 112 (*)     BUN 51 (*)     Creatinine 4.75 (*)     Sodium 133 (*)     Chloride 92 (*)     Total Protein 4.9 (*)     Albumin 3.3 (*)     AST (SGOT) 66 (*)     Alkaline Phosphatase 153 (*)     Total Bilirubin 3.7 (*)     Anion Gap 16.0 (*)     eGFR 15.2 (*)     All other components within normal limits    Narrative:     GFR Normal >60  Chronic Kidney Disease <60  Kidney Failure <15     URINALYSIS W/ CULTURE IF INDICATED - Abnormal; Notable for the following components:    Color, UA Dark Yellow (*)     Ketones, UA Trace (*)     Bilirubin, UA Small (1+) (*)     " Protein, UA 30 mg/dL (1+) (*)     Leuk Esterase, UA Trace (*)     Nitrite, UA Positive (*)     All other components within normal limits    Narrative:     In absence of clinical symptoms, the presence of pyuria, bacteria, and/or nitrites on the urinalysis result does not correlate with infection.   TROPONIN - Abnormal; Notable for the following components:    HS Troponin T 28 (*)     All other components within normal limits    Narrative:     High Sensitive Troponin T Reference Range:  <10.0 ng/L- Negative Female for AMI  <15.0 ng/L- Negative Male for AMI  >=10 - Abnormal Female indicating possible myocardial injury.  >=15 - Abnormal Male indicating possible myocardial injury.   Clinicians would have to utilize clinical acumen, EKG, Troponin, and serial changes to determine if it is an Acute Myocardial Infarction or myocardial injury due to an underlying chronic condition.        CBC WITH AUTO DIFFERENTIAL - Abnormal; Notable for the following components:    RBC 2.99 (*)     Hemoglobin 10.5 (*)     Hematocrit 31.3 (*)     .5 (*)     MCH 35.1 (*)     All other components within normal limits   LACTIC ACID, REFLEX - Abnormal; Notable for the following components:    Lactate 2.4 (*)     All other components within normal limits   MANUAL DIFFERENTIAL - Abnormal; Notable for the following components:    Neutrophil % 41.0 (*)     Eosinophil % 8.0 (*)     Basophil % 7.0 (*)     Eosinophils Absolute 0.64 (*)     Basophils Absolute 0.56 (*)     All other components within normal limits   URINALYSIS, MICROSCOPIC ONLY - Abnormal; Notable for the following components:    RBC, UA 0-2 (*)     WBC, UA 0-2 (*)     Squamous Epithelial Cells, UA 3-6 (*)     All other components within normal limits   POC LACTATE - Abnormal; Notable for the following components:    Lactate 2.1 (*)     All other components within normal limits   BLOOD CULTURE   BLOOD CULTURE   MRSA SCREEN, PCR   BODY FLUID CULTURE   SCAN SLIDE   PATH CONSULT  REFLEX   HIGH SENSITIVITIY TROPONIN T 2HR   PROTEIN, BODY FLUID   ALBUMIN, FLUID   BODY FLUID CELL COUNT WITH DIFFERENTIAL    Narrative:     The following orders were created for panel order Body Fluid Cell Count With Differential - Body Fluid, Peritoneum.  Procedure                               Abnormality         Status                     ---------                               -----------         ------                     Body fluid cell count - ...[[id number]]                                                   Please view results for these tests on the individual orders.   BODY FLUID CELL COUNT   POC LACTATE   PATHOLOGY CONSULTATION   CBC AND DIFFERENTIAL    Narrative:     The following orders were created for panel order CBC & Differential.  Procedure                               Abnormality         Status                     ---------                               -----------         ------                     CBC Auto Differential[[id number]]        Abnormal            Final result               Scan Slide[[id number]]                                       Final result                 Please view results for these tests on the individual orders.     CT Abdomen Pelvis Without Contrast    Result Date: 7/3/2023  Impression: 1. Large volume abdominal pelvic ascites. 2. Cirrhotic liver morphology. 3. Moderate to large colonic stool burden. Correlate for constipation. 4. Scattered mild bibasilar atelectasis. Small left and trace right pleural effusions are present. Electronically Signed: Chanda Clark  7/3/2023 3:42 PM EDT  Workstation ID: [id number]    CT Head Without Contrast    Result Date: 7/3/2023  Impression: No acute intracranial abnormality or acute facial bone fracture. Electronically Signed: True Allen  7/3/2023 3:45 PM EDT  Workstation ID: [id number]    CT Cervical Spine Without Contrast    Result Date: 7/3/2023  Impression: 1. No evidence of cervical spine fracture 2. Minimal left mastoid effusion 3. 1 cm  right apical groundglass opacity. Recommend follow-up chest CT in 6 Electronically Signed: Dio Murrell  7/3/2023 3:44 PM EDT  Workstation ID: [id number]    XR Chest 1 View    Result Date: 7/3/2023  Impression: Improving bibasilar atelectasis or pneumonia since 6/28/2023. No new airspace disease is appreciated. Electronically Signed: Chanda Clark  7/3/2023 4:27 PM EDT  Workstation ID: [id number]    CT Facial Bones Without Contrast    Result Date: 7/3/2023  Impression: No acute intracranial abnormality or acute facial bone fracture. Electronically Signed: True Allen  7/3/2023 3:45 PM EDT  Workstation ID: [id number]                                        Medical Decision Making  Chart Review: Discharge summary reviewed from 6/28/2023 was cleared by GI for discharge home with home health as above continue lactulose and rifaximin.  Also discharged on midodrine 20 mg 3 times a day his initial URIEL improved was also evaluated and cleared by nephrology during this hospitalization.  Echocardiogram performed on 6/23/2023 showed an EF of 56 to 60%.  Last paracentesis reviewed from 6/23/2023    Comorbidity: As per past medical history  Differentials: Hepatic encephalopathy, ascites, brain bleed, skull fracture, pneumonia, UTI     ;this list is not all inclusive and does not constitute the entirety of considered causes  EKG: Interpreted by myself and Dr. Jacobs shows sinus bradycardia rate 59 otherwise normal EKG previous reviewed from 6/23/2023 showed sinus rhythm  Labs: as above   Radiology: My interpretation CT head shows no obvious brain bleed, CT facial bones and cervical spine shows no obvious fracture, CT abdomen pelvis shows ascites no definite bowel obstruction correlated with radiologist interpretations as below  CT Abdomen Pelvis Without Contrast   Final Result    Impression:        1. Large volume abdominal pelvic ascites.    2. Cirrhotic liver morphology.    3. Moderate to large colonic stool burden. Correlate for  constipation.    4. Scattered mild bibasilar atelectasis. Small left and trace right pleural effusions are present.                        Electronically Signed: Chanda Clark      7/3/2023 3:42 PM EDT      Workstation ID: [id number]     CT Head Without Contrast   Final Result    Impression:    No acute intracranial abnormality or acute facial bone fracture.            Electronically Signed: True Allen      7/3/2023 3:45 PM EDT      Workstation ID: [id number]     CT Cervical Spine Without Contrast   Final Result    Impression:        1. No evidence of cervical spine fracture    2. Minimal left mastoid effusion    3. 1 cm right apical groundglass opacity. Recommend follow-up chest CT in 6            Electronically Signed: Dio Murrell      7/3/2023 3:44 PM EDT      Workstation ID: [id number]     CT Facial Bones Without Contrast   Final Result    Impression:    No acute intracranial abnormality or acute facial bone fracture.            Electronically Signed: True Allen      7/3/2023 3:45 PM EDT      Workstation ID: [id number]     XR Chest 1 View    (Results Pending)      Disposition/Treatment:  Appropriate PPE was worn during exam and throughout all encounters with the patient.  When the ED IV was placed and labs were obtained patient was placed on proper monitors he was afebrile presenting with wife at bedside with complaints of altered mental status and worsening ascites since he was discharged from our facility on 6/28/2023.    EKG showed sinus bradycardia otherwise unremarkable.  No reports of chest pain at this time initial troponin 28 likely secondary to chronic illnesses.  Labs otherwise show CBC with a normal white count hemoglobin 10.5 hematocrit 31.3 platelets 158.  Anemia does appear chronic.  Metabolic panel showed glucose 112 BUN 51 creatinine 4.75 sodium 133 potassium 4.3 ALT 28 AST 66 alk phos 153 total bilirubin 3.7.  Worsening acute kidney injury since discharge on 6/27/2023 BUN of 22 creatinine 1.57.   Bilirubin however has improved as it was 6.1 on 6/27/2023.  Liver enzymes chronically elevated.  POC lactic 2.1 blood cultures are pending.  Urinalysis pending.  Ammonia elevated at 157.     Due to patient's recent fall CT head, neck, facial bones ordered which showed no acute intracranial or osseous abnormality as above.  Due to patient's worsening swelling and ascites CT abdomen pelvis was ordered which did show large ascites cirrhotic liver morphology and large colonic stool burden along with a right pleural effusion.     While in the ED patient did have some initial hypertension and on chart review does appear to be very chronic he is on midodrine 20 mg 3 times daily.  Unfortunately, due to his altered mental status not able to give p.o. dose of midodrine while in the ED he was given 1000 cc normal saline with improvement of his blood pressure systolic now in the 90s.  GI was consulted while in the ED.  I did speak to MIRI Tapia.  Patient was also given lactulose while in the ED along with Rocephin.  Vancomycin was initially ordered but discussed with the ER pharmacist who would like to hold it at this time pending MRSA PCR.    On reassessment patient is resting quietly findings were discussed with the patient's wife at bedside voiced understand admission for further work-up and was and agree with plan.  Patient be admitted to PCU.  Spoke to  MIRI Miner who agreed for admission with hospitalist group    The high probability of sudden, clinically significant deterioration in the patient's condition required the highest level of my preparedness to intervene urgently.  The services I provided to this patient were to treat and/or prevent clinically significant deterioration that could result in: death or increase blood loss . Services included the following: chart data review, reviewing nurses notes an/or old charts, documentation time, consultant collaboration regarding findings and treatment options, vital sign  assessments and ordering, interpreting and reviewing diagnostic studies/lab test.  Aggregate critical care time was 43 minutes , which includes only time during which I was engaged in work directly related to the patient's care, as described above, whether at the bedside or elsewhere in the Emergency Department. It did not include time spent performing other reported procedures.    Problems Addressed:  Abrasion of face, initial encounter: acute illness or injury  URIEL (acute kidney injury): acute illness or injury  Ascites due to alcoholic cirrhosis: acute illness or injury  Fall, initial encounter: acute illness or injury  Hepatic encephalopathy: complicated acute illness or injury    Amount and/or Complexity of Data Reviewed  External Data Reviewed: labs, radiology, ECG and notes.  Labs: ordered. Decision-making details documented in ED Course.  Radiology: ordered. Decision-making details documented in ED Course.  ECG/medicine tests: ordered.  Discussion of management or test interpretation with external provider(s): As above    Risk  OTC drugs.  Prescription drug management.  Decision regarding hospitalization.        Final diagnoses:   Hepatic encephalopathy   Fall, initial encounter   Abrasion of face, initial encounter   Ascites due to alcoholic cirrhosis   URIEL (acute kidney injury)       ED Disposition  ED Disposition       ED Disposition   Decision to Admit    Condition   --    Comment   Level of Care: Med/Surg [1]   Admitting Physician: TAVO STONER [[id number]]   Attending Physician: TAVO STONER [[id number]]   Bed Request Comments: PCU                 No follow-up provider specified.       Medication List        ASK your doctor about these medications      ondansetron ODT 4 MG disintegrating tablet  Commonly known as: ZOFRAN-ODT  Place 1 tablet on the tongue Every 8 (Eight) Hours As Needed for Nausea or Vomiting for up to 30 days.  Ask about: Which instructions should I use?                 Ivan Oropeza  NADER Lock  07/03/23 9591

## 2023-07-03 NOTE — CONSULTS
Critical Care Consult Note   Daquan Xiong : 1985 MRN:[id number] LOS:0 ROOM: 2307/1     Reason for admission: Hepatic encephalopathy     Assessment / Plan     Hepatic encephalopathy  -Admission ammonia  level 157  -Lactulose 30 grams q 6 hours  -Continue Ceftriaxone for now  -GI consult prior to ICU admission  -Nutrition consult when appropriate    Hypotension  -Persistent hypotension in spite of adequate fluid resuscitation  -Continue home midodrine  -C/w additional fluids as needed. Avoid fluid overload  -Titrate vasopressors for a target MAP of 65    Kidney injury, acute on chronic  -UA reviewed  -Consider renal ultrasound  -Continue IVF resuscitation  -Monitor and trend labs  -Avoid nephrotoxic medications, hypotension, and NSAIDS  -Renally dose medications  -Monitor urine output  -Nephrology consulted prior to ICU admission    1 cm right apical groundglass opacity via CT  --Radiology recommend follow-up chest CT in 6     Chronic conditions: bipolar, GERD, malnutrition, alcoholic cirrhosis, CKD, chronic-constipation, obstructive sleep apnea, smoking and substance abuse  - Continue home medications as appropriate  - Monitor routine labs and vital signs        Code Status (Patient has no pulse and is not breathing): CPR (Attempt to Resuscitate)  Medical Interventions (Patient has pulse or is breathing): Full Support       Nutrition: NPO Diet NPO Type: Strict NPO Patient isn't on Tube Feeding     DVT prophylaxis:  Mechanical DVT prophylaxis orders are present.     History of Present illness     A 37 y.o. old male patient with PMH of bipolar, GERD, malnutrition, alcoholic cirrhosis, CKD, chronic-constipation, obstructive sleep apnea, smoking and substance abuse presents to the hospital with complaints of altered mental status.  HPI information is limited due to patient confusion; information obtained from chart review and ER report.  Patient has had increased low blood pressure and altered mental status over  the last 2 days.  Per report patient's wife stated he was hospitalized and discharged last week with similar complaints.  He went to [place] over the weekend and his symptoms worsened ending with a fall yesterday afternoon getting out of the car.  Additionally, patient's wife reported increased bilateral lower extremity edema going to his scrotum and abdomen.  His last paracentesis was 6/23/2023 he was not supposed to be scheduled again until 7/7/2023.  Patient's blood pressure continued to drop while in the emergency department awaiting PCU bed patient was brought to the intensive care unit for further evaluation and treatment.    ACP: Patient unable to answer questions at this time will remain full code with full intervention; his wife is his decision-maker if he is unable.    Patient was seen and examined on 07/03/23 at 21:35 EDT .    Subjective / Review of systems     Review of Systems   Unable to perform ROS: Acuity of condition      Past Medical/Surgical/Social/Family History & Allergies     Past Medical History:   Diagnosis Date    Alcohol abuse     1/2-1 pint a day, sober since 9/12/2022    Alcoholic cirrhosis 09/01/2022    Arthritis     Bipolar affective disorder 09/22/2022    Cholecystitis 11/08/2022    Chronic constipation 09/05/2017    GERD (gastroesophageal reflux disease) 09/22/2022    Doing well with pepcid.    Hypertension     Osteoarthritis 09/22/2022    Polysubstance abuse 09/22/2022    Severe Malnutrition (HCC) 06/27/2023    Sleep apnea     Tobacco use disorder 09/22/2022      Past Surgical History:   Procedure Laterality Date    BACK SURGERY      WRIST SURGERY        Social History     Socioeconomic History    Marital status:    Tobacco Use    Smoking status: Every Day     Packs/day: 0.50     Types: Cigarettes    Smokeless tobacco: Never   Vaping Use    Vaping Use: Never used   Substance and Sexual Activity    Alcohol use: Not Currently     Comment: sober since 9/12/22    Drug use: Not  Currently     Types: Marijuana    Sexual activity: Defer      Family History   Problem Relation Age of Onset    Cirrhosis Father     Heart disease Maternal Grandmother     Diabetes Maternal Grandmother     Diabetes Maternal Grandfather     Diabetes Paternal Grandmother     Diabetes Paternal Grandfather       Allergies   Allergen Reactions    Bee Venom Anaphylaxis    Scopolamine Anaphylaxis        Home Medications     Prior to Admission medications    Medication Sig Start Date End Date Taking? Authorizing Provider   allopurinol (ZYLOPRIM) 100 MG tablet Take 1 tablet by mouth Daily. 6/8/23   Luis Enrique Byrne MD   ALPRAZolam (Xanax) 0.5 MG tablet Take 1 tablet by mouth 2 (Two) Times a Day As Needed for Anxiety for up to 5 days. 6/28/23 7/3/23  Ward Adams MD   baclofen (LIORESAL) 10 MG tablet Take 5-10 mg by mouth 2 (Two) Times a Day As Needed for Muscle Spasms.    Nathaly Pop MD   baclofen (LIORESAL) 10 MG tablet Take 1 tablet by mouth Daily. With stretching    Nathaly Pop MD   calcium carbonate (TUMS) 500 MG chewable tablet Chew 2 tablets Daily for 14 days. 6/28/23 7/12/23  Ward Adams MD   cyclobenzaprine (FLEXERIL) 10 MG tablet Take 5-10 mg by mouth 3 (Three) Times a Day As Needed.    Nathaly Pop MD   furosemide (LASIX) 40 MG tablet Take 1 tablet by mouth Daily.    Nathaly Pop MD   lactulose (CHRONULAC) 10 GM/15ML solution Take 30 mL by mouth 3 (Three) Times a Day for 30 days. 6/28/23 7/28/23  Ward Adams MD   magnesium oxide (MAG-OX) 400 tablet tablet Take 1 tablet by mouth Daily for 14 days. 6/28/23 7/12/23  Ward Adams MD   melatonin 5 MG tablet tablet Take 1 tablet by mouth At Night As Needed.    Nathaly Pop MD   midodrine (PROAMATINE) 10 MG tablet Take 2 tablets (20 mg) by mouth 3 (Three) Times a Day Before Meals 6/28/23 7/28/23  Ward Adams MD   nadolol (CORGARD) 40 MG tablet Take 1 tablet by mouth Daily As Needed.    Provider  MD Nathaly   nicotine (NICODERM CQ) 14 MG/24HR patch Place 1 patch on the skin as directed by provider Daily As Needed.    Nathaly Pop MD   nicotine polacrilex (NICORETTE) 2 MG gum Chew 1 each As Needed for Smoking Cessation.    Nathaly Pop MD   ondansetron ODT (ZOFRAN-ODT) 4 MG disintegrating tablet Place 1 tablet on the tongue Every 8 (Eight) Hours As Needed for Nausea or Vomiting for up to 30 days. 6/28/23 7/28/23  Ward Adams MD   oxyCODONE (ROXICODONE) 10 MG tablet Take 1 tablet by mouth Every 4 (Four) to 6 (Six) Hours As Needed for Moderate Pain.    Nathaly Pop MD   pantoprazole (PROTONIX) 40 MG EC tablet Take 1 tablet by mouth Daily.    Nathaly Pop MD   riFAXIMin (XIFAXAN) 550 MG tablet Take 1 tablet by mouth 2 (Two) Times a Day.    Nathaly Pop MD   sodium bicarbonate 325 MG tablet Take 1 tablet by mouth Daily.    Nathaly Pop MD   spironolactone (ALDACTONE) 50 MG tablet Take 1 tablet by mouth Daily.    Nathaly Pop MD   thiamine (VITAMIN B-1) 100 MG tablet take 1 tablet daily 6/28/23 7/28/23  Ward Adams MD   ondansetron ODT (ZOFRAN-ODT) 4 MG disintegrating tablet Place 1 tablet on the tongue Every 8 (Eight) Hours As Needed for Nausea or Vomiting.  7/3/23  Nathaly Pop MD        Objective / Physical Exam     Vital signs:  Temp: 97.4 °F (36.3 °C)  BP: (!) 89/41  Heart Rate: 56  Resp: 12  SpO2: 98 %  Weight: 88.7 kg (195 lb 8.8 oz)    Admission Weight: Weight: 83.1 kg (183 lb 4.8 oz)    Physical Exam  Vitals and nursing note reviewed.   Constitutional:       Appearance: He is ill-appearing.   HENT:      Head: Normocephalic.      Comments: Abrasion to left side of face     Mouth/Throat:      Mouth: Mucous membranes are dry.      Pharynx: Oropharynx is clear.   Eyes:      Pupils: Pupils are equal, round, and reactive to light.   Cardiovascular:      Rate and Rhythm: Regular rhythm. Bradycardia present.      Pulses: Normal  pulses.      Heart sounds: Normal heart sounds.   Pulmonary:      Effort: Pulmonary effort is normal.      Breath sounds: Normal breath sounds.   Abdominal:      General: Bowel sounds are normal. There is distension.      Tenderness: There is abdominal tenderness.   Musculoskeletal:         General: Normal range of motion.      Cervical back: Normal range of motion.      Right lower leg: Edema present.      Left lower leg: Edema present.   Skin:     General: Skin is warm and dry.      Capillary Refill: Capillary refill takes more than 3 seconds.      Coloration: Skin is jaundiced.   Neurological:      Mental Status: He is alert. He is disoriented.        Labs     Results from last 7 days   Lab Units 07/03/23  1504 06/28/23  0954 06/27/23 2247 06/26/23  2344   WBC 10*3/mm3 8.00 7.90 9.10 8.90   HEMATOCRIT % 31.3* 33.5* 32.4* 32.8*   PLATELETS 10*3/mm3 158 148 135* 133*      Results from last 7 days   Lab Units 07/03/23  1504 06/27/23 2247 06/26/23  2344   SODIUM mmol/L 133* 139 142   POTASSIUM mmol/L 4.3 2.9* 3.1*   CHLORIDE mmol/L 92* 97* 100   CO2 mmol/L 25.0 32.0* 30.0*   BUN mg/dL 51* 22* 25*   CREATININE mg/dL 4.75* 1.57* 1.77*        Imaging     CT Abdomen Pelvis Without Contrast    Result Date: 7/3/2023  Impression: 1. Large volume abdominal pelvic ascites. 2. Cirrhotic liver morphology. 3. Moderate to large colonic stool burden. Correlate for constipation. 4. Scattered mild bibasilar atelectasis. Small left and trace right pleural effusions are present. Electronically Signed: Chanda Clark  7/3/2023 3:42 PM EDT  Workstation ID: [id number]    CT Head Without Contrast    Result Date: 7/3/2023  Impression: No acute intracranial abnormality or acute facial bone fracture. Electronically Signed: True Allen  7/3/2023 3:45 PM EDT  Workstation ID: [id number]    CT Cervical Spine Without Contrast    Result Date: 7/3/2023  Impression: 1. No evidence of cervical spine fracture 2. Minimal left mastoid effusion 3. 1 cm  right apical groundglass opacity. Recommend follow-up chest CT in 6 Electronically Signed: Dio Handy  7/3/2023 3:44 PM EDT  Workstation ID: [id number]    XR Chest 1 View    Result Date: 7/3/2023  Impression: Improving bibasilar atelectasis or pneumonia since 6/28/2023. No new airspace disease is appreciated. Electronically Signed: Chanda Guerraeliza  7/3/2023 4:27 PM EDT  Workstation ID: [id number]    CT Facial Bones Without Contrast    Result Date: 7/3/2023  Impression: No acute intracranial abnormality or acute facial bone fracture. Electronically Signed: True Allen  7/3/2023 3:45 PM EDT  Workstation ID: [id number]        Current Medications     Scheduled Meds:  albumin human, , ,   albumin human, , ,   albumin human, 50 g, Intravenous, Daily  [START ON 7/4/2023] cefTRIAXone, 2 g, Intravenous, Q24H  lactulose, 30 g, Oral, Q6H  [START ON 7/4/2023] midodrine, 20 mg, Oral, Q8H  octreotide, 100 mcg, Intravenous, TID  [START ON 7/4/2023] pantoprazole, 40 mg, Intravenous, Q AM  [START ON 7/4/2023] rifAXIMin, 550 mg, Oral, Q12H  sodium chloride, 500 mL, Intravenous, Once  sodium chloride, 10 mL, Intravenous, Q12H  thiamine, 100 mg, Oral, Daily         Continuous Infusions:  DOPamine, 2-20 mcg/kg/min  norepinephrine, 0.02-0.3 mcg/kg/min, Last Rate: 0.2 mcg/kg/min (07/03/23 2115)         MONICA Guerrier   Critical Care  07/03/23   21:35 EDT     Electronically signed by MONICA Guerrier, 07/03/23, 9:33 PM EDT.

## 2023-07-04 ENCOUNTER — INPATIENT HOSPITAL (OUTPATIENT)
Dept: URBAN - METROPOLITAN AREA HOSPITAL 84 | Facility: HOSPITAL | Age: 38
End: 2023-07-04
Payer: COMMERCIAL

## 2023-07-04 DIAGNOSIS — R74.01 ELEVATION OF LEVELS OF LIVER TRANSAMINASE LEVELS: ICD-10-CM

## 2023-07-04 DIAGNOSIS — I85.00 ESOPHAGEAL VARICES WITHOUT BLEEDING: ICD-10-CM

## 2023-07-04 DIAGNOSIS — K70.11 ALCOHOLIC HEPATITIS WITH ASCITES: ICD-10-CM

## 2023-07-04 DIAGNOSIS — K76.82 HEPATIC ENCEPHALOPATHY: ICD-10-CM

## 2023-07-04 DIAGNOSIS — K70.31 ALCOHOLIC CIRRHOSIS OF LIVER WITH ASCITES: ICD-10-CM

## 2023-07-04 LAB
ALBUMIN FLD-MCNC: 1.1 G/DL
ALBUMIN SERPL-MCNC: 3.3 G/DL (ref 3.5–5.2)
ALBUMIN/GLOB SERPL: 2.8 G/DL
ALP SERPL-CCNC: 119 U/L (ref 39–117)
ALT SERPL W P-5'-P-CCNC: 20 U/L (ref 1–41)
AMMONIA BLD-SCNC: 91 UMOL/L (ref 16–60)
AMPHET+METHAMPHET UR QL: NEGATIVE
ANION GAP SERPL CALCULATED.3IONS-SCNC: 14 MMOL/L (ref 5–15)
APPEARANCE FLD: CLEAR
AST SERPL-CCNC: 43 U/L (ref 1–40)
BARBITURATES UR QL SCN: NEGATIVE
BASOPHILS # BLD AUTO: 0.1 10*3/MM3 (ref 0–0.2)
BASOPHILS NFR BLD AUTO: 2.7 % (ref 0–1.5)
BENZODIAZ UR QL SCN: POSITIVE
BILIRUB SERPL-MCNC: 3.9 MG/DL (ref 0–1.2)
BUN SERPL-MCNC: 48 MG/DL (ref 6–20)
BUN/CREAT SERPL: 12.4 (ref 7–25)
CA-I SERPL ISE-MCNC: 1.08 MMOL/L (ref 1.2–1.3)
CALCIUM SPEC-SCNC: 8.2 MG/DL (ref 8.6–10.5)
CANNABINOIDS SERPL QL: NEGATIVE
CHLORIDE SERPL-SCNC: 100 MMOL/L (ref 98–107)
CHOLEST SERPL-MCNC: 71 MG/DL (ref 0–200)
CO2 SERPL-SCNC: 25 MMOL/L (ref 22–29)
COCAINE UR QL: NEGATIVE
COLOR FLD: YELLOW
CREAT SERPL-MCNC: 3.86 MG/DL (ref 0.76–1.27)
DEPRECATED RDW RBC AUTO: 53.4 FL (ref 37–54)
EGFRCR SERPLBLD CKD-EPI 2021: 19.5 ML/MIN/1.73
EOSINOPHIL # BLD AUTO: 0.4 10*3/MM3 (ref 0–0.4)
EOSINOPHIL NFR BLD AUTO: 7.5 % (ref 0.3–6.2)
ERYTHROCYTE [DISTWIDTH] IN BLOOD BY AUTOMATED COUNT: 14.5 % (ref 12.3–15.4)
GLOBULIN UR ELPH-MCNC: 1.2 GM/DL
GLUCOSE SERPL-MCNC: 108 MG/DL (ref 65–99)
HCT VFR BLD AUTO: 27.3 % (ref 37.5–51)
HDLC SERPL-MCNC: 15 MG/DL (ref 40–60)
HGB BLD-MCNC: 9.1 G/DL (ref 13–17.7)
INR PPP: 1.39 (ref 0.93–1.1)
LDLC SERPL CALC-MCNC: 39 MG/DL (ref 0–100)
LDLC/HDLC SERPL: 2.65 {RATIO}
LYMPHOCYTES # BLD AUTO: 1.4 10*3/MM3 (ref 0.7–3.1)
LYMPHOCYTES NFR BLD AUTO: 25.1 % (ref 19.6–45.3)
LYMPHOCYTES NFR FLD MANUAL: 48 %
MAGNESIUM SERPL-MCNC: 1.9 MG/DL (ref 1.6–2.6)
MCH RBC QN AUTO: 33.8 PG (ref 26.6–33)
MCHC RBC AUTO-ENTMCNC: 33.2 G/DL (ref 31.5–35.7)
MCV RBC AUTO: 101.8 FL (ref 79–97)
MESOTHL CELL NFR FLD MANUAL: 23 %
METHADONE UR QL SCN: NEGATIVE
METHOD: NORMAL
MONOCYTES # BLD AUTO: 1.1 10*3/MM3 (ref 0.1–0.9)
MONOCYTES NFR BLD AUTO: 18.9 % (ref 5–12)
MONOCYTES NFR FLD: 10 %
NEUTROPHILS NFR BLD AUTO: 2.6 10*3/MM3 (ref 1.7–7)
NEUTROPHILS NFR BLD AUTO: 45.8 % (ref 42.7–76)
NEUTROPHILS NFR FLD MANUAL: 19 %
NRBC BLD AUTO-RTO: 0.1 /100 WBC (ref 0–0.2)
NUC CELL # FLD: 116 /MM3
OPIATES UR QL: NEGATIVE
OXYCODONE UR QL SCN: POSITIVE
PHOSPHATE SERPL-MCNC: 4.3 MG/DL (ref 2.5–4.5)
PLATELET # BLD AUTO: 100 10*3/MM3 (ref 140–450)
PMV BLD AUTO: 10 FL (ref 6–12)
POTASSIUM SERPL-SCNC: 3.5 MMOL/L (ref 3.5–5.2)
POTASSIUM SERPL-SCNC: 4 MMOL/L (ref 3.5–5.2)
PROT FLD-MCNC: 1.8 G/DL
PROT SERPL-MCNC: 4.5 G/DL (ref 6–8.5)
PROTHROMBIN TIME: 14.6 SECONDS (ref 9.6–11.7)
RBC # BLD AUTO: 2.68 10*6/MM3 (ref 4.14–5.8)
SODIUM SERPL-SCNC: 139 MMOL/L (ref 136–145)
SODIUM UR-SCNC: <20 MMOL/L
TRIGL SERPL-MCNC: 81 MG/DL (ref 0–150)
TSH SERPL DL<=0.05 MIU/L-ACNC: 1.24 UIU/ML (ref 0.27–4.2)
VLDLC SERPL-MCNC: 17 MG/DL (ref 5–40)
WBC NRBC COR # BLD: 5.6 10*3/MM3 (ref 3.4–10.8)

## 2023-07-04 PROCEDURE — 80307 DRUG TEST PRSMV CHEM ANLYZR: CPT | Performed by: STUDENT IN AN ORGANIZED HEALTH CARE EDUCATION/TRAINING PROGRAM

## 2023-07-04 PROCEDURE — 80050 GENERAL HEALTH PANEL: CPT | Performed by: NURSE PRACTITIONER

## 2023-07-04 PROCEDURE — 25010000002 HEPARIN (PORCINE) PER 1000 UNITS: Performed by: INTERNAL MEDICINE

## 2023-07-04 PROCEDURE — 25010000002 CEFTRIAXONE PER 250 MG: Performed by: STUDENT IN AN ORGANIZED HEALTH CARE EDUCATION/TRAINING PROGRAM

## 2023-07-04 PROCEDURE — 82042 OTHER SOURCE ALBUMIN QUAN EA: CPT

## 2023-07-04 PROCEDURE — 25010000002 MAGNESIUM SULFATE IN D5W 1G/100ML (PREMIX) 1-5 GM/100ML-% SOLUTION: Performed by: INTERNAL MEDICINE

## 2023-07-04 PROCEDURE — 82140 ASSAY OF AMMONIA: CPT | Performed by: STUDENT IN AN ORGANIZED HEALTH CARE EDUCATION/TRAINING PROGRAM

## 2023-07-04 PROCEDURE — 82330 ASSAY OF CALCIUM: CPT | Performed by: STUDENT IN AN ORGANIZED HEALTH CARE EDUCATION/TRAINING PROGRAM

## 2023-07-04 PROCEDURE — 25010000002 ALBUMIN HUMAN 5% PER 50 ML

## 2023-07-04 PROCEDURE — 84300 ASSAY OF URINE SODIUM: CPT | Performed by: INTERNAL MEDICINE

## 2023-07-04 PROCEDURE — 80061 LIPID PANEL: CPT | Performed by: STUDENT IN AN ORGANIZED HEALTH CARE EDUCATION/TRAINING PROGRAM

## 2023-07-04 PROCEDURE — 0W9G3ZZ DRAINAGE OF PERITONEAL CAVITY, PERCUTANEOUS APPROACH: ICD-10-PCS | Performed by: INTERNAL MEDICINE

## 2023-07-04 PROCEDURE — 89051 BODY FLUID CELL COUNT: CPT

## 2023-07-04 PROCEDURE — 25010000002 ALBUMIN HUMAN 25% PER 50 ML: Performed by: INTERNAL MEDICINE

## 2023-07-04 PROCEDURE — P9041 ALBUMIN (HUMAN),5%, 50ML: HCPCS

## 2023-07-04 PROCEDURE — 87205 SMEAR GRAM STAIN: CPT | Performed by: INTERNAL MEDICINE

## 2023-07-04 PROCEDURE — 84100 ASSAY OF PHOSPHORUS: CPT | Performed by: STUDENT IN AN ORGANIZED HEALTH CARE EDUCATION/TRAINING PROGRAM

## 2023-07-04 PROCEDURE — 25010000002 OCTREOTIDE PER 25 MCG: Performed by: INTERNAL MEDICINE

## 2023-07-04 PROCEDURE — 84132 ASSAY OF SERUM POTASSIUM: CPT | Performed by: INTERNAL MEDICINE

## 2023-07-04 PROCEDURE — 83735 ASSAY OF MAGNESIUM: CPT | Performed by: STUDENT IN AN ORGANIZED HEALTH CARE EDUCATION/TRAINING PROGRAM

## 2023-07-04 PROCEDURE — P9047 ALBUMIN (HUMAN), 25%, 50ML: HCPCS | Performed by: INTERNAL MEDICINE

## 2023-07-04 PROCEDURE — 84157 ASSAY OF PROTEIN OTHER: CPT

## 2023-07-04 PROCEDURE — 25010000002 OCTREOTIDE PER 25 MCG

## 2023-07-04 PROCEDURE — 87070 CULTURE OTHR SPECIMN AEROBIC: CPT | Performed by: INTERNAL MEDICINE

## 2023-07-04 PROCEDURE — 85610 PROTHROMBIN TIME: CPT

## 2023-07-04 PROCEDURE — 99232 SBSQ HOSP IP/OBS MODERATE 35: CPT

## 2023-07-04 PROCEDURE — 25010000002 ONDANSETRON PER 1 MG: Performed by: NURSE PRACTITIONER

## 2023-07-04 RX ORDER — OXYCODONE HYDROCHLORIDE 5 MG/1
5 TABLET ORAL EVERY 8 HOURS PRN
Status: DISCONTINUED | OUTPATIENT
Start: 2023-07-04 | End: 2023-07-06

## 2023-07-04 RX ORDER — PANTOPRAZOLE SODIUM 40 MG/1
40 TABLET, DELAYED RELEASE ORAL
Status: DISCONTINUED | OUTPATIENT
Start: 2023-07-05 | End: 2023-07-07 | Stop reason: HOSPADM

## 2023-07-04 RX ORDER — OCTREOTIDE ACETATE 100 UG/ML
100 INJECTION, SOLUTION INTRAVENOUS; SUBCUTANEOUS 3 TIMES DAILY
Status: DISCONTINUED | OUTPATIENT
Start: 2023-07-04 | End: 2023-07-07

## 2023-07-04 RX ORDER — SODIUM CHLORIDE 9 MG/ML
50 INJECTION, SOLUTION INTRAVENOUS CONTINUOUS
Status: DISCONTINUED | OUTPATIENT
Start: 2023-07-04 | End: 2023-07-06

## 2023-07-04 RX ORDER — HEPARIN SODIUM 5000 [USP'U]/ML
5000 INJECTION, SOLUTION INTRAVENOUS; SUBCUTANEOUS EVERY 8 HOURS SCHEDULED
Status: DISCONTINUED | OUTPATIENT
Start: 2023-07-04 | End: 2023-07-07 | Stop reason: HOSPADM

## 2023-07-04 RX ORDER — ALBUMIN, HUMAN INJ 5% 5 %
50 SOLUTION INTRAVENOUS ONCE
Status: COMPLETED | OUTPATIENT
Start: 2023-07-04 | End: 2023-07-04

## 2023-07-04 RX ORDER — ALPRAZOLAM 0.25 MG/1
0.25 TABLET ORAL 3 TIMES DAILY PRN
Status: DISCONTINUED | OUTPATIENT
Start: 2023-07-04 | End: 2023-07-07 | Stop reason: HOSPADM

## 2023-07-04 RX ORDER — POTASSIUM CHLORIDE 20 MEQ/1
40 TABLET, EXTENDED RELEASE ORAL EVERY 4 HOURS
Status: COMPLETED | OUTPATIENT
Start: 2023-07-04 | End: 2023-07-04

## 2023-07-04 RX ORDER — SODIUM CHLORIDE, SODIUM LACTATE, POTASSIUM CHLORIDE, CALCIUM CHLORIDE 600; 310; 30; 20 MG/100ML; MG/100ML; MG/100ML; MG/100ML
75 INJECTION, SOLUTION INTRAVENOUS CONTINUOUS
Status: DISCONTINUED | OUTPATIENT
Start: 2023-07-04 | End: 2023-07-04

## 2023-07-04 RX ORDER — MAGNESIUM SULFATE 1 G/100ML
1 INJECTION INTRAVENOUS ONCE
Status: COMPLETED | OUTPATIENT
Start: 2023-07-04 | End: 2023-07-04

## 2023-07-04 RX ADMIN — MIDODRINE HYDROCHLORIDE 20 MG: 5 TABLET ORAL at 13:49

## 2023-07-04 RX ADMIN — LACTULOSE 30 G: 20 SOLUTION ORAL at 06:14

## 2023-07-04 RX ADMIN — Medication 0.28 MCG/KG/MIN: at 02:06

## 2023-07-04 RX ADMIN — Medication 10 ML: at 21:09

## 2023-07-04 RX ADMIN — NOREPINEPHRINE BITARTRATE 0.26 MCG/KG/MIN: 1 INJECTION, SOLUTION, CONCENTRATE INTRAVENOUS at 09:36

## 2023-07-04 RX ADMIN — PANTOPRAZOLE SODIUM 40 MG: 40 INJECTION, POWDER, LYOPHILIZED, FOR SOLUTION INTRAVENOUS at 06:14

## 2023-07-04 RX ADMIN — ONDANSETRON 4 MG: 2 INJECTION INTRAMUSCULAR; INTRAVENOUS at 06:32

## 2023-07-04 RX ADMIN — MAGNESIUM SULFATE HEPTAHYDRATE 1 G: 1 INJECTION, SOLUTION INTRAVENOUS at 13:14

## 2023-07-04 RX ADMIN — Medication 10 ML: at 09:57

## 2023-07-04 RX ADMIN — POTASSIUM CHLORIDE 40 MEQ: 1500 TABLET, EXTENDED RELEASE ORAL at 09:30

## 2023-07-04 RX ADMIN — RIFAXIMIN 550 MG: 550 TABLET ORAL at 09:30

## 2023-07-04 RX ADMIN — Medication 100 MG: at 09:30

## 2023-07-04 RX ADMIN — HEPARIN SODIUM 5000 UNITS: 5000 INJECTION INTRAVENOUS; SUBCUTANEOUS at 21:09

## 2023-07-04 RX ADMIN — NOREPINEPHRINE BITARTRATE 0.1 MCG/KG/MIN: 1 INJECTION, SOLUTION, CONCENTRATE INTRAVENOUS at 23:51

## 2023-07-04 RX ADMIN — ALBUMIN (HUMAN) 50 G: 12.5 INJECTION, SOLUTION INTRAVENOUS at 13:49

## 2023-07-04 RX ADMIN — MIDODRINE HYDROCHLORIDE 20 MG: 5 TABLET ORAL at 06:14

## 2023-07-04 RX ADMIN — LACTULOSE 30 G: 20 SOLUTION ORAL at 13:32

## 2023-07-04 RX ADMIN — CEFTRIAXONE 2 G: 2 INJECTION, POWDER, FOR SOLUTION INTRAMUSCULAR; INTRAVENOUS at 16:07

## 2023-07-04 RX ADMIN — POTASSIUM CHLORIDE 40 MEQ: 1500 TABLET, EXTENDED RELEASE ORAL at 13:50

## 2023-07-04 RX ADMIN — SODIUM CHLORIDE, POTASSIUM CHLORIDE, SODIUM LACTATE AND CALCIUM CHLORIDE 75 ML/HR: 600; 310; 30; 20 INJECTION, SOLUTION INTRAVENOUS at 09:57

## 2023-07-04 RX ADMIN — SODIUM CHLORIDE 75 ML/HR: 9 INJECTION, SOLUTION INTRAVENOUS at 13:07

## 2023-07-04 RX ADMIN — OXYCODONE HYDROCHLORIDE 5 MG: 5 TABLET ORAL at 21:08

## 2023-07-04 RX ADMIN — HEPARIN SODIUM 5000 UNITS: 5000 INJECTION INTRAVENOUS; SUBCUTANEOUS at 13:24

## 2023-07-04 RX ADMIN — ALPRAZOLAM 0.25 MG: 0.25 TABLET ORAL at 09:31

## 2023-07-04 RX ADMIN — MIDODRINE HYDROCHLORIDE 20 MG: 5 TABLET ORAL at 21:08

## 2023-07-04 RX ADMIN — LACTULOSE 30 G: 20 SOLUTION ORAL at 23:45

## 2023-07-04 RX ADMIN — OCTREOTIDE ACETATE 100 MCG: 100 INJECTION, SOLUTION INTRAVENOUS; SUBCUTANEOUS at 06:18

## 2023-07-04 RX ADMIN — Medication 0.28 MCG/KG/MIN: at 07:56

## 2023-07-04 RX ADMIN — OCTREOTIDE ACETATE 100 MCG: 100 INJECTION, SOLUTION INTRAVENOUS; SUBCUTANEOUS at 16:06

## 2023-07-04 RX ADMIN — RIFAXIMIN 550 MG: 550 TABLET ORAL at 21:08

## 2023-07-04 RX ADMIN — ALBUMIN (HUMAN) 50 G: 0.25 INJECTION, SOLUTION INTRAVENOUS at 08:53

## 2023-07-04 RX ADMIN — OCTREOTIDE ACETATE 100 MCG: 100 INJECTION, SOLUTION INTRAVENOUS; SUBCUTANEOUS at 21:08

## 2023-07-04 RX ADMIN — LACTULOSE 30 G: 20 SOLUTION ORAL at 18:49

## 2023-07-04 NOTE — PROCEDURES
Paracentesis Procedure note:    Indications: Ascites    Site: Left lower quadrant    Consent:    Risks of the procedure were explained including but not limited to infection, bleeding, perforated bowel, perforated viscus.    Procedure:    A time out was taken before the procedure. Under sterile precautions using masks, caps, sterile gloves and gown, the patient was draped in the usual sterile fashion. The site was prepped with chlorhexidine and the fluid pocket was located and marked with an ultrasound. 1% lidocaine was used to anesthetize the area. A Safe-paracentesis needle was introduced into peritoneal space over the marked area. Fluid was obtained at a depth of 4cm without any difficulties and minimal blood loss. The paracentesis catheter was advanced over the needle and needle was eventually withdrawn. After aspiration of the peritoneal fluid, catheter was withdrawn. A dressing was applied to the puncture site. No bleeding or oozing was noted at the puncture site.    Findings    5000 ml of clear peritoneal fluid was obtained. A sample was sent to lab for cell counts, albumin, cytology and cultures.    Complications: None; patient tolerated the procedure well. Estimated blood loss is 10cc.    Condition: stable    Dr. Aly Zayas MD MPH  Staff Intensivist  07/04/23   10:50 EDT

## 2023-07-04 NOTE — CONSULTS
NEPHROLOGY CONSULTATION-----[facility]    [facility]  [phone]  Saji Waldron MD    Patient Care Team:  Wolf Carvalho MD as PCP - General (Geriatric Medicine)  Vanita, MD Fatou as Consulting Physician (Nephrology)    CC/REASON FOR CONSULTATION: RENAL FAILURE/ELEVATED SERUM CREATININE    PHYSICIAN REQUESTING CONSULTATION:     History of Present Illness    Patient is a 37 y.o. WM, very well known to me, whom I was asked to see in consultation for evaluation and management of renal failure/elevated serum creatinine.  Patient was admitted after presenting to ER with c/o MS changes and hypotension. Per patient and his mother he has not been taking Lactulose as prescribed. S/P recent fall on trip to [place].  No NSAIDs or recent IV dye exposure. No known h/o TB, rheumatic fever,  SLE. +EtoH related cirrhosis and does bleed/bruise easily. Reports some decreased urine output and urinary hesitancy.  Some dysuria. No edema or fluid retention outside of ascites/abdominal distension. Was not on diuretics prior to admission. Was not on ACE-I/ARB prior to admission. No herbal med use. No seizures, tremors, twitches, HA, blurry vision. No polyuria or polydypsia.     Review of Systems   Constitutional:  Positive for activity change, appetite change and fatigue. Negative for chills, diaphoresis, fever and unexpected weight change.   HENT:  Negative for congestion, dental problem, drooling, ear discharge, ear pain, facial swelling, hearing loss, mouth sores, nosebleeds, postnasal drip, rhinorrhea, sinus pressure, sinus pain, sneezing, sore throat, tinnitus, trouble swallowing and voice change.    Eyes:  Negative for photophobia, pain, discharge, redness, itching and visual disturbance.   Respiratory:  Negative for apnea, cough, choking, chest tightness, shortness of breath, wheezing and stridor.    Cardiovascular:  Negative for chest  pain, palpitations and leg swelling.   Gastrointestinal:  Positive for abdominal distention and abdominal pain. Negative for anal bleeding, blood in stool, constipation, diarrhea, nausea, rectal pain and vomiting.   Endocrine: Negative for cold intolerance, heat intolerance, polydipsia, polyphagia and polyuria.   Genitourinary:  Positive for decreased urine volume and difficulty urinating. Negative for dysuria, enuresis, flank pain, frequency, genital sores, hematuria and urgency.   Musculoskeletal:  Positive for arthralgias and back pain. Negative for gait problem, joint swelling, myalgias, neck pain and neck stiffness.   Skin:  Negative for color change, pallor, rash and wound.   Allergic/Immunologic: Negative for environmental allergies, food allergies and immunocompromised state.   Neurological:  Positive for weakness. Negative for dizziness, tremors, seizures, syncope, facial asymmetry, speech difficulty, light-headedness, numbness and headaches.   Hematological:  Negative for adenopathy. Bruises/bleeds easily.   Psychiatric/Behavioral:  Positive for confusion, decreased concentration and dysphoric mood. Negative for agitation, behavioral problems, hallucinations, self-injury, sleep disturbance and suicidal ideas. The patient is not nervous/anxious and is not hyperactive.         Past Medical History:   Diagnosis Date    Alcohol abuse     1/2-1 pint a day, sober since 9/12/2022    Alcoholic cirrhosis 09/01/2022    Arthritis     Bipolar affective disorder 09/22/2022    Cholecystitis 11/08/2022    Chronic constipation 09/05/2017    GERD (gastroesophageal reflux disease) 09/22/2022    Doing well with pepcid.    Hypertension     Osteoarthritis 09/22/2022    Polysubstance abuse 09/22/2022    Severe Malnutrition (HCC) 06/27/2023    Sleep apnea     Tobacco use disorder 09/22/2022       Past Surgical History:   Procedure Laterality Date    BACK SURGERY      WRIST SURGERY         Family History   Problem Relation Age of  Onset    Cirrhosis Father     Heart disease Maternal Grandmother     Diabetes Maternal Grandmother     Diabetes Maternal Grandfather     Diabetes Paternal Grandmother     Diabetes Paternal Grandfather        Social History     Tobacco Use    Smoking status: Every Day     Packs/day: 0.50     Types: Cigarettes    Smokeless tobacco: Never   Vaping Use    Vaping Use: Never used   Substance Use Topics    Alcohol use: Not Currently     Comment: sober since 21    Drug use: Not Currently     Types: Marijuana       Home Meds:   Medications Prior to Admission   Medication Sig Dispense Refill Last Dose    allopurinol (ZYLOPRIM) 100 MG tablet Take 1 tablet by mouth Daily. 30 tablet 0 7/3/2023    [] ALPRAZolam (Xanax) 0.5 MG tablet Take 1 tablet by mouth 2 (Two) Times a Day As Needed for Anxiety for up to 5 days. 10 tablet 0 7/3/2023    calcium carbonate (TUMS) 500 MG chewable tablet Chew 2 tablets Daily for 14 days. 28 tablet 0 7/3/2023    magnesium oxide (MAG-OX) 400 tablet tablet Take 1 tablet by mouth Daily for 14 days. 14 tablet 0 7/3/2023    midodrine (PROAMATINE) 10 MG tablet Take 2 tablets (20 mg) by mouth 3 (Three) Times a Day Before Meals 180 tablet 0 7/3/2023    nadolol (CORGARD) 40 MG tablet Take 1 tablet by mouth Daily As Needed.   7/3/2023    ondansetron ODT (ZOFRAN-ODT) 4 MG disintegrating tablet Place 1 tablet on the tongue Every 8 (Eight) Hours As Needed for Nausea or Vomiting for up to 30 days. 90 tablet 0 Past Week    oxyCODONE (ROXICODONE) 10 MG tablet Take 1 tablet by mouth Every 4 (Four) to 6 (Six) Hours As Needed for Moderate Pain.   7/3/2023    pantoprazole (PROTONIX) 40 MG EC tablet Take 1 tablet by mouth Daily.   7/3/2023    riFAXIMin (XIFAXAN) 550 MG tablet Take 1 tablet by mouth 2 (Two) Times a Day.   7/3/2023    thiamine (VITAMIN B-1) 100 MG tablet take 1 tablet daily 30 tablet 0 7/3/2023    baclofen (LIORESAL) 10 MG tablet Take 5-10 mg by mouth 2 (Two) Times a Day As Needed for Muscle  Spasms.   Unknown    cyclobenzaprine (FLEXERIL) 10 MG tablet Take 5-10 mg by mouth 3 (Three) Times a Day As Needed.   Unknown    lactulose (CHRONULAC) 10 GM/15ML solution Take 30 mL by mouth 3 (Three) Times a Day for 30 days. 2700 mL 0 Unknown    melatonin 5 MG tablet tablet Take 1 tablet by mouth At Night As Needed.   Unknown    nicotine (NICODERM CQ) 14 MG/24HR patch Place 1 patch on the skin as directed by provider Daily As Needed.   Unknown    nicotine polacrilex (NICORETTE) 2 MG gum Chew 1 each As Needed for Smoking Cessation.   Unknown       Scheduled Meds:  albumin human, , ,   albumin human, 50 g, Intravenous, QAM  cefTRIAXone, 2 g, Intravenous, Q24H  lactulose, 30 g, Oral, Q6H  midodrine, 20 mg, Oral, Q8H  octreotide, 100 mcg, Intravenous, Q8H  pantoprazole, 40 mg, Intravenous, Q AM  potassium chloride ER, 40 mEq, Oral, Q4H  rifAXIMin, 550 mg, Oral, Q12H  sodium chloride, 500 mL, Intravenous, Once  sodium chloride, 10 mL, Intravenous, Q12H  sodium chloride, 10 mL, Intravenous, Q12H  sodium chloride, 10 mL, Intravenous, Q12H  sodium chloride, 10 mL, Intravenous, Q12H  thiamine, 100 mg, Oral, QAM        Continuous Infusions:  norepinephrine, 0.02-0.3 mcg/kg/min        PRN Meds:    ALPRAZolam    senna-docusate sodium **AND** polyethylene glycol **AND** bisacodyl **AND** bisacodyl    ipratropium-albuterol    Magnesium Standard Dose Replacement - Follow Nurse / BPA Driven Protocol    nitroglycerin    ondansetron **OR** ondansetron    Phosphorus Replacement - Follow Nurse / BPA Driven Protocol    Potassium Replacement - Follow Nurse / BPA Driven Protocol    [COMPLETED] Insert Peripheral IV **AND** sodium chloride    [COMPLETED] Insert Peripheral IV **AND** sodium chloride    sodium chloride    sodium chloride    sodium chloride    sodium chloride    Allergies:  Bee venom and Scopolamine    OBJECTIVE    Vital Signs  Temp:  [97.4 °F (36.3 °C)-98.3 °F (36.8 °C)] 97.4 °F (36.3 °C)  Heart Rate:  [47-80] 69  Resp:   [12-20] 12  BP: ()/(28-65) 108/65    No intake/output data recorded.  I/O last 3 completed shifts:  In: 2415 [P.O.:240; I.V.:575; IV Piggyback:1600]  Out: 400 [Urine:400]    Physical Exam:  General Appearance: alert, appears stated age and cooperative  Head: normocephalic, without obvious abnormality and atraumatic +DRY OP +LEFT FOREHEAD ABRASION    Eyes: conjunctivae and sclerae normal and no icterus  Neck: supple and no JVD  Lungs: clear to auscultation and respirations regular  Heart: regular rhythm & normal rate and normal S1, S2 +BETHANY  Chest Wall: no abnormalities observed  Abdomen: normal bowel sounds and  +ASCITES/DISTENSION AND MILD BILAT LQ PAIN ON PALPATION  Extremities: moves extremities well, no edema, no cyanosis  Skin: +SCATTERED SCABS, ABRASIONS, ECCHYMOSIS  Neurologic: Alert, and oriented x 2. No focal deficits    Results Review:    I reviewed the patient's new clinical results.    WBC WBC   Date Value Ref Range Status   07/04/2023 5.60 3.40 - 10.80 10*3/mm3 Final   07/03/2023 8.00 3.40 - 10.80 10*3/mm3 Final      HGB Hemoglobin   Date Value Ref Range Status   07/04/2023 9.1 (L) 13.0 - 17.7 g/dL Final   07/03/2023 10.5 (L) 13.0 - 17.7 g/dL Final      HCT Hematocrit   Date Value Ref Range Status   07/04/2023 27.3 (L) 37.5 - 51.0 % Final   07/03/2023 31.3 (L) 37.5 - 51.0 % Final      Platelets No results found for: LABPLAT   MCV MCV   Date Value Ref Range Status   07/04/2023 101.8 (H) 79.0 - 97.0 fL Final   07/03/2023 104.5 (H) 79.0 - 97.0 fL Final          Sodium Sodium   Date Value Ref Range Status   07/04/2023 139 136 - 145 mmol/L Final   07/03/2023 133 (L) 136 - 145 mmol/L Final      Potassium Potassium   Date Value Ref Range Status   07/04/2023 3.5 3.5 - 5.2 mmol/L Final   07/03/2023 4.3 3.5 - 5.2 mmol/L Final     Comment:     Slight hemolysis detected by analyzer. Results may be affected.      Chloride Chloride   Date Value Ref Range Status   07/04/2023 100 98 - 107 mmol/L Final   07/03/2023  92 (L) 98 - 107 mmol/L Final      CO2 CO2   Date Value Ref Range Status   07/04/2023 25.0 22.0 - 29.0 mmol/L Final   07/03/2023 25.0 22.0 - 29.0 mmol/L Final      BUN BUN   Date Value Ref Range Status   07/04/2023 48 (H) 6 - 20 mg/dL Final   07/03/2023 51 (H) 6 - 20 mg/dL Final      Creatinine Creatinine   Date Value Ref Range Status   07/04/2023 3.86 (H) 0.76 - 1.27 mg/dL Final   07/03/2023 4.75 (H) 0.76 - 1.27 mg/dL Final      Calcium Calcium   Date Value Ref Range Status   07/04/2023 8.2 (L) 8.6 - 10.5 mg/dL Final   07/03/2023 8.7 8.6 - 10.5 mg/dL Final      PO4 No results found for: CAPO4   Albumin Albumin   Date Value Ref Range Status   07/04/2023 3.3 (L) 3.5 - 5.2 g/dL Final   07/03/2023 3.3 (L) 3.5 - 5.2 g/dL Final      Magnesium Magnesium   Date Value Ref Range Status   07/04/2023 1.9 1.6 - 2.6 mg/dL Final   07/03/2023 2.1 1.6 - 2.6 mg/dL Final      Uric Acid No results found for: URICACID       Imaging Results (Last 72 Hours)       Procedure Component Value Units Date/Time    XR Chest 1 View [[id number]] Collected: 07/03/23 1626     Updated: 07/03/23 1630    Narrative:      XR CHEST 1 VW    Date of Exam: 7/3/2023 4:13 PM EDT    Indication: AMS    Comparison: AP portable chest 6/20/2023    Findings:  There is some bandlike atelectasis in the right lower lobe, similar to the prior examination. The previously described patchy left basilar airspace disease appears improved. No new airspace disease is identified. The heart size is normal. No pleural   effusion or pneumothorax is identified.      Impression:      Impression:  Improving bibasilar atelectasis or pneumonia since 6/28/2023. No new airspace disease is appreciated.      Electronically Signed: Chanda Clark    7/3/2023 4:27 PM EDT    Workstation ID: [id number]    CT Head Without Contrast [[id number]] Collected: 07/03/23 1540     Updated: 07/03/23 1547    Narrative:      CT HEAD WO CONTRAST, CT FACIAL BONES WO CONTRAST    Date of Exam: 7/3/2023 3:36 PM  EDT    Indication: fall last night with head injury now AMS.    Comparison: None available.    Technique: Axial CT images were obtained of the head without contrast administration.  Coronal reconstructions were performed.  Automated exposure control and iterative reconstruction methods were used.    Findings:  Gray-white differentiation is maintained and there is no evidence of intracranial hemorrhage, mass or mass effect. The ventricles are normal in size and configuration. The orbits are normal. The paranasal sinuses are grossly clear. The calvarium is   intact. Additional dedicated evaluation of the facial bones demonstrates no evidence of acute fracture. The mastoid air cells are clear. Rightward nasal septal deviation is noted the lamina papyracea are intact.      Impression:      Impression:  No acute intracranial abnormality or acute facial bone fracture.      Electronically Signed: True Allen    7/3/2023 3:45 PM EDT    Workstation ID: [id number]    CT Facial Bones Without Contrast [[id number]] Collected: 07/03/23 1540     Updated: 07/03/23 1547    Narrative:      CT HEAD WO CONTRAST, CT FACIAL BONES WO CONTRAST    Date of Exam: 7/3/2023 3:36 PM EDT    Indication: fall last night with head injury now AMS.    Comparison: None available.    Technique: Axial CT images were obtained of the head without contrast administration.  Coronal reconstructions were performed.  Automated exposure control and iterative reconstruction methods were used.    Findings:  Gray-white differentiation is maintained and there is no evidence of intracranial hemorrhage, mass or mass effect. The ventricles are normal in size and configuration. The orbits are normal. The paranasal sinuses are grossly clear. The calvarium is   intact. Additional dedicated evaluation of the facial bones demonstrates no evidence of acute fracture. The mastoid air cells are clear. Rightward nasal septal deviation is noted the lamina papyracea are intact.       Impression:      Impression:  No acute intracranial abnormality or acute facial bone fracture.      Electronically Signed: True Allen    7/3/2023 3:45 PM EDT    Workstation ID: [id number]    CT Cervical Spine Without Contrast [[id number]] Collected: 07/03/23 1541     Updated: 07/03/23 1546    Narrative:        CT CERVICAL SPINE WO CONTRAST    Date of Exam: 7/3/2023 3:36 PM EDT    Indication: fall last night with AMS.    Comparison: None available.    Technique: Axial CT images were obtained of the cervical spine without contrast administration.  Sagittal and coronal reconstructions were performed.  Automated exposure control and iterative reconstruction methods were used.      Findings:  No evidence of fracture or traumatic subluxation. Mild degenerative disc disease at C5-C6. No prevertebral soft tissue swelling. Minimal fluid in the left mastoid air cells. 1 cm groundglass opacity in the right lung apex      Impression:      Impression:    1. No evidence of cervical spine fracture  2. Minimal left mastoid effusion  3. 1 cm right apical groundglass opacity. Recommend follow-up chest CT in 6      Electronically Signed: Dio Murrell    7/3/2023 3:44 PM EDT    Workstation ID: [id number]    CT Abdomen Pelvis Without Contrast [[id number]] Collected: 07/03/23 1539     Updated: 07/03/23 1544    Narrative:      CT ABDOMEN PELVIS WO CONTRAST    Date of Exam: 7/3/2023 3:36 PM EDT    Indication: abdominal distention.    Comparison: Ultrasound-guided paracentesis 6/23/2023. No prior CT abdomen and pelvis for comparison at this institution. Liver ultrasound 6/25/2023.    Technique: Axial CT images were obtained of the abdomen and pelvis without the administration of contrast. Sagittal and coronal reconstructions were performed.  Automated exposure control and iterative reconstruction methods were used.      Findings:  Large quantity abdominal pelvic ascites is present. There is mild to moderate generalized body wall edema.  Small left, trace right basilar pleural effusions are present with mild posterior bibasilar atelectasis and mild to right middle lobe atelectasis.   There is mild right hemidiaphragm elevation.    The liver is diminutive in size. It demonstrates a subtle surface nodular contour, suggestive of cirrhosis. No focal liver lesion is identified on this noncontrast examination. The spleen size is within normal limits. There is a small esophageal hiatal   hernia which contains ascites fluid.    The gallbladder, pancreas, adrenals, and kidneys demonstrate a normal noncontrast appearance. Moderate to large stool burden is present in the ascending, transverse, and descending colon. The bowel does not appear abnormally dilated or inflamed. The   appendix is normal.    The urinary bladder, prostate, and rectum are normal.    No acute or suspicious osseous abnormalities are identified. There is moderate degenerative disc change at the L4-5 vertebral level.      Impression:      Impression:    1. Large volume abdominal pelvic ascites.  2. Cirrhotic liver morphology.  3. Moderate to large colonic stool burden. Correlate for constipation.  4. Scattered mild bibasilar atelectasis. Small left and trace right pleural effusions are present.            Electronically Signed: Chanda Clark    7/3/2023 3:42 PM EDT    Workstation ID: [id number]              Results for orders placed during the hospital encounter of 07/03/23    XR Chest 1 View    Narrative  XR CHEST 1 VW    Date of Exam: 7/3/2023 4:13 PM EDT    Indication: AMS    Comparison: AP portable chest 6/20/2023    Findings:  There is some bandlike atelectasis in the right lower lobe, similar to the prior examination. The previously described patchy left basilar airspace disease appears improved. No new airspace disease is identified. The heart size is normal. No pleural  effusion or pneumothorax is identified.    Impression  Impression:  Improving bibasilar atelectasis or pneumonia  since 6/28/2023. No new airspace disease is appreciated.      Electronically Signed: Chanda Clark  7/3/2023 4:27 PM EDT  Workstation ID: [id number]      Results for orders placed during the hospital encounter of 06/23/23    XR Chest 1 View    Narrative  XR CHEST 1 VW    Date of Exam: 6/28/2023 11:45 AM EDT    Indication: eval for pneumonia    Comparison: None available.    Findings:  The lung volumes are low. Patchy airspace disease is seen within the lung bases bilaterally. Probable small bilateral pleural effusions are present. The pulmonary vasculature appears unremarkable. No pneumothorax. No acute osseous abnormality identified.    Impression  Impression:  Low lung volumes with patchy airspace disease seen within the lung bases bilaterally, left greater than right with probable small bilateral pleural effusions, likely related to pneumonia and/or atelectasis.      Electronically Signed: Brynn Higuera  6/28/2023 12:00 PM EDT  Workstation ID: [id number]            ASSESSMENT / PLAN      Hepatic encephalopathy    Bipolar affective disorder    GERD (gastroesophageal reflux disease)    Osteoarthritis    Tobacco use disorder    Polysubstance abuse    Obstructive sleep apnea    URIEL (acute kidney injury)    Alcoholic cirrhosis    Chronic constipation    Severe Malnutrition (HCC)    Essential hypertension    CKD (chronic kidney disease) stage 3, GFR 30-59 ml/min      1.ARF/URIEL/CRF/CKD---------Nonoliguric. +Recurrent ARF/URIEL on top of known CRF/CKD STG 3A with a baseline serum creatinine of about 1.5. CRF/CKD STG 3A secondary to HTN NS. +ARF/URIEL is secondary to ATN from hypotension and prerenal state/intravascular volume depletion. Hydrate with NS and give IV Albumin. Avoid hypotension. Check urine and serum studies and PVR. No NSAIDs or IV dye. Dose meds for CrCl less than 10 cc/min until ARF/URIEL is resolved     2. HYPOKALEMIA-------Replace po and give IV MgSO4     3. ETOH CIRRHOSIS/HEPATIC ENCEPHALOPATHY/ASCITES-----Restart  Lactulose. Follow Ammonia. Follow for paracentesis need. IV Albumin     4. HYPOMAGNESEMIA--------Replace IV. No po replacement given diarrhea from Lactulose    5. DELERIUM----------Improving. Follow ammonia     6. HYPOCALCEMIA-------Replace IV and give MgSO4 also     7. ANEMIA-------H/H stable. Check Fe     8. HYPOALBUMINEMIA-----IV Albumin to temporize     9. ELEVATED INR------Related to cirrhosis     10. HYPOTENSION------On Midodrine     11. BIPOLAR DISEASE     12. BASILIO    13. POLYSUBSTANCE ABUSE/TOBACCO USE-----Drug screen positive for Benzodiazipine and Oxycodone    14. KETONURIA------Secondary to intravascular volume depletion. IVFs      I discussed the patient's findings and my recommendations with patient, family, and nursing staff    Will follow along closely. Thank you for allowing us to see this patient in renal consultation.    [facility]  [phone]  MD Saji Tang MD  07/04/23  08:22 EDT

## 2023-07-04 NOTE — PROGRESS NOTES
Critical Care Progress Note     Daquan Xiong : 1985 MRN:[id number] LOS:1  Rm: 2307/1     Principal Problem: Hepatic encephalopathy     Reason for follow up: All the medical problems listed below    Summary     A 37 y.o. male with PMH of alcoholic cirrhosis of liver, obstructive sleep apnea, substance abuse, CKD presented to the hospital for altered mental status and was admitted with a principal diagnosis of Hepatic encephalopathy.  Found to have ammonia of 157, treated with lactulose and rifaximin after which his mentation improved.      On admission, patient also found to be in septic shock, treated with ceftriaxone and needed vasopressors.  Also had paracentesis on  with removal of 5 L of fluid.  Also had URIEL on admission, treated with IV fluids, albumin, midodrine and octreotide.  Nephrology was consulted.    Significant Events     23 : Patient still requiring levophed this AM. Adding IVF.     Assessment / Plan     Hypotension  Unclear etiology, possible prerenal.  Does not meet sepsis criteria.  Continue with high-dose midodrine.  Add IV fluids.  Wean off norepinephrine as tolerated to keep MAP> 65.  On empiric ceftriaxone for suspected SBP.  Follow-up peritoneal fluid cultures.    Acute hepatic encephalopathy  Ammonia level improved from 157-> 95.  Continue with lactulose and rifaximin.    Alcoholic cirrhosis of liver / Portal Hypertension / Recurrent ascites  S/p paracentesis on  with removal of 5 L.  Not a candidate for TIPS due to recurrent hepatic encephalopathy.  Awaiting liver transplant at [facility].    Acute Kidney Injury on CKD 3:   Remains non oliguric.  Baseline creatinine around 1.5  Possibly prerenal.  Some component of ATN from hypotension.  Started IV fluids, nephrology was consulted by ED.  GI started on empiric midodrine, octreotide and albumin for possible hepatorenal syndrome.  Hepatorenal syndrome is more of a diagnosis by exclusion, have not been adequately fluid  resuscitated.  Started IV fluids.  Monitor Input/Output very closely.   Net IO Since Admission: -3,660 mL [07/04/23 1100]     S/p fall prior to admission: CT head, facial bones and cervical spine negative for injury  1 cm right apical groundglass opacity via CT: Radiology recommend follow-up chest CT in 6      Macrocytic anemia  Obstructive sleep apnea   Bipolar disorder   GERD   Chronic constipation   History of substance abuse     Disposition:  Potential downgrade to PCU later pending wean from Levophed    Code status:   Code Status (Patient has no pulse and is not breathing): CPR (Attempt to Resuscitate)  Medical Interventions (Patient has pulse or is breathing): Full Support       Nutrition:   Diet: Renal Diets; Low Potassium; Texture: Regular Texture (IDDSI 7); Fluid Consistency: Thin (IDDSI 0)   Patient isn't on Tube Feeding     DVT prophylaxis:  Medical and mechanical DVT prophylaxis orders are present.     Subjective / Review of systems     Review of Systems   Constitutional:  Negative for chills and fatigue.   Respiratory:  Negative for chest tightness and shortness of breath.    Cardiovascular:  Negative for chest pain.   Gastrointestinal:  Positive for nausea. Negative for abdominal pain and vomiting.   Genitourinary:  Negative for dysuria.   Neurological:  Positive for weakness. Negative for dizziness and light-headedness.   Psychiatric/Behavioral:  The patient is nervous/anxious.       Objective / Physical Exam     Vital signs:  Temp: 97.6 °F (36.4 °C)  BP: 98/51  Heart Rate: 68  Resp: 18  SpO2: 98 %  Weight: 89.6 kg (197 lb 8.5 oz)    Admission Weight: Weight: 83.1 kg (183 lb 4.8 oz)  Current Weight: Weight: 89.6 kg (197 lb 8.5 oz)    Input/Output in last 24 hours:    Intake/Output Summary (Last 24 hours) at 7/4/2023 1027  Last data filed at 7/4/2023 0930  Gross per 24 hour   Intake 2415 ml   Output 6075 ml   Net -3660 ml      Net IO Since Admission: -3,660 mL [07/04/23 1027]     Physical Exam  Vitals  and nursing note reviewed.   Constitutional:       General: He is not in acute distress.     Appearance: He is ill-appearing (Chronically).   HENT:      Head: Normocephalic.      Mouth/Throat:      Mouth: Mucous membranes are moist.      Pharynx: Oropharynx is clear.   Eyes:      General: Scleral icterus present.      Extraocular Movements: Extraocular movements intact.      Pupils: Pupils are equal, round, and reactive to light.   Cardiovascular:      Rate and Rhythm: Regular rhythm.   Pulmonary:      Effort: Pulmonary effort is normal.      Breath sounds: Normal breath sounds. No wheezing.   Abdominal:      General: There is distension.      Tenderness: There is abdominal tenderness.   Musculoskeletal:      Right lower leg: Edema (++++) present.      Left lower leg: Edema (++++) present.   Skin:     Comments: Abrasion left cheek. No drainage.    Neurological:      General: No focal deficit present.      Mental Status: He is alert. He is disoriented.   Psychiatric:         Judgment: Judgment is impulsive.      Comments: Mostly cooperative with episodes of agitation.         Radiology and Labs     Results from last 7 days   Lab Units 07/04/23  0445 07/03/23  1504 06/28/23  0954 06/27/23  2247   WBC 10*3/mm3 5.60 8.00 7.90 9.10   HEMOGLOBIN g/dL 9.1* 10.5* 11.2* 11.4*   HEMATOCRIT % 27.3* 31.3* 33.5* 32.4*   PLATELETS 10*3/mm3 100* 158 148 135*      Results from last 7 days   Lab Units 07/04/23  0445 06/27/23  2247   PROTIME Seconds 14.6* 14.0*   INR  1.39* 1.33*      Results from last 7 days   Lab Units 07/04/23  0445 07/03/23  1504 06/27/23  2247   SODIUM mmol/L 139 133* 139   POTASSIUM mmol/L 3.5 4.3 2.9*   CHLORIDE mmol/L 100 92* 97*   CO2 mmol/L 25.0 25.0 32.0*   BUN mg/dL 48* 51* 22*   CREATININE mg/dL 3.86* 4.75* 1.57*   GLUCOSE mg/dL 108* 112* 109*   MAGNESIUM mg/dL 1.9 2.1 1.4*   PHOSPHORUS mg/dL 4.3 5.4* 3.2      Results from last 7 days   Lab Units 07/04/23  0445 07/03/23  1504 06/27/23  2247   ALK PHOS  U/L 119* 153* 146*   AST (SGOT) U/L 43* 66* 46*   ALT (SGPT) U/L 20 28 25           Current medications     Scheduled Meds:   albumin human, 50 g, Intravenous, QAM  cefTRIAXone, 2 g, Intravenous, Q24H  heparin (porcine), 5,000 Units, Subcutaneous, Q8H  lactulose, 30 g, Oral, Q6H  midodrine, 20 mg, Oral, Q8H  octreotide, 100 mcg, Intravenous, Q8H  [START ON 7/5/2023] pantoprazole, 40 mg, Oral, Q AM  potassium chloride ER, 40 mEq, Oral, Q4H  rifAXIMin, 550 mg, Oral, Q12H  sodium chloride, 500 mL, Intravenous, Once  sodium chloride, 10 mL, Intravenous, Q12H  sodium chloride, 10 mL, Intravenous, Q12H  sodium chloride, 10 mL, Intravenous, Q12H  sodium chloride, 10 mL, Intravenous, Q12H  thiamine, 100 mg, Oral, QAM        Continuous Infusions:   lactated ringers, 75 mL/hr, Last Rate: 75 mL/hr (07/04/23 0957)  norepinephrine, 0.02-0.3 mcg/kg/min, Last Rate: 0.26 mcg/kg/min (07/04/23 0936)          Plan discussed with RN. Reviewed all other data in the last 24 hours, including but not limited to vitals, labs, microbiology, imaging and pertinent notes from other providers.  Plan also discussed with patient's mother at the bedside.      MONICA Rojas   Critical Care  07/04/23   10:27 EDT     Attending Addendum     Subjective: Still confused, feels okay, denies any abdominal pain.  Knows that he is in the hospital and he thinks because of some sodium issues.    Exam: Alert and awake, no tremor, abdomen is distended.  Nontender.    Assessment / Plan:   Hypotension: Unclear etiology, suspected infection, on empiric antibiotics.  Add IV fluids.  Continue with midodrine.  Ascites: s/p paracentesis with removal of 5 L.    I have personally reviewed all labs and other data, reviewed all other pertinent notes, interviewed and examined this patient today.     Dr. Aly Zayas MD MPH  Staff Intensivist  07/04/23   11:05 EDT

## 2023-07-04 NOTE — PROGRESS NOTES
LOS: 1 day   Patient Care Team:  Wolf Carvalho MD as PCP - General (Geriatric Medicine)  Fatou Waldron MD as Consulting Physician (Nephrology)      Subjective   Interval history: Patient is more alert this morning.  Does continue to have confusion.  He is tolerating lactulose by mouth.  He had 2 bowel movements overnight.  No overt GI bleeding.  Paracentesis with fluid studies pending.    ROS:   Review of Systems   Constitutional:  Negative for chills and fever.   Respiratory:  Negative for cough and shortness of breath.    Cardiovascular:  Negative for chest pain and palpitations.   Gastrointestinal:  Positive for abdominal distention. Negative for abdominal pain, nausea and vomiting.   Neurological:  Positive for weakness. Negative for dizziness.   Psychiatric/Behavioral:  Negative for agitation and confusion.       Medication Review:   Scheduled Meds:albumin human, 50 g, Intravenous, QAM  albumin human, 50 g, Intravenous, Once  cefTRIAXone, 2 g, Intravenous, Q24H  heparin (porcine), 5,000 Units, Subcutaneous, Q8H  lactulose, 30 g, Oral, Q6H  midodrine, 20 mg, Oral, Q8H  octreotide, 100 mcg, Subcutaneous, TID  [START ON 7/5/2023] pantoprazole, 40 mg, Oral, Q AM  potassium chloride ER, 40 mEq, Oral, Q4H  rifAXIMin, 550 mg, Oral, Q12H  sodium chloride, 500 mL, Intravenous, Once  sodium chloride, 10 mL, Intravenous, Q12H  sodium chloride, 10 mL, Intravenous, Q12H  sodium chloride, 10 mL, Intravenous, Q12H  sodium chloride, 10 mL, Intravenous, Q12H  thiamine, 100 mg, Oral, QAM      Continuous Infusions:norepinephrine, 0.02-0.3 mcg/kg/min, Last Rate: 0.26 mcg/kg/min (07/04/23 0936)  sodium chloride, 75 mL/hr, Last Rate: 75 mL/hr (07/04/23 1307)    PRN Meds:.  ALPRAZolam    ipratropium-albuterol    Magnesium Standard Dose Replacement - Follow Nurse / BPA Driven Protocol    nitroglycerin    ondansetron **OR** ondansetron    Phosphorus Replacement - Follow Nurse / BPA Driven Protocol    Potassium  Replacement - Follow Nurse / BPA Driven Protocol    [COMPLETED] Insert Peripheral IV **AND** sodium chloride    [COMPLETED] Insert Peripheral IV **AND** sodium chloride    sodium chloride    sodium chloride    sodium chloride    sodium chloride      Objective     Vital Signs  Temp:  [97.4 °F (36.3 °C)-98.3 °F (36.8 °C)] 97.6 °F (36.4 °C)  Heart Rate:  [47-80] 68  Resp:  [12-20] 18  BP: ()/(28-82) 98/51    Physical Exam:    General Appearance:    Awake, confused   Head:    Normocephalic, without obvious abnormality   Eyes:          Conjunctivae normal, icteric sclera   Ears:    Hearing intact   Throat:   No oral lesions, no thrush, oral mucosa moist   Neck:   No adenopathy, supple, no JVD   Lungs:     Respirations regular, even and unlabored       Abdomen:     Soft, distended with fluid wave, nontender   Rectal:     Deferred   Extremities:   3+ BLE edema, no cyanosis   Skin:   No bleeding, bruising or rash, + jaundice   Neurologic:   Sensation intact        Results Review:    CBC  Results from last 7 days   Lab Units 07/04/23  0445 07/03/23  1504 06/28/23  0954 06/27/23  2247   RBC 10*6/mm3 2.68* 2.99* 3.21* 3.14*   WBC 10*3/mm3 5.60 8.00 7.90 9.10   HEMOGLOBIN g/dL 9.1* 10.5* 11.2* 11.4*   PLATELETS 10*3/mm3 100* 158 148 135*         CMP  Results from last 7 days   Lab Units 07/04/23  0445 07/03/23  1504 06/27/23  2247   SODIUM mmol/L 139 133* 139   POTASSIUM mmol/L 3.5 4.3 2.9*   CHLORIDE mmol/L 100 92* 97*   CO2 mmol/L 25.0 25.0 32.0*   BUN mg/dL 48* 51* 22*   CREATININE mg/dL 3.86* 4.75* 1.57*   GLUCOSE mg/dL 108* 112* 109*   ALBUMIN g/dL 3.3* 3.3* 2.9*   BILIRUBIN mg/dL 3.9* 3.7* 6.1*   ALK PHOS U/L 119* 153* 146*   AST (SGOT) U/L 43* 66* 46*   ALT (SGPT) U/L 20 28 25   AMMONIA umol/L 91* 157*  --          Amylase and Lipase        CRP         Imaging Results (Last 24 Hours)       Procedure Component Value Units Date/Time    XR Chest 1 View [[id number]] Collected: 07/03/23 1626     Updated: 07/03/23 1630     Narrative:      XR CHEST 1 VW    Date of Exam: 7/3/2023 4:13 PM EDT    Indication: AMS    Comparison: AP portable chest 6/20/2023    Findings:  There is some bandlike atelectasis in the right lower lobe, similar to the prior examination. The previously described patchy left basilar airspace disease appears improved. No new airspace disease is identified. The heart size is normal. No pleural   effusion or pneumothorax is identified.      Impression:      Impression:  Improving bibasilar atelectasis or pneumonia since 6/28/2023. No new airspace disease is appreciated.      Electronically Signed: Chanda Clark    7/3/2023 4:27 PM EDT    Workstation ID: [id number]    CT Head Without Contrast [[id number]] Collected: 07/03/23 1540     Updated: 07/03/23 1547    Narrative:      CT HEAD WO CONTRAST, CT FACIAL BONES WO CONTRAST    Date of Exam: 7/3/2023 3:36 PM EDT    Indication: fall last night with head injury now AMS.    Comparison: None available.    Technique: Axial CT images were obtained of the head without contrast administration.  Coronal reconstructions were performed.  Automated exposure control and iterative reconstruction methods were used.    Findings:  Gray-white differentiation is maintained and there is no evidence of intracranial hemorrhage, mass or mass effect. The ventricles are normal in size and configuration. The orbits are normal. The paranasal sinuses are grossly clear. The calvarium is   intact. Additional dedicated evaluation of the facial bones demonstrates no evidence of acute fracture. The mastoid air cells are clear. Rightward nasal septal deviation is noted the lamina papyracea are intact.      Impression:      Impression:  No acute intracranial abnormality or acute facial bone fracture.      Electronically Signed: True Allen    7/3/2023 3:45 PM EDT    Workstation ID: [id number]    CT Facial Bones Without Contrast [[id number]] Collected: 07/03/23 1540     Updated: 07/03/23 1547    Narrative:       CT HEAD WO CONTRAST, CT FACIAL BONES WO CONTRAST    Date of Exam: 7/3/2023 3:36 PM EDT    Indication: fall last night with head injury now AMS.    Comparison: None available.    Technique: Axial CT images were obtained of the head without contrast administration.  Coronal reconstructions were performed.  Automated exposure control and iterative reconstruction methods were used.    Findings:  Gray-white differentiation is maintained and there is no evidence of intracranial hemorrhage, mass or mass effect. The ventricles are normal in size and configuration. The orbits are normal. The paranasal sinuses are grossly clear. The calvarium is   intact. Additional dedicated evaluation of the facial bones demonstrates no evidence of acute fracture. The mastoid air cells are clear. Rightward nasal septal deviation is noted the lamina papyracea are intact.      Impression:      Impression:  No acute intracranial abnormality or acute facial bone fracture.      Electronically Signed: True Allen    7/3/2023 3:45 PM EDT    Workstation ID: [id number]    CT Cervical Spine Without Contrast [[id number]] Collected: 07/03/23 1541     Updated: 07/03/23 1546    Narrative:        CT CERVICAL SPINE WO CONTRAST    Date of Exam: 7/3/2023 3:36 PM EDT    Indication: fall last night with AMS.    Comparison: None available.    Technique: Axial CT images were obtained of the cervical spine without contrast administration.  Sagittal and coronal reconstructions were performed.  Automated exposure control and iterative reconstruction methods were used.      Findings:  No evidence of fracture or traumatic subluxation. Mild degenerative disc disease at C5-C6. No prevertebral soft tissue swelling. Minimal fluid in the left mastoid air cells. 1 cm groundglass opacity in the right lung apex      Impression:      Impression:    1. No evidence of cervical spine fracture  2. Minimal left mastoid effusion  3. 1 cm right apical groundglass opacity. Recommend  follow-up chest CT in 6      Electronically Signed: Dio Murrell    7/3/2023 3:44 PM EDT    Workstation ID: [id number]    CT Abdomen Pelvis Without Contrast [[id number]] Collected: 07/03/23 1539     Updated: 07/03/23 1544    Narrative:      CT ABDOMEN PELVIS WO CONTRAST    Date of Exam: 7/3/2023 3:36 PM EDT    Indication: abdominal distention.    Comparison: Ultrasound-guided paracentesis 6/23/2023. No prior CT abdomen and pelvis for comparison at this institution. Liver ultrasound 6/25/2023.    Technique: Axial CT images were obtained of the abdomen and pelvis without the administration of contrast. Sagittal and coronal reconstructions were performed.  Automated exposure control and iterative reconstruction methods were used.      Findings:  Large quantity abdominal pelvic ascites is present. There is mild to moderate generalized body wall edema. Small left, trace right basilar pleural effusions are present with mild posterior bibasilar atelectasis and mild to right middle lobe atelectasis.   There is mild right hemidiaphragm elevation.    The liver is diminutive in size. It demonstrates a subtle surface nodular contour, suggestive of cirrhosis. No focal liver lesion is identified on this noncontrast examination. The spleen size is within normal limits. There is a small esophageal hiatal   hernia which contains ascites fluid.    The gallbladder, pancreas, adrenals, and kidneys demonstrate a normal noncontrast appearance. Moderate to large stool burden is present in the ascending, transverse, and descending colon. The bowel does not appear abnormally dilated or inflamed. The   appendix is normal.    The urinary bladder, prostate, and rectum are normal.    No acute or suspicious osseous abnormalities are identified. There is moderate degenerative disc change at the L4-5 vertebral level.      Impression:      Impression:    1. Large volume abdominal pelvic ascites.  2. Cirrhotic liver morphology.  3. Moderate to large  colonic stool burden. Correlate for constipation.  4. Scattered mild bibasilar atelectasis. Small left and trace right pleural effusions are present.            Electronically Signed: Chandatorres Clark    7/3/2023 3:42 PM EDT    Workstation ID: [id number]              Assessment & Plan     Assessment:  37-year-old male with history of alcoholic cirrhosis complicated by HE, ascites, and esophageal varices discharged on 6/28 and presented again to [facility] ER today with complaints of altered mental status, ascites, BLE edema, and hypotension.      -Hepatic encephalopathy/hyperammonemia  -Alcohol hepatitis and cirrhosis complicated by HE, ascites, and esophageal varices  -Hyponatremia  -Acute renal failure  -Hypotension  -Elevated LFTs  -Hyperkalemia     Plan:  Continue PO lactulose and titrate to a goal of at least 2-3 bowel movements daily.  Continue Xifaxan.  Paracentesis with fluid studies pending.  Kidney function is slowly improving.  Will give 1x dose 50 g of 5% albumin.   Continue zinc, PPI, midodrine,  and subcu octreotide.  Recommend strict 2 g sodium diet with Boost/Ensure supplements. Encourage nutrition.  MELD sodium score 30.  Referral has been sent to [facility] transplant team.  Patient and his family are interested in also being referred to [facility] transplant team.  Task has been sent through our office to initiate this referral.  Supportive care.  GI will follow.    MONICA Bates  07/04/23  13:25 EDT

## 2023-07-04 NOTE — PLAN OF CARE
Goal Outcome Evaluation:                  Patient remains on room air. Levo, NS gtt. AOx1/4, follows commands. Lethargic/agitated/frustration at times. No BM on shift. 600cc plus on incont episode of urine. Spouse remains at bedside.     Remains ICU status, will continue to monitor.

## 2023-07-04 NOTE — PLAN OF CARE
Goal Outcome Evaluation:         Patient more alert, able to swallow meds with no difficulty. Remains confused with no short term memory retention.Patient's mother has remained at bedside overnight. Levophed infusing to maintain adequate BP. Planning for paracentesis today. (Corrected dopamine to levophed.)

## 2023-07-04 NOTE — CONSULTS
PICC Line Insertion Procedure Note    Procedure: Insertion of #5 FR/16G PICC    Indications:  Pt./DrAshley Preference, Other (vesicants)    Active Time Out:  Correct patient: Yes  Correct procedure: Yes  Correct site: Yes  Verified with: SHELIA Ackerman    Procedure Details:  Informed consent was obtained for the procedure.  Risk include, but are not limited to infection, air embolism, catheter tip moving, catheter blockage and phlebitis.     Maximum sterile technique was used including antiseptics, cap, gloves, gown, hand hygiene, mask, and sheet.    Ultrasound Guidance: Yes    #5 FR/16G PICC inserted to the R Basilic vein per hospital protocol by Chema Martin RN.   Non-pulsatile blood return: yes    Lot #: [id number]  Expiration date: 2024-03-31    Complications:  none    Findings:  Catheter inserted to 39 cm, with 5 cm exposed.   Mid upper arm circumference is 30 cm.   Catheter was flushed with 30 cc NS and sterile dressing applied.  Patient tolerated procedure well.  PICC tip verified by:       [x] Sapiens 3cg       [] Chest X-ray    Recommendations:  Verbal and/or written Care/Maintenance instructions provided to patient.   Primary nurse notified.    Greg Martin RN  07/03/23  20:49 EDT

## 2023-07-04 NOTE — PAYOR COMM NOTE
"Amish Xiong (37 y.o. Male)       Date of Birth   1985    Social Security Number       Address   [address] [place] [address]    Home Phone   [phone]    MRN   [id number]       Anabaptist   [facility]    Marital Status                               Admission Date   7/3/23    Admission Type   Emergency    Admitting Provider   Aly Zayas MD    Attending Provider   Aly Zayas MD    Department, Room/Bed   [facility] INTENSIVE CARE UNIT, 2307/1       Discharge Date       Discharge Disposition       Discharge Destination                                 Attending Provider: Aly Zayas MD    Allergies: Bee Venom, Scopolamine    Isolation: None   Infection: None   Code Status: CPR    Ht: 182.9 cm (72\")   Wt: 89.6 kg (197 lb 8.5 oz)    Admission Cmt: None   Principal Problem: Hepatic encephalopathy [K76.82]                   Active Insurance as of 7/3/2023       Primary Coverage       Payor Plan Insurance Group Employer/Plan Group    ANTHEM BLUE CROSS ANTHEM BLUE CROSS BLUE SHIELD PPO [id number]       Payor Plan Address Payor Plan Phone Number Payor Plan Fax Number Effective Dates    [address] [phone]  1/1/2023 - None Entered    [facility] [id number]         Subscriber Name Subscriber Birth Date Member ID       ANA XIONG [birth year]/1986 [id number]               Secondary Coverage       Payor Plan Insurance Group Employer/Plan Group    ANTHEM MEDICAID [facility] [id number]       Payor Plan Address Payor Plan Phone Number Payor Plan Fax Number Effective Dates    MAIL STOP:   1/15/2023 - None Entered    [address] [address]       [facility] [id number]         Subscriber Name Subscriber Birth Date Member ID       AMISH XIONG 1985 [id number]                     Emergency Contacts        (Rel.) Home Phone Work Phone Mobile Phone    Ana Xiong (Spouse) [phone] -- [phone]    Alejandra Xiong (Daughter) -- -- [phone]    " Lizz Wiggins (Mother) -- -- [phone]                 History & Physical        Jose Ruffin , APRN at 23 1833       Attestation signed by Zi Vidal MD at 23 0846    I have reviewed this documentation and agree.                      [facility]  History & Physical    Patient Name: Daquan Xiong  : 1985  MRN: [id number]  Primary Care Physician:  Wolf Carvalho MD  Date of admission: 7/3/2023  Date and Time of Service: 2023 at 1730 hrs.    Subjective      Chief Complaint: Altered mental status, BLE edema, status post fall    History of Present Illness: Daquan Xiong is a 37 y.o. male with a past medical history significant for decompensated cirrhosis, hepatic encephalopathy, alcoholic hepatitis presenting to the ED with his wife who is main historian states he has had low blood pressure and altered mental status over the past 2 days. Patient's wife states he was hospitalized and discharged last week with similar complaints she states when he got discharged they went to [place] and his symptoms worsened over the weekend. She states he also fell yesterday when getting out of the car. He states today now he is not able to ambulate well. She reports he has had increased bilateral lower extremity edema now going up into his scrotum and abdomen. She reports he last had a paracentesis on 2023 and is not scheduled for another 1 until 2023. She reports chronic nausea and cough without significant change. No reports of fever or vomiting. No reports of loss of consciousness. Patient's wife states she has been giving him lactulose but she is not sure if he is actually taking it. She is not sure when his last bowel movement was.  Work-up in the ED revealed hyperammonemia 157, no leukocytosis, lactic 2.4, creatinine 4.7, LFTs elevated, he was pan imaged and negative for acute intracranial pathology or any fractures.  Patient was given lactulose and rifaximin.   He was given a 1 L bolus in the ED for hypotension and blood pressures were soft in the low 100s.  Patient was admitted to medicine and and GI was consulted.  Patient seen and examined in ED with family members present.  Patient responsive to verbal and painful stimuli but lethargic.  Unable to ascertain any complaints or information from patient due to current altered mental status.  Approximately 30 to 45 minutes later I was contacted by his ER bedside nurse stating that he was hypotensive again in the low 80s.  I advised her to give another 500 mL fluid bolus and reassess.  She reached out 15 minutes later stating that his BP has gone even further down into the 70s.  Patient was started on norepinephrine infusion.  Spoke with intensivist MIRI Villarreal and she accepted the patient for ICU level care.  Dr. Vidal was notified.    ROS   Unable to review due to current altered mental status.    Personal History     Past Medical History:   Diagnosis Date    Alcohol abuse     1/2-1 pint a day, sober since 9/12/2022    Alcoholic cirrhosis 09/01/2022    Arthritis     Hypertension     Sleep apnea        Past Surgical History:   Procedure Laterality Date    BACK SURGERY      WRIST SURGERY         Family History: family history includes Cirrhosis in his father; Diabetes in his maternal grandfather, maternal grandmother, paternal grandfather, and paternal grandmother; Heart disease in his maternal grandmother. Otherwise pertinent FHx was reviewed and not pertinent to current issue.    Social History:  reports that he has been smoking cigarettes. He has been smoking an average of .5 packs per day. He has never used smokeless tobacco. He reports that he does not currently use alcohol. He reports that he does not currently use drugs after having used the following drugs: Marijuana.    Home Medications:  Prior to Admission Medications       Prescriptions Last Dose Informant Patient Reported? Taking?    allopurinol (ZYLOPRIM) 100 MG  tablet   No No    Take 1 tablet by mouth Daily.    ALPRAZolam (Xanax) 0.5 MG tablet   No No    Take 1 tablet by mouth 2 (Two) Times a Day As Needed for Anxiety for up to 5 days.    baclofen (LIORESAL) 10 MG tablet   Yes No    Take 5-10 mg by mouth 2 (Two) Times a Day As Needed for Muscle Spasms.    baclofen (LIORESAL) 10 MG tablet   Yes No    Take 1 tablet by mouth Daily. With stretching    calcium carbonate (TUMS) 500 MG chewable tablet   No No    Chew 2 tablets Daily for 14 days.    cyclobenzaprine (FLEXERIL) 10 MG tablet   Yes No    Take 5-10 mg by mouth 3 (Three) Times a Day As Needed.    furosemide (LASIX) 40 MG tablet   Yes No    Take 1 tablet by mouth Daily.    lactulose (CHRONULAC) 10 GM/15ML solution   No No    Take 30 mL by mouth 3 (Three) Times a Day for 30 days.    magnesium oxide (MAG-OX) 400 tablet tablet   No No    Take 1 tablet by mouth Daily for 14 days.    melatonin 5 MG tablet tablet   Yes No    Take 1 tablet by mouth At Night As Needed.    midodrine (PROAMATINE) 10 MG tablet   No No    Take 2 tablets (20 mg) by mouth 3 (Three) Times a Day Before Meals    nadolol (CORGARD) 40 MG tablet   Yes No    Take 1 tablet by mouth Daily As Needed.    nicotine (NICODERM CQ) 14 MG/24HR patch   Yes No    Place 1 patch on the skin as directed by provider Daily As Needed.    nicotine polacrilex (NICORETTE) 2 MG gum   Yes No    Chew 1 each As Needed for Smoking Cessation.    ondansetron ODT (ZOFRAN-ODT) 4 MG disintegrating tablet   No No    Place 1 tablet on the tongue Every 8 (Eight) Hours As Needed for Nausea or Vomiting for up to 30 days.    oxyCODONE (ROXICODONE) 10 MG tablet   Yes No    Take 1 tablet by mouth Every 4 (Four) to 6 (Six) Hours As Needed for Moderate Pain.    pantoprazole (PROTONIX) 40 MG EC tablet   Yes No    Take 1 tablet by mouth Daily.    riFAXIMin (XIFAXAN) 550 MG tablet   Yes No    Take 1 tablet by mouth 2 (Two) Times a Day.    sodium bicarbonate 325 MG tablet   Yes No    Take 1 tablet by  mouth Daily.    spironolactone (ALDACTONE) 50 MG tablet   Yes No    Take 1 tablet by mouth Daily.    thiamine (VITAMIN B-1) 100 MG tablet   No No    take 1 tablet daily              Allergies:  Allergies   Allergen Reactions    Bee Venom Anaphylaxis    Scopolamine Anaphylaxis       Objective      Vitals:   Temp:  [98.3 °F (36.8 °C)] 98.3 °F (36.8 °C)  Heart Rate:  [56-76] 60  Resp:  [20] 20  BP: ()/(30-52) 72/33    Physical Exam  Vitals reviewed.   Constitutional:       General: He is in acute distress.      Appearance: He is ill-appearing and toxic-appearing.   HENT:      Head: Normocephalic.      Comments: Facial trauma with abrasions and bruising noted.  No crepitus or instability on physical exam.     Right Ear: Tympanic membrane and ear canal normal.      Left Ear: Tympanic membrane and ear canal normal.      Nose: Nose normal.      Mouth/Throat:      Mouth: Mucous membranes are dry.      Pharynx: Oropharynx is clear.   Eyes:      General: Scleral icterus present.      Pupils: Pupils are equal, round, and reactive to light.   Cardiovascular:      Rate and Rhythm: Normal rate and regular rhythm.      Pulses: Normal pulses.      Heart sounds: Normal heart sounds. No murmur heard.  Pulmonary:      Effort: Pulmonary effort is normal.      Breath sounds: Normal breath sounds.   Abdominal:      General: Bowel sounds are normal. There is distension.      Palpations: Abdomen is soft.      Tenderness: There is no abdominal tenderness. There is no guarding or rebound.   Genitourinary:     Comments: Penis and testes with edema  Musculoskeletal:         General: Swelling present.      Cervical back: Normal range of motion and neck supple. No rigidity or tenderness.      Right lower leg: Edema present.      Left lower leg: Edema present.   Skin:     General: Skin is warm and dry.      Capillary Refill: Capillary refill takes less than 2 seconds.      Coloration: Skin is jaundiced.   Neurological:      Mental Status:  He is disoriented.      Motor: Weakness present.      Comments: Somnolent and lethargic          Result Review    Result Review:  I have personally reviewed the results from the time of this admission to 7/3/2023 18:33 EDT and agree with these findings:  [x]  Laboratory  [x]  Microbiology  [x]  Radiology  [x]  EKG/Telemetry   []  Cardiology/Vascular   []  Pathology  []  Old records  []  Other:  Most notable findings include:       Assessment & Plan        Active Hospital Problems:  Active Hospital Problems    Diagnosis     **Hepatic encephalopathy      Plan:   AMS secondary to hepatic encephalopathy from hyperammonemia  -Long history of EtOH abuse  -Ammonia at 157  -CT of the brain negative for acute intracranial pathology  -Lactulose given rectally.  Initiate lactulose and rifaximin p.o. once able to tolerate  -Maintain n.p.o. status until SLP can assess for swallow capability  -MELD sodium score 30    -GI consulted.  Planning on doing a paracentesis in the next 1 to 2 days    Hypotension  -Persistent hypotension not responding to IVF hydration  -Lactic at 2.4  -Norepinephrine infusion initiated  -Intensivist MIRI Villarreal consulted for ICU admission and accepted    Status post ground-level fall  -Pan imaging negative for acute intracranial pathology, cervical/spinal or facial fractures    Acute renal failure  -Creatinine at 4.75  -Refrain from nephrotoxic medications, NSAIDs and IV contrast  -Nephrology consulted    PMH: Hepatic encephalopathy, alcoholic hepatitis and cirrhosis, esophageal varices, HTN  -Resume home meds when able to tolerate p.o. with exception of HTN meds     DVT prophylaxis:  Mechanical DVT prophylaxis orders are present.    CODE STATUS:    Code Status (Patient has no pulse and is not breathing): CPR (Attempt to Resuscitate)  Medical Interventions (Patient has pulse or is breathing): Full Support    Admission Status:  I believe this patient meets inpatient status.    I discussed the patient's  findings and my recommendations with patient and family.    This patient has been examined wearing appropriate Personal Protective Equipment and discussed with  patient and nurse . 07/03/23      Signature: Electronically signed by Jose Ruffin Jr, APRN, 07/03/23, 18:33 EDT.  [facility] Hospitalist Team    Electronically signed by Zi Vidal MD at 07/04/23 0846          Emergency Department Notes        Laura Crawford at 07/03/23 1701          Call placed to Dr. Padilla     Electronically signed by Laura Crawford at 07/03/23 1701       Ivan Oropeza PA at 07/03/23 1500       Attestation signed by Issa Jacobs MD at 07/03/23 2114        NON FACE TO FACE: This visit was performed by BOTH a physician and an APC. I performed all aspects of the MDM as documented.  Issa Jacobs MD 7/3/2023 21:14 EDT                         Subjective   History of Present Illness  Patient is a 37-year-old male PMH significant for Decompensated cirrhosis, hepatic encephalopathy, alcoholic hepatitis presenting to the ED with wife who is main historian with complaints of low blood pressure and altered mental status over the past 2 days.  Patient's wife states he was hospitalized and discharged last week with similar complaints she states when he got discharged they went to [place] and his symptoms worsened over the weekend.  She states he also fell yesterday when getting out of the car.  He states today now he is not able to ambulate well.  She reports he has had increased bilateral lower extremity edema now going up into his scrotum and abdomen.  She reports he last had a paracentesis on 6/23/2023 and is not scheduled for another 1 until 7/7/2023.  She reports chronic nausea and cough without significant change.  No reports of fever or vomiting.  No reports of loss of consciousness.  Patient's wife states she has been giving him lactulose but she is not sure if he is actually taking it.  She is not sure  when his last bowel movement was.      History provided by:  Spouse    Review of Systems   Unable to perform ROS: Mental status change     Past Medical History:   Diagnosis Date    Alcohol abuse     1/2-1 pint a day, sober since 9/12/2022    Alcoholic cirrhosis 09/01/2022    Arthritis     Hypertension     Sleep apnea        Allergies   Allergen Reactions    Bee Venom Anaphylaxis    Scopolamine Anaphylaxis       Past Surgical History:   Procedure Laterality Date    BACK SURGERY      WRIST SURGERY         Family History   Problem Relation Age of Onset    Cirrhosis Father     Heart disease Maternal Grandmother     Diabetes Maternal Grandmother     Diabetes Maternal Grandfather     Diabetes Paternal Grandmother     Diabetes Paternal Grandfather        Social History     Socioeconomic History    Marital status:    Tobacco Use    Smoking status: Every Day     Packs/day: 0.50     Types: Cigarettes    Smokeless tobacco: Never   Vaping Use    Vaping Use: Never used   Substance and Sexual Activity    Alcohol use: Not Currently     Comment: sober since 9/12/22    Drug use: Not Currently     Types: Marijuana    Sexual activity: Defer           Objective   Physical Exam  Vitals and nursing note reviewed.   Constitutional:       General: He is not in acute distress.     Appearance: He is well-developed. He is ill-appearing. He is not toxic-appearing or diaphoretic.   HENT:      Head: Normocephalic and atraumatic.      Mouth/Throat:      Mouth: Mucous membranes are moist.      Pharynx: Oropharynx is clear.   Eyes:      General: Scleral icterus present.      Extraocular Movements: Extraocular movements intact.      Pupils: Pupils are equal, round, and reactive to light.   Cardiovascular:      Rate and Rhythm: Normal rate and regular rhythm.      Pulses: Normal pulses.      Heart sounds: No murmur heard.    No friction rub. No gallop.   Pulmonary:      Effort: Pulmonary effort is normal. No tachypnea or accessory muscle  "usage.      Breath sounds: No decreased breath sounds, wheezing, rhonchi or rales.   Chest:      Chest wall: No mass, deformity, tenderness or crepitus.   Abdominal:      General: Bowel sounds are decreased. There is distension.      Tenderness: There is no abdominal tenderness. There is no guarding or rebound.   Musculoskeletal:      Right lower le+      Left lower le+   Skin:     General: Skin is warm.      Capillary Refill: Capillary refill takes less than 2 seconds.      Findings: No rash.   Neurological:      Mental Status: He is alert.      Comments: Patient will look at me when asked but will not follow any other commands.  No facial droop noted   Psychiatric:         Mood and Affect: Mood normal.         Behavior: Behavior normal.       Procedures          ED Course  ED Course as of 23 1656      152 Spoke to MIRI Tapia with GI [AA]   1556 Spoke to Tai [facility] recommends holding off on vancomycin until MRSA PCR results [AA]      ED Course User Index  [AA] Ivan Oropeza PA      BP 94/41   Pulse 67   Temp 98.3 °F (36.8 °C) (Oral)   Resp 20   Ht 182.9 cm (72\")   Wt 92.8 kg (204 lb 9.4 oz)   SpO2 98%   BMI 27.75 kg/m²   Medications   sodium chloride 0.9 % flush 10 mL (has no administration in time range)   midodrine (PROAMATINE) tablet 20 mg (20 mg Oral Not Given 7/3/23 1503)   sodium chloride 0.9 % flush 10 mL (has no administration in time range)   Pharmacy to dose vancomycin (has no administration in time range)   zinc sulfate (ZINCATE) capsule 220 mg (has no administration in time range)   lactulose (CHRONULAC) 10 GM/15ML solution 20 g (has no administration in time range)   riFAXIMin (XIFAXAN) tablet 550 mg (has no administration in time range)   sodium chloride 0.9 % bolus 1,000 mL (1,000 mL Intravenous New Bag 7/3/23 1507)   lactulose (CHRONULAC) solution for enema 300 mL (300 mL Rectal Given 7/3/23 1622)   cefTRIAXone (ROCEPHIN) 2 g in sodium chloride 0.9 % 100 " mL IVPB (2 g Intravenous New Bag 7/3/23 7668)     Labs Reviewed   AMMONIA - Abnormal; Notable for the following components:       Result Value    Ammonia 157 (*)     All other components within normal limits   COMPREHENSIVE METABOLIC PANEL - Abnormal; Notable for the following components:    Glucose 112 (*)     BUN 51 (*)     Creatinine 4.75 (*)     Sodium 133 (*)     Chloride 92 (*)     Total Protein 4.9 (*)     Albumin 3.3 (*)     AST (SGOT) 66 (*)     Alkaline Phosphatase 153 (*)     Total Bilirubin 3.7 (*)     Anion Gap 16.0 (*)     eGFR 15.2 (*)     All other components within normal limits    Narrative:     GFR Normal >60  Chronic Kidney Disease <60  Kidney Failure <15     URINALYSIS W/ CULTURE IF INDICATED - Abnormal; Notable for the following components:    Color, UA Dark Yellow (*)     Ketones, UA Trace (*)     Bilirubin, UA Small (1+) (*)     Protein, UA 30 mg/dL (1+) (*)     Leuk Esterase, UA Trace (*)     Nitrite, UA Positive (*)     All other components within normal limits    Narrative:     In absence of clinical symptoms, the presence of pyuria, bacteria, and/or nitrites on the urinalysis result does not correlate with infection.   TROPONIN - Abnormal; Notable for the following components:    HS Troponin T 28 (*)     All other components within normal limits    Narrative:     High Sensitive Troponin T Reference Range:  <10.0 ng/L- Negative Female for AMI  <15.0 ng/L- Negative Male for AMI  >=10 - Abnormal Female indicating possible myocardial injury.  >=15 - Abnormal Male indicating possible myocardial injury.   Clinicians would have to utilize clinical acumen, EKG, Troponin, and serial changes to determine if it is an Acute Myocardial Infarction or myocardial injury due to an underlying chronic condition.        CBC WITH AUTO DIFFERENTIAL - Abnormal; Notable for the following components:    RBC 2.99 (*)     Hemoglobin 10.5 (*)     Hematocrit 31.3 (*)     .5 (*)     MCH 35.1 (*)     All other  components within normal limits   LACTIC ACID, REFLEX - Abnormal; Notable for the following components:    Lactate 2.4 (*)     All other components within normal limits   MANUAL DIFFERENTIAL - Abnormal; Notable for the following components:    Neutrophil % 41.0 (*)     Eosinophil % 8.0 (*)     Basophil % 7.0 (*)     Eosinophils Absolute 0.64 (*)     Basophils Absolute 0.56 (*)     All other components within normal limits   URINALYSIS, MICROSCOPIC ONLY - Abnormal; Notable for the following components:    RBC, UA 0-2 (*)     WBC, UA 0-2 (*)     Squamous Epithelial Cells, UA 3-6 (*)     All other components within normal limits   POC LACTATE - Abnormal; Notable for the following components:    Lactate 2.1 (*)     All other components within normal limits   BLOOD CULTURE   BLOOD CULTURE   MRSA SCREEN, PCR   BODY FLUID CULTURE   SCAN SLIDE   PATH CONSULT REFLEX   HIGH SENSITIVITIY TROPONIN T 2HR   PROTEIN, BODY FLUID   ALBUMIN, FLUID   BODY FLUID CELL COUNT WITH DIFFERENTIAL    Narrative:     The following orders were created for panel order Body Fluid Cell Count With Differential - Body Fluid, Peritoneum.  Procedure                               Abnormality         Status                     ---------                               -----------         ------                     Body fluid cell count - ...[[id number]]                                                   Please view results for these tests on the individual orders.   BODY FLUID CELL COUNT   POC LACTATE   PATHOLOGY CONSULTATION   CBC AND DIFFERENTIAL    Narrative:     The following orders were created for panel order CBC & Differential.  Procedure                               Abnormality         Status                     ---------                               -----------         ------                     CBC Auto Differential[[id number]]        Abnormal            Final result               Scan Slide[[id number]]                                       Final  result                 Please view results for these tests on the individual orders.     CT Abdomen Pelvis Without Contrast    Result Date: 7/3/2023  Impression: 1. Large volume abdominal pelvic ascites. 2. Cirrhotic liver morphology. 3. Moderate to large colonic stool burden. Correlate for constipation. 4. Scattered mild bibasilar atelectasis. Small left and trace right pleural effusions are present. Electronically Signed: Chanda Clark  7/3/2023 3:42 PM EDT  Workstation ID: [id number]    CT Head Without Contrast    Result Date: 7/3/2023  Impression: No acute intracranial abnormality or acute facial bone fracture. Electronically Signed: True Allen  7/3/2023 3:45 PM EDT  Workstation ID: [id number]    CT Cervical Spine Without Contrast    Result Date: 7/3/2023  Impression: 1. No evidence of cervical spine fracture 2. Minimal left mastoid effusion 3. 1 cm right apical groundglass opacity. Recommend follow-up chest CT in 6 Electronically Signed: Dio Murrell  7/3/2023 3:44 PM EDT  Workstation ID: [id number]    XR Chest 1 View    Result Date: 7/3/2023  Impression: Improving bibasilar atelectasis or pneumonia since 6/28/2023. No new airspace disease is appreciated. Electronically Signed: Chanda Clark  7/3/2023 4:27 PM EDT  Workstation ID: [id number]    CT Facial Bones Without Contrast    Result Date: 7/3/2023  Impression: No acute intracranial abnormality or acute facial bone fracture. Electronically Signed: True Allen  7/3/2023 3:45 PM EDT  Workstation ID: [id number]                                        Medical Decision Making  Chart Review: Discharge summary reviewed from 6/28/2023 was cleared by GI for discharge home with home health as above continue lactulose and rifaximin.  Also discharged on midodrine 20 mg 3 times a day his initial URIEL improved was also evaluated and cleared by nephrology during this hospitalization.  Echocardiogram performed on 6/23/2023 showed an EF of 56 to 60%.  Last paracentesis reviewed  from 6/23/2023    Comorbidity: As per past medical history  Differentials: Hepatic encephalopathy, ascites, brain bleed, skull fracture, pneumonia, UTI     ;this list is not all inclusive and does not constitute the entirety of considered causes  EKG: Interpreted by myself and Dr. Jacobs shows sinus bradycardia rate 59 otherwise normal EKG previous reviewed from 6/23/2023 showed sinus rhythm  Labs: as above   Radiology: My interpretation CT head shows no obvious brain bleed, CT facial bones and cervical spine shows no obvious fracture, CT abdomen pelvis shows ascites no definite bowel obstruction correlated with radiologist interpretations as below  CT Abdomen Pelvis Without Contrast   Final Result    Impression:        1. Large volume abdominal pelvic ascites.    2. Cirrhotic liver morphology.    3. Moderate to large colonic stool burden. Correlate for constipation.    4. Scattered mild bibasilar atelectasis. Small left and trace right pleural effusions are present.                        Electronically Signed: Chanda Clark      7/3/2023 3:42 PM EDT      Workstation ID: [id number]     CT Head Without Contrast   Final Result    Impression:    No acute intracranial abnormality or acute facial bone fracture.            Electronically Signed: True Allen      7/3/2023 3:45 PM EDT      Workstation ID: [id number]     CT Cervical Spine Without Contrast   Final Result    Impression:        1. No evidence of cervical spine fracture    2. Minimal left mastoid effusion    3. 1 cm right apical groundglass opacity. Recommend follow-up chest CT in 6            Electronically Signed: Dio Murrell      7/3/2023 3:44 PM EDT      Workstation ID: [id number]     CT Facial Bones Without Contrast   Final Result    Impression:    No acute intracranial abnormality or acute facial bone fracture.            Electronically Signed: True Allen      7/3/2023 3:45 PM EDT      Workstation ID: [id number]     XR Chest 1 View    (Results  Pending)      Disposition/Treatment:  Appropriate PPE was worn during exam and throughout all encounters with the patient.  When the ED IV was placed and labs were obtained patient was placed on proper monitors he was afebrile presenting with wife at bedside with complaints of altered mental status and worsening ascites since he was discharged from our facility on 6/28/2023.    EKG showed sinus bradycardia otherwise unremarkable.  No reports of chest pain at this time initial troponin 28 likely secondary to chronic illnesses.  Labs otherwise show CBC with a normal white count hemoglobin 10.5 hematocrit 31.3 platelets 158.  Anemia does appear chronic.  Metabolic panel showed glucose 112 BUN 51 creatinine 4.75 sodium 133 potassium 4.3 ALT 28 AST 66 alk phos 153 total bilirubin 3.7.  Worsening acute kidney injury since discharge on 6/27/2023 BUN of 22 creatinine 1.57.  Bilirubin however has improved as it was 6.1 on 6/27/2023.  Liver enzymes chronically elevated.  POC lactic 2.1 blood cultures are pending.  Urinalysis pending.  Ammonia elevated at 157.     Due to patient's recent fall CT head, neck, facial bones ordered which showed no acute intracranial or osseous abnormality as above.  Due to patient's worsening swelling and ascites CT abdomen pelvis was ordered which did show large ascites cirrhotic liver morphology and large colonic stool burden along with a right pleural effusion.     While in the ED patient did have some initial hypertension and on chart review does appear to be very chronic he is on midodrine 20 mg 3 times daily.  Unfortunately, due to his altered mental status not able to give p.o. dose of midodrine while in the ED he was given 1000 cc normal saline with improvement of his blood pressure systolic now in the 90s.  GI was consulted while in the ED.  I did speak to MIRI Tapia.  Patient was also given lactulose while in the ED along with Rocephin.  Vancomycin was initially ordered but discussed  with the ER pharmacist who would like to hold it at this time pending MRSA PCR.    On reassessment patient is resting quietly findings were discussed with the patient's wife at bedside voiced understand admission for further work-up and was and agree with plan.  Patient be admitted to PCU.  Spoke to  Able, NP who agreed for admission with hospitalist group    The high probability of sudden, clinically significant deterioration in the patient's condition required the highest level of my preparedness to intervene urgently.  The services I provided to this patient were to treat and/or prevent clinically significant deterioration that could result in: death or increase blood loss . Services included the following: chart data review, reviewing nurses notes an/or old charts, documentation time, consultant collaboration regarding findings and treatment options, vital sign assessments and ordering, interpreting and reviewing diagnostic studies/lab test.  Aggregate critical care time was 43 minutes , which includes only time during which I was engaged in work directly related to the patient's care, as described above, whether at the bedside or elsewhere in the Emergency Department. It did not include time spent performing other reported procedures.    Problems Addressed:  Abrasion of face, initial encounter: acute illness or injury  URIEL (acute kidney injury): acute illness or injury  Ascites due to alcoholic cirrhosis: acute illness or injury  Fall, initial encounter: acute illness or injury  Hepatic encephalopathy: complicated acute illness or injury    Amount and/or Complexity of Data Reviewed  External Data Reviewed: labs, radiology, ECG and notes.  Labs: ordered. Decision-making details documented in ED Course.  Radiology: ordered. Decision-making details documented in ED Course.  ECG/medicine tests: ordered.  Discussion of management or test interpretation with external provider(s): As above    Risk  OTC  drugs.  Prescription drug management.  Decision regarding hospitalization.        Final diagnoses:   Hepatic encephalopathy   Fall, initial encounter   Abrasion of face, initial encounter   Ascites due to alcoholic cirrhosis   URIEL (acute kidney injury)       ED Disposition  ED Disposition       ED Disposition   Decision to Admit    Condition   --    Comment   Level of Care: Med/Surg [1]   Admitting Physician: TAVO STONER [[id number]]   Attending Physician: TAVO STONER [[id number]]   Bed Request Comments: PCU                 No follow-up provider specified.       Medication List        ASK your doctor about these medications      ondansetron ODT 4 MG disintegrating tablet  Commonly known as: ZOFRAN-ODT  Place 1 tablet on the tongue Every 8 (Eight) Hours As Needed for Nausea or Vomiting for up to 30 days.  Ask about: Which instructions should I use?                 Ivan Oropeza PA  07/03/23 1656      Electronically signed by Issa Jacobs MD at 07/03/23 2114       Laura Crawford at 07/03/23 1455          EKG requested       Electronically signed by Laura Crawford at 07/03/23 1455       Operative/Procedure Notes (last 48 hours)  Notes from 07/02/23 0855 through 07/04/23 0855   No notes of this type exist for this encounter.          Physician Progress Notes (last 48 hours)        Mamie Ayers APRN at 07/03/23 1628       Attestation signed by Juan Padilla MD at 07/03/23 1859    The patient was seen and examined with an APRN. I personally performed the substantive portion of the history of presenting illness.  I also performed the entire physical exam and medical decision making.    37-year-old alcoholic male presenting with hepatic encephalopathy.  Most of the history was from the patient's wife was at the bedside.  Patient has been sober for 9 months.  He was more confused and belligerent and so she brought him in because she could not wake him up.  On admission he is jaundiced and  in acute renal failure with severe lower extremity edema on exam.  Lactulose enema was ordered but was technically difficult to perform.  Patient is also hypotensive.  Wife admits that patient has not been taking his lactulose properly at home and had not had a bowel movement in a few days.  WBC 8, Hgb 10.5, CR 4.75, TB 3.7, , AST 66, ammonia 157  Impression:  Alcoholic hepatitis/cirrhosis decompensated  Hepatic encephalopathy likely made worse by constipation  Acute kidney injury likely from dehydration  Constipation  Plan:  Lactulose enema  N.p.o. until more awake  IV fluids with albumin replacement  Monitor renal output closely  Midodrine and octreotide  Agree with norepinephrine  Liver transplant has been ordered as an outpatient and is pending at [facility]    Electronically signed by Juan Padilla MD, 07/03/23, 6:48 PM EDT.                        LOS: 0 days   Patient Care Team:  Wolf Carvalho MD as PCP - General (Geriatric Medicine)  Fatou Waldron MD as Consulting Physician (Nephrology)      Subjective   Interval history: Patient was discharged on 6/28 and presented again to [facility] ER today with complaints of altered mental status, ascites, BLE edema, and hypotension.  History is limited secondary to patient's mental status.  He is awake, but is confused and does not provide verbal responses at this time.  Therefore, history was supplemented via chart review and report from bedside RN as well as patient's wife who is at bedside.  Per patient's wife, she is not sure if he has been compliant with lactulose at home.  She states that she encourages him to take lactulose as prescribed, but she is not sure if he is actually taking this.  She also reports that the patient has had recent mood swings and gets angry with her when she tries to encourage medication compliance.  Wife reports he has had no alcohol use since 9/2022.  Patient fell at home last night and has  bruising on his face.  CT of the head showed no acute findings.  CT abdomen/pelvis showed large amount of ascites, cirrhosis, and moderate to large stool burden.      ROS:   Review of Systems   Constitutional:  Negative for chills and fever.   Respiratory:  Negative for cough and shortness of breath.    Cardiovascular:  Negative for chest pain and palpitations.   Gastrointestinal:  Positive for abdominal distention. Negative for abdominal pain, nausea and vomiting.   Neurological:  Positive for weakness. Negative for dizziness.   Psychiatric/Behavioral:  Negative for agitation and confusion.       Medication Review:   Scheduled Meds:cefTRIAXone, 2 g, Intravenous, Once  lactulose, 300 mL, Rectal, Once  midodrine, 20 mg, Oral, Once      Continuous Infusions:Pharmacy to dose vancomycin,     PRN Meds:.  Pharmacy to dose vancomycin    [COMPLETED] Insert Peripheral IV **AND** sodium chloride    [COMPLETED] Insert Peripheral IV **AND** sodium chloride      Objective     Vital Signs  Temp:  [98.3 °F (36.8 °C)] 98.3 °F (36.8 °C)  Heart Rate:  [65-67] 67  Resp:  [20] 20  BP: (88-94)/(30-41) 94/41    Physical Exam:    General Appearance:    Awake, confused   Head:    Normocephalic, without obvious abnormality   Eyes:          Conjunctivae normal, icteric sclera   Ears:    Hearing intact   Throat:   No oral lesions, no thrush, oral mucosa moist   Neck:   No adenopathy, supple, no JVD   Lungs:     Respirations regular, even and unlabored       Abdomen:     Soft, distended with fluid wave, nontender   Rectal:     Deferred   Extremities:   3+ BLE edema, no cyanosis   Skin:   No bleeding, bruising or rash, + jaundice   Neurologic:   Sensation intact        Results Review:    CBC  Results from last 7 days   Lab Units 07/03/23  1504 06/28/23  0954 06/27/23  2247 06/26/23  2344   RBC 10*6/mm3 2.99* 3.21* 3.14* 3.19*   WBC 10*3/mm3 8.00 7.90 9.10 8.90   HEMOGLOBIN g/dL 10.5* 11.2* 11.4* 11.2*   PLATELETS 10*3/mm3 158 148 135* 133*          CMP  Results from last 7 days   Lab Units 07/03/23  1504 06/27/23  2247 06/26/23  2344   SODIUM mmol/L 133* 139 142   POTASSIUM mmol/L 4.3 2.9* 3.1*   CHLORIDE mmol/L 92* 97* 100   CO2 mmol/L 25.0 32.0* 30.0*   BUN mg/dL 51* 22* 25*   CREATININE mg/dL 4.75* 1.57* 1.77*   GLUCOSE mg/dL 112* 109* 115*   ALBUMIN g/dL 3.3* 2.9* 3.0*   BILIRUBIN mg/dL 3.7* 6.1* 3.8*   ALK PHOS U/L 153* 146* 152*   AST (SGOT) U/L 66* 46* 60*   ALT (SGPT) U/L 28 25 32   AMMONIA umol/L 157*  --   --          Amylase and Lipase        CRP         Imaging Results (Last 24 Hours)       Procedure Component Value Units Date/Time    XR Chest 1 View [[id number]] Resulted: 07/03/23 1615     Updated: 07/03/23 1615    CT Head Without Contrast [[id number]] Collected: 07/03/23 1540     Updated: 07/03/23 1547    Narrative:      CT HEAD WO CONTRAST, CT FACIAL BONES WO CONTRAST    Date of Exam: 7/3/2023 3:36 PM EDT    Indication: fall last night with head injury now AMS.    Comparison: None available.    Technique: Axial CT images were obtained of the head without contrast administration.  Coronal reconstructions were performed.  Automated exposure control and iterative reconstruction methods were used.    Findings:  Gray-white differentiation is maintained and there is no evidence of intracranial hemorrhage, mass or mass effect. The ventricles are normal in size and configuration. The orbits are normal. The paranasal sinuses are grossly clear. The calvarium is   intact. Additional dedicated evaluation of the facial bones demonstrates no evidence of acute fracture. The mastoid air cells are clear. Rightward nasal septal deviation is noted the lamina papyracea are intact.      Impression:      Impression:  No acute intracranial abnormality or acute facial bone fracture.      Electronically Signed: True Allen    7/3/2023 3:45 PM EDT    Workstation ID: [id number]    CT Facial Bones Without Contrast [[id number]] Collected: 07/03/23 1540     Updated:  07/03/23 1547    Narrative:      CT HEAD WO CONTRAST, CT FACIAL BONES WO CONTRAST    Date of Exam: 7/3/2023 3:36 PM EDT    Indication: fall last night with head injury now AMS.    Comparison: None available.    Technique: Axial CT images were obtained of the head without contrast administration.  Coronal reconstructions were performed.  Automated exposure control and iterative reconstruction methods were used.    Findings:  Gray-white differentiation is maintained and there is no evidence of intracranial hemorrhage, mass or mass effect. The ventricles are normal in size and configuration. The orbits are normal. The paranasal sinuses are grossly clear. The calvarium is   intact. Additional dedicated evaluation of the facial bones demonstrates no evidence of acute fracture. The mastoid air cells are clear. Rightward nasal septal deviation is noted the lamina papyracea are intact.      Impression:      Impression:  No acute intracranial abnormality or acute facial bone fracture.      Electronically Signed: True Allen    7/3/2023 3:45 PM EDT    Workstation ID: [id number]    CT Cervical Spine Without Contrast [[id number]] Collected: 07/03/23 1541     Updated: 07/03/23 1546    Narrative:        CT CERVICAL SPINE WO CONTRAST    Date of Exam: 7/3/2023 3:36 PM EDT    Indication: fall last night with AMS.    Comparison: None available.    Technique: Axial CT images were obtained of the cervical spine without contrast administration.  Sagittal and coronal reconstructions were performed.  Automated exposure control and iterative reconstruction methods were used.      Findings:  No evidence of fracture or traumatic subluxation. Mild degenerative disc disease at C5-C6. No prevertebral soft tissue swelling. Minimal fluid in the left mastoid air cells. 1 cm groundglass opacity in the right lung apex      Impression:      Impression:    1. No evidence of cervical spine fracture  2. Minimal left mastoid effusion  3. 1 cm right apical  groundglass opacity. Recommend follow-up chest CT in 6      Electronically Signed: Dio Murrell    7/3/2023 3:44 PM EDT    Workstation ID: [id number]    CT Abdomen Pelvis Without Contrast [[id number]] Collected: 07/03/23 1539     Updated: 07/03/23 1544    Narrative:      CT ABDOMEN PELVIS WO CONTRAST    Date of Exam: 7/3/2023 3:36 PM EDT    Indication: abdominal distention.    Comparison: Ultrasound-guided paracentesis 6/23/2023. No prior CT abdomen and pelvis for comparison at this institution. Liver ultrasound 6/25/2023.    Technique: Axial CT images were obtained of the abdomen and pelvis without the administration of contrast. Sagittal and coronal reconstructions were performed.  Automated exposure control and iterative reconstruction methods were used.      Findings:  Large quantity abdominal pelvic ascites is present. There is mild to moderate generalized body wall edema. Small left, trace right basilar pleural effusions are present with mild posterior bibasilar atelectasis and mild to right middle lobe atelectasis.   There is mild right hemidiaphragm elevation.    The liver is diminutive in size. It demonstrates a subtle surface nodular contour, suggestive of cirrhosis. No focal liver lesion is identified on this noncontrast examination. The spleen size is within normal limits. There is a small esophageal hiatal   hernia which contains ascites fluid.    The gallbladder, pancreas, adrenals, and kidneys demonstrate a normal noncontrast appearance. Moderate to large stool burden is present in the ascending, transverse, and descending colon. The bowel does not appear abnormally dilated or inflamed. The   appendix is normal.    The urinary bladder, prostate, and rectum are normal.    No acute or suspicious osseous abnormalities are identified. There is moderate degenerative disc change at the L4-5 vertebral level.      Impression:      Impression:    1. Large volume abdominal pelvic ascites.  2. Cirrhotic liver  morphology.  3. Moderate to large colonic stool burden. Correlate for constipation.  4. Scattered mild bibasilar atelectasis. Small left and trace right pleural effusions are present.            Electronically Signed: Chanda Guerraeliza    7/3/2023 3:42 PM EDT    Workstation ID: [id number]              Assessment & Plan     Assessment:  37-year-old male with history of alcoholic cirrhosis complicated by HE, ascites, and esophageal varices discharged on  and presented again to [facility] ER today with complaints of altered mental status, ascites, BLE edema, and hypotension.      -Hepatic encephalopathy/hyperammonemia  -Alcohol hepatitis and cirrhosis complicated by HE, ascites, and esophageal varices  -Hyponatremia  -Acute renal failure  -Hypotension  -Elevated LFTs  -Hyperkalemia     Plan:  Agree with lactulose enemas and repeat until more alert and oriented.   Restart oral lactulose and Xifaxan once able to tolerate p.o.  We will plan for paracentesis with fluid studies.  Will add zinc.  MELD sodium score 30.  Once he is more alert and oriented, recommend 2 g sodium diet with Boost/Ensure supplements. Encourage his nutrition.  Supportive care.  GI will follow.    MONICA Bates  23  16:28 EDT      Electronically signed by Juan Padilla MD at 23 1859          Consult Notes (last 48 hours)        Cathleen Wong APRN at 23 1951            Critical Care Consult Note   Daquan Xiong : 1985 MRN:[id number] LOS:0 ROOM: [facility]     Reason for admission: Hepatic encephalopathy     Assessment / Plan     Hepatic encephalopathy  -Admission ammonia  level 157  -Lactulose 30 grams q 6 hours  -Continue Ceftriaxone for now  -GI consult prior to ICU admission  -Nutrition consult when appropriate    Hypotension  -Persistent hypotension in spite of adequate fluid resuscitation  -Continue home midodrine  -C/w additional fluids as needed. Avoid fluid overload  -Titrate vasopressors  for a target MAP of 65    Kidney injury, acute on chronic  -UA reviewed  -Consider renal ultrasound  -Continue IVF resuscitation  -Monitor and trend labs  -Avoid nephrotoxic medications, hypotension, and NSAIDS  -Renally dose medications  -Monitor urine output  -Nephrology consulted prior to ICU admission    1 cm right apical groundglass opacity via CT  --Radiology recommend follow-up chest CT in 6     Chronic conditions: bipolar, GERD, malnutrition, alcoholic cirrhosis, CKD, chronic-constipation, obstructive sleep apnea, smoking and substance abuse  - Continue home medications as appropriate  - Monitor routine labs and vital signs        Code Status (Patient has no pulse and is not breathing): CPR (Attempt to Resuscitate)  Medical Interventions (Patient has pulse or is breathing): Full Support       Nutrition: NPO Diet NPO Type: Strict NPO Patient isn't on Tube Feeding     DVT prophylaxis:  Mechanical DVT prophylaxis orders are present.     History of Present illness     A 37 y.o. old male patient with PMH of bipolar, GERD, malnutrition, alcoholic cirrhosis, CKD, chronic-constipation, obstructive sleep apnea, smoking and substance abuse presents to the hospital with complaints of altered mental status.  HPI information is limited due to patient confusion; information obtained from chart review and ER report.  Patient has had increased low blood pressure and altered mental status over the last 2 days.  Per report patient's wife stated he was hospitalized and discharged last week with similar complaints.  He went to [place] over the weekend and his symptoms worsened ending with a fall yesterday afternoon getting out of the car.  Additionally, patient's wife reported increased bilateral lower extremity edema going to his scrotum and abdomen.  His last paracentesis was 6/23/2023 he was not supposed to be scheduled again until 7/7/2023.  Patient's blood pressure continued to drop while in the emergency department  awaiting PCU bed patient was brought to the intensive care unit for further evaluation and treatment.    ACP: Patient unable to answer questions at this time will remain full code with full intervention; his wife is his decision-maker if he is unable.    Patient was seen and examined on 07/03/23 at 21:35 EDT .    Subjective / Review of systems     Review of Systems   Unable to perform ROS: Acuity of condition      Past Medical/Surgical/Social/Family History & Allergies     Past Medical History:   Diagnosis Date    Alcohol abuse     1/2-1 pint a day, sober since 9/12/2022    Alcoholic cirrhosis 09/01/2022    Arthritis     Bipolar affective disorder 09/22/2022    Cholecystitis 11/08/2022    Chronic constipation 09/05/2017    GERD (gastroesophageal reflux disease) 09/22/2022    Doing well with pepcid.    Hypertension     Osteoarthritis 09/22/2022    Polysubstance abuse 09/22/2022    Severe Malnutrition (HCC) 06/27/2023    Sleep apnea     Tobacco use disorder 09/22/2022      Past Surgical History:   Procedure Laterality Date    BACK SURGERY      WRIST SURGERY        Social History     Socioeconomic History    Marital status:    Tobacco Use    Smoking status: Every Day     Packs/day: 0.50     Types: Cigarettes    Smokeless tobacco: Never   Vaping Use    Vaping Use: Never used   Substance and Sexual Activity    Alcohol use: Not Currently     Comment: sober since 9/12/22    Drug use: Not Currently     Types: Marijuana    Sexual activity: Defer      Family History   Problem Relation Age of Onset    Cirrhosis Father     Heart disease Maternal Grandmother     Diabetes Maternal Grandmother     Diabetes Maternal Grandfather     Diabetes Paternal Grandmother     Diabetes Paternal Grandfather       Allergies   Allergen Reactions    Bee Venom Anaphylaxis    Scopolamine Anaphylaxis        Home Medications     Prior to Admission medications    Medication Sig Start Date End Date Taking? Authorizing Provider   allopurinol  (ZYLOPRIM) 100 MG tablet Take 1 tablet by mouth Daily. 6/8/23   Luis Enrique Byrne MD   ALPRAZolam (Xanax) 0.5 MG tablet Take 1 tablet by mouth 2 (Two) Times a Day As Needed for Anxiety for up to 5 days. 6/28/23 7/3/23  Ward Adams MD   baclofen (LIORESAL) 10 MG tablet Take 5-10 mg by mouth 2 (Two) Times a Day As Needed for Muscle Spasms.    Nathaly Pop MD   baclofen (LIORESAL) 10 MG tablet Take 1 tablet by mouth Daily. With stretching    Nathaly Pop MD   calcium carbonate (TUMS) 500 MG chewable tablet Chew 2 tablets Daily for 14 days. 6/28/23 7/12/23  Ward Adams MD   cyclobenzaprine (FLEXERIL) 10 MG tablet Take 5-10 mg by mouth 3 (Three) Times a Day As Needed.    Nathaly Pop MD   furosemide (LASIX) 40 MG tablet Take 1 tablet by mouth Daily.    Nathaly Pop MD   lactulose (CHRONULAC) 10 GM/15ML solution Take 30 mL by mouth 3 (Three) Times a Day for 30 days. 6/28/23 7/28/23  Ward Adams MD   magnesium oxide (MAG-OX) 400 tablet tablet Take 1 tablet by mouth Daily for 14 days. 6/28/23 7/12/23  Ward Adams MD   melatonin 5 MG tablet tablet Take 1 tablet by mouth At Night As Needed.    Nathaly Pop MD   midodrine (PROAMATINE) 10 MG tablet Take 2 tablets (20 mg) by mouth 3 (Three) Times a Day Before Meals 6/28/23 7/28/23  Ward Adams MD   nadolol (CORGARD) 40 MG tablet Take 1 tablet by mouth Daily As Needed.    Nathaly Pop MD   nicotine (NICODERM CQ) 14 MG/24HR patch Place 1 patch on the skin as directed by provider Daily As Needed.    Nathaly Pop MD   nicotine polacrilex (NICORETTE) 2 MG gum Chew 1 each As Needed for Smoking Cessation.    Nathaly Pop MD   ondansetron ODT (ZOFRAN-ODT) 4 MG disintegrating tablet Place 1 tablet on the tongue Every 8 (Eight) Hours As Needed for Nausea or Vomiting for up to 30 days. 6/28/23 7/28/23  Ward Adams MD   oxyCODONE (ROXICODONE) 10 MG tablet Take 1 tablet by mouth Every 4  (Four) to 6 (Six) Hours As Needed for Moderate Pain.    Nathaly Pop MD   pantoprazole (PROTONIX) 40 MG EC tablet Take 1 tablet by mouth Daily.    Nathaly Pop MD   riFAXIMin (XIFAXAN) 550 MG tablet Take 1 tablet by mouth 2 (Two) Times a Day.    Nathaly Pop MD   sodium bicarbonate 325 MG tablet Take 1 tablet by mouth Daily.    Nathaly Pop MD   spironolactone (ALDACTONE) 50 MG tablet Take 1 tablet by mouth Daily.    Nathaly Pop MD   thiamine (VITAMIN B-1) 100 MG tablet take 1 tablet daily 6/28/23 7/28/23  Ward Adams MD   ondansetron ODT (ZOFRAN-ODT) 4 MG disintegrating tablet Place 1 tablet on the tongue Every 8 (Eight) Hours As Needed for Nausea or Vomiting.  7/3/23  Nathaly Pop MD        Objective / Physical Exam     Vital signs:  Temp: 97.4 °F (36.3 °C)  BP: (!) 89/41  Heart Rate: 56  Resp: 12  SpO2: 98 %  Weight: 88.7 kg (195 lb 8.8 oz)    Admission Weight: Weight: 83.1 kg (183 lb 4.8 oz)    Physical Exam  Vitals and nursing note reviewed.   Constitutional:       Appearance: He is ill-appearing.   HENT:      Head: Normocephalic.      Comments: Abrasion to left side of face     Mouth/Throat:      Mouth: Mucous membranes are dry.      Pharynx: Oropharynx is clear.   Eyes:      Pupils: Pupils are equal, round, and reactive to light.   Cardiovascular:      Rate and Rhythm: Regular rhythm. Bradycardia present.      Pulses: Normal pulses.      Heart sounds: Normal heart sounds.   Pulmonary:      Effort: Pulmonary effort is normal.      Breath sounds: Normal breath sounds.   Abdominal:      General: Bowel sounds are normal. There is distension.      Tenderness: There is abdominal tenderness.   Musculoskeletal:         General: Normal range of motion.      Cervical back: Normal range of motion.      Right lower leg: Edema present.      Left lower leg: Edema present.   Skin:     General: Skin is warm and dry.      Capillary Refill: Capillary refill takes more  than 3 seconds.      Coloration: Skin is jaundiced.   Neurological:      Mental Status: He is alert. He is disoriented.        Labs     Results from last 7 days   Lab Units 07/03/23  1504 06/28/23  0954 06/27/23 2247 06/26/23  2344   WBC 10*3/mm3 8.00 7.90 9.10 8.90   HEMATOCRIT % 31.3* 33.5* 32.4* 32.8*   PLATELETS 10*3/mm3 158 148 135* 133*      Results from last 7 days   Lab Units 07/03/23  1504 06/27/23 2247 06/26/23  2344   SODIUM mmol/L 133* 139 142   POTASSIUM mmol/L 4.3 2.9* 3.1*   CHLORIDE mmol/L 92* 97* 100   CO2 mmol/L 25.0 32.0* 30.0*   BUN mg/dL 51* 22* 25*   CREATININE mg/dL 4.75* 1.57* 1.77*        Imaging     CT Abdomen Pelvis Without Contrast    Result Date: 7/3/2023  Impression: 1. Large volume abdominal pelvic ascites. 2. Cirrhotic liver morphology. 3. Moderate to large colonic stool burden. Correlate for constipation. 4. Scattered mild bibasilar atelectasis. Small left and trace right pleural effusions are present. Electronically Signed: Chanda Clark  7/3/2023 3:42 PM EDT  Workstation ID: [id number]    CT Head Without Contrast    Result Date: 7/3/2023  Impression: No acute intracranial abnormality or acute facial bone fracture. Electronically Signed: True Allen  7/3/2023 3:45 PM EDT  Workstation ID: [id number]    CT Cervical Spine Without Contrast    Result Date: 7/3/2023  Impression: 1. No evidence of cervical spine fracture 2. Minimal left mastoid effusion 3. 1 cm right apical groundglass opacity. Recommend follow-up chest CT in 6 Electronically Signed: Dio Murrell  7/3/2023 3:44 PM EDT  Workstation ID: [id number]    XR Chest 1 View    Result Date: 7/3/2023  Impression: Improving bibasilar atelectasis or pneumonia since 6/28/2023. No new airspace disease is appreciated. Electronically Signed: Chanda Clark  7/3/2023 4:27 PM EDT  Workstation ID: [id number]    CT Facial Bones Without Contrast    Result Date: 7/3/2023  Impression: No acute intracranial abnormality or acute facial bone fracture.  Electronically Signed: True Allen  7/3/2023 3:45 PM EDT  Workstation ID: [id number]        Current Medications     Scheduled Meds:  albumin human, , ,   albumin human, , ,   albumin human, 50 g, Intravenous, Daily  [START ON 7/4/2023] cefTRIAXone, 2 g, Intravenous, Q24H  lactulose, 30 g, Oral, Q6H  [START ON 7/4/2023] midodrine, 20 mg, Oral, Q8H  octreotide, 100 mcg, Intravenous, TID  [START ON 7/4/2023] pantoprazole, 40 mg, Intravenous, Q AM  [START ON 7/4/2023] rifAXIMin, 550 mg, Oral, Q12H  sodium chloride, 500 mL, Intravenous, Once  sodium chloride, 10 mL, Intravenous, Q12H  thiamine, 100 mg, Oral, Daily         Continuous Infusions:  DOPamine, 2-20 mcg/kg/min  norepinephrine, 0.02-0.3 mcg/kg/min, Last Rate: 0.2 mcg/kg/min (07/03/23 2115)         MONICA Guerrier   Critical Care  07/03/23   21:35 EDT     Electronically signed by MONICA Guerrier, 07/03/23, 9:33 PM EDT.         Electronically signed by Cathleen Wong APRN at 07/03/23 6006

## 2023-07-05 ENCOUNTER — INPATIENT HOSPITAL (OUTPATIENT)
Dept: URBAN - METROPOLITAN AREA HOSPITAL 84 | Facility: HOSPITAL | Age: 38
End: 2023-07-05
Payer: COMMERCIAL

## 2023-07-05 DIAGNOSIS — K59.00 CONSTIPATION, UNSPECIFIED: ICD-10-CM

## 2023-07-05 DIAGNOSIS — I85.10 SECONDARY ESOPHAGEAL VARICES WITHOUT BLEEDING: ICD-10-CM

## 2023-07-05 DIAGNOSIS — K70.11 ALCOHOLIC HEPATITIS WITH ASCITES: ICD-10-CM

## 2023-07-05 DIAGNOSIS — K70.31 ALCOHOLIC CIRRHOSIS OF LIVER WITH ASCITES: ICD-10-CM

## 2023-07-05 DIAGNOSIS — K76.82 HEPATIC ENCEPHALOPATHY: ICD-10-CM

## 2023-07-05 DIAGNOSIS — R94.5 ABNORMAL RESULTS OF LIVER FUNCTION STUDIES: ICD-10-CM

## 2023-07-05 LAB
ALBUMIN SERPL-MCNC: 3.8 G/DL (ref 3.5–5.2)
ALBUMIN/GLOB SERPL: 4.2 G/DL
ALP SERPL-CCNC: 93 U/L (ref 39–117)
ALT SERPL W P-5'-P-CCNC: 19 U/L (ref 1–41)
ANION GAP SERPL CALCULATED.3IONS-SCNC: 12 MMOL/L (ref 5–15)
AST SERPL-CCNC: 33 U/L (ref 1–40)
BASOPHILS # BLD AUTO: 0.2 10*3/MM3 (ref 0–0.2)
BASOPHILS NFR BLD AUTO: 2.5 % (ref 0–1.5)
BILIRUB SERPL-MCNC: 3.8 MG/DL (ref 0–1.2)
BUN SERPL-MCNC: 36 MG/DL (ref 6–20)
BUN/CREAT SERPL: 13.1 (ref 7–25)
CALCIUM SPEC-SCNC: 9 MG/DL (ref 8.6–10.5)
CHLORIDE SERPL-SCNC: 103 MMOL/L (ref 98–107)
CO2 SERPL-SCNC: 24 MMOL/L (ref 22–29)
CREAT SERPL-MCNC: 2.74 MG/DL (ref 0.76–1.27)
DEPRECATED RDW RBC AUTO: 54.7 FL (ref 37–54)
EGFRCR SERPLBLD CKD-EPI 2021: 29.5 ML/MIN/1.73
EOSINOPHIL # BLD AUTO: 0.2 10*3/MM3 (ref 0–0.4)
EOSINOPHIL NFR BLD AUTO: 2.8 % (ref 0.3–6.2)
ERYTHROCYTE [DISTWIDTH] IN BLOOD BY AUTOMATED COUNT: 14.8 % (ref 12.3–15.4)
GLOBULIN UR ELPH-MCNC: 0.9 GM/DL
GLUCOSE SERPL-MCNC: 120 MG/DL (ref 65–99)
HCT VFR BLD AUTO: 27 % (ref 37.5–51)
HGB BLD-MCNC: 9.1 G/DL (ref 13–17.7)
INR PPP: 1.54 (ref 0.93–1.1)
LYMPHOCYTES # BLD AUTO: 1.4 10*3/MM3 (ref 0.7–3.1)
LYMPHOCYTES NFR BLD AUTO: 20.8 % (ref 19.6–45.3)
MAGNESIUM SERPL-MCNC: 2 MG/DL (ref 1.6–2.6)
MCH RBC QN AUTO: 34.5 PG (ref 26.6–33)
MCHC RBC AUTO-ENTMCNC: 33.9 G/DL (ref 31.5–35.7)
MCV RBC AUTO: 101.8 FL (ref 79–97)
MONOCYTES # BLD AUTO: 1.5 10*3/MM3 (ref 0.1–0.9)
MONOCYTES NFR BLD AUTO: 22.3 % (ref 5–12)
NEUTROPHILS NFR BLD AUTO: 3.4 10*3/MM3 (ref 1.7–7)
NEUTROPHILS NFR BLD AUTO: 51.6 % (ref 42.7–76)
NRBC BLD AUTO-RTO: 0.1 /100 WBC (ref 0–0.2)
PHOSPHATE SERPL-MCNC: 3.3 MG/DL (ref 2.5–4.5)
PLATELET # BLD AUTO: 113 10*3/MM3 (ref 140–450)
PMV BLD AUTO: 9.5 FL (ref 6–12)
POTASSIUM SERPL-SCNC: 3.9 MMOL/L (ref 3.5–5.2)
PROT SERPL-MCNC: 4.7 G/DL (ref 6–8.5)
PROTHROMBIN TIME: 16.1 SECONDS (ref 9.6–11.7)
QT INTERVAL: 458 MS
RBC # BLD AUTO: 2.65 10*6/MM3 (ref 4.14–5.8)
SODIUM SERPL-SCNC: 139 MMOL/L (ref 136–145)
WBC NRBC COR # BLD: 6.6 10*3/MM3 (ref 3.4–10.8)

## 2023-07-05 PROCEDURE — 25010000002 HEPARIN (PORCINE) PER 1000 UNITS: Performed by: INTERNAL MEDICINE

## 2023-07-05 PROCEDURE — 97112 NEUROMUSCULAR REEDUCATION: CPT | Performed by: PHYSICAL THERAPIST

## 2023-07-05 PROCEDURE — 97535 SELF CARE MNGMENT TRAINING: CPT | Performed by: OCCUPATIONAL THERAPIST

## 2023-07-05 PROCEDURE — 97162 PT EVAL MOD COMPLEX 30 MIN: CPT | Performed by: PHYSICAL THERAPIST

## 2023-07-05 PROCEDURE — 97166 OT EVAL MOD COMPLEX 45 MIN: CPT | Performed by: OCCUPATIONAL THERAPIST

## 2023-07-05 PROCEDURE — 97530 THERAPEUTIC ACTIVITIES: CPT | Performed by: PHYSICAL THERAPIST

## 2023-07-05 PROCEDURE — 25010000002 ALBUMIN HUMAN 25% PER 50 ML: Performed by: INTERNAL MEDICINE

## 2023-07-05 PROCEDURE — 25010000002 OCTREOTIDE PER 25 MCG

## 2023-07-05 PROCEDURE — 80053 COMPREHEN METABOLIC PANEL: CPT | Performed by: NURSE PRACTITIONER

## 2023-07-05 PROCEDURE — P9047 ALBUMIN (HUMAN), 25%, 50ML: HCPCS | Performed by: INTERNAL MEDICINE

## 2023-07-05 PROCEDURE — 25010000002 CALCIUM GLUCONATE-NACL 1-0.675 GM/50ML-% SOLUTION: Performed by: STUDENT IN AN ORGANIZED HEALTH CARE EDUCATION/TRAINING PROGRAM

## 2023-07-05 PROCEDURE — 85610 PROTHROMBIN TIME: CPT

## 2023-07-05 PROCEDURE — 83735 ASSAY OF MAGNESIUM: CPT | Performed by: STUDENT IN AN ORGANIZED HEALTH CARE EDUCATION/TRAINING PROGRAM

## 2023-07-05 PROCEDURE — 85025 COMPLETE CBC W/AUTO DIFF WBC: CPT | Performed by: NURSE PRACTITIONER

## 2023-07-05 PROCEDURE — 99232 SBSQ HOSP IP/OBS MODERATE 35: CPT

## 2023-07-05 PROCEDURE — 84100 ASSAY OF PHOSPHORUS: CPT | Performed by: STUDENT IN AN ORGANIZED HEALTH CARE EDUCATION/TRAINING PROGRAM

## 2023-07-05 RX ORDER — LACTULOSE 10 G/15ML
30 SOLUTION ORAL EVERY 4 HOURS
Status: DISCONTINUED | OUTPATIENT
Start: 2023-07-05 | End: 2023-07-07 | Stop reason: HOSPADM

## 2023-07-05 RX ORDER — CALCIUM GLUCONATE 20 MG/ML
1 INJECTION, SOLUTION INTRAVENOUS ONCE
Status: COMPLETED | OUTPATIENT
Start: 2023-07-05 | End: 2023-07-05

## 2023-07-05 RX ADMIN — LACTULOSE 30 G: 20 SOLUTION ORAL at 13:59

## 2023-07-05 RX ADMIN — LACTULOSE 30 G: 20 SOLUTION ORAL at 21:21

## 2023-07-05 RX ADMIN — MIDODRINE HYDROCHLORIDE 20 MG: 5 TABLET ORAL at 21:20

## 2023-07-05 RX ADMIN — Medication 10 ML: at 21:21

## 2023-07-05 RX ADMIN — Medication 10 ML: at 08:52

## 2023-07-05 RX ADMIN — RIFAXIMIN 550 MG: 550 TABLET ORAL at 21:20

## 2023-07-05 RX ADMIN — OXYCODONE HYDROCHLORIDE 5 MG: 5 TABLET ORAL at 06:00

## 2023-07-05 RX ADMIN — OXYCODONE HYDROCHLORIDE 5 MG: 5 TABLET ORAL at 14:19

## 2023-07-05 RX ADMIN — MIDODRINE HYDROCHLORIDE 20 MG: 5 TABLET ORAL at 06:00

## 2023-07-05 RX ADMIN — ALPRAZOLAM 0.25 MG: 0.25 TABLET ORAL at 21:20

## 2023-07-05 RX ADMIN — LACTULOSE 30 G: 20 SOLUTION ORAL at 11:01

## 2023-07-05 RX ADMIN — SODIUM CHLORIDE 500 ML: 9 INJECTION, SOLUTION INTRAVENOUS at 02:21

## 2023-07-05 RX ADMIN — HEPARIN SODIUM 5000 UNITS: 5000 INJECTION INTRAVENOUS; SUBCUTANEOUS at 06:00

## 2023-07-05 RX ADMIN — Medication 10 ML: at 08:51

## 2023-07-05 RX ADMIN — OXYCODONE HYDROCHLORIDE 5 MG: 5 TABLET ORAL at 21:22

## 2023-07-05 RX ADMIN — CALCIUM GLUCONATE 1 G: 20 INJECTION, SOLUTION INTRAVENOUS at 02:21

## 2023-07-05 RX ADMIN — MIDODRINE HYDROCHLORIDE 20 MG: 5 TABLET ORAL at 13:59

## 2023-07-05 RX ADMIN — LACTULOSE 30 G: 20 SOLUTION ORAL at 17:29

## 2023-07-05 RX ADMIN — PANTOPRAZOLE SODIUM 40 MG: 40 TABLET, DELAYED RELEASE ORAL at 06:00

## 2023-07-05 RX ADMIN — ALBUMIN (HUMAN) 50 G: 0.25 INJECTION, SOLUTION INTRAVENOUS at 08:51

## 2023-07-05 RX ADMIN — OCTREOTIDE ACETATE 100 MCG: 100 INJECTION, SOLUTION INTRAVENOUS; SUBCUTANEOUS at 21:20

## 2023-07-05 RX ADMIN — Medication 100 MG: at 08:50

## 2023-07-05 RX ADMIN — LACTULOSE 30 G: 20 SOLUTION ORAL at 06:00

## 2023-07-05 RX ADMIN — HEPARIN SODIUM 5000 UNITS: 5000 INJECTION INTRAVENOUS; SUBCUTANEOUS at 21:20

## 2023-07-05 RX ADMIN — OCTREOTIDE ACETATE 100 MCG: 100 INJECTION, SOLUTION INTRAVENOUS; SUBCUTANEOUS at 08:50

## 2023-07-05 RX ADMIN — RIFAXIMIN 550 MG: 550 TABLET ORAL at 08:50

## 2023-07-05 NOTE — THERAPY EVALUATION
Patient Name: Daquan Xiong  : 1985    MRN: [id number]                              Today's Date: 2023       Admit Date: 7/3/2023    Visit Dx:     ICD-10-CM ICD-9-CM   1. Hepatic encephalopathy  K76.82 572.2   2. Fall, initial encounter  W19.XXXA E888.9   3. Abrasion of face, initial encounter  S00.81XA 910.0   4. Ascites due to alcoholic cirrhosis  K70.31 571.2   5. URIEL (acute kidney injury)  N17.9 584.9     Patient Active Problem List   Diagnosis    Bipolar affective disorder    GERD (gastroesophageal reflux disease)    Osteoarthritis    Tobacco use disorder    Polysubstance abuse    Obstructive sleep apnea    URIEL (acute kidney injury)    Alcoholic cirrhosis    Chronic constipation    Severe Malnutrition (HCC)    Hepatic encephalopathy    Essential hypertension    CKD (chronic kidney disease) stage 3, GFR 30-59 ml/min     Past Medical History:   Diagnosis Date    Alcohol abuse     1/2-1 pint a day, sober since 2022    Alcoholic cirrhosis 2022    Arthritis     Bipolar affective disorder 2022    Cholecystitis 2022    Chronic constipation 2017    GERD (gastroesophageal reflux disease) 2022    Doing well with pepcid.    Hypertension     Osteoarthritis 2022    Polysubstance abuse 2022    Severe Malnutrition (HCC) 2023    Sleep apnea     Tobacco use disorder 2022     Past Surgical History:   Procedure Laterality Date    BACK SURGERY      WRIST SURGERY        General Information       Row Name 23 1547          OT Time and Intention    Document Type evaluation  -DT     Mode of Treatment occupational therapy  -DT       Row Name 23 1547          General Information    Patient Profile Reviewed yes  -DT     Prior Level of Function independent:;all household mobility;ADL's  Pt has a hx of multiple falls; so spouse provides intermittent supervision at times during showering, mobility/transfers.  -DT     Existing Precautions/Restrictions fall  -DT      Barriers to Rehab medically complex  -DT       Row Name 07/05/23 1547          Living Environment    People in Home spouse  States his daughter just recently moved out.  -DT       Row Name 07/05/23 1547          Home Main Entrance    Number of Stairs, Main Entrance one  -DT     Stair Railings, Main Entrance none  -DT       Row Name 07/05/23 1547          Stairs Within Home, Primary    Number of Stairs, Within Home, Primary none  -DT       Row Name 07/05/23 1547          Cognition    Orientation Status (Cognition) oriented x 3  -DT       Row Name 07/05/23 1547          Safety Issues, Functional Mobility    Safety Issues Affecting Function (Mobility) at risk behavior observed;impulsivity;judgment;problem-solving;safety precaution awareness;safety precautions follow-through/compliance  -DT     Impairments Affecting Function (Mobility) balance;cognition;endurance/activity tolerance;range of motion (ROM);strength;other (see comments)  BLE edema affecting LE ROM  -DT               User Key  (r) = Recorded By, (t) = Taken By, (c) = Cosigned By      Initials Name Provider Type    DT Cherelle White, OT Occupational Therapist                     Mobility/ADL's       Row Name 07/05/23 1553          Bed Mobility    Bed Mobility bed mobility (all) activities  -DT     All Activities, [place] (Bed Mobility) moderate assist (50% patient effort);2 person assist  -DT     Assistive Device (Bed Mobility) head of bed elevated;draw sheet;bed rails  -DT       Row Name 07/05/23 1553          Transfers    Transfers toilet transfer  -DT       Row Name 07/05/23 1553          Bed-Chair Transfer    Bed-Chair [place] (Transfers) minimum assist (75% patient effort);2 person assist  -DT       Row Name 07/05/23 1553          Sit-Stand Transfer    Sit-Stand [place] (Transfers) minimum assist (75% patient effort);2 person assist  -DT     Assistive Device (Sit-Stand Transfers) walker, front-wheeled  -DT       Row Name  07/05/23 1553          Toilet Transfer    [place] Level (Toilet Transfer) minimum assist (75% patient effort);2 person assist  -DT     Assistive Device (Toilet Transfer) commode  -DT       Row Name 07/05/23 1553          Activities of Daily Living    BADL Assessment/Intervention feeding;lower body dressing;toileting  -DT       Row Name 07/05/23 1553          Self-Feeding Assessment/Training    [place] Level (Feeding) liquids to mouth;set up  -DT     Position (Self-Feeding) edge of bed sitting;supported sitting  -DT       Row Name 07/05/23 1553          Lower Body Dressing Assessment/Training    [place] Level (Lower Body Dressing) lower body dressing skills;maximum assist (25% patient effort)  -DT     Position (Lower Body Dressing) edge of bed sitting  -DT       Row Name 07/05/23 1553          Toileting Assessment/Training    [place] Level (Toileting) toileting skills;minimum assist (75% patient effort)  -DT     Position (Toileting) supported sitting  -DT               User Key  (r) = Recorded By, (t) = Taken By, (c) = Cosigned By      Initials Name Provider Type    DT Cherelle White, OT Occupational Therapist                   Obj/Interventions       Row Name 07/05/23 1555          Range of Motion Comprehensive    General Range of Motion upper extremity range of motion deficits identified  -DT     Comment, General Range of Motion 50% cholo. shoulder flexion AROM deficit. Pt c/o pain from recent falls & general stiffness.  -DT       Row Name 07/05/23 1555          Strength Comprehensive (MMT)    General Manual Muscle Testing (MMT) Assessment upper extremity strength deficits identified  -DT     Comment, General Manual Muscle Testing (MMT) Assessment BUE elbow, wrist & hand=4-/5 ; shoulder=3-/5.  -DT       Row Name 07/05/23 1555          Balance    Balance Assessment sitting static balance;sitting dynamic balance;standing static balance;standing dynamic balance  -DT     Static Sitting  Balance supervision  -DT     Dynamic Sitting Balance contact guard  -DT     Static Standing Balance contact guard  -DT     Dynamic Standing Balance minimal assist  -DT     Position/Device Used, Standing Balance walker, front-wheeled  or HHA with gait belt.  -DT               User Key  (r) = Recorded By, (t) = Taken By, (c) = Cosigned By      Initials Name Provider Type    DT Cherelle White, OT Occupational Therapist                   Goals/Plan       Row Name 07/05/23 1601          Transfer Goal 1 (OT)    Activity/Assistive Device (Transfer Goal 1, OT) transfers, all  -DT     [place] Level/Cues Needed (Transfer Goal 1, OT) contact guard required  -DT     Time Frame (Transfer Goal 1, OT) 2 weeks  -DT       Row Name 07/05/23 1606          Dressing Goal 1 (OT)    Activity/Device (Dressing Goal 1, OT) dressing skills, all  -DT     [place]/Cues Needed (Dressing Goal 1, OT) standby assist  -DT     Time Frame (Dressing Goal 1, OT) 2 weeks  -DT       Row Name 07/05/23 1604          Toileting Goal 1 (OT)    Activity/Device (Toileting Goal 1, OT) toileting skills, all  -DT     [place] Level/Cues Needed (Toileting Goal 1, OT) supervision required  -DT     Time Frame (Toileting Goal 1, OT) 2 weeks  -DT       Row Name 07/05/23 1603          Grooming Goal 1 (OT)    Activity/Device (Grooming Goal 1, OT) grooming skills, all  -DT     [place] (Grooming Goal 1, OT) modified independence  -DT     Time Frame (Grooming Goal 1, OT) 2 weeks  -DT     Strategies/Barriers (Grooming Goal 1, OT) standing at sink for 3-5 min  -DT       Row Name 07/05/23 1602          Therapy Assessment/Plan (OT)    Planned Therapy Interventions (OT) activity tolerance training;BADL retraining;cognitive/visual perception retraining;functional balance retraining;neuromuscular control/coordination retraining;occupation/activity based interventions;patient/caregiver education/training;ROM/therapeutic exercise;strengthening  exercise;transfer/mobility retraining  -DT               User Key  (r) = Recorded By, (t) = Taken By, (c) = Cosigned By      Initials Name Provider Type    DT Cherelle White, OT Occupational Therapist                   Clinical Impression       Row Name 07/05/23 1558          Pain Assessment    Pain Location - Side/Orientation Bilateral  -DT     Pain Location - shoulder  -DT     Pain Intervention(s) Therapeutic presence;Emotional support;Repositioned  -DT       Row Name 07/05/23 155          Pain Scale: FACES Pre/Post-Treatment    Pain: FACES Scale, Pretreatment 2-->hurts little bit  -DT     Posttreatment Pain Rating 4-->hurts little more  -DT       Row Name 07/05/23 1559          Plan of Care Review    Plan of Care Reviewed With patient  -DT     Outcome Evaluation Pt is a 37 y.o. M adm to [facility] on 07/03/23 with AMS,  s/p fall & hypotension. CT of head & cervical spine (-).  Pt adm with hepatic encephalopathy, large volume abd/pelvic ascites, BLE edema, hypotension, hyperammonemia, esophageal varices. He is being referred for liver transplant. PMH: alcoholic cirrhosis, BASILIO, substance abuse, CKD. At baseline, pt lives with spouse who provides intermittent supervision at times during ADLs due to recent falls. Spouse works, but mother can assist at home PRN. He has a cane he uses at times during amb. He does not drive or work at this time. Upon eval, pt was lethargic at first then alertness & cognition improved as he was sat EOB. He required assist X 2 for safe sup<>sit & ADL transfers in the room. He requires mod A for toileting & LB ADLs & v.c. for safety awareness as he is a little impulsive at times. He has dec generalized strength, balance & endurance & is functioning significantly below his baseline. Will follow for OT tx & recommend inpt rehab upon discharge.  -DT       Row Name 07/05/23 1557          Therapy Assessment/Plan (OT)    Rehab Potential (OT) good, to achieve stated therapy goals  -DT      Criteria for Skilled Therapeutic Interventions Met (OT) yes;meets criteria;skilled treatment is necessary  -DT     Therapy Frequency (OT) 5 times/wk  -DT     Predicted Duration of Therapy Intervention (OT) until d/c  -DT       Row Name 07/05/23 1558          Therapy Plan Review/Discharge Plan (OT)    Anticipated Discharge Disposition (OT) inpatient rehabilitation facility  -DT       Row Name 07/05/23 1558          Positioning and Restraints    Pre-Treatment Position in bed  -DT     Post Treatment Position chair  -DT     In Chair notified nsg;reclined;sitting;encouraged to call for assist;call light within reach;exit alarm on;with nsg;legs elevated  -DT               User Key  (r) = Recorded By, (t) = Taken By, (c) = Cosigned By      Initials Name Provider Type    Cherelle Mtz, OT Occupational Therapist                   Outcome Measures       Row Name 07/05/23 1407          How much help from another person do you currently need...    Turning from your back to your side while in flat bed without using bedrails? 2  -EJ     Moving from lying on back to sitting on the side of a flat bed without bedrails? 2  -EJ     Moving to and from a bed to a chair (including a wheelchair)? 3  -EJ     Standing up from a chair using your arms (e.g., wheelchair, bedside chair)? 3  -EJ     Climbing 3-5 steps with a railing? 2  -EJ     To walk in hospital room? 2  -EJ     AM-PAC 6 Clicks Score (PT) 14  -EJ     Highest level of mobility 4 --> Transferred to chair/commode  -EJ       Row Name 07/05/23 1405          Functional Assessment    Outcome Measure Options AM-PAC 6 Clicks Basic Mobility (PT)  -EJ               User Key  (r) = Recorded By, (t) = Taken By, (c) = Cosigned By      Initials Name Provider Type    Judy Alberto, PT Physical Therapist                    Occupational Therapy Education       Title: PT OT SLP Therapies (In Progress)       Topic: Occupational Therapy (Done)       Point: ADL training (Done)        Description:   Instruct learner(s) on proper safety adaptation and remediation techniques during self care or transfers.   Instruct in proper use of assistive devices.                  Learning Progress Summary             Patient Acceptance, E,TB, VU,NR by DT at 7/5/2023 1611    Comment: Role of OT,goals & POC, safety prec.                         Point: Home exercise program (Done)       Description:   Instruct learner(s) on appropriate technique for monitoring, assisting and/or progressing therapeutic exercises/activities.                  Learning Progress Summary             Patient Acceptance, E,TB, VU,NR by DT at 7/5/2023 1611    Comment: Role of OT,goals & POC, safety prec.                         Point: Precautions (Done)       Description:   Instruct learner(s) on prescribed precautions during self-care and functional transfers.                  Learning Progress Summary             Patient Acceptance, E,TB, VU,NR by DT at 7/5/2023 1611    Comment: Role of OT,goals & POC, safety prec.                         Point: Body mechanics (Done)       Description:   Instruct learner(s) on proper positioning and spine alignment during self-care, functional mobility activities and/or exercises.                  Learning Progress Summary             Patient Acceptance, E,TB, VU,NR by DT at 7/5/2023 1611    Comment: Role of OT,goals & POC, safety prec.                                         User Key       Initials Effective Dates Name Provider Type Discipline    DT 05/31/23 -  Cherelle White, OT Occupational Therapist OT                  OT Recommendation and Plan  Planned Therapy Interventions (OT): activity tolerance training, BADL retraining, cognitive/visual perception retraining, functional balance retraining, neuromuscular control/coordination retraining, occupation/activity based interventions, patient/caregiver education/training, ROM/therapeutic exercise, strengthening exercise,  transfer/mobility retraining  Therapy Frequency (OT): 5 times/wk  Plan of Care Review  Plan of Care Reviewed With: patient  Outcome Evaluation: Pt is a 37 y.o. M adm to [facility] on 07/03/23 with AMS,  s/p fall & hypotension. CT of head & cervical spine (-).  Pt adm with hepatic encephalopathy, large volume abd/pelvic ascites, BLE edema, hypotension, hyperammonemia, esophageal varices. He is being referred for liver transplant. PMH: alcoholic cirrhosis, BASILIO, substance abuse, CKD. At baseline, pt lives with spouse who provides intermittent supervision at times during ADLs due to recent falls. Spouse works, but mother can assist at home PRN. He has a cane he uses at times during amb. He does not drive or work at this time. Upon eval, pt was lethargic at first then alertness & cognition improved as he was sat EOB. He required assist X 2 for safe sup<>sit & ADL transfers in the room. He requires mod A for toileting & LB ADLs & v.c. for safety awareness as he is a little impulsive at times. He has dec generalized strength, balance & endurance & is functioning significantly below his baseline. Will follow for OT tx & recommend inpt rehab upon discharge.     Time Calculation:    Time Calculation- OT       Row Name 07/05/23 1611             Time Calculation- OT    OT Start Time 1014  -DT      OT Stop Time 1107  -DT      OT Time Calculation (min) 53 min  -DT      Total Timed Code Minutes- OT 20 minute(s)  -DT      OT Received On 07/05/23  -DT      OT - Next Appointment 07/06/23  -DT      OT Goal Re-Cert Due Date 07/19/23  -DT                User Key  (r) = Recorded By, (t) = Taken By, (c) = Cosigned By      Initials Name Provider Type    Cherelle Mtz, OT Occupational Therapist                           Cherelle White, OT  7/5/2023

## 2023-07-05 NOTE — CASE MANAGEMENT/SOCIAL WORK
Case Management Readmission Assessment Note    Case Management Readmission Assessment (all recorded)       Readmission Interview       Row Name 07/05/23 1109             Readmission Indications    Is this hospitalization related to the prior hospital diagnosis? Yes      What was the reason you were admitted? Altered mental status        [facility] Name 07/05/23 1109             Recommendation for rehospitalization    Did you speak with your physician prior to coming to the hospital No      Did you seek care elsewhere prior to coming to the hospital? No        [facility] Name 07/05/23 1109             Follow up appointment    Do you have a PCP? Yes  [facility]      Did you have an appointment with PCP/specialist after hospitalization within 7 days? --  No appointments available.        [facility] Name 07/05/23 1109             Medications    Did you have newly prescribed medications at discharge? Yes      Did you understand the reasons for your medications at discharge and how to take them? Yes      Did you understand the side effects of your medications? Yes      Are you taking all of you prescribed medications? Yes        Row Name 07/05/23 1109             Discharge Instructions    Did you understand your discharge instructions? Yes      Did your family/caregiver hear your instructions? Yes      Were you told to eat a special diet? No      Did you adhere to the diet? No      Were you given a number of someone to call if you had questions or concerns? Yes        [facility] Name 07/05/23 1109             Index discharge location/services    Where did you go upon discharge? Home with services      Do you have supportive family or friends in the home? Yes      What services were arranged at discharge? Home Health        [facility] Name 07/05/23 1109             Discharge Readiness    On a scale of 1-5 (5 being well prepared), how ready were you for discharge 3      Recommendation based on interview Education on diagnosis/self management;Goals of care  discussion/advanced care planning                    6/5/23- Hyponatremia, weakness, Discharged home with Home Health.    6/23/23- [facility] for Paracentesis, Confused, called rapid response- sent to ED. Discharged home with Home Health.    7/3/23- Altered mental status, Lactulose enemas, Plan for Paracentesis. GI following. Probable INPT rehab, Possible Pallative consult.

## 2023-07-05 NOTE — CONSULTS
Nutrition Services    Patient Name: Daquan Xiong  YOB: 1985  MRN: [id number]  Admission date: 7/3/2023    Comment:  -- Moderate chronic disease related malnutrition related to suspected inadequate PO intake in the setting of hx of alcoholic cirrhosis of the liver as evidenced by severe fluid accumulation and mainly severe muscle wasting + moderate fat wasting per NFPE.  See MSA below.    -- Continue Boost Plus TID (provides 1080 kcals, 42 g protein if consumed)       PPE Documentation        PPE Worn By Provider gloves   PPE Worn By Patient  None      CLINICAL NUTRITION ASSESSMENT      Reason for Assessment 7/5: MST of 5, Consult for Nursing Admission Screen      H&P      Past Medical History:   Diagnosis Date    Alcohol abuse     1/2-1 pint a day, sober since 9/12/2022    Alcoholic cirrhosis 09/01/2022    Arthritis     Bipolar affective disorder 09/22/2022    Cholecystitis 11/08/2022    Chronic constipation 09/05/2017    GERD (gastroesophageal reflux disease) 09/22/2022    Doing well with pepcid.    Hypertension     Osteoarthritis 09/22/2022    Polysubstance abuse 09/22/2022    Severe Malnutrition (HCC) 06/27/2023    Sleep apnea     Tobacco use disorder 09/22/2022       Past Surgical History:   Procedure Laterality Date    BACK SURGERY      WRIST SURGERY          Current Problems   AMS secondary to hepatic encephalopathy from hyperammonemia  -Long history of EtOH abuse  -Ammonia at 157 on admission, currently 91 (on lactulose)  -GI following   -S/P paracentesis 7/4 with 5L fluid removal (last one 6/23/23)    Hypotension     Status post ground-level fall     Acute renal failure  -Nephrology following      Chronic: Hepatic encephalopathy, alcoholic hepatitis and cirrhosis, esophageal varices, HTN       Encounter Information        Trending Narrative     7/5: Admitted for hypotension and altered mental status.  Patient discussed in AM rounds.  Noted with 1-2 bowel movements per day, MD desires 3 so he  "is increasing dose of lactulose.  S/P paracentesis 7/4 with 5L fluid removal.  Levo off.  Noted with plans to downgrade to PCU.  RD visited patient at bedside.  Patient seems very lethargic.  NFPE completed, consistent with nutrition diagnosis of malnutrition using AND/ASPEN criteria. See MSA below.         Anthropometrics        Current Height, Weight Height: 182.9 cm (72\")  Weight: 89.6 kg (197 lb 8.5 oz) (07/04/23 0600)       Ideal Body Weight (IBW) 178#   Usual Body Weight (UBW) Unable to obtain from patient        Trending Weight Hx     This admission: 7/5: 195-204# range              PTA: 14% weight loss x 10 months     Wt Readings from Last 30 Encounters:   07/04/23 0600 89.6 kg (197 lb 8.5 oz)   07/03/23 2100 88.7 kg (195 lb 8.8 oz)   07/03/23 1449 92.8 kg (204 lb 9.4 oz)   07/03/23 1443 83.1 kg (183 lb 4.8 oz)   06/28/23 0500 83.1 kg (183 lb 3.2 oz)   06/27/23 0500 81.2 kg (179 lb 0.2 oz)   06/24/23 1716 78.5 kg (173 lb 1 oz)   06/24/23 0510 79.5 kg (175 lb 4.3 oz)   06/23/23 0855 75.3 kg (166 lb)   06/05/23 1916 75.5 kg (166 lb 7.2 oz)   06/05/23 1500 74.3 kg (163 lb 12.8 oz)   09/23/22 0549 120 kg (263 lb 12.8 oz)   09/22/22 0559 119 kg (263 lb)   09/20/22 2047 118 kg (259 lb 12.8 oz)   09/20/22 1331 120 kg (265 lb 6.9 oz)      BMI kg/m2 Body mass index is 26.79 kg/m².       Labs        Pertinent Labs    Results from last 7 days   Lab Units 07/05/23  0606 07/04/23  1606 07/04/23  0445 07/03/23  1504   SODIUM mmol/L 139  --  139 133*   POTASSIUM mmol/L 3.9 4.0 3.5 4.3   CHLORIDE mmol/L 103  --  100 92*   CO2 mmol/L 24.0  --  25.0 25.0   BUN mg/dL 36*  --  48* 51*   CREATININE mg/dL 2.74*  --  3.86* 4.75*   CALCIUM mg/dL 9.0  --  8.2* 8.7   BILIRUBIN mg/dL 3.8*  --  3.9* 3.7*   ALK PHOS U/L 93  --  119* 153*   ALT (SGPT) U/L 19  --  20 28   AST (SGOT) U/L 33  --  43* 66*   GLUCOSE mg/dL 120*  --  108* 112*     Results from last 7 days   Lab Units 07/05/23  0606 07/04/23  0445 07/03/23  1504   MAGNESIUM " mg/dL 2.0 1.9 2.1   PHOSPHORUS mg/dL 3.3 4.3 5.4*   HEMOGLOBIN g/dL 9.1* 9.1* 10.5*   HEMATOCRIT % 27.0* 27.3* 31.3*   TRIGLYCERIDES mg/dL  --  81  --      No results found for: COVID19  No results found for: HGBA1C     Medications    Scheduled Medications albumin human, 50 g, Intravenous, QAM  heparin (porcine), 5,000 Units, Subcutaneous, Q8H  lactulose, 30 g, Oral, Q4H  midodrine, 20 mg, Oral, Q8H  octreotide, 100 mcg, Subcutaneous, TID  pantoprazole, 40 mg, Oral, Q AM  rifAXIMin, 550 mg, Oral, Q12H  sodium chloride, 500 mL, Intravenous, Once  sodium chloride, 10 mL, Intravenous, Q12H  sodium chloride, 10 mL, Intravenous, Q12H  sodium chloride, 10 mL, Intravenous, Q12H  sodium chloride, 10 mL, Intravenous, Q12H  thiamine, 100 mg, Oral, QAM        Infusions sodium chloride, 75 mL/hr, Last Rate: 75 mL/hr (07/04/23 1307)        PRN Medications   ALPRAZolam    ipratropium-albuterol    Magnesium Standard Dose Replacement - Follow Nurse / BPA Driven Protocol    nitroglycerin    ondansetron **OR** ondansetron    oxyCODONE    Phosphorus Replacement - Follow Nurse / BPA Driven Protocol    Potassium Replacement - Follow Nurse / BPA Driven Protocol    [COMPLETED] Insert Peripheral IV **AND** sodium chloride    [COMPLETED] Insert Peripheral IV **AND** sodium chloride    sodium chloride    sodium chloride    sodium chloride    sodium chloride     Physical Findings        Trending Physical   Appearance, NFPE 7/5: NFPE completed, consistent with nutrition diagnosis of malnutrition using AND/ASPEN criteria. See MSA below.      --  Edema  3+ generalized, legs, knees  4+ ankles, feet      Bowel Function BM 7/5 (today)     Tubes No feeding tube      Chewing/Swallowing No known issues      Skin Intact      --  Current Nutrition Orders & Evaluation of Intake       Oral Nutrition     Food Allergies NKFA   Current PO Diet Diet: Renal Diets, Cardiac Diets; Low Sodium (2g); Low Potassium; Texture: Regular Texture (IDDSI 7); Fluid  Consistency: Thin (IDDSI 0)   Supplement Boost Plus TID   PO Evaluation     Trending % PO Intake 7/5: 50% x 1 documented meals since admission    --  Nutritional Risk Screening        NRS-2002 Score          Nutrition Diagnosis         Nutrition Dx Problem 1 Moderate chronic disease related malnutrition related to suspected inadequate PO intake in the setting of hx of alcoholic cirrhosis of the liver as evidenced by severe fluid accumulation and mainly severe muscle wasting + moderate fat wasting per NFPE.       Nutrition Dx Problem 2        Intervention Goal         Intervention Goal(s) Accept ONS  No weight loss  PO intakes at least 60-75%     Nutrition Intervention        RD Action Cotninue ONS     Nutrition Prescription          Diet Prescription Low Sodium, Low Potassium    Supplement Prescription Boost Plus TID   --  Monitor/Evaluation        Monitor Per protocol, I&O, PO intake, Supplement intake, Pertinent labs, Weight, Skin status, GI status, Symptoms, POC/GOC     Malnutrition Severity Assessment      Patient meets criteria for : Moderate (non-severe) Malnutrition  Malnutrition Type (last 8 hours)       Malnutrition Severity Assessment       Row Name 07/05/23 1031       Malnutrition Severity Assessment    Malnutrition Type Chronic Disease - Related Malnutrition      Row Name 07/05/23 1031       Muscle Loss    Loss of Muscle Mass Findings Moderate    Muslim Region Moderate - slight depression    Clavicle Bone Region Severe - protruding prominent bone    Acromion Bone Region Moderate - acromion may slightly protrude    Scapular Bone Region --  supine    Dorsal Hand Region Moderate - slight depression    Patellar Region --  edema    Anterior Thigh Region --  edema    Posterior Calf Region --  edema      Row Name 07/05/23 1031       Fat Loss    Subcutaneous Fat Loss Findings Moderate    Orbital Region  Moderate -  somewhat hollowness, slightly dark circles    Upper Arm Region Moderate - some fat tissue, not  ample    Thoracic & Lumbar Region --  JORGE      Row Name 07/05/23 1031       Fluid Accumulation (Edema)    Fluid Acumulation Findings Severe    Fluid Accumulation  Severe equals 3+ or 4+ pitting edema      Row Name 07/05/23 1031       Criteria Met (Must meet criteria for severity in at least 2 of these categories: M Wasting, Fat Loss, Fluid, Secondary Signs, Wt. Status, Intake)    Patient meets criteria for  Moderate (non-severe) Malnutrition                     Electronically signed by:  Ayse Burgos RD  07/05/23 10:19 EDT

## 2023-07-05 NOTE — THERAPY EVALUATION
Patient Name: Daquan Xiong  : 1985    MRN: [id number]                              Today's Date: 2023       Admit Date: 7/3/2023    Visit Dx:     ICD-10-CM ICD-9-CM   1. Hepatic encephalopathy  K76.82 572.2   2. Fall, initial encounter  W19.XXXA E888.9   3. Abrasion of face, initial encounter  S00.81XA 910.0   4. Ascites due to alcoholic cirrhosis  K70.31 571.2   5. URIEL (acute kidney injury)  N17.9 584.9     Patient Active Problem List   Diagnosis    Bipolar affective disorder    GERD (gastroesophageal reflux disease)    Osteoarthritis    Tobacco use disorder    Polysubstance abuse    Obstructive sleep apnea    URIEL (acute kidney injury)    Alcoholic cirrhosis    Chronic constipation    Severe Malnutrition (HCC)    Hepatic encephalopathy    Essential hypertension    CKD (chronic kidney disease) stage 3, GFR 30-59 ml/min     Past Medical History:   Diagnosis Date    Alcohol abuse     1/2-1 pint a day, sober since 2022    Alcoholic cirrhosis 2022    Arthritis     Bipolar affective disorder 2022    Cholecystitis 2022    Chronic constipation 2017    GERD (gastroesophageal reflux disease) 2022    Doing well with pepcid.    Hypertension     Osteoarthritis 2022    Polysubstance abuse 2022    Severe Malnutrition (HCC) 2023    Sleep apnea     Tobacco use disorder 2022     Past Surgical History:   Procedure Laterality Date    BACK SURGERY      WRIST SURGERY        General Information       Row Name 23 1259          Physical Therapy Time and Intention    Document Type evaluation  -EJ     Mode of Treatment physical therapy  -EJ       Row Name 23 1259          General Information    Patient Profile Reviewed yes  -EJ     Prior Level of Function independent:  Pt independent prior but with intermittent need for supervision/min assist from spouse as pt has h/o falls.  -EJ     Existing Precautions/Restrictions fall  -EJ     Barriers to Rehab medically  complex  -[facility] Name 07/05/23 1259          Living Environment    People in Home spouse  -EJ     Name(s) of People in Home Lana (wife)  -       Row Name 07/05/23 1259          Home Main Entrance    Number of Stairs, Main Entrance one  -EJ     Stair Railings, Main Entrance none  -EJ       Row Name 07/05/23 1259          Stairs Within Home, Primary    Number of Stairs, Within Home, Primary none  -EJ       [facility] Name 07/05/23 1259          Cognition    Orientation Status (Cognition) oriented x 3  -[facility] Name 07/05/23 1259          Safety Issues, Functional Mobility    Safety Issues Affecting Function (Mobility) judgment;positioning of assistive device;problem-solving;sequencing abilities;safety precautions follow-through/compliance  -EJ     Impairments Affecting Function (Mobility) balance;cognition;endurance/activity tolerance;range of motion (ROM);strength  -EJ     Cognitive Impairments, Mobility Safety/Performance impulsivity;judgment;problem-solving/reasoning;sequencing abilities  -EJ               User Key  (r) = Recorded By, (t) = Taken By, (c) = Cosigned By      Initials Name Provider Type    EJ Judy Spann, PT Physical Therapist                   Mobility       [facility] Name 07/05/23 1301          Bed Mobility    Bed Mobility bed mobility (all) activities  -     All Activities, [place] (Bed Mobility) moderate assist (50% patient effort);2 person assist  -EJ     Assistive Device (Bed Mobility) head of bed elevated;draw sheet;bed rails  -[facility] Name 07/05/23 1301          Bed-Chair Transfer    Bed-Chair [place] (Transfers) minimum assist (75% patient effort);2 person assist  Initially used RW, but then HHA x2 provided.  -[facility] Name 07/05/23 1301          Sit-Stand Transfer    Sit-Stand [place] (Transfers) minimum assist (75% patient effort);2 person assist  -EJ     Assistive Device (Sit-Stand Transfers) walker, front-wheeled  -[facility] Name 07/05/23 1301           Gait/Stairs (Locomotion)    [place] Level (Gait) minimum assist (75% patient effort);2 person assist  -EJ     Assistive Device (Gait) walker, front-wheeled  -EJ     Distance in Feet (Gait) 3 feet  -EJ     Deviations/Abnormal Patterns (Gait) gait speed decreased;karen decreased  -EJ     Bilateral Gait Deviations heel strike decreased  -EJ               User Key  (r) = Recorded By, (t) = Taken By, (c) = Cosigned By      Initials Name Provider Type    EJ Judy Spann, PT Physical Therapist                   Obj/Interventions       Row Name 07/05/23 1354          Range of Motion Comprehensive    Comment, General Range of Motion BLE grossly WFL.  -EJ       Row Name 07/05/23 1354          Strength Comprehensive (MMT)    Comment, General Manual Muscle Testing (MMT) Assessment BLE strength grossly 3+/5.  -EJ       Row Name 07/05/23 1354          Motor Skills    Motor Skills functional endurance  -EJ     Functional Endurance Poor  -EJ       Row Name 07/05/23 1354          Balance    Balance Assessment sitting static balance;sitting dynamic balance;sit to stand dynamic balance;standing static balance;standing dynamic balance  -EJ     Static Sitting Balance supervision  -EJ     Dynamic Sitting Balance standby assist  -EJ     Position, Sitting Balance unsupported;sitting edge of bed  -EJ     Sit to Stand Dynamic Balance minimal assist;2-person assist  -EJ     Static Standing Balance contact guard  -EJ     Dynamic Standing Balance minimal assist;2-person assist  -EJ     Position/Device Used, Standing Balance supported;walker, front-wheeled  -EJ     Balance Interventions sitting;standing;sit to stand;supported;static;dynamic;minimal challenge  -EJ               User Key  (r) = Recorded By, (t) = Taken By, (c) = Cosigned By      Initials Name Provider Type    Judy Alberto, PT Physical Therapist                   Goals/Plan       Row Name 07/05/23 1402          Bed Mobility Goal 1 (PT)    Activity/Assistive Device  (Bed Mobility Goal 1, PT) bed mobility activities, all  -EJ     [place] Level/Cues Needed (Bed Mobility Goal 1, PT) standby assist  -EJ     Time Frame (Bed Mobility Goal 1, PT) long term goal (LTG);2 weeks  -EJ       Row Name 07/05/23 1402          Transfer Goal 1 (PT)    Activity/Assistive Device (Transfer Goal 1, PT) transfers, all  -EJ     [place] Level/Cues Needed (Transfer Goal 1, PT) standby assist  HHA x1  -EJ     Time Frame (Transfer Goal 1, PT) long term goal (LTG);2 weeks  -EJ       Row Name 07/05/23 1402          Gait Training Goal 1 (PT)    Activity/Assistive Device (Gait Training Goal 1, PT) gait (walking locomotion)  HHA x1  -EJ     [place] Level (Gait Training Goal 1, PT) contact guard required  -EJ     Distance (Gait Training Goal 1, PT) 150  -EJ     Time Frame (Gait Training Goal 1, PT) long term goal (LTG);2 weeks  -EJ       Row Name 07/05/23 1402          Therapy Assessment/Plan (PT)    Planned Therapy Interventions (PT) balance training;bed mobility training;gait training;patient/family education;home exercise program;neuromuscular re-education;postural re-education;ROM (range of motion);strengthening;transfer training  -EJ               User Key  (r) = Recorded By, (t) = Taken By, (c) = Cosigned By      Initials Name Provider Type    Judy Alberto, PT Physical Therapist                   Clinical Impression       Row Name 07/05/23 0603          Pain    Pain Intervention(s) Emotional support;Therapeutic presence;Repositioned  -EJ     Additional Documentation Pain Scale: FACES Pre/Post-Treatment (Group)  -EJ       Row Name 07/05/23 6215          Pain Scale: FACES Pre/Post-Treatment    Pain: FACES Scale, Pretreatment 4-->hurts little more  -EJ     Posttreatment Pain Rating 4-->hurts little more  -EJ       Row Name 07/05/23 6323          Plan of Care Review    Plan of Care Reviewed With patient  -EJ     Outcome Evaluation Patient is a 38 y/o M who was admitted 7/3 due to AMS and  hypotension.  Pt also had increased BLE edema into scrotum and abdomen.  Pt had fall also prior to admit and a paracentesis had been performed on 6/23/23.  Imaging revealed large volume abdominal pelvic ascities.  Pt with h/o decompensated cirrhosis, hepatic encephalopathy, hepatits, and HTN complicating his condition.  Pt is candidate for possible liver transplant.  At baseline pt is independent.  Has wife at home that is available, and pt has h/o falls.  He reports 1 step to enter.  This date pt required mod Ax2 supine to sit, min A x2 for sit to stand, and min A x2 for gait.  Pt was able to have bowel movement this date also and was min A x2 with HHA to transfer chair to [facility] and back.  Pt's BP was WNL throughout session, and pt was non-symptomatic for hypotension.  At this time pt is not safe to d/c home.  He is below his baseline status with BLE weakness, reduced endurance/strength, and decreased balance.  Pt was A&Ox3, but was lethargic slightly and had difficulty with sequencing of tasks requring max cues throughout for hand placement and safety with mobility.  At this time PT recommends pt d/c to inpatient rehab.  PT will continue to follow him in acute setting.  -       Row Name 07/05/23 1269          Therapy Assessment/Plan (PT)    Criteria for Skilled Interventions Met (PT) yes;skilled treatment is necessary  -EJ     Therapy Frequency (PT) 5 times/wk  -EJ     Predicted Duration of Therapy Intervention (PT) until discharge  -EJ       Row Name 07/05/23 1358          Positioning and Restraints    Pre-Treatment Position in bed  -EJ     Post Treatment Position chair  -EJ     In Chair reclined;call light within reach;encouraged to call for assist;exit alarm on;with nsg  -EJ               User Key  (r) = Recorded By, (t) = Taken By, (c) = Cosigned By      Initials Name Provider Type    EJ Judy Spann, PT Physical Therapist                   Outcome Measures       Row Name 07/05/23 6228          How much  help from another person do you currently need...    Turning from your back to your side while in flat bed without using bedrails? 2  -EJ     Moving from lying on back to sitting on the side of a flat bed without bedrails? 2  -EJ     Moving to and from a bed to a chair (including a wheelchair)? 3  -EJ     Standing up from a chair using your arms (e.g., wheelchair, bedside chair)? 3  -EJ     Climbing 3-5 steps with a railing? 2  -EJ     To walk in hospital room? 2  -EJ     AM-PAC 6 Clicks Score (PT) 14  -EJ     Highest level of mobility 4 --> Transferred to chair/commode  -EJ       Row Name 07/05/23 1403          Functional Assessment    Outcome Measure Options AM-PAC 6 Clicks Basic Mobility (PT)  -               User Key  (r) = Recorded By, (t) = Taken By, (c) = Cosigned By      Initials Name Provider Type    Judy Alberto, PT Physical Therapist                                 Physical Therapy Education       Title: PT OT SLP Therapies (In Progress)       Topic: Physical Therapy (In Progress)       Point: Mobility training (Done)       Learning Progress Summary             Patient Acceptance, E, VU,NR by  at 7/5/2023 1437                         Point: Home exercise program (Not Started)       Learner Progress:  Not documented in this visit.              Point: Body mechanics (Done)       Learning Progress Summary             Patient Acceptance, E, VU,NR by  at 7/5/2023 1437                         Point: Precautions (Done)       Learning Progress Summary             Patient Acceptance, E, VU,NR by  at 7/5/2023 1437                                         User Key       Initials Effective Dates Name Provider Type [facility] 05/31/23 -  Judy Spann, PT Physical Therapist PT                  PT Recommendation and Plan  Planned Therapy Interventions (PT): balance training, bed mobility training, gait training, patient/family education, home exercise program, neuromuscular re-education, postural  re-education, ROM (range of motion), strengthening, transfer training  Plan of Care Reviewed With: patient  Outcome Evaluation: Patient is a 38 y/o M who was admitted 7/3 due to AMS and hypotension.  Pt also had increased BLE edema into scrotum and abdomen.  Pt had fall also prior to admit and a paracentesis had been performed on 6/23/23.  Imaging revealed large volume abdominal pelvic ascities.  Pt with h/o decompensated cirrhosis, hepatic encephalopathy, hepatits, and HTN complicating his condition.  Pt is candidate for possible liver transplant.  At baseline pt is independent.  Has wife at home that is available, and pt has h/o falls.  He reports 1 step to enter.  This date pt required mod Ax2 supine to sit, min A x2 for sit to stand, and min A x2 for gait.  Pt was able to have bowel movement this date also and was min A x2 with HHA to transfer chair to [facility] and back.  Pt's BP was WNL throughout session, and pt was non-symptomatic for hypotension.  At this time pt is not safe to d/c home.  He is below his baseline status with BLE weakness, reduced endurance/strength, and decreased balance.  Pt was A&Ox3, but was lethargic slightly and had difficulty with sequencing of tasks requring max cues throughout for hand placement and safety with mobility.  At this time PT recommends pt d/c to inpatient rehab.  PT will continue to follow him in acute setting.     Time Calculation:    PT Charges       Row Name 07/05/23 1437             Time Calculation    Start Time 1014  -EJ      Stop Time 1107  -EJ      Time Calculation (min) 53 min  -EJ      PT Received On 07/05/23  -EJ      PT - Next Appointment 07/06/23  -EJ      PT Goal Re-Cert Due Date 07/19/23  -EJ         Time Calculation- PT    Total Timed Code Minutes- PT 23 minute(s)  -EJ                User Key  (r) = Recorded By, (t) = Taken By, (c) = Cosigned By      Initials Name Provider Type    Judy Alberto, PT Physical Therapist                  Therapy Charges for  Today       Code Description Service Date Service Provider Modifiers Qty    80183990132 HC PT THERAPEUTIC ACT EA 15 MIN 7/5/2023 Judy Spann, PT GP 1    71673476684 HC PT NEUROMUSC RE EDUCATION EA 15 MIN 7/5/2023 Judy Spann, PT GP 1    14289550993 HC PT EVAL MOD COMPLEXITY 3 7/5/2023 Judy Spann, PT GP 1            PT G-Codes  Outcome Measure Options: AM-PAC 6 Clicks Basic Mobility (PT)  AM-PAC 6 Clicks Score (PT): 14  PT Discharge Summary  Anticipated Discharge Disposition (PT): inpatient rehabilitation facility    Judy Spann, PT  7/5/2023

## 2023-07-05 NOTE — PLAN OF CARE
Goal Outcome Evaluation:  Plan of Care Reviewed With: patient           Outcome Evaluation: Patient is a 36 y/o M who was admitted 7/3 due to AMS and hypotension.  Pt also had increased BLE edema into scrotum and abdomen.  Pt had fall also prior to admit and a paracentesis had been performed on 6/23/23.  Imaging revealed large volume abdominal pelvic ascities.  Pt with h/o decompensated cirrhosis, hepatic encephalopathy, hepatits, and HTN complicating his condition.  Pt is candidate for possible liver transplant.  At baseline pt is independent.  Has wife at home that is available, and pt has h/o falls.  He reports 1 step to enter.  This date pt required mod Ax2 supine to sit, min A x2 for sit to stand, and min A x2 for gait.  Pt was able to have bowel movement this date also and was min A x2 with HHA to [place] [facility] [place].  Pt's BP was WNL throughout session, and pt was non-symptomatic for hypotension.  At this time pt is not safe to d/c home.  He is below his baseline status with BLE weakness, reduced endurance/strength, and decreased balance.  Pt was A&Ox3, but was lethargic slightly and had difficulty with sequencing of tasks requring max cues throughout for hand placement and safety with mobility.  At this time PT recommends pt d/c to inpatient rehab.  PT will continue to follow him in acute setting.      Anticipated Discharge Disposition (PT): inpatient rehabilitation facility

## 2023-07-05 NOTE — PAYOR COMM NOTE
"CLINICAL UPDATE 07/05/2023:        Daquan Xiong (37 y.o. Male)   PENDING AUTH # [id number]         AUTHORIZATION PENDING:   PLEASE CALL OR FAX DETERMINATION TO CONTACT BELOW. THANK YOU.        Jerica Eldridge RN MSN  /UR  [facility]  [phone] office  [phone] fax  [email or web address]    [facility]  NPI: [id number]  Tax: [phone]          Daquan Xiong (37 y.o. Male)       Date of Birth   1985    Social Security Number       Address   [address] [place] [address]    Home Phone   [phone]    MRN   [id number]       Tenriism   Anabaptism    Marital Status                               Admission Date   7/3/23    Admission Type   Emergency    Admitting Provider   Aly Zayas MD    Attending Provider   Gavin Medina MD    Department, Room/Bed   [facility] INTENSIVE CARE UNIT, 2307/1       Discharge Date       Discharge Disposition       Discharge Destination                                 Attending Provider: Gavin Medina MD    Allergies: Bee Venom, Scopolamine    Isolation: None   Infection: None   Code Status: CPR    Ht: 182.9 cm (72\")   Wt: 89.6 kg (197 lb 8.5 oz)    Admission Cmt: None   Principal Problem: Hepatic encephalopathy [K76.82]                   Active Insurance as of 7/3/2023       Primary Coverage       Payor Plan Insurance Group Employer/Plan Group    ANTHEM BLUE CROSS ANTHEM BLUE CROSS BLUE SHIELD PPO [id number]       Payor Plan Address Payor Plan Phone Number Payor Plan Fax Number Effective Dates    [address] [phone]  1/1/2023 - None Entered    [facility] [address]         Subscriber Name Subscriber Birth Date Member ID       ANA XIONG [birth year]/1986 [id number]               Secondary Coverage       Payor Plan Insurance Group Employer/Plan Group    ANTHEM MEDICAID [facility] [employer] - ANTH [id number]       Payor Plan Address Payor Plan Phone Number Payor Plan Fax Number Effective Dates    " MAIL STOP:   1/15/2023 - None Entered    [address] [address]       [facility] [address]         Subscriber Name Subscriber Birth Date Member ID       AMISH XIONG 1985 [id number]                     Emergency Contacts        (Rel.) Home Phone Work Phone Mobile Phone    Lana Xiong (Spouse) [phone] -- [phone]    Alejandra Xiong (Daughter) -- -- [phone]    Lizz Wiggins (Mother) -- -- [phone]                 History & Physical        Jose Ruffin Jr., APRN at 23 1833       Attestation signed by Zi Vidal MD at 23 0846    I have reviewed this documentation and agree.                      [facility] Medicine Services  History & Physical    Patient Name: Amish Xiong  : 1985  MRN: [id number]  Primary Care Physician:  Wolf Carvalho MD  Date of admission: 7/3/2023  Date and Time of Service: 2023 at 1730 hrs.    Subjective      Chief Complaint: Altered mental status, BLE edema, status post fall    History of Present Illness: Amish Xiong is a 37 y.o. male with a past medical history significant for decompensated cirrhosis, hepatic encephalopathy, alcoholic hepatitis presenting to the ED with his wife who is main historian states he has had low blood pressure and altered mental status over the past 2 days. Patient's wife states he was hospitalized and discharged last week with similar complaints she states when he got discharged they went to [place] and his symptoms worsened over the weekend. She states he also fell yesterday when getting out of the car. He states today now he is not able to ambulate well. She reports he has had increased bilateral lower extremity edema now going up into his scrotum and abdomen. She reports he last had a paracentesis on 2023 and is not scheduled for another 1 until 2023. She reports chronic nausea and cough without significant change. No reports of fever or vomiting. No reports of loss of  consciousness. Patient's wife states she has been giving him lactulose but she is not sure if he is actually taking it. She is not sure when his last bowel movement was.  Work-up in the ED revealed hyperammonemia 157, no leukocytosis, lactic 2.4, creatinine 4.7, LFTs elevated, he was pan imaged and negative for acute intracranial pathology or any fractures.  Patient was given lactulose and rifaximin.  He was given a 1 L bolus in the ED for hypotension and blood pressures were soft in the low 100s.  Patient was admitted to medicine and and GI was consulted.  Patient seen and examined in ED with family members present.  Patient responsive to verbal and painful stimuli but lethargic.  Unable to ascertain any complaints or information from patient due to current altered mental status.  Approximately 30 to 45 minutes later I was contacted by his ER bedside nurse stating that he was hypotensive again in the low 80s.  I advised her to give another 500 mL fluid bolus and reassess.  She reached out 15 minutes later stating that his BP has gone even further down into the 70s.  Patient was started on norepinephrine infusion.  Spoke with intensivist MIRI Villarreal and she accepted the patient for ICU level care.  Dr. Vidal was notified.    ROS   Unable to review due to current altered mental status.    Personal History     Past Medical History:   Diagnosis Date    Alcohol abuse     1/2-1 pint a day, sober since 9/12/2022    Alcoholic cirrhosis 09/01/2022    Arthritis     Hypertension     Sleep apnea        Past Surgical History:   Procedure Laterality Date    BACK SURGERY      WRIST SURGERY         Family History: family history includes Cirrhosis in his father; Diabetes in his maternal grandfather, maternal grandmother, paternal grandfather, and paternal grandmother; Heart disease in his maternal grandmother. Otherwise pertinent FHx was reviewed and not pertinent to current issue.    Social History:  reports that he has been smoking  cigarettes. He has been smoking an average of .5 packs per day. He has never used smokeless tobacco. He reports that he does not currently use alcohol. He reports that he does not currently use drugs after having used the following drugs: Marijuana.    Home Medications:  Prior to Admission Medications       Prescriptions Last Dose Informant Patient Reported? Taking?    allopurinol (ZYLOPRIM) 100 MG tablet   No No    Take 1 tablet by mouth Daily.    ALPRAZolam (Xanax) 0.5 MG tablet   No No    Take 1 tablet by mouth 2 (Two) Times a Day As Needed for Anxiety for up to 5 days.    baclofen (LIORESAL) 10 MG tablet   Yes No    Take 5-10 mg by mouth 2 (Two) Times a Day As Needed for Muscle Spasms.    baclofen (LIORESAL) 10 MG tablet   Yes No    Take 1 tablet by mouth Daily. With stretching    calcium carbonate (TUMS) 500 MG chewable tablet   No No    Chew 2 tablets Daily for 14 days.    cyclobenzaprine (FLEXERIL) 10 MG tablet   Yes No    Take 5-10 mg by mouth 3 (Three) Times a Day As Needed.    furosemide (LASIX) 40 MG tablet   Yes No    Take 1 tablet by mouth Daily.    lactulose (CHRONULAC) 10 GM/15ML solution   No No    Take 30 mL by mouth 3 (Three) Times a Day for 30 days.    magnesium oxide (MAG-OX) 400 tablet tablet   No No    Take 1 tablet by mouth Daily for 14 days.    melatonin 5 MG tablet tablet   Yes No    Take 1 tablet by mouth At Night As Needed.    midodrine (PROAMATINE) 10 MG tablet   No No    Take 2 tablets (20 mg) by mouth 3 (Three) Times a Day Before Meals    nadolol (CORGARD) 40 MG tablet   Yes No    Take 1 tablet by mouth Daily As Needed.    nicotine (NICODERM CQ) 14 MG/24HR patch   Yes No    Place 1 patch on the skin as directed by provider Daily As Needed.    nicotine polacrilex (NICORETTE) 2 MG gum   Yes No    Chew 1 each As Needed for Smoking Cessation.    ondansetron ODT (ZOFRAN-ODT) 4 MG disintegrating tablet   No No    Place 1 tablet on the tongue Every 8 (Eight) Hours As Needed for Nausea or  Vomiting for up to 30 days.    oxyCODONE (ROXICODONE) 10 MG tablet   Yes No    Take 1 tablet by mouth Every 4 (Four) to 6 (Six) Hours As Needed for Moderate Pain.    pantoprazole (PROTONIX) 40 MG EC tablet   Yes No    Take 1 tablet by mouth Daily.    riFAXIMin (XIFAXAN) 550 MG tablet   Yes No    Take 1 tablet by mouth 2 (Two) Times a Day.    sodium bicarbonate 325 MG tablet   Yes No    Take 1 tablet by mouth Daily.    spironolactone (ALDACTONE) 50 MG tablet   Yes No    Take 1 tablet by mouth Daily.    thiamine (VITAMIN B-1) 100 MG tablet   No No    take 1 tablet daily              Allergies:  Allergies   Allergen Reactions    Bee Venom Anaphylaxis    Scopolamine Anaphylaxis       Objective      Vitals:   Temp:  [98.3 °F (36.8 °C)] 98.3 °F (36.8 °C)  Heart Rate:  [56-76] 60  Resp:  [20] 20  BP: ()/(30-52) 72/33    Physical Exam  Vitals reviewed.   Constitutional:       General: He is in acute distress.      Appearance: He is ill-appearing and toxic-appearing.   HENT:      Head: Normocephalic.      Comments: Facial trauma with abrasions and bruising noted.  No crepitus or instability on physical exam.     Right Ear: Tympanic membrane and ear canal normal.      Left Ear: Tympanic membrane and ear canal normal.      Nose: Nose normal.      Mouth/Throat:      Mouth: Mucous membranes are dry.      Pharynx: Oropharynx is clear.   Eyes:      General: Scleral icterus present.      Pupils: Pupils are equal, round, and reactive to light.   Cardiovascular:      Rate and Rhythm: Normal rate and regular rhythm.      Pulses: Normal pulses.      Heart sounds: Normal heart sounds. No murmur heard.  Pulmonary:      Effort: Pulmonary effort is normal.      Breath sounds: Normal breath sounds.   Abdominal:      General: Bowel sounds are normal. There is distension.      Palpations: Abdomen is soft.      Tenderness: There is no abdominal tenderness. There is no guarding or rebound.   Genitourinary:     Comments: Penis and testes  with edema  Musculoskeletal:         General: Swelling present.      Cervical back: Normal range of motion and neck supple. No rigidity or tenderness.      Right lower leg: Edema present.      Left lower leg: Edema present.   Skin:     General: Skin is warm and dry.      Capillary Refill: Capillary refill takes less than 2 seconds.      Coloration: Skin is jaundiced.   Neurological:      Mental Status: He is disoriented.      Motor: Weakness present.      Comments: Somnolent and lethargic          Result Review    Result Review:  I have personally reviewed the results from the time of this admission to 7/3/2023 18:33 EDT and agree with these findings:  [x]  Laboratory  [x]  Microbiology  [x]  Radiology  [x]  EKG/Telemetry   []  Cardiology/Vascular   []  Pathology  []  Old records  []  Other:  Most notable findings include:       Assessment & Plan        Active Hospital Problems:  Active Hospital Problems    Diagnosis     **Hepatic encephalopathy      Plan:   AMS secondary to hepatic encephalopathy from hyperammonemia  -Long history of EtOH abuse  -Ammonia at 157  -CT of the brain negative for acute intracranial pathology  -Lactulose given rectally.  Initiate lactulose and rifaximin p.o. once able to tolerate  -Maintain n.p.o. status until SLP can assess for swallow capability  -MELD sodium score 30    -GI consulted.  Planning on doing a paracentesis in the next 1 to 2 days    Hypotension  -Persistent hypotension not responding to IVF hydration  -Lactic at 2.4  -Norepinephrine infusion initiated  -Intensivist MIRI Villarreal consulted for ICU admission and accepted    Status post ground-level fall  -Pan imaging negative for acute intracranial pathology, cervical/spinal or facial fractures    Acute renal failure  -Creatinine at 4.75  -Refrain from nephrotoxic medications, NSAIDs and IV contrast  -Nephrology consulted    PMH: Hepatic encephalopathy, alcoholic hepatitis and cirrhosis, esophageal varices, HTN  -Resume home meds  when able to tolerate p.o. with exception of HTN meds     DVT prophylaxis:  Mechanical DVT prophylaxis orders are present.    CODE STATUS:    Code Status (Patient has no pulse and is not breathing): CPR (Attempt to Resuscitate)  Medical Interventions (Patient has pulse or is breathing): Full Support    Admission Status:  I believe this patient meets inpatient status.    I discussed the patient's findings and my recommendations with patient and family.    This patient has been examined wearing appropriate Personal Protective Equipment and discussed with  patient and nurse . 07/03/23      Signature: Electronically signed by Jose Ruffin Jr, APRN, 07/03/23, 18:33 EDT.  [facility] [place] Hospitalist Team    Electronically signed by Zi Vidal MD at 07/04/23 0846          Emergency Department Notes        Laura Crawford at 07/03/23 1701          Call placed to Dr. Padilla     Electronically signed by Laura Crawford at 07/03/23 1701       Ivan Oropeza PA at 07/03/23 1500       Attestation signed by Issa Jacobs MD at 07/03/23 2114        NON FACE TO FACE: This visit was performed by BOTH a physician and an APC. I performed all aspects of the MDM as documented.  Issa Jacobs MD 7/3/2023 21:14 EDT                         Subjective   History of Present Illness  Patient is a 37-year-old male PMH significant for Decompensated cirrhosis, hepatic encephalopathy, alcoholic hepatitis presenting to the ED with wife who is main historian with complaints of low blood pressure and altered mental status over the past 2 days.  Patient's wife states he was hospitalized and discharged last week with similar complaints she states when he got discharged they went to [place] and his symptoms worsened over the weekend.  She states he also fell yesterday when getting out of the car.  He states today now he is not able to ambulate well.  She reports he has had increased bilateral lower extremity edema now going  up into his scrotum and abdomen.  She reports he last had a paracentesis on 6/23/2023 and is not scheduled for another 1 until 7/7/2023.  She reports chronic nausea and cough without significant change.  No reports of fever or vomiting.  No reports of loss of consciousness.  Patient's wife states she has been giving him lactulose but she is not sure if he is actually taking it.  She is not sure when his last bowel movement was.      History provided by:  Spouse    Review of Systems   Unable to perform ROS: Mental status change     Past Medical History:   Diagnosis Date    Alcohol abuse     1/2-1 pint a day, sober since 9/12/2022    Alcoholic cirrhosis 09/01/2022    Arthritis     Hypertension     Sleep apnea        Allergies   Allergen Reactions    Bee Venom Anaphylaxis    Scopolamine Anaphylaxis       Past Surgical History:   Procedure Laterality Date    BACK SURGERY      WRIST SURGERY         Family History   Problem Relation Age of Onset    Cirrhosis Father     Heart disease Maternal Grandmother     Diabetes Maternal Grandmother     Diabetes Maternal Grandfather     Diabetes Paternal Grandmother     Diabetes Paternal Grandfather        Social History     Socioeconomic History    Marital status:    Tobacco Use    Smoking status: Every Day     Packs/day: 0.50     Types: Cigarettes    Smokeless tobacco: Never   Vaping Use    Vaping Use: Never used   Substance and Sexual Activity    Alcohol use: Not Currently     Comment: sober since 9/12/22    Drug use: Not Currently     Types: Marijuana    Sexual activity: Defer           Objective   Physical Exam  Vitals and nursing note reviewed.   Constitutional:       General: He is not in acute distress.     Appearance: He is well-developed. He is ill-appearing. He is not toxic-appearing or diaphoretic.   HENT:      Head: Normocephalic and atraumatic.      Mouth/Throat:      Mouth: Mucous membranes are moist.      Pharynx: Oropharynx is clear.   Eyes:      General:  "Scleral icterus present.      Extraocular Movements: Extraocular movements intact.      Pupils: Pupils are equal, round, and reactive to light.   Cardiovascular:      Rate and Rhythm: Normal rate and regular rhythm.      Pulses: Normal pulses.      Heart sounds: No murmur heard.    No friction rub. No gallop.   Pulmonary:      Effort: Pulmonary effort is normal. No tachypnea or accessory muscle usage.      Breath sounds: No decreased breath sounds, wheezing, rhonchi or rales.   Chest:      Chest wall: No mass, deformity, tenderness or crepitus.   Abdominal:      General: Bowel sounds are decreased. There is distension.      Tenderness: There is no abdominal tenderness. There is no guarding or rebound.   Musculoskeletal:      Right lower le+      Left lower le+   Skin:     General: Skin is warm.      Capillary Refill: Capillary refill takes less than 2 seconds.      Findings: No rash.   Neurological:      Mental Status: He is alert.      Comments: Patient will look at me when asked but will not follow any other commands.  No facial droop noted   Psychiatric:         Mood and Affect: Mood normal.         Behavior: Behavior normal.       Procedures          ED Course  ED Course as of 23 1656      152 Spoke to MIRI Tapia with GI [AA]   1556 Spoke to Tai [facility] recommends holding off on vancomycin until MRSA PCR results [AA]      ED Course User Index  [AA] Ivan Oropeza PA      BP 94/41   Pulse 67   Temp 98.3 °F (36.8 °C) (Oral)   Resp 20   Ht 182.9 cm (72\")   Wt 92.8 kg (204 lb 9.4 oz)   SpO2 98%   BMI 27.75 kg/m²   Medications   sodium chloride 0.9 % flush 10 mL (has no administration in time range)   midodrine (PROAMATINE) tablet 20 mg (20 mg Oral Not Given 7/3/23 1503)   sodium chloride 0.9 % flush 10 mL (has no administration in time range)   Pharmacy to dose vancomycin (has no administration in time range)   zinc sulfate (ZINCATE) capsule 220 mg (has no administration " in time range)   lactulose (CHRONULAC) 10 GM/15ML solution 20 g (has no administration in time range)   riFAXIMin (XIFAXAN) tablet 550 mg (has no administration in time range)   sodium chloride 0.9 % bolus 1,000 mL (1,000 mL Intravenous New Bag 7/3/23 1507)   lactulose (CHRONULAC) solution for enema 300 mL (300 mL Rectal Given 7/3/23 1622)   cefTRIAXone (ROCEPHIN) 2 g in sodium chloride 0.9 % 100 mL IVPB (2 g Intravenous New Bag 7/3/23 1609)     Labs Reviewed   AMMONIA - Abnormal; Notable for the following components:       Result Value    Ammonia 157 (*)     All other components within normal limits   COMPREHENSIVE METABOLIC PANEL - Abnormal; Notable for the following components:    Glucose 112 (*)     BUN 51 (*)     Creatinine 4.75 (*)     Sodium 133 (*)     Chloride 92 (*)     Total Protein 4.9 (*)     Albumin 3.3 (*)     AST (SGOT) 66 (*)     Alkaline Phosphatase 153 (*)     Total Bilirubin 3.7 (*)     Anion Gap 16.0 (*)     eGFR 15.2 (*)     All other components within normal limits    Narrative:     GFR Normal >60  Chronic Kidney Disease <60  Kidney Failure <15     URINALYSIS W/ CULTURE IF INDICATED - Abnormal; Notable for the following components:    Color, UA Dark Yellow (*)     Ketones, UA Trace (*)     Bilirubin, UA Small (1+) (*)     Protein, UA 30 mg/dL (1+) (*)     Leuk Esterase, UA Trace (*)     Nitrite, UA Positive (*)     All other components within normal limits    Narrative:     In absence of clinical symptoms, the presence of pyuria, bacteria, and/or nitrites on the urinalysis result does not correlate with infection.   TROPONIN - Abnormal; Notable for the following components:    HS Troponin T 28 (*)     All other components within normal limits    Narrative:     High Sensitive Troponin T Reference Range:  <10.0 ng/L- Negative Female for AMI  <15.0 ng/L- Negative Male for AMI  >=10 - Abnormal Female indicating possible myocardial injury.  >=15 - Abnormal Male indicating possible myocardial  injury.   Clinicians would have to utilize clinical acumen, EKG, Troponin, and serial changes to determine if it is an Acute Myocardial Infarction or myocardial injury due to an underlying chronic condition.        CBC WITH AUTO DIFFERENTIAL - Abnormal; Notable for the following components:    RBC 2.99 (*)     Hemoglobin 10.5 (*)     Hematocrit 31.3 (*)     .5 (*)     MCH 35.1 (*)     All other components within normal limits   LACTIC ACID, REFLEX - Abnormal; Notable for the following components:    Lactate 2.4 (*)     All other components within normal limits   MANUAL DIFFERENTIAL - Abnormal; Notable for the following components:    Neutrophil % 41.0 (*)     Eosinophil % 8.0 (*)     Basophil % 7.0 (*)     Eosinophils Absolute 0.64 (*)     Basophils Absolute 0.56 (*)     All other components within normal limits   URINALYSIS, MICROSCOPIC ONLY - Abnormal; Notable for the following components:    RBC, UA 0-2 (*)     WBC, UA 0-2 (*)     Squamous Epithelial Cells, UA 3-6 (*)     All other components within normal limits   POC LACTATE - Abnormal; Notable for the following components:    Lactate 2.1 (*)     All other components within normal limits   BLOOD CULTURE   BLOOD CULTURE   MRSA SCREEN, PCR   BODY FLUID CULTURE   SCAN SLIDE   PATH CONSULT REFLEX   HIGH SENSITIVITIY TROPONIN T 2HR   PROTEIN, BODY FLUID   ALBUMIN, FLUID   BODY FLUID CELL COUNT WITH DIFFERENTIAL    Narrative:     The following orders were created for panel order Body Fluid Cell Count With Differential - Body Fluid, Peritoneum.  Procedure                               Abnormality         Status                     ---------                               -----------         ------                     Body fluid cell count - ...[[id number]]                                                   Please view results for these tests on the individual orders.   BODY FLUID CELL COUNT   POC LACTATE   PATHOLOGY CONSULTATION   CBC AND DIFFERENTIAL     Narrative:     The following orders were created for panel order CBC & Differential.  Procedure                               Abnormality         Status                     ---------                               -----------         ------                     CBC Auto Differential[[id number]]        Abnormal            Final result               Scan Slide[[id number]]                                       Final result                 Please view results for these tests on the individual orders.     CT Abdomen Pelvis Without Contrast    Result Date: 7/3/2023  Impression: 1. Large volume abdominal pelvic ascites. 2. Cirrhotic liver morphology. 3. Moderate to large colonic stool burden. Correlate for constipation. 4. Scattered mild bibasilar atelectasis. Small left and trace right pleural effusions are present. Electronically Signed: Chanda Clark  7/3/2023 3:42 PM EDT  Workstation ID: [id number]    CT Head Without Contrast    Result Date: 7/3/2023  Impression: No acute intracranial abnormality or acute facial bone fracture. Electronically Signed: [address]  7/3/2023 3:45 PM EDT  Workstation ID: [id number]    CT Cervical Spine Without Contrast    Result Date: 7/3/2023  Impression: 1. No evidence of cervical spine fracture 2. Minimal left mastoid effusion 3. 1 cm right apical groundglass opacity. Recommend follow-up chest CT in 6 Electronically Signed: Dio Murrell  7/3/2023 3:44 PM EDT  Workstation ID: [id number]    XR Chest 1 View    Result Date: 7/3/2023  Impression: Improving bibasilar atelectasis or pneumonia since 6/28/2023. No new airspace disease is appreciated. Electronically Signed: Chanda Clark  7/3/2023 4:27 PM EDT  Workstation ID: [id number]    CT Facial Bones Without Contrast    Result Date: 7/3/2023  Impression: No acute intracranial abnormality or acute facial bone fracture. Electronically Signed: [address]  7/3/2023 3:45 PM EDT  Workstation ID: [id number]                                        Medical  Decision Making  Chart Review: Discharge summary reviewed from 6/28/2023 was cleared by GI for discharge home with home health as above continue lactulose and rifaximin.  Also discharged on midodrine 20 mg 3 times a day his initial URIEL improved was also evaluated and cleared by nephrology during this hospitalization.  Echocardiogram performed on 6/23/2023 showed an EF of 56 to 60%.  Last paracentesis reviewed from 6/23/2023    Comorbidity: As per past medical history  Differentials: Hepatic encephalopathy, ascites, brain bleed, skull fracture, pneumonia, UTI     ;this list is not all inclusive and does not constitute the entirety of considered causes  EKG: Interpreted by myself and Dr. Jacobs shows sinus bradycardia rate 59 otherwise normal EKG previous reviewed from 6/23/2023 showed sinus rhythm  Labs: as above   Radiology: My interpretation CT head shows no obvious brain bleed, CT facial bones and cervical spine shows no obvious fracture, CT abdomen pelvis shows ascites no definite bowel obstruction correlated with radiologist interpretations as below  CT Abdomen Pelvis Without Contrast   Final Result    Impression:        1. Large volume abdominal pelvic ascites.    2. Cirrhotic liver morphology.    3. Moderate to large colonic stool burden. Correlate for constipation.    4. Scattered mild bibasilar atelectasis. Small left and trace right pleural effusions are present.                        Electronically Signed: Chanda Clark      7/3/2023 3:42 PM EDT      Workstation ID: [id number]     CT Head Without Contrast   Final Result    Impression:    No acute intracranial abnormality or acute facial bone fracture.            Electronically Signed: True Allen      7/3/2023 3:45 PM EDT      Workstation ID: [id number]     CT Cervical Spine Without Contrast   Final Result    Impression:        1. No evidence of cervical spine fracture    2. Minimal left mastoid effusion    3. 1 cm right apical groundglass opacity.  Recommend follow-up chest CT in 6            Electronically Signed: Dio Murrell      7/3/2023 3:44 PM EDT      Workstation ID: [id number]     CT Facial Bones Without Contrast   Final Result    Impression:    No acute intracranial abnormality or acute facial bone fracture.            Electronically Signed: True Allen      7/3/2023 3:45 PM EDT      Workstation ID: [id number]     XR Chest 1 View    (Results Pending)      Disposition/Treatment:  Appropriate PPE was worn during exam and throughout all encounters with the patient.  When the ED IV was placed and labs were obtained patient was placed on proper monitors he was afebrile presenting with wife at bedside with complaints of altered mental status and worsening ascites since he was discharged from our facility on 6/28/2023.    EKG showed sinus bradycardia otherwise unremarkable.  No reports of chest pain at this time initial troponin 28 likely secondary to chronic illnesses.  Labs otherwise show CBC with a normal white count hemoglobin 10.5 hematocrit 31.3 platelets 158.  Anemia does appear chronic.  Metabolic panel showed glucose 112 BUN 51 creatinine 4.75 sodium 133 potassium 4.3 ALT 28 AST 66 alk phos 153 total bilirubin 3.7.  Worsening acute kidney injury since discharge on 6/27/2023 BUN of 22 creatinine 1.57.  Bilirubin however has improved as it was 6.1 on 6/27/2023.  Liver enzymes chronically elevated.  POC lactic 2.1 blood cultures are pending.  Urinalysis pending.  Ammonia elevated at 157.     Due to patient's recent fall CT head, neck, facial bones ordered which showed no acute intracranial or osseous abnormality as above.  Due to patient's worsening swelling and ascites CT abdomen pelvis was ordered which did show large ascites cirrhotic liver morphology and large colonic stool burden along with a right pleural effusion.     While in the ED patient did have some initial hypertension and on chart review does appear to be very chronic he is on midodrine  20 mg 3 times daily.  Unfortunately, due to his altered mental status not able to give p.o. dose of midodrine while in the ED he was given 1000 cc normal saline with improvement of his blood pressure systolic now in the 90s.  GI was consulted while in the ED.  I did speak to MIRI Tapia.  Patient was also given lactulose while in the ED along with Rocephin.  Vancomycin was initially ordered but discussed with the ER pharmacist who would like to hold it at this time pending MRSA PCR.    On reassessment patient is resting quietly findings were discussed with the patient's wife at bedside voiced understand admission for further work-up and was and agree with plan.  Patient be admitted to PCU.  Spoke to  MIRI Miner who agreed for admission with hospitalist group    The high probability of sudden, clinically significant deterioration in the patient's condition required the highest level of my preparedness to intervene urgently.  The services I provided to this patient were to treat and/or prevent clinically significant deterioration that could result in: death or increase blood loss . Services included the following: chart data review, reviewing nurses notes an/or old charts, documentation time, consultant collaboration regarding findings and treatment options, vital sign assessments and ordering, interpreting and reviewing diagnostic studies/lab test.  Aggregate critical care time was 43 minutes , which includes only time during which I was engaged in work directly related to the patient's care, as described above, whether at the bedside or elsewhere in the Emergency Department. It did not include time spent performing other reported procedures.    Problems Addressed:  Abrasion of face, initial encounter: acute illness or injury  URIEL (acute kidney injury): acute illness or injury  Ascites due to alcoholic cirrhosis: acute illness or injury  Fall, initial encounter: acute illness or injury  Hepatic encephalopathy:  complicated acute illness or injury    Amount and/or Complexity of Data Reviewed  External Data Reviewed: labs, radiology, ECG and notes.  Labs: ordered. Decision-making details documented in ED Course.  Radiology: ordered. Decision-making details documented in ED Course.  ECG/medicine tests: ordered.  Discussion of management or test interpretation with external provider(s): As above    Risk  OTC drugs.  Prescription drug management.  Decision regarding hospitalization.        Final diagnoses:   Hepatic encephalopathy   Fall, initial encounter   Abrasion of face, initial encounter   Ascites due to alcoholic cirrhosis   URIEL (acute kidney injury)       ED Disposition  ED Disposition       ED Disposition   Decision to Admit    Condition   --    Comment   Level of Care: Med/Surg [1]   Admitting Physician: TAVO STONER [[id number]]   Attending Physician: TAVO STONER [[id number]]   Bed Request Comments: PCU                 No follow-up provider specified.       Medication List        ASK your doctor about these medications      ondansetron ODT 4 MG disintegrating tablet  Commonly known as: ZOFRAN-ODT  Place 1 tablet on the tongue Every 8 (Eight) Hours As Needed for Nausea or Vomiting for up to 30 days.  Ask about: Which instructions should I use?                 Ivan Oropeza PA  23 1656      Electronically signed by Issa Jacobs MD at 23 2114       Laura Crawford at 23 1455          EKG requested       Electronically signed by Laura Crawford at 23 1455          Physician Progress Notes (all)        Aly Zayas MD at 23 1039            Critical Care Progress Note     Daquan Xiong : 1985 MRN:[id number] LOS:2  Rm: 2307/1     Principal Problem: Hepatic encephalopathy     Reason for follow up: All the medical problems listed below    Summary     A 37 y.o. male with PMH of alcoholic cirrhosis of liver, obstructive sleep apnea, substance abuse, CKD presented to  the hospital for altered mental status and was admitted with a principal diagnosis of Hepatic encephalopathy.  Found to have ammonia of 157, treated with lactulose and rifaximin after which his mentation improved.      On admission, patient also found to be in septic shock, treated with ceftriaxone and needed vasopressors.  Also had paracentesis on 7/4 with removal of 5 L of fluid.  Also had URIEL on admission, treated with IV fluids, albumin, midodrine and octreotide.  Nephrology was consulted.    Significant Events     07/05/23 : Transfer to PCU.  Increase lactulose to titrate for 3-4 bowel movements per day.  Critical care signing off.    Assessment / Plan     Hypotension  Unclear etiology, possible prerenal.  Does not meet sepsis criteria.  Resolved after adequate fluid resuscitation, off vasopressors.  Wean off midodrine as tolerated.  Peritoneal fluid without any growth, less than 250 neutrophils, DC antibiotics.    Acute hepatic encephalopathy  Ammonia level improved from 157-> 95.  Currently on lactulose and rifaximin.  Having only 1-2 bowel movements per day, increase lactulose to every 4 hours to titrate for 3-4 bowel movements per day.    Alcoholic cirrhosis of liver / Portal Hypertension / Recurrent ascites  S/p paracentesis on 7/4 with removal of 5 L.  Cultures remain negative.  Not a candidate for TIPS due to recurrent hepatic encephalopathy.  Awaiting liver transplant at [facility].    Acute Kidney Injury on CKD 3:   Remains non oliguric.  Baseline creatinine around 1.5  Possibly prerenal.  Some component of ATN from hypotension.  Continue with IV fluids, nephrology following.  GI started on empiric midodrine, octreotide and albumin for possible hepatorenal syndrome.  Hepatorenal syndrome is more of a diagnosis by exclusion, doubt a true hepatorenal syndrome.  Can discontinue octreotide and albumin from my perspective.  Monitor Input/Output very closely.   Net IO Since Admission: -1,544 mL [07/05/23  1041]     S/p fall prior to admission: CT head, facial bones and cervical spine negative for injury    1 cm right apical groundglass opacity via CT: Radiology recommend follow-up chest CT in 6 months.     Macrocytic anemia  Obstructive sleep apnea   Bipolar disorder   GERD   Chronic constipation   History of substance abuse     Code status:   Code Status (Patient has no pulse and is not breathing): CPR (Attempt to Resuscitate)  Medical Interventions (Patient has pulse or is breathing): Full Support       Nutrition:   Diet: Renal Diets, Cardiac Diets; Low Sodium (2g); Low Potassium; Texture: Regular Texture (IDDSI 7); Fluid Consistency: Thin (IDDSI 0)   Patient isn't on Tube Feeding     DVT prophylaxis:  Medical and mechanical DVT prophylaxis orders are present.     Subjective / Review of systems     Review of Systems   Still confused, knows that he is in [place].  Unable to explain why he is in the hospital.  No abdominal pain.  No nausea or vomiting.  Objective / Physical Exam     Vital signs:  Temp: 97.6 °F (36.4 °C)  BP: 106/57  Heart Rate: 72  Resp: 16  SpO2: 96 %  Weight: 89.6 kg (197 lb 8.5 oz)    Admission Weight: Weight: 83.1 kg (183 lb 4.8 oz)  Current Weight: Weight: 89.6 kg (197 lb 8.5 oz)    Input/Output in last 24 hours:    Intake/Output Summary (Last 24 hours) at 7/5/2023 1041  Last data filed at 7/5/2023 0800  Gross per 24 hour   Intake 2116 ml   Output 0 ml   Net 2116 ml      Net IO Since Admission: -1,544 mL [07/05/23 1041]     Physical Exam  Vitals and nursing note reviewed.   Constitutional:       General: He is awake. He is not in acute distress.     Appearance: Normal appearance.   HENT:      Mouth/Throat:      Mouth: Mucous membranes are moist.      Pharynx: Oropharynx is clear.   Eyes:      General: No scleral icterus.     Extraocular Movements: Extraocular movements intact.      Conjunctiva/sclera: Conjunctivae normal.   Cardiovascular:      Rate and Rhythm: Normal rate.      Heart sounds:  No murmur heard.    No gallop.   Pulmonary:      Breath sounds: Normal breath sounds. No wheezing or rales.   Abdominal:      General: Bowel sounds are normal. There is distension.      Palpations: Abdomen is soft.      Tenderness: There is no abdominal tenderness.   Musculoskeletal:         General: No tenderness.      Right lower leg: No edema.      Left lower leg: No edema.   Neurological:      Mental Status: He is alert.      Comments: Confused, no focal motor or sensory deficits.   Psychiatric:         Behavior: Behavior is cooperative.        Radiology and Labs     Results from last 7 days   Lab Units 07/05/23  0606 07/04/23  0445 07/03/23  1504   WBC 10*3/mm3 6.60 5.60 8.00   HEMOGLOBIN g/dL 9.1* 9.1* 10.5*   HEMATOCRIT % 27.0* 27.3* 31.3*   PLATELETS 10*3/mm3 113* 100* 158      Results from last 7 days   Lab Units 07/05/23  0606 07/04/23  0445   PROTIME Seconds 16.1* 14.6*   INR  1.54* 1.39*      Results from last 7 days   Lab Units 07/05/23  0606 07/04/23  1606 07/04/23 0445 07/03/23  1504   SODIUM mmol/L 139  --  139 133*   POTASSIUM mmol/L 3.9 4.0 3.5 4.3   CHLORIDE mmol/L 103  --  100 92*   CO2 mmol/L 24.0  --  25.0 25.0   BUN mg/dL 36*  --  48* 51*   CREATININE mg/dL 2.74*  --  3.86* 4.75*   GLUCOSE mg/dL 120*  --  108* 112*   MAGNESIUM mg/dL 2.0  --  1.9 2.1   PHOSPHORUS mg/dL 3.3  --  4.3 5.4*      Results from last 7 days   Lab Units 07/05/23  0606 07/04/23  0445 07/03/23  1504   ALK PHOS U/L 93 119* 153*   AST (SGOT) U/L 33 43* 66*   ALT (SGPT) U/L 19 20 28           Current medications     Scheduled Meds:   albumin human, 50 g, Intravenous, QAM  heparin (porcine), 5,000 Units, Subcutaneous, Q8H  lactulose, 30 g, Oral, Q4H  midodrine, 20 mg, Oral, Q8H  octreotide, 100 mcg, Subcutaneous, TID  pantoprazole, 40 mg, Oral, Q AM  rifAXIMin, 550 mg, Oral, Q12H  sodium chloride, 500 mL, Intravenous, Once  sodium chloride, 10 mL, Intravenous, Q12H  sodium chloride, 10 mL, Intravenous, Q12H  sodium chloride,  10 mL, Intravenous, Q12H  sodium chloride, 10 mL, Intravenous, Q12H  thiamine, 100 mg, Oral, QAM        Continuous Infusions:   sodium chloride, 75 mL/hr, Last Rate: 75 mL/hr (23 1307)          Plan discussed with RN. Reviewed all other data in the last 24 hours, including but not limited to vitals, labs, microbiology, imaging and pertinent notes from other providers.     Aly Zayas MD   Critical Care  23   10:41 EDT         Electronically signed by Aly Zayas MD at 23 1043       Fatou Waldron MD at 23 1150          NEPHROLOGY PROGRESS NOTE------[facility]    [facility]  [phone]  Saji Waldron MD      Patient Care Team:  Wolf Carvalho MD as PCP - General (Geriatric Medicine)  Fatou Waldron MD as Consulting Physician (Nephrology)      Provider:  Saji Waldron MD  Patient Name: Daquan Xiong  :  1985    SUBJECTIVE:    F/U ARF/URIEL/CRF/CKD    Weak and malaised but alert and oriented today. No angina. No dysuria.     Medication:  albumin human, 50 g, Intravenous, QAM  heparin (porcine), 5,000 Units, Subcutaneous, Q8H  lactulose, 30 g, Oral, Q6H  midodrine, 20 mg, Oral, Q8H  octreotide, 100 mcg, Subcutaneous, TID  pantoprazole, 40 mg, Oral, Q AM  rifAXIMin, 550 mg, Oral, Q12H  sodium chloride, 500 mL, Intravenous, Once  sodium chloride, 10 mL, Intravenous, Q12H  sodium chloride, 10 mL, Intravenous, Q12H  sodium chloride, 10 mL, Intravenous, Q12H  sodium chloride, 10 mL, Intravenous, Q12H  thiamine, 100 mg, Oral, QAM      sodium chloride, 75 mL/hr, Last Rate: 75 mL/hr (23 1307)        OBJECTIVE    Vital Sign Min/Max for last 24 hours  Temp  Min: 97.6 °F (36.4 °C)  Max: 97.6 °F (36.4 °C)   BP  Min: 91/45  Max: 118/68   Pulse  Min: 59  Max: 105   Resp  Min: 18  Max: 18   SpO2  Min: 93 %  Max: 100 %   No data recorded   No data recorded     Flowsheet Rows   "    Flowsheet Row First Filed Value   Admission Height 182.9 cm (72\") Documented at 07/03/2023 1435   Admission Weight 83.1 kg (183 lb 4.8 oz) Documented at 07/03/2023 1443            No intake/output data recorded.  I/O last 3 completed shifts:  In: 3331 [P.O.:720; I.V.:2111; IV Piggyback:500]  Out: 6075 [Urine:1000; Other:5075]    Physical Exam:  General Appearance: alert, appears stated age and cooperative  Head: normocephalic, without obvious abnormality and atraumatic +DRY OP +LEFT FOREHEAD ABRASION    Eyes: conjunctivae and sclerae normal and no icterus  Neck: supple and no JVD  Lungs: clear to auscultation and respirations regular  Heart: regular rhythm & normal rate and normal S1, S2 +BETHANY  Chest Wall: no abnormalities observed  Abdomen: normal bowel sounds and  +ASCITES/DISTENSION AND MILD BILAT LQ PAIN ON PALPATION  Extremities: moves extremities well, no edema, no cyanosis  Skin: +SCATTERED SCABS, ABRASIONS, ECCHYMOSIS  Neurologic: Alert, and oriented x 3. No focal deficits    Labs:    WBC WBC   Date Value Ref Range Status   07/05/2023 6.60 3.40 - 10.80 10*3/mm3 Final   07/04/2023 5.60 3.40 - 10.80 10*3/mm3 Final   07/03/2023 8.00 3.40 - 10.80 10*3/mm3 Final      HGB Hemoglobin   Date Value Ref Range Status   07/05/2023 9.1 (L) 13.0 - 17.7 g/dL Final   07/04/2023 9.1 (L) 13.0 - 17.7 g/dL Final   07/03/2023 10.5 (L) 13.0 - 17.7 g/dL Final      HCT Hematocrit   Date Value Ref Range Status   07/05/2023 27.0 (L) 37.5 - 51.0 % Final   07/04/2023 27.3 (L) 37.5 - 51.0 % Final   07/03/2023 31.3 (L) 37.5 - 51.0 % Final      Platelets No results found for: LABPLAT   MCV MCV   Date Value Ref Range Status   07/05/2023 101.8 (H) 79.0 - 97.0 fL Final   07/04/2023 101.8 (H) 79.0 - 97.0 fL Final   07/03/2023 104.5 (H) 79.0 - 97.0 fL Final          Sodium Sodium   Date Value Ref Range Status   07/05/2023 139 136 - 145 mmol/L Final   07/04/2023 139 136 - 145 mmol/L Final   07/03/2023 133 (L) 136 - 145 mmol/L Final    "   Potassium Potassium   Date Value Ref Range Status   07/05/2023 3.9 3.5 - 5.2 mmol/L Final   07/04/2023 4.0 3.5 - 5.2 mmol/L Final     Comment:     Slight hemolysis detected by analyzer. Results may be affected.   07/04/2023 3.5 3.5 - 5.2 mmol/L Final   07/03/2023 4.3 3.5 - 5.2 mmol/L Final     Comment:     Slight hemolysis detected by analyzer. Results may be affected.      Chloride Chloride   Date Value Ref Range Status   07/05/2023 103 98 - 107 mmol/L Final   07/04/2023 100 98 - 107 mmol/L Final   07/03/2023 92 (L) 98 - 107 mmol/L Final      CO2 CO2   Date Value Ref Range Status   07/05/2023 24.0 22.0 - 29.0 mmol/L Final   07/04/2023 25.0 22.0 - 29.0 mmol/L Final   07/03/2023 25.0 22.0 - 29.0 mmol/L Final      BUN BUN   Date Value Ref Range Status   07/05/2023 36 (H) 6 - 20 mg/dL Final   07/04/2023 48 (H) 6 - 20 mg/dL Final   07/03/2023 51 (H) 6 - 20 mg/dL Final      Creatinine Creatinine   Date Value Ref Range Status   07/05/2023 2.74 (H) 0.76 - 1.27 mg/dL Final   07/04/2023 3.86 (H) 0.76 - 1.27 mg/dL Final   07/03/2023 4.75 (H) 0.76 - 1.27 mg/dL Final      Calcium Calcium   Date Value Ref Range Status   07/05/2023 9.0 8.6 - 10.5 mg/dL Final   07/04/2023 8.2 (L) 8.6 - 10.5 mg/dL Final   07/03/2023 8.7 8.6 - 10.5 mg/dL Final      PO4 No components found for: PO4   Albumin Albumin   Date Value Ref Range Status   07/05/2023 3.8 3.5 - 5.2 g/dL Final   07/04/2023 3.3 (L) 3.5 - 5.2 g/dL Final   07/03/2023 3.3 (L) 3.5 - 5.2 g/dL Final      Magnesium Magnesium   Date Value Ref Range Status   07/05/2023 2.0 1.6 - 2.6 mg/dL Final   07/04/2023 1.9 1.6 - 2.6 mg/dL Final   07/03/2023 2.1 1.6 - 2.6 mg/dL Final      Uric Acid No components found for: URIC ACID     Imaging Results (Last 72 Hours)       Procedure Component Value Units Date/Time    XR Chest 1 View [[id number]] Collected: 07/03/23 1626     Updated: 07/03/23 1630    Narrative:      XR CHEST 1 VW    Date of Exam: 7/3/2023 4:13 PM EDT    Indication:  AMS    Comparison: AP portable chest 6/20/2023    Findings:  There is some bandlike atelectasis in the right lower lobe, similar to the prior examination. The previously described patchy left basilar airspace disease appears improved. No new airspace disease is identified. The heart size is normal. No pleural   effusion or pneumothorax is identified.      Impression:      Impression:  Improving bibasilar atelectasis or pneumonia since 6/28/2023. No new airspace disease is appreciated.      Electronically Signed: Chanda Clark    7/3/2023 4:27 PM EDT    Workstation ID: [id number]    CT Head Without Contrast [[id number]] Collected: 07/03/23 1540     Updated: 07/03/23 1547    Narrative:      CT HEAD WO CONTRAST, CT FACIAL BONES WO CONTRAST    Date of Exam: 7/3/2023 3:36 PM EDT    Indication: fall last night with head injury now AMS.    Comparison: None available.    Technique: Axial CT images were obtained of the head without contrast administration.  Coronal reconstructions were performed.  Automated exposure control and iterative reconstruction methods were used.    Findings:  Gray-white differentiation is maintained and there is no evidence of intracranial hemorrhage, mass or mass effect. The ventricles are normal in size and configuration. The orbits are normal. The paranasal sinuses are grossly clear. The calvarium is   intact. Additional dedicated evaluation of the facial bones demonstrates no evidence of acute fracture. The mastoid air cells are clear. Rightward nasal septal deviation is noted the lamina papyracea are intact.      Impression:      Impression:  No acute intracranial abnormality or acute facial bone fracture.      Electronically Signed: Treu Allen    7/3/2023 3:45 PM EDT    Workstation ID: [id number]    CT Facial Bones Without Contrast [[id number]] Collected: 07/03/23 1540     Updated: 07/03/23 1547    Narrative:      CT HEAD WO CONTRAST, CT FACIAL BONES WO CONTRAST    Date of Exam: 7/3/2023 3:36 PM  EDT    Indication: fall last night with head injury now AMS.    Comparison: None available.    Technique: Axial CT images were obtained of the head without contrast administration.  Coronal reconstructions were performed.  Automated exposure control and iterative reconstruction methods were used.    Findings:  Gray-white differentiation is maintained and there is no evidence of intracranial hemorrhage, mass or mass effect. The ventricles are normal in size and configuration. The orbits are normal. The paranasal sinuses are grossly clear. The calvarium is   intact. Additional dedicated evaluation of the facial bones demonstrates no evidence of acute fracture. The mastoid air cells are clear. Rightward nasal septal deviation is noted the lamina papyracea are intact.      Impression:      Impression:  No acute intracranial abnormality or acute facial bone fracture.      Electronically Signed: True Allen    7/3/2023 3:45 PM EDT    Workstation ID: [id number]    CT Cervical Spine Without Contrast [[id number]] Collected: 07/03/23 1541     Updated: 07/03/23 1546    Narrative:        CT CERVICAL SPINE WO CONTRAST    Date of Exam: 7/3/2023 3:36 PM EDT    Indication: fall last night with AMS.    Comparison: None available.    Technique: Axial CT images were obtained of the cervical spine without contrast administration.  Sagittal and coronal reconstructions were performed.  Automated exposure control and iterative reconstruction methods were used.      Findings:  No evidence of fracture or traumatic subluxation. Mild degenerative disc disease at C5-C6. No prevertebral soft tissue swelling. Minimal fluid in the left mastoid air cells. 1 cm groundglass opacity in the right lung apex      Impression:      Impression:    1. No evidence of cervical spine fracture  2. Minimal left mastoid effusion  3. 1 cm right apical groundglass opacity. Recommend follow-up chest CT in 6      Electronically Signed: Dio Murrell    7/3/2023 3:44  PM EDT    Workstation ID: [id number]    CT Abdomen Pelvis Without Contrast [[id number]] Collected: 07/03/23 1539     Updated: 07/03/23 1544    Narrative:      CT ABDOMEN PELVIS WO CONTRAST    Date of Exam: 7/3/2023 3:36 PM EDT    Indication: abdominal distention.    Comparison: Ultrasound-guided paracentesis 6/23/2023. No prior CT abdomen and pelvis for comparison at this institution. Liver ultrasound 6/25/2023.    Technique: Axial CT images were obtained of the abdomen and pelvis without the administration of contrast. Sagittal and coronal reconstructions were performed.  Automated exposure control and iterative reconstruction methods were used.      Findings:  Large quantity abdominal pelvic ascites is present. There is mild to moderate generalized body wall edema. Small left, trace right basilar pleural effusions are present with mild posterior bibasilar atelectasis and mild to right middle lobe atelectasis.   There is mild right hemidiaphragm elevation.    The liver is diminutive in size. It demonstrates a subtle surface nodular contour, suggestive of cirrhosis. No focal liver lesion is identified on this noncontrast examination. The spleen size is within normal limits. There is a small esophageal hiatal   hernia which contains ascites fluid.    The gallbladder, pancreas, adrenals, and kidneys demonstrate a normal noncontrast appearance. Moderate to large stool burden is present in the ascending, transverse, and descending colon. The bowel does not appear abnormally dilated or inflamed. The   appendix is normal.    The urinary bladder, prostate, and rectum are normal.    No acute or suspicious osseous abnormalities are identified. There is moderate degenerative disc change at the L4-5 vertebral level.      Impression:      Impression:    1. Large volume abdominal pelvic ascites.  2. Cirrhotic liver morphology.  3. Moderate to large colonic stool burden. Correlate for constipation.  4. Scattered mild bibasilar  atelectasis. Small left and trace right pleural effusions are present.            Electronically Signed: Chanda Clark    7/3/2023 3:42 PM EDT    Workstation ID: [id number]            Results for orders placed during the hospital encounter of 07/03/23    XR Chest 1 View    Narrative  XR CHEST 1 VW    Date of Exam: 7/3/2023 4:13 PM EDT    Indication: AMS    Comparison: AP portable chest 6/20/2023    Findings:  There is some bandlike atelectasis in the right lower lobe, similar to the prior examination. The previously described patchy left basilar airspace disease appears improved. No new airspace disease is identified. The heart size is normal. No pleural  effusion or pneumothorax is identified.    Impression  Impression:  Improving bibasilar atelectasis or pneumonia since 6/28/2023. No new airspace disease is appreciated.      Electronically Signed: Chanda Clark  7/3/2023 4:27 PM EDT  Workstation ID: [id number]      Results for orders placed during the hospital encounter of 06/23/23    XR Chest 1 View    Narrative  XR CHEST 1 VW    Date of Exam: 6/28/2023 11:45 AM EDT    Indication: eval for pneumonia    Comparison: None available.    Findings:  The lung volumes are low. Patchy airspace disease is seen within the lung bases bilaterally. Probable small bilateral pleural effusions are present. The pulmonary vasculature appears unremarkable. No pneumothorax. No acute osseous abnormality identified.    Impression  Impression:  Low lung volumes with patchy airspace disease seen within the lung bases bilaterally, left greater than right with probable small bilateral pleural effusions, likely related to pneumonia and/or atelectasis.      Electronically Signed: Brynn Higuera  6/28/2023 12:00 PM EDT  Workstation ID: [id number]            ASSESSMENT / PLAN      Hepatic encephalopathy    Bipolar affective disorder    GERD (gastroesophageal reflux disease)    Osteoarthritis    Tobacco use disorder    Polysubstance abuse    Obstructive  sleep apnea    URIEL (acute kidney injury)    Alcoholic cirrhosis    Chronic constipation    Severe Malnutrition (HCC)    Essential hypertension    CKD (chronic kidney disease) stage 3, GFR 30-59 ml/min      1.ARF/URIEL/CRF/CKD---------Nonoliguric. +Recurrent ARF/URIEL on top of known CRF/CKD STG 3A with a baseline serum creatinine of about 1.5. CRF/CKD STG 3A secondary to HTN NS. +ARF/URIEL is secondary to ATN from hypotension and prerenal state/intravascular volume depletion. Continue to hydrate with NS and given IV Albumin. Avoid hypotension.  No NSAIDs or IV dye. Dose meds for CrCl less than 10 cc/min until ARF/URIEL is resolved     2. HYPOKALEMIA-------Replaced     3. ETOH CIRRHOSIS/HEPATIC ENCEPHALOPATHY/ASCITES-----Restarted Lactulose. Follow Ammonia. S/P IV Albumin     4. HYPOMAGNESEMIA--------Replaced IV. No po replacement given diarrhea from Lactulose     5. DELERIUM----------Improving. Follow ammonia     6. HYPOCALCEMIA-------Replaced     7. ANEMIA-------H/H stable. Check Fe     8. HYPOALBUMINEMIA-----S/P IV Albumin to temporize     9. ELEVATED INR------Related to cirrhosis     10. HYPOTENSION------On Midodrine     11. BIPOLAR DISEASE     12. BASILIO     13. POLYSUBSTANCE ABUSE/TOBACCO USE-----Drug screen positive for Benzodiazipine and Oxycodone     14. KETONURIA------Secondary to intravascular volume depletion. IVFs      Saji Waldron MD  [facility]  [phone]  07/05/23  07:50 EDT      Electronically signed by Fatou Waldron MD at 07/05/23 0832       Mamie Ayers APRN at 07/04/23 1325       Attestation signed by ERMA Guzmán MD at 07/04/23 1346    Electronically signed on 07/04/23 13:42 EDT by TAZ Guzmán MD  The patient was seen and examined with an APRN. I personally performed the entire medical decision making, and physical exam. Labs and imaging and outpatient records reviewed, ordered.    Still confused.  Tolerating lactulose.  2 bowel  movements overnight but none yet today.  Still awaiting paracentesis.  On exam he is drowsy and confused but arousable.  Labs reviewed hemoglobin 9.1.  Creatinine 3.86.  For hepatic encephalopathy will increase lactulose and titrate to 3 bowel movements daily.  Continue Xifaxan.  For HRS will give additional albumin today, continue midodrine and octreotide.  Continue 2 g low-sodium diet and encourage protein shakes.  Still awaiting paracentesis.  Referral has been sent for liver transplant eval at [facility].  Remains very sick, prognosis guarded, risk is high.                   LOS: 1 day   Patient Care Team:  Wolf Carvalho MD as PCP - General (Geriatric Medicine)  Fatou Waldron MD as Consulting Physician (Nephrology)      Subjective   Interval history: Patient is more alert this morning.  Does continue to have confusion.  He is tolerating lactulose by mouth.  He had 2 bowel movements overnight.  No overt GI bleeding.  Paracentesis with fluid studies pending.    ROS:   Review of Systems   Constitutional:  Negative for chills and fever.   Respiratory:  Negative for cough and shortness of breath.    Cardiovascular:  Negative for chest pain and palpitations.   Gastrointestinal:  Positive for abdominal distention. Negative for abdominal pain, nausea and vomiting.   Neurological:  Positive for weakness. Negative for dizziness.   Psychiatric/Behavioral:  Negative for agitation and confusion.       Medication Review:   Scheduled Meds:albumin human, 50 g, Intravenous, QAM  albumin human, 50 g, Intravenous, Once  cefTRIAXone, 2 g, Intravenous, Q24H  heparin (porcine), 5,000 Units, Subcutaneous, Q8H  lactulose, 30 g, Oral, Q6H  midodrine, 20 mg, Oral, Q8H  octreotide, 100 mcg, Subcutaneous, TID  [START ON 7/5/2023] pantoprazole, 40 mg, Oral, Q AM  potassium chloride ER, 40 mEq, Oral, Q4H  rifAXIMin, 550 mg, Oral, Q12H  sodium chloride, 500 mL, Intravenous, Once  sodium chloride, 10 mL, Intravenous, Q12H  sodium  chloride, 10 mL, Intravenous, Q12H  sodium chloride, 10 mL, Intravenous, Q12H  sodium chloride, 10 mL, Intravenous, Q12H  thiamine, 100 mg, Oral, QAM      Continuous Infusions:norepinephrine, 0.02-0.3 mcg/kg/min, Last Rate: 0.26 mcg/kg/min (07/04/23 0936)  sodium chloride, 75 mL/hr, Last Rate: 75 mL/hr (07/04/23 1307)    PRN Meds:.  ALPRAZolam    ipratropium-albuterol    Magnesium Standard Dose Replacement - Follow Nurse / BPA Driven Protocol    nitroglycerin    ondansetron **OR** ondansetron    Phosphorus Replacement - Follow Nurse / BPA Driven Protocol    Potassium Replacement - Follow Nurse / BPA Driven Protocol    [COMPLETED] Insert Peripheral IV **AND** sodium chloride    [COMPLETED] Insert Peripheral IV **AND** sodium chloride    sodium chloride    sodium chloride    sodium chloride    sodium chloride      Objective     Vital Signs  Temp:  [97.4 °F (36.3 °C)-98.3 °F (36.8 °C)] 97.6 °F (36.4 °C)  Heart Rate:  [47-80] 68  Resp:  [12-20] 18  BP: ()/(28-82) 98/51    Physical Exam:    General Appearance:    Awake, confused   Head:    Normocephalic, without obvious abnormality   Eyes:          Conjunctivae normal, icteric sclera   Ears:    Hearing intact   Throat:   No oral lesions, no thrush, oral mucosa moist   Neck:   No adenopathy, supple, no JVD   Lungs:     Respirations regular, even and unlabored       Abdomen:     Soft, distended with fluid wave, nontender   Rectal:     Deferred   Extremities:   3+ BLE edema, no cyanosis   Skin:   No bleeding, bruising or rash, + jaundice   Neurologic:   Sensation intact        Results Review:    CBC  Results from last 7 days   Lab Units 07/04/23  0445 07/03/23  1504 06/28/23  0954 06/27/23  2247   RBC 10*6/mm3 2.68* 2.99* 3.21* 3.14*   WBC 10*3/mm3 5.60 8.00 7.90 9.10   HEMOGLOBIN g/dL 9.1* 10.5* 11.2* 11.4*   PLATELETS 10*3/mm3 100* 158 148 135*         CMP  Results from last 7 days   Lab Units 07/04/23  0445 07/03/23  1504 06/27/23  8257   SODIUM mmol/L 139 133* 139    POTASSIUM mmol/L 3.5 4.3 2.9*   CHLORIDE mmol/L 100 92* 97*   CO2 mmol/L 25.0 25.0 32.0*   BUN mg/dL 48* 51* 22*   CREATININE mg/dL 3.86* 4.75* 1.57*   GLUCOSE mg/dL 108* 112* 109*   ALBUMIN g/dL 3.3* 3.3* 2.9*   BILIRUBIN mg/dL 3.9* 3.7* 6.1*   ALK PHOS U/L 119* 153* 146*   AST (SGOT) U/L 43* 66* 46*   ALT (SGPT) U/L 20 28 25   AMMONIA umol/L 91* 157*  --          Amylase and Lipase        CRP         Imaging Results (Last 24 Hours)       Procedure Component Value Units Date/Time    XR Chest 1 View [[id number]] Collected: 07/03/23 1626     Updated: 07/03/23 1630    Narrative:      XR CHEST 1 VW    Date of Exam: 7/3/2023 4:13 PM EDT    Indication: AMS    Comparison: AP portable chest 6/20/2023    Findings:  There is some bandlike atelectasis in the right lower lobe, similar to the prior examination. The previously described patchy left basilar airspace disease appears improved. No new airspace disease is identified. The heart size is normal. No pleural   effusion or pneumothorax is identified.      Impression:      Impression:  Improving bibasilar atelectasis or pneumonia since 6/28/2023. No new airspace disease is appreciated.      Electronically Signed: Chanda Clark    7/3/2023 4:27 PM EDT    Workstation ID: [id number]    CT Head Without Contrast [[id number]] Collected: 07/03/23 1540     Updated: 07/03/23 1547    Narrative:      CT HEAD WO CONTRAST, CT FACIAL BONES WO CONTRAST    Date of Exam: 7/3/2023 3:36 PM EDT    Indication: fall last night with head injury now AMS.    Comparison: None available.    Technique: Axial CT images were obtained of the head without contrast administration.  Coronal reconstructions were performed.  Automated exposure control and iterative reconstruction methods were used.    Findings:  Gray-white differentiation is maintained and there is no evidence of intracranial hemorrhage, mass or mass effect. The ventricles are normal in size and configuration. The orbits are normal. The  paranasal sinuses are grossly clear. The calvarium is   intact. Additional dedicated evaluation of the facial bones demonstrates no evidence of acute fracture. The mastoid air cells are clear. Rightward nasal septal deviation is noted the lamina papyracea are intact.      Impression:      Impression:  No acute intracranial abnormality or acute facial bone fracture.      Electronically Signed: True Allen    7/3/2023 3:45 PM EDT    Workstation ID: [id number]    CT Facial Bones Without Contrast [[id number]] Collected: 07/03/23 1540     Updated: 07/03/23 1547    Narrative:      CT HEAD WO CONTRAST, CT FACIAL BONES WO CONTRAST    Date of Exam: 7/3/2023 3:36 PM EDT    Indication: fall last night with head injury now AMS.    Comparison: None available.    Technique: Axial CT images were obtained of the head without contrast administration.  Coronal reconstructions were performed.  Automated exposure control and iterative reconstruction methods were used.    Findings:  Gray-white differentiation is maintained and there is no evidence of intracranial hemorrhage, mass or mass effect. The ventricles are normal in size and configuration. The orbits are normal. The paranasal sinuses are grossly clear. The calvarium is   intact. Additional dedicated evaluation of the facial bones demonstrates no evidence of acute fracture. The mastoid air cells are clear. Rightward nasal septal deviation is noted the lamina papyracea are intact.      Impression:      Impression:  No acute intracranial abnormality or acute facial bone fracture.      Electronically Signed: True Allen    7/3/2023 3:45 PM EDT    Workstation ID: [id number]    CT Cervical Spine Without Contrast [[id number]] Collected: 07/03/23 1541     Updated: 07/03/23 1546    Narrative:        CT CERVICAL SPINE WO CONTRAST    Date of Exam: 7/3/2023 3:36 PM EDT    Indication: fall last night with AMS.    Comparison: None available.    Technique: Axial CT images were obtained of the  cervical spine without contrast administration.  Sagittal and coronal reconstructions were performed.  Automated exposure control and iterative reconstruction methods were used.      Findings:  No evidence of fracture or traumatic subluxation. Mild degenerative disc disease at C5-C6. No prevertebral soft tissue swelling. Minimal fluid in the left mastoid air cells. 1 cm groundglass opacity in the right lung apex      Impression:      Impression:    1. No evidence of cervical spine fracture  2. Minimal left mastoid effusion  3. 1 cm right apical groundglass opacity. Recommend follow-up chest CT in 6      Electronically Signed: Dio Handy    7/3/2023 3:44 PM EDT    Workstation ID: [id number]    CT Abdomen Pelvis Without Contrast [[id number]] Collected: 07/03/23 1539     Updated: 07/03/23 1544    Narrative:      CT ABDOMEN PELVIS WO CONTRAST    Date of Exam: 7/3/2023 3:36 PM EDT    Indication: abdominal distention.    Comparison: Ultrasound-guided paracentesis 6/23/2023. No prior CT abdomen and pelvis for comparison at this institution. Liver ultrasound 6/25/2023.    Technique: Axial CT images were obtained of the abdomen and pelvis without the administration of contrast. Sagittal and coronal reconstructions were performed.  Automated exposure control and iterative reconstruction methods were used.      Findings:  Large quantity abdominal pelvic ascites is present. There is mild to moderate generalized body wall edema. Small left, trace right basilar pleural effusions are present with mild posterior bibasilar atelectasis and mild to right middle lobe atelectasis.   There is mild right hemidiaphragm elevation.    The liver is diminutive in size. It demonstrates a subtle surface nodular contour, suggestive of cirrhosis. No focal liver lesion is identified on this noncontrast examination. The spleen size is within normal limits. There is a small esophageal hiatal   hernia which contains ascites fluid.    The gallbladder,  pancreas, adrenals, and kidneys demonstrate a normal noncontrast appearance. Moderate to large stool burden is present in the ascending, transverse, and descending colon. The bowel does not appear abnormally dilated or inflamed. The   appendix is normal.    The urinary bladder, prostate, and rectum are normal.    No acute or suspicious osseous abnormalities are identified. There is moderate degenerative disc change at the L4-5 vertebral level.      Impression:      Impression:    1. Large volume abdominal pelvic ascites.  2. Cirrhotic liver morphology.  3. Moderate to large colonic stool burden. Correlate for constipation.  4. Scattered mild bibasilar atelectasis. Small left and trace right pleural effusions are present.            Electronically Signed: Chanda Clark    7/3/2023 3:42 PM EDT    Workstation ID: [id number]              Assessment & Plan     Assessment:  37-year-old male with history of alcoholic cirrhosis complicated by HE, ascites, and esophageal varices discharged on 6/28 and presented again to [facility] ER today with complaints of altered mental status, ascites, BLE edema, and hypotension.      -Hepatic encephalopathy/hyperammonemia  -Alcohol hepatitis and cirrhosis complicated by HE, ascites, and esophageal varices  -Hyponatremia  -Acute renal failure  -Hypotension  -Elevated LFTs  -Hyperkalemia     Plan:  Continue PO lactulose and titrate to a goal of at least 2-3 bowel movements daily.  Continue Xifaxan.  Paracentesis with fluid studies pending.  Kidney function is slowly improving.  Will give 1x dose 50 g of 5% albumin.   Continue zinc, PPI, midodrine,  and subcu octreotide.  Recommend strict 2 g sodium diet with Boost/Ensure supplements. Encourage nutrition.  MELD sodium score 30.  Referral has been sent to [facility] team.  Patient and his family are interested in also being referred to [facility] transplant team.  Task has been sent through our office to initiate this  referral.  Supportive care.  GI will follow.    Mamie Ayers, MONICA  23  13:25 EDT      Electronically signed by ERMA Guzmán MD at 23 1346       Aly Zayas MD at 23 1026            Critical Care Progress Note     Daquan Xiong : 1985 MRN:[id number] LOS:1  Rm: 2307/1     Principal Problem: Hepatic encephalopathy     Reason for follow up: All the medical problems listed below    Summary     A 37 y.o. male with PMH of alcoholic cirrhosis of liver, obstructive sleep apnea, substance abuse, CKD presented to the hospital for altered mental status and was admitted with a principal diagnosis of Hepatic encephalopathy.  Found to have ammonia of 157, treated with lactulose and rifaximin after which his mentation improved.      On admission, patient also found to be in septic shock, treated with ceftriaxone and needed vasopressors.  Also had paracentesis on  with removal of 5 L of fluid.  Also had URIEL on admission, treated with IV fluids, albumin, midodrine and octreotide.  Nephrology was consulted.    Significant Events     23 : Patient still requiring levophed this AM. Adding IVF.     Assessment / Plan     Hypotension  Unclear etiology, possible prerenal.  Does not meet sepsis criteria.  Continue with high-dose midodrine.  Add IV fluids.  Wean off norepinephrine as tolerated to keep MAP> 65.  On empiric ceftriaxone for suspected SBP.  Follow-up peritoneal fluid cultures.    Acute hepatic encephalopathy  Ammonia level improved from 157-> 95.  Continue with lactulose and rifaximin.    Alcoholic cirrhosis of liver / Portal Hypertension / Recurrent ascites  S/p paracentesis on  with removal of 5 L.  Not a candidate for TIPS due to recurrent hepatic encephalopathy.  Awaiting liver transplant at [facility].    Acute Kidney Injury on CKD 3:   Remains non oliguric.  Baseline creatinine around 1.5  Possibly prerenal.  Some component of ATN from hypotension.  Started IV fluids,  nephrology was consulted by ED.  GI started on empiric midodrine, octreotide and albumin for possible hepatorenal syndrome.  Hepatorenal syndrome is more of a diagnosis by exclusion, have not been adequately fluid resuscitated.  Started IV fluids.  Monitor Input/Output very closely.   Net IO Since Admission: -3,660 mL [07/04/23 1100]     S/p fall prior to admission: CT head, facial bones and cervical spine negative for injury  1 cm right apical groundglass opacity via CT: Radiology recommend follow-up chest CT in 6      Macrocytic anemia  Obstructive sleep apnea   Bipolar disorder   GERD   Chronic constipation   History of substance abuse     Disposition:  Potential downgrade to PCU later pending wean from Levophed    Code status:   Code Status (Patient has no pulse and is not breathing): CPR (Attempt to Resuscitate)  Medical Interventions (Patient has pulse or is breathing): Full Support       Nutrition:   Diet: Renal Diets; Low Potassium; Texture: Regular Texture (IDDSI 7); Fluid Consistency: Thin (IDDSI 0)   Patient isn't on Tube Feeding     DVT prophylaxis:  Medical and mechanical DVT prophylaxis orders are present.     Subjective / Review of systems     Review of Systems   Constitutional:  Negative for chills and fatigue.   Respiratory:  Negative for chest tightness and shortness of breath.    Cardiovascular:  Negative for chest pain.   Gastrointestinal:  Positive for nausea. Negative for abdominal pain and vomiting.   Genitourinary:  Negative for dysuria.   Neurological:  Positive for weakness. Negative for dizziness and light-headedness.   Psychiatric/Behavioral:  The patient is nervous/anxious.       Objective / Physical Exam     Vital signs:  Temp: 97.6 °F (36.4 °C)  BP: 98/51  Heart Rate: 68  Resp: 18  SpO2: 98 %  Weight: 89.6 kg (197 lb 8.5 oz)    Admission Weight: Weight: 83.1 kg (183 lb 4.8 oz)  Current Weight: Weight: 89.6 kg (197 lb 8.5 oz)    Input/Output in last 24 hours:    Intake/Output Summary  (Last 24 hours) at 7/4/2023 1027  Last data filed at 7/4/2023 0930  Gross per 24 hour   Intake 2415 ml   Output 6075 ml   Net -3660 ml      Net IO Since Admission: -3,660 mL [07/04/23 1027]     Physical Exam  Vitals and nursing note reviewed.   Constitutional:       General: He is not in acute distress.     Appearance: He is ill-appearing (Chronically).   HENT:      Head: Normocephalic.      Mouth/Throat:      Mouth: Mucous membranes are moist.      Pharynx: Oropharynx is clear.   Eyes:      General: Scleral icterus present.      Extraocular Movements: Extraocular movements intact.      Pupils: Pupils are equal, round, and reactive to light.   Cardiovascular:      Rate and Rhythm: Regular rhythm.   Pulmonary:      Effort: Pulmonary effort is normal.      Breath sounds: Normal breath sounds. No wheezing.   Abdominal:      General: There is distension.      Tenderness: There is abdominal tenderness.   Musculoskeletal:      Right lower leg: Edema (++++) present.      Left lower leg: Edema (++++) present.   Skin:     Comments: Abrasion left cheek. No drainage.    Neurological:      General: No focal deficit present.      Mental Status: He is alert. He is disoriented.   Psychiatric:         Judgment: Judgment is impulsive.      Comments: Mostly cooperative with episodes of agitation.         Radiology and Labs     Results from last 7 days   Lab Units 07/04/23  0445 07/03/23  1504 06/28/23  0954 06/27/23  2247   WBC 10*3/mm3 5.60 8.00 7.90 9.10   HEMOGLOBIN g/dL 9.1* 10.5* 11.2* 11.4*   HEMATOCRIT % 27.3* 31.3* 33.5* 32.4*   PLATELETS 10*3/mm3 100* 158 148 135*      Results from last 7 days   Lab Units 07/04/23  0445 06/27/23  2247   PROTIME Seconds 14.6* 14.0*   INR  1.39* 1.33*      Results from last 7 days   Lab Units 07/04/23  0445 07/03/23  1504 06/27/23  2247   SODIUM mmol/L 139 133* 139   POTASSIUM mmol/L 3.5 4.3 2.9*   CHLORIDE mmol/L 100 92* 97*   CO2 mmol/L 25.0 25.0 32.0*   BUN mg/dL 48* 51* 22*   CREATININE  mg/dL 3.86* 4.75* 1.57*   GLUCOSE mg/dL 108* 112* 109*   MAGNESIUM mg/dL 1.9 2.1 1.4*   PHOSPHORUS mg/dL 4.3 5.4* 3.2      Results from last 7 days   Lab Units 07/04/23  0445 07/03/23  1504 06/27/23  2247   ALK PHOS U/L 119* 153* 146*   AST (SGOT) U/L 43* 66* 46*   ALT (SGPT) U/L 20 28 25           Current medications     Scheduled Meds:   albumin human, 50 g, Intravenous, QAM  cefTRIAXone, 2 g, Intravenous, Q24H  heparin (porcine), 5,000 Units, Subcutaneous, Q8H  lactulose, 30 g, Oral, Q6H  midodrine, 20 mg, Oral, Q8H  octreotide, 100 mcg, Intravenous, Q8H  [START ON 7/5/2023] pantoprazole, 40 mg, Oral, Q AM  potassium chloride ER, 40 mEq, Oral, Q4H  rifAXIMin, 550 mg, Oral, Q12H  sodium chloride, 500 mL, Intravenous, Once  sodium chloride, 10 mL, Intravenous, Q12H  sodium chloride, 10 mL, Intravenous, Q12H  sodium chloride, 10 mL, Intravenous, Q12H  sodium chloride, 10 mL, Intravenous, Q12H  thiamine, 100 mg, Oral, QAM        Continuous Infusions:   lactated ringers, 75 mL/hr, Last Rate: 75 mL/hr (07/04/23 0957)  norepinephrine, 0.02-0.3 mcg/kg/min, Last Rate: 0.26 mcg/kg/min (07/04/23 0936)          Plan discussed with RN. Reviewed all other data in the last 24 hours, including but not limited to vitals, labs, microbiology, imaging and pertinent notes from other providers.  Plan also discussed with patient's mother at the bedside.      MONICA Rojas   Critical Care  07/04/23   10:27 EDT     Attending Addendum     Subjective: Still confused, feels okay, denies any abdominal pain.  Knows that he is in the hospital and he thinks because of some sodium issues.    Exam: Alert and awake, no tremor, abdomen is distended.  Nontender.    Assessment / Plan:   Hypotension: Unclear etiology, suspected infection, on empiric antibiotics.  Add IV fluids.  Continue with midodrine.  Ascites: s/p paracentesis with removal of 5 L.    I have personally reviewed all labs and other data, reviewed all other pertinent notes,  interviewed and examined this patient today.     Dr. Aly Zayas MD MPH  Staff Intensivist  07/04/23   11:05 EDT          Electronically signed by Aly Zayas MD at 07/04/23 1106       Mamie Ayers APRN at 07/03/23 1628       Attestation signed by Juan Padilla MD at 07/03/23 8224    The patient was seen and examined with an APRN. I personally performed the substantive portion of the history of presenting illness.  I also performed the entire physical exam and medical decision making.    37-year-old alcoholic male presenting with hepatic encephalopathy.  Most of the history was from the patient's wife was at the bedside.  Patient has been sober for 9 months.  He was more confused and belligerent and so she brought him in because she could not wake him up.  On admission he is jaundiced and in acute renal failure with severe lower extremity edema on exam.  Lactulose enema was ordered but was technically difficult to perform.  Patient is also hypotensive.  Wife admits that patient has not been taking his lactulose properly at home and had not had a bowel movement in a few days.  WBC 8, Hgb 10.5, CR 4.75, TB 3.7, , AST 66, ammonia 157  Impression:  Alcoholic hepatitis/cirrhosis decompensated  Hepatic encephalopathy likely made worse by constipation  Acute kidney injury likely from dehydration  Constipation  Plan:  Lactulose enema  N.p.o. until more awake  IV fluids with albumin replacement  Monitor renal output closely  Midodrine and octreotide  Agree with norepinephrine  Liver transplant has been ordered as an outpatient and is pending at [facility]    Electronically signed by Juan Padilla MD, 07/03/23, 6:48 PM EDT.                        LOS: 0 days   Patient Care Team:  Wolf Carvalho MD as PCP - General (Geriatric Medicine)  Fatou Waldron MD as Consulting Physician (Nephrology)      Subjective   Interval history: Patient was discharged on 6/28  and presented again to [facility] ER today with complaints of altered mental status, ascites, BLE edema, and hypotension.  History is limited secondary to patient's mental status.  He is awake, but is confused and does not provide verbal responses at this time.  Therefore, history was supplemented via chart review and report from bedside RN as well as patient's wife who is at bedside.  Per patient's wife, she is not sure if he has been compliant with lactulose at home.  She states that she encourages him to take lactulose as prescribed, but she is not sure if he is actually taking this.  She also reports that the patient has had recent mood swings and gets angry with her when she tries to encourage medication compliance.  Wife reports he has had no alcohol use since 9/2022.  Patient fell at home last night and has bruising on his face.  CT of the head showed no acute findings.  CT abdomen/pelvis showed large amount of ascites, cirrhosis, and moderate to large stool burden.      ROS:   Review of Systems   Constitutional:  Negative for chills and fever.   Respiratory:  Negative for cough and shortness of breath.    Cardiovascular:  Negative for chest pain and palpitations.   Gastrointestinal:  Positive for abdominal distention. Negative for abdominal pain, nausea and vomiting.   Neurological:  Positive for weakness. Negative for dizziness.   Psychiatric/Behavioral:  Negative for agitation and confusion.       Medication Review:   Scheduled Meds:cefTRIAXone, 2 g, Intravenous, Once  lactulose, 300 mL, Rectal, Once  midodrine, 20 mg, Oral, Once      Continuous Infusions:Pharmacy to dose vancomycin,     PRN Meds:.  Pharmacy to dose vancomycin    [COMPLETED] Insert Peripheral IV **AND** sodium chloride    [COMPLETED] Insert Peripheral IV **AND** sodium chloride      Objective     Vital Signs  Temp:  [98.3 °F (36.8 °C)] 98.3 °F (36.8 °C)  Heart Rate:  [65-67] 67  Resp:  [20] 20  BP: (88-94)/(30-41)  94/41    Physical Exam:    General Appearance:    Awake, confused   Head:    Normocephalic, without obvious abnormality   Eyes:          Conjunctivae normal, icteric sclera   Ears:    Hearing intact   Throat:   No oral lesions, no thrush, oral mucosa moist   Neck:   No adenopathy, supple, no JVD   Lungs:     Respirations regular, even and unlabored       Abdomen:     Soft, distended with fluid wave, nontender   Rectal:     Deferred   Extremities:   3+ BLE edema, no cyanosis   Skin:   No bleeding, bruising or rash, + jaundice   Neurologic:   Sensation intact        Results Review:    CBC  Results from last 7 days   Lab Units 07/03/23  1504 06/28/23  0954 06/27/23  2247 06/26/23  2344   RBC 10*6/mm3 2.99* 3.21* 3.14* 3.19*   WBC 10*3/mm3 8.00 7.90 9.10 8.90   HEMOGLOBIN g/dL 10.5* 11.2* 11.4* 11.2*   PLATELETS 10*3/mm3 158 148 135* 133*         CMP  Results from last 7 days   Lab Units 07/03/23  1504 06/27/23 2247 06/26/23  2344   SODIUM mmol/L 133* 139 142   POTASSIUM mmol/L 4.3 2.9* 3.1*   CHLORIDE mmol/L 92* 97* 100   CO2 mmol/L 25.0 32.0* 30.0*   BUN mg/dL 51* 22* 25*   CREATININE mg/dL 4.75* 1.57* 1.77*   GLUCOSE mg/dL 112* 109* 115*   ALBUMIN g/dL 3.3* 2.9* 3.0*   BILIRUBIN mg/dL 3.7* 6.1* 3.8*   ALK PHOS U/L 153* 146* 152*   AST (SGOT) U/L 66* 46* 60*   ALT (SGPT) U/L 28 25 32   AMMONIA umol/L 157*  --   --          Amylase and Lipase        CRP         Imaging Results (Last 24 Hours)       Procedure Component Value Units Date/Time    XR Chest 1 View [[id number]] Resulted: 07/03/23 1615     Updated: 07/03/23 1615    CT Head Without Contrast [[id number]] Collected: 07/03/23 1540     Updated: 07/03/23 1547    Narrative:      CT HEAD WO CONTRAST, CT FACIAL BONES WO CONTRAST    Date of Exam: 7/3/2023 3:36 PM EDT    Indication: fall last night with head injury now AMS.    Comparison: None available.    Technique: Axial CT images were obtained of the head without contrast administration.  Coronal reconstructions were  performed.  Automated exposure control and iterative reconstruction methods were used.    Findings:  Gray-white differentiation is maintained and there is no evidence of intracranial hemorrhage, mass or mass effect. The ventricles are normal in size and configuration. The orbits are normal. The paranasal sinuses are grossly clear. The calvarium is   intact. Additional dedicated evaluation of the facial bones demonstrates no evidence of acute fracture. The mastoid air cells are clear. Rightward nasal septal deviation is noted the lamina papyracea are intact.      Impression:      Impression:  No acute intracranial abnormality or acute facial bone fracture.      Electronically Signed: True Allen    7/3/2023 3:45 PM EDT    Workstation ID: [id number]    CT Facial Bones Without Contrast [[id number]] Collected: 07/03/23 1540     Updated: 07/03/23 1547    Narrative:      CT HEAD WO CONTRAST, CT FACIAL BONES WO CONTRAST    Date of Exam: 7/3/2023 3:36 PM EDT    Indication: fall last night with head injury now AMS.    Comparison: None available.    Technique: Axial CT images were obtained of the head without contrast administration.  Coronal reconstructions were performed.  Automated exposure control and iterative reconstruction methods were used.    Findings:  Gray-white differentiation is maintained and there is no evidence of intracranial hemorrhage, mass or mass effect. The ventricles are normal in size and configuration. The orbits are normal. The paranasal sinuses are grossly clear. The calvarium is   intact. Additional dedicated evaluation of the facial bones demonstrates no evidence of acute fracture. The mastoid air cells are clear. Rightward nasal septal deviation is noted the lamina papyracea are intact.      Impression:      Impression:  No acute intracranial abnormality or acute facial bone fracture.      Electronically Signed: True Jeongord    7/3/2023 3:45 PM EDT    Workstation ID: [id number]    CT Cervical Spine  Without Contrast [[id number]] Collected: 07/03/23 1541     Updated: 07/03/23 1546    Narrative:        CT CERVICAL SPINE WO CONTRAST    Date of Exam: 7/3/2023 3:36 PM EDT    Indication: fall last night with AMS.    Comparison: None available.    Technique: Axial CT images were obtained of the cervical spine without contrast administration.  Sagittal and coronal reconstructions were performed.  Automated exposure control and iterative reconstruction methods were used.      Findings:  No evidence of fracture or traumatic subluxation. Mild degenerative disc disease at C5-C6. No prevertebral soft tissue swelling. Minimal fluid in the left mastoid air cells. 1 cm groundglass opacity in the right lung apex      Impression:      Impression:    1. No evidence of cervical spine fracture  2. Minimal left mastoid effusion  3. 1 cm right apical groundglass opacity. Recommend follow-up chest CT in 6      Electronically Signed: Dio Murrell    7/3/2023 3:44 PM EDT    Workstation ID: [id number]    CT Abdomen Pelvis Without Contrast [[id number]] Collected: 07/03/23 1539     Updated: 07/03/23 1544    Narrative:      CT ABDOMEN PELVIS WO CONTRAST    Date of Exam: 7/3/2023 3:36 PM EDT    Indication: abdominal distention.    Comparison: Ultrasound-guided paracentesis 6/23/2023. No prior CT abdomen and pelvis for comparison at this institution. Liver ultrasound 6/25/2023.    Technique: Axial CT images were obtained of the abdomen and pelvis without the administration of contrast. Sagittal and coronal reconstructions were performed.  Automated exposure control and iterative reconstruction methods were used.      Findings:  Large quantity abdominal pelvic ascites is present. There is mild to moderate generalized body wall edema. Small left, trace right basilar pleural effusions are present with mild posterior bibasilar atelectasis and mild to right middle lobe atelectasis.   There is mild right hemidiaphragm elevation.    The liver is  diminutive in size. It demonstrates a subtle surface nodular contour, suggestive of cirrhosis. No focal liver lesion is identified on this noncontrast examination. The spleen size is within normal limits. There is a small esophageal hiatal   hernia which contains ascites fluid.    The gallbladder, pancreas, adrenals, and kidneys demonstrate a normal noncontrast appearance. Moderate to large stool burden is present in the ascending, transverse, and descending colon. The bowel does not appear abnormally dilated or inflamed. The   appendix is normal.    The urinary bladder, prostate, and rectum are normal.    No acute or suspicious osseous abnormalities are identified. There is moderate degenerative disc change at the L4-5 vertebral level.      Impression:      Impression:    1. Large volume abdominal pelvic ascites.  2. Cirrhotic liver morphology.  3. Moderate to large colonic stool burden. Correlate for constipation.  4. Scattered mild bibasilar atelectasis. Small left and trace right pleural effusions are present.            Electronically Signed: Chanda Clark    7/3/2023 3:42 PM EDT    Workstation ID: [id number]              Assessment & Plan     Assessment:  37-year-old male with history of alcoholic cirrhosis complicated by HE, ascites, and esophageal varices discharged on 6/28 and presented again to [facility] ER today with complaints of altered mental status, ascites, BLE edema, and hypotension.      -Hepatic encephalopathy/hyperammonemia  -Alcohol hepatitis and cirrhosis complicated by HE, ascites, and esophageal varices  -Hyponatremia  -Acute renal failure  -Hypotension  -Elevated LFTs  -Hyperkalemia     Plan:  Agree with lactulose enemas and repeat until more alert and oriented.   Restart oral lactulose and Xifaxan once able to tolerate p.o.  We will plan for paracentesis with fluid studies.  Will add zinc.  MELD sodium score 30.  Once he is more alert and oriented, recommend 2 g sodium diet with  Boost/Ensure supplements. Encourage his nutrition.  Supportive care.  GI will follow.    MONICA Bates  07/03/23  16:28 EDT      Electronically signed by Juan Padilla MD at 07/03/23 9064          Consult Notes (all)        Fatou Waldron MD at 07/04/23 0822        Consult Orders    1. Nephrology (on -call MD unless specified) [[id number]] ordered by Ivan Oropeza PA at 07/03/23 1611                     NEPHROLOGY CONSULTATION-----[facility]    [facility]  [phone]  Saji Waldron MD    Patient Care Team:  Wolf Carvalho MD as PCP - General (Geriatric Medicine)  Fatou Waldron MD as Consulting Physician (Nephrology)    CC/REASON FOR CONSULTATION: RENAL FAILURE/ELEVATED SERUM CREATININE    PHYSICIAN REQUESTING CONSULTATION:     History of Present Illness    Patient is a 37 y.o. WM, very well known to me, whom I was asked to see in consultation for evaluation and management of renal failure/elevated serum creatinine.  Patient was admitted after presenting to ER with c/o MS changes and hypotension. Per patient and his mother he has not been taking Lactulose as prescribed. S/P recent fall on trip to [place].  No NSAIDs or recent IV dye exposure. No known h/o TB, rheumatic fever,  SLE. +EtoH related cirrhosis and does bleed/bruise easily. Reports some decreased urine output and urinary hesitancy.  Some dysuria. No edema or fluid retention outside of ascites/abdominal distension. Was not on diuretics prior to admission. Was not on ACE-I/ARB prior to admission. No herbal med use. No seizures, tremors, twitches, HA, blurry vision. No polyuria or polydypsia.     Review of Systems   Constitutional:  Positive for activity change, appetite change and fatigue. Negative for chills, diaphoresis, fever and unexpected weight change.   HENT:  Negative for congestion, dental problem, drooling, ear  discharge, ear pain, facial swelling, hearing loss, mouth sores, nosebleeds, postnasal drip, rhinorrhea, sinus pressure, sinus pain, sneezing, sore throat, tinnitus, trouble swallowing and voice change.    Eyes:  Negative for photophobia, pain, discharge, redness, itching and visual disturbance.   Respiratory:  Negative for apnea, cough, choking, chest tightness, shortness of breath, wheezing and stridor.    Cardiovascular:  Negative for chest pain, palpitations and leg swelling.   Gastrointestinal:  Positive for abdominal distention and abdominal pain. Negative for anal bleeding, blood in stool, constipation, diarrhea, nausea, rectal pain and vomiting.   Endocrine: Negative for cold intolerance, heat intolerance, polydipsia, polyphagia and polyuria.   Genitourinary:  Positive for decreased urine volume and difficulty urinating. Negative for dysuria, enuresis, flank pain, frequency, genital sores, hematuria and urgency.   Musculoskeletal:  Positive for arthralgias and back pain. Negative for gait problem, joint swelling, myalgias, neck pain and neck stiffness.   Skin:  Negative for color change, pallor, rash and wound.   Allergic/Immunologic: Negative for environmental allergies, food allergies and immunocompromised state.   Neurological:  Positive for weakness. Negative for dizziness, tremors, seizures, syncope, facial asymmetry, speech difficulty, light-headedness, numbness and headaches.   Hematological:  Negative for adenopathy. Bruises/bleeds easily.   Psychiatric/Behavioral:  Positive for confusion, decreased concentration and dysphoric mood. Negative for agitation, behavioral problems, hallucinations, self-injury, sleep disturbance and suicidal ideas. The patient is not nervous/anxious and is not hyperactive.         Past Medical History:   Diagnosis Date    Alcohol abuse     1/2-1 pint a day, sober since 9/12/2022    Alcoholic cirrhosis 09/01/2022    Arthritis     Bipolar affective disorder 09/22/2022     Cholecystitis 2022    Chronic constipation 2017    GERD (gastroesophageal reflux disease) 2022    Doing well with pepcid.    Hypertension     Osteoarthritis 2022    Polysubstance abuse 2022    Severe Malnutrition (HCC) 2023    Sleep apnea     Tobacco use disorder 2022       Past Surgical History:   Procedure Laterality Date    BACK SURGERY      WRIST SURGERY         Family History   Problem Relation Age of Onset    Cirrhosis Father     Heart disease Maternal Grandmother     Diabetes Maternal Grandmother     Diabetes Maternal Grandfather     Diabetes Paternal Grandmother     Diabetes Paternal Grandfather        Social History     Tobacco Use    Smoking status: Every Day     Packs/day: 0.50     Types: Cigarettes    Smokeless tobacco: Never   Vaping Use    Vaping Use: Never used   Substance Use Topics    Alcohol use: Not Currently     Comment: sober since 21    Drug use: Not Currently     Types: Marijuana       Home Meds:   Medications Prior to Admission   Medication Sig Dispense Refill Last Dose    allopurinol (ZYLOPRIM) 100 MG tablet Take 1 tablet by mouth Daily. 30 tablet 0 7/3/2023    [] ALPRAZolam (Xanax) 0.5 MG tablet Take 1 tablet by mouth 2 (Two) Times a Day As Needed for Anxiety for up to 5 days. 10 tablet 0 7/3/2023    calcium carbonate (TUMS) 500 MG chewable tablet Chew 2 tablets Daily for 14 days. 28 tablet 0 7/3/2023    magnesium oxide (MAG-OX) 400 tablet tablet Take 1 tablet by mouth Daily for 14 days. 14 tablet 0 7/3/2023    midodrine (PROAMATINE) 10 MG tablet Take 2 tablets (20 mg) by mouth 3 (Three) Times a Day Before Meals 180 tablet 0 7/3/2023    nadolol (CORGARD) 40 MG tablet Take 1 tablet by mouth Daily As Needed.   7/3/2023    ondansetron ODT (ZOFRAN-ODT) 4 MG disintegrating tablet Place 1 tablet on the tongue Every 8 (Eight) Hours As Needed for Nausea or Vomiting for up to 30 days. 90 tablet 0 Past Week    oxyCODONE (ROXICODONE) 10 MG tablet  Take 1 tablet by mouth Every 4 (Four) to 6 (Six) Hours As Needed for Moderate Pain.   7/3/2023    pantoprazole (PROTONIX) 40 MG EC tablet Take 1 tablet by mouth Daily.   7/3/2023    riFAXIMin (XIFAXAN) 550 MG tablet Take 1 tablet by mouth 2 (Two) Times a Day.   7/3/2023    thiamine (VITAMIN B-1) 100 MG tablet take 1 tablet daily 30 tablet 0 7/3/2023    baclofen (LIORESAL) 10 MG tablet Take 5-10 mg by mouth 2 (Two) Times a Day As Needed for Muscle Spasms.   Unknown    cyclobenzaprine (FLEXERIL) 10 MG tablet Take 5-10 mg by mouth 3 (Three) Times a Day As Needed.   Unknown    lactulose (CHRONULAC) 10 GM/15ML solution Take 30 mL by mouth 3 (Three) Times a Day for 30 days. 2700 mL 0 Unknown    melatonin 5 MG tablet tablet Take 1 tablet by mouth At Night As Needed.   Unknown    nicotine (NICODERM CQ) 14 MG/24HR patch Place 1 patch on the skin as directed by provider Daily As Needed.   Unknown    nicotine polacrilex (NICORETTE) 2 MG gum Chew 1 each As Needed for Smoking Cessation.   Unknown       Scheduled Meds:  albumin human, , ,   albumin human, 50 g, Intravenous, QAM  cefTRIAXone, 2 g, Intravenous, Q24H  lactulose, 30 g, Oral, Q6H  midodrine, 20 mg, Oral, Q8H  octreotide, 100 mcg, Intravenous, Q8H  pantoprazole, 40 mg, Intravenous, Q AM  potassium chloride ER, 40 mEq, Oral, Q4H  rifAXIMin, 550 mg, Oral, Q12H  sodium chloride, 500 mL, Intravenous, Once  sodium chloride, 10 mL, Intravenous, Q12H  sodium chloride, 10 mL, Intravenous, Q12H  sodium chloride, 10 mL, Intravenous, Q12H  sodium chloride, 10 mL, Intravenous, Q12H  thiamine, 100 mg, Oral, QAM        Continuous Infusions:  norepinephrine, 0.02-0.3 mcg/kg/min        PRN Meds:    ALPRAZolam    senna-docusate sodium **AND** polyethylene glycol **AND** bisacodyl **AND** bisacodyl    ipratropium-albuterol    Magnesium Standard Dose Replacement - Follow Nurse / BPA Driven Protocol    nitroglycerin    ondansetron **OR** ondansetron    Phosphorus Replacement - Follow  Nurse / BPA Driven Protocol    Potassium Replacement - Follow Nurse / BPA Driven Protocol    [COMPLETED] Insert Peripheral IV **AND** sodium chloride    [COMPLETED] Insert Peripheral IV **AND** sodium chloride    sodium chloride    sodium chloride    sodium chloride    sodium chloride    Allergies:  Bee venom and Scopolamine    OBJECTIVE    Vital Signs  Temp:  [97.4 °F (36.3 °C)-98.3 °F (36.8 °C)] 97.4 °F (36.3 °C)  Heart Rate:  [47-80] 69  Resp:  [12-20] 12  BP: ()/(28-65) 108/65    No intake/output data recorded.  I/O last 3 completed shifts:  In: 2415 [P.O.:240; I.V.:575; IV Piggyback:1600]  Out: 400 [Urine:400]    Physical Exam:  General Appearance: alert, appears stated age and cooperative  Head: normocephalic, without obvious abnormality and atraumatic +DRY OP +LEFT FOREHEAD ABRASION    Eyes: conjunctivae and sclerae normal and no icterus  Neck: supple and no JVD  Lungs: clear to auscultation and respirations regular  Heart: regular rhythm & normal rate and normal S1, S2 +BETHANY  Chest Wall: no abnormalities observed  Abdomen: normal bowel sounds and  +ASCITES/DISTENSION AND MILD BILAT LQ PAIN ON PALPATION  Extremities: moves extremities well, no edema, no cyanosis  Skin: +SCATTERED SCABS, ABRASIONS, ECCHYMOSIS  Neurologic: Alert, and oriented x 2. No focal deficits    Results Review:    I reviewed the patient's new clinical results.    WBC WBC   Date Value Ref Range Status   07/04/2023 5.60 3.40 - 10.80 10*3/mm3 Final   07/03/2023 8.00 3.40 - 10.80 10*3/mm3 Final      HGB Hemoglobin   Date Value Ref Range Status   07/04/2023 9.1 (L) 13.0 - 17.7 g/dL Final   07/03/2023 10.5 (L) 13.0 - 17.7 g/dL Final      HCT Hematocrit   Date Value Ref Range Status   07/04/2023 27.3 (L) 37.5 - 51.0 % Final   07/03/2023 31.3 (L) 37.5 - 51.0 % Final      Platelets No results found for: LABPLAT   MCV MCV   Date Value Ref Range Status   07/04/2023 101.8 (H) 79.0 - 97.0 fL Final   07/03/2023 104.5 (H) 79.0 - 97.0 fL Final           Sodium Sodium   Date Value Ref Range Status   07/04/2023 139 136 - 145 mmol/L Final   07/03/2023 133 (L) 136 - 145 mmol/L Final      Potassium Potassium   Date Value Ref Range Status   07/04/2023 3.5 3.5 - 5.2 mmol/L Final   07/03/2023 4.3 3.5 - 5.2 mmol/L Final     Comment:     Slight hemolysis detected by analyzer. Results may be affected.      Chloride Chloride   Date Value Ref Range Status   07/04/2023 100 98 - 107 mmol/L Final   07/03/2023 92 (L) 98 - 107 mmol/L Final      CO2 CO2   Date Value Ref Range Status   07/04/2023 25.0 22.0 - 29.0 mmol/L Final   07/03/2023 25.0 22.0 - 29.0 mmol/L Final      BUN BUN   Date Value Ref Range Status   07/04/2023 48 (H) 6 - 20 mg/dL Final   07/03/2023 51 (H) 6 - 20 mg/dL Final      Creatinine Creatinine   Date Value Ref Range Status   07/04/2023 3.86 (H) 0.76 - 1.27 mg/dL Final   07/03/2023 4.75 (H) 0.76 - 1.27 mg/dL Final      Calcium Calcium   Date Value Ref Range Status   07/04/2023 8.2 (L) 8.6 - 10.5 mg/dL Final   07/03/2023 8.7 8.6 - 10.5 mg/dL Final      PO4 No results found for: CAPO4   Albumin Albumin   Date Value Ref Range Status   07/04/2023 3.3 (L) 3.5 - 5.2 g/dL Final   07/03/2023 3.3 (L) 3.5 - 5.2 g/dL Final      Magnesium Magnesium   Date Value Ref Range Status   07/04/2023 1.9 1.6 - 2.6 mg/dL Final   07/03/2023 2.1 1.6 - 2.6 mg/dL Final      Uric Acid No results found for: URICACID       Imaging Results (Last 72 Hours)       Procedure Component Value Units Date/Time    XR Chest 1 View [[id number]] Collected: 07/03/23 1626     Updated: 07/03/23 1630    Narrative:      XR CHEST 1 VW    Date of Exam: 7/3/2023 4:13 PM EDT    Indication: AMS    Comparison: AP portable chest 6/20/2023    Findings:  There is some bandlike atelectasis in the right lower lobe, similar to the prior examination. The previously described patchy left basilar airspace disease appears improved. No new airspace disease is identified. The heart size is normal. No pleural   effusion or  pneumothorax is identified.      Impression:      Impression:  Improving bibasilar atelectasis or pneumonia since 6/28/2023. No new airspace disease is appreciated.      Electronically Signed: Chanda Clark    7/3/2023 4:27 PM EDT    Workstation ID: [id number]    CT Head Without Contrast [[id number]] Collected: 07/03/23 1540     Updated: 07/03/23 1547    Narrative:      CT HEAD WO CONTRAST, CT FACIAL BONES WO CONTRAST    Date of Exam: 7/3/2023 3:36 PM EDT    Indication: fall last night with head injury now AMS.    Comparison: None available.    Technique: Axial CT images were obtained of the head without contrast administration.  Coronal reconstructions were performed.  Automated exposure control and iterative reconstruction methods were used.    Findings:  Gray-white differentiation is maintained and there is no evidence of intracranial hemorrhage, mass or mass effect. The ventricles are normal in size and configuration. The orbits are normal. The paranasal sinuses are grossly clear. The calvarium is   intact. Additional dedicated evaluation of the facial bones demonstrates no evidence of acute fracture. The mastoid air cells are clear. Rightward nasal septal deviation is noted the lamina papyracea are intact.      Impression:      Impression:  No acute intracranial abnormality or acute facial bone fracture.      Electronically Signed: True Allen    7/3/2023 3:45 PM EDT    Workstation ID: [id number]    CT Facial Bones Without Contrast [[id number]] Collected: 07/03/23 1540     Updated: 07/03/23 1547    Narrative:      CT HEAD WO CONTRAST, CT FACIAL BONES WO CONTRAST    Date of Exam: 7/3/2023 3:36 PM EDT    Indication: fall last night with head injury now AMS.    Comparison: None available.    Technique: Axial CT images were obtained of the head without contrast administration.  Coronal reconstructions were performed.  Automated exposure control and iterative reconstruction methods were used.    Findings:  Gray-white  differentiation is maintained and there is no evidence of intracranial hemorrhage, mass or mass effect. The ventricles are normal in size and configuration. The orbits are normal. The paranasal sinuses are grossly clear. The calvarium is   intact. Additional dedicated evaluation of the facial bones demonstrates no evidence of acute fracture. The mastoid air cells are clear. Rightward nasal septal deviation is noted the lamina papyracea are intact.      Impression:      Impression:  No acute intracranial abnormality or acute facial bone fracture.      Electronically Signed: True Allen    7/3/2023 3:45 PM EDT    Workstation ID: [id number]    CT Cervical Spine Without Contrast [[id number]] Collected: 07/03/23 1541     Updated: 07/03/23 1546    Narrative:        CT CERVICAL SPINE WO CONTRAST    Date of Exam: 7/3/2023 3:36 PM EDT    Indication: fall last night with AMS.    Comparison: None available.    Technique: Axial CT images were obtained of the cervical spine without contrast administration.  Sagittal and coronal reconstructions were performed.  Automated exposure control and iterative reconstruction methods were used.      Findings:  No evidence of fracture or traumatic subluxation. Mild degenerative disc disease at C5-C6. No prevertebral soft tissue swelling. Minimal fluid in the left mastoid air cells. 1 cm groundglass opacity in the right lung apex      Impression:      Impression:    1. No evidence of cervical spine fracture  2. Minimal left mastoid effusion  3. 1 cm right apical groundglass opacity. Recommend follow-up chest CT in 6      Electronically Signed: Dio Murrell    7/3/2023 3:44 PM EDT    Workstation ID: [id number]    CT Abdomen Pelvis Without Contrast [[id number]] Collected: 07/03/23 1539     Updated: 07/03/23 1544    Narrative:      CT ABDOMEN PELVIS WO CONTRAST    Date of Exam: 7/3/2023 3:36 PM EDT    Indication: abdominal distention.    Comparison: Ultrasound-guided paracentesis 6/23/2023. No  prior CT abdomen and pelvis for comparison at this institution. Liver ultrasound 6/25/2023.    Technique: Axial CT images were obtained of the abdomen and pelvis without the administration of contrast. Sagittal and coronal reconstructions were performed.  Automated exposure control and iterative reconstruction methods were used.      Findings:  Large quantity abdominal pelvic ascites is present. There is mild to moderate generalized body wall edema. Small left, trace right basilar pleural effusions are present with mild posterior bibasilar atelectasis and mild to right middle lobe atelectasis.   There is mild right hemidiaphragm elevation.    The liver is diminutive in size. It demonstrates a subtle surface nodular contour, suggestive of cirrhosis. No focal liver lesion is identified on this noncontrast examination. The spleen size is within normal limits. There is a small esophageal hiatal   hernia which contains ascites fluid.    The gallbladder, pancreas, adrenals, and kidneys demonstrate a normal noncontrast appearance. Moderate to large stool burden is present in the ascending, transverse, and descending colon. The bowel does not appear abnormally dilated or inflamed. The   appendix is normal.    The urinary bladder, prostate, and rectum are normal.    No acute or suspicious osseous abnormalities are identified. There is moderate degenerative disc change at the L4-5 vertebral level.      Impression:      Impression:    1. Large volume abdominal pelvic ascites.  2. Cirrhotic liver morphology.  3. Moderate to large colonic stool burden. Correlate for constipation.  4. Scattered mild bibasilar atelectasis. Small left and trace right pleural effusions are present.            Electronically Signed: Chanda Clark    7/3/2023 3:42 PM EDT    Workstation ID: [id number]              Results for orders placed during the hospital encounter of 07/03/23    XR Chest 1 View    Narrative  XR CHEST 1 VW    Date of Exam: 7/3/2023  4:13 PM EDT    Indication: AMS    Comparison: AP portable chest 6/20/2023    Findings:  There is some bandlike atelectasis in the right lower lobe, similar to the prior examination. The previously described patchy left basilar airspace disease appears improved. No new airspace disease is identified. The heart size is normal. No pleural  effusion or pneumothorax is identified.    Impression  Impression:  Improving bibasilar atelectasis or pneumonia since 6/28/2023. No new airspace disease is appreciated.      Electronically Signed: Chanda Clark  7/3/2023 4:27 PM EDT  Workstation ID: [id number]      Results for orders placed during the hospital encounter of 06/23/23    XR Chest 1 View    Narrative  XR CHEST 1 VW    Date of Exam: 6/28/2023 11:45 AM EDT    Indication: eval for pneumonia    Comparison: None available.    Findings:  The lung volumes are low. Patchy airspace disease is seen within the lung bases bilaterally. Probable small bilateral pleural effusions are present. The pulmonary vasculature appears unremarkable. No pneumothorax. No acute osseous abnormality identified.    Impression  Impression:  Low lung volumes with patchy airspace disease seen within the lung bases bilaterally, left greater than right with probable small bilateral pleural effusions, likely related to pneumonia and/or atelectasis.      Electronically Signed: Brynn Higuera  6/28/2023 12:00 PM EDT  Workstation ID: [id number]            ASSESSMENT / PLAN      Hepatic encephalopathy    Bipolar affective disorder    GERD (gastroesophageal reflux disease)    Osteoarthritis    Tobacco use disorder    Polysubstance abuse    Obstructive sleep apnea    URIEL (acute kidney injury)    Alcoholic cirrhosis    Chronic constipation    Severe Malnutrition (HCC)    Essential hypertension    CKD (chronic kidney disease) stage 3, GFR 30-59 ml/min      1.ARF/URIEL/CRF/CKD---------Nonoliguric. +Recurrent ARF/URIEL on top of known CRF/CKD STG 3A with a baseline serum  creatinine of about 1.5. CRF/CKD STG 3A secondary to HTN NS. +ARF/URIEL is secondary to ATN from hypotension and prerenal state/intravascular volume depletion. Hydrate with NS and give IV Albumin. Avoid hypotension. Check urine and serum studies and PVR. No NSAIDs or IV dye. Dose meds for CrCl less than 10 cc/min until ARF/URIEL is resolved     2. HYPOKALEMIA-------Replace po and give IV MgSO4     3. ETOH CIRRHOSIS/HEPATIC ENCEPHALOPATHY/ASCITES-----Restart Lactulose. Follow Ammonia. Follow for paracentesis need. IV Albumin     4. HYPOMAGNESEMIA--------Replace IV. No po replacement given diarrhea from Lactulose    5. DELERIUM----------Improving. Follow ammonia     6. HYPOCALCEMIA-------Replace IV and give MgSO4 also     7. ANEMIA-------H/H stable. Check Fe     8. HYPOALBUMINEMIA-----IV Albumin to temporize     9. ELEVATED INR------Related to cirrhosis     10. HYPOTENSION------On Midodrine     11. BIPOLAR DISEASE     12. BASILIO    13. POLYSUBSTANCE ABUSE/TOBACCO USE-----Drug screen positive for Benzodiazipine and Oxycodone    14. KETONURIA------Secondary to intravascular volume depletion. IVFs      I discussed the patient's findings and my recommendations with patient, family, and nursing staff    Will follow along closely. Thank you for allowing us to see this patient in renal consultation.    [facility]  [phone]  MD Saji Tang MD  07/04/23  08:22 EDT              Electronically signed by Fatou Waldron MD at 07/04/23 1126       Cathleen Wong APRN at 07/03/23 1951       Attestation signed by Aly Zayas MD at 07/04/23 9737    I have personally reviewed all overnight events, labs and other data, reviewed all other pertinent notes, seen and examined this patient today. I have also reviewed the note by MONICA and pertinent changes are reflected in my today's progress note.    Dr. Aly Zayas MD MPH  Staff  Intensivist  23  09:25 EDT                      Critical Care Consult Note   Daquan Xiong : 1985 MRN:[id number] LOS:0 ROOM: 230/1     Reason for admission: Hepatic encephalopathy     Assessment / Plan     Hepatic encephalopathy  -Admission ammonia  level 157  -Lactulose 30 grams q 6 hours  -Continue Ceftriaxone for now  -GI consult prior to ICU admission  -Nutrition consult when appropriate    Hypotension  -Persistent hypotension in spite of adequate fluid resuscitation  -Continue home midodrine  -C/w additional fluids as needed. Avoid fluid overload  -Titrate vasopressors for a target MAP of 65    Kidney injury, acute on chronic  -UA reviewed  -Consider renal ultrasound  -Continue IVF resuscitation  -Monitor and trend labs  -Avoid nephrotoxic medications, hypotension, and NSAIDS  -Renally dose medications  -Monitor urine output  -Nephrology consulted prior to ICU admission    1 cm right apical groundglass opacity via CT  --Radiology recommend follow-up chest CT in 6     Chronic conditions: bipolar, GERD, malnutrition, alcoholic cirrhosis, CKD, chronic-constipation, obstructive sleep apnea, smoking and substance abuse  - Continue home medications as appropriate  - Monitor routine labs and vital signs        Code Status (Patient has no pulse and is not breathing): CPR (Attempt to Resuscitate)  Medical Interventions (Patient has pulse or is breathing): Full Support       Nutrition: NPO Diet NPO Type: Strict NPO Patient isn't on Tube Feeding     DVT prophylaxis:  Mechanical DVT prophylaxis orders are present.     History of Present illness     A 37 y.o. old male patient with PMH of bipolar, GERD, malnutrition, alcoholic cirrhosis, CKD, chronic-constipation, obstructive sleep apnea, smoking and substance abuse presents to the hospital with complaints of altered mental status.  HPI information is limited due to patient confusion; information obtained from chart review and ER report.  Patient has had  increased low blood pressure and altered mental status over the last 2 days.  Per report patient's wife stated he was hospitalized and discharged last week with similar complaints.  He went to [place] over the weekend and his symptoms worsened ending with a fall yesterday afternoon getting out of the car.  Additionally, patient's wife reported increased bilateral lower extremity edema going to his scrotum and abdomen.  His last paracentesis was 6/23/2023 he was not supposed to be scheduled again until 7/7/2023.  Patient's blood pressure continued to drop while in the emergency department awaiting PCU bed patient was brought to the intensive care unit for further evaluation and treatment.    ACP: Patient unable to answer questions at this time will remain full code with full intervention; his wife is his decision-maker if he is unable.    Patient was seen and examined on 07/03/23 at 21:35 EDT .    Subjective / Review of systems     Review of Systems   Unable to perform ROS: Acuity of condition      Past Medical/Surgical/Social/Family History & Allergies     Past Medical History:   Diagnosis Date    Alcohol abuse     1/2-1 pint a day, sober since 9/12/2022    Alcoholic cirrhosis 09/01/2022    Arthritis     Bipolar affective disorder 09/22/2022    Cholecystitis 11/08/2022    Chronic constipation 09/05/2017    GERD (gastroesophageal reflux disease) 09/22/2022    Doing well with pepcid.    Hypertension     Osteoarthritis 09/22/2022    Polysubstance abuse 09/22/2022    Severe Malnutrition (HCC) 06/27/2023    Sleep apnea     Tobacco use disorder 09/22/2022      Past Surgical History:   Procedure Laterality Date    BACK SURGERY      WRIST SURGERY        Social History     Socioeconomic History    Marital status:    Tobacco Use    Smoking status: Every Day     Packs/day: 0.50     Types: Cigarettes    Smokeless tobacco: Never   Vaping Use    Vaping Use: Never used   Substance and Sexual Activity    Alcohol use: Not  Currently     Comment: sober since 9/12/22    Drug use: Not Currently     Types: Marijuana    Sexual activity: Defer      Family History   Problem Relation Age of Onset    Cirrhosis Father     Heart disease Maternal Grandmother     Diabetes Maternal Grandmother     Diabetes Maternal Grandfather     Diabetes Paternal Grandmother     Diabetes Paternal Grandfather       Allergies   Allergen Reactions    Bee Venom Anaphylaxis    Scopolamine Anaphylaxis        Home Medications     Prior to Admission medications    Medication Sig Start Date End Date Taking? Authorizing Provider   allopurinol (ZYLOPRIM) 100 MG tablet Take 1 tablet by mouth Daily. 6/8/23   Luis Enrique Byrne MD   ALPRAZolam (Xanax) 0.5 MG tablet Take 1 tablet by mouth 2 (Two) Times a Day As Needed for Anxiety for up to 5 days. 6/28/23 7/3/23  Ward Adams MD   baclofen (LIORESAL) 10 MG tablet Take 5-10 mg by mouth 2 (Two) Times a Day As Needed for Muscle Spasms.    Nathaly Pop MD   baclofen (LIORESAL) 10 MG tablet Take 1 tablet by mouth Daily. With stretching    Nathaly Pop MD   calcium carbonate (TUMS) 500 MG chewable tablet Chew 2 tablets Daily for 14 days. 6/28/23 7/12/23  Ward Adams MD   cyclobenzaprine (FLEXERIL) 10 MG tablet Take 5-10 mg by mouth 3 (Three) Times a Day As Needed.    Nathaly Pop MD   furosemide (LASIX) 40 MG tablet Take 1 tablet by mouth Daily.    Nathaly Pop MD   lactulose (CHRONULAC) 10 GM/15ML solution Take 30 mL by mouth 3 (Three) Times a Day for 30 days. 6/28/23 7/28/23  Ward Adams MD   magnesium oxide (MAG-OX) 400 tablet tablet Take 1 tablet by mouth Daily for 14 days. 6/28/23 7/12/23  Ward Adams MD   melatonin 5 MG tablet tablet Take 1 tablet by mouth At Night As Needed.    Nathaly Pop MD   midodrine (PROAMATINE) 10 MG tablet Take 2 tablets (20 mg) by mouth 3 (Three) Times a Day Before Meals 6/28/23 7/28/23  Ward Adams MD   nadolol (CORGARD) 40 MG  tablet Take 1 tablet by mouth Daily As Needed.    Nathaly Pop MD   nicotine (NICODERM CQ) 14 MG/24HR patch Place 1 patch on the skin as directed by provider Daily As Needed.    Nathaly Pop MD   nicotine polacrilex (NICORETTE) 2 MG gum Chew 1 each As Needed for Smoking Cessation.    Nathaly Pop MD   ondansetron ODT (ZOFRAN-ODT) 4 MG disintegrating tablet Place 1 tablet on the tongue Every 8 (Eight) Hours As Needed for Nausea or Vomiting for up to 30 days. 6/28/23 7/28/23  Ward Adams MD   oxyCODONE (ROXICODONE) 10 MG tablet Take 1 tablet by mouth Every 4 (Four) to 6 (Six) Hours As Needed for Moderate Pain.    Nathaly Pop MD   pantoprazole (PROTONIX) 40 MG EC tablet Take 1 tablet by mouth Daily.    Nathaly Pop MD   riFAXIMin (XIFAXAN) 550 MG tablet Take 1 tablet by mouth 2 (Two) Times a Day.    Nathaly Pop MD   sodium bicarbonate 325 MG tablet Take 1 tablet by mouth Daily.    Nathaly Pop MD   spironolactone (ALDACTONE) 50 MG tablet Take 1 tablet by mouth Daily.    Nathaly Pop MD   thiamine (VITAMIN B-1) 100 MG tablet take 1 tablet daily 6/28/23 7/28/23  Ward Adams MD   ondansetron ODT (ZOFRAN-ODT) 4 MG disintegrating tablet Place 1 tablet on the tongue Every 8 (Eight) Hours As Needed for Nausea or Vomiting.  7/3/23  Nathaly Pop MD        Objective / Physical Exam     Vital signs:  Temp: 97.4 °F (36.3 °C)  BP: (!) 89/41  Heart Rate: 56  Resp: 12  SpO2: 98 %  Weight: 88.7 kg (195 lb 8.8 oz)    Admission Weight: Weight: 83.1 kg (183 lb 4.8 oz)    Physical Exam  Vitals and nursing note reviewed.   Constitutional:       Appearance: He is ill-appearing.   HENT:      Head: Normocephalic.      Comments: Abrasion to left side of face     Mouth/Throat:      Mouth: Mucous membranes are dry.      Pharynx: Oropharynx is clear.   Eyes:      Pupils: Pupils are equal, round, and reactive to light.   Cardiovascular:      Rate and  Rhythm: Regular rhythm. Bradycardia present.      Pulses: Normal pulses.      Heart sounds: Normal heart sounds.   Pulmonary:      Effort: Pulmonary effort is normal.      Breath sounds: Normal breath sounds.   Abdominal:      General: Bowel sounds are normal. There is distension.      Tenderness: There is abdominal tenderness.   Musculoskeletal:         General: Normal range of motion.      Cervical back: Normal range of motion.      Right lower leg: Edema present.      Left lower leg: Edema present.   Skin:     General: Skin is warm and dry.      Capillary Refill: Capillary refill takes more than 3 seconds.      Coloration: Skin is jaundiced.   Neurological:      Mental Status: He is alert. He is disoriented.        Labs     Results from last 7 days   Lab Units 07/03/23  1504 06/28/23  0954 06/27/23 2247 06/26/23  2344   WBC 10*3/mm3 8.00 7.90 9.10 8.90   HEMATOCRIT % 31.3* 33.5* 32.4* 32.8*   PLATELETS 10*3/mm3 158 148 135* 133*      Results from last 7 days   Lab Units 07/03/23  1504 06/27/23 2247 06/26/23  2344   SODIUM mmol/L 133* 139 142   POTASSIUM mmol/L 4.3 2.9* 3.1*   CHLORIDE mmol/L 92* 97* 100   CO2 mmol/L 25.0 32.0* 30.0*   BUN mg/dL 51* 22* 25*   CREATININE mg/dL 4.75* 1.57* 1.77*        Imaging     CT Abdomen Pelvis Without Contrast    Result Date: 7/3/2023  Impression: 1. Large volume abdominal pelvic ascites. 2. Cirrhotic liver morphology. 3. Moderate to large colonic stool burden. Correlate for constipation. 4. Scattered mild bibasilar atelectasis. Small left and trace right pleural effusions are present. Electronically Signed: Chanda Clark  7/3/2023 3:42 PM EDT  Workstation ID: [id number]    CT Head Without Contrast    Result Date: 7/3/2023  Impression: No acute intracranial abnormality or acute facial bone fracture. Electronically Signed: True Allen  7/3/2023 3:45 PM EDT  Workstation ID: [id number]    CT Cervical Spine Without Contrast    Result Date: 7/3/2023  Impression: 1. No evidence of  cervical spine fracture 2. Minimal left mastoid effusion 3. 1 cm right apical groundglass opacity. Recommend follow-up chest CT in 6 Electronically Signed: Dio Murrell  7/3/2023 3:44 PM EDT  Workstation ID: [id number]    XR Chest 1 View    Result Date: 7/3/2023  Impression: Improving bibasilar atelectasis or pneumonia since 6/28/2023. No new airspace disease is appreciated. Electronically Signed: Chanda Clark  7/3/2023 4:27 PM EDT  Workstation ID: [id number]    CT Facial Bones Without Contrast    Result Date: 7/3/2023  Impression: No acute intracranial abnormality or acute facial bone fracture. Electronically Signed: True Allen  7/3/2023 3:45 PM EDT  Workstation ID: [id number]        Current Medications     Scheduled Meds:  albumin human, , ,   albumin human, , ,   albumin human, 50 g, Intravenous, Daily  [START ON 7/4/2023] cefTRIAXone, 2 g, Intravenous, Q24H  lactulose, 30 g, Oral, Q6H  [START ON 7/4/2023] midodrine, 20 mg, Oral, Q8H  octreotide, 100 mcg, Intravenous, TID  [START ON 7/4/2023] pantoprazole, 40 mg, Intravenous, Q AM  [START ON 7/4/2023] rifAXIMin, 550 mg, Oral, Q12H  sodium chloride, 500 mL, Intravenous, Once  sodium chloride, 10 mL, Intravenous, Q12H  thiamine, 100 mg, Oral, Daily         Continuous Infusions:  DOPamine, 2-20 mcg/kg/min  norepinephrine, 0.02-0.3 mcg/kg/min, Last Rate: 0.2 mcg/kg/min (07/03/23 2115)         MONICA Guerrier   Critical Care  07/03/23   21:35 EDT     Electronically signed by MONICA Guerrier, 07/03/23, 9:33 PM EDT.         Electronically signed by Aly Zayas MD at 07/04/23 0134

## 2023-07-05 NOTE — PLAN OF CARE
Goal Outcome Evaluation:  Plan of Care Reviewed With: patient           Outcome Evaluation: Pt is a 37 y.o. M adm to [facility] on 07/03/23 with AMS,  s/p fall & hypotension. CT of head & cervical spine (-).  Pt adm with hepatic encephalopathy, large volume abd/pelvic ascites, BLE edema, hypotension, hyperammonemia, esophageal varices. He is being referred for liver transplant. PMH: alcoholic cirrhosis, BASILIO, substance abuse, CKD. At baseline, pt lives with spouse who provides intermittent supervision at times during ADLs due to recent falls. Spouse works, but mother can assist at home PRN. He has a cane he uses at times during amb. He does not drive or work at this time. Upon eval, pt was lethargic at first then alertness & cognition improved as he was sat EOB. He required assist X 2 for safe sup<>sit & ADL transfers in the room. He requires mod A for toileting & LB ADLs & v.c. for safety awareness as he is a little impulsive at times. He has dec generalized strength, balance & endurance & is functioning significantly below his baseline. Will follow for OT tx & recommend inpt rehab upon discharge.      Anticipated Discharge Disposition (OT): inpatient rehabilitation facility

## 2023-07-05 NOTE — SIGNIFICANT NOTE
Patient deemed appropriate to downgrade out of ICU per intensivist.  Hospitalist was consulted for routine medical management. Dr. Medina to be assigned as attending.  Patient's BP stable, Levophed discontinued.  Orders and plan of care reviewed.  Continue with current plan of care.  Nephrology and GI following.

## 2023-07-05 NOTE — PROGRESS NOTES
Critical Care Progress Note     Daquan Xiong : 1985 MRN:[id number] LOS:2  Rm: 2307/1     Principal Problem: Hepatic encephalopathy     Reason for follow up: All the medical problems listed below    Summary     A 37 y.o. male with PMH of alcoholic cirrhosis of liver, obstructive sleep apnea, substance abuse, CKD presented to the hospital for altered mental status and was admitted with a principal diagnosis of Hepatic encephalopathy.  Found to have ammonia of 157, treated with lactulose and rifaximin after which his mentation improved.      On admission, patient also found to be in septic shock, treated with ceftriaxone and needed vasopressors.  Also had paracentesis on  with removal of 5 L of fluid.  Also had URIEL on admission, treated with IV fluids, albumin, midodrine and octreotide.  Nephrology was consulted.    Significant Events     23 : Transfer to PCU.  Increase lactulose to titrate for 3-4 bowel movements per day.  Critical care signing off.    Assessment / Plan     Hypotension  Unclear etiology, possible prerenal.  Does not meet sepsis criteria.  Resolved after adequate fluid resuscitation, off vasopressors.  Wean off midodrine as tolerated.  Peritoneal fluid without any growth, less than 250 neutrophils, DC antibiotics.    Acute hepatic encephalopathy  Ammonia level improved from 157-> 95.  Currently on lactulose and rifaximin.  Having only 1-2 bowel movements per day, increase lactulose to every 4 hours to titrate for 3-4 bowel movements per day.    Alcoholic cirrhosis of liver / Portal Hypertension / Recurrent ascites  S/p paracentesis on  with removal of 5 L.  Cultures remain negative.  Not a candidate for TIPS due to recurrent hepatic encephalopathy.  Awaiting liver transplant at [facility].    Acute Kidney Injury on CKD 3:   Remains non oliguric.  Baseline creatinine around 1.5  Possibly prerenal.  Some component of ATN from hypotension.  Continue with IV fluids, nephrology  following.  GI started on empiric midodrine, octreotide and albumin for possible hepatorenal syndrome.  Hepatorenal syndrome is more of a diagnosis by exclusion, doubt a true hepatorenal syndrome.  Can discontinue octreotide and albumin from my perspective.  Monitor Input/Output very closely.   Net IO Since Admission: -1,544 mL [07/05/23 1041]     S/p fall prior to admission: CT head, facial bones and cervical spine negative for injury    1 cm right apical groundglass opacity via CT: Radiology recommend follow-up chest CT in 6 months.     Macrocytic anemia  Obstructive sleep apnea   Bipolar disorder   GERD   Chronic constipation   History of substance abuse     Code status:   Code Status (Patient has no pulse and is not breathing): CPR (Attempt to Resuscitate)  Medical Interventions (Patient has pulse or is breathing): Full Support       Nutrition:   Diet: Renal Diets, Cardiac Diets; Low Sodium (2g); Low Potassium; Texture: Regular Texture (IDDSI 7); Fluid Consistency: Thin (IDDSI 0)   Patient isn't on Tube Feeding     DVT prophylaxis:  Medical and mechanical DVT prophylaxis orders are present.     Subjective / Review of systems     Review of Systems   Still confused, knows that he is in [facility].  Unable to explain why he is in the hospital.  No abdominal pain.  No nausea or vomiting.  Objective / Physical Exam     Vital signs:  Temp: 97.6 °F (36.4 °C)  BP: 106/57  Heart Rate: 72  Resp: 16  SpO2: 96 %  Weight: 89.6 kg (197 lb 8.5 oz)    Admission Weight: Weight: 83.1 kg (183 lb 4.8 oz)  Current Weight: Weight: 89.6 kg (197 lb 8.5 oz)    Input/Output in last 24 hours:    Intake/Output Summary (Last 24 hours) at 7/5/2023 1041  Last data filed at 7/5/2023 0800  Gross per 24 hour   Intake 2116 ml   Output 0 ml   Net 2116 ml      Net IO Since Admission: -1,544 mL [07/05/23 1041]     Physical Exam  Vitals and nursing note reviewed.   Constitutional:       General: He is awake. He is not in acute distress.      Appearance: Normal appearance.   HENT:      Mouth/Throat:      Mouth: Mucous membranes are moist.      Pharynx: Oropharynx is clear.   Eyes:      General: No scleral icterus.     Extraocular Movements: Extraocular movements intact.      Conjunctiva/sclera: Conjunctivae normal.   Cardiovascular:      Rate and Rhythm: Normal rate.      Heart sounds: No murmur heard.    No gallop.   Pulmonary:      Breath sounds: Normal breath sounds. No wheezing or rales.   Abdominal:      General: Bowel sounds are normal. There is distension.      Palpations: Abdomen is soft.      Tenderness: There is no abdominal tenderness.   Musculoskeletal:         General: No tenderness.      Right lower leg: No edema.      Left lower leg: No edema.   Neurological:      Mental Status: He is alert.      Comments: Confused, no focal motor or sensory deficits.   Psychiatric:         Behavior: Behavior is cooperative.        Radiology and Labs     Results from last 7 days   Lab Units 07/05/23  0606 07/04/23 0445 07/03/23  1504   WBC 10*3/mm3 6.60 5.60 8.00   HEMOGLOBIN g/dL 9.1* 9.1* 10.5*   HEMATOCRIT % 27.0* 27.3* 31.3*   PLATELETS 10*3/mm3 113* 100* 158      Results from last 7 days   Lab Units 07/05/23  0606 07/04/23  0445   PROTIME Seconds 16.1* 14.6*   INR  1.54* 1.39*      Results from last 7 days   Lab Units 07/05/23  0606 07/04/23  1606 07/04/23 0445 07/03/23  1504   SODIUM mmol/L 139  --  139 133*   POTASSIUM mmol/L 3.9 4.0 3.5 4.3   CHLORIDE mmol/L 103  --  100 92*   CO2 mmol/L 24.0  --  25.0 25.0   BUN mg/dL 36*  --  48* 51*   CREATININE mg/dL 2.74*  --  3.86* 4.75*   GLUCOSE mg/dL 120*  --  108* 112*   MAGNESIUM mg/dL 2.0  --  1.9 2.1   PHOSPHORUS mg/dL 3.3  --  4.3 5.4*      Results from last 7 days   Lab Units 07/05/23  0606 07/04/23  0445 07/03/23  1504   ALK PHOS U/L 93 119* 153*   AST (SGOT) U/L 33 43* 66*   ALT (SGPT) U/L 19 20 28           Current medications     Scheduled Meds:   albumin human, 50 g, Intravenous, QAM  heparin  (porcine), 5,000 Units, Subcutaneous, Q8H  lactulose, 30 g, Oral, Q4H  midodrine, 20 mg, Oral, Q8H  octreotide, 100 mcg, Subcutaneous, TID  pantoprazole, 40 mg, Oral, Q AM  rifAXIMin, 550 mg, Oral, Q12H  sodium chloride, 500 mL, Intravenous, Once  sodium chloride, 10 mL, Intravenous, Q12H  sodium chloride, 10 mL, Intravenous, Q12H  sodium chloride, 10 mL, Intravenous, Q12H  sodium chloride, 10 mL, Intravenous, Q12H  thiamine, 100 mg, Oral, QAM        Continuous Infusions:   sodium chloride, 75 mL/hr, Last Rate: 75 mL/hr (07/04/23 130)          Plan discussed with RN. Reviewed all other data in the last 24 hours, including but not limited to vitals, labs, microbiology, imaging and pertinent notes from other providers.     Aly Zayas MD   Critical Care  07/05/23   10:41 EDT

## 2023-07-05 NOTE — DISCHARGE PLACEMENT REQUEST
Discharge Planning Assessment  [facility]     Patient Name: Daquan Xiong  MRN: [id number]  Today's Date: 7/5/2023    Admit Date: 7/3/2023    Plan: DC PLAN: From Routine home Current with [facility]. Pending PT eval. Possible Pallative.     Discharge Needs Assessment       Row Name 07/05/23 1143       Living Environment    People in Home spouse    Current Living Arrangements home    Potentially Unsafe Housing Conditions none    Primary Care Provided by self    Able to Return to Prior Arrangements other (see comments)    Living Arrangement Comments Pending PT eval.       Resource/Environmental Concerns    Resource/Environmental Concerns none       Food Insecurity    Within the past 12 months, you worried that your food would run out before you got the money to buy more. Never true    Within the past 12 months, the food you bought just didn't last and you didn't have money to get more. Never true       Transition Planning    Patient/Family Anticipates Transition to home with family       Discharge Needs Assessment    Equipment Currently Used at Home cane, straight                   Discharge Plan       Row Name 07/05/23 2725       Plan    Plan DC PLAN: From Routine home Current with [facility]. Pending PT eval. Possible Pallative.    Patient/Family in Agreement with Plan yes    Plan Comments Met with patient at bedside, from routine home with wife. Independent with ADL's prior to this admission, uses cane. Current with [facility], but states had asked them not to come back. PCP is [facility], Denies any transportation issues, wife will provide. Able to afford medications. Frequent Paracentesis. DC BARRIERS: Lacutulose enemas, Pending PT eval, Possible Pallative consult.                  Continued Care and Services - Admitted Since 7/3/2023    Coordination has not been started for this encounter.       Selected Continued Care - Prior Encounters Includes continued care and service providers with  selected services from prior encounters from 4/4/2023 to 7/5/2023      Discharged on 6/28/2023 Admission date: 6/23/2023 - Discharge disposition: Home or Self Care      Home Medical Care       Service Provider Selected Services Address Phone Fax Patient Preferred    [facility] [address] [place] [address] [phone] --                          Expected Discharge Date and Time       Expected Discharge Date Expected Discharge Time    Jul 9, 2023            Demographic Summary       Row Name 07/05/23 1103       General Information    Admission Type inpatient    Arrived From emergency department    Referral Source admission list    Reason for Consult discharge planning    Preferred Language English       Contact Information    Permission Granted to Share Info With     Contact Information Obtained for                    Functional Status       Row Name 07/05/23 1103       Functional Status    Usual Activity Tolerance moderate    Current Activity Tolerance moderate       Assessment of Health Literacy    How often do you have someone help you read hospital materials? Sometimes    How often do you have problems learning about your medical condition because of difficulty understanding written information? Sometimes    How often do you have a problem understanding what is told to you about your medical condition? Sometimes    How confident are you filling out medical forms by yourself? Somewhat    Health Literacy Moderate       Functional Status, IADL    Medications assistive person    Meal Preparation assistive person    Housekeeping assistive person    Laundry assistive person    Shopping assistive person       Mental Status    General Appearance WDL WDL                  Karolina Munoz RN     Office phone: [phone]  Cell phone: [phone]

## 2023-07-05 NOTE — PAYOR COMM NOTE
"CLINICALS FOR PENDING INPATIENT PRECERT:      Daquan Xiong (37 y.o. Male) 1985  PENDING AUTH # [id number]          AUTHORIZATION PENDING:   PLEASE CALL OR FAX DETERMINATION TO CONTACT BELOW. THANK YOU.        Jerica Eldridge, RN MSN  /UR  [facility]  [phone] office  [phone] fax  [email or web address]    [facility]  NPI: [id number]  Tax: [id number]          Daquan Xiong (37 y.o. Male)       Date of Birth   1985    Social Security Number       Address   [address] [place] [address]    Home Phone   [phone]    MRN   [id number]       Scientologist   Amish    Marital Status                               Admission Date   7/3/23    Admission Type   Emergency    Admitting Provider   Aly Zayas MD    Attending Provider   Gavin Medina MD    Department, Room/Bed   [facility] INTENSIVE CARE UNIT, 2307/1       Discharge Date       Discharge Disposition       Discharge Destination                                 Attending Provider: Gavin Medina MD    Allergies: Bee Venom, Scopolamine    Isolation: None   Infection: None   Code Status: CPR    Ht: 182.9 cm (72\")   Wt: 89.6 kg (197 lb 8.5 oz)    Admission Cmt: None   Principal Problem: Hepatic encephalopathy [K76.82]                   Active Insurance as of 7/3/2023       Primary Coverage       Payor Plan Insurance Group Employer/Plan Group    ANTHEM BLUE CROSS ANTHEM BLUE CROSS BLUE SHIELD PPO [id number]       Payor Plan Address Payor Plan Phone Number Payor Plan Fax Number Effective Dates    [address] [phone]  1/1/2023 - None Entered    [facility] [id number]         Subscriber Name Subscriber Birth Date Member ID       ANA XIONG 3/25/1986 [id number]               Secondary Coverage       Payor Plan Insurance Group Employer/Plan Group    ANTHEM MEDICAID [facility] - ANTH [id number]       Payor Plan Address Payor Plan Phone Number Payor Plan Fax Number " Effective Dates    MAIL STOP:   1/15/2023 - None Entered    [address] [address]       [facility] [address]         Subscriber Name Subscriber Birth Date Member ID       AMISH XIONG 1985 [id number]                     Emergency Contacts        (Rel.) Home Phone Work Phone Mobile Phone    Lana Xiong (Spouse) [phone] -- [phone]    Alejandra Xiong (Daughter) -- -- [phone]    Lizz Wiggins (Mother) -- -- [phone]          23 1655  Inpatient Admission  Once     Completed     Level of Care: Med/Surg   Diagnosis: Hepatic encephalopathy [572.2.ICD-9-CM]   Admitting Physician: TAVO VIDAL [[id number]]   Attending Physician: TAVO VIDAL [[id number]]   Certification: I Certify That Inpatient Hospital Services Are Medically Necessary For Greater Than 2 Midnights                History & Physical        Jose Ruffin Jr., APRN at 23 1833       Attestation signed by Tavo Vidal MD at 23 0846    I have reviewed this documentation and agree.                      [facility] Medicine Services  History & Physical    Patient Name: Amish Xiong  : 1985  MRN: [id number]  Primary Care Physician:  Wolf Carvalho MD  Date of admission: 7/3/2023  Date and Time of Service: 2023 at 1730 hrs.    Subjective      Chief Complaint: Altered mental status, BLE edema, status post fall    History of Present Illness: Amish Xiong is a 37 y.o. male with a past medical history significant for decompensated cirrhosis, hepatic encephalopathy, alcoholic hepatitis presenting to the ED with his wife who is main historian states he has had low blood pressure and altered mental status over the past 2 days. Patient's wife states he was hospitalized and discharged last week with similar complaints she states when he got discharged they went to [place] and his symptoms worsened over the weekend. She states he also fell yesterday when getting out of the car. He states today now  he is not able to ambulate well. She reports he has had increased bilateral lower extremity edema now going up into his scrotum and abdomen. She reports he last had a paracentesis on 6/23/2023 and is not scheduled for another 1 until 7/7/2023. She reports chronic nausea and cough without significant change. No reports of fever or vomiting. No reports of loss of consciousness. Patient's wife states she has been giving him lactulose but she is not sure if he is actually taking it. She is not sure when his last bowel movement was.  Work-up in the ED revealed hyperammonemia 157, no leukocytosis, lactic 2.4, creatinine 4.7, LFTs elevated, he was pan imaged and negative for acute intracranial pathology or any fractures.  Patient was given lactulose and rifaximin.  He was given a 1 L bolus in the ED for hypotension and blood pressures were soft in the low 100s.  Patient was admitted to medicine and and GI was consulted.  Patient seen and examined in ED with family members present.  Patient responsive to verbal and painful stimuli but lethargic.  Unable to ascertain any complaints or information from patient due to current altered mental status.  Approximately 30 to 45 minutes later I was contacted by his ER bedside nurse stating that he was hypotensive again in the low 80s.  I advised her to give another 500 mL fluid bolus and reassess.  She reached out 15 minutes later stating that his BP has gone even further down into the 70s.  Patient was started on norepinephrine infusion.  Spoke with intensivist MIRI Villarreal and she accepted the patient for ICU level care.  Dr. Vidal was notified.    ROS   Unable to review due to current altered mental status.    Personal History     Past Medical History:   Diagnosis Date    Alcohol abuse     1/2-1 pint a day, sober since 9/12/2022    Alcoholic cirrhosis 09/01/2022    Arthritis     Hypertension     Sleep apnea        Past Surgical History:   Procedure Laterality Date    BACK SURGERY       WRIST SURGERY         Family History: family history includes Cirrhosis in his father; Diabetes in his maternal grandfather, maternal grandmother, paternal grandfather, and paternal grandmother; Heart disease in his maternal grandmother. Otherwise pertinent FHx was reviewed and not pertinent to current issue.    Social History:  reports that he has been smoking cigarettes. He has been smoking an average of .5 packs per day. He has never used smokeless tobacco. He reports that he does not currently use alcohol. He reports that he does not currently use drugs after having used the following drugs: Marijuana.    Home Medications:  Prior to Admission Medications       Prescriptions Last Dose Informant Patient Reported? Taking?    allopurinol (ZYLOPRIM) 100 MG tablet   No No    Take 1 tablet by mouth Daily.    ALPRAZolam (Xanax) 0.5 MG tablet   No No    Take 1 tablet by mouth 2 (Two) Times a Day As Needed for Anxiety for up to 5 days.    baclofen (LIORESAL) 10 MG tablet   Yes No    Take 5-10 mg by mouth 2 (Two) Times a Day As Needed for Muscle Spasms.    baclofen (LIORESAL) 10 MG tablet   Yes No    Take 1 tablet by mouth Daily. With stretching    calcium carbonate (TUMS) 500 MG chewable tablet   No No    Chew 2 tablets Daily for 14 days.    cyclobenzaprine (FLEXERIL) 10 MG tablet   Yes No    Take 5-10 mg by mouth 3 (Three) Times a Day As Needed.    furosemide (LASIX) 40 MG tablet   Yes No    Take 1 tablet by mouth Daily.    lactulose (CHRONULAC) 10 GM/15ML solution   No No    Take 30 mL by mouth 3 (Three) Times a Day for 30 days.    magnesium oxide (MAG-OX) 400 tablet tablet   No No    Take 1 tablet by mouth Daily for 14 days.    melatonin 5 MG tablet tablet   Yes No    Take 1 tablet by mouth At Night As Needed.    midodrine (PROAMATINE) 10 MG tablet   No No    Take 2 tablets (20 mg) by mouth 3 (Three) Times a Day Before Meals    nadolol (CORGARD) 40 MG tablet   Yes No    Take 1 tablet by mouth Daily As Needed.     nicotine (NICODERM CQ) 14 MG/24HR patch   Yes No    Place 1 patch on the skin as directed by provider Daily As Needed.    nicotine polacrilex (NICORETTE) 2 MG gum   Yes No    Chew 1 each As Needed for Smoking Cessation.    ondansetron ODT (ZOFRAN-ODT) 4 MG disintegrating tablet   No No    Place 1 tablet on the tongue Every 8 (Eight) Hours As Needed for Nausea or Vomiting for up to 30 days.    oxyCODONE (ROXICODONE) 10 MG tablet   Yes No    Take 1 tablet by mouth Every 4 (Four) to 6 (Six) Hours As Needed for Moderate Pain.    pantoprazole (PROTONIX) 40 MG EC tablet   Yes No    Take 1 tablet by mouth Daily.    riFAXIMin (XIFAXAN) 550 MG tablet   Yes No    Take 1 tablet by mouth 2 (Two) Times a Day.    sodium bicarbonate 325 MG tablet   Yes No    Take 1 tablet by mouth Daily.    spironolactone (ALDACTONE) 50 MG tablet   Yes No    Take 1 tablet by mouth Daily.    thiamine (VITAMIN B-1) 100 MG tablet   No No    take 1 tablet daily              Allergies:  Allergies   Allergen Reactions    Bee Venom Anaphylaxis    Scopolamine Anaphylaxis       Objective      Vitals:   Temp:  [98.3 °F (36.8 °C)] 98.3 °F (36.8 °C)  Heart Rate:  [56-76] 60  Resp:  [20] 20  BP: ()/(30-52) 72/33    Physical Exam  Vitals reviewed.   Constitutional:       General: He is in acute distress.      Appearance: He is ill-appearing and toxic-appearing.   HENT:      Head: Normocephalic.      Comments: Facial trauma with abrasions and bruising noted.  No crepitus or instability on physical exam.     Right Ear: Tympanic membrane and ear canal normal.      Left Ear: Tympanic membrane and ear canal normal.      Nose: Nose normal.      Mouth/Throat:      Mouth: Mucous membranes are dry.      Pharynx: Oropharynx is clear.   Eyes:      General: Scleral icterus present.      Pupils: Pupils are equal, round, and reactive to light.   Cardiovascular:      Rate and Rhythm: Normal rate and regular rhythm.      Pulses: Normal pulses.      Heart sounds: Normal  heart sounds. No murmur heard.  Pulmonary:      Effort: Pulmonary effort is normal.      Breath sounds: Normal breath sounds.   Abdominal:      General: Bowel sounds are normal. There is distension.      Palpations: Abdomen is soft.      Tenderness: There is no abdominal tenderness. There is no guarding or rebound.   Genitourinary:     Comments: Penis and testes with edema  Musculoskeletal:         General: Swelling present.      Cervical back: Normal range of motion and neck supple. No rigidity or tenderness.      Right lower leg: Edema present.      Left lower leg: Edema present.   Skin:     General: Skin is warm and dry.      Capillary Refill: Capillary refill takes less than 2 seconds.      Coloration: Skin is jaundiced.   Neurological:      Mental Status: He is disoriented.      Motor: Weakness present.      Comments: Somnolent and lethargic          Result Review    Result Review:  I have personally reviewed the results from the time of this admission to 7/3/2023 18:33 EDT and agree with these findings:  [x]  Laboratory  [x]  Microbiology  [x]  Radiology  [x]  EKG/Telemetry   []  Cardiology/Vascular   []  Pathology  []  Old records  []  Other:  Most notable findings include:       Assessment & Plan        Active Hospital Problems:  Active Hospital Problems    Diagnosis     **Hepatic encephalopathy      Plan:   AMS secondary to hepatic encephalopathy from hyperammonemia  -Long history of EtOH abuse  -Ammonia at 157  -CT of the brain negative for acute intracranial pathology  -Lactulose given rectally.  Initiate lactulose and rifaximin p.o. once able to tolerate  -Maintain n.p.o. status until SLP can assess for swallow capability  -MELD sodium score 30    -GI consulted.  Planning on doing a paracentesis in the next 1 to 2 days    Hypotension  -Persistent hypotension not responding to IVF hydration  -Lactic at 2.4  -Norepinephrine infusion initiated  -Intensivist MIRI Villarreal consulted for ICU admission and  accepted    Status post ground-level fall  -Pan imaging negative for acute intracranial pathology, cervical/spinal or facial fractures    Acute renal failure  -Creatinine at 4.75  -Refrain from nephrotoxic medications, NSAIDs and IV contrast  -Nephrology consulted    PMH: Hepatic encephalopathy, alcoholic hepatitis and cirrhosis, esophageal varices, HTN  -Resume home meds when able to tolerate p.o. with exception of HTN meds     DVT prophylaxis:  Mechanical DVT prophylaxis orders are present.    CODE STATUS:    Code Status (Patient has no pulse and is not breathing): CPR (Attempt to Resuscitate)  Medical Interventions (Patient has pulse or is breathing): Full Support    Admission Status:  I believe this patient meets inpatient status.    I discussed the patient's findings and my recommendations with patient and family.    This patient has been examined wearing appropriate Personal Protective Equipment and discussed with  patient and nurse . 07/03/23      Signature: Electronically signed by Jose Ruffin Jr, APRN, 07/03/23, 18:33 EDT.  [facility] Hospitalist Team    Electronically signed by Zi Vidal MD at 07/04/23 0846          Emergency Department Notes        Laura Crawford at 07/03/23 1701          Call placed to Dr. Padilla     Electronically signed by Laura Crawford at 07/03/23 1701       Ivan Oropeza PA at 07/03/23 1500       Attestation signed by Issa Jacobs MD at 07/03/23 2114        NON FACE TO FACE: This visit was performed by BOTH a physician and an APC. I performed all aspects of the MDM as documented.  Issa Jacobs MD 7/3/2023 21:14 EDT                         Subjective   History of Present Illness  Patient is a 37-year-old male PMH significant for Decompensated cirrhosis, hepatic encephalopathy, alcoholic hepatitis presenting to the ED with wife who is main historian with complaints of low blood pressure and altered mental status over the past 2 days.  Patient's  wife states he was hospitalized and discharged last week with similar complaints she states when he got discharged they went to [place] and his symptoms worsened over the weekend.  She states he also fell yesterday when getting out of the car.  He states today now he is not able to ambulate well.  She reports he has had increased bilateral lower extremity edema now going up into his scrotum and abdomen.  She reports he last had a paracentesis on 6/23/2023 and is not scheduled for another 1 until 7/7/2023.  She reports chronic nausea and cough without significant change.  No reports of fever or vomiting.  No reports of loss of consciousness.  Patient's wife states she has been giving him lactulose but she is not sure if he is actually taking it.  She is not sure when his last bowel movement was.      History provided by:  Spouse    Review of Systems   Unable to perform ROS: Mental status change     Past Medical History:   Diagnosis Date    Alcohol abuse     1/2-1 pint a day, sober since 9/12/2022    Alcoholic cirrhosis 09/01/2022    Arthritis     Hypertension     Sleep apnea        Allergies   Allergen Reactions    Bee Venom Anaphylaxis    Scopolamine Anaphylaxis       Past Surgical History:   Procedure Laterality Date    BACK SURGERY      WRIST SURGERY         Family History   Problem Relation Age of Onset    Cirrhosis Father     Heart disease Maternal Grandmother     Diabetes Maternal Grandmother     Diabetes Maternal Grandfather     Diabetes Paternal Grandmother     Diabetes Paternal Grandfather        Social History     Socioeconomic History    Marital status:    Tobacco Use    Smoking status: Every Day     Packs/day: 0.50     Types: Cigarettes    Smokeless tobacco: Never   Vaping Use    Vaping Use: Never used   Substance and Sexual Activity    Alcohol use: Not Currently     Comment: sober since 9/12/22    Drug use: Not Currently     Types: Marijuana    Sexual activity: Defer           Objective  "  Physical Exam  Vitals and nursing note reviewed.   Constitutional:       General: He is not in acute distress.     Appearance: He is well-developed. He is ill-appearing. He is not toxic-appearing or diaphoretic.   HENT:      Head: Normocephalic and atraumatic.      Mouth/Throat:      Mouth: Mucous membranes are moist.      Pharynx: Oropharynx is clear.   Eyes:      General: Scleral icterus present.      Extraocular Movements: Extraocular movements intact.      Pupils: Pupils are equal, round, and reactive to light.   Cardiovascular:      Rate and Rhythm: Normal rate and regular rhythm.      Pulses: Normal pulses.      Heart sounds: No murmur heard.    No friction rub. No gallop.   Pulmonary:      Effort: Pulmonary effort is normal. No tachypnea or accessory muscle usage.      Breath sounds: No decreased breath sounds, wheezing, rhonchi or rales.   Chest:      Chest wall: No mass, deformity, tenderness or crepitus.   Abdominal:      General: Bowel sounds are decreased. There is distension.      Tenderness: There is no abdominal tenderness. There is no guarding or rebound.   Musculoskeletal:      Right lower le+      Left lower le+   Skin:     General: Skin is warm.      Capillary Refill: Capillary refill takes less than 2 seconds.      Findings: No rash.   Neurological:      Mental Status: He is alert.      Comments: Patient will look at me when asked but will not follow any other commands.  No facial droop noted   Psychiatric:         Mood and Affect: Mood normal.         Behavior: Behavior normal.       Procedures          ED Course  ED Course as of 23 1656      1527 Spoke to MIRI Tapia with GI [AA]   1556 Spoke to [facility] recommends holding off on vancomycin until MRSA PCR results [AA]      ED Course User Index  [AA] Ivan Oropeza PA      BP 94/41   Pulse 67   Temp 98.3 °F (36.8 °C) (Oral)   Resp 20   Ht 182.9 cm (72\")   Wt 92.8 kg (204 lb 9.4 oz)   SpO2 98%   BMI " 27.75 kg/m²   Medications   sodium chloride 0.9 % flush 10 mL (has no administration in time range)   midodrine (PROAMATINE) tablet 20 mg (20 mg Oral Not Given 7/3/23 1503)   sodium chloride 0.9 % flush 10 mL (has no administration in time range)   Pharmacy to dose vancomycin (has no administration in time range)   zinc sulfate (ZINCATE) capsule 220 mg (has no administration in time range)   lactulose (CHRONULAC) 10 GM/15ML solution 20 g (has no administration in time range)   riFAXIMin (XIFAXAN) tablet 550 mg (has no administration in time range)   sodium chloride 0.9 % bolus 1,000 mL (1,000 mL Intravenous New Bag 7/3/23 1507)   lactulose (CHRONULAC) solution for enema 300 mL (300 mL Rectal Given 7/3/23 1622)   cefTRIAXone (ROCEPHIN) 2 g in sodium chloride 0.9 % 100 mL IVPB (2 g Intravenous New Bag 7/3/23 1609)     Labs Reviewed   AMMONIA - Abnormal; Notable for the following components:       Result Value    Ammonia 157 (*)     All other components within normal limits   COMPREHENSIVE METABOLIC PANEL - Abnormal; Notable for the following components:    Glucose 112 (*)     BUN 51 (*)     Creatinine 4.75 (*)     Sodium 133 (*)     Chloride 92 (*)     Total Protein 4.9 (*)     Albumin 3.3 (*)     AST (SGOT) 66 (*)     Alkaline Phosphatase 153 (*)     Total Bilirubin 3.7 (*)     Anion Gap 16.0 (*)     eGFR 15.2 (*)     All other components within normal limits    Narrative:     GFR Normal >60  Chronic Kidney Disease <60  Kidney Failure <15     URINALYSIS W/ CULTURE IF INDICATED - Abnormal; Notable for the following components:    Color, UA Dark Yellow (*)     Ketones, UA Trace (*)     Bilirubin, UA Small (1+) (*)     Protein, UA 30 mg/dL (1+) (*)     Leuk Esterase, UA Trace (*)     Nitrite, UA Positive (*)     All other components within normal limits    Narrative:     In absence of clinical symptoms, the presence of pyuria, bacteria, and/or nitrites on the urinalysis result does not correlate with infection.    TROPONIN - Abnormal; Notable for the following components:    HS Troponin T 28 (*)     All other components within normal limits    Narrative:     High Sensitive Troponin T Reference Range:  <10.0 ng/L- Negative Female for AMI  <15.0 ng/L- Negative Male for AMI  >=10 - Abnormal Female indicating possible myocardial injury.  >=15 - Abnormal Male indicating possible myocardial injury.   Clinicians would have to utilize clinical acumen, EKG, Troponin, and serial changes to determine if it is an Acute Myocardial Infarction or myocardial injury due to an underlying chronic condition.        CBC WITH AUTO DIFFERENTIAL - Abnormal; Notable for the following components:    RBC 2.99 (*)     Hemoglobin 10.5 (*)     Hematocrit 31.3 (*)     .5 (*)     MCH 35.1 (*)     All other components within normal limits   LACTIC ACID, REFLEX - Abnormal; Notable for the following components:    Lactate 2.4 (*)     All other components within normal limits   MANUAL DIFFERENTIAL - Abnormal; Notable for the following components:    Neutrophil % 41.0 (*)     Eosinophil % 8.0 (*)     Basophil % 7.0 (*)     Eosinophils Absolute 0.64 (*)     Basophils Absolute 0.56 (*)     All other components within normal limits   URINALYSIS, MICROSCOPIC ONLY - Abnormal; Notable for the following components:    RBC, UA 0-2 (*)     WBC, UA 0-2 (*)     Squamous Epithelial Cells, UA 3-6 (*)     All other components within normal limits   POC LACTATE - Abnormal; Notable for the following components:    Lactate 2.1 (*)     All other components within normal limits   BLOOD CULTURE   BLOOD CULTURE   MRSA SCREEN, PCR   BODY FLUID CULTURE   SCAN SLIDE   PATH CONSULT REFLEX   HIGH SENSITIVITIY TROPONIN T 2HR   PROTEIN, BODY FLUID   ALBUMIN, FLUID   BODY FLUID CELL COUNT WITH DIFFERENTIAL    Narrative:     The following orders were created for panel order Body Fluid Cell Count With Differential - Body Fluid, Peritoneum.  Procedure                                Abnormality         Status                     ---------                               -----------         ------                     Body fluid cell count - ...[[id number]]                                                   Please view results for these tests on the individual orders.   BODY FLUID CELL COUNT   POC LACTATE   PATHOLOGY CONSULTATION   CBC AND DIFFERENTIAL    Narrative:     The following orders were created for panel order CBC & Differential.  Procedure                               Abnormality         Status                     ---------                               -----------         ------                     CBC Auto Differential[[id number]]        Abnormal            Final result               Scan Slide[[id number]]                                       Final result                 Please view results for these tests on the individual orders.     CT Abdomen Pelvis Without Contrast    Result Date: 7/3/2023  Impression: 1. Large volume abdominal pelvic ascites. 2. Cirrhotic liver morphology. 3. Moderate to large colonic stool burden. Correlate for constipation. 4. Scattered mild bibasilar atelectasis. Small left and trace right pleural effusions are present. Electronically Signed: Chanda Clark  7/3/2023 3:42 PM EDT  Workstation ID: [id number]    CT Head Without Contrast    Result Date: 7/3/2023  Impression: No acute intracranial abnormality or acute facial bone fracture. Electronically Signed: True Allen  7/3/2023 3:45 PM EDT  Workstation ID: [id number]    CT Cervical Spine Without Contrast    Result Date: 7/3/2023  Impression: 1. No evidence of cervical spine fracture 2. Minimal left mastoid effusion 3. 1 cm right apical groundglass opacity. Recommend follow-up chest CT in 6 Electronically Signed: Dio Murrell  7/3/2023 3:44 PM EDT  Workstation ID: [id number]    XR Chest 1 View    Result Date: 7/3/2023  Impression: Improving bibasilar atelectasis or pneumonia since 6/28/2023. No new airspace disease  is appreciated. Electronically Signed: Chanda Clark  7/3/2023 4:27 PM EDT  Workstation ID: [id number]    CT Facial Bones Without Contrast    Result Date: 7/3/2023  Impression: No acute intracranial abnormality or acute facial bone fracture. Electronically Signed: True Allen  7/3/2023 3:45 PM EDT  Workstation ID: [id number]                                        Medical Decision Making  Chart Review: Discharge summary reviewed from 6/28/2023 was cleared by GI for discharge home with home health as above continue lactulose and rifaximin.  Also discharged on midodrine 20 mg 3 times a day his initial URIEL improved was also evaluated and cleared by nephrology during this hospitalization.  Echocardiogram performed on 6/23/2023 showed an EF of 56 to 60%.  Last paracentesis reviewed from 6/23/2023    Comorbidity: As per past medical history  Differentials: Hepatic encephalopathy, ascites, brain bleed, skull fracture, pneumonia, UTI     ;this list is not all inclusive and does not constitute the entirety of considered causes  EKG: Interpreted by myself and Dr. Jacobs shows sinus bradycardia rate 59 otherwise normal EKG previous reviewed from 6/23/2023 showed sinus rhythm  Labs: as above   Radiology: My interpretation CT head shows no obvious brain bleed, CT facial bones and cervical spine shows no obvious fracture, CT abdomen pelvis shows ascites no definite bowel obstruction correlated with radiologist interpretations as below  CT Abdomen Pelvis Without Contrast   Final Result    Impression:        1. Large volume abdominal pelvic ascites.    2. Cirrhotic liver morphology.    3. Moderate to large colonic stool burden. Correlate for constipation.    4. Scattered mild bibasilar atelectasis. Small left and trace right pleural effusions are present.                        Electronically Signed: Chanda Clark      7/3/2023 3:42 PM EDT      Workstation ID: [id number]     CT Head Without Contrast   Final Result    Impression:    No  acute intracranial abnormality or acute facial bone fracture.            Electronically Signed: True Allen      7/3/2023 3:45 PM EDT      Workstation ID: [id number]     CT Cervical Spine Without Contrast   Final Result    Impression:        1. No evidence of cervical spine fracture    2. Minimal left mastoid effusion    3. 1 cm right apical groundglass opacity. Recommend follow-up chest CT in 6            Electronically Signed: Dio Murrell      7/3/2023 3:44 PM EDT      Workstation ID: [id number]     CT Facial Bones Without Contrast   Final Result    Impression:    No acute intracranial abnormality or acute facial bone fracture.            Electronically Signed: True Allen      7/3/2023 3:45 PM EDT      Workstation ID: [id number]     XR Chest 1 View    (Results Pending)      Disposition/Treatment:  Appropriate PPE was worn during exam and throughout all encounters with the patient.  When the ED IV was placed and labs were obtained patient was placed on proper monitors he was afebrile presenting with wife at bedside with complaints of altered mental status and worsening ascites since he was discharged from our facility on 6/28/2023.    EKG showed sinus bradycardia otherwise unremarkable.  No reports of chest pain at this time initial troponin 28 likely secondary to chronic illnesses.  Labs otherwise show CBC with a normal white count hemoglobin 10.5 hematocrit 31.3 platelets 158.  Anemia does appear chronic.  Metabolic panel showed glucose 112 BUN 51 creatinine 4.75 sodium 133 potassium 4.3 ALT 28 AST 66 alk phos 153 total bilirubin 3.7.  Worsening acute kidney injury since discharge on 6/27/2023 BUN of 22 creatinine 1.57.  Bilirubin however has improved as it was 6.1 on 6/27/2023.  Liver enzymes chronically elevated.  POC lactic 2.1 blood cultures are pending.  Urinalysis pending.  Ammonia elevated at 157.     Due to patient's recent fall CT head, neck, facial bones ordered which showed no acute intracranial or  osseous abnormality as above.  Due to patient's worsening swelling and ascites CT abdomen pelvis was ordered which did show large ascites cirrhotic liver morphology and large colonic stool burden along with a right pleural effusion.     While in the ED patient did have some initial hypertension and on chart review does appear to be very chronic he is on midodrine 20 mg 3 times daily.  Unfortunately, due to his altered mental status not able to give p.o. dose of midodrine while in the ED he was given 1000 cc normal saline with improvement of his blood pressure systolic now in the 90s.  GI was consulted while in the ED.  I did speak to MIRI Tapia.  Patient was also given lactulose while in the ED along with Rocephin.  Vancomycin was initially ordered but discussed with the ER pharmacist who would like to hold it at this time pending MRSA PCR.    On reassessment patient is resting quietly findings were discussed with the patient's wife at bedside voiced understand admission for further work-up and was and agree with plan.  Patient be admitted to PCU.  Spoke to  MIRI Miner who agreed for admission with hospitalist group    The high probability of sudden, clinically significant deterioration in the patient's condition required the highest level of my preparedness to intervene urgently.  The services I provided to this patient were to treat and/or prevent clinically significant deterioration that could result in: death or increase blood loss . Services included the following: chart data review, reviewing nurses notes an/or old charts, documentation time, consultant collaboration regarding findings and treatment options, vital sign assessments and ordering, interpreting and reviewing diagnostic studies/lab test.  Aggregate critical care time was 43 minutes , which includes only time during which I was engaged in work directly related to the patient's care, as described above, whether at the bedside or elsewhere in the  Emergency Department. It did not include time spent performing other reported procedures.    Problems Addressed:  Abrasion of face, initial encounter: acute illness or injury  URIEL (acute kidney injury): acute illness or injury  Ascites due to alcoholic cirrhosis: acute illness or injury  Fall, initial encounter: acute illness or injury  Hepatic encephalopathy: complicated acute illness or injury    Amount and/or Complexity of Data Reviewed  External Data Reviewed: labs, radiology, ECG and notes.  Labs: ordered. Decision-making details documented in ED Course.  Radiology: ordered. Decision-making details documented in ED Course.  ECG/medicine tests: ordered.  Discussion of management or test interpretation with external provider(s): As above    Risk  OTC drugs.  Prescription drug management.  Decision regarding hospitalization.        Final diagnoses:   Hepatic encephalopathy   Fall, initial encounter   Abrasion of face, initial encounter   Ascites due to alcoholic cirrhosis   URIEL (acute kidney injury)       ED Disposition  ED Disposition       ED Disposition   Decision to Admit    Condition   --    Comment   Level of Care: Med/Surg [1]   Admitting Physician: TAVO STONER [[id number]]   Attending Physician: TAVO STONER [[id number]]   Bed Request Comments: PCU                 No follow-up provider specified.       Medication List        ASK your doctor about these medications      ondansetron ODT 4 MG disintegrating tablet  Commonly known as: ZOFRAN-ODT  Place 1 tablet on the tongue Every 8 (Eight) Hours As Needed for Nausea or Vomiting for up to 30 days.  Ask about: Which instructions should I use?                 Ivan Oropeza PA  07/03/23 1656      Electronically signed by Issa Jacobs MD at 07/03/23 2114       Laura Crawford at 07/03/23 1455          EKG requested       Electronically signed by Laura Crawford at 07/03/23 1455          Physician Progress Notes (all)        Aly Zayas MD  at 23 1039            Critical Care Progress Note     Daquan Xiong : 1985 MRN:[id number] LOS:2  Rm: 2307/1     Principal Problem: Hepatic encephalopathy     Reason for follow up: All the medical problems listed below    Summary     A 37 y.o. male with PMH of alcoholic cirrhosis of liver, obstructive sleep apnea, substance abuse, CKD presented to the hospital for altered mental status and was admitted with a principal diagnosis of Hepatic encephalopathy.  Found to have ammonia of 157, treated with lactulose and rifaximin after which his mentation improved.      On admission, patient also found to be in septic shock, treated with ceftriaxone and needed vasopressors.  Also had paracentesis on  with removal of 5 L of fluid.  Also had URIEL on admission, treated with IV fluids, albumin, midodrine and octreotide.  Nephrology was consulted.    Significant Events     23 : Transfer to PCU.  Increase lactulose to titrate for 3-4 bowel movements per day.  Critical care signing off.    Assessment / Plan     Hypotension  Unclear etiology, possible prerenal.  Does not meet sepsis criteria.  Resolved after adequate fluid resuscitation, off vasopressors.  Wean off midodrine as tolerated.  Peritoneal fluid without any growth, less than 250 neutrophils, DC antibiotics.    Acute hepatic encephalopathy  Ammonia level improved from 157-> 95.  Currently on lactulose and rifaximin.  Having only 1-2 bowel movements per day, increase lactulose to every 4 hours to titrate for 3-4 bowel movements per day.    Alcoholic cirrhosis of liver / Portal Hypertension / Recurrent ascites  S/p paracentesis on  with removal of 5 L.  Cultures remain negative.  Not a candidate for TIPS due to recurrent hepatic encephalopathy.  Awaiting liver transplant at [facility].    Acute Kidney Injury on CKD 3:   Remains non oliguric.  Baseline creatinine around 1.5  Possibly prerenal.  Some component of ATN from hypotension.  Continue with IV  fluids, nephrology following.  GI started on empiric midodrine, octreotide and albumin for possible hepatorenal syndrome.  Hepatorenal syndrome is more of a diagnosis by exclusion, doubt a true hepatorenal syndrome.  Can discontinue octreotide and albumin from my perspective.  Monitor Input/Output very closely.   Net IO Since Admission: -1,544 mL [07/05/23 1041]     S/p fall prior to admission: CT head, facial bones and cervical spine negative for injury    1 cm right apical groundglass opacity via CT: Radiology recommend follow-up chest CT in 6 months.     Macrocytic anemia  Obstructive sleep apnea   Bipolar disorder   GERD   Chronic constipation   History of substance abuse     Code status:   Code Status (Patient has no pulse and is not breathing): CPR (Attempt to Resuscitate)  Medical Interventions (Patient has pulse or is breathing): Full Support       Nutrition:   Diet: Renal Diets, Cardiac Diets; Low Sodium (2g); Low Potassium; Texture: Regular Texture (IDDSI 7); Fluid Consistency: Thin (IDDSI 0)   Patient isn't on Tube Feeding     DVT prophylaxis:  Medical and mechanical DVT prophylaxis orders are present.     Subjective / Review of systems     Review of Systems   Still confused, knows that he is in [place].  Unable to explain why he is in the hospital.  No abdominal pain.  No nausea or vomiting.  Objective / Physical Exam     Vital signs:  Temp: 97.6 °F (36.4 °C)  BP: 106/57  Heart Rate: 72  Resp: 16  SpO2: 96 %  Weight: 89.6 kg (197 lb 8.5 oz)    Admission Weight: Weight: 83.1 kg (183 lb 4.8 oz)  Current Weight: Weight: 89.6 kg (197 lb 8.5 oz)    Input/Output in last 24 hours:    Intake/Output Summary (Last 24 hours) at 7/5/2023 1041  Last data filed at 7/5/2023 0800  Gross per 24 hour   Intake 2116 ml   Output 0 ml   Net 2116 ml      Net IO Since Admission: -1,544 mL [07/05/23 1041]     Physical Exam  Vitals and nursing note reviewed.   Constitutional:       General: He is awake. He is not in acute  distress.     Appearance: Normal appearance.   HENT:      Mouth/Throat:      Mouth: Mucous membranes are moist.      Pharynx: Oropharynx is clear.   Eyes:      General: No scleral icterus.     Extraocular Movements: Extraocular movements intact.      Conjunctiva/sclera: Conjunctivae normal.   Cardiovascular:      Rate and Rhythm: Normal rate.      Heart sounds: No murmur heard.    No gallop.   Pulmonary:      Breath sounds: Normal breath sounds. No wheezing or rales.   Abdominal:      General: Bowel sounds are normal. There is distension.      Palpations: Abdomen is soft.      Tenderness: There is no abdominal tenderness.   Musculoskeletal:         General: No tenderness.      Right lower leg: No edema.      Left lower leg: No edema.   Neurological:      Mental Status: He is alert.      Comments: Confused, no focal motor or sensory deficits.   Psychiatric:         Behavior: Behavior is cooperative.        Radiology and Labs     Results from last 7 days   Lab Units 07/05/23  0606 07/04/23 0445 07/03/23  1504   WBC 10*3/mm3 6.60 5.60 8.00   HEMOGLOBIN g/dL 9.1* 9.1* 10.5*   HEMATOCRIT % 27.0* 27.3* 31.3*   PLATELETS 10*3/mm3 113* 100* 158      Results from last 7 days   Lab Units 07/05/23  0606 07/04/23  0445   PROTIME Seconds 16.1* 14.6*   INR  1.54* 1.39*      Results from last 7 days   Lab Units 07/05/23  0606 07/04/23  1606 07/04/23 0445 07/03/23  1504   SODIUM mmol/L 139  --  139 133*   POTASSIUM mmol/L 3.9 4.0 3.5 4.3   CHLORIDE mmol/L 103  --  100 92*   CO2 mmol/L 24.0  --  25.0 25.0   BUN mg/dL 36*  --  48* 51*   CREATININE mg/dL 2.74*  --  3.86* 4.75*   GLUCOSE mg/dL 120*  --  108* 112*   MAGNESIUM mg/dL 2.0  --  1.9 2.1   PHOSPHORUS mg/dL 3.3  --  4.3 5.4*      Results from last 7 days   Lab Units 07/05/23  0606 07/04/23  0445 07/03/23  1504   ALK PHOS U/L 93 119* 153*   AST (SGOT) U/L 33 43* 66*   ALT (SGPT) U/L 19 20 28           Current medications     Scheduled Meds:   albumin human, 50 g, Intravenous,  QAM  heparin (porcine), 5,000 Units, Subcutaneous, Q8H  lactulose, 30 g, Oral, Q4H  midodrine, 20 mg, Oral, Q8H  octreotide, 100 mcg, Subcutaneous, TID  pantoprazole, 40 mg, Oral, Q AM  rifAXIMin, 550 mg, Oral, Q12H  sodium chloride, 500 mL, Intravenous, Once  sodium chloride, 10 mL, Intravenous, Q12H  sodium chloride, 10 mL, Intravenous, Q12H  sodium chloride, 10 mL, Intravenous, Q12H  sodium chloride, 10 mL, Intravenous, Q12H  thiamine, 100 mg, Oral, QAM        Continuous Infusions:   sodium chloride, 75 mL/hr, Last Rate: 75 mL/hr (23 1307)          Plan discussed with RN. Reviewed all other data in the last 24 hours, including but not limited to vitals, labs, microbiology, imaging and pertinent notes from other providers.     Aly Zayas MD   Critical Care  23   10:41 EDT         Electronically signed by Aly Zayas MD at 23 1043       Fatou Waldron MD at 23 0750          NEPHROLOGY PROGRESS NOTE------[facility]    [facility]  [phone]  Saji Waldron MD      Patient Care Team:  Wolf Carvalho MD as PCP - General (Geriatric Medicine)  Fatou Waldron MD as Consulting Physician (Nephrology)      Provider:  Saji Waldron MD  Patient Name: Daquan Xiong  :  1985    SUBJECTIVE:    F/U ARF/URIEL/CRF/CKD    Weak and malaised but alert and oriented today. No angina. No dysuria.     Medication:  albumin human, 50 g, Intravenous, QAM  heparin (porcine), 5,000 Units, Subcutaneous, Q8H  lactulose, 30 g, Oral, Q6H  midodrine, 20 mg, Oral, Q8H  octreotide, 100 mcg, Subcutaneous, TID  pantoprazole, 40 mg, Oral, Q AM  rifAXIMin, 550 mg, Oral, Q12H  sodium chloride, 500 mL, Intravenous, Once  sodium chloride, 10 mL, Intravenous, Q12H  sodium chloride, 10 mL, Intravenous, Q12H  sodium chloride, 10 mL, Intravenous, Q12H  sodium chloride, 10 mL, Intravenous, Q12H  thiamine, 100 mg, Oral,  "QAM      sodium chloride, 75 mL/hr, Last Rate: 75 mL/hr (07/04/23 1307)        OBJECTIVE    Vital Sign Min/Max for last 24 hours  Temp  Min: 97.6 °F (36.4 °C)  Max: 97.6 °F (36.4 °C)   BP  Min: 91/45  Max: 118/68   Pulse  Min: 59  Max: 105   Resp  Min: 18  Max: 18   SpO2  Min: 93 %  Max: 100 %   No data recorded   No data recorded     Flowsheet Rows      Flowsheet Row First Filed Value   Admission Height 182.9 cm (72\") Documented at 07/03/2023 1435   Admission Weight 83.1 kg (183 lb 4.8 oz) Documented at 07/03/2023 1443            No intake/output data recorded.  I/O last 3 completed shifts:  In: 3331 [P.O.:720; I.V.:2111; IV Piggyback:500]  Out: 6075 [Urine:1000; Other:5075]    Physical Exam:  General Appearance: alert, appears stated age and cooperative  Head: normocephalic, without obvious abnormality and atraumatic +DRY OP +LEFT FOREHEAD ABRASION    Eyes: conjunctivae and sclerae normal and no icterus  Neck: supple and no JVD  Lungs: clear to auscultation and respirations regular  Heart: regular rhythm & normal rate and normal S1, S2 +BETHANY  Chest Wall: no abnormalities observed  Abdomen: normal bowel sounds and  +ASCITES/DISTENSION AND MILD BILAT LQ PAIN ON PALPATION  Extremities: moves extremities well, no edema, no cyanosis  Skin: +SCATTERED SCABS, ABRASIONS, ECCHYMOSIS  Neurologic: Alert, and oriented x 3. No focal deficits    Labs:    WBC WBC   Date Value Ref Range Status   07/05/2023 6.60 3.40 - 10.80 10*3/mm3 Final   07/04/2023 5.60 3.40 - 10.80 10*3/mm3 Final   07/03/2023 8.00 3.40 - 10.80 10*3/mm3 Final      HGB Hemoglobin   Date Value Ref Range Status   07/05/2023 9.1 (L) 13.0 - 17.7 g/dL Final   07/04/2023 9.1 (L) 13.0 - 17.7 g/dL Final   07/03/2023 10.5 (L) 13.0 - 17.7 g/dL Final      HCT Hematocrit   Date Value Ref Range Status   07/05/2023 27.0 (L) 37.5 - 51.0 % Final   07/04/2023 27.3 (L) 37.5 - 51.0 % Final   07/03/2023 31.3 (L) 37.5 - 51.0 % Final      Platelets No results found for: LABPLAT   MCV " MCV   Date Value Ref Range Status   07/05/2023 101.8 (H) 79.0 - 97.0 fL Final   07/04/2023 101.8 (H) 79.0 - 97.0 fL Final   07/03/2023 104.5 (H) 79.0 - 97.0 fL Final          Sodium Sodium   Date Value Ref Range Status   07/05/2023 139 136 - 145 mmol/L Final   07/04/2023 139 136 - 145 mmol/L Final   07/03/2023 133 (L) 136 - 145 mmol/L Final      Potassium Potassium   Date Value Ref Range Status   07/05/2023 3.9 3.5 - 5.2 mmol/L Final   07/04/2023 4.0 3.5 - 5.2 mmol/L Final     Comment:     Slight hemolysis detected by analyzer. Results may be affected.   07/04/2023 3.5 3.5 - 5.2 mmol/L Final   07/03/2023 4.3 3.5 - 5.2 mmol/L Final     Comment:     Slight hemolysis detected by analyzer. Results may be affected.      Chloride Chloride   Date Value Ref Range Status   07/05/2023 103 98 - 107 mmol/L Final   07/04/2023 100 98 - 107 mmol/L Final   07/03/2023 92 (L) 98 - 107 mmol/L Final      CO2 CO2   Date Value Ref Range Status   07/05/2023 24.0 22.0 - 29.0 mmol/L Final   07/04/2023 25.0 22.0 - 29.0 mmol/L Final   07/03/2023 25.0 22.0 - 29.0 mmol/L Final      BUN BUN   Date Value Ref Range Status   07/05/2023 36 (H) 6 - 20 mg/dL Final   07/04/2023 48 (H) 6 - 20 mg/dL Final   07/03/2023 51 (H) 6 - 20 mg/dL Final      Creatinine Creatinine   Date Value Ref Range Status   07/05/2023 2.74 (H) 0.76 - 1.27 mg/dL Final   07/04/2023 3.86 (H) 0.76 - 1.27 mg/dL Final   07/03/2023 4.75 (H) 0.76 - 1.27 mg/dL Final      Calcium Calcium   Date Value Ref Range Status   07/05/2023 9.0 8.6 - 10.5 mg/dL Final   07/04/2023 8.2 (L) 8.6 - 10.5 mg/dL Final   07/03/2023 8.7 8.6 - 10.5 mg/dL Final      PO4 No components found for: PO4   Albumin Albumin   Date Value Ref Range Status   07/05/2023 3.8 3.5 - 5.2 g/dL Final   07/04/2023 3.3 (L) 3.5 - 5.2 g/dL Final   07/03/2023 3.3 (L) 3.5 - 5.2 g/dL Final      Magnesium Magnesium   Date Value Ref Range Status   07/05/2023 2.0 1.6 - 2.6 mg/dL Final   07/04/2023 1.9 1.6 - 2.6 mg/dL Final   07/03/2023  2.1 1.6 - 2.6 mg/dL Final      Uric Acid No components found for: URIC ACID     Imaging Results (Last 72 Hours)       Procedure Component Value Units Date/Time    XR Chest 1 View [[id number]] Collected: 07/03/23 1626     Updated: 07/03/23 1630    Narrative:      XR CHEST 1 VW    Date of Exam: 7/3/2023 4:13 PM EDT    Indication: AMS    Comparison: AP portable chest 6/20/2023    Findings:  There is some bandlike atelectasis in the right lower lobe, similar to the prior examination. The previously described patchy left basilar airspace disease appears improved. No new airspace disease is identified. The heart size is normal. No pleural   effusion or pneumothorax is identified.      Impression:      Impression:  Improving bibasilar atelectasis or pneumonia since 6/28/2023. No new airspace disease is appreciated.      Electronically Signed: Chanda Clark    7/3/2023 4:27 PM EDT    Workstation ID: [id number]    CT Head Without Contrast [[id number]] Collected: 07/03/23 1540     Updated: 07/03/23 1547    Narrative:      CT HEAD WO CONTRAST, CT FACIAL BONES WO CONTRAST    Date of Exam: 7/3/2023 3:36 PM EDT    Indication: fall last night with head injury now AMS.    Comparison: None available.    Technique: Axial CT images were obtained of the head without contrast administration.  Coronal reconstructions were performed.  Automated exposure control and iterative reconstruction methods were used.    Findings:  Gray-white differentiation is maintained and there is no evidence of intracranial hemorrhage, mass or mass effect. The ventricles are normal in size and configuration. The orbits are normal. The paranasal sinuses are grossly clear. The calvarium is   intact. Additional dedicated evaluation of the facial bones demonstrates no evidence of acute fracture. The mastoid air cells are clear. Rightward nasal septal deviation is noted the lamina papyracea are intact.      Impression:      Impression:  No acute intracranial  abnormality or acute facial bone fracture.      Electronically Signed: True Allen    7/3/2023 3:45 PM EDT    Workstation ID: [id number]    CT Facial Bones Without Contrast [[id number]] Collected: 07/03/23 1540     Updated: 07/03/23 1547    Narrative:      CT HEAD WO CONTRAST, CT FACIAL BONES WO CONTRAST    Date of Exam: 7/3/2023 3:36 PM EDT    Indication: fall last night with head injury now AMS.    Comparison: None available.    Technique: Axial CT images were obtained of the head without contrast administration.  Coronal reconstructions were performed.  Automated exposure control and iterative reconstruction methods were used.    Findings:  Gray-white differentiation is maintained and there is no evidence of intracranial hemorrhage, mass or mass effect. The ventricles are normal in size and configuration. The orbits are normal. The paranasal sinuses are grossly clear. The calvarium is   intact. Additional dedicated evaluation of the facial bones demonstrates no evidence of acute fracture. The mastoid air cells are clear. Rightward nasal septal deviation is noted the lamina papyracea are intact.      Impression:      Impression:  No acute intracranial abnormality or acute facial bone fracture.      Electronically Signed: [email or web address]    7/3/2023 3:45 PM EDT    Workstation ID: [id number]    CT Cervical Spine Without Contrast [[id number]] Collected: 07/03/23 1541     Updated: 07/03/23 1546    Narrative:        CT CERVICAL SPINE WO CONTRAST    Date of Exam: 7/3/2023 3:36 PM EDT    Indication: fall last night with AMS.    Comparison: None available.    Technique: Axial CT images were obtained of the cervical spine without contrast administration.  Sagittal and coronal reconstructions were performed.  Automated exposure control and iterative reconstruction methods were used.      Findings:  No evidence of fracture or traumatic subluxation. Mild degenerative disc disease at C5-C6. No prevertebral soft tissue swelling.  Minimal fluid in the left mastoid air cells. 1 cm groundglass opacity in the right lung apex      Impression:      Impression:    1. No evidence of cervical spine fracture  2. Minimal left mastoid effusion  3. 1 cm right apical groundglass opacity. Recommend follow-up chest CT in 6      Electronically Signed: Dio Murrell    7/3/2023 3:44 PM EDT    Workstation ID: [id number]    CT Abdomen Pelvis Without Contrast [[id number]] Collected: 07/03/23 1539     Updated: 07/03/23 1544    Narrative:      CT ABDOMEN PELVIS WO CONTRAST    Date of Exam: 7/3/2023 3:36 PM EDT    Indication: abdominal distention.    Comparison: Ultrasound-guided paracentesis 6/23/2023. No prior CT abdomen and pelvis for comparison at this institution. Liver ultrasound 6/25/2023.    Technique: Axial CT images were obtained of the abdomen and pelvis without the administration of contrast. Sagittal and coronal reconstructions were performed.  Automated exposure control and iterative reconstruction methods were used.      Findings:  Large quantity abdominal pelvic ascites is present. There is mild to moderate generalized body wall edema. Small left, trace right basilar pleural effusions are present with mild posterior bibasilar atelectasis and mild to right middle lobe atelectasis.   There is mild right hemidiaphragm elevation.    The liver is diminutive in size. It demonstrates a subtle surface nodular contour, suggestive of cirrhosis. No focal liver lesion is identified on this noncontrast examination. The spleen size is within normal limits. There is a small esophageal hiatal   hernia which contains ascites fluid.    The gallbladder, pancreas, adrenals, and kidneys demonstrate a normal noncontrast appearance. Moderate to large stool burden is present in the ascending, transverse, and descending colon. The bowel does not appear abnormally dilated or inflamed. The   appendix is normal.    The urinary bladder, prostate, and rectum are normal.    No acute  or suspicious osseous abnormalities are identified. There is moderate degenerative disc change at the L4-5 vertebral level.      Impression:      Impression:    1. Large volume abdominal pelvic ascites.  2. Cirrhotic liver morphology.  3. Moderate to large colonic stool burden. Correlate for constipation.  4. Scattered mild bibasilar atelectasis. Small left and trace right pleural effusions are present.            Electronically Signed: Chanda Guerraeliza    7/3/2023 3:42 PM EDT    Workstation ID: [id number]            Results for orders placed during the hospital encounter of 07/03/23    XR Chest 1 View    Narrative  XR CHEST 1 VW    Date of Exam: 7/3/2023 4:13 PM EDT    Indication: AMS    Comparison: AP portable chest 6/20/2023    Findings:  There is some bandlike atelectasis in the right lower lobe, similar to the prior examination. The previously described patchy left basilar airspace disease appears improved. No new airspace disease is identified. The heart size is normal. No pleural  effusion or pneumothorax is identified.    Impression  Impression:  Improving bibasilar atelectasis or pneumonia since 6/28/2023. No new airspace disease is appreciated.      Electronically Signed: Chandatorres Guerraeliza  7/3/2023 4:27 PM EDT  Workstation ID: [id number]      Results for orders placed during the hospital encounter of 06/23/23    XR Chest 1 View    Narrative  XR CHEST 1 VW    Date of Exam: 6/28/2023 11:45 AM EDT    Indication: eval for pneumonia    Comparison: None available.    Findings:  The lung volumes are low. Patchy airspace disease is seen within the lung bases bilaterally. Probable small bilateral pleural effusions are present. The pulmonary vasculature appears unremarkable. No pneumothorax. No acute osseous abnormality identified.    Impression  Impression:  Low lung volumes with patchy airspace disease seen within the lung bases bilaterally, left greater than right with probable small bilateral pleural effusions, likely  related to pneumonia and/or atelectasis.      Electronically Signed: Brynn Higuera  6/28/2023 12:00 PM EDT  Workstation ID: [id number]            ASSESSMENT / PLAN      Hepatic encephalopathy    Bipolar affective disorder    GERD (gastroesophageal reflux disease)    Osteoarthritis    Tobacco use disorder    Polysubstance abuse    Obstructive sleep apnea    URIEL (acute kidney injury)    Alcoholic cirrhosis    Chronic constipation    Severe Malnutrition (HCC)    Essential hypertension    CKD (chronic kidney disease) stage 3, GFR 30-59 ml/min      1.ARF/URIEL/CRF/CKD---------Nonoliguric. +Recurrent ARF/URIEL on top of known CRF/CKD STG 3A with a baseline serum creatinine of about 1.5. CRF/CKD STG 3A secondary to HTN NS. +ARF/URIEL is secondary to ATN from hypotension and prerenal state/intravascular volume depletion. Continue to hydrate with NS and given IV Albumin. Avoid hypotension.  No NSAIDs or IV dye. Dose meds for CrCl less than 10 cc/min until ARF/URIEL is resolved     2. HYPOKALEMIA-------Replaced     3. ETOH CIRRHOSIS/HEPATIC ENCEPHALOPATHY/ASCITES-----Restarted Lactulose. Follow Ammonia. S/P IV Albumin     4. HYPOMAGNESEMIA--------Replaced IV. No po replacement given diarrhea from Lactulose     5. DELERIUM----------Improving. Follow ammonia     6. HYPOCALCEMIA-------Replaced     7. ANEMIA-------H/H stable. Check Fe     8. HYPOALBUMINEMIA-----S/P IV Albumin to temporize     9. ELEVATED INR------Related to cirrhosis     10. HYPOTENSION------On Midodrine     11. BIPOLAR DISEASE     12. BASILIO     13. POLYSUBSTANCE ABUSE/TOBACCO USE-----Drug screen positive for Benzodiazipine and Oxycodone     14. KETONURIA------Secondary to intravascular volume depletion. IVFs      Saji Waldron MD  [facility]  [phone]  07/05/23  07:50 EDT      Electronically signed by Fatou Waldron MD at 07/05/23 8433       Mamie Ayers APRN at 07/04/23 0392       Attestation signed by ERMA Guzmán  MD Angel at 07/04/23 1346    Electronically signed on 07/04/23 13:42 EDT by TAZ Guzmán MD  The patient was seen and examined with an APRN. I personally performed the entire medical decision making, and physical exam. Labs and imaging and outpatient records reviewed, ordered.    Still confused.  Tolerating lactulose.  2 bowel movements overnight but none yet today.  Still awaiting paracentesis.  On exam he is drowsy and confused but arousable.  Labs reviewed hemoglobin 9.1.  Creatinine 3.86.  For hepatic encephalopathy will increase lactulose and titrate to 3 bowel movements daily.  Continue Xifaxan.  For HRS will give additional albumin today, continue midodrine and octreotide.  Continue 2 g low-sodium diet and encourage protein shakes.  Still awaiting paracentesis.  Referral has been sent for liver transplant eval at [facility].  Remains very sick, prognosis guarded, risk is high.                   LOS: 1 day   Patient Care Team:  Wolf Carvalho MD as PCP - General (Geriatric Medicine)  Fatou Waldron MD as Consulting Physician (Nephrology)      Subjective   Interval history: Patient is more alert this morning.  Does continue to have confusion.  He is tolerating lactulose by mouth.  He had 2 bowel movements overnight.  No overt GI bleeding.  Paracentesis with fluid studies pending.    ROS:   Review of Systems   Constitutional:  Negative for chills and fever.   Respiratory:  Negative for cough and shortness of breath.    Cardiovascular:  Negative for chest pain and palpitations.   Gastrointestinal:  Positive for abdominal distention. Negative for abdominal pain, nausea and vomiting.   Neurological:  Positive for weakness. Negative for dizziness.   Psychiatric/Behavioral:  Negative for agitation and confusion.       Medication Review:   Scheduled Meds:albumin human, 50 g, Intravenous, QAM  albumin human, 50 g, Intravenous, Once  cefTRIAXone, 2 g, Intravenous, Q24H  heparin (porcine), 5,000 Units,  Subcutaneous, Q8H  lactulose, 30 g, Oral, Q6H  midodrine, 20 mg, Oral, Q8H  octreotide, 100 mcg, Subcutaneous, TID  [START ON 7/5/2023] pantoprazole, 40 mg, Oral, Q AM  potassium chloride ER, 40 mEq, Oral, Q4H  rifAXIMin, 550 mg, Oral, Q12H  sodium chloride, 500 mL, Intravenous, Once  sodium chloride, 10 mL, Intravenous, Q12H  sodium chloride, 10 mL, Intravenous, Q12H  sodium chloride, 10 mL, Intravenous, Q12H  sodium chloride, 10 mL, Intravenous, Q12H  thiamine, 100 mg, Oral, QAM      Continuous Infusions:norepinephrine, 0.02-0.3 mcg/kg/min, Last Rate: 0.26 mcg/kg/min (07/04/23 0936)  sodium chloride, 75 mL/hr, Last Rate: 75 mL/hr (07/04/23 1307)    PRN Meds:.  ALPRAZolam    ipratropium-albuterol    Magnesium Standard Dose Replacement - Follow Nurse / BPA Driven Protocol    nitroglycerin    ondansetron **OR** ondansetron    Phosphorus Replacement - Follow Nurse / BPA Driven Protocol    Potassium Replacement - Follow Nurse / BPA Driven Protocol    [COMPLETED] Insert Peripheral IV **AND** sodium chloride    [COMPLETED] Insert Peripheral IV **AND** sodium chloride    sodium chloride    sodium chloride    sodium chloride    sodium chloride      Objective     Vital Signs  Temp:  [97.4 °F (36.3 °C)-98.3 °F (36.8 °C)] 97.6 °F (36.4 °C)  Heart Rate:  [47-80] 68  Resp:  [12-20] 18  BP: ()/(28-82) 98/51    Physical Exam:    General Appearance:    Awake, confused   Head:    Normocephalic, without obvious abnormality   Eyes:          Conjunctivae normal, icteric sclera   Ears:    Hearing intact   Throat:   No oral lesions, no thrush, oral mucosa moist   Neck:   No adenopathy, supple, no JVD   Lungs:     Respirations regular, even and unlabored       Abdomen:     Soft, distended with fluid wave, nontender   Rectal:     Deferred   Extremities:   3+ BLE edema, no cyanosis   Skin:   No bleeding, bruising or rash, + jaundice   Neurologic:   Sensation intact        Results Review:    CBC  Results from last 7 days   Lab Units  07/04/23  0445 07/03/23  1504 06/28/23  0954 06/27/23  2247   RBC 10*6/mm3 2.68* 2.99* 3.21* 3.14*   WBC 10*3/mm3 5.60 8.00 7.90 9.10   HEMOGLOBIN g/dL 9.1* 10.5* 11.2* 11.4*   PLATELETS 10*3/mm3 100* 158 148 135*         CMP  Results from last 7 days   Lab Units 07/04/23  0445 07/03/23  1504 06/27/23  2247   SODIUM mmol/L 139 133* 139   POTASSIUM mmol/L 3.5 4.3 2.9*   CHLORIDE mmol/L 100 92* 97*   CO2 mmol/L 25.0 25.0 32.0*   BUN mg/dL 48* 51* 22*   CREATININE mg/dL 3.86* 4.75* 1.57*   GLUCOSE mg/dL 108* 112* 109*   ALBUMIN g/dL 3.3* 3.3* 2.9*   BILIRUBIN mg/dL 3.9* 3.7* 6.1*   ALK PHOS U/L 119* 153* 146*   AST (SGOT) U/L 43* 66* 46*   ALT (SGPT) U/L 20 28 25   AMMONIA umol/L 91* 157*  --          Amylase and Lipase        CRP         Imaging Results (Last 24 Hours)       Procedure Component Value Units Date/Time    XR Chest 1 View [[id number]] Collected: 07/03/23 1626     Updated: 07/03/23 1630    Narrative:      XR CHEST 1 VW    Date of Exam: 7/3/2023 4:13 PM EDT    Indication: AMS    Comparison: AP portable chest 6/20/2023    Findings:  There is some bandlike atelectasis in the right lower lobe, similar to the prior examination. The previously described patchy left basilar airspace disease appears improved. No new airspace disease is identified. The heart size is normal. No pleural   effusion or pneumothorax is identified.      Impression:      Impression:  Improving bibasilar atelectasis or pneumonia since 6/28/2023. No new airspace disease is appreciated.      Electronically Signed: Chanda Clark    7/3/2023 4:27 PM EDT    Workstation ID: [id number]    CT Head Without Contrast [[id number]] Collected: 07/03/23 1540     Updated: 07/03/23 1547    Narrative:      CT HEAD WO CONTRAST, CT FACIAL BONES WO CONTRAST    Date of Exam: 7/3/2023 3:36 PM EDT    Indication: fall last night with head injury now AMS.    Comparison: None available.    Technique: Axial CT images were obtained of the head without contrast  administration.  Coronal reconstructions were performed.  Automated exposure control and iterative reconstruction methods were used.    Findings:  Gray-white differentiation is maintained and there is no evidence of intracranial hemorrhage, mass or mass effect. The ventricles are normal in size and configuration. The orbits are normal. The paranasal sinuses are grossly clear. The calvarium is   intact. Additional dedicated evaluation of the facial bones demonstrates no evidence of acute fracture. The mastoid air cells are clear. Rightward nasal septal deviation is noted the lamina papyracea are intact.      Impression:      Impression:  No acute intracranial abnormality or acute facial bone fracture.      Electronically Signed: True Allen    7/3/2023 3:45 PM EDT    Workstation ID: [id number]    CT Facial Bones Without Contrast [[id number]] Collected: 07/03/23 1540     Updated: 07/03/23 1547    Narrative:      CT HEAD WO CONTRAST, CT FACIAL BONES WO CONTRAST    Date of Exam: 7/3/2023 3:36 PM EDT    Indication: fall last night with head injury now AMS.    Comparison: None available.    Technique: Axial CT images were obtained of the head without contrast administration.  Coronal reconstructions were performed.  Automated exposure control and iterative reconstruction methods were used.    Findings:  Gray-white differentiation is maintained and there is no evidence of intracranial hemorrhage, mass or mass effect. The ventricles are normal in size and configuration. The orbits are normal. The paranasal sinuses are grossly clear. The calvarium is   intact. Additional dedicated evaluation of the facial bones demonstrates no evidence of acute fracture. The mastoid air cells are clear. Rightward nasal septal deviation is noted the lamina papyracea are intact.      Impression:      Impression:  No acute intracranial abnormality or acute facial bone fracture.      Electronically Signed: True Allen    7/3/2023 3:45 PM EDT     Workstation ID: [id number]    CT Cervical Spine Without Contrast [[id number]] Collected: 07/03/23 1541     Updated: 07/03/23 1546    Narrative:        CT CERVICAL SPINE WO CONTRAST    Date of Exam: 7/3/2023 3:36 PM EDT    Indication: fall last night with AMS.    Comparison: None available.    Technique: Axial CT images were obtained of the cervical spine without contrast administration.  Sagittal and coronal reconstructions were performed.  Automated exposure control and iterative reconstruction methods were used.      Findings:  No evidence of fracture or traumatic subluxation. Mild degenerative disc disease at C5-C6. No prevertebral soft tissue swelling. Minimal fluid in the left mastoid air cells. 1 cm groundglass opacity in the right lung apex      Impression:      Impression:    1. No evidence of cervical spine fracture  2. Minimal left mastoid effusion  3. 1 cm right apical groundglass opacity. Recommend follow-up chest CT in 6      Electronically Signed: Dio Stantonnes    7/3/2023 3:44 PM EDT    Workstation ID: [id number]    CT Abdomen Pelvis Without Contrast [[id number]] Collected: 07/03/23 1539     Updated: 07/03/23 1544    Narrative:      CT ABDOMEN PELVIS WO CONTRAST    Date of Exam: 7/3/2023 3:36 PM EDT    Indication: abdominal distention.    Comparison: Ultrasound-guided paracentesis 6/23/2023. No prior CT abdomen and pelvis for comparison at this institution. Liver ultrasound 6/25/2023.    Technique: Axial CT images were obtained of the abdomen and pelvis without the administration of contrast. Sagittal and coronal reconstructions were performed.  Automated exposure control and iterative reconstruction methods were used.      Findings:  Large quantity abdominal pelvic ascites is present. There is mild to moderate generalized body wall edema. Small left, trace right basilar pleural effusions are present with mild posterior bibasilar atelectasis and mild to right middle lobe atelectasis.   There is mild right  hemidiaphragm elevation.    The liver is diminutive in size. It demonstrates a subtle surface nodular contour, suggestive of cirrhosis. No focal liver lesion is identified on this noncontrast examination. The spleen size is within normal limits. There is a small esophageal hiatal   hernia which contains ascites fluid.    The gallbladder, pancreas, adrenals, and kidneys demonstrate a normal noncontrast appearance. Moderate to large stool burden is present in the ascending, transverse, and descending colon. The bowel does not appear abnormally dilated or inflamed. The   appendix is normal.    The urinary bladder, prostate, and rectum are normal.    No acute or suspicious osseous abnormalities are identified. There is moderate degenerative disc change at the L4-5 vertebral level.      Impression:      Impression:    1. Large volume abdominal pelvic ascites.  2. Cirrhotic liver morphology.  3. Moderate to large colonic stool burden. Correlate for constipation.  4. Scattered mild bibasilar atelectasis. Small left and trace right pleural effusions are present.            Electronically Signed: Chanda Clark    7/3/2023 3:42 PM EDT    Workstation ID: [id number]              Assessment & Plan     Assessment:  37-year-old male with history of alcoholic cirrhosis complicated by HE, ascites, and esophageal varices discharged on 6/28 and presented again to [facility] ER today with complaints of altered mental status, ascites, BLE edema, and hypotension.      -Hepatic encephalopathy/hyperammonemia  -Alcohol hepatitis and cirrhosis complicated by HE, ascites, and esophageal varices  -Hyponatremia  -Acute renal failure  -Hypotension  -Elevated LFTs  -Hyperkalemia     Plan:  Continue PO lactulose and titrate to a goal of at least 2-3 bowel movements daily.  Continue Xifaxan.  Paracentesis with fluid studies pending.  Kidney function is slowly improving.  Will give 1x dose 50 g of 5% albumin.   Continue zinc, PPI, midodrine,   and subcu octreotide.  Recommend strict 2 g sodium diet with Boost/Ensure supplements. Encourage nutrition.  MELD sodium score 30.  Referral has been sent to [facility] transplant team.  Patient and his family are interested in also being referred to [facility] transplant team.  Task has been sent through our office to initiate this referral.  Supportive care.  GI will follow.    Mamie Ayers MONICA  23  13:25 EDT      Electronically signed by ERMA Guzmán MD at 23 1346       Aly Zayas MD at 23 1026            Critical Care Progress Note     Daquan Xiong : 1985 MRN:[id number] LOS:1  Rm: 2307/1     Principal Problem: Hepatic encephalopathy     Reason for follow up: All the medical problems listed below    Summary     A 37 y.o. male with PMH of alcoholic cirrhosis of liver, obstructive sleep apnea, substance abuse, CKD presented to the hospital for altered mental status and was admitted with a principal diagnosis of Hepatic encephalopathy.  Found to have ammonia of 157, treated with lactulose and rifaximin after which his mentation improved.      On admission, patient also found to be in septic shock, treated with ceftriaxone and needed vasopressors.  Also had paracentesis on  with removal of 5 L of fluid.  Also had URIEL on admission, treated with IV fluids, albumin, midodrine and octreotide.  Nephrology was consulted.    Significant Events     23 : Patient still requiring levophed this AM. Adding IVF.     Assessment / Plan     Hypotension  Unclear etiology, possible prerenal.  Does not meet sepsis criteria.  Continue with high-dose midodrine.  Add IV fluids.  Wean off norepinephrine as tolerated to keep MAP> 65.  On empiric ceftriaxone for suspected SBP.  Follow-up peritoneal fluid cultures.    Acute hepatic encephalopathy  Ammonia level improved from 157-> 95.  Continue with lactulose and rifaximin.    Alcoholic cirrhosis of liver / Portal Hypertension / Recurrent  ascites  S/p paracentesis on 7/4 with removal of 5 L.  Not a candidate for TIPS due to recurrent hepatic encephalopathy.  Awaiting liver transplant at [facility].    Acute Kidney Injury on CKD 3:   Remains non oliguric.  Baseline creatinine around 1.5  Possibly prerenal.  Some component of ATN from hypotension.  Started IV fluids, nephrology was consulted by ED.  GI started on empiric midodrine, octreotide and albumin for possible hepatorenal syndrome.  Hepatorenal syndrome is more of a diagnosis by exclusion, have not been adequately fluid resuscitated.  Started IV fluids.  Monitor Input/Output very closely.   Net IO Since Admission: -3,660 mL [07/04/23 1100]     S/p fall prior to admission: CT head, facial bones and cervical spine negative for injury  1 cm right apical groundglass opacity via CT: Radiology recommend follow-up chest CT in 6      Macrocytic anemia  Obstructive sleep apnea   Bipolar disorder   GERD   Chronic constipation   History of substance abuse     Disposition:  Potential downgrade to PCU later pending wean from Levophed    Code status:   Code Status (Patient has no pulse and is not breathing): CPR (Attempt to Resuscitate)  Medical Interventions (Patient has pulse or is breathing): Full Support       Nutrition:   Diet: Renal Diets; Low Potassium; Texture: Regular Texture (IDDSI 7); Fluid Consistency: Thin (IDDSI 0)   Patient isn't on Tube Feeding     DVT prophylaxis:  Medical and mechanical DVT prophylaxis orders are present.     Subjective / Review of systems     Review of Systems   Constitutional:  Negative for chills and fatigue.   Respiratory:  Negative for chest tightness and shortness of breath.    Cardiovascular:  Negative for chest pain.   Gastrointestinal:  Positive for nausea. Negative for abdominal pain and vomiting.   Genitourinary:  Negative for dysuria.   Neurological:  Positive for weakness. Negative for dizziness and light-headedness.   Psychiatric/Behavioral:  The patient is  nervous/anxious.       Objective / Physical Exam     Vital signs:  Temp: 97.6 °F (36.4 °C)  BP: 98/51  Heart Rate: 68  Resp: 18  SpO2: 98 %  Weight: 89.6 kg (197 lb 8.5 oz)    Admission Weight: Weight: 83.1 kg (183 lb 4.8 oz)  Current Weight: Weight: 89.6 kg (197 lb 8.5 oz)    Input/Output in last 24 hours:    Intake/Output Summary (Last 24 hours) at 7/4/2023 1027  Last data filed at 7/4/2023 0930  Gross per 24 hour   Intake 2415 ml   Output 6075 ml   Net -3660 ml      Net IO Since Admission: -3,660 mL [07/04/23 1027]     Physical Exam  Vitals and nursing note reviewed.   Constitutional:       General: He is not in acute distress.     Appearance: He is ill-appearing (Chronically).   HENT:      Head: Normocephalic.      Mouth/Throat:      Mouth: Mucous membranes are moist.      Pharynx: Oropharynx is clear.   Eyes:      General: Scleral icterus present.      Extraocular Movements: Extraocular movements intact.      Pupils: Pupils are equal, round, and reactive to light.   Cardiovascular:      Rate and Rhythm: Regular rhythm.   Pulmonary:      Effort: Pulmonary effort is normal.      Breath sounds: Normal breath sounds. No wheezing.   Abdominal:      General: There is distension.      Tenderness: There is abdominal tenderness.   Musculoskeletal:      Right lower leg: Edema (++++) present.      Left lower leg: Edema (++++) present.   Skin:     Comments: Abrasion left cheek. No drainage.    Neurological:      General: No focal deficit present.      Mental Status: He is alert. He is disoriented.   Psychiatric:         Judgment: Judgment is impulsive.      Comments: Mostly cooperative with episodes of agitation.         Radiology and Labs     Results from last 7 days   Lab Units 07/04/23  0445 07/03/23  1504 06/28/23  0954 06/27/23  2247   WBC 10*3/mm3 5.60 8.00 7.90 9.10   HEMOGLOBIN g/dL 9.1* 10.5* 11.2* 11.4*   HEMATOCRIT % 27.3* 31.3* 33.5* 32.4*   PLATELETS 10*3/mm3 100* 158 148 135*      Results from last 7 days    Lab Units 07/04/23  0445 06/27/23  2247   PROTIME Seconds 14.6* 14.0*   INR  1.39* 1.33*      Results from last 7 days   Lab Units 07/04/23  0445 07/03/23  1504 06/27/23  2247   SODIUM mmol/L 139 133* 139   POTASSIUM mmol/L 3.5 4.3 2.9*   CHLORIDE mmol/L 100 92* 97*   CO2 mmol/L 25.0 25.0 32.0*   BUN mg/dL 48* 51* 22*   CREATININE mg/dL 3.86* 4.75* 1.57*   GLUCOSE mg/dL 108* 112* 109*   MAGNESIUM mg/dL 1.9 2.1 1.4*   PHOSPHORUS mg/dL 4.3 5.4* 3.2      Results from last 7 days   Lab Units 07/04/23 0445 07/03/23  1504 06/27/23  2247   ALK PHOS U/L 119* 153* 146*   AST (SGOT) U/L 43* 66* 46*   ALT (SGPT) U/L 20 28 25           Current medications     Scheduled Meds:   albumin human, 50 g, Intravenous, QAM  cefTRIAXone, 2 g, Intravenous, Q24H  heparin (porcine), 5,000 Units, Subcutaneous, Q8H  lactulose, 30 g, Oral, Q6H  midodrine, 20 mg, Oral, Q8H  octreotide, 100 mcg, Intravenous, Q8H  [START ON 7/5/2023] pantoprazole, 40 mg, Oral, Q AM  potassium chloride ER, 40 mEq, Oral, Q4H  rifAXIMin, 550 mg, Oral, Q12H  sodium chloride, 500 mL, Intravenous, Once  sodium chloride, 10 mL, Intravenous, Q12H  sodium chloride, 10 mL, Intravenous, Q12H  sodium chloride, 10 mL, Intravenous, Q12H  sodium chloride, 10 mL, Intravenous, Q12H  thiamine, 100 mg, Oral, QAM        Continuous Infusions:   lactated ringers, 75 mL/hr, Last Rate: 75 mL/hr (07/04/23 0957)  norepinephrine, 0.02-0.3 mcg/kg/min, Last Rate: 0.26 mcg/kg/min (07/04/23 0936)          Plan discussed with RN. Reviewed all other data in the last 24 hours, including but not limited to vitals, labs, microbiology, imaging and pertinent notes from other providers.  Plan also discussed with patient's mother at the bedside.      MONICA Rojas   Critical Care  07/04/23   10:27 EDT     Attending Addendum     Subjective: Still confused, feels okay, denies any abdominal pain.  Knows that he is in the hospital and he thinks because of some sodium issues.    Exam: Alert  and awake, no tremor, abdomen is distended.  Nontender.    Assessment / Plan:   Hypotension: Unclear etiology, suspected infection, on empiric antibiotics.  Add IV fluids.  Continue with midodrine.  Ascites: s/p paracentesis with removal of 5 L.    I have personally reviewed all labs and other data, reviewed all other pertinent notes, interviewed and examined this patient today.     Dr. Aly Zayas MD MPH  Staff Intensivist  07/04/23   11:05 EDT          Electronically signed by Aly Zayas MD at 07/04/23 1106       Mamie Ayers APRN at 07/03/23 9550       Attestation signed by Juan Padilla MD at 07/03/23 2871    The patient was seen and examined with an APRN. I personally performed the substantive portion of the history of presenting illness.  I also performed the entire physical exam and medical decision making.    37-year-old alcoholic male presenting with hepatic encephalopathy.  Most of the history was from the patient's wife was at the bedside.  Patient has been sober for 9 months.  He was more confused and belligerent and so she brought him in because she could not wake him up.  On admission he is jaundiced and in acute renal failure with severe lower extremity edema on exam.  Lactulose enema was ordered but was technically difficult to perform.  Patient is also hypotensive.  Wife admits that patient has not been taking his lactulose properly at home and had not had a bowel movement in a few days.  WBC 8, Hgb 10.5, CR 4.75, TB 3.7, , AST 66, ammonia 157  Impression:  Alcoholic hepatitis/cirrhosis decompensated  Hepatic encephalopathy likely made worse by constipation  Acute kidney injury likely from dehydration  Constipation  Plan:  Lactulose enema  N.p.o. until more awake  IV fluids with albumin replacement  Monitor renal output closely  Midodrine and octreotide  Agree with norepinephrine  Liver transplant has been ordered as an outpatient and is pending at [facility]    Electronically signed by Juan Padilla MD, 07/03/23, 6:48 PM EDT.                        LOS: 0 days   Patient Care Team:  Wolf Carvalho MD as PCP - General (Geriatric Medicine)  Fatou Waldron MD as Consulting Physician (Nephrology)      Subjective   Interval history: Patient was discharged on 6/28 and presented again to [facility] ER today with complaints of altered mental status, ascites, BLE edema, and hypotension.  History is limited secondary to patient's mental status.  He is awake, but is confused and does not provide verbal responses at this time.  Therefore, history was supplemented via chart review and report from bedside RN as well as patient's wife who is at bedside.  Per patient's wife, she is not sure if he has been compliant with lactulose at home.  She states that she encourages him to take lactulose as prescribed, but she is not sure if he is actually taking this.  She also reports that the patient has had recent mood swings and gets angry with her when she tries to encourage medication compliance.  Wife reports he has had no alcohol use since 9/2022.  Patient fell at home last night and has bruising on his face.  CT of the head showed no acute findings.  CT abdomen/pelvis showed large amount of ascites, cirrhosis, and moderate to large stool burden.      ROS:   Review of Systems   Constitutional:  Negative for chills and fever.   Respiratory:  Negative for cough and shortness of breath.    Cardiovascular:  Negative for chest pain and palpitations.   Gastrointestinal:  Positive for abdominal distention. Negative for abdominal pain, nausea and vomiting.   Neurological:  Positive for weakness. Negative for dizziness.   Psychiatric/Behavioral:  Negative for agitation and confusion.       Medication Review:   Scheduled Meds:cefTRIAXone, 2 g, Intravenous, Once  lactulose, 300 mL, Rectal, Once  midodrine, 20 mg, Oral, Once      Continuous Infusions:Pharmacy to  dose vancomycin,     PRN Meds:.  Pharmacy to dose vancomycin    [COMPLETED] Insert Peripheral IV **AND** sodium chloride    [COMPLETED] Insert Peripheral IV **AND** sodium chloride      Objective     Vital Signs  Temp:  [98.3 °F (36.8 °C)] 98.3 °F (36.8 °C)  Heart Rate:  [65-67] 67  Resp:  [20] 20  BP: (88-94)/(30-41) 94/41    Physical Exam:    General Appearance:    Awake, confused   Head:    Normocephalic, without obvious abnormality   Eyes:          Conjunctivae normal, icteric sclera   Ears:    Hearing intact   Throat:   No oral lesions, no thrush, oral mucosa moist   Neck:   No adenopathy, supple, no JVD   Lungs:     Respirations regular, even and unlabored       Abdomen:     Soft, distended with fluid wave, nontender   Rectal:     Deferred   Extremities:   3+ BLE edema, no cyanosis   Skin:   No bleeding, bruising or rash, + jaundice   Neurologic:   Sensation intact        Results Review:    CBC  Results from last 7 days   Lab Units 07/03/23  1504 06/28/23  0954 06/27/23  2247 06/26/23  2344   RBC 10*6/mm3 2.99* 3.21* 3.14* 3.19*   WBC 10*3/mm3 8.00 7.90 9.10 8.90   HEMOGLOBIN g/dL 10.5* 11.2* 11.4* 11.2*   PLATELETS 10*3/mm3 158 148 135* 133*         CMP  Results from last 7 days   Lab Units 07/03/23  1504 06/27/23  2247 06/26/23  2344   SODIUM mmol/L 133* 139 142   POTASSIUM mmol/L 4.3 2.9* 3.1*   CHLORIDE mmol/L 92* 97* 100   CO2 mmol/L 25.0 32.0* 30.0*   BUN mg/dL 51* 22* 25*   CREATININE mg/dL 4.75* 1.57* 1.77*   GLUCOSE mg/dL 112* 109* 115*   ALBUMIN g/dL 3.3* 2.9* 3.0*   BILIRUBIN mg/dL 3.7* 6.1* 3.8*   ALK PHOS U/L 153* 146* 152*   AST (SGOT) U/L 66* 46* 60*   ALT (SGPT) U/L 28 25 32   AMMONIA umol/L 157*  --   --          Amylase and Lipase        CRP         Imaging Results (Last 24 Hours)       Procedure Component Value Units Date/Time    XR Chest 1 View [[id number]] Resulted: 07/03/23 1615     Updated: 07/03/23 1615    CT Head Without Contrast [[id number]] Collected: 07/03/23 1540     Updated:  07/03/23 1547    Narrative:      CT HEAD WO CONTRAST, CT FACIAL BONES WO CONTRAST    Date of Exam: 7/3/2023 3:36 PM EDT    Indication: fall last night with head injury now AMS.    Comparison: None available.    Technique: Axial CT images were obtained of the head without contrast administration.  Coronal reconstructions were performed.  Automated exposure control and iterative reconstruction methods were used.    Findings:  Gray-white differentiation is maintained and there is no evidence of intracranial hemorrhage, mass or mass effect. The ventricles are normal in size and configuration. The orbits are normal. The paranasal sinuses are grossly clear. The calvarium is   intact. Additional dedicated evaluation of the facial bones demonstrates no evidence of acute fracture. The mastoid air cells are clear. Rightward nasal septal deviation is noted the lamina papyracea are intact.      Impression:      Impression:  No acute intracranial abnormality or acute facial bone fracture.      Electronically Signed: True Allen    7/3/2023 3:45 PM EDT    Workstation ID: [id number]    CT Facial Bones Without Contrast [[id number]] Collected: 07/03/23 1540     Updated: 07/03/23 1547    Narrative:      CT HEAD WO CONTRAST, CT FACIAL BONES WO CONTRAST    Date of Exam: 7/3/2023 3:36 PM EDT    Indication: fall last night with head injury now AMS.    Comparison: None available.    Technique: Axial CT images were obtained of the head without contrast administration.  Coronal reconstructions were performed.  Automated exposure control and iterative reconstruction methods were used.    Findings:  Gray-white differentiation is maintained and there is no evidence of intracranial hemorrhage, mass or mass effect. The ventricles are normal in size and configuration. The orbits are normal. The paranasal sinuses are grossly clear. The calvarium is   intact. Additional dedicated evaluation of the facial bones demonstrates no evidence of acute  fracture. The mastoid air cells are clear. Rightward nasal septal deviation is noted the lamina papyracea are intact.      Impression:      Impression:  No acute intracranial abnormality or acute facial bone fracture.      Electronically Signed: True Allen    7/3/2023 3:45 PM EDT    Workstation ID: [id number]    CT Cervical Spine Without Contrast [[id number]] Collected: 07/03/23 1541     Updated: 07/03/23 1546    Narrative:        CT CERVICAL SPINE WO CONTRAST    Date of Exam: 7/3/2023 3:36 PM EDT    Indication: fall last night with AMS.    Comparison: None available.    Technique: Axial CT images were obtained of the cervical spine without contrast administration.  Sagittal and coronal reconstructions were performed.  Automated exposure control and iterative reconstruction methods were used.      Findings:  No evidence of fracture or traumatic subluxation. Mild degenerative disc disease at C5-C6. No prevertebral soft tissue swelling. Minimal fluid in the left mastoid air cells. 1 cm groundglass opacity in the right lung apex      Impression:      Impression:    1. No evidence of cervical spine fracture  2. Minimal left mastoid effusion  3. 1 cm right apical groundglass opacity. Recommend follow-up chest CT in 6      Electronically Signed: Dio Murrell    7/3/2023 3:44 PM EDT    Workstation ID: [id number]    CT Abdomen Pelvis Without Contrast [[id number]] Collected: 07/03/23 1539     Updated: 07/03/23 1544    Narrative:      CT ABDOMEN PELVIS WO CONTRAST    Date of Exam: 7/3/2023 3:36 PM EDT    Indication: abdominal distention.    Comparison: Ultrasound-guided paracentesis 6/23/2023. No prior CT abdomen and pelvis for comparison at this institution. Liver ultrasound 6/25/2023.    Technique: Axial CT images were obtained of the abdomen and pelvis without the administration of contrast. Sagittal and coronal reconstructions were performed.  Automated exposure control and iterative reconstruction methods were  used.      Findings:  Large quantity abdominal pelvic ascites is present. There is mild to moderate generalized body wall edema. Small left, trace right basilar pleural effusions are present with mild posterior bibasilar atelectasis and mild to right middle lobe atelectasis.   There is mild right hemidiaphragm elevation.    The liver is diminutive in size. It demonstrates a subtle surface nodular contour, suggestive of cirrhosis. No focal liver lesion is identified on this noncontrast examination. The spleen size is within normal limits. There is a small esophageal hiatal   hernia which contains ascites fluid.    The gallbladder, pancreas, adrenals, and kidneys demonstrate a normal noncontrast appearance. Moderate to large stool burden is present in the ascending, transverse, and descending colon. The bowel does not appear abnormally dilated or inflamed. The   appendix is normal.    The urinary bladder, prostate, and rectum are normal.    No acute or suspicious osseous abnormalities are identified. There is moderate degenerative disc change at the L4-5 vertebral level.      Impression:      Impression:    1. Large volume abdominal pelvic ascites.  2. Cirrhotic liver morphology.  3. Moderate to large colonic stool burden. Correlate for constipation.  4. Scattered mild bibasilar atelectasis. Small left and trace right pleural effusions are present.            Electronically Signed: Chanda Clark    7/3/2023 3:42 PM EDT    Workstation ID: [id number]              Assessment & Plan     Assessment:  37-year-old male with history of alcoholic cirrhosis complicated by HE, ascites, and esophageal varices discharged on 6/28 and presented again to [facility] ER today with complaints of altered mental status, ascites, BLE edema, and hypotension.      -Hepatic encephalopathy/hyperammonemia  -Alcohol hepatitis and cirrhosis complicated by HE, ascites, and esophageal varices  -Hyponatremia  -Acute renal  failure  -Hypotension  -Elevated LFTs  -Hyperkalemia     Plan:  Agree with lactulose enemas and repeat until more alert and oriented.   Restart oral lactulose and Xifaxan once able to tolerate p.o.  We will plan for paracentesis with fluid studies.  Will add zinc.  MELD sodium score 30.  Once he is more alert and oriented, recommend 2 g sodium diet with Boost/Ensure supplements. Encourage his nutrition.  Supportive care.  GI will follow.    Mamie MONICA Ayers  07/03/23  16:28 EDT      Electronically signed by Juan Padilla MD at 07/03/23 5024          Consult Notes (all)        Fatou Waldron MD at 07/04/23 0861        Consult Orders    1. Nephrology (on -call MD unless specified) [[id number]] ordered by Ivan Oropeza PA at 07/03/23 1611                     NEPHROLOGY CONSULTATION-----[facility]    [facility]  [phone]  Saji Waldron MD    Patient Care Team:  Wolf Carvalho MD as PCP - General (Geriatric Medicine)  Fatou Waldron MD as Consulting Physician (Nephrology)    CC/REASON FOR CONSULTATION: RENAL FAILURE/ELEVATED SERUM CREATININE    PHYSICIAN REQUESTING CONSULTATION:     History of Present Illness    Patient is a 37 y.o. WM, very well known to me, whom I was asked to see in consultation for evaluation and management of renal failure/elevated serum creatinine.  Patient was admitted after presenting to ER with c/o MS changes and hypotension. Per patient and his mother he has not been taking Lactulose as prescribed. S/P recent fall on trip to [place].  No NSAIDs or recent IV dye exposure. No known h/o TB, rheumatic fever,  SLE. +EtoH related cirrhosis and does bleed/bruise easily. Reports some decreased urine output and urinary hesitancy.  Some dysuria. No edema or fluid retention outside of ascites/abdominal distension. Was not on diuretics prior to admission. Was not on ACE-I/ARB  prior to admission. No herbal med use. No seizures, tremors, twitches, HA, blurry vision. No polyuria or polydypsia.     Review of Systems   Constitutional:  Positive for activity change, appetite change and fatigue. Negative for chills, diaphoresis, fever and unexpected weight change.   HENT:  Negative for congestion, dental problem, drooling, ear discharge, ear pain, facial swelling, hearing loss, mouth sores, nosebleeds, postnasal drip, rhinorrhea, sinus pressure, sinus pain, sneezing, sore throat, tinnitus, trouble swallowing and voice change.    Eyes:  Negative for photophobia, pain, discharge, redness, itching and visual disturbance.   Respiratory:  Negative for apnea, cough, choking, chest tightness, shortness of breath, wheezing and stridor.    Cardiovascular:  Negative for chest pain, palpitations and leg swelling.   Gastrointestinal:  Positive for abdominal distention and abdominal pain. Negative for anal bleeding, blood in stool, constipation, diarrhea, nausea, rectal pain and vomiting.   Endocrine: Negative for cold intolerance, heat intolerance, polydipsia, polyphagia and polyuria.   Genitourinary:  Positive for decreased urine volume and difficulty urinating. Negative for dysuria, enuresis, flank pain, frequency, genital sores, hematuria and urgency.   Musculoskeletal:  Positive for arthralgias and back pain. Negative for gait problem, joint swelling, myalgias, neck pain and neck stiffness.   Skin:  Negative for color change, pallor, rash and wound.   Allergic/Immunologic: Negative for environmental allergies, food allergies and immunocompromised state.   Neurological:  Positive for weakness. Negative for dizziness, tremors, seizures, syncope, facial asymmetry, speech difficulty, light-headedness, numbness and headaches.   Hematological:  Negative for adenopathy. Bruises/bleeds easily.   Psychiatric/Behavioral:  Positive for confusion, decreased concentration and dysphoric mood. Negative for agitation,  behavioral problems, hallucinations, self-injury, sleep disturbance and suicidal ideas. The patient is not nervous/anxious and is not hyperactive.         Past Medical History:   Diagnosis Date    Alcohol abuse     1/2-1 pint a day, sober since 2022    Alcoholic cirrhosis 2022    Arthritis     Bipolar affective disorder 2022    Cholecystitis 2022    Chronic constipation 2017    GERD (gastroesophageal reflux disease) 2022    Doing well with pepcid.    Hypertension     Osteoarthritis 2022    Polysubstance abuse 2022    Severe Malnutrition (HCC) 2023    Sleep apnea     Tobacco use disorder 2022       Past Surgical History:   Procedure Laterality Date    BACK SURGERY      WRIST SURGERY         Family History   Problem Relation Age of Onset    Cirrhosis Father     Heart disease Maternal Grandmother     Diabetes Maternal Grandmother     Diabetes Maternal Grandfather     Diabetes Paternal Grandmother     Diabetes Paternal Grandfather        Social History     Tobacco Use    Smoking status: Every Day     Packs/day: 0.50     Types: Cigarettes    Smokeless tobacco: Never   Vaping Use    Vaping Use: Never used   Substance Use Topics    Alcohol use: Not Currently     Comment: sober since 21    Drug use: Not Currently     Types: Marijuana       Home Meds:   Medications Prior to Admission   Medication Sig Dispense Refill Last Dose    allopurinol (ZYLOPRIM) 100 MG tablet Take 1 tablet by mouth Daily. 30 tablet 0 7/3/2023    [] ALPRAZolam (Xanax) 0.5 MG tablet Take 1 tablet by mouth 2 (Two) Times a Day As Needed for Anxiety for up to 5 days. 10 tablet 0 7/3/2023    calcium carbonate (TUMS) 500 MG chewable tablet Chew 2 tablets Daily for 14 days. 28 tablet 0 7/3/2023    magnesium oxide (MAG-OX) 400 tablet tablet Take 1 tablet by mouth Daily for 14 days. 14 tablet 0 7/3/2023    midodrine (PROAMATINE) 10 MG tablet Take 2 tablets (20 mg) by mouth 3 (Three) Times a  Day Before Meals 180 tablet 0 7/3/2023    nadolol (CORGARD) 40 MG tablet Take 1 tablet by mouth Daily As Needed.   7/3/2023    ondansetron ODT (ZOFRAN-ODT) 4 MG disintegrating tablet Place 1 tablet on the tongue Every 8 (Eight) Hours As Needed for Nausea or Vomiting for up to 30 days. 90 tablet 0 Past Week    oxyCODONE (ROXICODONE) 10 MG tablet Take 1 tablet by mouth Every 4 (Four) to 6 (Six) Hours As Needed for Moderate Pain.   7/3/2023    pantoprazole (PROTONIX) 40 MG EC tablet Take 1 tablet by mouth Daily.   7/3/2023    riFAXIMin (XIFAXAN) 550 MG tablet Take 1 tablet by mouth 2 (Two) Times a Day.   7/3/2023    thiamine (VITAMIN B-1) 100 MG tablet take 1 tablet daily 30 tablet 0 7/3/2023    baclofen (LIORESAL) 10 MG tablet Take 5-10 mg by mouth 2 (Two) Times a Day As Needed for Muscle Spasms.   Unknown    cyclobenzaprine (FLEXERIL) 10 MG tablet Take 5-10 mg by mouth 3 (Three) Times a Day As Needed.   Unknown    lactulose (CHRONULAC) 10 GM/15ML solution Take 30 mL by mouth 3 (Three) Times a Day for 30 days. 2700 mL 0 Unknown    melatonin 5 MG tablet tablet Take 1 tablet by mouth At Night As Needed.   Unknown    nicotine (NICODERM CQ) 14 MG/24HR patch Place 1 patch on the skin as directed by provider Daily As Needed.   Unknown    nicotine polacrilex (NICORETTE) 2 MG gum Chew 1 each As Needed for Smoking Cessation.   Unknown       Scheduled Meds:  albumin human, , ,   albumin human, 50 g, Intravenous, QAM  cefTRIAXone, 2 g, Intravenous, Q24H  lactulose, 30 g, Oral, Q6H  midodrine, 20 mg, Oral, Q8H  octreotide, 100 mcg, Intravenous, Q8H  pantoprazole, 40 mg, Intravenous, Q AM  potassium chloride ER, 40 mEq, Oral, Q4H  rifAXIMin, 550 mg, Oral, Q12H  sodium chloride, 500 mL, Intravenous, Once  sodium chloride, 10 mL, Intravenous, Q12H  sodium chloride, 10 mL, Intravenous, Q12H  sodium chloride, 10 mL, Intravenous, Q12H  sodium chloride, 10 mL, Intravenous, Q12H  thiamine, 100 mg, Oral, QAM        Continuous Infusions:   norepinephrine, 0.02-0.3 mcg/kg/min        PRN Meds:    ALPRAZolam    senna-docusate sodium **AND** polyethylene glycol **AND** bisacodyl **AND** bisacodyl    ipratropium-albuterol    Magnesium Standard Dose Replacement - Follow Nurse / BPA Driven Protocol    nitroglycerin    ondansetron **OR** ondansetron    Phosphorus Replacement - Follow Nurse / BPA Driven Protocol    Potassium Replacement - Follow Nurse / BPA Driven Protocol    [COMPLETED] Insert Peripheral IV **AND** sodium chloride    [COMPLETED] Insert Peripheral IV **AND** sodium chloride    sodium chloride    sodium chloride    sodium chloride    sodium chloride    Allergies:  Bee venom and Scopolamine    OBJECTIVE    Vital Signs  Temp:  [97.4 °F (36.3 °C)-98.3 °F (36.8 °C)] 97.4 °F (36.3 °C)  Heart Rate:  [47-80] 69  Resp:  [12-20] 12  BP: ()/(28-65) 108/65    No intake/output data recorded.  I/O last 3 completed shifts:  In: 2415 [P.O.:240; I.V.:575; IV Piggyback:1600]  Out: 400 [Urine:400]    Physical Exam:  General Appearance: alert, appears stated age and cooperative  Head: normocephalic, without obvious abnormality and atraumatic +DRY OP +LEFT FOREHEAD ABRASION    Eyes: conjunctivae and sclerae normal and no icterus  Neck: supple and no JVD  Lungs: clear to auscultation and respirations regular  Heart: regular rhythm & normal rate and normal S1, S2 +BETHANY  Chest Wall: no abnormalities observed  Abdomen: normal bowel sounds and  +ASCITES/DISTENSION AND MILD BILAT LQ PAIN ON PALPATION  Extremities: moves extremities well, no edema, no cyanosis  Skin: +SCATTERED SCABS, ABRASIONS, ECCHYMOSIS  Neurologic: Alert, and oriented x 2. No focal deficits    Results Review:    I reviewed the patient's new clinical results.    WBC WBC   Date Value Ref Range Status   07/04/2023 5.60 3.40 - 10.80 10*3/mm3 Final   07/03/2023 8.00 3.40 - 10.80 10*3/mm3 Final      HGB Hemoglobin   Date Value Ref Range Status   07/04/2023 9.1 (L) 13.0 - 17.7 g/dL Final   07/03/2023  10.5 (L) 13.0 - 17.7 g/dL Final      HCT Hematocrit   Date Value Ref Range Status   07/04/2023 27.3 (L) 37.5 - 51.0 % Final   07/03/2023 31.3 (L) 37.5 - 51.0 % Final      Platelets No results found for: LABPLAT   MCV MCV   Date Value Ref Range Status   07/04/2023 101.8 (H) 79.0 - 97.0 fL Final   07/03/2023 104.5 (H) 79.0 - 97.0 fL Final          Sodium Sodium   Date Value Ref Range Status   07/04/2023 139 136 - 145 mmol/L Final   07/03/2023 133 (L) 136 - 145 mmol/L Final      Potassium Potassium   Date Value Ref Range Status   07/04/2023 3.5 3.5 - 5.2 mmol/L Final   07/03/2023 4.3 3.5 - 5.2 mmol/L Final     Comment:     Slight hemolysis detected by analyzer. Results may be affected.      Chloride Chloride   Date Value Ref Range Status   07/04/2023 100 98 - 107 mmol/L Final   07/03/2023 92 (L) 98 - 107 mmol/L Final      CO2 CO2   Date Value Ref Range Status   07/04/2023 25.0 22.0 - 29.0 mmol/L Final   07/03/2023 25.0 22.0 - 29.0 mmol/L Final      BUN BUN   Date Value Ref Range Status   07/04/2023 48 (H) 6 - 20 mg/dL Final   07/03/2023 51 (H) 6 - 20 mg/dL Final      Creatinine Creatinine   Date Value Ref Range Status   07/04/2023 3.86 (H) 0.76 - 1.27 mg/dL Final   07/03/2023 4.75 (H) 0.76 - 1.27 mg/dL Final      Calcium Calcium   Date Value Ref Range Status   07/04/2023 8.2 (L) 8.6 - 10.5 mg/dL Final   07/03/2023 8.7 8.6 - 10.5 mg/dL Final      PO4 No results found for: CAPO4   Albumin Albumin   Date Value Ref Range Status   07/04/2023 3.3 (L) 3.5 - 5.2 g/dL Final   07/03/2023 3.3 (L) 3.5 - 5.2 g/dL Final      Magnesium Magnesium   Date Value Ref Range Status   07/04/2023 1.9 1.6 - 2.6 mg/dL Final   07/03/2023 2.1 1.6 - 2.6 mg/dL Final      Uric Acid No results found for: URICACID       Imaging Results (Last 72 Hours)       Procedure Component Value Units Date/Time    XR Chest 1 View [[id number]] Collected: 07/03/23 1626     Updated: 07/03/23 1630    Narrative:      XR CHEST 1 VW    Date of Exam: 7/3/2023 4:13 PM  EDT    Indication: AMS    Comparison: AP portable chest 6/20/2023    Findings:  There is some bandlike atelectasis in the right lower lobe, similar to the prior examination. The previously described patchy left basilar airspace disease appears improved. No new airspace disease is identified. The heart size is normal. No pleural   effusion or pneumothorax is identified.      Impression:      Impression:  Improving bibasilar atelectasis or pneumonia since 6/28/2023. No new airspace disease is appreciated.      Electronically Signed: Chanda Clark    7/3/2023 4:27 PM EDT    Workstation ID: [id number]    CT Head Without Contrast [[id number]] Collected: 07/03/23 1540     Updated: 07/03/23 1547    Narrative:      CT HEAD WO CONTRAST, CT FACIAL BONES WO CONTRAST    Date of Exam: 7/3/2023 3:36 PM EDT    Indication: fall last night with head injury now AMS.    Comparison: None available.    Technique: Axial CT images were obtained of the head without contrast administration.  Coronal reconstructions were performed.  Automated exposure control and iterative reconstruction methods were used.    Findings:  Gray-white differentiation is maintained and there is no evidence of intracranial hemorrhage, mass or mass effect. The ventricles are normal in size and configuration. The orbits are normal. The paranasal sinuses are grossly clear. The calvarium is   intact. Additional dedicated evaluation of the facial bones demonstrates no evidence of acute fracture. The mastoid air cells are clear. Rightward nasal septal deviation is noted the lamina papyracea are intact.      Impression:      Impression:  No acute intracranial abnormality or acute facial bone fracture.      Electronically Signed: True Allen    7/3/2023 3:45 PM EDT    Workstation ID: [id number]    CT Facial Bones Without Contrast [[id number]] Collected: 07/03/23 1540     Updated: 07/03/23 1547    Narrative:      CT HEAD WO CONTRAST, CT FACIAL BONES WO CONTRAST    Date of  Exam: 7/3/2023 3:36 PM EDT    Indication: fall last night with head injury now AMS.    Comparison: None available.    Technique: Axial CT images were obtained of the head without contrast administration.  Coronal reconstructions were performed.  Automated exposure control and iterative reconstruction methods were used.    Findings:  Gray-white differentiation is maintained and there is no evidence of intracranial hemorrhage, mass or mass effect. The ventricles are normal in size and configuration. The orbits are normal. The paranasal sinuses are grossly clear. The calvarium is   intact. Additional dedicated evaluation of the facial bones demonstrates no evidence of acute fracture. The mastoid air cells are clear. Rightward nasal septal deviation is noted the lamina papyracea are intact.      Impression:      Impression:  No acute intracranial abnormality or acute facial bone fracture.      Electronically Signed: True Allen    7/3/2023 3:45 PM EDT    Workstation ID: [id number]    CT Cervical Spine Without Contrast [[id number]] Collected: 07/03/23 1541     Updated: 07/03/23 1546    Narrative:        CT CERVICAL SPINE WO CONTRAST    Date of Exam: 7/3/2023 3:36 PM EDT    Indication: fall last night with AMS.    Comparison: None available.    Technique: Axial CT images were obtained of the cervical spine without contrast administration.  Sagittal and coronal reconstructions were performed.  Automated exposure control and iterative reconstruction methods were used.      Findings:  No evidence of fracture or traumatic subluxation. Mild degenerative disc disease at C5-C6. No prevertebral soft tissue swelling. Minimal fluid in the left mastoid air cells. 1 cm groundglass opacity in the right lung apex      Impression:      Impression:    1. No evidence of cervical spine fracture  2. Minimal left mastoid effusion  3. 1 cm right apical groundglass opacity. Recommend follow-up chest CT in 6      Electronically Signed: Dio  Handy    7/3/2023 3:44 PM EDT    Workstation ID: [id number]    CT Abdomen Pelvis Without Contrast [[id number]] Collected: 07/03/23 1539     Updated: 07/03/23 1544    Narrative:      CT ABDOMEN PELVIS WO CONTRAST    Date of Exam: 7/3/2023 3:36 PM EDT    Indication: abdominal distention.    Comparison: Ultrasound-guided paracentesis 6/23/2023. No prior CT abdomen and pelvis for comparison at this institution. Liver ultrasound 6/25/2023.    Technique: Axial CT images were obtained of the abdomen and pelvis without the administration of contrast. Sagittal and coronal reconstructions were performed.  Automated exposure control and iterative reconstruction methods were used.      Findings:  Large quantity abdominal pelvic ascites is present. There is mild to moderate generalized body wall edema. Small left, trace right basilar pleural effusions are present with mild posterior bibasilar atelectasis and mild to right middle lobe atelectasis.   There is mild right hemidiaphragm elevation.    The liver is diminutive in size. It demonstrates a subtle surface nodular contour, suggestive of cirrhosis. No focal liver lesion is identified on this noncontrast examination. The spleen size is within normal limits. There is a small esophageal hiatal   hernia which contains ascites fluid.    The gallbladder, pancreas, adrenals, and kidneys demonstrate a normal noncontrast appearance. Moderate to large stool burden is present in the ascending, transverse, and descending colon. The bowel does not appear abnormally dilated or inflamed. The   appendix is normal.    The urinary bladder, prostate, and rectum are normal.    No acute or suspicious osseous abnormalities are identified. There is moderate degenerative disc change at the L4-5 vertebral level.      Impression:      Impression:    1. Large volume abdominal pelvic ascites.  2. Cirrhotic liver morphology.  3. Moderate to large colonic stool burden. Correlate for constipation.  4.  Scattered mild bibasilar atelectasis. Small left and trace right pleural effusions are present.            Electronically Signed: Chanda Clark    7/3/2023 3:42 PM EDT    Workstation ID: [id number]              Results for orders placed during the hospital encounter of 07/03/23    XR Chest 1 View    Narrative  XR CHEST 1 VW    Date of Exam: 7/3/2023 4:13 PM EDT    Indication: AMS    Comparison: AP portable chest 6/20/2023    Findings:  There is some bandlike atelectasis in the right lower lobe, similar to the prior examination. The previously described patchy left basilar airspace disease appears improved. No new airspace disease is identified. The heart size is normal. No pleural  effusion or pneumothorax is identified.    Impression  Impression:  Improving bibasilar atelectasis or pneumonia since 6/28/2023. No new airspace disease is appreciated.      Electronically Signed: Chanda Clark  7/3/2023 4:27 PM EDT  Workstation ID: [id number]      Results for orders placed during the hospital encounter of 06/23/23    XR Chest 1 View    Narrative  XR CHEST 1 VW    Date of Exam: 6/28/2023 11:45 AM EDT    Indication: eval for pneumonia    Comparison: None available.    Findings:  The lung volumes are low. Patchy airspace disease is seen within the lung bases bilaterally. Probable small bilateral pleural effusions are present. The pulmonary vasculature appears unremarkable. No pneumothorax. No acute osseous abnormality identified.    Impression  Impression:  Low lung volumes with patchy airspace disease seen within the lung bases bilaterally, left greater than right with probable small bilateral pleural effusions, likely related to pneumonia and/or atelectasis.      Electronically Signed: Brynn Higuera  6/28/2023 12:00 PM EDT  Workstation ID: [id number]            ASSESSMENT / PLAN      Hepatic encephalopathy    Bipolar affective disorder    GERD (gastroesophageal reflux disease)    Osteoarthritis    Tobacco use disorder     Polysubstance abuse    Obstructive sleep apnea    URIEL (acute kidney injury)    Alcoholic cirrhosis    Chronic constipation    Severe Malnutrition (HCC)    Essential hypertension    CKD (chronic kidney disease) stage 3, GFR 30-59 ml/min      1.ARF/URIEL/CRF/CKD---------Nonoliguric. +Recurrent ARF/URIEL on top of known CRF/CKD STG 3A with a baseline serum creatinine of about 1.5. CRF/CKD STG 3A secondary to HTN NS. +ARF/URIEL is secondary to ATN from hypotension and prerenal state/intravascular volume depletion. Hydrate with NS and give IV Albumin. Avoid hypotension. Check urine and serum studies and PVR. No NSAIDs or IV dye. Dose meds for CrCl less than 10 cc/min until ARF/URIEL is resolved     2. HYPOKALEMIA-------Replace po and give IV MgSO4     3. ETOH CIRRHOSIS/HEPATIC ENCEPHALOPATHY/ASCITES-----Restart Lactulose. Follow Ammonia. Follow for paracentesis need. IV Albumin     4. HYPOMAGNESEMIA--------Replace IV. No po replacement given diarrhea from Lactulose    5. DELERIUM----------Improving. Follow ammonia     6. HYPOCALCEMIA-------Replace IV and give MgSO4 also     7. ANEMIA-------H/H stable. Check Fe     8. HYPOALBUMINEMIA-----IV Albumin to temporize     9. ELEVATED INR------Related to cirrhosis     10. HYPOTENSION------On Midodrine     11. BIPOLAR DISEASE     12. BASILIO    13. POLYSUBSTANCE ABUSE/TOBACCO USE-----Drug screen positive for Benzodiazipine and Oxycodone    14. KETONURIA------Secondary to intravascular volume depletion. IVFs      I discussed the patient's findings and my recommendations with patient, family, and nursing staff    Will follow along closely. Thank you for allowing us to see this patient in renal consultation.    [facility]  [phone]  MD Saji Tang MD  07/04/23  08:22 EDT              Electronically signed by Fatou Waldron MD at 07/04/23 1126       Cathleen Wong APRN at 07/03/23 1951       Attestation signed  by Aly Zayas MD at 23 0953    I have personally reviewed all overnight events, labs and other data, reviewed all other pertinent notes, seen and examined this patient today. I have also reviewed the note by APRN and pertinent changes are reflected in my today's progress note.    Dr. Aly Zayas MD MPH  Staff Intensivist  23  09:25 EDT                      Critical Care Consult Note   Daquan Xiong : 1985 MRN:[id number] LOS:0 ROOM: [facility][note 3]     Reason for admission: Hepatic encephalopathy     Assessment / Plan     Hepatic encephalopathy  -Admission ammonia  level 157  -Lactulose 30 grams q 6 hours  -Continue Ceftriaxone for now  -GI consult prior to ICU admission  -Nutrition consult when appropriate    Hypotension  -Persistent hypotension in spite of adequate fluid resuscitation  -Continue home midodrine  -C/w additional fluids as needed. Avoid fluid overload  -Titrate vasopressors for a target MAP of 65    Kidney injury, acute on chronic  -UA reviewed  -Consider renal ultrasound  -Continue IVF resuscitation  -Monitor and trend labs  -Avoid nephrotoxic medications, hypotension, and NSAIDS  -Renally dose medications  -Monitor urine output  -Nephrology consulted prior to ICU admission    1 cm right apical groundglass opacity via CT  --Radiology recommend follow-up chest CT in 6     Chronic conditions: bipolar, GERD, malnutrition, alcoholic cirrhosis, CKD, chronic-constipation, obstructive sleep apnea, smoking and substance abuse  - Continue home medications as appropriate  - Monitor routine labs and vital signs        Code Status (Patient has no pulse and is not breathing): CPR (Attempt to Resuscitate)  Medical Interventions (Patient has pulse or is breathing): Full Support       Nutrition: NPO Diet NPO Type: Strict NPO Patient isn't on Tube Feeding     DVT prophylaxis:  Mechanical DVT prophylaxis orders are present.     History of Present illness     A 37 y.o. old male patient  with PMH of bipolar, GERD, malnutrition, alcoholic cirrhosis, CKD, chronic-constipation, obstructive sleep apnea, smoking and substance abuse presents to the hospital with complaints of altered mental status.  HPI information is limited due to patient confusion; information obtained from chart review and ER report.  Patient has had increased low blood pressure and altered mental status over the last 2 days.  Per report patient's wife stated he was hospitalized and discharged last week with similar complaints.  He went to [place] over the weekend and his symptoms worsened ending with a fall yesterday afternoon getting out of the car.  Additionally, patient's wife reported increased bilateral lower extremity edema going to his scrotum and abdomen.  His last paracentesis was 6/23/2023 he was not supposed to be scheduled again until 7/7/2023.  Patient's blood pressure continued to drop while in the emergency department awaiting PCU bed patient was brought to the intensive care unit for further evaluation and treatment.    ACP: Patient unable to answer questions at this time will remain full code with full intervention; his wife is his decision-maker if he is unable.    Patient was seen and examined on 07/03/23 at 21:35 EDT .    Subjective / Review of systems     Review of Systems   Unable to perform ROS: Acuity of condition      Past Medical/Surgical/Social/Family History & Allergies     Past Medical History:   Diagnosis Date    Alcohol abuse     1/2-1 pint a day, sober since 9/12/2022    Alcoholic cirrhosis 09/01/2022    Arthritis     Bipolar affective disorder 09/22/2022    Cholecystitis 11/08/2022    Chronic constipation 09/05/2017    GERD (gastroesophageal reflux disease) 09/22/2022    Doing well with pepcid.    Hypertension     Osteoarthritis 09/22/2022    Polysubstance abuse 09/22/2022    Severe Malnutrition (HCC) 06/27/2023    Sleep apnea     Tobacco use disorder 09/22/2022      Past Surgical History:    Procedure Laterality Date    BACK SURGERY      WRIST SURGERY        Social History     Socioeconomic History    Marital status:    Tobacco Use    Smoking status: Every Day     Packs/day: 0.50     Types: Cigarettes    Smokeless tobacco: Never   Vaping Use    Vaping Use: Never used   Substance and Sexual Activity    Alcohol use: Not Currently     Comment: sober since 9/12/22    Drug use: Not Currently     Types: Marijuana    Sexual activity: Defer      Family History   Problem Relation Age of Onset    Cirrhosis Father     Heart disease Maternal Grandmother     Diabetes Maternal Grandmother     Diabetes Maternal Grandfather     Diabetes Paternal Grandmother     Diabetes Paternal Grandfather       Allergies   Allergen Reactions    Bee Venom Anaphylaxis    Scopolamine Anaphylaxis        Home Medications     Prior to Admission medications    Medication Sig Start Date End Date Taking? Authorizing Provider   allopurinol (ZYLOPRIM) 100 MG tablet Take 1 tablet by mouth Daily. 6/8/23   Luis Enrique Byrne MD   ALPRAZolam (Xanax) 0.5 MG tablet Take 1 tablet by mouth 2 (Two) Times a Day As Needed for Anxiety for up to 5 days. 6/28/23 7/3/23  Ward Adams MD   baclofen (LIORESAL) 10 MG tablet Take 5-10 mg by mouth 2 (Two) Times a Day As Needed for Muscle Spasms.    Nathaly Pop MD   baclofen (LIORESAL) 10 MG tablet Take 1 tablet by mouth Daily. With stretching    Nathaly Pop MD   calcium carbonate (TUMS) 500 MG chewable tablet Chew 2 tablets Daily for 14 days. 6/28/23 7/12/23  Ward Adams MD   cyclobenzaprine (FLEXERIL) 10 MG tablet Take 5-10 mg by mouth 3 (Three) Times a Day As Needed.    Nathaly Pop MD   furosemide (LASIX) 40 MG tablet Take 1 tablet by mouth Daily.    Nathaly Ppo MD   lactulose (CHRONULAC) 10 GM/15ML solution Take 30 mL by mouth 3 (Three) Times a Day for 30 days. 6/28/23 7/28/23  Ward Adams MD   magnesium oxide (MAG-OX) 400 tablet tablet Take 1  tablet by mouth Daily for 14 days. 6/28/23 7/12/23  Ward Adams MD   melatonin 5 MG tablet tablet Take 1 tablet by mouth At Night As Needed.    Nathaly Pop MD   midodrine (PROAMATINE) 10 MG tablet Take 2 tablets (20 mg) by mouth 3 (Three) Times a Day Before Meals 6/28/23 7/28/23  Ward Adams MD   nadolol (CORGARD) 40 MG tablet Take 1 tablet by mouth Daily As Needed.    Nathaly Pop MD   nicotine (NICODERM CQ) 14 MG/24HR patch Place 1 patch on the skin as directed by provider Daily As Needed.    Nathaly Pop MD   nicotine polacrilex (NICORETTE) 2 MG gum Chew 1 each As Needed for Smoking Cessation.    Nathaly Pop MD   ondansetron ODT (ZOFRAN-ODT) 4 MG disintegrating tablet Place 1 tablet on the tongue Every 8 (Eight) Hours As Needed for Nausea or Vomiting for up to 30 days. 6/28/23 7/28/23  Ward Adams MD   oxyCODONE (ROXICODONE) 10 MG tablet Take 1 tablet by mouth Every 4 (Four) to 6 (Six) Hours As Needed for Moderate Pain.    Nathaly Pop MD   pantoprazole (PROTONIX) 40 MG EC tablet Take 1 tablet by mouth Daily.    Nathaly Pop MD   riFAXIMin (XIFAXAN) 550 MG tablet Take 1 tablet by mouth 2 (Two) Times a Day.    Nathaly Pop MD   sodium bicarbonate 325 MG tablet Take 1 tablet by mouth Daily.    Nathaly Pop MD   spironolactone (ALDACTONE) 50 MG tablet Take 1 tablet by mouth Daily.    Nathaly Pop MD   thiamine (VITAMIN B-1) 100 MG tablet take 1 tablet daily 6/28/23 7/28/23  Ward Adams MD   ondansetron ODT (ZOFRAN-ODT) 4 MG disintegrating tablet Place 1 tablet on the tongue Every 8 (Eight) Hours As Needed for Nausea or Vomiting.  7/3/23  Nathaly Pop MD        Objective / Physical Exam     Vital signs:  Temp: 97.4 °F (36.3 °C)  BP: (!) 89/41  Heart Rate: 56  Resp: 12  SpO2: 98 %  Weight: 88.7 kg (195 lb 8.8 oz)    Admission Weight: Weight: 83.1 kg (183 lb 4.8 oz)    Physical Exam  Vitals and nursing note  reviewed.   Constitutional:       Appearance: He is ill-appearing.   HENT:      Head: Normocephalic.      Comments: Abrasion to left side of face     Mouth/Throat:      Mouth: Mucous membranes are dry.      Pharynx: Oropharynx is clear.   Eyes:      Pupils: Pupils are equal, round, and reactive to light.   Cardiovascular:      Rate and Rhythm: Regular rhythm. Bradycardia present.      Pulses: Normal pulses.      Heart sounds: Normal heart sounds.   Pulmonary:      Effort: Pulmonary effort is normal.      Breath sounds: Normal breath sounds.   Abdominal:      General: Bowel sounds are normal. There is distension.      Tenderness: There is abdominal tenderness.   Musculoskeletal:         General: Normal range of motion.      Cervical back: Normal range of motion.      Right lower leg: Edema present.      Left lower leg: Edema present.   Skin:     General: Skin is warm and dry.      Capillary Refill: Capillary refill takes more than 3 seconds.      Coloration: Skin is jaundiced.   Neurological:      Mental Status: He is alert. He is disoriented.        Labs     Results from last 7 days   Lab Units 07/03/23  1504 06/28/23  0954 06/27/23  2247 06/26/23  2344   WBC 10*3/mm3 8.00 7.90 9.10 8.90   HEMATOCRIT % 31.3* 33.5* 32.4* 32.8*   PLATELETS 10*3/mm3 158 148 135* 133*      Results from last 7 days   Lab Units 07/03/23  1504 06/27/23 2247 06/26/23  2344   SODIUM mmol/L 133* 139 142   POTASSIUM mmol/L 4.3 2.9* 3.1*   CHLORIDE mmol/L 92* 97* 100   CO2 mmol/L 25.0 32.0* 30.0*   BUN mg/dL 51* 22* 25*   CREATININE mg/dL 4.75* 1.57* 1.77*        Imaging     CT Abdomen Pelvis Without Contrast    Result Date: 7/3/2023  Impression: 1. Large volume abdominal pelvic ascites. 2. Cirrhotic liver morphology. 3. Moderate to large colonic stool burden. Correlate for constipation. 4. Scattered mild bibasilar atelectasis. Small left and trace right pleural effusions are present. Electronically Signed: Chanda Clark  7/3/2023 3:42 PM EDT   Workstation ID: [id number]    CT Head Without Contrast    Result Date: 7/3/2023  Impression: No acute intracranial abnormality or acute facial bone fracture. Electronically Signed: True Allen  7/3/2023 3:45 PM EDT  Workstation ID: [id number]    CT Cervical Spine Without Contrast    Result Date: 7/3/2023  Impression: 1. No evidence of cervical spine fracture 2. Minimal left mastoid effusion 3. 1 cm right apical groundglass opacity. Recommend follow-up chest CT in 6 Electronically Signed: Dio Murrell  7/3/2023 3:44 PM EDT  Workstation ID: [id number]    XR Chest 1 View    Result Date: 7/3/2023  Impression: Improving bibasilar atelectasis or pneumonia since 6/28/2023. No new airspace disease is appreciated. Electronically Signed: Chanda Clark  7/3/2023 4:27 PM EDT  Workstation ID: [id number]    CT Facial Bones Without Contrast    Result Date: 7/3/2023  Impression: No acute intracranial abnormality or acute facial bone fracture. Electronically Signed: True Allen  7/3/2023 3:45 PM EDT  Workstation ID: [id number]        Current Medications     Scheduled Meds:  albumin human, , ,   albumin human, , ,   albumin human, 50 g, Intravenous, Daily  [START ON 7/4/2023] cefTRIAXone, 2 g, Intravenous, Q24H  lactulose, 30 g, Oral, Q6H  [START ON 7/4/2023] midodrine, 20 mg, Oral, Q8H  octreotide, 100 mcg, Intravenous, TID  [START ON 7/4/2023] pantoprazole, 40 mg, Intravenous, Q AM  [START ON 7/4/2023] rifAXIMin, 550 mg, Oral, Q12H  sodium chloride, 500 mL, Intravenous, Once  sodium chloride, 10 mL, Intravenous, Q12H  thiamine, 100 mg, Oral, Daily         Continuous Infusions:  DOPamine, 2-20 mcg/kg/min  norepinephrine, 0.02-0.3 mcg/kg/min, Last Rate: 0.2 mcg/kg/min (07/03/23 2115)         MONICA Guerrier   Critical Care  07/03/23   21:35 EDT     Electronically signed by MONICA Guerrier, 07/03/23, 9:33 PM EDT.         Electronically signed by Aly Zayas MD at 07/04/23 0909

## 2023-07-05 NOTE — PLAN OF CARE
Pt has become more alert throughout shift, oriented x4 on afternoon assessment but still confused at times during conversation. Room air  650 out in urine  2 very large BMs  Up to chair today with PT  Pt to downgrade to PCU

## 2023-07-05 NOTE — PROGRESS NOTES
LOS: 2 days   Patient Care Team:  Wolf Carvalho MD as PCP - General (Geriatric Medicine)  Fatou Waldron MD as Consulting Physician (Nephrology)      Subjective   Interval history: Patient is more alert this morning.  Does continue to have confusion.  He is tolerating lactulose by mouth.  Being bowel movements with no overt GI bleeding.  Abdominal distention is improved s/p paracentesis.    ROS:   Review of Systems   Constitutional:  Negative for chills and fever.   Respiratory:  Negative for cough and shortness of breath.    Cardiovascular:  Negative for chest pain and palpitations.   Gastrointestinal:  Positive for abdominal distention. Negative for abdominal pain, nausea and vomiting.   Neurological:  Positive for weakness. Negative for dizziness.   Psychiatric/Behavioral:  Negative for agitation and confusion.       Medication Review:   Scheduled Meds:albumin human, 50 g, Intravenous, QAM  heparin (porcine), 5,000 Units, Subcutaneous, Q8H  lactulose, 30 g, Oral, Q4H  midodrine, 20 mg, Oral, Q8H  octreotide, 100 mcg, Subcutaneous, TID  pantoprazole, 40 mg, Oral, Q AM  rifAXIMin, 550 mg, Oral, Q12H  sodium chloride, 500 mL, Intravenous, Once  sodium chloride, 10 mL, Intravenous, Q12H  sodium chloride, 10 mL, Intravenous, Q12H  sodium chloride, 10 mL, Intravenous, Q12H  sodium chloride, 10 mL, Intravenous, Q12H  thiamine, 100 mg, Oral, QAM      Continuous Infusions:sodium chloride, 75 mL/hr, Last Rate: 75 mL/hr (07/04/23 1307)    PRN Meds:.  ALPRAZolam    ipratropium-albuterol    Magnesium Standard Dose Replacement - Follow Nurse / BPA Driven Protocol    nitroglycerin    ondansetron **OR** ondansetron    oxyCODONE    Phosphorus Replacement - Follow Nurse / BPA Driven Protocol    Potassium Replacement - Follow Nurse / BPA Driven Protocol    [COMPLETED] Insert Peripheral IV **AND** sodium chloride    [COMPLETED] Insert Peripheral IV **AND** sodium chloride    sodium chloride    sodium chloride    sodium  chloride    sodium chloride      Objective     Vital Signs  Temp:  [97.9 °F (36.6 °C)-98.3 °F (36.8 °C)] 97.9 °F (36.6 °C)  Heart Rate:  [] 67  Resp:  [15-18] 15  BP: ()/(44-72) 103/57    Physical Exam:    General Appearance:    Awake, confused   Head:    Normocephalic, without obvious abnormality   Eyes:          Conjunctivae normal, icteric sclera   Ears:    Hearing intact   Throat:   No oral lesions, no thrush, oral mucosa moist   Neck:   No adenopathy, supple, no JVD   Lungs:     Respirations regular, even and unlabored       Abdomen:     Soft, improvement in abdominal distention, nontender   Rectal:     Deferred   Extremities:   3+ BLE edema, no cyanosis   Skin:   No bleeding, bruising or rash, + jaundice   Neurologic:   Sensation intact        Results Review:    CBC  Results from last 7 days   Lab Units 07/05/23  0606 07/04/23  0445 07/03/23  1504   RBC 10*6/mm3 2.65* 2.68* 2.99*   WBC 10*3/mm3 6.60 5.60 8.00   HEMOGLOBIN g/dL 9.1* 9.1* 10.5*   PLATELETS 10*3/mm3 113* 100* 158         CMP  Results from last 7 days   Lab Units 07/05/23  0606 07/04/23  1606 07/04/23  0445 07/03/23  1504   SODIUM mmol/L 139  --  139 133*   POTASSIUM mmol/L 3.9 4.0 3.5 4.3   CHLORIDE mmol/L 103  --  100 92*   CO2 mmol/L 24.0  --  25.0 25.0   BUN mg/dL 36*  --  48* 51*   CREATININE mg/dL 2.74*  --  3.86* 4.75*   GLUCOSE mg/dL 120*  --  108* 112*   ALBUMIN g/dL 3.8  --  3.3* 3.3*   BILIRUBIN mg/dL 3.8*  --  3.9* 3.7*   ALK PHOS U/L 93  --  119* 153*   AST (SGOT) U/L 33  --  43* 66*   ALT (SGPT) U/L 19  --  20 28   AMMONIA umol/L  --   --  91* 157*         Amylase and Lipase        CRP         Imaging Results (Last 24 Hours)       ** No results found for the last 24 hours. **              Assessment & Plan     Assessment:  37-year-old male with history of alcoholic cirrhosis complicated by HE, ascites, and esophageal varices discharged on 6/28 and presented again to [facility] ER today with complaints of altered  mental status, ascites, BLE edema, and hypotension.      -Hepatic encephalopathy/hyperammonemia  -Alcohol hepatitis and cirrhosis complicated by HE, ascites, and esophageal varices  -Hyponatremia  -Acute renal failure  -Hypotension  -Elevated LFTs  -Hyperkalemia     Plan:  Patient is s/p paracentesis with fluid studies negative for SBP.  Okay for 2 g sodium diet with Boost/Ensure supplements. Encourage nutrition.  Continue PO lactulose and titrate to a goal of at least 2-3 bowel movements daily.  Continue Xifaxan.  Paracentesis with fluid studies pending.  Kidney function is slowly improving.  Continue daily albumin.  Continue zinc, PPI, midodrine,  and subcu octreotide.  MELD sodium score 30.  Referral has been sent to  transplant team.  Patient and his family are interested in also being referred to [facility] transplant team.  Task has been sent through our office to initiate this referral.  Supportive care.  GI will follow.    MONICA Bates  07/05/23  14:04 EDT

## 2023-07-06 ENCOUNTER — INPATIENT HOSPITAL (OUTPATIENT)
Dept: URBAN - METROPOLITAN AREA HOSPITAL 84 | Facility: HOSPITAL | Age: 38
End: 2023-07-06
Payer: COMMERCIAL

## 2023-07-06 DIAGNOSIS — K76.82 HEPATIC ENCEPHALOPATHY: ICD-10-CM

## 2023-07-06 DIAGNOSIS — I85.10 SECONDARY ESOPHAGEAL VARICES WITHOUT BLEEDING: ICD-10-CM

## 2023-07-06 DIAGNOSIS — K70.11 ALCOHOLIC HEPATITIS WITH ASCITES: ICD-10-CM

## 2023-07-06 DIAGNOSIS — R74.01 ELEVATION OF LEVELS OF LIVER TRANSAMINASE LEVELS: ICD-10-CM

## 2023-07-06 DIAGNOSIS — E87.5 HYPERKALEMIA: ICD-10-CM

## 2023-07-06 DIAGNOSIS — K70.31 ALCOHOLIC CIRRHOSIS OF LIVER WITH ASCITES: ICD-10-CM

## 2023-07-06 PROBLEM — E44.0 MODERATE MALNUTRITION: Status: ACTIVE | Noted: 2023-07-06

## 2023-07-06 LAB
ALBUMIN SERPL-MCNC: 3.6 G/DL (ref 3.5–5.2)
ALBUMIN/GLOB SERPL: 3.6 G/DL
ALP SERPL-CCNC: 76 U/L (ref 39–117)
ALT SERPL W P-5'-P-CCNC: 14 U/L (ref 1–41)
ANION GAP SERPL CALCULATED.3IONS-SCNC: 13 MMOL/L (ref 5–15)
AST SERPL-CCNC: 30 U/L (ref 1–40)
BASOPHILS # BLD AUTO: 0.1 10*3/MM3 (ref 0–0.2)
BASOPHILS NFR BLD AUTO: 1.2 % (ref 0–1.5)
BILIRUB SERPL-MCNC: 5 MG/DL (ref 0–1.2)
BUN SERPL-MCNC: 27 MG/DL (ref 6–20)
BUN/CREAT SERPL: 15.1 (ref 7–25)
CALCIUM SPEC-SCNC: 8.7 MG/DL (ref 8.6–10.5)
CHLORIDE SERPL-SCNC: 103 MMOL/L (ref 98–107)
CO2 SERPL-SCNC: 22 MMOL/L (ref 22–29)
CREAT SERPL-MCNC: 1.79 MG/DL (ref 0.76–1.27)
DEPRECATED RDW RBC AUTO: 57.8 FL (ref 37–54)
EGFRCR SERPLBLD CKD-EPI 2021: 49.1 ML/MIN/1.73
EOSINOPHIL # BLD AUTO: 0.5 10*3/MM3 (ref 0–0.4)
EOSINOPHIL NFR BLD AUTO: 5.1 % (ref 0.3–6.2)
ERYTHROCYTE [DISTWIDTH] IN BLOOD BY AUTOMATED COUNT: 15.2 % (ref 12.3–15.4)
GLOBULIN UR ELPH-MCNC: 1 GM/DL
GLUCOSE SERPL-MCNC: 125 MG/DL (ref 65–99)
HCT VFR BLD AUTO: 30 % (ref 37.5–51)
HGB BLD-MCNC: 9.9 G/DL (ref 13–17.7)
INR PPP: 1.58 (ref 0.93–1.1)
IRON 24H UR-MRATE: 54 MCG/DL (ref 59–158)
LYMPHOCYTES # BLD AUTO: 2 10*3/MM3 (ref 0.7–3.1)
LYMPHOCYTES NFR BLD AUTO: 21 % (ref 19.6–45.3)
MAGNESIUM SERPL-MCNC: 1.8 MG/DL (ref 1.6–2.6)
MCH RBC QN AUTO: 34.1 PG (ref 26.6–33)
MCHC RBC AUTO-ENTMCNC: 32.8 G/DL (ref 31.5–35.7)
MCV RBC AUTO: 103.7 FL (ref 79–97)
MONOCYTES # BLD AUTO: 2.2 10*3/MM3 (ref 0.1–0.9)
MONOCYTES NFR BLD AUTO: 23.2 % (ref 5–12)
NEUTROPHILS NFR BLD AUTO: 4.7 10*3/MM3 (ref 1.7–7)
NEUTROPHILS NFR BLD AUTO: 49.5 % (ref 42.7–76)
NRBC BLD AUTO-RTO: 0.1 /100 WBC (ref 0–0.2)
PHOSPHATE SERPL-MCNC: 2.5 MG/DL (ref 2.5–4.5)
PLATELET # BLD AUTO: 125 10*3/MM3 (ref 140–450)
PMV BLD AUTO: 9.7 FL (ref 6–12)
POTASSIUM SERPL-SCNC: 3.7 MMOL/L (ref 3.5–5.2)
PROT SERPL-MCNC: 4.6 G/DL (ref 6–8.5)
PROTHROMBIN TIME: 16.5 SECONDS (ref 9.6–11.7)
RBC # BLD AUTO: 2.89 10*6/MM3 (ref 4.14–5.8)
SODIUM SERPL-SCNC: 138 MMOL/L (ref 136–145)
URATE SERPL-MCNC: 6.2 MG/DL (ref 3.4–7)
WBC NRBC COR # BLD: 9.5 10*3/MM3 (ref 3.4–10.8)

## 2023-07-06 PROCEDURE — 36415 COLL VENOUS BLD VENIPUNCTURE: CPT | Performed by: NURSE PRACTITIONER

## 2023-07-06 PROCEDURE — P9047 ALBUMIN (HUMAN), 25%, 50ML: HCPCS | Performed by: INTERNAL MEDICINE

## 2023-07-06 PROCEDURE — 83735 ASSAY OF MAGNESIUM: CPT | Performed by: STUDENT IN AN ORGANIZED HEALTH CARE EDUCATION/TRAINING PROGRAM

## 2023-07-06 PROCEDURE — 25010000002 ALBUMIN HUMAN 25% PER 50 ML: Performed by: INTERNAL MEDICINE

## 2023-07-06 PROCEDURE — 25010000002 HEPARIN (PORCINE) PER 1000 UNITS: Performed by: INTERNAL MEDICINE

## 2023-07-06 PROCEDURE — 83540 ASSAY OF IRON: CPT | Performed by: INTERNAL MEDICINE

## 2023-07-06 PROCEDURE — 80053 COMPREHEN METABOLIC PANEL: CPT | Performed by: NURSE PRACTITIONER

## 2023-07-06 PROCEDURE — 99232 SBSQ HOSP IP/OBS MODERATE 35: CPT

## 2023-07-06 PROCEDURE — 84550 ASSAY OF BLOOD/URIC ACID: CPT | Performed by: INTERNAL MEDICINE

## 2023-07-06 PROCEDURE — 84100 ASSAY OF PHOSPHORUS: CPT | Performed by: STUDENT IN AN ORGANIZED HEALTH CARE EDUCATION/TRAINING PROGRAM

## 2023-07-06 PROCEDURE — 85610 PROTHROMBIN TIME: CPT

## 2023-07-06 PROCEDURE — 25010000002 OCTREOTIDE PER 25 MCG

## 2023-07-06 PROCEDURE — 25010000002 NA FERRIC GLUC CPLX PER 12.5 MG: Performed by: INTERNAL MEDICINE

## 2023-07-06 PROCEDURE — 85025 COMPLETE CBC W/AUTO DIFF WBC: CPT | Performed by: NURSE PRACTITIONER

## 2023-07-06 PROCEDURE — 25010000002 MAGNESIUM SULFATE IN D5W 1G/100ML (PREMIX) 1-5 GM/100ML-% SOLUTION: Performed by: INTERNAL MEDICINE

## 2023-07-06 RX ORDER — OXYCODONE HYDROCHLORIDE 5 MG/1
5 TABLET ORAL EVERY 6 HOURS PRN
Status: DISCONTINUED | OUTPATIENT
Start: 2023-07-06 | End: 2023-07-06

## 2023-07-06 RX ORDER — OXYCODONE HYDROCHLORIDE 5 MG/1
10 TABLET ORAL EVERY 6 HOURS PRN
Status: DISCONTINUED | OUTPATIENT
Start: 2023-07-06 | End: 2023-07-07 | Stop reason: HOSPADM

## 2023-07-06 RX ORDER — MAGNESIUM SULFATE 1 G/100ML
1 INJECTION INTRAVENOUS ONCE
Status: COMPLETED | OUTPATIENT
Start: 2023-07-06 | End: 2023-07-06

## 2023-07-06 RX ADMIN — RIFAXIMIN 550 MG: 550 TABLET ORAL at 08:42

## 2023-07-06 RX ADMIN — HEPARIN SODIUM 5000 UNITS: 5000 INJECTION INTRAVENOUS; SUBCUTANEOUS at 21:04

## 2023-07-06 RX ADMIN — LACTULOSE 30 G: 20 SOLUTION ORAL at 21:05

## 2023-07-06 RX ADMIN — Medication 10 ML: at 21:05

## 2023-07-06 RX ADMIN — PANTOPRAZOLE SODIUM 40 MG: 40 TABLET, DELAYED RELEASE ORAL at 05:40

## 2023-07-06 RX ADMIN — LACTULOSE 30 G: 20 SOLUTION ORAL at 09:23

## 2023-07-06 RX ADMIN — Medication 10 ML: at 08:43

## 2023-07-06 RX ADMIN — MAGNESIUM SULFATE HEPTAHYDRATE 1 G: 1 INJECTION, SOLUTION INTRAVENOUS at 08:42

## 2023-07-06 RX ADMIN — SODIUM CHLORIDE 125 MG: 9 INJECTION, SOLUTION INTRAVENOUS at 09:23

## 2023-07-06 RX ADMIN — ALBUMIN (HUMAN) 50 G: 0.25 INJECTION, SOLUTION INTRAVENOUS at 08:41

## 2023-07-06 RX ADMIN — Medication 10 ML: at 21:04

## 2023-07-06 RX ADMIN — RIFAXIMIN 550 MG: 550 TABLET ORAL at 21:04

## 2023-07-06 RX ADMIN — MIDODRINE HYDROCHLORIDE 20 MG: 5 TABLET ORAL at 21:04

## 2023-07-06 RX ADMIN — OXYCODONE HYDROCHLORIDE 5 MG: 5 TABLET ORAL at 08:42

## 2023-07-06 RX ADMIN — Medication 10 ML: at 08:42

## 2023-07-06 RX ADMIN — OXYCODONE HYDROCHLORIDE 10 MG: 5 TABLET ORAL at 19:33

## 2023-07-06 RX ADMIN — MIDODRINE HYDROCHLORIDE 20 MG: 5 TABLET ORAL at 13:59

## 2023-07-06 RX ADMIN — HEPARIN SODIUM 5000 UNITS: 5000 INJECTION INTRAVENOUS; SUBCUTANEOUS at 05:39

## 2023-07-06 RX ADMIN — OXYCODONE HYDROCHLORIDE 5 MG: 5 TABLET ORAL at 14:34

## 2023-07-06 RX ADMIN — OCTREOTIDE ACETATE 100 MCG: 100 INJECTION, SOLUTION INTRAVENOUS; SUBCUTANEOUS at 21:04

## 2023-07-06 RX ADMIN — OXYCODONE HYDROCHLORIDE 5 MG: 5 TABLET ORAL at 01:14

## 2023-07-06 RX ADMIN — ALPRAZOLAM 0.25 MG: 0.25 TABLET ORAL at 18:16

## 2023-07-06 RX ADMIN — MIDODRINE HYDROCHLORIDE 20 MG: 5 TABLET ORAL at 05:39

## 2023-07-06 RX ADMIN — Medication 100 MG: at 08:42

## 2023-07-06 NOTE — CASE MANAGEMENT/SOCIAL WORK
Continued Stay Note  [facility]     Patient Name: Daquan Xiong  MRN: [id number]  Today's Date: 7/6/2023    Admit Date: 7/3/2023    Plan: D/C Plan: [facility] accepted. Precert pending. Current with [facility]. Possible Palliative. Patient deciding between [facility] vs going home.   Discharge Plan       Row Name 07/06/23 1248       Plan    Plan D/C Plan: [facility] accepted. Precert pending. Current with [facility]. Possible Palliative. Patient deciding between [facility] vs going home.    Provided Post Acute Provider List? N/A    Provided Post Acute Provider Quality & Resource List? N/A    Plan Comments CM was contacted by [facility] liaison about the patient thinking he might want to go home. [facility] has started the precert just in case.                                       Expected Discharge Date and Time       Expected Discharge Date Expected Discharge Time    Jul 9, 2023             Phone communication or documentation only - no physical contact with patient or family.   Apoorva Stewart RN      [address] [address]  Phone: [phone]  Fax: [phone]

## 2023-07-06 NOTE — DISCHARGE PLACEMENT REQUEST
"Amish Xiong (37 y.o. Male)       Date of Birth   1985    Social Security Number       Address   [address] [place] [address]    Home Phone   [phone]    MRN   [id number]       Hindu   [facility]    Marital Status                               Admission Date   7/3/23    Admission Type   Emergency    Admitting Provider   Aly Zayas MD    Attending Provider   Gavin Medina MD    Department, Room/Bed   [facility] INTENSIVE CARE UNIT, 2307/1       Discharge Date       Discharge Disposition       Discharge Destination                                 Attending Provider: Gavin Medina MD    Allergies: Bee Venom, Scopolamine    Isolation: None   Infection: None   Code Status: CPR    Ht: 182.9 cm (72\")   Wt: 89.7 kg (197 lb 12 oz)    Admission Cmt: None   Principal Problem: Hepatic encephalopathy [K76.82]                   Active Insurance as of 7/3/2023       Primary Coverage       Payor Plan Insurance Group Employer/Plan Group    ANTHEM BLUE CROSS ANTHEM BLUE CROSS BLUE SHIELD PPO [id number]       Payor Plan Address Payor Plan Phone Number Payor Plan Fax Number Effective Dates    [address] [phone]  1/1/2023 - None Entered    [facility] [id number]         Subscriber Name Subscriber Birth Date Member ID       ANA XIONG 3/25/1986 [id number]               Secondary Coverage       Payor Plan Insurance Group Employer/Plan Group    ANTHEM MEDICAID [facility] [id number]       Payor Plan Address Payor Plan Phone Number Payor Plan Fax Number Effective Dates    MAIL STOP:   1/15/2023 - None Entered    [address] [address]       [facility] [address]         Subscriber Name Subscriber Birth Date Member ID       AMISH XIONG 1985 [id number]                     Emergency Contacts        (Rel.) Home Phone Work Phone Mobile Phone    Ana Xiong (Spouse) [phone] -- [phone]    Alejandra Xiong (Daughter) -- -- [phone]    " Lizz Wiggins (Mother) -- -- [phone]

## 2023-07-06 NOTE — PROGRESS NOTES
"NEPHROLOGY PROGRESS NOTE------[facility]    [facility]  [phone]  Saji Waldron MD      Patient Care Team:  Wolf Carvalho MD as PCP - General (Geriatric Medicine)  Fatou Waldorn MD as Consulting Physician (Nephrology)      Provider:  Saji Waldron MD  Patient Name: Daquan Xiong  :  1985    SUBJECTIVE:    F/U ARF/URIEL/CRF/CKD    Weak and malaise. +Increasing ascites/abdominal pain. No dysuria    Medication:  albumin human, 50 g, Intravenous, QAM  heparin (porcine), 5,000 Units, Subcutaneous, Q8H  lactulose, 30 g, Oral, Q4H  midodrine, 20 mg, Oral, Q8H  octreotide, 100 mcg, Subcutaneous, TID  pantoprazole, 40 mg, Oral, Q AM  rifAXIMin, 550 mg, Oral, Q12H  sodium chloride, 500 mL, Intravenous, Once  sodium chloride, 10 mL, Intravenous, Q12H  sodium chloride, 10 mL, Intravenous, Q12H  sodium chloride, 10 mL, Intravenous, Q12H  sodium chloride, 10 mL, Intravenous, Q12H  thiamine, 100 mg, Oral, QAM      sodium chloride, 75 mL/hr, Last Rate: 75 mL/hr (23 1307)        OBJECTIVE    Vital Sign Min/Max for last 24 hours  Temp  Min: 97.8 °F (36.6 °C)  Max: 98.6 °F (37 °C)   BP  Min: 94/44  Max: 115/65   Pulse  Min: 58  Max: 79   Resp  Min: 14  Max: 19   SpO2  Min: 95 %  Max: 100 %   No data recorded   Weight  Min: 89.7 kg (197 lb 12 oz)  Max: 89.7 kg (197 lb 12 oz)     Flowsheet Rows      Flowsheet Row First Filed Value   Admission Height 182.9 cm (72\") Documented at 2023 1435   Admission Weight 83.1 kg (183 lb 4.8 oz) Documented at 2023 1443            No intake/output data recorded.  I/O last 3 completed shifts:  In: 600 [P.O.:600]  Out: 650 [Urine:650]    Physical Exam:  General Appearance: alert, appears stated age and cooperative  Head: normocephalic, without obvious abnormality and atraumatic +DRY OP +LEFT FOREHEAD ABRASION    Eyes: conjunctivae and sclerae normal and no icterus  Neck: supple and no " JVD  Lungs: clear to auscultation and respirations regular  Heart: regular rhythm & normal rate and normal S1, S2 +BETHANY  Chest Wall: no abnormalities observed  Abdomen: normal bowel sounds and  +ASCITES/DISTENSION AND MILD BILAT LQ PAIN ON PALPATION  Extremities: moves extremities well, no edema, no cyanosis  Skin: +SCATTERED SCABS, ABRASIONS, ECCHYMOSIS  Neurologic: Alert, and oriented x 3. No focal deficits    Labs:    WBC WBC   Date Value Ref Range Status   07/06/2023 9.50 3.40 - 10.80 10*3/mm3 Final   07/05/2023 6.60 3.40 - 10.80 10*3/mm3 Final   07/04/2023 5.60 3.40 - 10.80 10*3/mm3 Final   07/03/2023 8.00 3.40 - 10.80 10*3/mm3 Final      HGB Hemoglobin   Date Value Ref Range Status   07/06/2023 9.9 (L) 13.0 - 17.7 g/dL Final   07/05/2023 9.1 (L) 13.0 - 17.7 g/dL Final   07/04/2023 9.1 (L) 13.0 - 17.7 g/dL Final   07/03/2023 10.5 (L) 13.0 - 17.7 g/dL Final      HCT Hematocrit   Date Value Ref Range Status   07/06/2023 30.0 (L) 37.5 - 51.0 % Final   07/05/2023 27.0 (L) 37.5 - 51.0 % Final   07/04/2023 27.3 (L) 37.5 - 51.0 % Final   07/03/2023 31.3 (L) 37.5 - 51.0 % Final      Platelets No results found for: LABPLAT   MCV MCV   Date Value Ref Range Status   07/06/2023 103.7 (H) 79.0 - 97.0 fL Final   07/05/2023 101.8 (H) 79.0 - 97.0 fL Final   07/04/2023 101.8 (H) 79.0 - 97.0 fL Final   07/03/2023 104.5 (H) 79.0 - 97.0 fL Final          Sodium Sodium   Date Value Ref Range Status   07/06/2023 138 136 - 145 mmol/L Final   07/05/2023 139 136 - 145 mmol/L Final   07/04/2023 139 136 - 145 mmol/L Final   07/03/2023 133 (L) 136 - 145 mmol/L Final      Potassium Potassium   Date Value Ref Range Status   07/06/2023 3.7 3.5 - 5.2 mmol/L Final   07/05/2023 3.9 3.5 - 5.2 mmol/L Final   07/04/2023 4.0 3.5 - 5.2 mmol/L Final     Comment:     Slight hemolysis detected by analyzer. Results may be affected.   07/04/2023 3.5 3.5 - 5.2 mmol/L Final   07/03/2023 4.3 3.5 - 5.2 mmol/L Final     Comment:     Slight hemolysis detected  by analyzer. Results may be affected.      Chloride Chloride   Date Value Ref Range Status   07/06/2023 103 98 - 107 mmol/L Final   07/05/2023 103 98 - 107 mmol/L Final   07/04/2023 100 98 - 107 mmol/L Final   07/03/2023 92 (L) 98 - 107 mmol/L Final      CO2 CO2   Date Value Ref Range Status   07/06/2023 22.0 22.0 - 29.0 mmol/L Final   07/05/2023 24.0 22.0 - 29.0 mmol/L Final   07/04/2023 25.0 22.0 - 29.0 mmol/L Final   07/03/2023 25.0 22.0 - 29.0 mmol/L Final      BUN BUN   Date Value Ref Range Status   07/06/2023 27 (H) 6 - 20 mg/dL Final   07/05/2023 36 (H) 6 - 20 mg/dL Final   07/04/2023 48 (H) 6 - 20 mg/dL Final   07/03/2023 51 (H) 6 - 20 mg/dL Final      Creatinine Creatinine   Date Value Ref Range Status   07/06/2023 1.79 (H) 0.76 - 1.27 mg/dL Final   07/05/2023 2.74 (H) 0.76 - 1.27 mg/dL Final   07/04/2023 3.86 (H) 0.76 - 1.27 mg/dL Final   07/03/2023 4.75 (H) 0.76 - 1.27 mg/dL Final      Calcium Calcium   Date Value Ref Range Status   07/06/2023 8.7 8.6 - 10.5 mg/dL Final   07/05/2023 9.0 8.6 - 10.5 mg/dL Final   07/04/2023 8.2 (L) 8.6 - 10.5 mg/dL Final   07/03/2023 8.7 8.6 - 10.5 mg/dL Final      PO4 No components found for: PO4   Albumin Albumin   Date Value Ref Range Status   07/06/2023 3.6 3.5 - 5.2 g/dL Final   07/05/2023 3.8 3.5 - 5.2 g/dL Final   07/04/2023 3.3 (L) 3.5 - 5.2 g/dL Final   07/03/2023 3.3 (L) 3.5 - 5.2 g/dL Final      Magnesium Magnesium   Date Value Ref Range Status   07/06/2023 1.8 1.6 - 2.6 mg/dL Final   07/05/2023 2.0 1.6 - 2.6 mg/dL Final   07/04/2023 1.9 1.6 - 2.6 mg/dL Final   07/03/2023 2.1 1.6 - 2.6 mg/dL Final      Uric Acid No components found for: URIC ACID     Imaging Results (Last 72 Hours)       Procedure Component Value Units Date/Time    XR Chest 1 View [[id number]] Collected: 07/03/23 1626     Updated: 07/03/23 1630    Narrative:      XR CHEST 1 VW    Date of Exam: 7/3/2023 4:13 PM EDT    Indication: AMS    Comparison: AP portable chest 6/20/2023    Findings:  There is  some bandlike atelectasis in the right lower lobe, similar to the prior examination. The previously described patchy left basilar airspace disease appears improved. No new airspace disease is identified. The heart size is normal. No pleural   effusion or pneumothorax is identified.      Impression:      Impression:  Improving bibasilar atelectasis or pneumonia since 6/28/2023. No new airspace disease is appreciated.      Electronically Signed: Chanda Clark    7/3/2023 4:27 PM EDT    Workstation ID: [id number]    CT Head Without Contrast [[id number]] Collected: 07/03/23 1540     Updated: 07/03/23 1547    Narrative:      CT HEAD WO CONTRAST, CT FACIAL BONES WO CONTRAST    Date of Exam: 7/3/2023 3:36 PM EDT    Indication: fall last night with head injury now AMS.    Comparison: None available.    Technique: Axial CT images were obtained of the head without contrast administration.  Coronal reconstructions were performed.  Automated exposure control and iterative reconstruction methods were used.    Findings:  Gray-white differentiation is maintained and there is no evidence of intracranial hemorrhage, mass or mass effect. The ventricles are normal in size and configuration. The orbits are normal. The paranasal sinuses are grossly clear. The calvarium is   intact. Additional dedicated evaluation of the facial bones demonstrates no evidence of acute fracture. The mastoid air cells are clear. Rightward nasal septal deviation is noted the lamina papyracea are intact.      Impression:      Impression:  No acute intracranial abnormality or acute facial bone fracture.      Electronically Signed: True Allen    7/3/2023 3:45 PM EDT    Workstation ID: [id number]    CT Facial Bones Without Contrast [[id number]] Collected: 07/03/23 1540     Updated: 07/03/23 1547    Narrative:      CT HEAD WO CONTRAST, CT FACIAL BONES WO CONTRAST    Date of Exam: 7/3/2023 3:36 PM EDT    Indication: fall last night with head injury now  AMS.    Comparison: None available.    Technique: Axial CT images were obtained of the head without contrast administration.  Coronal reconstructions were performed.  Automated exposure control and iterative reconstruction methods were used.    Findings:  Gray-white differentiation is maintained and there is no evidence of intracranial hemorrhage, mass or mass effect. The ventricles are normal in size and configuration. The orbits are normal. The paranasal sinuses are grossly clear. The calvarium is   intact. Additional dedicated evaluation of the facial bones demonstrates no evidence of acute fracture. The mastoid air cells are clear. Rightward nasal septal deviation is noted the lamina papyracea are intact.      Impression:      Impression:  No acute intracranial abnormality or acute facial bone fracture.      Electronically Signed: True Jeongord    7/3/2023 3:45 PM EDT    Workstation ID: [id number]    CT Cervical Spine Without Contrast [[id number]] Collected: 07/03/23 1541     Updated: 07/03/23 1546    Narrative:        CT CERVICAL SPINE WO CONTRAST    Date of Exam: 7/3/2023 3:36 PM EDT    Indication: fall last night with AMS.    Comparison: None available.    Technique: Axial CT images were obtained of the cervical spine without contrast administration.  Sagittal and coronal reconstructions were performed.  Automated exposure control and iterative reconstruction methods were used.      Findings:  No evidence of fracture or traumatic subluxation. Mild degenerative disc disease at C5-C6. No prevertebral soft tissue swelling. Minimal fluid in the left mastoid air cells. 1 cm groundglass opacity in the right lung apex      Impression:      Impression:    1. No evidence of cervical spine fracture  2. Minimal left mastoid effusion  3. 1 cm right apical groundglass opacity. Recommend follow-up chest CT in 6      Electronically Signed: Dio Murrell    7/3/2023 3:44 PM EDT    Workstation ID: [id number]    CT Abdomen Pelvis  Without Contrast [[id number]] Collected: 07/03/23 1539     Updated: 07/03/23 1544    Narrative:      CT ABDOMEN PELVIS WO CONTRAST    Date of Exam: 7/3/2023 3:36 PM EDT    Indication: abdominal distention.    Comparison: Ultrasound-guided paracentesis 6/23/2023. No prior CT abdomen and pelvis for comparison at this institution. Liver ultrasound 6/25/2023.    Technique: Axial CT images were obtained of the abdomen and pelvis without the administration of contrast. Sagittal and coronal reconstructions were performed.  Automated exposure control and iterative reconstruction methods were used.      Findings:  Large quantity abdominal pelvic ascites is present. There is mild to moderate generalized body wall edema. Small left, trace right basilar pleural effusions are present with mild posterior bibasilar atelectasis and mild to right middle lobe atelectasis.   There is mild right hemidiaphragm elevation.    The liver is diminutive in size. It demonstrates a subtle surface nodular contour, suggestive of cirrhosis. No focal liver lesion is identified on this noncontrast examination. The spleen size is within normal limits. There is a small esophageal hiatal   hernia which contains ascites fluid.    The gallbladder, pancreas, adrenals, and kidneys demonstrate a normal noncontrast appearance. Moderate to large stool burden is present in the ascending, transverse, and descending colon. The bowel does not appear abnormally dilated or inflamed. The   appendix is normal.    The urinary bladder, prostate, and rectum are normal.    No acute or suspicious osseous abnormalities are identified. There is moderate degenerative disc change at the L4-5 vertebral level.      Impression:      Impression:    1. Large volume abdominal pelvic ascites.  2. Cirrhotic liver morphology.  3. Moderate to large colonic stool burden. Correlate for constipation.  4. Scattered mild bibasilar atelectasis. Small left and trace right pleural effusions are  present.            Electronically Signed: Chanda Clark    7/3/2023 3:42 PM EDT    Workstation ID: [id number]            Results for orders placed during the hospital encounter of 07/03/23    XR Chest 1 View    Narrative  XR CHEST 1 VW    Date of Exam: 7/3/2023 4:13 PM EDT    Indication: AMS    Comparison: AP portable chest 6/20/2023    Findings:  There is some bandlike atelectasis in the right lower lobe, similar to the prior examination. The previously described patchy left basilar airspace disease appears improved. No new airspace disease is identified. The heart size is normal. No pleural  effusion or pneumothorax is identified.    Impression  Impression:  Improving bibasilar atelectasis or pneumonia since 6/28/2023. No new airspace disease is appreciated.      Electronically Signed: Chanda Clark  7/3/2023 4:27 PM EDT  Workstation ID: [id number]      Results for orders placed during the hospital encounter of 06/23/23    XR Chest 1 View    Narrative  XR CHEST 1 VW    Date of Exam: 6/28/2023 11:45 AM EDT    Indication: eval for pneumonia    Comparison: None available.    Findings:  The lung volumes are low. Patchy airspace disease is seen within the lung bases bilaterally. Probable small bilateral pleural effusions are present. The pulmonary vasculature appears unremarkable. No pneumothorax. No acute osseous abnormality identified.    Impression  Impression:  Low lung volumes with patchy airspace disease seen within the lung bases bilaterally, left greater than right with probable small bilateral pleural effusions, likely related to pneumonia and/or atelectasis.      Electronically Signed: Brynn Higuera  6/28/2023 12:00 PM EDT  Workstation ID: [id number]            ASSESSMENT / PLAN      Hepatic encephalopathy    Bipolar affective disorder    GERD (gastroesophageal reflux disease)    Osteoarthritis    Tobacco use disorder    Polysubstance abuse    Obstructive sleep apnea    URIEL (acute kidney injury)    Alcoholic  cirrhosis    Chronic constipation    Severe Malnutrition (HCC)    Essential hypertension    CKD (chronic kidney disease) stage 3, GFR 30-59 ml/min      1.ARF/URIEL/CRF/CKD---------Nonoliguric. BUN/Cr coming down. +Recurrent ARF/URIEL on top of known CRF/CKD STG 3A with a baseline serum creatinine of about 1.5. CRF/CKD STG 3A secondary to HTN NS. +ARF/URIEL is secondary to ATN from hypotension and prerenal state/intravascular volume depletion. Continue to hydrate with NS and given IV Albumin. Avoid hypotension.  No NSAIDs or IV dye. Dose meds for CrCl less than 10 cc/min until ARF/URIEL is resolved     2. HYPOKALEMIA-------Replaced     3. ETOH CIRRHOSIS/HEPATIC ENCEPHALOPATHY/ASCITES-----Restarted Lactulose. Follow Ammonia. S/P IV Albumin. ? Paracentesis     4. HYPOMAGNESEMIA--------Replace IV. No po replacement given diarrhea from Lactulose     5. DELERIUM----------Improving. Follow ammonia     6. HYPOCALCEMIA-------Replaced     7. ANEMIA-------H/H stable. IV iron x one for CHRISTINA     8. HYPOALBUMINEMIA-----S/P IV Albumin to temporize     9. ELEVATED INR------Related to cirrhosis     10. HYPOTENSION------On Midodrine     11. BIPOLAR DISEASE     12. BASILIO     13. POLYSUBSTANCE ABUSE/TOBACCO USE-----Drug screen positive for Benzodiazipine and Oxycodone     14. KETONURIA------Secondary to intravascular volume depletion. IVFs      Saji Waldron MD  [facility]  [phone]  07/06/23  07:51 EDT

## 2023-07-06 NOTE — PROGRESS NOTES
[facility]     Progress Note    Patient Name: Daquan Xiong  : 1985  MRN: [id number]  Primary Care Physician:  Wolf Carvalho MD  Date of admission: 7/3/2023  Service date and time: 23 12:58 EDT  Subjective   Subjective     Chief Complaint: AMS    HPI:  Patient Reports abdomen distended again      Objective   Objective     Vitals:   Temp:  [97.8 °F (36.6 °C)-98.6 °F (37 °C)] 98.1 °F (36.7 °C)  Heart Rate:  [58-79] 59  Resp:  [14-19] 14  BP: ()/(44-67) 107/54  Physical Exam    Constitutional: Awake, chronically ill appearing   Eyes: PERRLA, sclerae anicteric, no conjunctival injection   HENT: NCAT, mucous membranes moist   Neck: Supple, no thyromegaly, no lymphadenopathy, trachea midline   Respiratory: Clear to auscultation bilaterally, nonlabored respirations    Cardiovascular: RRR, no murmurs, rubs, or gallops, palpable pedal pulses bilaterally   Gastrointestinal: Positive bowel sounds, soft, nontender, +distended   Musculoskeletal: No bilateral ankle edema, no clubbing or cyanosis to extremities   Psychiatric: Appropriate affect, cooperative   Neurologic: slowed mentation, strength symmetric in all extremities, Cranial Nerves grossly intact to confrontation, speech clear   Skin: No rashes     Result Review    Result Review:  I have personally reviewed the results from the time of this admission to 2023 12:58 EDT and agree with these findings:  [x]  Laboratory list / accordion  [x]  Microbiology  [x]  Radiology  [x]  EKG/Telemetry   [x]  Cardiology/Vascular   []  Pathology  []  Old records  []  Other:      Assessment & Plan   Assessment / Plan       Active Hospital Problems:  Active Hospital Problems    Diagnosis     **Hepatic encephalopathy     Essential hypertension     CKD (chronic kidney disease) stage 3, GFR 30-59 ml/min     Severe Malnutrition (HCC)     URIEL (acute kidney injury)     Bipolar affective disorder     GERD (gastroesophageal reflux disease)     Osteoarthritis     Tobacco  use disorder     Polysubstance abuse     Obstructive sleep apnea     Alcoholic cirrhosis     Chronic constipation      Plan:    - cont lactulose and rifaximin, coherent  - renal following, avoid nephrotoxic meds, trend labs daily  - s/p paracentesis on 7/4 with 5 liters removed  - not requiring vasopressors, telemetry, monitor BP closely  - cont home meds as able  - recommend EtOH cessation  - recommend tobacco cessation, nicotine patch offered    DVT prophylaxis:  Medical and mechanical DVT prophylaxis orders are present.    CODE STATUS:   Code Status (Patient has no pulse and is not breathing): CPR (Attempt to Resuscitate)  Medical Interventions (Patient has pulse or is breathing): Full Support    Disposition:  I expect patient to be discharged 1-2 days    Gavin Medina MD

## 2023-07-06 NOTE — PLAN OF CARE
Goal Outcome Evaluation:  Pt sating in upper 90s on RA. Pt resting comfortably throughout course of shift w/minimal c/o pain/discomfort aside from reports of abd pain - managed w/PRN josseline. Additional PRN dose ordered Q6 r/t alleviation of breakthrough pain. Pt refusing pm dose of lactulose but experienced significant episode of fecal incontinence while ambulating to bedside commode. Pt maintaining improved LOC w/intermittent episodes of confusion/forgetfulness but otherwise A/O x 4. VSS. Will continue to monitor.

## 2023-07-06 NOTE — CASE MANAGEMENT/SOCIAL WORK
Continued Stay Note  [facility]     Patient Name: Daquan Xiong  MRN: [id number]  Today's Date: 7/6/2023    Admit Date: 7/3/2023    Plan: D/C Plan: Referral to [facility] pending. Current with [facility]. Possible Pallative. No precert needed.   Discharge Plan       Row Name 07/06/23 1113       Plan    Plan D/C Plan: Referral to [facility] pending. Current with [facility]. Possible Pallative. Precert needed.    Plan Comments CM discussed PT's recommendation of inpatient rehab. The patient chose [facility], then [facility]. CM placed a referral to [facility] and notified the liaison. D/c barries: Inpatient rehab placement, IV albumin, IV ferric gluconate, IVF.                 Expected Discharge Date and Time       Expected Discharge Date Expected Discharge Time    Jul 9, 2023           Met with patient in room wearing PPE: mask,      Maintained distance greater than six feet and spent less than 15 minutes in the room.       Apoorva Stewart RN      [address] [address]  Phone: [phone]  Fax: [phone]

## 2023-07-06 NOTE — PLAN OF CARE
"Pt oriented x4 but confused during conversation. Pt has refused most medications today, and has repeatedly stated that he is \"getting out of here today\". Educated on importance of hospital stay and why he is still here.   Magnesium replaced.  No BM today  Pt to downgrade to MIPS with tele                      "

## 2023-07-06 NOTE — PROGRESS NOTES
LOS: 3 days   Patient Care Team:  Wolf Carvalho MD as PCP - General (Geriatric Medicine)  Fatou Waldron MD as Consulting Physician (Nephrology)      Subjective   Interval history: Patient is more alert this morning.  Does continue to have confusion.  He is tolerating lactulose by mouth.  Being bowel movements with no overt GI bleeding.  Abdominal distention is improved s/p paracentesis.    ROS:   Review of Systems   Constitutional:  Negative for chills and fever.   Respiratory:  Negative for cough and shortness of breath.    Cardiovascular:  Negative for chest pain and palpitations.   Gastrointestinal:  Positive for abdominal distention. Negative for abdominal pain, nausea and vomiting.   Neurological:  Positive for weakness. Negative for dizziness.   Psychiatric/Behavioral:  Negative for agitation and confusion.       Medication Review:   Scheduled Meds:albumin human, 50 g, Intravenous, QAM  ferric gluconate, 125 mg, Intravenous, Daily  heparin (porcine), 5,000 Units, Subcutaneous, Q8H  lactulose, 30 g, Oral, Q4H  midodrine, 20 mg, Oral, Q8H  octreotide, 100 mcg, Subcutaneous, TID  pantoprazole, 40 mg, Oral, Q AM  rifAXIMin, 550 mg, Oral, Q12H  sodium chloride, 500 mL, Intravenous, Once  sodium chloride, 10 mL, Intravenous, Q12H  sodium chloride, 10 mL, Intravenous, Q12H  sodium chloride, 10 mL, Intravenous, Q12H  sodium chloride, 10 mL, Intravenous, Q12H  thiamine, 100 mg, Oral, QAM      Continuous Infusions:   PRN Meds:.  ALPRAZolam    ipratropium-albuterol    Magnesium Standard Dose Replacement - Follow Nurse / BPA Driven Protocol    nitroglycerin    ondansetron **OR** ondansetron    oxyCODONE    oxyCODONE    Phosphorus Replacement - Follow Nurse / BPA Driven Protocol    Potassium Replacement - Follow Nurse / BPA Driven Protocol    [COMPLETED] Insert Peripheral IV **AND** sodium chloride    [COMPLETED] Insert Peripheral IV **AND** sodium chloride    sodium chloride    sodium chloride    sodium  chloride    sodium chloride      Objective     Vital Signs  Temp:  [97.8 °F (36.6 °C)-98.6 °F (37 °C)] 98.1 °F (36.7 °C)  Heart Rate:  [58-79] 59  Resp:  [14-19] 14  BP: ()/(44-67) 107/54    Physical Exam:    General Appearance:    Awake, confused   Head:    Normocephalic, without obvious abnormality   Eyes:          Conjunctivae normal, icteric sclera   Ears:    Hearing intact   Throat:   No oral lesions, no thrush, oral mucosa moist   Neck:   No adenopathy, supple, no JVD   Lungs:     Respirations regular, even and unlabored       Abdomen:     Soft, improvement in abdominal distention, nontender   Rectal:     Deferred   Extremities:   3+ BLE edema, no cyanosis   Skin:   No bleeding, bruising or rash, + jaundice   Neurologic:   Sensation intact        Results Review:    CBC  Results from last 7 days   Lab Units 07/06/23  0528 07/05/23  0606 07/04/23  0445 07/03/23  1504   RBC 10*6/mm3 2.89* 2.65* 2.68* 2.99*   WBC 10*3/mm3 9.50 6.60 5.60 8.00   HEMOGLOBIN g/dL 9.9* 9.1* 9.1* 10.5*   PLATELETS 10*3/mm3 125* 113* 100* 158         CMP  Results from last 7 days   Lab Units 07/06/23  0528 07/05/23  0606 07/04/23  1606 07/04/23  0445 07/03/23  1504   SODIUM mmol/L 138 139  --  139 133*   POTASSIUM mmol/L 3.7 3.9 4.0 3.5 4.3   CHLORIDE mmol/L 103 103  --  100 92*   CO2 mmol/L 22.0 24.0  --  25.0 25.0   BUN mg/dL 27* 36*  --  48* 51*   CREATININE mg/dL 1.79* 2.74*  --  3.86* 4.75*   GLUCOSE mg/dL 125* 120*  --  108* 112*   ALBUMIN g/dL 3.6 3.8  --  3.3* 3.3*   BILIRUBIN mg/dL 5.0* 3.8*  --  3.9* 3.7*   ALK PHOS U/L 76 93  --  119* 153*   AST (SGOT) U/L 30 33  --  43* 66*   ALT (SGPT) U/L 14 19  --  20 28   AMMONIA umol/L  --   --   --  91* 157*         Amylase and Lipase        CRP         Imaging Results (Last 24 Hours)       ** No results found for the last 24 hours. **              Assessment & Plan     Assessment:  37-year-old male with history of alcoholic cirrhosis complicated by HE, ascites, and esophageal  varices discharged on 6/28 and presented again to [facility] ER today with complaints of altered mental status, ascites, BLE edema, and hypotension.      -Hepatic encephalopathy/hyperammonemia  -Alcohol hepatitis and cirrhosis complicated by HE, ascites, and esophageal varices  -Hyponatremia  -Acute renal failure - Cr improved to 1.79 (2.74)  -Hypotension  -Elevated LFTs  -Hyperkalemia     Plan:  Patient is s/p paracentesis with fluid studies negative for SBP.  Continue daily albumin replacement and monitoring kidney function closely.   Okay for 2 g sodium diet with Boost/Ensure supplements. Encourage nutrition.  Continue PO lactulose and titrate to a goal of at least 2-3 bowel movements daily.  Continue zinc, PPI, Xifaxan, midodrine,  and subcu octreotide.  MELD sodium score 30.  Supportive care.  GI will follow.    MONICA Bates  07/06/23  13:48 EDT

## 2023-07-07 ENCOUNTER — INPATIENT HOSPITAL (OUTPATIENT)
Dept: URBAN - METROPOLITAN AREA HOSPITAL 84 | Facility: HOSPITAL | Age: 38
End: 2023-07-07
Payer: COMMERCIAL

## 2023-07-07 VITALS
SYSTOLIC BLOOD PRESSURE: 93 MMHG | HEIGHT: 72 IN | DIASTOLIC BLOOD PRESSURE: 48 MMHG | WEIGHT: 197.53 LBS | TEMPERATURE: 97.8 F | RESPIRATION RATE: 14 BRPM | BODY MASS INDEX: 26.76 KG/M2 | OXYGEN SATURATION: 76 % | HEART RATE: 62 BPM

## 2023-07-07 DIAGNOSIS — R74.01 ELEVATION OF LEVELS OF LIVER TRANSAMINASE LEVELS: ICD-10-CM

## 2023-07-07 DIAGNOSIS — K70.11 ALCOHOLIC HEPATITIS WITH ASCITES: ICD-10-CM

## 2023-07-07 DIAGNOSIS — F10.10 ALCOHOL ABUSE, UNCOMPLICATED: ICD-10-CM

## 2023-07-07 DIAGNOSIS — E87.5 HYPERKALEMIA: ICD-10-CM

## 2023-07-07 DIAGNOSIS — K70.31 ALCOHOLIC CIRRHOSIS OF LIVER WITH ASCITES: ICD-10-CM

## 2023-07-07 DIAGNOSIS — K76.82 HEPATIC ENCEPHALOPATHY: ICD-10-CM

## 2023-07-07 DIAGNOSIS — I85.00 ESOPHAGEAL VARICES WITHOUT BLEEDING: ICD-10-CM

## 2023-07-07 LAB
ALBUMIN SERPL-MCNC: 4.1 G/DL (ref 3.5–5.2)
ALBUMIN/GLOB SERPL: 5.1 G/DL
ALP SERPL-CCNC: 79 U/L (ref 39–117)
ALT SERPL W P-5'-P-CCNC: 21 U/L (ref 1–41)
AMMONIA BLD-SCNC: 29 UMOL/L (ref 16–60)
ANION GAP SERPL CALCULATED.3IONS-SCNC: 12 MMOL/L (ref 5–15)
AST SERPL-CCNC: 48 U/L (ref 1–40)
BACTERIA FLD CULT: NORMAL
BASOPHILS # BLD AUTO: 0.1 10*3/MM3 (ref 0–0.2)
BASOPHILS NFR BLD AUTO: 1.1 % (ref 0–1.5)
BILIRUB SERPL-MCNC: 5.5 MG/DL (ref 0–1.2)
BUN SERPL-MCNC: 21 MG/DL (ref 6–20)
BUN/CREAT SERPL: 15.1 (ref 7–25)
CALCIUM SPEC-SCNC: 9 MG/DL (ref 8.6–10.5)
CHLORIDE SERPL-SCNC: 104 MMOL/L (ref 98–107)
CO2 SERPL-SCNC: 24 MMOL/L (ref 22–29)
CREAT SERPL-MCNC: 1.39 MG/DL (ref 0.76–1.27)
DEPRECATED RDW RBC AUTO: 56.9 FL (ref 37–54)
EGFRCR SERPLBLD CKD-EPI 2021: 66.5 ML/MIN/1.73
EOSINOPHIL # BLD AUTO: 0.7 10*3/MM3 (ref 0–0.4)
EOSINOPHIL NFR BLD AUTO: 7.2 % (ref 0.3–6.2)
ERYTHROCYTE [DISTWIDTH] IN BLOOD BY AUTOMATED COUNT: 14.9 % (ref 12.3–15.4)
GLOBULIN UR ELPH-MCNC: 0.8 GM/DL
GLUCOSE SERPL-MCNC: 124 MG/DL (ref 65–99)
GRAM STN SPEC: NORMAL
GRAM STN SPEC: NORMAL
HCT VFR BLD AUTO: 28.5 % (ref 37.5–51)
HGB BLD-MCNC: 9.3 G/DL (ref 13–17.7)
INR PPP: 1.65 (ref 0.93–1.1)
LYMPHOCYTES # BLD AUTO: 2.2 10*3/MM3 (ref 0.7–3.1)
LYMPHOCYTES NFR BLD AUTO: 22.8 % (ref 19.6–45.3)
MAGNESIUM SERPL-MCNC: 1.7 MG/DL (ref 1.6–2.6)
MCH RBC QN AUTO: 33.7 PG (ref 26.6–33)
MCHC RBC AUTO-ENTMCNC: 32.6 G/DL (ref 31.5–35.7)
MCV RBC AUTO: 103.3 FL (ref 79–97)
MONOCYTES # BLD AUTO: 2.1 10*3/MM3 (ref 0.1–0.9)
MONOCYTES NFR BLD AUTO: 21.2 % (ref 5–12)
NEUTROPHILS NFR BLD AUTO: 4.7 10*3/MM3 (ref 1.7–7)
NEUTROPHILS NFR BLD AUTO: 47.7 % (ref 42.7–76)
NRBC BLD AUTO-RTO: 0.1 /100 WBC (ref 0–0.2)
PHOSPHATE SERPL-MCNC: 2.4 MG/DL (ref 2.5–4.5)
PLATELET # BLD AUTO: 117 10*3/MM3 (ref 140–450)
PMV BLD AUTO: 9.5 FL (ref 6–12)
POTASSIUM SERPL-SCNC: 3.7 MMOL/L (ref 3.5–5.2)
PROT SERPL-MCNC: 4.9 G/DL (ref 6–8.5)
PROTHROMBIN TIME: 17.2 SECONDS (ref 9.6–11.7)
RBC # BLD AUTO: 2.76 10*6/MM3 (ref 4.14–5.8)
SODIUM SERPL-SCNC: 140 MMOL/L (ref 136–145)
WBC NRBC COR # BLD: 9.8 10*3/MM3 (ref 3.4–10.8)

## 2023-07-07 PROCEDURE — P9047 ALBUMIN (HUMAN), 25%, 50ML: HCPCS | Performed by: INTERNAL MEDICINE

## 2023-07-07 PROCEDURE — 97129 THER IVNTJ 1ST 15 MIN: CPT | Performed by: OCCUPATIONAL THERAPIST

## 2023-07-07 PROCEDURE — 0W9G3ZZ DRAINAGE OF PERITONEAL CAVITY, PERCUTANEOUS APPROACH: ICD-10-PCS | Performed by: HOSPITALIST

## 2023-07-07 PROCEDURE — 85025 COMPLETE CBC W/AUTO DIFF WBC: CPT | Performed by: NURSE PRACTITIONER

## 2023-07-07 PROCEDURE — 25010000002 ALBUMIN HUMAN 25% PER 50 ML: Performed by: HOSPITALIST

## 2023-07-07 PROCEDURE — 83735 ASSAY OF MAGNESIUM: CPT | Performed by: STUDENT IN AN ORGANIZED HEALTH CARE EDUCATION/TRAINING PROGRAM

## 2023-07-07 PROCEDURE — 25010000002 HEPARIN (PORCINE) PER 1000 UNITS: Performed by: INTERNAL MEDICINE

## 2023-07-07 PROCEDURE — 85610 PROTHROMBIN TIME: CPT

## 2023-07-07 PROCEDURE — 97530 THERAPEUTIC ACTIVITIES: CPT | Performed by: OCCUPATIONAL THERAPIST

## 2023-07-07 PROCEDURE — 25010000002 ALBUMIN HUMAN 25% PER 50 ML: Performed by: INTERNAL MEDICINE

## 2023-07-07 PROCEDURE — 97110 THERAPEUTIC EXERCISES: CPT

## 2023-07-07 PROCEDURE — P9047 ALBUMIN (HUMAN), 25%, 50ML: HCPCS | Performed by: HOSPITALIST

## 2023-07-07 PROCEDURE — 82140 ASSAY OF AMMONIA: CPT | Performed by: INTERNAL MEDICINE

## 2023-07-07 PROCEDURE — 97116 GAIT TRAINING THERAPY: CPT

## 2023-07-07 PROCEDURE — 25010000002 NA FERRIC GLUC CPLX PER 12.5 MG: Performed by: INTERNAL MEDICINE

## 2023-07-07 PROCEDURE — 25010000002 MAGNESIUM SULFATE IN D5W 1G/100ML (PREMIX) 1-5 GM/100ML-% SOLUTION: Performed by: INTERNAL MEDICINE

## 2023-07-07 PROCEDURE — 97535 SELF CARE MNGMENT TRAINING: CPT | Performed by: OCCUPATIONAL THERAPIST

## 2023-07-07 PROCEDURE — 84100 ASSAY OF PHOSPHORUS: CPT | Performed by: STUDENT IN AN ORGANIZED HEALTH CARE EDUCATION/TRAINING PROGRAM

## 2023-07-07 PROCEDURE — 97530 THERAPEUTIC ACTIVITIES: CPT

## 2023-07-07 PROCEDURE — 25010000002 OCTREOTIDE PER 25 MCG

## 2023-07-07 PROCEDURE — 80053 COMPREHEN METABOLIC PANEL: CPT | Performed by: NURSE PRACTITIONER

## 2023-07-07 PROCEDURE — 99232 SBSQ HOSP IP/OBS MODERATE 35: CPT

## 2023-07-07 RX ORDER — MAGNESIUM SULFATE 1 G/100ML
1 INJECTION INTRAVENOUS EVERY 12 HOURS
Status: DISCONTINUED | OUTPATIENT
Start: 2023-07-07 | End: 2023-07-07 | Stop reason: HOSPADM

## 2023-07-07 RX ORDER — ALBUMIN (HUMAN) 12.5 G/50ML
100 SOLUTION INTRAVENOUS ONCE
Status: COMPLETED | OUTPATIENT
Start: 2023-07-07 | End: 2023-07-07

## 2023-07-07 RX ORDER — FENTANYL/ROPIVACAINE/NS/PF 2-625MCG/1
15 PLASTIC BAG, INJECTION (ML) EPIDURAL ONCE
Status: COMPLETED | OUTPATIENT
Start: 2023-07-07 | End: 2023-07-07

## 2023-07-07 RX ORDER — LIDOCAINE HYDROCHLORIDE 10 MG/ML
INJECTION, SOLUTION EPIDURAL; INFILTRATION; INTRACAUDAL; PERINEURAL
Status: COMPLETED
Start: 2023-07-07 | End: 2023-07-07

## 2023-07-07 RX ORDER — LIDOCAINE HYDROCHLORIDE 10 MG/ML
10 INJECTION, SOLUTION INFILTRATION; PERINEURAL ONCE
Status: DISCONTINUED | OUTPATIENT
Start: 2023-07-07 | End: 2023-07-07 | Stop reason: SDUPTHER

## 2023-07-07 RX ORDER — LIDOCAINE HYDROCHLORIDE 10 MG/ML
10 INJECTION, SOLUTION EPIDURAL; INFILTRATION; INTRACAUDAL; PERINEURAL ONCE
Status: COMPLETED | OUTPATIENT
Start: 2023-07-07 | End: 2023-07-07

## 2023-07-07 RX ORDER — ALBUMIN (HUMAN) 12.5 G/50ML
87.5 SOLUTION INTRAVENOUS ONCE
Status: COMPLETED | OUTPATIENT
Start: 2023-07-07 | End: 2023-07-07

## 2023-07-07 RX ORDER — ALBUMIN (HUMAN) 12.5 G/50ML
50 SOLUTION INTRAVENOUS ONCE
Status: COMPLETED | OUTPATIENT
Start: 2023-07-07 | End: 2023-07-07

## 2023-07-07 RX ORDER — ALBUMIN (HUMAN) 12.5 G/50ML
37.5 SOLUTION INTRAVENOUS ONCE
Status: COMPLETED | OUTPATIENT
Start: 2023-07-07 | End: 2023-07-07

## 2023-07-07 RX ORDER — ALBUMIN (HUMAN) 12.5 G/50ML
62.5 SOLUTION INTRAVENOUS ONCE
Status: COMPLETED | OUTPATIENT
Start: 2023-07-07 | End: 2023-07-07

## 2023-07-07 RX ORDER — ALBUMIN (HUMAN) 12.5 G/50ML
112.5 SOLUTION INTRAVENOUS ONCE
Status: COMPLETED | OUTPATIENT
Start: 2023-07-07 | End: 2023-07-07

## 2023-07-07 RX ORDER — ALBUMIN (HUMAN) 12.5 G/50ML
75 SOLUTION INTRAVENOUS ONCE
Status: COMPLETED | OUTPATIENT
Start: 2023-07-07 | End: 2023-07-07

## 2023-07-07 RX ADMIN — LIDOCAINE HYDROCHLORIDE 10 ML: 10 INJECTION, SOLUTION EPIDURAL; INFILTRATION; INTRACAUDAL; PERINEURAL at 18:08

## 2023-07-07 RX ADMIN — OXYCODONE HYDROCHLORIDE 10 MG: 5 TABLET ORAL at 02:03

## 2023-07-07 RX ADMIN — ALBUMIN (HUMAN) 62.5 G: 0.25 INJECTION, SOLUTION INTRAVENOUS at 10:46

## 2023-07-07 RX ADMIN — ALBUMIN (HUMAN) 50 G: 0.25 INJECTION, SOLUTION INTRAVENOUS at 09:07

## 2023-07-07 RX ADMIN — LACTULOSE 30 G: 20 SOLUTION ORAL at 13:24

## 2023-07-07 RX ADMIN — Medication 10 ML: at 09:08

## 2023-07-07 RX ADMIN — LACTULOSE 30 G: 20 SOLUTION ORAL at 05:01

## 2023-07-07 RX ADMIN — MAGNESIUM SULFATE HEPTAHYDRATE 1 G: 1 INJECTION, SOLUTION INTRAVENOUS at 09:07

## 2023-07-07 RX ADMIN — HEPARIN SODIUM 5000 UNITS: 5000 INJECTION INTRAVENOUS; SUBCUTANEOUS at 05:01

## 2023-07-07 RX ADMIN — SODIUM CHLORIDE 125 MG: 9 INJECTION, SOLUTION INTRAVENOUS at 09:07

## 2023-07-07 RX ADMIN — RIFAXIMIN 550 MG: 550 TABLET ORAL at 09:07

## 2023-07-07 RX ADMIN — OXYCODONE HYDROCHLORIDE 10 MG: 5 TABLET ORAL at 09:06

## 2023-07-07 RX ADMIN — LACTULOSE 30 G: 20 SOLUTION ORAL at 09:08

## 2023-07-07 RX ADMIN — Medication 100 MG: at 09:07

## 2023-07-07 RX ADMIN — PANTOPRAZOLE SODIUM 40 MG: 40 TABLET, DELAYED RELEASE ORAL at 05:02

## 2023-07-07 RX ADMIN — LACTULOSE 30 G: 20 SOLUTION ORAL at 02:02

## 2023-07-07 RX ADMIN — OCTREOTIDE ACETATE 100 MCG: 100 INJECTION, SOLUTION INTRAVENOUS; SUBCUTANEOUS at 09:07

## 2023-07-07 RX ADMIN — POTASSIUM PHOSPHATE, MONOBASIC AND POTASSIUM PHOSPHATE, DIBASIC 15 MMOL: 224; 236 INJECTION, SOLUTION, CONCENTRATE INTRAVENOUS at 13:24

## 2023-07-07 RX ADMIN — MIDODRINE HYDROCHLORIDE 20 MG: 5 TABLET ORAL at 05:02

## 2023-07-07 RX ADMIN — LACTULOSE 30 G: 20 SOLUTION ORAL at 18:11

## 2023-07-07 RX ADMIN — Medication 10 ML: at 09:07

## 2023-07-07 RX ADMIN — MIDODRINE HYDROCHLORIDE 20 MG: 5 TABLET ORAL at 13:24

## 2023-07-07 RX ADMIN — HEPARIN SODIUM 5000 UNITS: 5000 INJECTION INTRAVENOUS; SUBCUTANEOUS at 13:24

## 2023-07-07 NOTE — PAYOR COMM NOTE
"Clinical update for case# [id number]     ----------------------  EXTENDED AUTHORIZATION PENDING:   PLEASE FAX OR CALL DETERMINATION TO CONTACT BELOW:       THANK YOU,    BLADE Carter, RN  Utilization Review  [facility]  Phone: [phone]  Fax: [phone]      NPI: [id number]  Tax ID: [id number]        Daquan Xiong (37 y.o. Male)       Date of Birth   1985    Social Security Number       Address   [address] [place] [address]    Home Phone   [phone]    MRN   [id number]       Hinduism   Latter-day    Marital Status                               Admission Date   7/3/23    Admission Type   Emergency    Admitting Provider   Aly Zayas MD    Attending Provider   Gavin Medina MD    Department, Room/Bed   [facility] INTENSIVE CARE UNIT, 2307/1       Discharge Date       Discharge Disposition       Discharge Destination                                 Attending Provider: Gavin Medina MD    Allergies: Bee Venom, Scopolamine    Isolation: None   Infection: None   Code Status: CPR    Ht: 182.9 cm (72\")   Wt: 89.6 kg (197 lb 8.5 oz)    Admission Cmt: None   Principal Problem: Hepatic encephalopathy [K76.82]                   Active Insurance as of 7/3/2023       Primary Coverage       Payor Plan Insurance Group Employer/Plan Group    ANTHEM BLUE CROSS ANTHEM BLUE CROSS BLUE SHIELD PPO [id number]       Payor Plan Address Payor Plan Phone Number Payor Plan Fax Number Effective Dates    [address] [phone]  1/1/2023 - None Entered    [facility] [id number]         Subscriber Name Subscriber Birth Date Member ID       ANA XIONG [birth year]/25/1986 [id number]               Secondary Coverage       Payor Plan Insurance Group Employer/Plan Group    ANTHEM MEDICAID [facility] [id number]       Payor Plan Address Payor Plan Phone Number Payor Plan Fax Number Effective Dates    MAIL STOP:   1/15/2023 - None Entered    [address] [address]       [place] " [facility] [address]         Subscriber Name Subscriber Birth Date Member ID       AMISH XIONG 1985 [id number]                     Emergency Contacts        (Rel.) Home Phone Work Phone Mobile Phone    Lana Xiong (Spouse) [phone] -- [phone]    Alejandra Xiong (Daughter) -- -- [phone]    Lizz Wiggins (Mother) -- -- [phone]              Vital Signs (last day)       Date/Time Temp Temp src Pulse Resp BP Patient Position SpO2    07/07/23 0800 98.4 (36.9) Oral -- -- -- -- --    07/07/23 0600 -- -- 57 -- -- -- 97    07/07/23 0530 -- -- 57 -- -- -- 96    07/07/23 0500 -- -- 104 -- -- -- 90    07/07/23 0430 -- -- 57 -- -- -- 96    07/07/23 0400 98.3 (36.8) Oral 63 -- -- -- 95    07/07/23 0330 -- -- 61 -- -- -- 94    07/07/23 0300 -- -- 62 -- 104/57 -- 93    07/07/23 0230 -- -- 60 -- -- -- 94    07/07/23 0200 -- -- 57 -- 105/63 -- 96    07/07/23 0000 98.9 (37.2) Oral 55 -- 120/69 -- 98    07/06/23 2330 -- -- 52 -- -- -- 97    07/06/23 2300 -- -- 58 -- 109/71 -- 97    07/06/23 2230 -- -- 53 -- -- -- 95    07/06/23 2200 -- -- 53 -- 107/62 -- 95    07/06/23 2130 -- -- 58 -- -- -- 96    07/06/23 2100 -- -- 54 -- 106/59 -- 95    07/06/23 2030 -- -- 52 -- -- -- 95    07/06/23 2000 98.4 (36.9) Oral 53 -- 109/62 -- 96    07/06/23 1930 -- -- 58 -- -- -- 96    07/06/23 1900 -- -- 53 -- -- -- 97    07/06/23 1830 -- -- 59 -- -- -- 97    07/06/23 1800 -- -- 57 -- -- -- 97    07/06/23 1700 -- -- 67 -- -- -- 97    07/06/23 1600 -- -- 73 14 -- -- 95    07/06/23 1546 98.6 (37) Oral -- -- -- -- --    07/06/23 1500 -- -- 59 -- 107/54 -- 98    07/06/23 1400 -- -- 57 -- 108/54 -- 98    07/06/23 1300 -- -- 55 -- 112/58 -- 98    07/06/23 1200 98.2 (36.8) Oral 63 -- 111/52 -- 97    07/06/23 1100 -- -- 59 14 107/54 -- 98    07/06/23 1000 -- -- 62 -- -- -- 97    07/06/23 0900 -- -- 61 16 108/55 -- 98    07/06/23 0800 98.1 (36.7) Oral 69 14 122/64 -- 97    07/06/23 0700 -- -- 62 14 110/61 -- 95    07/06/23 0539 --  -- 66 -- -- -- --    07/06/23 0513 98.6 (37) Oral 72 15 101/55 Lying 97    07/06/23 0140 98.6 (37) Axillary 77 19 111/67 Lying 96          Oxygen Therapy (last day)       Date/Time SpO2 Device (Oxygen Therapy) Flow (L/min) Oxygen Concentration (%) ETCO2 (mmHg)    07/07/23 0600 97 -- -- -- --    07/07/23 0530 96 -- -- -- --    07/07/23 0500 90 -- -- -- --    07/07/23 0430 96 -- -- -- --    07/07/23 0400 95 -- -- -- --    07/07/23 0345 -- room air -- -- --    07/07/23 0330 94 -- -- -- --    07/07/23 0300 93 -- -- -- --    07/07/23 0230 94 -- -- -- --    07/07/23 0200 96 -- -- -- --    07/07/23 0000 98 -- -- -- --    07/06/23 2345 -- room air -- -- --    07/06/23 2330 97 -- -- -- --    07/06/23 2300 97 -- -- -- --    07/06/23 2230 95 -- -- -- --    07/06/23 2200 95 -- -- -- --    07/06/23 2130 96 -- -- -- --    07/06/23 2100 95 -- -- -- --    07/06/23 2030 95 -- -- -- --    07/06/23 2000 96 -- -- -- --    07/06/23 1945 -- room air -- -- --    07/06/23 1930 96 -- -- -- --    07/06/23 1900 97 -- -- -- --    07/06/23 1830 97 -- -- -- --    07/06/23 1800 97 -- -- -- --    07/06/23 1700 97 -- -- -- --    07/06/23 1600 95 room air -- -- --    07/06/23 1500 98 -- -- -- --    07/06/23 1400 98 -- -- -- --    07/06/23 1300 98 -- -- -- --    07/06/23 1200 97 -- -- -- --    07/06/23 1130 -- room air -- -- --    07/06/23 1100 98 room air -- -- --    07/06/23 1000 97 -- -- -- --    07/06/23 0900 98 room air -- -- --    07/06/23 0800 97 room air -- -- --    07/06/23 0700 95 room air -- -- --    07/06/23 0513 97 room air -- -- --    07/06/23 0400 -- room air -- -- --    07/06/23 0140 96 room air -- -- --          Current Facility-Administered Medications   Medication Dose Route Frequency Provider Last Rate Last Admin    albumin human 25 % IV SOLN 50 g  50 g Intravenous Juan Puente MD   50 g at 07/06/23 0841    ALPRAZolam (XANAX) tablet 0.25 mg  0.25 mg Oral TID PRN Loren Cardona APRN   0.25 mg at 07/06/23  1816    ferric gluconate (FERRLECIT)125 MG in sodium chloride 0.9 % 100 mL IVPB  125 mg Intravenous Daily Fatou Waldron  mL/hr at 07/06/23 0923 125 mg at 07/06/23 0923    heparin (porcine) 5000 UNIT/ML injection 5,000 Units  5,000 Units Subcutaneous Q8H Aly Zayas MD   5,000 Units at 07/07/23 0501    ipratropium-albuterol (DUO-NEB) nebulizer solution 3 mL  3 mL Nebulization Q6H PRN Cathleen Wong APRN        lactulose (CHRONULAC) 10 GM/15ML solution 30 g  30 g Oral Q4H Aly Zayas MD   30 g at 07/07/23 0501    Magnesium Standard Dose Replacement - Follow Nurse / BPA Driven Protocol   Does not apply PRN Jose Ruffin Jr., APRN        magnesium sulfate in D5W 1g/100mL (PREMIX)  1 g Intravenous Q12H Fatou Waldron MD        midodrine (PROAMATINE) tablet 20 mg  20 mg Oral Q8H Cathleen Wong APRN   20 mg at 07/07/23 0502    nitroglycerin (NITROSTAT) SL tablet 0.4 mg  0.4 mg Sublingual Q5 Min PRN Cathleen Wong APRN        octreotide (sandoSTATIN) injection 100 mcg  100 mcg Subcutaneous TID Mamie Ayers APRN   100 mcg at 07/06/23 2104    ondansetron (ZOFRAN) tablet 4 mg  4 mg Oral Q6H PRN Jose Ruffin Jr., APRN        Or    ondansetron (ZOFRAN) injection 4 mg  4 mg Intravenous Q6H PRN Joes Ruffin Jr., APRN   4 mg at 07/04/23 0632    oxyCODONE (ROXICODONE) immediate release tablet 10 mg  10 mg Oral Q6H PRN Cathleen Wong APRN   10 mg at 07/07/23 0203    pantoprazole (PROTONIX) EC tablet 40 mg  40 mg Oral Q AM Aly Zayas MD   40 mg at 07/07/23 0502    Phosphorus Replacement - Follow Nurse / BPA Driven Protocol   Does not apply PRN Jose Ruffin Jr., APRN        potassium phosphate 15 mmol in 0.9% normal saline 250 mL IV  15 mmol Intravenous Once Fatou Waldron MD        Potassium Replacement - Follow Nurse / BPA Driven Protocol   Does not apply PRN Jose Ruffin Jr., APRN        riFAXIMin (XIFAXAN) tablet 550 mg  550 mg Oral Q12H Andria,  Mamie, APRN   550 mg at 07/06/23 2104    sodium chloride 0.9 % bolus 500 mL  500 mL Intravenous Once Jose Ruffin Jr., APRN   Stopped at 07/03/23 1957    sodium chloride 0.9 % flush 10 mL  10 mL Intravenous PRN Ivan Oropeza PA        sodium chloride 0.9 % flush 10 mL  10 mL Intravenous PRN Ivan Oropeza, PA        sodium chloride 0.9 % flush 10 mL  10 mL Intravenous Q12H Jose Ruffin Jr., APRN   10 mL at 07/06/23 2105    sodium chloride 0.9 % flush 10 mL  10 mL Intravenous PRN Ruffin, Jose Linn., APRN        sodium chloride 0.9 % flush 10 mL  10 mL Intravenous Q12H Day, Cherelle G, APRN   10 mL at 07/06/23 2105    sodium chloride 0.9 % flush 10 mL  10 mL Intravenous Q12H Day, Cherelle G, APRN   10 mL at 07/06/23 2105    sodium chloride 0.9 % flush 10 mL  10 mL Intravenous Q12H Day, Cherelle G, APRN   10 mL at 07/06/23 2104    sodium chloride 0.9 % flush 10 mL  10 mL Intravenous PRN Day, Cherelle G, APRN        sodium chloride 0.9 % flush 20 mL  20 mL Intravenous PRN Day, Cherelle G, APRN        sodium chloride 0.9 % infusion 40 mL  40 mL Intravenous PRN RuffinJose cui Jr., APRN        thiamine (VITAMIN B-1) tablet 100 mg  100 mg Oral QAM Jose Ruffin Jr., APRN   100 mg at 07/06/23 0842     Blood Administration Record (From admission, onward)      None          Lab Results (last 24 hours)       Procedure Component Value Units Date/Time    Body Fluid Culture - Body Fluid, Peritoneum [[id number]] Collected: 07/04/23 0927    Specimen: Body Fluid from Peritoneum Updated: 07/07/23 0720     Body Fluid Culture No growth at 3 days     Gram Stain Rare (1+) WBCs per low power field      No organisms seen    Magnesium [[id number]]  (Normal) Collected: 07/07/23 0400    Specimen: Blood Updated: 07/07/23 0433     Magnesium 1.7 mg/dL     Ammonia [[id number]]  (Normal) Collected: 07/07/23 0400    Specimen: Blood Updated: 07/07/23 0433     Ammonia 29 umol/L     Comprehensive Metabolic Panel [[id number]]  (Abnormal) Collected: 07/07/23 0400     Specimen: Blood Updated: 07/07/23 0433     Glucose 124 mg/dL      BUN 21 mg/dL      Creatinine 1.39 mg/dL      Sodium 140 mmol/L      Potassium 3.7 mmol/L      Chloride 104 mmol/L      CO2 24.0 mmol/L      Calcium 9.0 mg/dL      Total Protein 4.9 g/dL      Albumin 4.1 g/dL      ALT (SGPT) 21 U/L      AST (SGOT) 48 U/L      Alkaline Phosphatase 79 U/L      Total Bilirubin 5.5 mg/dL      Globulin 0.8 gm/dL      A/G Ratio 5.1 g/dL      BUN/Creatinine Ratio 15.1     Anion Gap 12.0 mmol/L      eGFR 66.5 mL/min/1.73     Narrative:      GFR Normal >60  Chronic Kidney Disease <60  Kidney Failure <15      Phosphorus [[id number]]  (Abnormal) Collected: 07/07/23 0400    Specimen: Blood Updated: 07/07/23 0428     Phosphorus 2.4 mg/dL     Protime-INR [[id number]]  (Abnormal) Collected: 07/07/23 0400    Specimen: Blood Updated: 07/07/23 0417     Protime 17.2 Seconds      INR 1.65    CBC & Differential [[id number]]  (Abnormal) Collected: 07/07/23 0400    Specimen: Blood Updated: 07/07/23 0416    Narrative:      The following orders were created for panel order CBC & Differential.  Procedure                               Abnormality         Status                     ---------                               -----------         ------                     CBC Auto Differential[[id number]]        Abnormal            Final result               Scan Slide[[id number]]                                                                    Please view results for these tests on the individual orders.    CBC Auto Differential [[id number]]  (Abnormal) Collected: 07/07/23 0400    Specimen: Blood Updated: 07/07/23 0416     WBC 9.80 10*3/mm3      RBC 2.76 10*6/mm3      Hemoglobin 9.3 g/dL      Hematocrit 28.5 %      .3 fL      MCH 33.7 pg      MCHC 32.6 g/dL      RDW 14.9 %      RDW-SD 56.9 fl      MPV 9.5 fL      Platelets 117 10*3/mm3      Neutrophil % 47.7 %      Lymphocyte % 22.8 %      Monocyte % 21.2 %      Eosinophil % 7.2 %      Basophil %  1.1 %      Neutrophils, Absolute 4.70 10*3/mm3      Lymphocytes, Absolute 2.20 10*3/mm3      Monocytes, Absolute 2.10 10*3/mm3      Eosinophils, Absolute 0.70 10*3/mm3      Basophils, Absolute 0.10 10*3/mm3      nRBC 0.1 /100 WBC     Blood Culture - Blood, Arm, Right [[id number]]  (Normal) Collected: 07/03/23 1548    Specimen: Blood from Arm, Right Updated: 07/06/23 1600     Blood Culture No growth at 3 days    Blood Culture - Blood, Arm, Left [[id number]]  (Normal) Collected: 07/03/23 1540    Specimen: Blood from Arm, Left Updated: 07/06/23 1545     Blood Culture No growth at 3 days          Imaging Results (Last 24 Hours)       ** No results found for the last 24 hours. **             Operative/Procedure Notes (all)        Aly Zayas MD at 07/04/23 1049  Version 1 of 1         Paracentesis Procedure note:    Indications: Ascites    Site: Left lower quadrant    Consent:    Risks of the procedure were explained including but not limited to infection, bleeding, perforated bowel, perforated viscus.    Procedure:    A time out was taken before the procedure. Under sterile precautions using masks, caps, sterile gloves and gown, the patient was draped in the usual sterile fashion. The site was prepped with chlorhexidine and the fluid pocket was located and marked with an ultrasound. 1% lidocaine was used to anesthetize the area. A Safe-paracentesis needle was introduced into peritoneal space over the marked area. Fluid was obtained at a depth of 4cm without any difficulties and minimal blood loss. The paracentesis catheter was advanced over the needle and needle was eventually withdrawn. After aspiration of the peritoneal fluid, catheter was withdrawn. A dressing was applied to the puncture site. No bleeding or oozing was noted at the puncture site.    Findings    5000 ml of clear peritoneal fluid was obtained. A sample was sent to lab for cell counts, albumin, cytology and cultures.    Complications: None;  "patient tolerated the procedure well. Estimated blood loss is 10cc.    Condition: stable    Dr. Aly Zayas MD MPH  Staff Intensivist  23   10:50 EDT         Electronically signed by Aly Zayas MD at 23 1050          Physician Progress Notes (last 48 hours)        Fatou Waldron MD at 23 0834          NEPHROLOGY PROGRESS NOTE------[facility]    [facility]  [phone]  Saji Waldron MD      Patient Care Team:  Wolf Carvalho MD as PCP - General (Geriatric Medicine)  Fatou Waldron MD as Consulting Physician (Nephrology)      Provider:  Saji Waldron MD  Patient Name: Daquan Xiong  :  1985    SUBJECTIVE:    F/U ARF/URIEL/CRF/CKD    Still with abdominal pain/ascites. No SOB, CP, dysuria. Weak and fatigued    Medication:  albumin human, 50 g, Intravenous, QAM  ferric gluconate, 125 mg, Intravenous, Daily  heparin (porcine), 5,000 Units, Subcutaneous, Q8H  lactulose, 30 g, Oral, Q4H  midodrine, 20 mg, Oral, Q8H  octreotide, 100 mcg, Subcutaneous, TID  pantoprazole, 40 mg, Oral, Q AM  rifAXIMin, 550 mg, Oral, Q12H  sodium chloride, 500 mL, Intravenous, Once  sodium chloride, 10 mL, Intravenous, Q12H  sodium chloride, 10 mL, Intravenous, Q12H  sodium chloride, 10 mL, Intravenous, Q12H  sodium chloride, 10 mL, Intravenous, Q12H  thiamine, 100 mg, Oral, QAM             OBJECTIVE    Vital Sign Min/Max for last 24 hours  Temp  Min: 98.2 °F (36.8 °C)  Max: 98.9 °F (37.2 °C)   BP  Min: 104/57  Max: 120/69   Pulse  Min: 52  Max: 104   Resp  Min: 14  Max: 16   SpO2  Min: 90 %  Max: 98 %   No data recorded   Weight  Min: 89.6 kg (197 lb 8.5 oz)  Max: 89.6 kg (197 lb 8.5 oz)     Flowsheet Rows      Flowsheet Row First Filed Value   Admission Height 182.9 cm (72\") Documented at 2023 1435   Admission Weight 83.1 kg (183 lb 4.8 oz) Documented at 2023 1443            No " intake/output data recorded.  I/O last 3 completed shifts:  In: 240 [P.O.:240]  Out: 550 [Urine:550]    Physical Exam:  General Appearance: alert, appears stated age and cooperative  Head: normocephalic, without obvious abnormality and atraumatic +DRY OP +LEFT FOREHEAD ABRASION    Eyes: conjunctivae and sclerae normal and no icterus  Neck: supple and no JVD  Lungs: clear to auscultation and respirations regular  Heart: regular rhythm & normal rate and normal S1, S2 +BETHANY  Chest Wall: no abnormalities observed  Abdomen: normal bowel sounds and  +ASCITES/DISTENSION AND MILD BILAT LQ PAIN ON PALPATION  Extremities: moves extremities well, no edema, no cyanosis  Skin: +SCATTERED SCABS, ABRASIONS, ECCHYMOSIS  Neurologic: Alert, and oriented x 3. No focal deficits    Labs:    WBC WBC   Date Value Ref Range Status   07/07/2023 9.80 3.40 - 10.80 10*3/mm3 Final   07/06/2023 9.50 3.40 - 10.80 10*3/mm3 Final   07/05/2023 6.60 3.40 - 10.80 10*3/mm3 Final      HGB Hemoglobin   Date Value Ref Range Status   07/07/2023 9.3 (L) 13.0 - 17.7 g/dL Final   07/06/2023 9.9 (L) 13.0 - 17.7 g/dL Final   07/05/2023 9.1 (L) 13.0 - 17.7 g/dL Final      HCT Hematocrit   Date Value Ref Range Status   07/07/2023 28.5 (L) 37.5 - 51.0 % Final   07/06/2023 30.0 (L) 37.5 - 51.0 % Final   07/05/2023 27.0 (L) 37.5 - 51.0 % Final      Platelets No results found for: LABPLAT   MCV MCV   Date Value Ref Range Status   07/07/2023 103.3 (H) 79.0 - 97.0 fL Final   07/06/2023 103.7 (H) 79.0 - 97.0 fL Final   07/05/2023 101.8 (H) 79.0 - 97.0 fL Final          Sodium Sodium   Date Value Ref Range Status   07/07/2023 140 136 - 145 mmol/L Final   07/06/2023 138 136 - 145 mmol/L Final   07/05/2023 139 136 - 145 mmol/L Final      Potassium Potassium   Date Value Ref Range Status   07/07/2023 3.7 3.5 - 5.2 mmol/L Final   07/06/2023 3.7 3.5 - 5.2 mmol/L Final   07/05/2023 3.9 3.5 - 5.2 mmol/L Final   07/04/2023 4.0 3.5 - 5.2 mmol/L Final     Comment:     Slight  hemolysis detected by analyzer. Results may be affected.      Chloride Chloride   Date Value Ref Range Status   07/07/2023 104 98 - 107 mmol/L Final   07/06/2023 103 98 - 107 mmol/L Final   07/05/2023 103 98 - 107 mmol/L Final      CO2 CO2   Date Value Ref Range Status   07/07/2023 24.0 22.0 - 29.0 mmol/L Final   07/06/2023 22.0 22.0 - 29.0 mmol/L Final   07/05/2023 24.0 22.0 - 29.0 mmol/L Final      BUN BUN   Date Value Ref Range Status   07/07/2023 21 (H) 6 - 20 mg/dL Final   07/06/2023 27 (H) 6 - 20 mg/dL Final   07/05/2023 36 (H) 6 - 20 mg/dL Final      Creatinine Creatinine   Date Value Ref Range Status   07/07/2023 1.39 (H) 0.76 - 1.27 mg/dL Final   07/06/2023 1.79 (H) 0.76 - 1.27 mg/dL Final   07/05/2023 2.74 (H) 0.76 - 1.27 mg/dL Final      Calcium Calcium   Date Value Ref Range Status   07/07/2023 9.0 8.6 - 10.5 mg/dL Final   07/06/2023 8.7 8.6 - 10.5 mg/dL Final   07/05/2023 9.0 8.6 - 10.5 mg/dL Final      PO4 No components found for: PO4   Albumin Albumin   Date Value Ref Range Status   07/07/2023 4.1 3.5 - 5.2 g/dL Final   07/06/2023 3.6 3.5 - 5.2 g/dL Final   07/05/2023 3.8 3.5 - 5.2 g/dL Final      Magnesium Magnesium   Date Value Ref Range Status   07/07/2023 1.7 1.6 - 2.6 mg/dL Final   07/06/2023 1.8 1.6 - 2.6 mg/dL Final   07/05/2023 2.0 1.6 - 2.6 mg/dL Final      Uric Acid No components found for: URIC ACID     Imaging Results (Last 72 Hours)       ** No results found for the last 72 hours. **            Results for orders placed during the hospital encounter of 07/03/23    XR Chest 1 View    Narrative  XR CHEST 1 VW    Date of Exam: 7/3/2023 4:13 PM EDT    Indication: AMS    Comparison: AP portable chest 6/20/2023    Findings:  There is some bandlike atelectasis in the right lower lobe, similar to the prior examination. The previously described patchy left basilar airspace disease appears improved. No new airspace disease is identified. The heart size is normal. No pleural  effusion or  pneumothorax is identified.    Impression  Impression:  Improving bibasilar atelectasis or pneumonia since 6/28/2023. No new airspace disease is appreciated.      Electronically Signed: Chanda Guerraeliza  7/3/2023 4:27 PM EDT  Workstation ID: [id number]      Results for orders placed during the hospital encounter of 06/23/23    XR Chest 1 View    Narrative  XR CHEST 1 VW    Date of Exam: 6/28/2023 11:45 AM EDT    Indication: eval for pneumonia    Comparison: None available.    Findings:  The lung volumes are low. Patchy airspace disease is seen within the lung bases bilaterally. Probable small bilateral pleural effusions are present. The pulmonary vasculature appears unremarkable. No pneumothorax. No acute osseous abnormality identified.    Impression  Impression:  Low lung volumes with patchy airspace disease seen within the lung bases bilaterally, left greater than right with probable small bilateral pleural effusions, likely related to pneumonia and/or atelectasis.      Electronically Signed: Brynn Higuera  6/28/2023 12:00 PM EDT  Workstation ID: [id number]            ASSESSMENT / PLAN      Hepatic encephalopathy    Bipolar affective disorder    GERD (gastroesophageal reflux disease)    Osteoarthritis    Tobacco use disorder    Polysubstance abuse    Obstructive sleep apnea    URIEL (acute kidney injury)    Alcoholic cirrhosis    Chronic constipation    Severe Malnutrition (HCC)    Essential hypertension    CKD (chronic kidney disease) stage 3, GFR 30-59 ml/min    Moderate Malnutrition (HCC)      1.ARF/URIEL/CRF/CKD---------Nonoliguric. BUN/Cr coming down. +Recurrent ARF/URIEL on top of known CRF/CKD STG 3A with a baseline serum creatinine of about 1.5. CRF/CKD STG 3A secondary to HTN NS. +ARF/URIEL is secondary to ATN from hypotension and prerenal state/intravascular volume depletion. Continue to hydrate with NS and given IV Albumin. Avoid hypotension.  No NSAIDs or IV dye. Dose meds for CrCl less than 10 cc/min until ARF/URIEL is  resolved     2. HYPOKALEMIA-------Replaced     3. ETOH CIRRHOSIS/HEPATIC ENCEPHALOPATHY/ASCITES-----Restarted Lactulose. Follow Ammonia. S/P IV Albumin. ? Paracentesis     4. HYPOMAGNESEMIA--------Replace IV. No po replacement given diarrhea from Lactulose     5. DELERIUM----------Improving. Follow ammonia     6. HYPOCALCEMIA-------Replaced     7. ANEMIA-------H/H stable. IV iron x one for CHRISTINA     8. HYPOALBUMINEMIA-----S/P IV Albumin to temporize     9. ELEVATED INR------Related to cirrhosis     10. HYPOTENSION------On Midodrine     11. BIPOLAR DISEASE     12. BASILIO     13. POLYSUBSTANCE ABUSE/TOBACCO USE-----Drug screen positive for Benzodiazipine and Oxycodone     14. KETONURIA------Clinically better. IVFs    15. HYPOPHOSPHATEMIA------Replace IV with Kphos    16. HYPOMAGNESEMIA------Replace IV      Saji Waldron MD  [facility]  [phone]  07/07/23  08:34 EDT      Electronically signed by Fatou Waldron MD at 07/07/23 0836       Mamie Ayers APRN at 07/06/23 1348       Attestation signed by Juan Padilla MD at 07/06/23 1769    The patient was seen and examined with an APRN. I personally performed the substantive portion of the history of presenting illness.  I also performed the entire physical exam and medical decision making.    Patient is more alert today tolerating his diet.  Having bowel movements.  On exam he is in no acute distress.  His abdomen is distended with ascites.  Fluid analysis was negative for SBP  WBC 9.5, Hgb 9.9, , CR 1.7 from 2.7, TB 5.0, ALT 14  Impression:  Alcoholic hepatitis/cirrhosis decompensated  Hepatic encephalopathy improving   acute kidney injury likely from dehydration improving  Constipation improved  Plan:  Continue albumin replacement  Advance to 2 g sodium diet  Monitor renal output closely and hold diuretics  Midodrine and octreotide  Continue lactulose and Xifaxan  Liver transplant has been ordered  as an outpatient and is pending at [facility]  Electronically signed by Juan Padilla MD, 07/06/23, 5:42 PM EDT.                        LOS: 3 days   Patient Care Team:  Wolf Carvalho MD as PCP - General (Geriatric Medicine)  Fatou Waldron MD as Consulting Physician (Nephrology)      Subjective   Interval history: Patient is more alert this morning.  Does continue to have confusion.  He is tolerating lactulose by mouth.  Being bowel movements with no overt GI bleeding.  Abdominal distention is improved s/p paracentesis.    ROS:   Review of Systems   Constitutional:  Negative for chills and fever.   Respiratory:  Negative for cough and shortness of breath.    Cardiovascular:  Negative for chest pain and palpitations.   Gastrointestinal:  Positive for abdominal distention. Negative for abdominal pain, nausea and vomiting.   Neurological:  Positive for weakness. Negative for dizziness.   Psychiatric/Behavioral:  Negative for agitation and confusion.       Medication Review:   Scheduled Meds:albumin human, 50 g, Intravenous, QAM  ferric gluconate, 125 mg, Intravenous, Daily  heparin (porcine), 5,000 Units, Subcutaneous, Q8H  lactulose, 30 g, Oral, Q4H  midodrine, 20 mg, Oral, Q8H  octreotide, 100 mcg, Subcutaneous, TID  pantoprazole, 40 mg, Oral, Q AM  rifAXIMin, 550 mg, Oral, Q12H  sodium chloride, 500 mL, Intravenous, Once  sodium chloride, 10 mL, Intravenous, Q12H  sodium chloride, 10 mL, Intravenous, Q12H  sodium chloride, 10 mL, Intravenous, Q12H  sodium chloride, 10 mL, Intravenous, Q12H  thiamine, 100 mg, Oral, QAM      Continuous Infusions:   PRN Meds:.  ALPRAZolam    ipratropium-albuterol    Magnesium Standard Dose Replacement - Follow Nurse / BPA Driven Protocol    nitroglycerin    ondansetron **OR** ondansetron    oxyCODONE    oxyCODONE    Phosphorus Replacement - Follow Nurse / BPA Driven Protocol    Potassium Replacement - Follow Nurse / BPA Driven Protocol    [COMPLETED]  Insert Peripheral IV **AND** sodium chloride    [COMPLETED] Insert Peripheral IV **AND** sodium chloride    sodium chloride    sodium chloride    sodium chloride    sodium chloride      Objective     Vital Signs  Temp:  [97.8 °F (36.6 °C)-98.6 °F (37 °C)] 98.1 °F (36.7 °C)  Heart Rate:  [58-79] 59  Resp:  [14-19] 14  BP: ()/(44-67) 107/54    Physical Exam:    General Appearance:    Awake, confused   Head:    Normocephalic, without obvious abnormality   Eyes:          Conjunctivae normal, icteric sclera   Ears:    Hearing intact   Throat:   No oral lesions, no thrush, oral mucosa moist   Neck:   No adenopathy, supple, no JVD   Lungs:     Respirations regular, even and unlabored       Abdomen:     Soft, improvement in abdominal distention, nontender   Rectal:     Deferred   Extremities:   3+ BLE edema, no cyanosis   Skin:   No bleeding, bruising or rash, + jaundice   Neurologic:   Sensation intact        Results Review:    CBC  Results from last 7 days   Lab Units 07/06/23  0528 07/05/23  0606 07/04/23  0445 07/03/23  1504   RBC 10*6/mm3 2.89* 2.65* 2.68* 2.99*   WBC 10*3/mm3 9.50 6.60 5.60 8.00   HEMOGLOBIN g/dL 9.9* 9.1* 9.1* 10.5*   PLATELETS 10*3/mm3 125* 113* 100* 158         CMP  Results from last 7 days   Lab Units 07/06/23  0528 07/05/23  0606 07/04/23  1606 07/04/23  0445 07/03/23  1504   SODIUM mmol/L 138 139  --  139 133*   POTASSIUM mmol/L 3.7 3.9 4.0 3.5 4.3   CHLORIDE mmol/L 103 103  --  100 92*   CO2 mmol/L 22.0 24.0  --  25.0 25.0   BUN mg/dL 27* 36*  --  48* 51*   CREATININE mg/dL 1.79* 2.74*  --  3.86* 4.75*   GLUCOSE mg/dL 125* 120*  --  108* 112*   ALBUMIN g/dL 3.6 3.8  --  3.3* 3.3*   BILIRUBIN mg/dL 5.0* 3.8*  --  3.9* 3.7*   ALK PHOS U/L 76 93  --  119* 153*   AST (SGOT) U/L 30 33  --  43* 66*   ALT (SGPT) U/L 14 19  --  20 28   AMMONIA umol/L  --   --   --  91* 157*         Amylase and Lipase        CRP         Imaging Results (Last 24 Hours)       ** No results found for the last 24  hours. **              Assessment & Plan     Assessment:  37-year-old male with history of alcoholic cirrhosis complicated by HE, ascites, and esophageal varices discharged on  and presented again to [facility] ER today with complaints of altered mental status, ascites, BLE edema, and hypotension.      -Hepatic encephalopathy/hyperammonemia  -Alcohol hepatitis and cirrhosis complicated by HE, ascites, and esophageal varices  -Hyponatremia  -Acute renal failure - Cr improved to 1.79 (2.74)  -Hypotension  -Elevated LFTs  -Hyperkalemia     Plan:  Patient is s/p paracentesis with fluid studies negative for SBP.  Continue daily albumin replacement and monitoring kidney function closely.   Okay for 2 g sodium diet with Boost/Ensure supplements. Encourage nutrition.  Continue PO lactulose and titrate to a goal of at least 2-3 bowel movements daily.  Continue zinc, PPI, Xifaxan, midodrine,  and subcu octreotide.  MELD sodium score 30.  Supportive care.  GI will follow.    MONICA Bates  23  13:48 EDT      Electronically signed by Juan Padilla MD at 23 [phone]       Gavin Medina MD at 23 1258           [facility]     Progress Note    Patient Name: Daquan Xiong  : 1985  MRN: [id number]  Primary Care Physician:  Wolf Carvalho MD  Date of admission: 7/3/2023  Service date and time: 23 12:58 EDT  Subjective   Subjective     Chief Complaint: AMS    HPI:  Patient Reports abdomen distended again      Objective   Objective     Vitals:   Temp:  [97.8 °F (36.6 °C)-98.6 °F (37 °C)] 98.1 °F (36.7 °C)  Heart Rate:  [58-79] 59  Resp:  [14-19] 14  BP: ()/(44-67) 107/54  Physical Exam    Constitutional: Awake, chronically ill appearing   Eyes: PERRLA, sclerae anicteric, no conjunctival injection   HENT: NCAT, mucous membranes moist   Neck: Supple, no thyromegaly, no lymphadenopathy, trachea midline   Respiratory: Clear to auscultation bilaterally, nonlabored  respirations    Cardiovascular: RRR, no murmurs, rubs, or gallops, palpable pedal pulses bilaterally   Gastrointestinal: Positive bowel sounds, soft, nontender, +distended   Musculoskeletal: No bilateral ankle edema, no clubbing or cyanosis to extremities   Psychiatric: Appropriate affect, cooperative   Neurologic: slowed mentation, strength symmetric in all extremities, Cranial Nerves grossly intact to confrontation, speech clear   Skin: No rashes     Result Review    Result Review:  I have personally reviewed the results from the time of this admission to 7/6/2023 12:58 EDT and agree with these findings:  [x]  Laboratory list / accordion  [x]  Microbiology  [x]  Radiology  [x]  EKG/Telemetry   [x]  Cardiology/Vascular   []  Pathology  []  Old records  []  Other:      Assessment & Plan   Assessment / Plan       Active Hospital Problems:  Active Hospital Problems    Diagnosis     **Hepatic encephalopathy     Essential hypertension     CKD (chronic kidney disease) stage 3, GFR 30-59 ml/min     Severe Malnutrition (HCC)     URIEL (acute kidney injury)     Bipolar affective disorder     GERD (gastroesophageal reflux disease)     Osteoarthritis     Tobacco use disorder     Polysubstance abuse     Obstructive sleep apnea     Alcoholic cirrhosis     Chronic constipation      Plan:    - cont lactulose and rifaximin, coherent  - renal following, avoid nephrotoxic meds, trend labs daily  - s/p paracentesis on 7/4 with 5 liters removed  - not requiring vasopressors, telemetry, monitor BP closely  - cont home meds as able  - recommend EtOH cessation  - recommend tobacco cessation, nicotine patch offered    DVT prophylaxis:  Medical and mechanical DVT prophylaxis orders are present.    CODE STATUS:   Code Status (Patient has no pulse and is not breathing): CPR (Attempt to Resuscitate)  Medical Interventions (Patient has pulse or is breathing): Full Support    Disposition:  I expect patient to be discharged 1-2 days    [facility]  "MD Carol    Electronically signed by Gavin Medina MD at 23 1300       Fatou Waldron MD at 23 0751          NEPHROLOGY PROGRESS NOTE------[facility]    [facility]  [phone]  Saji Waldron MD      Patient Care Team:  Wolf Carvalho MD as PCP - General (Geriatric Medicine)  Fatou Waldron MD as Consulting Physician (Nephrology)      Provider:  Saji Waldron MD  Patient Name: Daquan Xiong  :  1985    SUBJECTIVE:    F/U ARF/URIEL/CRF/CKD    Weak and malaise. +Increasing ascites/abdominal pain. No dysuria    Medication:  albumin human, 50 g, Intravenous, QAM  heparin (porcine), 5,000 Units, Subcutaneous, Q8H  lactulose, 30 g, Oral, Q4H  midodrine, 20 mg, Oral, Q8H  octreotide, 100 mcg, Subcutaneous, TID  pantoprazole, 40 mg, Oral, Q AM  rifAXIMin, 550 mg, Oral, Q12H  sodium chloride, 500 mL, Intravenous, Once  sodium chloride, 10 mL, Intravenous, Q12H  sodium chloride, 10 mL, Intravenous, Q12H  sodium chloride, 10 mL, Intravenous, Q12H  sodium chloride, 10 mL, Intravenous, Q12H  thiamine, 100 mg, Oral, QAM      sodium chloride, 75 mL/hr, Last Rate: 75 mL/hr (23 1307)        OBJECTIVE    Vital Sign Min/Max for last 24 hours  Temp  Min: 97.8 °F (36.6 °C)  Max: 98.6 °F (37 °C)   BP  Min: 94/44  Max: 115/65   Pulse  Min: 58  Max: 79   Resp  Min: 14  Max: 19   SpO2  Min: 95 %  Max: 100 %   No data recorded   Weight  Min: 89.7 kg (197 lb 12 oz)  Max: 89.7 kg (197 lb 12 oz)     Flowsheet Rows      Flowsheet Row First Filed Value   Admission Height 182.9 cm (72\") Documented at 2023 1435   Admission Weight 83.1 kg (183 lb 4.8 oz) Documented at 2023 1443            No intake/output data recorded.  I/O last 3 completed shifts:  In: 600 [P.O.:600]  Out: 650 [Urine:650]    Physical Exam:  General Appearance: alert, appears stated age and cooperative  Head: normocephalic, without " obvious abnormality and atraumatic +DRY OP +LEFT FOREHEAD ABRASION    Eyes: conjunctivae and sclerae normal and no icterus  Neck: supple and no JVD  Lungs: clear to auscultation and respirations regular  Heart: regular rhythm & normal rate and normal S1, S2 +BETHANY  Chest Wall: no abnormalities observed  Abdomen: normal bowel sounds and  +ASCITES/DISTENSION AND MILD BILAT LQ PAIN ON PALPATION  Extremities: moves extremities well, no edema, no cyanosis  Skin: +SCATTERED SCABS, ABRASIONS, ECCHYMOSIS  Neurologic: Alert, and oriented x 3. No focal deficits    Labs:    WBC WBC   Date Value Ref Range Status   07/06/2023 9.50 3.40 - 10.80 10*3/mm3 Final   07/05/2023 6.60 3.40 - 10.80 10*3/mm3 Final   07/04/2023 5.60 3.40 - 10.80 10*3/mm3 Final   07/03/2023 8.00 3.40 - 10.80 10*3/mm3 Final      HGB Hemoglobin   Date Value Ref Range Status   07/06/2023 9.9 (L) 13.0 - 17.7 g/dL Final   07/05/2023 9.1 (L) 13.0 - 17.7 g/dL Final   07/04/2023 9.1 (L) 13.0 - 17.7 g/dL Final   07/03/2023 10.5 (L) 13.0 - 17.7 g/dL Final      HCT Hematocrit   Date Value Ref Range Status   07/06/2023 30.0 (L) 37.5 - 51.0 % Final   07/05/2023 27.0 (L) 37.5 - 51.0 % Final   07/04/2023 27.3 (L) 37.5 - 51.0 % Final   07/03/2023 31.3 (L) 37.5 - 51.0 % Final      Platelets No results found for: LABPLAT   MCV MCV   Date Value Ref Range Status   07/06/2023 103.7 (H) 79.0 - 97.0 fL Final   07/05/2023 101.8 (H) 79.0 - 97.0 fL Final   07/04/2023 101.8 (H) 79.0 - 97.0 fL Final   07/03/2023 104.5 (H) 79.0 - 97.0 fL Final          Sodium Sodium   Date Value Ref Range Status   07/06/2023 138 136 - 145 mmol/L Final   07/05/2023 139 136 - 145 mmol/L Final   07/04/2023 139 136 - 145 mmol/L Final   07/03/2023 133 (L) 136 - 145 mmol/L Final      Potassium Potassium   Date Value Ref Range Status   07/06/2023 3.7 3.5 - 5.2 mmol/L Final   07/05/2023 3.9 3.5 - 5.2 mmol/L Final   07/04/2023 4.0 3.5 - 5.2 mmol/L Final     Comment:     Slight hemolysis detected by analyzer.  Results may be affected.   07/04/2023 3.5 3.5 - 5.2 mmol/L Final   07/03/2023 4.3 3.5 - 5.2 mmol/L Final     Comment:     Slight hemolysis detected by analyzer. Results may be affected.      Chloride Chloride   Date Value Ref Range Status   07/06/2023 103 98 - 107 mmol/L Final   07/05/2023 103 98 - 107 mmol/L Final   07/04/2023 100 98 - 107 mmol/L Final   07/03/2023 92 (L) 98 - 107 mmol/L Final      CO2 CO2   Date Value Ref Range Status   07/06/2023 22.0 22.0 - 29.0 mmol/L Final   07/05/2023 24.0 22.0 - 29.0 mmol/L Final   07/04/2023 25.0 22.0 - 29.0 mmol/L Final   07/03/2023 25.0 22.0 - 29.0 mmol/L Final      BUN BUN   Date Value Ref Range Status   07/06/2023 27 (H) 6 - 20 mg/dL Final   07/05/2023 36 (H) 6 - 20 mg/dL Final   07/04/2023 48 (H) 6 - 20 mg/dL Final   07/03/2023 51 (H) 6 - 20 mg/dL Final      Creatinine Creatinine   Date Value Ref Range Status   07/06/2023 1.79 (H) 0.76 - 1.27 mg/dL Final   07/05/2023 2.74 (H) 0.76 - 1.27 mg/dL Final   07/04/2023 3.86 (H) 0.76 - 1.27 mg/dL Final   07/03/2023 4.75 (H) 0.76 - 1.27 mg/dL Final      Calcium Calcium   Date Value Ref Range Status   07/06/2023 8.7 8.6 - 10.5 mg/dL Final   07/05/2023 9.0 8.6 - 10.5 mg/dL Final   07/04/2023 8.2 (L) 8.6 - 10.5 mg/dL Final   07/03/2023 8.7 8.6 - 10.5 mg/dL Final      PO4 No components found for: PO4   Albumin Albumin   Date Value Ref Range Status   07/06/2023 3.6 3.5 - 5.2 g/dL Final   07/05/2023 3.8 3.5 - 5.2 g/dL Final   07/04/2023 3.3 (L) 3.5 - 5.2 g/dL Final   07/03/2023 3.3 (L) 3.5 - 5.2 g/dL Final      Magnesium Magnesium   Date Value Ref Range Status   07/06/2023 1.8 1.6 - 2.6 mg/dL Final   07/05/2023 2.0 1.6 - 2.6 mg/dL Final   07/04/2023 1.9 1.6 - 2.6 mg/dL Final   07/03/2023 2.1 1.6 - 2.6 mg/dL Final      Uric Acid No components found for: URIC ACID     Imaging Results (Last 72 Hours)       Procedure Component Value Units Date/Time    XR Chest 1 View [[id number]] Collected: 07/03/23 1626     Updated: 07/03/23 1632     Narrative:      XR CHEST 1 VW    Date of Exam: 7/3/2023 4:13 PM EDT    Indication: AMS    Comparison: AP portable chest 6/20/2023    Findings:  There is some bandlike atelectasis in the right lower lobe, similar to the prior examination. The previously described patchy left basilar airspace disease appears improved. No new airspace disease is identified. The heart size is normal. No pleural   effusion or pneumothorax is identified.      Impression:      Impression:  Improving bibasilar atelectasis or pneumonia since 6/28/2023. No new airspace disease is appreciated.      Electronically Signed: Chanda Clark    7/3/2023 4:27 PM EDT    Workstation ID: [id number]    CT Head Without Contrast [[id number]] Collected: 07/03/23 1540     Updated: 07/03/23 1547    Narrative:      CT HEAD WO CONTRAST, CT FACIAL BONES WO CONTRAST    Date of Exam: 7/3/2023 3:36 PM EDT    Indication: fall last night with head injury now AMS.    Comparison: None available.    Technique: Axial CT images were obtained of the head without contrast administration.  Coronal reconstructions were performed.  Automated exposure control and iterative reconstruction methods were used.    Findings:  Gray-white differentiation is maintained and there is no evidence of intracranial hemorrhage, mass or mass effect. The ventricles are normal in size and configuration. The orbits are normal. The paranasal sinuses are grossly clear. The calvarium is   intact. Additional dedicated evaluation of the facial bones demonstrates no evidence of acute fracture. The mastoid air cells are clear. Rightward nasal septal deviation is noted the lamina papyracea are intact.      Impression:      Impression:  No acute intracranial abnormality or acute facial bone fracture.      Electronically Signed: True Allen    7/3/2023 3:45 PM EDT    Workstation ID: [id number]    CT Facial Bones Without Contrast [[id number]] Collected: 07/03/23 1540     Updated: 07/03/23 1547    Narrative:       CT HEAD WO CONTRAST, CT FACIAL BONES WO CONTRAST    Date of Exam: 7/3/2023 3:36 PM EDT    Indication: fall last night with head injury now AMS.    Comparison: None available.    Technique: Axial CT images were obtained of the head without contrast administration.  Coronal reconstructions were performed.  Automated exposure control and iterative reconstruction methods were used.    Findings:  Gray-white differentiation is maintained and there is no evidence of intracranial hemorrhage, mass or mass effect. The ventricles are normal in size and configuration. The orbits are normal. The paranasal sinuses are grossly clear. The calvarium is   intact. Additional dedicated evaluation of the facial bones demonstrates no evidence of acute fracture. The mastoid air cells are clear. Rightward nasal septal deviation is noted the lamina papyracea are intact.      Impression:      Impression:  No acute intracranial abnormality or acute facial bone fracture.      Electronically Signed: True Allen    7/3/2023 3:45 PM EDT    Workstation ID: [id number]    CT Cervical Spine Without Contrast [[id number]] Collected: 07/03/23 1541     Updated: 07/03/23 1546    Narrative:        CT CERVICAL SPINE WO CONTRAST    Date of Exam: 7/3/2023 3:36 PM EDT    Indication: fall last night with AMS.    Comparison: None available.    Technique: Axial CT images were obtained of the cervical spine without contrast administration.  Sagittal and coronal reconstructions were performed.  Automated exposure control and iterative reconstruction methods were used.      Findings:  No evidence of fracture or traumatic subluxation. Mild degenerative disc disease at C5-C6. No prevertebral soft tissue swelling. Minimal fluid in the left mastoid air cells. 1 cm groundglass opacity in the right lung apex      Impression:      Impression:    1. No evidence of cervical spine fracture  2. Minimal left mastoid effusion  3. 1 cm right apical groundglass opacity. Recommend  follow-up chest CT in 6      Electronically Signed: Dio Murrell    7/3/2023 3:44 PM EDT    Workstation ID: [id number]    CT Abdomen Pelvis Without Contrast [[id number]] Collected: 07/03/23 1539     Updated: 07/03/23 1544    Narrative:      CT ABDOMEN PELVIS WO CONTRAST    Date of Exam: 7/3/2023 3:36 PM EDT    Indication: abdominal distention.    Comparison: Ultrasound-guided paracentesis 6/23/2023. No prior CT abdomen and pelvis for comparison at this institution. Liver ultrasound 6/25/2023.    Technique: Axial CT images were obtained of the abdomen and pelvis without the administration of contrast. Sagittal and coronal reconstructions were performed.  Automated exposure control and iterative reconstruction methods were used.      Findings:  Large quantity abdominal pelvic ascites is present. There is mild to moderate generalized body wall edema. Small left, trace right basilar pleural effusions are present with mild posterior bibasilar atelectasis and mild to right middle lobe atelectasis.   There is mild right hemidiaphragm elevation.    The liver is diminutive in size. It demonstrates a subtle surface nodular contour, suggestive of cirrhosis. No focal liver lesion is identified on this noncontrast examination. The spleen size is within normal limits. There is a small esophageal hiatal   hernia which contains ascites fluid.    The gallbladder, pancreas, adrenals, and kidneys demonstrate a normal noncontrast appearance. Moderate to large stool burden is present in the ascending, transverse, and descending colon. The bowel does not appear abnormally dilated or inflamed. The   appendix is normal.    The urinary bladder, prostate, and rectum are normal.    No acute or suspicious osseous abnormalities are identified. There is moderate degenerative disc change at the L4-5 vertebral level.      Impression:      Impression:    1. Large volume abdominal pelvic ascites.  2. Cirrhotic liver morphology.  3. Moderate to large  colonic stool burden. Correlate for constipation.  4. Scattered mild bibasilar atelectasis. Small left and trace right pleural effusions are present.            Electronically Signed: Chanda Charlieeliza    7/3/2023 3:42 PM EDT    Workstation ID: [id number]            Results for orders placed during the hospital encounter of 07/03/23    XR Chest 1 View    Narrative  XR CHEST 1 VW    Date of Exam: 7/3/2023 4:13 PM EDT    Indication: AMS    Comparison: AP portable chest 6/20/2023    Findings:  There is some bandlike atelectasis in the right lower lobe, similar to the prior examination. The previously described patchy left basilar airspace disease appears improved. No new airspace disease is identified. The heart size is normal. No pleural  effusion or pneumothorax is identified.    Impression  Impression:  Improving bibasilar atelectasis or pneumonia since 6/28/2023. No new airspace disease is appreciated.      Electronically Signed: Chanda Charlieeliza  7/3/2023 4:27 PM EDT  Workstation ID: [id number]      Results for orders placed during the hospital encounter of 06/23/23    XR Chest 1 View    Narrative  XR CHEST 1 VW    Date of Exam: 6/28/2023 11:45 AM EDT    Indication: eval for pneumonia    Comparison: None available.    Findings:  The lung volumes are low. Patchy airspace disease is seen within the lung bases bilaterally. Probable small bilateral pleural effusions are present. The pulmonary vasculature appears unremarkable. No pneumothorax. No acute osseous abnormality identified.    Impression  Impression:  Low lung volumes with patchy airspace disease seen within the lung bases bilaterally, left greater than right with probable small bilateral pleural effusions, likely related to pneumonia and/or atelectasis.      Electronically Signed: Brynn Higuera  6/28/2023 12:00 PM EDT  Workstation ID: [id number]            ASSESSMENT / PLAN      Hepatic encephalopathy    Bipolar affective disorder    GERD (gastroesophageal reflux  disease)    Osteoarthritis    Tobacco use disorder    Polysubstance abuse    Obstructive sleep apnea    URIEL (acute kidney injury)    Alcoholic cirrhosis    Chronic constipation    Severe Malnutrition (HCC)    Essential hypertension    CKD (chronic kidney disease) stage 3, GFR 30-59 ml/min      1.ARF/URIEL/CRF/CKD---------Nonoliguric. BUN/Cr coming down. +Recurrent ARF/URIEL on top of known CRF/CKD STG 3A with a baseline serum creatinine of about 1.5. CRF/CKD STG 3A secondary to HTN NS. +ARF/URIEL is secondary to ATN from hypotension and prerenal state/intravascular volume depletion. Continue to hydrate with NS and given IV Albumin. Avoid hypotension.  No NSAIDs or IV dye. Dose meds for CrCl less than 10 cc/min until ARF/URIEL is resolved     2. HYPOKALEMIA-------Replaced     3. ETOH CIRRHOSIS/HEPATIC ENCEPHALOPATHY/ASCITES-----Restarted Lactulose. Follow Ammonia. S/P IV Albumin. ? Paracentesis     4. HYPOMAGNESEMIA--------Replace IV. No po replacement given diarrhea from Lactulose     5. DELERIUM----------Improving. Follow ammonia     6. HYPOCALCEMIA-------Replaced     7. ANEMIA-------H/H stable. IV iron x one for CHRISTINA     8. HYPOALBUMINEMIA-----S/P IV Albumin to temporize     9. ELEVATED INR------Related to cirrhosis     10. HYPOTENSION------On Midodrine     11. BIPOLAR DISEASE     12. BASILIO     13. POLYSUBSTANCE ABUSE/TOBACCO USE-----Drug screen positive for Benzodiazipine and Oxycodone     14. KETONURIA------Secondary to intravascular volume depletion. IVFs      Saji Waldron MD  [facility]  [phone]  07/06/23  07:51 EDT      Electronically signed by Fatou Waldron MD at 07/06/23 4466       Mamie Ayers APRN at 07/05/23 6989       Attestation signed by Juan Padilla MD at 07/05/23  (Updated)    The patient was seen and examined with an APRN. I personally performed the substantive portion of the history of presenting illness.  I also performed the  entire physical exam and medical decision making.    More awake and alert today.  Still somewhat confused.  Having bowel movements with lactulose.  Status post paracentesis.  Fluid analysis negative for SBP  On exam he continues to have mild encephalopathy.  He is jaundiced.  His abdomen is soft and nontender.  He has bilateral lower extremity edema  WBC 6.6, Hgb 9.1, , CR 2.7, TB 3.8  Impression:  Alcoholic hepatitis/cirrhosis decompensated  Hepatic encephalopathy improving   acute kidney injury likely from dehydration improving  Constipation improved  Plan:  Continue albumin replacement  Advance to 2 g sodium diet  Monitor renal output closely  Midodrine and octreotide  Continue lactulose and Xifaxan  Liver transplant has been ordered as an outpatient and is pending at [facility]    Electronically signed by Juan Padilla MD, 07/05/23, 5:50 PM EDT.                        LOS: 2 days   Patient Care Team:  Wolf Carvalho MD as PCP - General (Geriatric Medicine)  Fatou Waldron MD as Consulting Physician (Nephrology)      Subjective   Interval history: Patient is more alert this morning.  Does continue to have confusion.  He is tolerating lactulose by mouth.  Being bowel movements with no overt GI bleeding.  Abdominal distention is improved s/p paracentesis.    ROS:   Review of Systems   Constitutional:  Negative for chills and fever.   Respiratory:  Negative for cough and shortness of breath.    Cardiovascular:  Negative for chest pain and palpitations.   Gastrointestinal:  Positive for abdominal distention. Negative for abdominal pain, nausea and vomiting.   Neurological:  Positive for weakness. Negative for dizziness.   Psychiatric/Behavioral:  Negative for agitation and confusion.       Medication Review:   Scheduled Meds:albumin human, 50 g, Intravenous, QAM  heparin (porcine), 5,000 Units, Subcutaneous, Q8H  lactulose, 30 g, Oral, Q4H  midodrine, 20 mg, Oral, Q8H  octreotide,  100 mcg, Subcutaneous, TID  pantoprazole, 40 mg, Oral, Q AM  rifAXIMin, 550 mg, Oral, Q12H  sodium chloride, 500 mL, Intravenous, Once  sodium chloride, 10 mL, Intravenous, Q12H  sodium chloride, 10 mL, Intravenous, Q12H  sodium chloride, 10 mL, Intravenous, Q12H  sodium chloride, 10 mL, Intravenous, Q12H  thiamine, 100 mg, Oral, QAM      Continuous Infusions:sodium chloride, 75 mL/hr, Last Rate: 75 mL/hr (07/04/23 1307)    PRN Meds:.  ALPRAZolam    ipratropium-albuterol    Magnesium Standard Dose Replacement - Follow Nurse / BPA Driven Protocol    nitroglycerin    ondansetron **OR** ondansetron    oxyCODONE    Phosphorus Replacement - Follow Nurse / BPA Driven Protocol    Potassium Replacement - Follow Nurse / BPA Driven Protocol    [COMPLETED] Insert Peripheral IV **AND** sodium chloride    [COMPLETED] Insert Peripheral IV **AND** sodium chloride    sodium chloride    sodium chloride    sodium chloride    sodium chloride      Objective     Vital Signs  Temp:  [97.9 °F (36.6 °C)-98.3 °F (36.8 °C)] 97.9 °F (36.6 °C)  Heart Rate:  [] 67  Resp:  [15-18] 15  BP: ()/(44-72) 103/57    Physical Exam:    General Appearance:    Awake, confused   Head:    Normocephalic, without obvious abnormality   Eyes:          Conjunctivae normal, icteric sclera   Ears:    Hearing intact   Throat:   No oral lesions, no thrush, oral mucosa moist   Neck:   No adenopathy, supple, no JVD   Lungs:     Respirations regular, even and unlabored       Abdomen:     Soft, improvement in abdominal distention, nontender   Rectal:     Deferred   Extremities:   3+ BLE edema, no cyanosis   Skin:   No bleeding, bruising or rash, + jaundice   Neurologic:   Sensation intact        Results Review:    CBC  Results from last 7 days   Lab Units 07/05/23  0606 07/04/23  0445 07/03/23  1504   RBC 10*6/mm3 2.65* 2.68* 2.99*   WBC 10*3/mm3 6.60 5.60 8.00   HEMOGLOBIN g/dL 9.1* 9.1* 10.5*   PLATELETS 10*3/mm3 113* 100* 158         CMP  Results from last  7 days   Lab Units 07/05/23  0606 07/04/23  1606 07/04/23  0445 07/03/23  1504   SODIUM mmol/L 139  --  139 133*   POTASSIUM mmol/L 3.9 4.0 3.5 4.3   CHLORIDE mmol/L 103  --  100 92*   CO2 mmol/L 24.0  --  25.0 25.0   BUN mg/dL 36*  --  48* 51*   CREATININE mg/dL 2.74*  --  3.86* 4.75*   GLUCOSE mg/dL 120*  --  108* 112*   ALBUMIN g/dL 3.8  --  3.3* 3.3*   BILIRUBIN mg/dL 3.8*  --  3.9* 3.7*   ALK PHOS U/L 93  --  119* 153*   AST (SGOT) U/L 33  --  43* 66*   ALT (SGPT) U/L 19  --  20 28   AMMONIA umol/L  --   --  91* 157*         Amylase and Lipase        CRP         Imaging Results (Last 24 Hours)       ** No results found for the last 24 hours. **              Assessment & Plan     Assessment:  37-year-old male with history of alcoholic cirrhosis complicated by HE, ascites, and esophageal varices discharged on 6/28 and presented again to [facility] ER today with complaints of altered mental status, ascites, BLE edema, and hypotension.      -Hepatic encephalopathy/hyperammonemia  -Alcohol hepatitis and cirrhosis complicated by HE, ascites, and esophageal varices  -Hyponatremia  -Acute renal failure  -Hypotension  -Elevated LFTs  -Hyperkalemia     Plan:  Patient is s/p paracentesis with fluid studies negative for SBP.  Okay for 2 g sodium diet with Boost/Ensure supplements. Encourage nutrition.  Continue PO lactulose and titrate to a goal of at least 2-3 bowel movements daily.  Continue Xifaxan.  Paracentesis with fluid studies pending.  Kidney function is slowly improving.  Continue daily albumin.  Continue zinc, PPI, midodrine,  and subcu octreotide.  MELD sodium score 30.  Referral [facility] to [facility] team.  Patient and his family are interested in also being referred to [facility] transplant team.  Task has been sent through our office to initiate this referral.  Supportive care.  GI will follow.    MONICA Bates  07/05/23  14:04 EDT      Electronically signed by Juan Padilla  MD Taye at 23 1786       Aly Zayas MD at 23 1039            Critical Care Progress Note     Daquan Xiong : 1985 MRN:[id number] LOS:2  Rm: 2307/1     Principal Problem: Hepatic encephalopathy     Reason for follow up: All the medical problems listed below    Summary     A 37 y.o. male with PMH of alcoholic cirrhosis of liver, obstructive sleep apnea, substance abuse, CKD presented to the hospital for altered mental status and was admitted with a principal diagnosis of Hepatic encephalopathy.  Found to have ammonia of 157, treated with lactulose and rifaximin after which his mentation improved.      On admission, patient also found to be in septic shock, treated with ceftriaxone and needed vasopressors.  Also had paracentesis on  with removal of 5 L of fluid.  Also had URIEL on admission, treated with IV fluids, albumin, midodrine and octreotide.  Nephrology was consulted.    Significant Events     23 : Transfer to PCU.  Increase lactulose to titrate for 3-4 bowel movements per day.  Critical care signing off.    Assessment / Plan     Hypotension  Unclear etiology, possible prerenal.  Does not meet sepsis criteria.  Resolved after adequate fluid resuscitation, off vasopressors.  Wean off midodrine as tolerated.  Peritoneal fluid without any growth, less than 250 neutrophils, DC antibiotics.    Acute hepatic encephalopathy  Ammonia level improved from 157-> 95.  Currently on lactulose and rifaximin.  Having only 1-2 bowel movements per day, increase lactulose to every 4 hours to titrate for 3-4 bowel movements per day.    Alcoholic cirrhosis of liver / Portal Hypertension / Recurrent ascites  S/p paracentesis on  with removal of 5 L.  Cultures remain negative.  Not a candidate for TIPS due to recurrent hepatic encephalopathy.  Awaiting liver transplant at [facility].    Acute Kidney Injury on CKD 3:   Remains non oliguric.  Baseline creatinine around 1.5  Possibly prerenal.   Some component of ATN from hypotension.  Continue with IV fluids, nephrology following.  GI started on empiric midodrine, octreotide and albumin for possible hepatorenal syndrome.  Hepatorenal syndrome is more of a diagnosis by exclusion, doubt a true hepatorenal syndrome.  Can discontinue octreotide and albumin from my perspective.  Monitor Input/Output very closely.   Net IO Since Admission: -1,544 mL [07/05/23 1041]     S/p fall prior to admission: CT head, facial bones and cervical spine negative for injury    1 cm right apical groundglass opacity via CT: Radiology recommend follow-up chest CT in 6 months.     Macrocytic anemia  Obstructive sleep apnea   Bipolar disorder   GERD   Chronic constipation   History of substance abuse     Code status:   Code Status (Patient has no pulse and is not breathing): CPR (Attempt to Resuscitate)  Medical Interventions (Patient has pulse or is breathing): Full Support       Nutrition:   Diet: Renal Diets, Cardiac Diets; Low Sodium (2g); Low Potassium; Texture: Regular Texture (IDDSI 7); Fluid Consistency: Thin (IDDSI 0)   Patient isn't on Tube Feeding     DVT prophylaxis:  Medical and mechanical DVT prophylaxis orders are present.     Subjective / Review of systems     Review of Systems   Still confused, knows that he is in [place].  Unable to explain why he is in the hospital.  No abdominal pain.  No nausea or vomiting.  Objective / Physical Exam     Vital signs:  Temp: 97.6 °F (36.4 °C)  BP: 106/57  Heart Rate: 72  Resp: 16  SpO2: 96 %  Weight: 89.6 kg (197 lb 8.5 oz)    Admission Weight: Weight: 83.1 kg (183 lb 4.8 oz)  Current Weight: Weight: 89.6 kg (197 lb 8.5 oz)    Input/Output in last 24 hours:    Intake/Output Summary (Last 24 hours) at 7/5/2023 1041  Last data filed at 7/5/2023 0800  Gross per 24 hour   Intake 2116 ml   Output 0 ml   Net 2116 ml      Net IO Since Admission: -1,544 mL [07/05/23 1041]     Physical Exam  Vitals and nursing note reviewed.    Constitutional:       General: He is awake. He is not in acute distress.     Appearance: Normal appearance.   HENT:      Mouth/Throat:      Mouth: Mucous membranes are moist.      Pharynx: Oropharynx is clear.   Eyes:      General: No scleral icterus.     Extraocular Movements: Extraocular movements intact.      Conjunctiva/sclera: Conjunctivae normal.   Cardiovascular:      Rate and Rhythm: Normal rate.      Heart sounds: No murmur heard.    No gallop.   Pulmonary:      Breath sounds: Normal breath sounds. No wheezing or rales.   Abdominal:      General: Bowel sounds are normal. There is distension.      Palpations: Abdomen is soft.      Tenderness: There is no abdominal tenderness.   Musculoskeletal:         General: No tenderness.      Right lower leg: No edema.      Left lower leg: No edema.   Neurological:      Mental Status: He is alert.      Comments: Confused, no focal motor or sensory deficits.   Psychiatric:         Behavior: Behavior is cooperative.        Radiology and Labs     Results from last 7 days   Lab Units 07/05/23  0606 07/04/23 0445 07/03/23  1504   WBC 10*3/mm3 6.60 5.60 8.00   HEMOGLOBIN g/dL 9.1* 9.1* 10.5*   HEMATOCRIT % 27.0* 27.3* 31.3*   PLATELETS 10*3/mm3 113* 100* 158      Results from last 7 days   Lab Units 07/05/23 0606 07/04/23  0445   PROTIME Seconds 16.1* 14.6*   INR  1.54* 1.39*      Results from last 7 days   Lab Units 07/05/23 0606 07/04/23  1606 07/04/23 0445 07/03/23  1504   SODIUM mmol/L 139  --  139 133*   POTASSIUM mmol/L 3.9 4.0 3.5 4.3   CHLORIDE mmol/L 103  --  100 92*   CO2 mmol/L 24.0  --  25.0 25.0   BUN mg/dL 36*  --  48* 51*   CREATININE mg/dL 2.74*  --  3.86* 4.75*   GLUCOSE mg/dL 120*  --  108* 112*   MAGNESIUM mg/dL 2.0  --  1.9 2.1   PHOSPHORUS mg/dL 3.3  --  4.3 5.4*      Results from last 7 days   Lab Units 07/05/23  0606 07/04/23  0445 07/03/23  1504   ALK PHOS U/L 93 119* 153*   AST (SGOT) U/L 33 43* 66*   ALT (SGPT) U/L 19 20 28           Current  medications     Scheduled Meds:   albumin human, 50 g, Intravenous, QAM  heparin (porcine), 5,000 Units, Subcutaneous, Q8H  lactulose, 30 g, Oral, Q4H  midodrine, 20 mg, Oral, Q8H  octreotide, 100 mcg, Subcutaneous, TID  pantoprazole, 40 mg, Oral, Q AM  rifAXIMin, 550 mg, Oral, Q12H  sodium chloride, 500 mL, Intravenous, Once  sodium chloride, 10 mL, Intravenous, Q12H  sodium chloride, 10 mL, Intravenous, Q12H  sodium chloride, 10 mL, Intravenous, Q12H  sodium chloride, 10 mL, Intravenous, Q12H  thiamine, 100 mg, Oral, QAM        Continuous Infusions:   sodium chloride, 75 mL/hr, Last Rate: 75 mL/hr (07/04/23 7010)          Plan discussed with RN. Reviewed all other data in the last 24 hours, including but not limited to vitals, labs, microbiology, imaging and pertinent notes from other providers.     Aly Zayas MD   Critical Care  07/05/23   10:41 EDT         Electronically signed by Aly Zayas MD at 07/05/23 4739

## 2023-07-07 NOTE — PROGRESS NOTES
LOS: 4 days   Patient Care Team:  Wolf Carvalho MD as PCP - General (Geriatric Medicine)  Fatou Waldron MD as Consulting Physician (Nephrology)      Subjective   Interval history: Patient is more alert this morning.  Does continue to have confusion.  He is tolerating lactulose by mouth.  Being bowel movements with no overt GI bleeding.  Abdominal distention is improved s/p paracentesis.    ROS:   Review of Systems   Constitutional:  Negative for chills and fever.   Respiratory:  Negative for cough and shortness of breath.    Cardiovascular:  Negative for chest pain and palpitations.   Gastrointestinal:  Positive for abdominal distention. Negative for abdominal pain, nausea and vomiting.   Neurological:  Positive for weakness. Negative for dizziness.   Psychiatric/Behavioral:  Negative for agitation and confusion.       Medication Review:   Scheduled Meds:albumin human, 37.5 g, Intravenous, Once   Or  albumin human, 50 g, Intravenous, Once   Or  albumin human, 62.5 g, Intravenous, Once   Or  albumin human, 75 g, Intravenous, Once   Or  albumin human, 87.5 g, Intravenous, Once   Or  albumin human, 100 g, Intravenous, Once   Or  albumin human, 112.5 g, Intravenous, Once  albumin human, 50 g, Intravenous, QAM  heparin (porcine), 5,000 Units, Subcutaneous, Q8H  lactulose, 30 g, Oral, Q4H  magnesium sulfate, 1 g, Intravenous, Q12H  midodrine, 20 mg, Oral, Q8H  octreotide, 100 mcg, Subcutaneous, TID  pantoprazole, 40 mg, Oral, Q AM  potassium phosphate, 15 mmol, Intravenous, Once  rifAXIMin, 550 mg, Oral, Q12H  sodium chloride, 500 mL, Intravenous, Once  sodium chloride, 10 mL, Intravenous, Q12H  sodium chloride, 10 mL, Intravenous, Q12H  sodium chloride, 10 mL, Intravenous, Q12H  sodium chloride, 10 mL, Intravenous, Q12H  thiamine, 100 mg, Oral, QAM      Continuous Infusions:   PRN Meds:.  ALPRAZolam    ipratropium-albuterol    Magnesium Standard Dose Replacement - Follow Nurse / BPA Driven Protocol     nitroglycerin    ondansetron **OR** ondansetron    oxyCODONE    Phosphorus Replacement - Follow Nurse / BPA Driven Protocol    Potassium Replacement - Follow Nurse / BPA Driven Protocol    [COMPLETED] Insert Peripheral IV **AND** sodium chloride    [COMPLETED] Insert Peripheral IV **AND** sodium chloride    sodium chloride    sodium chloride    sodium chloride    sodium chloride      Objective     Vital Signs  Temp:  [98.3 °F (36.8 °C)-98.9 °F (37.2 °C)] 98.4 °F (36.9 °C)  Heart Rate:  [] 58  Resp:  [14] 14  BP: (104-122)/(54-71) 122/66    Physical Exam:    General Appearance:    Awake, confused   Head:    Normocephalic, without obvious abnormality   Eyes:          Conjunctivae normal, icteric sclera   Ears:    Hearing intact   Throat:   No oral lesions, no thrush, oral mucosa moist   Neck:   No adenopathy, supple, no JVD   Lungs:     Respirations regular, even and unlabored       Abdomen:     Soft, improvement in abdominal distention, nontender   Rectal:     Deferred   Extremities:   3+ BLE edema, no cyanosis   Skin:   No bleeding, bruising or rash, + jaundice   Neurologic:   Sensation intact        Results Review:    CBC  Results from last 7 days   Lab Units 07/07/23  0400 07/06/23  0528 07/05/23  0606 07/04/23  0445 07/03/23  1504   RBC 10*6/mm3 2.76* 2.89* 2.65* 2.68* 2.99*   WBC 10*3/mm3 9.80 9.50 6.60 5.60 8.00   HEMOGLOBIN g/dL 9.3* 9.9* 9.1* 9.1* 10.5*   PLATELETS 10*3/mm3 117* 125* 113* 100* 158         CMP  Results from last 7 days   Lab Units 07/07/23  0400 07/06/23  0528 07/05/23  0606 07/04/23  1606 07/04/23  0445 07/03/23  1504   SODIUM mmol/L 140 138 139  --  139 133*   POTASSIUM mmol/L 3.7 3.7 3.9 4.0 3.5 4.3   CHLORIDE mmol/L 104 103 103  --  100 92*   CO2 mmol/L 24.0 22.0 24.0  --  25.0 25.0   BUN mg/dL 21* 27* 36*  --  48* 51*   CREATININE mg/dL 1.39* 1.79* 2.74*  --  3.86* 4.75*   GLUCOSE mg/dL 124* 125* 120*  --  108* 112*   ALBUMIN g/dL 4.1 3.6 3.8  --  3.3* 3.3*   BILIRUBIN mg/dL 5.5*  5.0* 3.8*  --  3.9* 3.7*   ALK PHOS U/L 79 76 93  --  119* 153*   AST (SGOT) U/L 48* 30 33  --  43* 66*   ALT (SGPT) U/L 21 14 19  --  20 28   AMMONIA umol/L 29  --   --   --  91* 157*         Amylase and Lipase        CRP         Imaging Results (Last 24 Hours)       ** No results found for the last 24 hours. **              Assessment & Plan     Assessment:  37-year-old male with history of alcoholic cirrhosis complicated by HE, ascites, and esophageal varices discharged on 6/28 and presented again to [facility] ER today with complaints of altered mental status, ascites, BLE edema, and hypotension.      -Hepatic encephalopathy/hyperammonemia  -Alcohol hepatitis and cirrhosis complicated by HE, ascites, and esophageal varices  -Recurrent ascites  -Hyponatremia  -Acute renal failure -  improving  -Hypotension  -Elevated LFTs  -Hyperkalemia     Plan:  Repeat paracentesis ordered for today.  Creatinine improved to 1.39  (1.79). Continue daily albumin replacement and monitoring kidney function closely.   Okay for 2 g sodium diet with Boost/Ensure supplements. Encourage nutrition.  Continue lactulose and titrate to a goal of at least 2-3 bowel movements daily.  Continue zinc, PPI, Xifaxan, and midodrine. Will d/c octreotide.  MELD Na score 30 upon admission and has improved to 22 with updated labs today.  Supportive care.  GI will follow.    Mamie Ayers, MONICA  07/07/23  12:19 EDT

## 2023-07-07 NOTE — PLAN OF CARE
Goal Outcome Evaluation:    Patient alert and oriented X 4 at this time on room air. Paracentesis completed this afternoon with 8 L total of fluid removal. After paracentesis around 1900, patient became very agitated and requesting to go home at this time. Attempted to give patient education about his status / risks of going home at this time, yet patient refuses education and insists about DC. Patient started becoming verbally aggressive towards nursing staff. Informed patient about process of AMA and forms given to patient. Patient's wife called and updated on patient's status / decision. Wife is on the way to get patient yet is an hour and 30 minutes away.       Problem: Fall Injury Risk  Goal: Absence of Fall and Fall-Related Injury  Outcome: Unable to Meet, Plan Revised     Problem: Skin Injury Risk Increased  Goal: Skin Health and Integrity  Outcome: Unable to Meet, Plan Revised     Problem: Malnutrition  Goal: Improved Nutritional Intake  Outcome: Unable to Meet, Plan Revised     Problem: Fluid and Electrolyte Imbalance (Acute Kidney Injury/Impairment)  Goal: Fluid and Electrolyte Balance  Outcome: Unable to Meet, Plan Revised     Problem: Oral Intake Inadequate (Acute Kidney Injury/Impairment)  Goal: Optimal Nutrition Intake  Outcome: Unable to Meet, Plan Revised     Problem: Renal Function Impairment (Acute Kidney Injury/Impairment)  Goal: Effective Renal Function  Outcome: Unable to Meet, Plan Revised

## 2023-07-07 NOTE — PLAN OF CARE
Goal Outcome Evaluation:    Assessment: Daquan Xiong presents with ADL impairments affecting function including balance, cognition, endurance / activity tolerance, pain, shortness of breath, and strength. Pt is making steady progress. He demo improved cognition following commands & orientation, but still req v.c. for attn to task, initiation of tasks, problem solving & safety awareness.  Demonstrated functioning below baseline abilities indicate the need for continued skilled intervention while inpatient. Tolerating session today without incident. Will continue to follow and progress as tolerated.     Plan/Recommendations:   High Intensity Therapy recommended post-acute care. This is recommended as therapy feels the patient would require 5-6 days per week, 2-3 hours per day. At this time, inpatient rehabilitation (acute rehab) would be the first choice and SNF would be second.. Pt requires no DME at discharge.     Pt desires Inpatient Rehabilitation placement at discharge. Pt cooperative; agreeable to therapeutic recommendations and plan of care.

## 2023-07-07 NOTE — CASE MANAGEMENT/SOCIAL WORK
Continued Stay Note  [facility]     Patient Name: Daquan Xiong  MRN: [id number]  Today's Date: 7/7/2023    Admit Date: 7/3/2023    Plan: D/C Plan: [facility] accepted. Precert pending. Current with [facility]. Possible Palliative. Patient deciding between [facility] vs going home.   Discharge Plan       Row Name 07/07/23 1037       Plan    Plan D/C Plan: [facility] accepted. Precert pending. Current with [facility]. Possible Palliative. Patient deciding between [facility] vs going home.    Provided Post Acute Provider List? N/A    Provided Post Acute Provider Quality & Resource List? N/A    Plan Comments CM reached out to [facility] liaison about pending precert - precert still pending. D/C barriers: IV albumin, IV ferric Gluconate, pending precert.                 Expected Discharge Date and Time       Expected Discharge Date Expected Discharge Time    Jul 9, 2023           Phone communication or documentation only - no physical contact with patient or family.     Apoorva Stewart RN      [address] [address]  Phone: [phone]  Fax: [phone]

## 2023-07-07 NOTE — THERAPY TREATMENT NOTE
Subjective: Pt agreeable to therapeutic plan of care. Pt states he feels so bloated. Pt stating he is hoping to have a paracentesis done today.   Cognition: oriented to Person, Place, Time, and Situation. Pt continues to be impulsive at times & req v.c. for safety prec,  problem solving skills & attn to task.     Objective:     Bed Mobility: Min-A   Functional Transfers: Min-A     Balance: static standing balance =  CGA; standing dynamic balance=min A.   Functional Ambulation: Min-A    Lower Body Dressing: Max-A  ADL Position: supported standing and unsupported sitting  ADL Comments: v.c. for initiation of task.     Toileting: Min-A  ADL Position: unsupported sitting  ADL Comments:     Vitals: WNL    Pain: 5 VAS  Location: abdomen   Interventions for pain: Repositioned and Increased Activity  Education: Provided education on the importance of mobility in the acute care setting, Verbal/Tactile Cues, and ADL training, safety prec.      Assessment: Daquan Xiong presents with ADL impairments affecting function including balance, cognition, endurance / activity tolerance, pain, shortness of breath, and strength. Pt is making steady progress. He demo improved cognition following commands & orientation, but still req v.c. for attn to task, initiation of tasks, problem solving & safety awareness.  Demonstrated functioning below baseline abilities indicate the need for continued skilled intervention while inpatient. Tolerating session today without incident. Will continue to follow and progress as tolerated.     Plan/Recommendations:   High Intensity Therapy recommended post-acute care. This is recommended as therapy feels the patient would require 5-6 days per week, 2-3 hours per day. At this time, inpatient rehabilitation (acute rehab) would be the first choice and SNF would be second.. Pt requires no DME at discharge.     Pt desires Inpatient Rehabilitation placement at discharge. Pt cooperative; agreeable to therapeutic  recommendations and plan of care.     Modified Susan: N/A = No pre-op stroke/TIA    Post-Tx Position: Up in Chair, Alarms activated, and Call light and personal items within reach, family in room   PPE: gloves

## 2023-07-07 NOTE — PROGRESS NOTES
"NEPHROLOGY PROGRESS NOTE------[facility]    [facility]  [phone]  Saji Waldron MD      Patient Care Team:  Wolf Carvalho MD as PCP - General (Geriatric Medicine)  aFtou Waldron MD as Consulting Physician (Nephrology)      Provider:  Saji Waldron MD  Patient Name: Daquan Xiong  :  1985    SUBJECTIVE:    F/U ARF/URIEL/CRF/CKD    Still with abdominal pain/ascites. No SOB, CP, dysuria. Weak and fatigued    Medication:  albumin human, 50 g, Intravenous, QAM  ferric gluconate, 125 mg, Intravenous, Daily  heparin (porcine), 5,000 Units, Subcutaneous, Q8H  lactulose, 30 g, Oral, Q4H  midodrine, 20 mg, Oral, Q8H  octreotide, 100 mcg, Subcutaneous, TID  pantoprazole, 40 mg, Oral, Q AM  rifAXIMin, 550 mg, Oral, Q12H  sodium chloride, 500 mL, Intravenous, Once  sodium chloride, 10 mL, Intravenous, Q12H  sodium chloride, 10 mL, Intravenous, Q12H  sodium chloride, 10 mL, Intravenous, Q12H  sodium chloride, 10 mL, Intravenous, Q12H  thiamine, 100 mg, Oral, QAM             OBJECTIVE    Vital Sign Min/Max for last 24 hours  Temp  Min: 98.2 °F (36.8 °C)  Max: 98.9 °F (37.2 °C)   BP  Min: 104/57  Max: 120/69   Pulse  Min: 52  Max: 104   Resp  Min: 14  Max: 16   SpO2  Min: 90 %  Max: 98 %   No data recorded   Weight  Min: 89.6 kg (197 lb 8.5 oz)  Max: 89.6 kg (197 lb 8.5 oz)     Flowsheet Rows      Flowsheet Row First Filed Value   Admission Height 182.9 cm (72\") Documented at 2023 1435   Admission Weight 83.1 kg (183 lb 4.8 oz) Documented at 2023 1443            No intake/output data recorded.  I/O last 3 completed shifts:  In: 240 [P.O.:240]  Out: 550 [Urine:550]    Physical Exam:  General Appearance: alert, appears stated age and cooperative  Head: normocephalic, without obvious abnormality and atraumatic +DRY OP +LEFT FOREHEAD ABRASION    Eyes: conjunctivae and sclerae normal and no icterus  Neck: supple and no " JVD  Lungs: clear to auscultation and respirations regular  Heart: regular rhythm & normal rate and normal S1, S2 +BETHANY  Chest Wall: no abnormalities observed  Abdomen: normal bowel sounds and  +ASCITES/DISTENSION AND MILD BILAT LQ PAIN ON PALPATION  Extremities: moves extremities well, no edema, no cyanosis  Skin: +SCATTERED SCABS, ABRASIONS, ECCHYMOSIS  Neurologic: Alert, and oriented x 3. No focal deficits    Labs:    WBC WBC   Date Value Ref Range Status   07/07/2023 9.80 3.40 - 10.80 10*3/mm3 Final   07/06/2023 9.50 3.40 - 10.80 10*3/mm3 Final   07/05/2023 6.60 3.40 - 10.80 10*3/mm3 Final      HGB Hemoglobin   Date Value Ref Range Status   07/07/2023 9.3 (L) 13.0 - 17.7 g/dL Final   07/06/2023 9.9 (L) 13.0 - 17.7 g/dL Final   07/05/2023 9.1 (L) 13.0 - 17.7 g/dL Final      HCT Hematocrit   Date Value Ref Range Status   07/07/2023 28.5 (L) 37.5 - 51.0 % Final   07/06/2023 30.0 (L) 37.5 - 51.0 % Final   07/05/2023 27.0 (L) 37.5 - 51.0 % Final      Platelets No results found for: LABPLAT   MCV MCV   Date Value Ref Range Status   07/07/2023 103.3 (H) 79.0 - 97.0 fL Final   07/06/2023 103.7 (H) 79.0 - 97.0 fL Final   07/05/2023 101.8 (H) 79.0 - 97.0 fL Final          Sodium Sodium   Date Value Ref Range Status   07/07/2023 140 136 - 145 mmol/L Final   07/06/2023 138 136 - 145 mmol/L Final   07/05/2023 139 136 - 145 mmol/L Final      Potassium Potassium   Date Value Ref Range Status   07/07/2023 3.7 3.5 - 5.2 mmol/L Final   07/06/2023 3.7 3.5 - 5.2 mmol/L Final   07/05/2023 3.9 3.5 - 5.2 mmol/L Final   07/04/2023 4.0 3.5 - 5.2 mmol/L Final     Comment:     Slight hemolysis detected by analyzer. Results may be affected.      Chloride Chloride   Date Value Ref Range Status   07/07/2023 104 98 - 107 mmol/L Final   07/06/2023 103 98 - 107 mmol/L Final   07/05/2023 103 98 - 107 mmol/L Final      CO2 CO2   Date Value Ref Range Status   07/07/2023 24.0 22.0 - 29.0 mmol/L Final   07/06/2023 22.0 22.0 - 29.0 mmol/L Final    07/05/2023 24.0 22.0 - 29.0 mmol/L Final      BUN BUN   Date Value Ref Range Status   07/07/2023 21 (H) 6 - 20 mg/dL Final   07/06/2023 27 (H) 6 - 20 mg/dL Final   07/05/2023 36 (H) 6 - 20 mg/dL Final      Creatinine Creatinine   Date Value Ref Range Status   07/07/2023 1.39 (H) 0.76 - 1.27 mg/dL Final   07/06/2023 1.79 (H) 0.76 - 1.27 mg/dL Final   07/05/2023 2.74 (H) 0.76 - 1.27 mg/dL Final      Calcium Calcium   Date Value Ref Range Status   07/07/2023 9.0 8.6 - 10.5 mg/dL Final   07/06/2023 8.7 8.6 - 10.5 mg/dL Final   07/05/2023 9.0 8.6 - 10.5 mg/dL Final      PO4 No components found for: PO4   Albumin Albumin   Date Value Ref Range Status   07/07/2023 4.1 3.5 - 5.2 g/dL Final   07/06/2023 3.6 3.5 - 5.2 g/dL Final   07/05/2023 3.8 3.5 - 5.2 g/dL Final      Magnesium Magnesium   Date Value Ref Range Status   07/07/2023 1.7 1.6 - 2.6 mg/dL Final   07/06/2023 1.8 1.6 - 2.6 mg/dL Final   07/05/2023 2.0 1.6 - 2.6 mg/dL Final      Uric Acid No components found for: URIC ACID     Imaging Results (Last 72 Hours)       ** No results found for the last 72 hours. **            Results for orders placed during the hospital encounter of 07/03/23    XR Chest 1 View    Narrative  XR CHEST 1 VW    Date of Exam: 7/3/2023 4:13 PM EDT    Indication: AMS    Comparison: AP portable chest 6/20/2023    Findings:  There is some bandlike atelectasis in the right lower lobe, similar to the prior examination. The previously described patchy left basilar airspace disease appears improved. No new airspace disease is identified. The heart size is normal. No pleural  effusion or pneumothorax is identified.    Impression  Impression:  Improving bibasilar atelectasis or pneumonia since 6/28/2023. No new airspace disease is appreciated.      Electronically Signed: Chanda Clark  7/3/2023 4:27 PM EDT  Workstation ID: [id number]      Results for orders placed during the hospital encounter of 06/23/23    XR Chest 1 View    Narrative  XR CHEST 1  VW    Date of Exam: 6/28/2023 11:45 AM EDT    Indication: eval for pneumonia    Comparison: None available.    Findings:  The lung volumes are low. Patchy airspace disease is seen within the lung bases bilaterally. Probable small bilateral pleural effusions are present. The pulmonary vasculature appears unremarkable. No pneumothorax. No acute osseous abnormality identified.    Impression  Impression:  Low lung volumes with patchy airspace disease seen within the lung bases bilaterally, left greater than right with probable small bilateral pleural effusions, likely related to pneumonia and/or atelectasis.      Electronically Signed: Brynn Higuera  6/28/2023 12:00 PM EDT  Workstation ID: [id number]            ASSESSMENT / PLAN      Hepatic encephalopathy    Bipolar affective disorder    GERD (gastroesophageal reflux disease)    Osteoarthritis    Tobacco use disorder    Polysubstance abuse    Obstructive sleep apnea    URIEL (acute kidney injury)    Alcoholic cirrhosis    Chronic constipation    Severe Malnutrition (HCC)    Essential hypertension    CKD (chronic kidney disease) stage 3, GFR 30-59 ml/min    Moderate Malnutrition (HCC)      1.ARF/URIEL/CRF/CKD---------Nonoliguric. BUN/Cr coming down. +Recurrent ARF/URIEL on top of known CRF/CKD STG 3A with a baseline serum creatinine of about 1.5. CRF/CKD STG 3A secondary to HTN NS. +ARF/URIEL is secondary to ATN from hypotension and prerenal state/intravascular volume depletion. Continue to hydrate with NS and given IV Albumin. Avoid hypotension.  No NSAIDs or IV dye. Dose meds for CrCl less than 10 cc/min until ARF/URIEL is resolved     2. HYPOKALEMIA-------Replaced     3. ETOH CIRRHOSIS/HEPATIC ENCEPHALOPATHY/ASCITES-----Restarted Lactulose. Follow Ammonia. S/P IV Albumin. ? Paracentesis     4. HYPOMAGNESEMIA--------Replace IV. No po replacement given diarrhea from Lactulose     5. DELERIUM----------Improving. Follow ammonia     6. HYPOCALCEMIA-------Replaced     7.  ANEMIA-------H/H stable. IV iron x one for CHRISTINA     8. HYPOALBUMINEMIA-----S/P IV Albumin to temporize     9. ELEVATED INR------Related to cirrhosis     10. HYPOTENSION------On Midodrine     11. BIPOLAR DISEASE     12. BASILIO     13. POLYSUBSTANCE ABUSE/TOBACCO USE-----Drug screen positive for Benzodiazipine and Oxycodone     14. KETONURIA------Clinically better. IVFs    15. HYPOPHOSPHATEMIA------Replace IV with Kphos    16. HYPOMAGNESEMIA------Replace IV      Saji Waldron MD  [facility]  [phone]  07/07/23  08:34 EDT

## 2023-07-07 NOTE — PROGRESS NOTES
[facility]     Progress Note    Patient Name: Daquan Xiong  : 1985  MRN: [id number]  Primary Care Physician:  Wolf Carvalho MD  Date of admission: 7/3/2023  Service date and time: 23 11:42 EDT  Subjective   Subjective     Chief Complaint: AMS    HPI:  Patient wants paracentesis done      Objective   Objective     Vitals:   Temp:  [98.2 °F (36.8 °C)-98.9 °F (37.2 °C)] 98.4 °F (36.9 °C)  Heart Rate:  [] 58  Resp:  [14] 14  BP: (104-122)/(52-71) 122/66  Physical Exam    Constitutional: Awake, chronically ill appearing   Eyes: PERRLA, sclerae anicteric, no conjunctival injection   HENT: NCAT, mucous membranes moist   Neck: Supple, no thyromegaly, no lymphadenopathy, trachea midline   Respiratory: Clear to auscultation bilaterally, nonlabored respirations    Cardiovascular: RRR, no murmurs, rubs, or gallops, palpable pedal pulses bilaterally   Gastrointestinal: Positive bowel sounds, soft, nontender, +distended   Musculoskeletal: No bilateral ankle edema, no clubbing or cyanosis to extremities   Psychiatric: Appropriate affect, cooperative   Neurologic: slowed mentation, strength symmetric in all extremities, Cranial Nerves grossly intact to confrontation, speech clear   Skin: No rashes     Result Review    Result Review:  I have personally reviewed the results from the time of this admission to 2023 11:42 EDT and agree with these findings:  [x]  Laboratory list / accordion  [x]  Microbiology  [x]  Radiology  [x]  EKG/Telemetry   [x]  Cardiology/Vascular   []  Pathology  []  Old records  []  Other:      Assessment & Plan   Assessment / Plan       Active Hospital Problems:  Active Hospital Problems    Diagnosis     **Hepatic encephalopathy     Moderate Malnutrition (HCC)     Essential hypertension     CKD (chronic kidney disease) stage 3, GFR 30-59 ml/min     Severe Malnutrition (HCC)     URIEL (acute kidney injury)     Bipolar affective disorder     GERD (gastroesophageal reflux disease)      Osteoarthritis     Tobacco use disorder     Polysubstance abuse     Obstructive sleep apnea     Alcoholic cirrhosis     Chronic constipation      Plan:   - have ordered therapeutic paracentesis for today   - cont lactulose and rifaximin, coherent  - renal following, avoid nephrotoxic meds, trend labs daily  - s/p paracentesis on 7/4 with 5 liters removed  - not requiring vasopressors, telemetry, monitor BP closely  - cont home meds as able  - recommend EtOH cessation  - recommend tobacco cessation, nicotine patch offered  - SW following for placement, deciding on [facility] vs home vs palliative    DVT prophylaxis:  Medical and mechanical DVT prophylaxis orders are present.    CODE STATUS:   Code Status (Patient has no pulse and is not breathing): CPR (Attempt to Resuscitate)  Medical Interventions (Patient has pulse or is breathing): Full Support    Disposition:  I expect patient to be discharged 1-2 days    Gavin Medina MD

## 2023-07-07 NOTE — PLAN OF CARE
Goal Outcome Evaluation:         Assessment: Daquan Xiong presents with functional mobility impairments which indicate the need for skilled intervention. Tolerating session today without incident. Pt is unsteady on his feet and has decreased balance and endurance which has multiple causes. Will continue to follow and progress as tolerated. He would benefit from intense/IRF level PT at discharge.

## 2023-07-07 NOTE — NURSING NOTE
Upon shift change pt stated that he wished to leave AMA and removed himself from all monitoring devices. The risks of leaving against medical advice were explained to the patient and he still insisted. Two nurses verified that the patient was alert and oriented and able to make decisions. Paperwork provided. Pt currently getting dressed and refusing to sign until he is packed but refusing help with the task.

## 2023-07-07 NOTE — PROGRESS NOTES
Nutrition Services    Patient Name: Daquan Xiong  YOB: 1985  MRN: [id number]  Admission date: 7/3/2023    PPE Documentation        PPE Worn By Provider Did not enter room for this encounter   PPE Worn By Patient  N/A     PROGRESS NOTE      Encounter Information: Check on for PO intakes.  Patient desires to leave the hospital per RN notes.         PO Diet: Diet: Renal Diets, Cardiac Diets; Low Sodium (2g); Low Potassium; Texture: Regular Texture (IDDSI 7); Fluid Consistency: Thin (IDDSI 0)   PO Supplements: Boost Plus TID   PO Intake:  17% average PO intakes x 3 documented meals since admission        Current nutrition support:    Nutrition support review:        Labs (reviewed below): Hypophosphatemia - scheduled to be replaced today        GI Function:  BM 7/7 (today)       Nutrition Intervention Updates: Continue current diet, supplements and encourage good PO intakes.       Results from last 7 days   Lab Units 07/07/23  0400 07/06/23  0528 07/05/23  0606   SODIUM mmol/L 140 138 139   POTASSIUM mmol/L 3.7 3.7 3.9   CHLORIDE mmol/L 104 103 103   CO2 mmol/L 24.0 22.0 24.0   BUN mg/dL 21* 27* 36*   CREATININE mg/dL 1.39* 1.79* 2.74*   CALCIUM mg/dL 9.0 8.7 9.0   BILIRUBIN mg/dL 5.5* 5.0* 3.8*   ALK PHOS U/L 79 76 93   ALT (SGPT) U/L 21 14 19   AST (SGOT) U/L 48* 30 33   GLUCOSE mg/dL 124* 125* 120*     Results from last 7 days   Lab Units 07/07/23  0400 07/06/23  0528 07/05/23  0606 07/04/23  0445   MAGNESIUM mg/dL 1.7 1.8 2.0 1.9   PHOSPHORUS mg/dL 2.4* 2.5 3.3 4.3   HEMOGLOBIN g/dL 9.3* 9.9* 9.1* 9.1*   HEMATOCRIT % 28.5* 30.0* 27.0* 27.3*   TRIGLYCERIDES mg/dL  --   --   --  81     No results found for: COVID19  No results found for: HGBA1C      RD to follow up per protocol.    Electronically signed by:  Ayse Burgos RD  07/07/23 10:02 EDT

## 2023-07-07 NOTE — THERAPY TREATMENT NOTE
Subjective: Pt agreeable to therapeutic plan of care. Pt feels his LE strength is decreased and his balance needs work.     Objective:     Bed mobility - Min-A  Transfers - Min-A  Ambulation - 30 x 1, 15 feet x 1 with IV pole for support and min A 2 due to decreased balance and impulsivity of movements     Therapeutic Exercise - supine ex: flexed knee fall outs; SLR, AP with BLEs.  Pt is easily distracted with activities and needs redirected to task.    Toileted with min A 2 for positioning and needs cues for use of wipes.  Dynamic sitting balance with min A.     Vitals: Supine /65, sitting 108/59     Pain: 5 VAS   Location: generalized related to edema  Intervention for pain: Repositioned and Increased Activity    Education: Provided education on the importance of mobility in the acute care setting, Transfer Training, and Gait Training    Assessment: Daquan Xiong presents with functional mobility impairments which indicate the need for skilled intervention. Tolerating session today without incident. Pt is unsteady on his feet and has decreased balance and endurance which has multiple causes. Will continue to follow and progress as tolerated. He would benefit from intense/IRF level PT at discharge.     Plan/Recommendations:   High Intensity Therapy recommended post-acute care. This is recommended as therapy feels the patient would require 5-6 days per week, 2-3 hours per day. At this time, inpatient rehabilitation (acute rehab) would be the first choice and SNF would be second.. Pt requires no DME at discharge.     Pt desires Home at discharge. Pt cooperative; agreeable to therapeutic recommendations and plan of care.         Basic Mobility 6-click:  Rollin = Total, A lot = 2, A little = 3; 4 = None  Supine>Sit:   1 = Total, A lot = 2, A little = 3; 4 = None   Sit>Stand with arms:  1 = Total, A lot = 2, A little = 3; 4 = None  Bed>Chair:   1 = Total, A lot = 2, A little = 3; 4 = None  Ambulate in  room:  1 = Total, A lot = 2, A little = 3; 4 = None  3-5 Steps with railin = Total, A lot = 2, A little = 3; 4 = None  Score: 17    Modified [place]: N/A = No pre-op stroke/TIA    Post-Tx Position: Up in Chair  PPE: gloves and gown

## 2023-07-07 NOTE — CASE MANAGEMENT/SOCIAL WORK
Continued Stay Note  REJI Evans     Patient Name: Daquan Xiong  MRN: [id number]  Today's Date: 7/7/2023    Admit Date: 7/3/2023    Plan: D/C Plan: [facility] accepted and following. Precert approved through end of Sunday, 7/9. Current with [facility]. Possible Palliative. Patient/family deciding between [facility] vs going home.   Discharge Plan       Row Name 07/07/23 1551       Plan    Plan D/C Plan: [facility] accepted and following. Precert approved through end of Sunday, 7/9. Current with [facility]. Possible Palliative. Patient/family deciding between [facility] vs going home.    Provided Post Acute Provider List? N/A    Provided Post Acute Provider Quality & Resource List? N/A    Plan Comments  was notified that the precert has been approved through Sunday, 7/9. Patient/family still deciding on rehab vs home.                 Expected Discharge Date and Time       Expected Discharge Date Expected Discharge Time    Jul 9, 2023               Apoorva Stewart RN     Initial (On Arrival)

## 2023-07-07 NOTE — PROCEDURES
"Therapeutic Bedside Paracentesis Without  Radiology    Date/Time: 7/7/2023 6:16 PM  Performed by: Loren Cardona APRN  Authorized by: Gavin Medina MD   Consent: Verbal consent obtained. Written consent obtained.  Risks and benefits: risks, benefits and alternatives were discussed  Consent given by: patient  Patient understanding: patient states understanding of the procedure being performed  Patient consent: the patient's understanding of the procedure matches consent given  Procedure consent: procedure consent matches procedure scheduled  Relevant documents: relevant documents present and verified  Test results: test results available and properly labeled  Site marked: the operative site was marked  Imaging studies: imaging studies available  Required items: required blood products, implants, devices, and special equipment available  Patient identity confirmed: verbally with patient and arm band  Time out: Immediately prior to procedure a \"time out\" was called to verify the correct patient, procedure, equipment, support staff and site/side marked as required.  Initial or subsequent exam: subsequent  Procedure purpose: therapeutic  Indications: abdominal discomfort secondary to ascites  Anesthesia: local infiltration    Anesthesia:  Local Anesthetic: lidocaine 1% without epinephrine  Anesthetic total: 10 mL    Sedation:  Patient sedated: no    Preparation: Patient was prepped and draped in the usual sterile fashion.  Needle gauge: 22  Ultrasound guidance: yes  Puncture site: right lower quadrant  Aspirate amount (ml): 8L.  Fluid appearance: clear (Yellow)  Dressing: 4x4 sterile gauze (Pressure dressing)  Patient tolerance: patient tolerated the procedure well with no immediate complications    Electronically signed by MONICA Rojas, 07/07/23, 6:17 PM EDT.    "

## 2023-07-08 LAB
BACTERIA SPEC AEROBE CULT: NORMAL
BACTERIA SPEC AEROBE CULT: NORMAL

## 2023-07-08 NOTE — DISCHARGE SUMMARY
[facility]         DISCHARGE SUMMARY    Patient Name: Daquan Xiong  : 1985  MRN: [id number]    Date of Admission: 7/3/2023  Date of Discharge:  23  Primary Care Physician: Wolf Carvalho MD    Consults       Date and Time Order Name Status Description    7/3/2023  4:11 PM Nephrology (on -call MD unless specified) Completed     2023  9:35 AM Gastroenterology (on-call MD unless specified) Completed     2023  9:27 AM Nephrology (on -call MD unless specified) Completed     2023  4:04 PM Nephrology (on -call MD unless specified) Completed             Presenting Problem:   Hepatic encephalopathy [K76.82]  URIEL (acute kidney injury) [N17.9]  Abrasion of face, initial encounter [S00.81XA]  Fall, initial encounter [W19.XXXA]  Ascites due to alcoholic cirrhosis [K70.31]    Active and Resolved Hospital Problems:  Active Hospital Problems    Diagnosis POA    **Hepatic encephalopathy [K76.82] Yes    Moderate Malnutrition (HCC) [E44.0] Yes    Essential hypertension [I10] Yes    CKD (chronic kidney disease) stage 3, GFR 30-59 ml/min [N18.30] Yes    Severe Malnutrition (HCC) [E43] Yes    URIEL (acute kidney injury) [N17.9] Yes    Bipolar affective disorder [F31.9] Yes    GERD (gastroesophageal reflux disease) [K21.9] Yes    Osteoarthritis [M19.90] Yes    Tobacco use disorder [F17.200] Yes    Polysubstance abuse [F19.10] Yes    Obstructive sleep apnea [G47.33] Yes    Alcoholic cirrhosis [K70.30] Yes    Chronic constipation [K59.09] Yes   Ascites     Resolved Hospital Problems    Diagnosis POA    Anxiety [F41.9] Yes         Hospital Course     Hospital Course:  Daquan Xiong is a 37 y.o. male  with PMH of alcoholic cirrhosis of liver, obstructive sleep apnea, substance abuse, noncompliance, CKD presented to the hospital for altered mental status and was admitted with a principal diagnosis of Hepatic encephalopathy.  Found to have ammonia of 157, treated with lactulose and rifaximin after which  his mentation improved.    On admission, patient also found to be in septic shock, treated with ceftriaxone and needed vasopressors.  Also had paracentesis on 7/4 with removal of 5 L of fluid.  Also had URIEL on admission, treated with IV fluids, albumin, midodrine and octreotide.  Nephrology was consulted.  Patient had paracentesis done on 7/4 and had 5 liters removed, his ascites returned and he required another paracentesis on 7/7 with 8 Liters removed. Patient worked with PT/OT and was counseled on medication compliance. The plan was for him to go to [facility] on discharge and family/pt was also discussing palliative care. Patient decided to leave in the evening of 7/7 against medical device.         DISCHARGE Follow Up Recommendations for labs and diagnostics: left AMA      Day of Discharge     Left AMA no physical exam done right before he left    Pertinent  and/or Most Recent Results     LAB RESULTS:      Lab 07/07/23  0400 07/06/23  0528 07/05/23  0606 07/04/23  0445 07/03/23  1548 07/03/23  1536 07/03/23  1504   WBC 9.80 9.50 6.60 5.60  --   --  8.00   HEMOGLOBIN 9.3* 9.9* 9.1* 9.1*  --   --  10.5*   HEMATOCRIT 28.5* 30.0* 27.0* 27.3*  --   --  31.3*   PLATELETS 117* 125* 113* 100*  --   --  158   NEUTROS ABS 4.70 4.70 3.40 2.60  --   --  3.28   LYMPHS ABS 2.20 2.00 1.40 1.40  --   --   --    MONOS ABS 2.10* 2.20* 1.50* 1.10*  --   --   --    EOS ABS 0.70* 0.50* 0.20 0.40  --   --  0.64*   .3* 103.7* 101.8* 101.8*  --   --  104.5*   LACTATE  --   --   --   --  2.4* 2.1*  --    PROTIME 17.2* 16.5* 16.1* 14.6*  --   --   --          Lab 07/07/23  0400 07/06/23  0528 07/05/23  0606 07/04/23  1606 07/04/23  0445 07/03/23  1504   SODIUM 140 138 139  --  139 133*   POTASSIUM 3.7 3.7 3.9 4.0 3.5 4.3   CHLORIDE 104 103 103  --  100 92*   CO2 24.0 22.0 24.0  --  25.0 25.0   ANION GAP 12.0 13.0 12.0  --  14.0 16.0*   BUN 21* 27* 36*  --  48* 51*   CREATININE 1.39* 1.79* 2.74*  --  3.86* 4.75*   EGFR 66.5 49.1* 29.5*   --  19.5* 15.2*   GLUCOSE 124* 125* 120*  --  108* 112*   CALCIUM 9.0 8.7 9.0  --  8.2* 8.7   IONIZED CALCIUM  --   --   --   --  1.08*  --    MAGNESIUM 1.7 1.8 2.0  --  1.9 2.1   PHOSPHORUS 2.4* 2.5 3.3  --  4.3 5.4*   TSH  --   --   --   --  1.240  --          Lab 07/07/23  0400 07/06/23  0528 07/05/23  0606 07/04/23  0445 07/03/23  1504   TOTAL PROTEIN 4.9* 4.6* 4.7* 4.5* 4.9*   ALBUMIN 4.1 3.6 3.8 3.3* 3.3*   GLOBULIN 0.8 1.0 0.9 1.2 1.6   ALT (SGPT) 21 14 19 20 28   AST (SGOT) 48* 30 33 43* 66*   BILIRUBIN 5.5* 5.0* 3.8* 3.9* 3.7*   ALK PHOS 79 76 93 119* 153*         Lab 07/07/23  0400 07/06/23  0528 07/05/23  0606 07/04/23  0445 07/03/23  1504   HSTROP T  --   --   --   --  28*   PROTIME 17.2* 16.5* 16.1* 14.6*  --    INR 1.65* 1.58* 1.54* 1.39*  --          Lab 07/04/23  0445   CHOLESTEROL 71   LDL CHOL 39   HDL CHOL 15*   TRIGLYCERIDES 81         Lab 07/06/23  0528   IRON 54*         Brief Urine Lab Results  (Last result in the past 365 days)        Color   Clarity   Blood   Leuk Est   Nitrite   Protein   CREAT   Urine HCG        07/03/23 1605 Dark Yellow  Comment: Result checked     Clear   Negative   Trace   Positive   30 mg/dL (1+)                 Microbiology Results (last 10 days)       Procedure Component Value - Date/Time    Body Fluid Culture - Body Fluid, Peritoneum [[id number]] Collected: 07/04/23 0927    Lab Status: Final result Specimen: Body Fluid from Peritoneum Updated: 07/07/23 0720     Body Fluid Culture No growth at 3 days     Gram Stain Rare (1+) WBCs per low power field      No organisms seen    MRSA Screen, PCR (Inpatient) - Swab, Nares [[id number]]  (Normal) Collected: 07/03/23 1556    Lab Status: Final result Specimen: Swab from Nares Updated: 07/03/23 5823     MRSA PCR No MRSA Detected    Narrative:      The negative predictive value of this diagnostic test is high and should only be used to consider de-escalating anti-MRSA therapy. A positive result may indicate colonization with MRSA  and must be correlated clinically.    Blood Culture - Blood, Arm, Right [[id number]]  (Normal) Collected: 07/03/23 1548    Lab Status: Preliminary result Specimen: Blood from Arm, Right Updated: 07/07/23 1601     Blood Culture No growth at 4 days    Blood Culture - Blood, Arm, Left [[id number]]  (Normal) Collected: 07/03/23 1540    Lab Status: Preliminary result Specimen: Blood from Arm, Left Updated: 07/07/23 1545     Blood Culture No growth at 4 days            CT Abdomen Pelvis Without Contrast    Result Date: 7/3/2023  Impression: Impression: 1. Large volume abdominal pelvic ascites. 2. Cirrhotic liver morphology. 3. Moderate to large colonic stool burden. Correlate for constipation. 4. Scattered mild bibasilar atelectasis. Small left and trace right pleural effusions are present. Electronically Signed: Chanda Clark  7/3/2023 3:42 PM EDT  Workstation ID: [id number]    CT Head Without Contrast    Result Date: 7/3/2023  Impression: Impression: No acute intracranial abnormality or acute facial bone fracture. Electronically Signed: True Allen  7/3/2023 3:45 PM EDT  Workstation ID: [id number]    CT Cervical Spine Without Contrast    Result Date: 7/3/2023  Impression: Impression: 1. No evidence of cervical spine fracture 2. Minimal left mastoid effusion 3. 1 cm right apical groundglass opacity. Recommend follow-up chest CT in 6 Electronically Signed: Dio Murrell  7/3/2023 3:44 PM EDT  Workstation ID: [id number]    US Liver    Result Date: 6/26/2023  Impression: Impression: Cirrhotic morphology of the liver with a moderate amount of surrounding ascites which appears to have increased as compared to the previous study. Otherwise, no acute process identified. No focal lesions. Electronically Signed: Brynn Higuera  6/26/2023 8:18 AM EDT  Workstation ID: [id number]    XR Chest 1 View    Result Date: 7/3/2023  Impression: Impression: Improving bibasilar atelectasis or pneumonia since 6/28/2023. No new airspace disease is  appreciated. Electronically Signed: Chanda Clark  7/3/2023 4:27 PM EDT  Workstation ID: [id number]    XR Chest 1 View    Result Date: 6/28/2023  Impression: Impression: Low lung volumes with patchy airspace disease seen within the lung bases bilaterally, left greater than right with probable small bilateral pleural effusions, likely related to pneumonia and/or atelectasis. Electronically Signed: Brynn Higuera  6/28/2023 12:00 PM EDT  Workstation ID: [id number]    CT Facial Bones Without Contrast    Result Date: 7/3/2023  Impression: Impression: No acute intracranial abnormality or acute facial bone fracture. Electronically Signed: True Allen  7/3/2023 3:45 PM EDT  Workstation ID: [id number]    Therapeutic Paracentesis With Radiology    Result Date: 6/23/2023  Impression: Impression: 1. Technically successful US Guided Paracentesis. 2. Patient taken to the emergency department for further evaluation and treatment following the paracentesis due to persistent hypotension. Please see Ambulatory nursing notes for intra-procedure and immediate post procedural vitals and information. Electronically Signed: Ismael Ramirez  6/23/2023 1:06 PM EDT  Workstation ID: [id number]    Therapeutic Paracentesis With Radiology    Result Date: 6/16/2023  Impression: Impression: Technically successful US Guided Paracentesis. Please see Ambulatory nursing notes for intra-procedure vitals and information. Electronically Signed: Deven Scott  6/16/2023 2:24 PM EDT  Workstation ID: [id number]             Results for orders placed during the hospital encounter of 06/23/23    Adult Transthoracic Echo Complete W/ Cont if Necessary Per Protocol    Interpretation Summary    Left ventricular systolic function is normal. Calculated left ventricular EF = 62% Left ventricular ejection fraction appears to be 56 - 60%.    Left ventricular diastolic function was normal.    Estimated right ventricular systolic pressure from tricuspid regurgitation is normal (<35  mmHg).      Labs Pending at Discharge:  Pending Labs       Order Current Status    Blood Culture - Blood, Arm, Left Preliminary result    Blood Culture - Blood, Arm, Right Preliminary result              Discharge Details        Discharge Medications        ASK your doctor about these medications        Instructions Start Date   allopurinol 100 MG tablet  Commonly known as: ZYLOPRIM   100 mg, Oral, Daily      ALPRAZolam 0.5 MG tablet  Commonly known as: Xanax  Ask about: Should I take this medication?   0.5 mg, Oral, 2 Times Daily PRN      Antacid Regular Strength 500 MG chewable tablet  Generic drug: calcium carbonate   2 tablets, Oral, Daily      baclofen 10 MG tablet  Commonly known as: LIORESAL  Ask about: Which instructions should I use?   5-10 mg, Oral, 2 Times Daily PRN      cyclobenzaprine 10 MG tablet  Commonly known as: FLEXERIL   5-10 mg, Oral, 3 Times Daily PRN      lactulose 10 GM/15ML solution  Commonly known as: CHRONULAC   20 g, Oral, 3 Times Daily      Magnesium Oxide -Mg Supplement 400 (240 Mg) MG tablet   400 mg, Oral, Daily      melatonin 5 MG tablet tablet   5 mg, Oral, Nightly PRN      midodrine 10 MG tablet  Commonly known as: PROAMATINE   Take 2 tablets (20 mg) by mouth 3 (Three) Times a Day Before Meals      nadolol 40 MG tablet  Commonly known as: CORGARD   40 mg, Oral, Daily PRN      nicotine 14 MG/24HR patch  Commonly known as: NICODERM CQ   1 patch, Transdermal, Daily PRN      nicotine polacrilex 2 MG gum  Commonly known as: NICORETTE   2 mg, Mouth/Throat, As Needed      ondansetron ODT 4 MG disintegrating tablet  Commonly known as: ZOFRAN-ODT  Ask about: Which instructions should I use?   4 mg, Translingual, Every 8 Hours PRN      oxyCODONE 10 MG tablet  Commonly known as: ROXICODONE   10 mg, Oral, Every 4 to 6 Hours PRN      pantoprazole 40 MG EC tablet  Commonly known as: PROTONIX   40 mg, Oral, Daily      riFAXIMin 550 MG tablet  Commonly known as: XIFAXAN   550 mg, Oral, 2 Times  Daily      Thiamine Mononitrate 100 MG tablet   take 1 tablet daily               Allergies   Allergen Reactions    Bee Venom Anaphylaxis    Scopolamine Anaphylaxis         Discharge Disposition:   Left Against Medical Advice    Discharge Condition: critical    Diet:  Low Na diet        Discharge Activity: as tolerated        CODE STATUS:  Code Status and Medical Interventions:   Ordered at: 07/03/23 1658     Code Status (Patient has no pulse and is not breathing):    CPR (Attempt to Resuscitate)     Medical Interventions (Patient has pulse or is breathing):    Full Support         No future appointments.    Time spend on discharge: 10 minutes    Gavin Medina MD

## 2023-07-09 NOTE — PROGRESS NOTES
"Enter Query Response Below      Query Response: moderate protein calorie malnutrition             If applicable, please update the problem list.     Patient: Daquan Xiong        : 1985  Account: [id number]           Admit Date: 7/3/2023        How to Respond to this query:       a. Click New Note     b. Answer query within the yellow box.                c. Update the Problem List, if applicable.      If you have any questions about this query contact me at: [phone]    Dr. Medina:    37 y.o. male  with alcoholic cirrhosis of liver presented with hepatic encephalopathy.  Patient BMI 27.75.  Registered dietitian note states \"Moderate chronic disease related malnutrition related to suspected inadequate PO intake in the setting of hx of alcoholic cirrhosis of the liver as evidenced by severe fluid accumulation and mainly severe muscle wasting + moderate fat wasting.\"  Discharge summary states \"severe malnutrition\" and \"moderate malnutrition.\"    Can this be further clarified as:    - moderate protein calorie malnutrition  - severe protein calorie malnutrition  - other (specify) ____________  - unable to determine     By submitting this query, we are merely seeking further clarification of documentation to accurately reflect all conditions that you are monitoring, evaluating, treating or that extend the hospitalization or utilize additional resources of care. Please utilize your independent clinical judgment when addressing the question(s) above.     This query and your response, once completed, will be entered into the legal medical record.    Sincerely,  Melissa Villafuerte RN, MSN  Clinical Documentation Integrity Program   [email or web address]     "

## 2023-07-09 NOTE — PROGRESS NOTES
"Enter Query Response Below      Query Response: unable to determine             If applicable, please update the problem list.     Patient: Daquan Xiong        : 1985  Account: [id number]           Admit Date: 7/3/2023        How to Respond to this query:       a. Click New Note     b. Answer query within the yellow box.                c. Update the Problem List, if applicable.      If you have any questions about this query contact me at: [phone]    Dr. Medina:    37 y.o. male with alcoholic cirrhosis of liver presented with hepatic encephalopathy and acute kidney injury.  Patient had persistent hypotension and received IV fluids and started on norepinephrine infusion.  Labs include WBC 8.0, lactate 2.4, creatinine 4.7.  Intensivist note states \"Hypotension-Unclear etiology, possible prerenal.  Does not meet sepsis criteria.\"  GI progress note states \"acute kidney injury likely from dehydration improving.\"  Patient was given rocephin IV on 7/3-.  Discharge summary states \"patient also found to be in septic shock, treated with ceftriaxone and needed vasopressors.\"       Can this be further clarified as:    - septic shock due to ________ (please identify infectious source)  - hypovolemic shock   - other (specify) ____________  - unable to determine     By submitting this query, we are merely seeking further clarification of documentation to accurately reflect all conditions that you are monitoring, evaluating, treating or that extend the hospitalization or utilize additional resources of care. Please utilize your independent clinical judgment when addressing the question(s) above.     This query and your response, once completed, will be entered into the legal medical record.    Sincerely,  Melissa Villafuerte RN, MSN  Clinical Documentation Integrity Program   [email or web address]     "

## 2023-07-10 NOTE — PAYOR COMM NOTE
"NOTIFICATION OF DISCHARGE:        Daquan Xiong (37 y.o. Male) 1985  AUTH # [id number]         LEFT AGAINST MEDICAL ON 07/07/2023.          AUTHORIZATION PENDING:   PLEASE CALL OR FAX DETERMINATION TO CONTACT BELOW. THANK YOU.        Jerica Eldridge RN MSN  /UR  [facility]  [phone] office  [phone] fax  [email or web address]    [facility]  NPI: [id number]  Tax: [phone]            Daquan Xiong (37 y.o. Male)       Date of Birth   1985    Social Security Number       Address   [address] [place] [address]    Home Phone   [phone]    MRN   [id number]       [employer]    Marital Status                               Admission Date   7/3/23    Admission Type   Emergency    Admitting Provider   Aly Zayas MD    Attending Provider       Department, Room/Bed   [facility] INTENSIVE CARE UNIT, 2307/1       Discharge Date   7/7/2023    Discharge Disposition   Left Against Medical Advice    Discharge Destination                                 Attending Provider: (none)   Allergies: Bee Venom, Scopolamine    Isolation: None   Infection: None   Code Status: Prior    Ht: 182.9 cm (72\")   Wt: 89.6 kg (197 lb 8.5 oz)    Admission Cmt: None   Principal Problem: Hepatic encephalopathy [K76.82]                   Active Insurance as of 7/3/2023       Primary Coverage       Payor Plan Insurance Group Employer/Plan Group    ANTHEM BLUE CROSS ANTHEM BLUE CROSS BLUE SHIELD PPO [id number]       Payor Plan Address Payor Plan Phone Number Payor Plan Fax Number Effective Dates    [address] [phone]  1/1/2023 - None Entered    [facility] [address]         Subscriber Name Subscriber Birth Date Member ID       ANA XIONG 3/25/1986 [id number]               Secondary Coverage       Payor Plan Insurance Group Employer/Plan Group    ANTHEM MEDICAID [facility] - ANTH [id number]       Payor Plan Address Payor Plan Phone " Number Payor Plan Fax Number Effective Dates    MAIL STOP:   1/15/2023 - None Entered    [address] [address]       [facility] [id number]         Subscriber Name Subscriber Birth Date Member ID       AMISH XIONG 1985 [id number]                     Emergency Contacts        (Rel.) Home Phone Work Phone Mobile Phone    Lana Xiong (Spouse) [phone] -- [phone]    Alejandra Xiong (Daughter) -- -- [phone]    Lizz Wiggins (Mother) -- -- [phone]                 Discharge Summary        Gavin Medina MD at 23                         [facility]         DISCHARGE SUMMARY    Patient Name: Amish Xiong  : 1985  MRN: [id number]    Date of Admission: 7/3/2023  Date of Discharge:  23  Primary Care Physician: Wolf Carvalho MD    Consults       Date and Time Order Name Status Description    7/3/2023  4:11 PM Nephrology (on -call MD unless specified) Completed     2023  9:35 AM Gastroenterology (on-call MD unless specified) Completed     2023  9:27 AM Nephrology (on -call MD unless specified) Completed     2023  4:04 PM Nephrology (on -call MD unless specified) Completed             Presenting Problem:   Hepatic encephalopathy [K76.82]  URIEL (acute kidney injury) [N17.9]  Abrasion of face, initial encounter [S00.81XA]  Fall, initial encounter [W19.XXXA]  Ascites due to alcoholic cirrhosis [K70.31]    Active and Resolved Hospital Problems:  Active Hospital Problems    Diagnosis POA    **Hepatic encephalopathy [K76.82] Yes    Moderate Malnutrition (HCC) [E44.0] Yes    Essential hypertension [I10] Yes    CKD (chronic kidney disease) stage 3, GFR 30-59 ml/min [N18.30] Yes    Severe Malnutrition (HCC) [E43] Yes    URIEL (acute kidney injury) [N17.9] Yes    Bipolar affective disorder [F31.9] Yes    GERD (gastroesophageal reflux disease) [K21.9] Yes    Osteoarthritis [M19.90] Yes    Tobacco use disorder [F17.200] Yes    Polysubstance abuse [F19.10] Yes     Obstructive sleep apnea [G47.33] Yes    Alcoholic cirrhosis [K70.30] Yes    Chronic constipation [K59.09] Yes   Ascites     Resolved Hospital Problems    Diagnosis POA    Anxiety [F41.9] Yes         Hospital Course     Hospital Course:  Daquan Xiong is a 37 y.o. male  with PMH of alcoholic cirrhosis of liver, obstructive sleep apnea, substance abuse, noncompliance, CKD presented to the hospital for altered mental status and was admitted with a principal diagnosis of Hepatic encephalopathy.  Found to have ammonia of 157, treated with lactulose and rifaximin after which his mentation improved.    On admission, patient also found to be in septic shock, treated with ceftriaxone and needed vasopressors.  Also had paracentesis on 7/4 with removal of 5 L of fluid.  Also had URIEL on admission, treated with IV fluids, albumin, midodrine and octreotide.  Nephrology was consulted.  Patient had paracentesis done on 7/4 and had 5 liters removed, his ascites returned and he required another paracentesis on 7/7 with 8 Liters removed. Patient worked with PT/OT and was counseled on medication compliance. The plan was for him to go to [facility] on discharge and family/pt was also discussing palliative care. Patient decided to leave in the evening of 7/7 against medical device.         DISCHARGE Follow Up Recommendations for labs and diagnostics: left AMA      Day of Discharge     Left AMA no physical exam done right before he left    Pertinent  and/or Most Recent Results     LAB RESULTS:      Lab 07/07/23  0400 07/06/23  0528 07/05/23  0606 07/04/23  0445 07/03/23  1548 07/03/23  1536 07/03/23  1504   WBC 9.80 9.50 6.60 5.60  --   --  8.00   HEMOGLOBIN 9.3* 9.9* 9.1* 9.1*  --   --  10.5*   HEMATOCRIT 28.5* 30.0* 27.0* 27.3*  --   --  31.3*   PLATELETS 117* 125* 113* 100*  --   --  158   NEUTROS ABS 4.70 4.70 3.40 2.60  --   --  3.28   LYMPHS ABS 2.20 2.00 1.40 1.40  --   --   --    MONOS ABS 2.10* 2.20* 1.50* 1.10*  --   --   --    EOS ABS  0.70* 0.50* 0.20 0.40  --   --  0.64*   .3* 103.7* 101.8* 101.8*  --   --  104.5*   LACTATE  --   --   --   --  2.4* 2.1*  --    PROTIME 17.2* 16.5* 16.1* 14.6*  --   --   --          Lab 07/07/23  0400 07/06/23 0528 07/05/23 0606 07/04/23  1606 07/04/23  0445 07/03/23  1504   SODIUM 140 138 139  --  139 133*   POTASSIUM 3.7 3.7 3.9 4.0 3.5 4.3   CHLORIDE 104 103 103  --  100 92*   CO2 24.0 22.0 24.0  --  25.0 25.0   ANION GAP 12.0 13.0 12.0  --  14.0 16.0*   BUN 21* 27* 36*  --  48* 51*   CREATININE 1.39* 1.79* 2.74*  --  3.86* 4.75*   EGFR 66.5 49.1* 29.5*  --  19.5* 15.2*   GLUCOSE 124* 125* 120*  --  108* 112*   CALCIUM 9.0 8.7 9.0  --  8.2* 8.7   IONIZED CALCIUM  --   --   --   --  1.08*  --    MAGNESIUM 1.7 1.8 2.0  --  1.9 2.1   PHOSPHORUS 2.4* 2.5 3.3  --  4.3 5.4*   TSH  --   --   --   --  1.240  --          Lab 07/07/23 0400 07/06/23 0528 07/05/23  0606 07/04/23  0445 07/03/23  1504   TOTAL PROTEIN 4.9* 4.6* 4.7* 4.5* 4.9*   ALBUMIN 4.1 3.6 3.8 3.3* 3.3*   GLOBULIN 0.8 1.0 0.9 1.2 1.6   ALT (SGPT) 21 14 19 20 28   AST (SGOT) 48* 30 33 43* 66*   BILIRUBIN 5.5* 5.0* 3.8* 3.9* 3.7*   ALK PHOS 79 76 93 119* 153*         Lab 07/07/23  0400 07/06/23  0528 07/05/23  0606 07/04/23  0445 07/03/23  1504   HSTROP T  --   --   --   --  28*   PROTIME 17.2* 16.5* 16.1* 14.6*  --    INR 1.65* 1.58* 1.54* 1.39*  --          Lab 07/04/23  0445   CHOLESTEROL 71   LDL CHOL 39   HDL CHOL 15*   TRIGLYCERIDES 81         Lab 07/06/23  0528   IRON 54*         Brief Urine Lab Results  (Last result in the past 365 days)        Color   Clarity   Blood   Leuk Est   Nitrite   Protein   CREAT   Urine HCG        07/03/23 1605 Dark Yellow  Comment: Result checked     Clear   Negative   Trace   Positive   30 mg/dL (1+)                 Microbiology Results (last 10 days)       Procedure Component Value - Date/Time    Body Fluid Culture - Body Fluid, Peritoneum [[id number]] Collected: 07/04/23 0927    Lab Status: Final result  Specimen: Body Fluid from Peritoneum Updated: 07/07/23 0720     Body Fluid Culture No growth at 3 days     Gram Stain Rare (1+) WBCs per low power field      No organisms seen    MRSA Screen, PCR (Inpatient) - Swab, Nares [[id number]]  (Normal) Collected: 07/03/23 1556    Lab Status: Final result Specimen: Swab from Nares Updated: 07/03/23 1713     MRSA PCR No MRSA Detected    Narrative:      The negative predictive value of this diagnostic test is high and should only be used to consider de-escalating anti-MRSA therapy. A positive result may indicate colonization with MRSA and must be correlated clinically.    Blood Culture - Blood, Arm, Right [[id number]]  (Normal) Collected: 07/03/23 1548    Lab Status: Preliminary result Specimen: Blood from Arm, Right Updated: 07/07/23 1601     Blood Culture No growth at 4 days    Blood Culture - Blood, Arm, Left [[id number]]  (Normal) Collected: 07/03/23 1540    Lab Status: Preliminary result Specimen: Blood from Arm, Left Updated: 07/07/23 1545     Blood Culture No growth at 4 days            CT Abdomen Pelvis Without Contrast    Result Date: 7/3/2023  Impression: Impression: 1. Large volume abdominal pelvic ascites. 2. Cirrhotic liver morphology. 3. Moderate to large colonic stool burden. Correlate for constipation. 4. Scattered mild bibasilar atelectasis. Small left and trace right pleural effusions are present. Electronically Signed: Chanda Clark  7/3/2023 3:42 PM EDT  Workstation ID: [id number]    CT Head Without Contrast    Result Date: 7/3/2023  Impression: Impression: No acute intracranial abnormality or acute facial bone fracture. Electronically Signed: True Allen  7/3/2023 3:45 PM EDT  Workstation ID: [id number]    CT Cervical Spine Without Contrast    Result Date: 7/3/2023  Impression: Impression: 1. No evidence of cervical spine fracture 2. Minimal left mastoid effusion 3. 1 cm right apical groundglass opacity. Recommend follow-up chest CT in 6 Electronically Signed:  Dio Murrell  7/3/2023 3:44 PM EDT  Workstation ID: [id number]    US Liver    Result Date: 6/26/2023  Impression: Impression: Cirrhotic morphology of the liver with a moderate amount of surrounding ascites which appears to have increased as compared to the previous study. Otherwise, no acute process identified. No focal lesions. Electronically Signed: Brynn Higuera  6/26/2023 8:18 AM EDT  Workstation ID: [id number]    XR Chest 1 View    Result Date: 7/3/2023  Impression: Impression: Improving bibasilar atelectasis or pneumonia since 6/28/2023. No new airspace disease is appreciated. Electronically Signed: Chanda Clark  7/3/2023 4:27 PM EDT  Workstation ID: [id number]    XR Chest 1 View    Result Date: 6/28/2023  Impression: Impression: Low lung volumes with patchy airspace disease seen within the lung bases bilaterally, left greater than right with probable small bilateral pleural effusions, likely related to pneumonia and/or atelectasis. Electronically Signed: Brynn Higuera  6/28/2023 12:00 PM EDT  Workstation ID: [id number]    CT Facial Bones Without Contrast    Result Date: 7/3/2023  Impression: Impression: No acute intracranial abnormality or acute facial bone fracture. Electronically Signed: True Jeongord  7/3/2023 3:45 PM EDT  Workstation ID: [id number]    Therapeutic Paracentesis With Radiology    Result Date: 6/23/2023  Impression: Impression: 1. Technically successful US Guided Paracentesis. 2. Patient taken to the emergency department for further evaluation and treatment following the paracentesis due to persistent hypotension. Please see Ambulatory nursing notes for intra-procedure and immediate post procedural vitals and information. Electronically Signed: Ismael Ramirez  6/23/2023 1:06 PM EDT  Workstation ID: [id number]    Therapeutic Paracentesis With Radiology    Result Date: 6/16/2023  Impression: Impression: Technically successful US Guided Paracentesis. Please see Ambulatory nursing notes for intra-procedure  vitals and information. Electronically Signed: Deven Scott  6/16/2023 2:24 PM EDT  Workstation ID: [id number]             Results for orders placed during the hospital encounter of 06/23/23    Adult Transthoracic Echo Complete W/ Cont if Necessary Per Protocol    Interpretation Summary    Left ventricular systolic function is normal. Calculated left ventricular EF = 62% Left ventricular ejection fraction appears to be 56 - 60%.    Left ventricular diastolic function was normal.    Estimated right ventricular systolic pressure from tricuspid regurgitation is normal (<35 mmHg).      Labs Pending at Discharge:  Pending Labs       Order Current Status    Blood Culture - Blood, Arm, Left Preliminary result    Blood Culture - Blood, Arm, Right Preliminary result              Discharge Details        Discharge Medications        ASK your doctor about these medications        Instructions Start Date   allopurinol 100 MG tablet  Commonly known as: ZYLOPRIM   100 mg, Oral, Daily      ALPRAZolam 0.5 MG tablet  Commonly known as: Xanax  Ask about: Should I take this medication?   0.5 mg, Oral, 2 Times Daily PRN      Antacid Regular Strength 500 MG chewable tablet  Generic drug: calcium carbonate   2 tablets, Oral, Daily      baclofen 10 MG tablet  Commonly known as: LIORESAL  Ask about: Which instructions should I use?   5-10 mg, Oral, 2 Times Daily PRN      cyclobenzaprine 10 MG tablet  Commonly known as: FLEXERIL   5-10 mg, Oral, 3 Times Daily PRN      lactulose 10 GM/15ML solution  Commonly known as: CHRONULAC   20 g, Oral, 3 Times Daily      Magnesium Oxide -Mg Supplement 400 (240 Mg) MG tablet   400 mg, Oral, Daily      melatonin 5 MG tablet tablet   5 mg, Oral, Nightly PRN      midodrine 10 MG tablet  Commonly known as: PROAMATINE   Take 2 tablets (20 mg) by mouth 3 (Three) Times a Day Before Meals      nadolol 40 MG tablet  Commonly known as: CORGARD   40 mg, Oral, Daily PRN      nicotine 14 MG/24HR patch  Commonly known  as: NICODERM CQ   1 patch, Transdermal, Daily PRN      nicotine polacrilex 2 MG gum  Commonly known as: NICORETTE   2 mg, Mouth/Throat, As Needed      ondansetron ODT 4 MG disintegrating tablet  Commonly known as: ZOFRAN-ODT  Ask about: Which instructions should I use?   4 mg, Translingual, Every 8 Hours PRN      oxyCODONE 10 MG tablet  Commonly known as: ROXICODONE   10 mg, Oral, Every 4 to 6 Hours PRN      pantoprazole 40 MG EC tablet  Commonly known as: PROTONIX   40 mg, Oral, Daily      riFAXIMin 550 MG tablet  Commonly known as: XIFAXAN   550 mg, Oral, 2 Times Daily      Thiamine Mononitrate 100 MG tablet   take 1 tablet daily               Allergies   Allergen Reactions    Bee Venom Anaphylaxis    Scopolamine Anaphylaxis         Discharge Disposition:   Left Against Medical Advice    Discharge Condition: critical    Diet:  Low Na diet        Discharge Activity: as tolerated        CODE STATUS:  Code Status and Medical Interventions:   Ordered at: 07/03/23 1655     Code Status (Patient has no pulse and is not breathing):    CPR (Attempt to Resuscitate)     Medical Interventions (Patient has pulse or is breathing):    Full Support         No future appointments.    Time spend on discharge: 10 minutes    Gavin Medina MD      Electronically signed by Gavin Medina MD at 07/08/23 5678       Discharge Order (From admission, onward)      None

## 2023-07-11 NOTE — CASE MANAGEMENT/SOCIAL WORK
Case Management Discharge Note           Transportation Services  Private: Car (with family)    Final Discharge Disposition Code: 07 - left AMA

## 2023-07-25 DIAGNOSIS — K70.30 ALCOHOLIC CIRRHOSIS, UNSPECIFIED WHETHER ASCITES PRESENT: ICD-10-CM

## 2023-07-25 DIAGNOSIS — R18.8 OTHER ASCITES: Primary | ICD-10-CM

## 2023-07-25 RX ORDER — ALBUMIN (HUMAN) 12.5 G/50ML
12.5 SOLUTION INTRAVENOUS DAILY PRN
Status: CANCELLED | OUTPATIENT
Start: 2023-07-25

## 2023-07-25 RX ORDER — ALBUMIN (HUMAN) 12.5 G/50ML
25 SOLUTION INTRAVENOUS DAILY PRN
Status: CANCELLED | OUTPATIENT
Start: 2023-07-25

## 2023-07-25 RX ORDER — LIDOCAINE HYDROCHLORIDE 20 MG/ML
10 INJECTION, SOLUTION INFILTRATION; PERINEURAL AS NEEDED
Status: CANCELLED | OUTPATIENT
Start: 2023-07-25

## 2023-07-27 ENCOUNTER — HOSPITAL ENCOUNTER (OUTPATIENT)
Dept: INFUSION THERAPY | Facility: HOSPITAL | Age: 38
Discharge: HOME | End: 2023-07-27
Payer: COMMERCIAL

## 2023-07-27 VITALS
DIASTOLIC BLOOD PRESSURE: 39 MMHG | RESPIRATION RATE: 14 BRPM | TEMPERATURE: 97.2 F | OXYGEN SATURATION: 98 % | HEART RATE: 71 BPM | SYSTOLIC BLOOD PRESSURE: 97 MMHG

## 2023-07-27 DIAGNOSIS — K70.30 ALCOHOLIC CIRRHOSIS, UNSPECIFIED WHETHER ASCITES PRESENT: ICD-10-CM

## 2023-07-27 DIAGNOSIS — R18.8 OTHER ASCITES: ICD-10-CM

## 2023-07-27 PROCEDURE — 96366 THER/PROPH/DIAG IV INF ADDON: CPT

## 2023-07-27 PROCEDURE — 76942 ECHO GUIDE FOR BIOPSY: CPT

## 2023-07-27 PROCEDURE — 36415 COLL VENOUS BLD VENIPUNCTURE: CPT

## 2023-07-27 PROCEDURE — 96365 THER/PROPH/DIAG IV INF INIT: CPT

## 2023-07-27 PROCEDURE — 25010000002 ALBUMIN HUMAN 25% PER 50 ML

## 2023-07-27 PROCEDURE — P9047 ALBUMIN (HUMAN), 25%, 50ML: HCPCS

## 2023-07-27 RX ORDER — LIDOCAINE HYDROCHLORIDE 20 MG/ML
10 INJECTION, SOLUTION INFILTRATION; PERINEURAL AS NEEDED
Status: DISCONTINUED | OUTPATIENT
Start: 2023-07-27 | End: 2023-07-29 | Stop reason: HOSPADM

## 2023-07-27 RX ORDER — ALBUMIN (HUMAN) 12.5 G/50ML
25 SOLUTION INTRAVENOUS DAILY PRN
Status: DISCONTINUED | OUTPATIENT
Start: 2023-07-27 | End: 2023-07-29 | Stop reason: HOSPADM

## 2023-07-27 RX ORDER — ALBUMIN (HUMAN) 12.5 G/50ML
12.5 SOLUTION INTRAVENOUS DAILY PRN
Status: DISCONTINUED | OUTPATIENT
Start: 2023-07-27 | End: 2023-07-29 | Stop reason: HOSPADM

## 2023-07-27 RX ADMIN — ALBUMIN HUMAN 25 G: 0.25 SOLUTION INTRAVENOUS at 08:59

## 2023-07-27 RX ADMIN — ALBUMIN HUMAN 25 G: 0.25 SOLUTION INTRAVENOUS at 09:36

## 2023-07-27 RX ADMIN — LIDOCAINE HYDROCHLORIDE 10 ML: 20 INJECTION, SOLUTION EPIDURAL; INFILTRATION; INTRACAUDAL; PERINEURAL at 08:15

## 2023-07-27 RX ADMIN — ALBUMIN HUMAN 25 G: 0.25 SOLUTION INTRAVENOUS at 08:28

## 2023-08-01 DIAGNOSIS — K70.31 ALCOHOLIC CIRRHOSIS OF LIVER WITH ASCITES: Primary | Chronic | ICD-10-CM

## 2023-08-01 RX ORDER — ALBUMIN (HUMAN) 12.5 G/50ML
25 SOLUTION INTRAVENOUS DAILY PRN
OUTPATIENT
Start: 2023-08-01

## 2023-08-01 RX ORDER — LIDOCAINE HYDROCHLORIDE 20 MG/ML
10 INJECTION, SOLUTION INFILTRATION; PERINEURAL AS NEEDED
OUTPATIENT
Start: 2023-08-01

## 2023-08-01 RX ORDER — ALBUMIN (HUMAN) 12.5 G/50ML
12.5 SOLUTION INTRAVENOUS DAILY PRN
OUTPATIENT
Start: 2023-08-01

## 2023-08-03 ENCOUNTER — HOSPITAL ENCOUNTER (OUTPATIENT)
Dept: INFUSION THERAPY | Facility: HOSPITAL | Age: 38
Discharge: HOME | End: 2023-08-03

## 2023-08-03 DIAGNOSIS — K70.31 ALCOHOLIC CIRRHOSIS OF LIVER WITH ASCITES: Chronic | ICD-10-CM

## 2023-08-08 DIAGNOSIS — K70.31 ALCOHOLIC CIRRHOSIS OF LIVER WITH ASCITES: Primary | ICD-10-CM

## 2023-08-09 ENCOUNTER — HOSPITAL ENCOUNTER (OUTPATIENT)
Dept: INFUSION THERAPY | Facility: HOSPITAL | Age: 38
Discharge: HOME | End: 2023-08-09
Payer: COMMERCIAL

## 2023-08-09 VITALS
HEART RATE: 60 BPM | OXYGEN SATURATION: 100 % | DIASTOLIC BLOOD PRESSURE: 42 MMHG | RESPIRATION RATE: 14 BRPM | SYSTOLIC BLOOD PRESSURE: 120 MMHG

## 2023-08-09 DIAGNOSIS — K70.31 ALCOHOLIC CIRRHOSIS OF LIVER WITH ASCITES: Chronic | ICD-10-CM

## 2023-08-09 LAB
DEPRECATED RDW RBC AUTO: 55.6 FL (ref 37–54)
ERYTHROCYTE [DISTWIDTH] IN BLOOD BY AUTOMATED COUNT: 14.7 % (ref 12.3–15.4)
HCT VFR BLD AUTO: 30.1 % (ref 37.5–51)
HGB BLD-MCNC: 9.9 G/DL (ref 13–17.7)
INR PPP: 1.38 (ref 0.93–1.1)
MCH RBC QN AUTO: 34 PG (ref 26.6–33)
MCHC RBC AUTO-ENTMCNC: 32.9 G/DL (ref 31.5–35.7)
MCV RBC AUTO: 103.5 FL (ref 79–97)
PLATELET # BLD AUTO: 168 10*3/MM3 (ref 140–450)
PMV BLD AUTO: 9.7 FL (ref 6–12)
PROTHROMBIN TIME: 14.5 SECONDS (ref 9.6–11.7)
RBC # BLD AUTO: 2.91 10*6/MM3 (ref 4.14–5.8)
WBC NRBC COR # BLD: 12.1 10*3/MM3 (ref 3.4–10.8)

## 2023-08-09 PROCEDURE — 25010000002 ALBUMIN HUMAN 25% PER 50 ML

## 2023-08-09 PROCEDURE — P9047 ALBUMIN (HUMAN), 25%, 50ML: HCPCS

## 2023-08-09 PROCEDURE — 96366 THER/PROPH/DIAG IV INF ADDON: CPT

## 2023-08-09 PROCEDURE — 36415 COLL VENOUS BLD VENIPUNCTURE: CPT

## 2023-08-09 PROCEDURE — 85027 COMPLETE CBC AUTOMATED: CPT

## 2023-08-09 PROCEDURE — 96365 THER/PROPH/DIAG IV INF INIT: CPT

## 2023-08-09 PROCEDURE — 76942 ECHO GUIDE FOR BIOPSY: CPT

## 2023-08-09 PROCEDURE — 85610 PROTHROMBIN TIME: CPT

## 2023-08-09 RX ORDER — ALBUMIN (HUMAN) 12.5 G/50ML
12.5 SOLUTION INTRAVENOUS DAILY PRN
Status: DISCONTINUED | OUTPATIENT
Start: 2023-08-09 | End: 2023-08-11 | Stop reason: HOSPADM

## 2023-08-09 RX ORDER — ALBUMIN (HUMAN) 12.5 G/50ML
25 SOLUTION INTRAVENOUS DAILY PRN
Status: DISCONTINUED | OUTPATIENT
Start: 2023-08-09 | End: 2023-08-11 | Stop reason: HOSPADM

## 2023-08-09 RX ORDER — LIDOCAINE HYDROCHLORIDE 20 MG/ML
10 INJECTION, SOLUTION INFILTRATION; PERINEURAL AS NEEDED
Status: DISCONTINUED | OUTPATIENT
Start: 2023-08-09 | End: 2023-08-11 | Stop reason: HOSPADM

## 2023-08-09 RX ADMIN — ALBUMIN (HUMAN) 25 G: 0.25 INJECTION, SOLUTION INTRAVENOUS at 12:30

## 2023-08-09 RX ADMIN — ALBUMIN (HUMAN) 25 G: 0.25 INJECTION, SOLUTION INTRAVENOUS at 13:04

## 2023-08-09 NOTE — PROGRESS NOTES
[facility]     Progress Note    Patient Name: Daquan Xiong  : 1985  MRN: [id number]  Primary Care Physician:  Wolf Carvalho MD  Date of admission: 2023    Patient refused last 12.5 g of Albumin. Patient was educated.     Ember Clemens RN

## 2023-08-16 PROBLEM — K31.89 OTHER DISEASES OF STOMACH AND DUODENUM: Status: ACTIVE | Noted: 2022-12-06

## 2023-08-17 ENCOUNTER — OFFICE (OUTPATIENT)
Dept: URBAN - METROPOLITAN AREA CLINIC 64 | Facility: CLINIC | Age: 38
End: 2023-08-17
Payer: COMMERCIAL

## 2023-08-17 VITALS — WEIGHT: 160 LBS | HEIGHT: 73 IN

## 2023-08-17 DIAGNOSIS — I85.00 ESOPHAGEAL VARICES WITHOUT BLEEDING: ICD-10-CM

## 2023-08-17 DIAGNOSIS — K70.31 ALCOHOLIC CIRRHOSIS OF LIVER WITH ASCITES: Primary | ICD-10-CM

## 2023-08-17 DIAGNOSIS — K70.31 ALCOHOLIC CIRRHOSIS OF LIVER WITH ASCITES: ICD-10-CM

## 2023-08-17 DIAGNOSIS — K72.90 HEPATIC FAILURE, UNSPECIFIED WITHOUT COMA: ICD-10-CM

## 2023-08-17 PROCEDURE — 99214 OFFICE O/P EST MOD 30 MIN: CPT

## 2023-08-17 RX ORDER — ALBUMIN (HUMAN) 12.5 G/50ML
12.5 SOLUTION INTRAVENOUS DAILY PRN
Status: CANCELLED | OUTPATIENT
Start: 2023-08-17

## 2023-08-17 RX ORDER — ALBUMIN (HUMAN) 12.5 G/50ML
25 SOLUTION INTRAVENOUS DAILY PRN
Status: CANCELLED | OUTPATIENT
Start: 2023-08-17

## 2023-08-17 RX ORDER — ZINC GLUCONATE 50 MG
50 TABLET ORAL
Qty: 90 | Refills: 3 | Status: ACTIVE
Start: 2023-08-17

## 2023-08-17 RX ORDER — LIDOCAINE HYDROCHLORIDE 20 MG/ML
10 INJECTION, SOLUTION INFILTRATION; PERINEURAL AS NEEDED
Status: CANCELLED | OUTPATIENT
Start: 2023-08-17

## 2023-08-17 RX ORDER — LIDOCAINE HYDROCHLORIDE 10 MG/ML
10 INJECTION, SOLUTION INFILTRATION; PERINEURAL AS NEEDED
Status: CANCELLED | OUTPATIENT
Start: 2023-08-17

## 2023-08-18 ENCOUNTER — HOSPITAL ENCOUNTER (INPATIENT)
Facility: HOSPITAL | Age: 38
LOS: 5 days | Discharge: HOME HEALTH | DRG: 871 | End: 2023-08-24
Attending: EMERGENCY MEDICINE | Admitting: INTERNAL MEDICINE
Payer: COMMERCIAL

## 2023-08-18 ENCOUNTER — HOSPITAL ENCOUNTER (OUTPATIENT)
Dept: INFUSION THERAPY | Facility: HOSPITAL | Age: 38
Discharge: HOME | End: 2023-08-18
Payer: COMMERCIAL

## 2023-08-18 DIAGNOSIS — R18.8 OTHER ASCITES: Primary | ICD-10-CM

## 2023-08-18 DIAGNOSIS — N17.9 ACUTE KIDNEY INJURY: Primary | ICD-10-CM

## 2023-08-18 DIAGNOSIS — D64.9 ANEMIA, UNSPECIFIED TYPE: ICD-10-CM

## 2023-08-18 DIAGNOSIS — K70.31 ALCOHOLIC CIRRHOSIS OF LIVER WITH ASCITES: ICD-10-CM

## 2023-08-18 DIAGNOSIS — R18.8 OTHER ASCITES: ICD-10-CM

## 2023-08-18 DIAGNOSIS — I95.9 HYPOTENSION, UNSPECIFIED HYPOTENSION TYPE: ICD-10-CM

## 2023-08-18 DIAGNOSIS — K70.30 ALCOHOLIC CIRRHOSIS, UNSPECIFIED WHETHER ASCITES PRESENT: Chronic | ICD-10-CM

## 2023-08-18 LAB
ALBUMIN SERPL-MCNC: 2.3 G/DL (ref 3.5–5.2)
ALBUMIN/GLOB SERPL: 1 G/DL
ALP SERPL-CCNC: 232 U/L (ref 39–117)
ALT SERPL W P-5'-P-CCNC: 30 U/L (ref 1–41)
AMMONIA BLD-SCNC: 52 UMOL/L (ref 16–60)
ANION GAP SERPL CALCULATED.3IONS-SCNC: 14 MMOL/L (ref 5–15)
AST SERPL-CCNC: 77 U/L (ref 1–40)
BASOPHILS # BLD AUTO: 0.1 10*3/MM3 (ref 0–0.2)
BASOPHILS NFR BLD AUTO: 0.7 % (ref 0–1.5)
BILIRUB SERPL-MCNC: 4.8 MG/DL (ref 0–1.2)
BUN SERPL-MCNC: 74 MG/DL (ref 6–20)
BUN/CREAT SERPL: 18.9 (ref 7–25)
CALCIUM SPEC-SCNC: 8.7 MG/DL (ref 8.6–10.5)
CHLORIDE SERPL-SCNC: 98 MMOL/L (ref 98–107)
CO2 SERPL-SCNC: 18 MMOL/L (ref 22–29)
CREAT SERPL-MCNC: 3.91 MG/DL (ref 0.76–1.27)
DEPRECATED RDW RBC AUTO: 57.3 FL (ref 37–54)
EGFRCR SERPLBLD CKD-EPI 2021: 19.2 ML/MIN/1.73
EOSINOPHIL # BLD AUTO: 0.3 10*3/MM3 (ref 0–0.4)
EOSINOPHIL NFR BLD AUTO: 3.3 % (ref 0.3–6.2)
ERYTHROCYTE [DISTWIDTH] IN BLOOD BY AUTOMATED COUNT: 15.4 % (ref 12.3–15.4)
GLOBULIN UR ELPH-MCNC: 2.3 GM/DL
GLUCOSE SERPL-MCNC: 86 MG/DL (ref 65–99)
HCT VFR BLD AUTO: 30.5 % (ref 37.5–51)
HGB BLD-MCNC: 10.1 G/DL (ref 13–17.7)
INR PPP: 1.37 (ref 0.93–1.1)
LIPASE SERPL-CCNC: 68 U/L (ref 13–60)
LYMPHOCYTES # BLD AUTO: 2 10*3/MM3 (ref 0.7–3.1)
LYMPHOCYTES NFR BLD AUTO: 21.2 % (ref 19.6–45.3)
MAGNESIUM SERPL-MCNC: 2.5 MG/DL (ref 1.6–2.6)
MCH RBC QN AUTO: 35.2 PG (ref 26.6–33)
MCHC RBC AUTO-ENTMCNC: 33.1 G/DL (ref 31.5–35.7)
MCV RBC AUTO: 106.3 FL (ref 79–97)
MONOCYTES # BLD AUTO: 1.6 10*3/MM3 (ref 0.1–0.9)
MONOCYTES NFR BLD AUTO: 17 % (ref 5–12)
NEUTROPHILS NFR BLD AUTO: 5.5 10*3/MM3 (ref 1.7–7)
NEUTROPHILS NFR BLD AUTO: 57.8 % (ref 42.7–76)
NRBC BLD AUTO-RTO: 0 /100 WBC (ref 0–0.2)
PLATELET # BLD AUTO: 116 10*3/MM3 (ref 140–450)
PMV BLD AUTO: 10 FL (ref 6–12)
POTASSIUM SERPL-SCNC: 5.9 MMOL/L (ref 3.5–5.2)
PROT SERPL-MCNC: 4.6 G/DL (ref 6–8.5)
PROTHROMBIN TIME: 14.4 SECONDS (ref 9.6–11.7)
RBC # BLD AUTO: 2.87 10*6/MM3 (ref 4.14–5.8)
SODIUM SERPL-SCNC: 130 MMOL/L (ref 136–145)
WBC NRBC COR # BLD: 9.5 10*3/MM3 (ref 3.4–10.8)

## 2023-08-18 PROCEDURE — 82140 ASSAY OF AMMONIA: CPT | Performed by: EMERGENCY MEDICINE

## 2023-08-18 PROCEDURE — 83735 ASSAY OF MAGNESIUM: CPT | Performed by: EMERGENCY MEDICINE

## 2023-08-18 PROCEDURE — 99285 EMERGENCY DEPT VISIT HI MDM: CPT

## 2023-08-18 PROCEDURE — 36415 COLL VENOUS BLD VENIPUNCTURE: CPT

## 2023-08-18 PROCEDURE — 85025 COMPLETE CBC W/AUTO DIFF WBC: CPT | Performed by: EMERGENCY MEDICINE

## 2023-08-18 PROCEDURE — 80053 COMPREHEN METABOLIC PANEL: CPT | Performed by: EMERGENCY MEDICINE

## 2023-08-18 PROCEDURE — 85610 PROTHROMBIN TIME: CPT | Performed by: EMERGENCY MEDICINE

## 2023-08-18 PROCEDURE — 87040 BLOOD CULTURE FOR BACTERIA: CPT | Performed by: EMERGENCY MEDICINE

## 2023-08-18 PROCEDURE — 83690 ASSAY OF LIPASE: CPT | Performed by: EMERGENCY MEDICINE

## 2023-08-18 RX ORDER — SODIUM CHLORIDE 0.9 % (FLUSH) 0.9 %
10 SYRINGE (ML) INJECTION AS NEEDED
Status: DISCONTINUED | OUTPATIENT
Start: 2023-08-18 | End: 2023-08-24 | Stop reason: HOSPADM

## 2023-08-18 RX ADMIN — SODIUM CHLORIDE 2178 ML: 9 INJECTION, SOLUTION INTRAVENOUS at 21:04

## 2023-08-18 NOTE — LETTER
[facility] CASE MANAGEMENT  [address] [facility] [place] [id number] [phone]        August 25, 2023      Patient: Daquan Xiong  YOB: 1985  Date of Visit: 8/18/2023      Attached is an order for OP PT.  Please contact wife to set up appointment.   [place]- [phone].          Lexi Sunshine RN    Office Phone [phone]  Office Cell [phone]

## 2023-08-18 NOTE — Clinical Note
Level of Care: Progressive Care [20]   Admitting Physician: EFFIE DIEZ [[id number]]   Attending Physician: EFFIE DIEZ [[id number]]

## 2023-08-19 ENCOUNTER — APPOINTMENT (OUTPATIENT)
Dept: ULTRASOUND IMAGING | Facility: HOSPITAL | Age: 38
DRG: 871 | End: 2023-08-19
Payer: COMMERCIAL

## 2023-08-19 ENCOUNTER — APPOINTMENT (OUTPATIENT)
Dept: GENERAL RADIOLOGY | Facility: HOSPITAL | Age: 38
DRG: 871 | End: 2023-08-19
Payer: COMMERCIAL

## 2023-08-19 LAB
ALBUMIN SERPL-MCNC: 2.5 G/DL (ref 3.5–5.2)
ALBUMIN/GLOB SERPL: 1.3 G/DL
ALP SERPL-CCNC: 245 U/L (ref 39–117)
ALT SERPL W P-5'-P-CCNC: 28 U/L (ref 1–41)
ANION GAP SERPL CALCULATED.3IONS-SCNC: 12 MMOL/L (ref 5–15)
ANION GAP SERPL CALCULATED.3IONS-SCNC: 15 MMOL/L (ref 5–15)
ANION GAP SERPL CALCULATED.3IONS-SCNC: 17 MMOL/L (ref 5–15)
ARTERIAL PATENCY WRIST A: POSITIVE
AST SERPL-CCNC: 84 U/L (ref 1–40)
ATMOSPHERIC PRESS: ABNORMAL MM[HG]
BASE EXCESS BLDA CALC-SCNC: -4.1 MMOL/L (ref 0–3)
BASOPHILS # BLD AUTO: 0.1 10*3/MM3 (ref 0–0.2)
BASOPHILS NFR BLD AUTO: 0.5 % (ref 0–1.5)
BDY SITE: ABNORMAL
BILIRUB SERPL-MCNC: 4.7 MG/DL (ref 0–1.2)
BILIRUB UR QL STRIP: NEGATIVE
BUN SERPL-MCNC: 62 MG/DL (ref 6–20)
BUN SERPL-MCNC: 65 MG/DL (ref 6–20)
BUN SERPL-MCNC: 69 MG/DL (ref 6–20)
BUN/CREAT SERPL: 17.1 (ref 7–25)
BUN/CREAT SERPL: 18.7 (ref 7–25)
BUN/CREAT SERPL: 19 (ref 7–25)
CA-I SERPL ISE-MCNC: 1.16 MMOL/L (ref 1.2–1.3)
CALCIUM SPEC-SCNC: 8.6 MG/DL (ref 8.6–10.5)
CALCIUM SPEC-SCNC: 9 MG/DL (ref 8.6–10.5)
CALCIUM SPEC-SCNC: 9.1 MG/DL (ref 8.6–10.5)
CHLORIDE SERPL-SCNC: 100 MMOL/L (ref 98–107)
CHLORIDE SERPL-SCNC: 95 MMOL/L (ref 98–107)
CHLORIDE SERPL-SCNC: 96 MMOL/L (ref 98–107)
CK SERPL-CCNC: 28 U/L (ref 20–200)
CLARITY UR: CLEAR
CO2 BLDA-SCNC: 19.9 MMOL/L (ref 22–29)
CO2 SERPL-SCNC: 18 MMOL/L (ref 22–29)
CO2 SERPL-SCNC: 20 MMOL/L (ref 22–29)
CO2 SERPL-SCNC: 20 MMOL/L (ref 22–29)
COLOR UR: YELLOW
CORTIS SERPL-MCNC: 11.99 MCG/DL
CREAT SERPL-MCNC: 3.27 MG/DL (ref 0.76–1.27)
CREAT SERPL-MCNC: 3.48 MG/DL (ref 0.76–1.27)
CREAT SERPL-MCNC: 4.04 MG/DL (ref 0.76–1.27)
CREAT UR-MCNC: 62.2 MG/DL
D-LACTATE SERPL-SCNC: 1.3 MMOL/L (ref 0.3–2)
D-LACTATE SERPL-SCNC: 1.9 MMOL/L (ref 0.5–2)
D-LACTATE SERPL-SCNC: 2.1 MMOL/L (ref 0.5–2)
DEPRECATED RDW RBC AUTO: 54.3 FL (ref 37–54)
EGFRCR SERPLBLD CKD-EPI 2021: 18.5 ML/MIN/1.73
EGFRCR SERPLBLD CKD-EPI 2021: 22.1 ML/MIN/1.73
EGFRCR SERPLBLD CKD-EPI 2021: 23.8 ML/MIN/1.73
EOSINOPHIL # BLD AUTO: 0.4 10*3/MM3 (ref 0–0.4)
EOSINOPHIL NFR BLD AUTO: 3.3 % (ref 0.3–6.2)
EOSINOPHIL SPEC QL MICRO: 1 % EOS/100 CELLS (ref 0–0)
ERYTHROCYTE [DISTWIDTH] IN BLOOD BY AUTOMATED COUNT: 15.1 % (ref 12.3–15.4)
GLOBULIN UR ELPH-MCNC: 1.9 GM/DL
GLUCOSE BLDC GLUCOMTR-MCNC: 81 MG/DL (ref 70–105)
GLUCOSE BLDC GLUCOMTR-MCNC: 98 MG/DL (ref 70–105)
GLUCOSE SERPL-MCNC: 143 MG/DL (ref 65–99)
GLUCOSE SERPL-MCNC: 146 MG/DL (ref 65–99)
GLUCOSE SERPL-MCNC: 83 MG/DL (ref 65–99)
GLUCOSE UR STRIP-MCNC: NEGATIVE MG/DL
HCO3 BLDA-SCNC: 19 MMOL/L (ref 21–28)
HCT VFR BLD AUTO: 24.7 % (ref 37.5–51)
HEMODILUTION: NO
HGB BLD-MCNC: 8.4 G/DL (ref 13–17.7)
HGB UR QL STRIP.AUTO: NEGATIVE
HOLD SPECIMEN: NORMAL
INHALED O2 CONCENTRATION: 21 %
IRON 24H UR-MRATE: 73 MCG/DL (ref 59–158)
KETONES UR QL STRIP: NEGATIVE
LEUKOCYTE ESTERASE UR QL STRIP.AUTO: NEGATIVE
LYMPHOCYTES # BLD AUTO: 2.7 10*3/MM3 (ref 0.7–3.1)
LYMPHOCYTES NFR BLD AUTO: 25.4 % (ref 19.6–45.3)
MCH RBC QN AUTO: 35 PG (ref 26.6–33)
MCHC RBC AUTO-ENTMCNC: 33.8 G/DL (ref 31.5–35.7)
MCV RBC AUTO: 103.7 FL (ref 79–97)
MODALITY: ABNORMAL
MONOCYTES # BLD AUTO: 2 10*3/MM3 (ref 0.1–0.9)
MONOCYTES NFR BLD AUTO: 18.4 % (ref 5–12)
NEUTROPHILS NFR BLD AUTO: 5.6 10*3/MM3 (ref 1.7–7)
NEUTROPHILS NFR BLD AUTO: 52.4 % (ref 42.7–76)
NITRITE UR QL STRIP: NEGATIVE
NRBC BLD AUTO-RTO: 0 /100 WBC (ref 0–0.2)
OSMOLALITY SERPL: 298 MOSM/KG (ref 275–295)
OSMOLALITY UR: 301 MOSM/KG (ref 300–800)
PCO2 BLDA: 26.8 MM HG (ref 35–48)
PH BLDA: 7.46 PH UNITS (ref 7.35–7.45)
PH UR STRIP.AUTO: 5.5 [PH] (ref 5–8)
PLATELET # BLD AUTO: 122 10*3/MM3 (ref 140–450)
PMV BLD AUTO: 9.4 FL (ref 6–12)
PO2 BLDA: 111.8 MM HG (ref 83–108)
POTASSIUM SERPL-SCNC: 4.4 MMOL/L (ref 3.5–5.2)
POTASSIUM SERPL-SCNC: 4.6 MMOL/L (ref 3.5–5.2)
POTASSIUM SERPL-SCNC: 5.1 MMOL/L (ref 3.5–5.2)
PROT SERPL-MCNC: 4.4 G/DL (ref 6–8.5)
PROT UR QL STRIP: NEGATIVE
QT INTERVAL: 440 MS
QT INTERVAL: 461 MS
RBC # BLD AUTO: 2.39 10*6/MM3 (ref 4.14–5.8)
RESPIRATORY RATE: 22
SAO2 % BLDCOA: 98.7 % (ref 94–98)
SODIUM SERPL-SCNC: 127 MMOL/L (ref 136–145)
SODIUM SERPL-SCNC: 129 MMOL/L (ref 136–145)
SODIUM SERPL-SCNC: 137 MMOL/L (ref 136–145)
SODIUM UR-SCNC: <20 MMOL/L
SP GR UR STRIP: 1.01 (ref 1–1.03)
TSH SERPL DL<=0.05 MIU/L-ACNC: 2.93 UIU/ML (ref 0.27–4.2)
URATE SERPL-MCNC: 10.1 MG/DL (ref 3.4–7)
UROBILINOGEN UR QL STRIP: NORMAL
WBC NRBC COR # BLD: 10.7 10*3/MM3 (ref 3.4–10.8)

## 2023-08-19 PROCEDURE — 83605 ASSAY OF LACTIC ACID: CPT | Performed by: NURSE PRACTITIONER

## 2023-08-19 PROCEDURE — 82948 REAGENT STRIP/BLOOD GLUCOSE: CPT

## 2023-08-19 PROCEDURE — 83605 ASSAY OF LACTIC ACID: CPT

## 2023-08-19 PROCEDURE — 25010000002 ALBUMIN HUMAN 5% PER 50 ML: Performed by: PHYSICIAN ASSISTANT

## 2023-08-19 PROCEDURE — 76775 US EXAM ABDO BACK WALL LIM: CPT

## 2023-08-19 PROCEDURE — 71045 X-RAY EXAM CHEST 1 VIEW: CPT

## 2023-08-19 PROCEDURE — 93005 ELECTROCARDIOGRAM TRACING: CPT | Performed by: PHYSICIAN ASSISTANT

## 2023-08-19 PROCEDURE — 82533 TOTAL CORTISOL: CPT | Performed by: INTERNAL MEDICINE

## 2023-08-19 PROCEDURE — 36600 WITHDRAWAL OF ARTERIAL BLOOD: CPT

## 2023-08-19 PROCEDURE — P9047 ALBUMIN (HUMAN), 25%, 50ML: HCPCS | Performed by: HOSPITALIST

## 2023-08-19 PROCEDURE — 80048 BASIC METABOLIC PNL TOTAL CA: CPT | Performed by: NURSE PRACTITIONER

## 2023-08-19 PROCEDURE — 94799 UNLISTED PULMONARY SVC/PX: CPT

## 2023-08-19 PROCEDURE — 36415 COLL VENOUS BLD VENIPUNCTURE: CPT | Performed by: PHYSICIAN ASSISTANT

## 2023-08-19 PROCEDURE — 63710000001 INSULIN REGULAR HUMAN PER 5 UNITS: Performed by: PHYSICIAN ASSISTANT

## 2023-08-19 PROCEDURE — 84550 ASSAY OF BLOOD/URIC ACID: CPT | Performed by: INTERNAL MEDICINE

## 2023-08-19 PROCEDURE — 25010000002 ALBUMIN HUMAN 25% PER 50 ML: Performed by: INTERNAL MEDICINE

## 2023-08-19 PROCEDURE — 80050 GENERAL HEALTH PANEL: CPT

## 2023-08-19 PROCEDURE — 93010 ELECTROCARDIOGRAM REPORT: CPT | Performed by: INTERNAL MEDICINE

## 2023-08-19 PROCEDURE — 87205 SMEAR GRAM STAIN: CPT | Performed by: INTERNAL MEDICINE

## 2023-08-19 PROCEDURE — 83935 ASSAY OF URINE OSMOLALITY: CPT | Performed by: PHYSICIAN ASSISTANT

## 2023-08-19 PROCEDURE — 81003 URINALYSIS AUTO W/O SCOPE: CPT | Performed by: PHYSICIAN ASSISTANT

## 2023-08-19 PROCEDURE — 25010000002 CALCIUM GLUCONATE-NACL 1-0.675 GM/50ML-% SOLUTION: Performed by: INTERNAL MEDICINE

## 2023-08-19 PROCEDURE — 25010000002 ALBUMIN HUMAN 25% PER 50 ML: Performed by: HOSPITALIST

## 2023-08-19 PROCEDURE — 25010000002 ONDANSETRON PER 1 MG: Performed by: PHYSICIAN ASSISTANT

## 2023-08-19 PROCEDURE — 83540 ASSAY OF IRON: CPT | Performed by: INTERNAL MEDICINE

## 2023-08-19 PROCEDURE — 84300 ASSAY OF URINE SODIUM: CPT | Performed by: PHYSICIAN ASSISTANT

## 2023-08-19 PROCEDURE — 25010000002 CALCIUM GLUCONATE-NACL 1-0.675 GM/50ML-% SOLUTION: Performed by: PHYSICIAN ASSISTANT

## 2023-08-19 PROCEDURE — 82570 ASSAY OF URINE CREATININE: CPT | Performed by: PHYSICIAN ASSISTANT

## 2023-08-19 PROCEDURE — P9047 ALBUMIN (HUMAN), 25%, 50ML: HCPCS | Performed by: INTERNAL MEDICINE

## 2023-08-19 PROCEDURE — 82803 BLOOD GASES ANY COMBINATION: CPT

## 2023-08-19 PROCEDURE — 82550 ASSAY OF CK (CPK): CPT | Performed by: INTERNAL MEDICINE

## 2023-08-19 PROCEDURE — 82330 ASSAY OF CALCIUM: CPT

## 2023-08-19 PROCEDURE — 83930 ASSAY OF BLOOD OSMOLALITY: CPT | Performed by: PHYSICIAN ASSISTANT

## 2023-08-19 PROCEDURE — P9041 ALBUMIN (HUMAN),5%, 50ML: HCPCS | Performed by: PHYSICIAN ASSISTANT

## 2023-08-19 RX ORDER — CALCIUM GLUCONATE 20 MG/ML
1000 INJECTION, SOLUTION INTRAVENOUS ONCE
Status: COMPLETED | OUTPATIENT
Start: 2023-08-19 | End: 2023-08-19

## 2023-08-19 RX ORDER — SODIUM CHLORIDE 9 MG/ML
40 INJECTION, SOLUTION INTRAVENOUS AS NEEDED
Status: DISCONTINUED | OUTPATIENT
Start: 2023-08-19 | End: 2023-08-24 | Stop reason: HOSPADM

## 2023-08-19 RX ORDER — OXYCODONE HYDROCHLORIDE 5 MG/1
5 TABLET ORAL EVERY 6 HOURS PRN
Status: DISCONTINUED | OUTPATIENT
Start: 2023-08-19 | End: 2023-08-23

## 2023-08-19 RX ORDER — CHOLECALCIFEROL (VITAMIN D3) 125 MCG
5 CAPSULE ORAL NIGHTLY PRN
Status: DISCONTINUED | OUTPATIENT
Start: 2023-08-19 | End: 2023-08-24 | Stop reason: HOSPADM

## 2023-08-19 RX ORDER — SODIUM CHLORIDE 9 MG/ML
100 INJECTION, SOLUTION INTRAVENOUS CONTINUOUS
Status: DISCONTINUED | OUTPATIENT
Start: 2023-08-19 | End: 2023-08-19

## 2023-08-19 RX ORDER — ALBUMIN (HUMAN) 12.5 G/50ML
25 SOLUTION INTRAVENOUS EVERY 6 HOURS
Status: DISCONTINUED | OUTPATIENT
Start: 2023-08-19 | End: 2023-08-20

## 2023-08-19 RX ORDER — DEXTROSE MONOHYDRATE 25 G/50ML
25 INJECTION, SOLUTION INTRAVENOUS ONCE
Status: COMPLETED | OUTPATIENT
Start: 2023-08-19 | End: 2023-08-19

## 2023-08-19 RX ORDER — ALBUMIN (HUMAN) 12.5 G/50ML
50 SOLUTION INTRAVENOUS ONCE
Status: COMPLETED | OUTPATIENT
Start: 2023-08-19 | End: 2023-08-19

## 2023-08-19 RX ORDER — PANTOPRAZOLE SODIUM 40 MG/1
40 TABLET, DELAYED RELEASE ORAL
Status: DISCONTINUED | OUTPATIENT
Start: 2023-08-20 | End: 2023-08-20

## 2023-08-19 RX ORDER — OXYCODONE HYDROCHLORIDE 5 MG/1
5 TABLET ORAL ONCE
Status: DISCONTINUED | OUTPATIENT
Start: 2023-08-19 | End: 2023-08-24 | Stop reason: SDUPTHER

## 2023-08-19 RX ORDER — FEBUXOSTAT 40 MG/1
40 TABLET, FILM COATED ORAL DAILY
Status: DISCONTINUED | OUTPATIENT
Start: 2023-08-19 | End: 2023-08-24 | Stop reason: HOSPADM

## 2023-08-19 RX ORDER — MIDODRINE HYDROCHLORIDE 5 MG/1
20 TABLET ORAL ONCE
Status: COMPLETED | OUTPATIENT
Start: 2023-08-19 | End: 2023-08-19

## 2023-08-19 RX ORDER — MIDODRINE HYDROCHLORIDE 5 MG/1
20 TABLET ORAL EVERY 8 HOURS SCHEDULED
Status: DISCONTINUED | OUTPATIENT
Start: 2023-08-19 | End: 2023-08-24 | Stop reason: HOSPADM

## 2023-08-19 RX ORDER — SODIUM CHLORIDE 0.9 % (FLUSH) 0.9 %
10 SYRINGE (ML) INJECTION AS NEEDED
Status: DISCONTINUED | OUTPATIENT
Start: 2023-08-19 | End: 2023-08-24 | Stop reason: HOSPADM

## 2023-08-19 RX ORDER — PANTOPRAZOLE SODIUM 40 MG/10ML
40 INJECTION, POWDER, LYOPHILIZED, FOR SOLUTION INTRAVENOUS
Status: DISCONTINUED | OUTPATIENT
Start: 2023-08-19 | End: 2023-08-19

## 2023-08-19 RX ORDER — CALCIUM CARBONATE 500 MG/1
2 TABLET, CHEWABLE ORAL EVERY 8 HOURS PRN
Status: DISCONTINUED | OUTPATIENT
Start: 2023-08-19 | End: 2023-08-24 | Stop reason: HOSPADM

## 2023-08-19 RX ORDER — ONDANSETRON 2 MG/ML
4 INJECTION INTRAMUSCULAR; INTRAVENOUS EVERY 6 HOURS PRN
Status: DISCONTINUED | OUTPATIENT
Start: 2023-08-19 | End: 2023-08-21 | Stop reason: SDUPTHER

## 2023-08-19 RX ORDER — NOREPINEPHRINE BITARTRATE 0.03 MG/ML
.02-.3 INJECTION, SOLUTION INTRAVENOUS
Status: DISCONTINUED | OUTPATIENT
Start: 2023-08-19 | End: 2023-08-22

## 2023-08-19 RX ORDER — ALBUMIN, HUMAN INJ 5% 5 %
75 SOLUTION INTRAVENOUS ONCE
Status: COMPLETED | OUTPATIENT
Start: 2023-08-19 | End: 2023-08-19

## 2023-08-19 RX ORDER — SODIUM CHLORIDE 0.9 % (FLUSH) 0.9 %
10 SYRINGE (ML) INJECTION EVERY 12 HOURS SCHEDULED
Status: DISCONTINUED | OUTPATIENT
Start: 2023-08-19 | End: 2023-08-24 | Stop reason: HOSPADM

## 2023-08-19 RX ORDER — ONDANSETRON 4 MG/1
4 TABLET, FILM COATED ORAL EVERY 6 HOURS PRN
Status: DISCONTINUED | OUTPATIENT
Start: 2023-08-19 | End: 2023-08-21 | Stop reason: SDUPTHER

## 2023-08-19 RX ORDER — NITROGLYCERIN 0.4 MG/1
0.4 TABLET SUBLINGUAL
Status: DISCONTINUED | OUTPATIENT
Start: 2023-08-19 | End: 2023-08-24 | Stop reason: HOSPADM

## 2023-08-19 RX ADMIN — MIDODRINE HYDROCHLORIDE 20 MG: 5 TABLET ORAL at 21:54

## 2023-08-19 RX ADMIN — ALBUMIN (HUMAN) 75 G: 12.5 INJECTION, SOLUTION INTRAVENOUS at 03:06

## 2023-08-19 RX ADMIN — ALBUMIN (HUMAN) 50 G: 0.25 INJECTION, SOLUTION INTRAVENOUS at 10:04

## 2023-08-19 RX ADMIN — MIDODRINE HYDROCHLORIDE 20 MG: 5 TABLET ORAL at 14:20

## 2023-08-19 RX ADMIN — Medication 10 ML: at 21:54

## 2023-08-19 RX ADMIN — SODIUM BICARBONATE 150 MEQ: 84 INJECTION, SOLUTION INTRAVENOUS at 17:31

## 2023-08-19 RX ADMIN — OXYCODONE HYDROCHLORIDE 5 MG: 5 TABLET ORAL at 14:56

## 2023-08-19 RX ADMIN — RIFAXIMIN 550 MG: 550 TABLET ORAL at 21:54

## 2023-08-19 RX ADMIN — ALBUMIN (HUMAN) 25 G: 0.25 INJECTION, SOLUTION INTRAVENOUS at 12:30

## 2023-08-19 RX ADMIN — SODIUM BICARBONATE 150 MEQ: 84 INJECTION, SOLUTION INTRAVENOUS at 10:01

## 2023-08-19 RX ADMIN — FEBUXOSTAT 40 MG: 40 TABLET, FILM COATED ORAL at 10:04

## 2023-08-19 RX ADMIN — ONDANSETRON 4 MG: 2 INJECTION INTRAMUSCULAR; INTRAVENOUS at 09:59

## 2023-08-19 RX ADMIN — CALCIUM GLUCONATE 1000 MG: 20 INJECTION, SOLUTION INTRAVENOUS at 18:17

## 2023-08-19 RX ADMIN — SODIUM ZIRCONIUM CYCLOSILICATE 10 G: 10 POWDER, FOR SUSPENSION ORAL at 06:20

## 2023-08-19 RX ADMIN — ALBUMIN (HUMAN) 25 G: 0.25 INJECTION, SOLUTION INTRAVENOUS at 17:25

## 2023-08-19 RX ADMIN — CALCIUM CARBONATE 2 TABLET: 500 TABLET, CHEWABLE ORAL at 14:56

## 2023-08-19 RX ADMIN — PANTOPRAZOLE SODIUM 40 MG: 40 INJECTION, POWDER, LYOPHILIZED, FOR SOLUTION INTRAVENOUS at 10:04

## 2023-08-19 RX ADMIN — Medication 10 ML: at 08:17

## 2023-08-19 RX ADMIN — INSULIN HUMAN 5 UNITS: 100 INJECTION, SOLUTION PARENTERAL at 02:01

## 2023-08-19 RX ADMIN — MIDODRINE HYDROCHLORIDE 20 MG: 5 TABLET ORAL at 06:20

## 2023-08-19 RX ADMIN — SODIUM CHLORIDE 100 ML/HR: 9 INJECTION, SOLUTION INTRAVENOUS at 07:03

## 2023-08-19 RX ADMIN — DEXTROSE MONOHYDRATE 25 G: 25 INJECTION, SOLUTION INTRAVENOUS at 02:02

## 2023-08-19 RX ADMIN — ONDANSETRON 4 MG: 2 INJECTION INTRAMUSCULAR; INTRAVENOUS at 17:37

## 2023-08-19 RX ADMIN — Medication 0.02 MCG/KG/MIN: at 06:55

## 2023-08-19 RX ADMIN — CALCIUM GLUCONATE 1000 MG: 20 INJECTION, SOLUTION INTRAVENOUS at 06:53

## 2023-08-19 RX ADMIN — MIDODRINE HYDROCHLORIDE 20 MG: 5 TABLET ORAL at 00:16

## 2023-08-19 RX ADMIN — Medication 0.15 MCG/KG/MIN: at 22:03

## 2023-08-19 RX ADMIN — RIFAXIMIN 550 MG: 550 TABLET ORAL at 08:17

## 2023-08-19 NOTE — CONSULTS
"  Critical Care Consult Note   Daquan Xiong : 1985 MRN:[id number] LOS:0 ROOM: /     Reason for admission: URIEL (acute kidney injury)     Assessment / Plan     Hypotension due to poor oral intake and GI loss  Severe malnutrition  Nausea and vomiting   Gentle hydration with maintenance fluids   Encourage oral intake   Zofran for nausea  Inpatient dietician consult for malnutrition severity  Albumin   Home dose midodrine restarted    Acute on Chronic kidney injury  Hyperkalemia-Resolved  Hyponatremia  Support blood pressure  Monitor intake and output   Monitor electrolytes   Nephrology consulted   IVF for gentle hydration, avoid fluid overload    History of alcoholic cirrhosis   History of hepatic encephalopathy   Rifaximin home dose restarted       Code Status (Patient has no pulse and is not breathing): CPR (Attempt to Resuscitate)  Medical Interventions (Patient has pulse or is breathing): Full Support       Nutrition: NPO Diet NPO Type: Strict NPO Patient isn't on Tube Feeding     DVT prophylaxis:  Mechanical DVT prophylaxis orders are present.     History of Present illness     A 37 y.o. old male patient with PMH of Alcoholic cirrhosis, severe malnutrition, GERD, CKD III presents to the hospital with complaints of nausea, vomiting, and \"kidney pain\". Patient reports that he has been unable to take most of his medications as well as any oral intake due to nausea and vomiting. Patient had an appointment with his GI provider on  and labs drawn from that time showed he had a significant increase in creatine. He was then referred to the ER. The patient was initially found to hypotensive. He was started on IVF, given a dose of midodrine and his blood pressure did improve. However, shortly after fluid was finished, the patient's again became hypotensive. I was called by the hospitilist service to evaluate the patient for vasopressor need. Patient was accepted to ICU service.     ACP: CPR, Full " Intervention. Patient's spouse Lana Xiong is the patient's decision maker in the event he is unable.     Patient was seen and examined on 08/19/23 at 04:20 EDT .    Subjective / Review of systems     Review of Systems   Constitutional:  Positive for fatigue. Negative for chills.   Respiratory:  Negative for chest tightness and shortness of breath.    Cardiovascular:  Negative for chest pain.   Gastrointestinal:  Positive for nausea and vomiting. Negative for abdominal pain and diarrhea.   Genitourinary:  Negative for flank pain.   Neurological:  Negative for dizziness and light-headedness.      Past Medical/Surgical/Social/Family History & Allergies     Past Medical History:   Diagnosis Date    Alcohol abuse     1/2-1 pint a day, sober since 9/12/2022    Alcoholic cirrhosis 09/01/2022    Arthritis     Bipolar affective disorder 09/22/2022    Cholecystitis 11/08/2022    Chronic constipation 09/05/2017    GERD (gastroesophageal reflux disease) 09/22/2022    Doing well with pepcid.    Hypertension     Osteoarthritis 09/22/2022    Polysubstance abuse 09/22/2022    Severe Malnutrition (HCC) 06/27/2023    Sleep apnea     Tobacco use disorder 09/22/2022      Past Surgical History:   Procedure Laterality Date    BACK SURGERY      WRIST SURGERY        Social History     Socioeconomic History    Marital status:    Tobacco Use    Smoking status: Every Day     Packs/day: 0.50     Types: Cigarettes    Smokeless tobacco: Never   Vaping Use    Vaping Use: Never used   Substance and Sexual Activity    Alcohol use: Not Currently     Comment: sober since 9/12/22    Drug use: Not Currently     Types: Marijuana    Sexual activity: Defer      Family History   Problem Relation Age of Onset    Cirrhosis Father     Heart disease Maternal Grandmother     Diabetes Maternal Grandmother     Diabetes Maternal Grandfather     Diabetes Paternal Grandmother     Diabetes Paternal Grandfather       Allergies   Allergen Reactions    Bee Venom  Anaphylaxis    Scopolamine Anaphylaxis        Home Medications     Prior to Admission medications    Medication Sig Start Date End Date Taking? Authorizing Provider   allopurinol (ZYLOPRIM) 100 MG tablet Take 1 tablet by mouth Daily. 6/8/23  Yes Luis Enrique Byrne MD   cyclobenzaprine (FLEXERIL) 10 MG tablet Take 0.5-1 tablets by mouth 3 (Three) Times a Day As Needed.   Yes Nathaly Pop MD   nadolol (CORGARD) 40 MG tablet Take 1 tablet by mouth Daily As Needed.   Yes Nathaly Pop MD   nicotine (NICODERM CQ) 14 MG/24HR patch Place 1 patch on the skin as directed by provider Daily As Needed.   Yes Nathaly Pop MD   nicotine polacrilex (NICORETTE) 2 MG gum Chew 1 each As Needed for Smoking Cessation.   Yes Nathaly Pop MD   oxyCODONE (ROXICODONE) 10 MG tablet Take 1 tablet by mouth Every 4 (Four) to 6 (Six) Hours As Needed for Moderate Pain.   Yes Nathaly Pop MD   pantoprazole (PROTONIX) 40 MG EC tablet Take 1 tablet by mouth Daily.   Yes Nathaly Pop MD   riFAXIMin (XIFAXAN) 550 MG tablet Take 1 tablet by mouth 2 (Two) Times a Day.   Yes Nathaly Pop MD   baclofen (LIORESAL) 10 MG tablet Take 0.5-1 tablets by mouth 2 (Two) Times a Day As Needed for Muscle Spasms.    Nathaly Pop MD   melatonin 5 MG tablet tablet Take 1 tablet by mouth At Night As Needed.    Nathaly Pop MD   midodrine (PROAMATINE) 10 MG tablet Take 2 tablets (20 mg) by mouth 3 (Three) Times a Day Before Meals 6/28/23 7/28/23  Ward Adams MD        Objective / Physical Exam     Vital signs:  Temp: 98.5 øF (36.9 øC)  BP: (!) 86/42  Heart Rate: 56  Resp: 10  SpO2: 97 %  Weight: 72.6 kg (160 lb)    Admission Weight: Weight: 72.6 kg (160 lb)    Physical Exam  Vitals and nursing note reviewed.   Constitutional:       General: He is not in acute distress.     Appearance: He is ill-appearing (Chronically).   HENT:      Head: Normocephalic.      Mouth/Throat:      Mouth:  Mucous membranes are dry.      Pharynx: Oropharynx is clear.   Eyes:      Extraocular Movements: Extraocular movements intact.      Conjunctiva/sclera: Conjunctivae normal.      Pupils: Pupils are equal, round, and reactive to light.   Cardiovascular:      Rate and Rhythm: Normal rate and regular rhythm.      Pulses: Normal pulses.      Heart sounds: Normal heart sounds.   Pulmonary:      Effort: Pulmonary effort is normal.      Breath sounds: Normal breath sounds.   Abdominal:      General: Bowel sounds are normal.      Palpations: Abdomen is soft.   Musculoskeletal:      Right lower leg: Edema (+++) present.      Left lower leg: Edema (+++) present.   Skin:     General: Skin is warm and dry.      Coloration: Skin is jaundiced.      Findings: No rash.   Neurological:      Mental Status: He is alert and oriented to person, place, and time.   Psychiatric:         Mood and Affect: Mood normal.         Behavior: Behavior normal.        Labs     Results from last 7 days   Lab Units 08/19/23  0404 08/18/23  2315   WBC 10*3/mm3 10.70 9.50   HEMATOCRIT % 24.7* 30.5*   PLATELETS 10*3/mm3 122* 116*      Results from last 7 days   Lab Units 08/19/23  0233 08/18/23  2202   SODIUM mmol/L 127* 130*   POTASSIUM mmol/L 5.1 5.9*   CHLORIDE mmol/L 95* 98   CO2 mmol/L 20.0* 18.0*   BUN mg/dL 69* 74*   CREATININE mg/dL 4.04* 3.91*        Imaging     XR Chest 1 View    Result Date: 8/19/2023  Impression: Cardiomegaly with mild pulmonary vascular prominence. Right IJ central line is in place with the tip at the cavoatrial junction. There is no pneumothorax. Electronically Signed: Deven Machado MD  8/19/2023 6:54 AM EDT  Workstation ID: [id number]      Chest X ray: My independent assessment showed no infiltrates or effusions    EKG: My independent evaluation showed sinus bradycardia, no ST -T changes    Current Medications     Scheduled Meds:  albumin human, 50 g, Intravenous, Once  midodrine, 20 mg, Oral, Q8H  oxyCODONE, 5 mg, Oral,  Once  riFAXIMin, 550 mg, Oral, BID  sodium chloride, 10 mL, Intravenous, Q12H         Continuous Infusions:  norepinephrine, 0.02-0.3 mcg/kg/min  sodium chloride, 75 mL/hr         MONICA Rojas   Critical Care  08/19/23   04:20 EDT

## 2023-08-19 NOTE — NURSING NOTE
Per APRN, pt transferred to ICU accompanied by ICU RN on ecg and continuous oulse ox. Pt remains hypotensive. APRN aware. Levophed ordered to be administered in ICU.

## 2023-08-19 NOTE — CONSULTS
NEPHROLOGY CONSULTATION-----[facility]    [facility]  [phone]  Saji Waldron MD    Patient Care Team:  Wolf Carvalho MD as PCP - General (Geriatric Medicine)  Vanita, MD Fatou as Consulting Physician (Nephrology)    CC/REASON FOR CONSULTATION: RENAL FAILURE/ELEVATED SERUM CREATININE    PHYSICIAN REQUESTING CONSULTATION:     History of Present Illness    HPI    Patient is a 37 y.o. WM, very well known to me, whom I was asked to see in consultation for evaluation and management of renal failure/elevated serum creatinine.  Patient was admitted after presenting to ER with c/o n/v and hypotension and abnormal renal function labs. Per patient he has not been taking Lactulose as prescribed and missed last outpatient paracentesis.  No NSAIDs or recent IV dye exposure. No known h/o TB, rheumatic fever,  SLE. +EtoH related cirrhosis and does bleed/bruise easily. Reports some decreased urine output and urinary hesitancy.  Some dysuria. No edema or fluid retention outside of ascites/abdominal distension. Was not on diuretics prior to admission. Was not on ACE-I/ARB prior to admission. No herbal med use. No seizures, tremors, twitches, HA, blurry vision. No polyuria or polydypsia.      Review of Systems   Constitutional:  Positive for activity change, appetite change and fatigue. Negative for chills, diaphoresis, fever and unexpected weight change.   HENT:  Negative for congestion, dental problem, drooling, ear discharge, ear pain, facial swelling, hearing loss, mouth sores, nosebleeds, postnasal drip, rhinorrhea, sinus pressure, sinus pain, sneezing, sore throat, tinnitus, trouble swallowing and voice change.    Eyes:  Negative for photophobia, pain, discharge, redness, itching and visual disturbance.   Respiratory:  Negative for apnea, cough, choking, chest tightness, shortness of breath, wheezing and stridor.    Cardiovascular:   Negative for chest pain, palpitations and leg swelling.   Gastrointestinal:  Positive for abdominal pain, nausea and vomiting. Negative for abdominal distention, anal bleeding, blood in stool, constipation, diarrhea and rectal pain.   Endocrine: Negative for cold intolerance, heat intolerance, polydipsia, polyphagia and polyuria.   Genitourinary:  Positive for decreased urine volume, difficulty urinating and dysuria. Negative for enuresis, flank pain, frequency, genital sores, hematuria and urgency.   Musculoskeletal:  Positive for arthralgias, back pain and myalgias. Negative for gait problem, joint swelling, neck pain and neck stiffness.   Skin:  Negative for color change, pallor, rash and wound.   Allergic/Immunologic: Negative for environmental allergies, food allergies and immunocompromised state.   Neurological:  Negative for dizziness, tremors, seizures, syncope, facial asymmetry, speech difficulty, weakness, light-headedness, numbness and headaches.   Hematological:  Negative for adenopathy. Bruises/bleeds easily.   Psychiatric/Behavioral:  Positive for dysphoric mood. Negative for agitation, behavioral problems, confusion, decreased concentration, hallucinations, self-injury, sleep disturbance and suicidal ideas. The patient is not nervous/anxious and is not hyperactive.         Past Medical History:   Diagnosis Date    Alcohol abuse     1/2-1 pint a day, sober since 9/12/2022    Alcoholic cirrhosis 09/01/2022    Arthritis     Bipolar affective disorder 09/22/2022    Cholecystitis 11/08/2022    Chronic constipation 09/05/2017    GERD (gastroesophageal reflux disease) 09/22/2022    Doing well with pepcid.    Hypertension     Osteoarthritis 09/22/2022    Polysubstance abuse 09/22/2022    Severe Malnutrition (HCC) 06/27/2023    Sleep apnea     Tobacco use disorder 09/22/2022       Past Surgical History:   Procedure Laterality Date    BACK SURGERY      WRIST SURGERY         Family History   Problem Relation Age  of Onset    Cirrhosis Father     Heart disease Maternal Grandmother     Diabetes Maternal Grandmother     Diabetes Maternal Grandfather     Diabetes Paternal Grandmother     Diabetes Paternal Grandfather        Social History     Tobacco Use    Smoking status: Every Day     Packs/day: 0.50     Types: Cigarettes    Smokeless tobacco: Never   Vaping Use    Vaping Use: Never used   Substance Use Topics    Alcohol use: Not Currently     Comment: sober since 9/12/22    Drug use: Not Currently     Types: Marijuana       Home Meds:   Medications Prior to Admission   Medication Sig Dispense Refill Last Dose    allopurinol (ZYLOPRIM) 100 MG tablet Take 1 tablet by mouth Daily. 30 tablet 0 Past Week    cyclobenzaprine (FLEXERIL) 10 MG tablet Take 0.5-1 tablets by mouth 3 (Three) Times a Day As Needed.   Past Month    nadolol (CORGARD) 40 MG tablet Take 1 tablet by mouth Daily As Needed.   Past Week    nicotine (NICODERM CQ) 14 MG/24HR patch Place 1 patch on the skin as directed by provider Daily As Needed.   8/18/2023    nicotine polacrilex (NICORETTE) 2 MG gum Chew 1 each As Needed for Smoking Cessation.   8/18/2023    oxyCODONE (ROXICODONE) 10 MG tablet Take 1 tablet by mouth Every 4 (Four) to 6 (Six) Hours As Needed for Moderate Pain.   8/18/2023    pantoprazole (PROTONIX) 40 MG EC tablet Take 1 tablet by mouth Daily.   8/18/2023    riFAXIMin (XIFAXAN) 550 MG tablet Take 1 tablet by mouth 2 (Two) Times a Day.   Past Week    baclofen (LIORESAL) 10 MG tablet Take 0.5-1 tablets by mouth 2 (Two) Times a Day As Needed for Muscle Spasms.   Unknown    melatonin 5 MG tablet tablet Take 1 tablet by mouth At Night As Needed.   More than a month    midodrine (PROAMATINE) 10 MG tablet Take 2 tablets (20 mg) by mouth 3 (Three) Times a Day Before Meals 180 tablet 0        Scheduled Meds:  albumin human, 50 g, Intravenous, Once  midodrine, 20 mg, Oral, Q8H  oxyCODONE, 5 mg, Oral, Once  riFAXIMin, 550 mg, Oral, BID  sodium chloride, 10  mL, Intravenous, Q12H        Continuous Infusions:  norepinephrine, 0.02-0.3 mcg/kg/min, Last Rate: 0.05 mcg/kg/min (08/19/23 0821)  sodium chloride, 75 mL/hr  sodium chloride, 100 mL/hr, Last Rate: 100 mL/hr (08/19/23 0703)        PRN Meds:    melatonin    nitroglycerin    ondansetron **OR** ondansetron    [COMPLETED] Insert Peripheral IV **AND** sodium chloride    sodium chloride    sodium chloride    Allergies:  Bee venom and Scopolamine    OBJECTIVE    Vital Signs  Temp:  [96 øF (35.6 øC)-98.7 øF (37.1 øC)] 96 øF (35.6 øC)  Heart Rate:  [54-74] 63  Resp:  [8-19] 8  BP: ()/(20-45) 85/37    I/O this shift:  In: -   Out: 250 [Urine:250]  I/O last 3 completed shifts:  In: -   Out: 200 [Urine:200]    Physical Exam:  General Appearance: alert, appears stated age and cooperative  Head: normocephalic, without obvious abnormality and atraumatic +DRY OP   Eyes: conjunctivae and sclerae normal and no icterus  Neck: supple and no JVD  Lungs: clear to auscultation and respirations regular  Heart: regular rhythm & normal rate and normal S1, S2 +BETHANY  Chest Wall: no abnormalities observed  Abdomen: normal bowel sounds and  +ASCITES/DISTENSION AND MILD BILAT LQ PAIN ON PALPATION  Extremities: moves extremities well, no edema, no cyanosis  Skin: +SCATTERED SCABS, ABRASIONS, ECCHYMOSIS  Neurologic: Alert, and oriented x 3. No focal deficits    Results Review:    I reviewed the patient's new clinical results.    WBC WBC   Date Value Ref Range Status   08/19/2023 10.70 3.40 - 10.80 10*3/mm3 Final   08/18/2023 9.50 3.40 - 10.80 10*3/mm3 Final      HGB Hemoglobin   Date Value Ref Range Status   08/19/2023 8.4 (L) 13.0 - 17.7 g/dL Final   08/18/2023 10.1 (L) 13.0 - 17.7 g/dL Final      HCT Hematocrit   Date Value Ref Range Status   08/19/2023 24.7 (L) 37.5 - 51.0 % Final   08/18/2023 30.5 (L) 37.5 - 51.0 % Final      Platelets No results found for: LABPLAT   MCV MCV   Date Value Ref Range Status   08/19/2023 103.7 (H) 79.0 - 97.0  fL Final   08/18/2023 106.3 (H) 79.0 - 97.0 fL Final          Sodium Sodium   Date Value Ref Range Status   08/19/2023 127 (L) 136 - 145 mmol/L Final   08/18/2023 130 (L) 136 - 145 mmol/L Final      Potassium Potassium   Date Value Ref Range Status   08/19/2023 5.1 3.5 - 5.2 mmol/L Final   08/18/2023 5.9 (H) 3.5 - 5.2 mmol/L Final      Chloride Chloride   Date Value Ref Range Status   08/19/2023 95 (L) 98 - 107 mmol/L Final   08/18/2023 98 98 - 107 mmol/L Final      CO2 CO2   Date Value Ref Range Status   08/19/2023 20.0 (L) 22.0 - 29.0 mmol/L Final   08/18/2023 18.0 (L) 22.0 - 29.0 mmol/L Final      BUN BUN   Date Value Ref Range Status   08/19/2023 69 (H) 6 - 20 mg/dL Final   08/18/2023 74 (H) 6 - 20 mg/dL Final      Creatinine Creatinine   Date Value Ref Range Status   08/19/2023 4.04 (H) 0.76 - 1.27 mg/dL Final   08/18/2023 3.91 (H) 0.76 - 1.27 mg/dL Final      Calcium Calcium   Date Value Ref Range Status   08/19/2023 8.6 8.6 - 10.5 mg/dL Final   08/18/2023 8.7 8.6 - 10.5 mg/dL Final      PO4 No results found for: CAPO4   Albumin Albumin   Date Value Ref Range Status   08/19/2023 2.5 (L) 3.5 - 5.2 g/dL Final   08/18/2023 2.3 (L) 3.5 - 5.2 g/dL Final      Magnesium Magnesium   Date Value Ref Range Status   08/18/2023 2.5 1.6 - 2.6 mg/dL Final      Uric Acid No results found for: URICACID       Imaging Results (Last 72 Hours)       Procedure Component Value Units Date/Time    XR Chest 1 View [[id number]] Collected: 08/19/23 0649     Updated: 08/19/23 0657    Narrative:      XR CHEST 1 VW    Date of Exam: 8/19/2023 6:40 AM EDT    Indication: central line placement    Comparison: July 3, 2023    Findings:  There is a right internal jugular central line with the tip at the cavoatrial junction. The heart size is borderline enlarged. There is mild pulmonary vascular congestion. There are no focal infiltrates to suggest pneumonia.      Impression:      Impression:  Cardiomegaly with mild pulmonary vascular prominence.  Right IJ central line is in place with the tip at the cavoatrial junction. There is no pneumothorax.      Electronically Signed: Deven Machado MD    8/19/2023 6:54 AM EDT    Workstation ID: [id number]              Results for orders placed during the hospital encounter of 08/18/23    XR Chest 1 View    Narrative  XR CHEST 1 VW    Date of Exam: 8/19/2023 6:40 AM EDT    Indication: central line placement    Comparison: July 3, 2023    Findings:  There is a right internal jugular central line with the tip at the cavoatrial junction. The heart size is borderline enlarged. There is mild pulmonary vascular congestion. There are no focal infiltrates to suggest pneumonia.    Impression  Impression:  Cardiomegaly with mild pulmonary vascular prominence. Right IJ central line is in place with the tip at the cavoatrial junction. There is no pneumothorax.      Electronically Signed: Deven Machado MD  8/19/2023 6:54 AM EDT  Workstation ID: [id number]      Results for orders placed during the hospital encounter of 07/03/23    XR Chest 1 View    Narrative  XR CHEST 1 VW    Date of Exam: 7/3/2023 4:13 PM EDT    Indication: AMS    Comparison: AP portable chest 6/20/2023    Findings:  There is some bandlike atelectasis in the right lower lobe, similar to the prior examination. The previously described patchy left basilar airspace disease appears improved. No new airspace disease is identified. The heart size is normal. No pleural  effusion or pneumothorax is identified.    Impression  Impression:  Improving bibasilar atelectasis or pneumonia since 6/28/2023. No new airspace disease is appreciated.      Electronically Signed: Chanda Clark  7/3/2023 4:27 PM EDT  Workstation ID: [id number]      Results for orders placed during the hospital encounter of 06/23/23    XR Chest 1 View    Narrative  XR CHEST 1 VW    Date of Exam: 6/28/2023 11:45 AM EDT    Indication: eval for pneumonia    Comparison: None available.    Findings:  The lung volumes  are low. Patchy airspace disease is seen within the lung bases bilaterally. Probable small bilateral pleural effusions are present. The pulmonary vasculature appears unremarkable. No pneumothorax. No acute osseous abnormality identified.    Impression  Impression:  Low lung volumes with patchy airspace disease seen within the lung bases bilaterally, left greater than right with probable small bilateral pleural effusions, likely related to pneumonia and/or atelectasis.      Electronically Signed: Brynn Higuera  6/28/2023 12:00 PM EDT  Workstation ID: [id number]            ASSESSMENT / PLAN      URIEL (acute kidney injury)    GERD (gastroesophageal reflux disease)    Tobacco use disorder    Alcoholic cirrhosis    Severe malnutrition      1.ARF/URIEL/CRF/CKD---------Nonoliguric. +Recurrent ARF/URIEL on top of known CRF/CKD STG 3A with a baseline serum creatinine of about 1.5. CRF/CKD STG 3A secondary to HTN NS. +ARF/URIEL is secondary to ATN from hypotension and prerenal state/intravascular volume depletion. Hydrate and give IV Albumin. Avoid hypotension. Check urine and serum studies and PVR and renal US. No NSAIDs or IV dye. Dose meds for CrCl less than 10 cc/min until ARF/URIEL is resolved     2. HYPERKALEMIA--------Secondary to RF and acidosis. Med Rx. Telemetry. K+ restrict diet and follow     3. ETOH CIRRHOSIS/HEPATIC ENCEPHALOPATHY/ASCITES-----Restart Lactulose. Follow Ammonia. Follow for paracentesis need. IV Albumin and Midodrine     4. HYPOMAGNESEMIA--------Follow levels     5. HYPERURICEMIA------Uloric     6. HYPOCALCEMIA-------Replace IV     7. ANEMIA-------H/H stable. Check Fe and follow for iron/PRBC need     8. HYPOALBUMINEMIA-----IV Albumin to temporize     9. ELEVATED INR------Related to cirrhosis     10. HYPOTENSION------On Midodrine     11. BIPOLAR DISEASE     12. BASILIO     13. POLYSUBSTANCE ABUSE/TOBACCO USE      14. KETONURIA------Secondary to intravascular volume depletion. IVFs    15. HYPOTENSION-----Levophed,  IVFs, IV Albumin, Midodrine    16. ACIDOSIS--------Bicarb gtt ordered. Follow lactic. +Type 4 RTA and from stool losses related Lactulose use (when he actually takes it)    17. N/V/GERD/PUD PROPHYLAXIS------IV PPI    18. DVT PROPHYLAXIS------SCDs      I discussed the patient's findings and my recommendations with patient, nursing staff, and consulting provider    Will follow along closely. Thank you for allowing us to see this patient in renal consultation.    [facility]  [phone]  MD Saji Tang MD  08/19/23  08:43 EDT

## 2023-08-19 NOTE — PLAN OF CARE
Goal Outcome Evaluation:      Patient continues on levophed. Nephrology consulted and bicarb gtt started. Rosario catheter placed. Patient resting comfortably in bed with wife at bedside.

## 2023-08-19 NOTE — NURSING NOTE
APRN at bedside to place central line. Consents obtained and pt verbalized understanidng of procedure.

## 2023-08-19 NOTE — NURSING NOTE
"Pt transported from ED bed 8 to PCU 2130 via bed, on ecg and continuous pulse ox, with PCU RN and CNA. Pt hypotensive but states that is his \"normal\" albumin infusion started. APRN contacted for orders.   "

## 2023-08-19 NOTE — ED PROVIDER NOTES
Subjective   History of Present Illness  Chief complaint: Abnormal labs    37-year-old male with a history of alcoholic cirrhosis presents with abnormal labs.  Patient states he had blood work done yesterday at his GI office and he was told to come to the hospital because his creatinine was high.  He has had nausea and vomiting.  He has had some diffuse abdominal discomfort.  He denies fever.    History provided by:  Patient    Review of Systems   Constitutional:  Negative for fever.   HENT:  Negative for congestion.    Respiratory:  Negative for cough and shortness of breath.    Cardiovascular:  Negative for chest pain.   Gastrointestinal:  Positive for abdominal pain, nausea and vomiting.   Genitourinary:  Negative for dysuria.   Musculoskeletal:  Negative for back pain.   Neurological:  Negative for headaches.   Psychiatric/Behavioral:  Negative for confusion.      Past Medical History:   Diagnosis Date    Alcohol abuse     1/2-1 pint a day, sober since 9/12/2022    Alcoholic cirrhosis 09/01/2022    Arthritis     Bipolar affective disorder 09/22/2022    Cholecystitis 11/08/2022    Chronic constipation 09/05/2017    GERD (gastroesophageal reflux disease) 09/22/2022    Doing well with pepcid.    Hypertension     Osteoarthritis 09/22/2022    Polysubstance abuse 09/22/2022    Severe Malnutrition (HCC) 06/27/2023    Sleep apnea     Tobacco use disorder 09/22/2022       Allergies   Allergen Reactions    Bee Venom Anaphylaxis    Scopolamine Anaphylaxis       Past Surgical History:   Procedure Laterality Date    BACK SURGERY      WRIST SURGERY         Family History   Problem Relation Age of Onset    Cirrhosis Father     Heart disease Maternal Grandmother     Diabetes Maternal Grandmother     Diabetes Maternal Grandfather     Diabetes Paternal Grandmother     Diabetes Paternal Grandfather        Social History     Socioeconomic History    Marital status:    Tobacco Use    Smoking status: Every Day     Packs/day:  "0.50     Types: Cigarettes    Smokeless tobacco: Never   Vaping Use    Vaping Use: Never used   Substance and Sexual Activity    Alcohol use: Not Currently     Comment: sober since 9/12/22    Drug use: Not Currently     Types: Marijuana    Sexual activity: Defer       BP 96/43 (BP Location: Left arm, Patient Position: Lying)   Pulse 58   Temp 98.5 øF (36.9 øC) (Oral)   Resp 12   Ht 185.4 cm (73\")   Wt 72.6 kg (160 lb)   SpO2 98%   BMI 21.11 kg/mý       Objective   Physical Exam  Vitals and nursing note reviewed.   Constitutional:       Comments: Chronically ill-appearing   HENT:      Head: Normocephalic and atraumatic.      Mouth/Throat:      Mouth: Mucous membranes are moist.   Eyes:      General: Scleral icterus present.   Cardiovascular:      Rate and Rhythm: Normal rate and regular rhythm.      Heart sounds: Normal heart sounds.   Pulmonary:      Effort: Pulmonary effort is normal. No respiratory distress.      Breath sounds: Normal breath sounds.   Abdominal:      Palpations: Abdomen is soft.      Comments: Mild tenderness to palpation diffusely, no rebound or guarding   Skin:     Coloration: Skin is jaundiced.   Neurological:      Mental Status: He is alert and oriented to person, place, and time.       Procedures           ED Course      Results for orders placed or performed during the hospital encounter of 08/18/23   Comprehensive Metabolic Panel    Specimen: Arm, Right; Blood   Result Value Ref Range    Glucose 86 65 - 99 mg/dL    BUN 74 (H) 6 - 20 mg/dL    Creatinine 3.91 (H) 0.76 - 1.27 mg/dL    Sodium 130 (L) 136 - 145 mmol/L    Potassium 5.9 (H) 3.5 - 5.2 mmol/L    Chloride 98 98 - 107 mmol/L    CO2 18.0 (L) 22.0 - 29.0 mmol/L    Calcium 8.7 8.6 - 10.5 mg/dL    Total Protein 4.6 (L) 6.0 - 8.5 g/dL    Albumin 2.3 (L) 3.5 - 5.2 g/dL    ALT (SGPT) 30 1 - 41 U/L    AST (SGOT) 77 (H) 1 - 40 U/L    Alkaline Phosphatase 232 (H) 39 - 117 U/L    Total Bilirubin 4.8 (H) 0.0 - 1.2 mg/dL    Globulin 2.3 " gm/dL    A/G Ratio 1.0 g/dL    BUN/Creatinine Ratio 18.9 7.0 - 25.0    Anion Gap 14.0 5.0 - 15.0 mmol/L    eGFR 19.2 (L) >60.0 mL/min/1.73   Lipase    Specimen: Arm, Right; Blood   Result Value Ref Range    Lipase 68 (H) 13 - 60 U/L   Protime-INR    Specimen: Blood   Result Value Ref Range    Protime 14.4 (H) 9.6 - 11.7 Seconds    INR 1.37 (H) 0.93 - 1.10   Ammonia    Specimen: Blood   Result Value Ref Range    Ammonia 52 16 - 60 umol/L   Magnesium    Specimen: Arm, Right; Blood   Result Value Ref Range    Magnesium 2.5 1.6 - 2.6 mg/dL   CBC Auto Differential    Specimen: Arm, Right; Blood   Result Value Ref Range    WBC 9.50 3.40 - 10.80 10*3/mm3    RBC 2.87 (L) 4.14 - 5.80 10*6/mm3    Hemoglobin 10.1 (L) 13.0 - 17.7 g/dL    Hematocrit 30.5 (L) 37.5 - 51.0 %    .3 (H) 79.0 - 97.0 fL    MCH 35.2 (H) 26.6 - 33.0 pg    MCHC 33.1 31.5 - 35.7 g/dL    RDW 15.4 12.3 - 15.4 %    RDW-SD 57.3 (H) 37.0 - 54.0 fl    MPV 10.0 6.0 - 12.0 fL    Platelets 116 (L) 140 - 450 10*3/mm3    Neutrophil % 57.8 42.7 - 76.0 %    Lymphocyte % 21.2 19.6 - 45.3 %    Monocyte % 17.0 (H) 5.0 - 12.0 %    Eosinophil % 3.3 0.3 - 6.2 %    Basophil % 0.7 0.0 - 1.5 %    Neutrophils, Absolute 5.50 1.70 - 7.00 10*3/mm3    Lymphocytes, Absolute 2.00 0.70 - 3.10 10*3/mm3    Monocytes, Absolute 1.60 (H) 0.10 - 0.90 10*3/mm3    Eosinophils, Absolute 0.30 0.00 - 0.40 10*3/mm3    Basophils, Absolute 0.10 0.00 - 0.20 10*3/mm3    nRBC 0.0 0.0 - 0.2 /100 WBC   POC Lactate    Specimen: Blood   Result Value Ref Range    Lactate 1.3 0.3 - 2.0 mmol/L                                          Medical Decision Making  Amount and/or Complexity of Data Reviewed  Labs: ordered.    Risk  Prescription drug management.      Patient had the above evaluation.  Results were discussed with the patient.  Patient was hypotensive on arrival.  He was started on IV fluids.  Patient also states he missed 3 doses of his midodrine today because of nausea.  He was given a dose of  midodrine.  His blood pressure is starting to come up.  Patient has acute kidney injury with a BUN of 30.91 and BUN of 74.  Potassium is a little elevated at 5.9.  Ammonia level is normal.  Lipase is 68.  White blood cell count is normal.  Hemoglobin is 10.1.  I discussed with the provider on-call for the hospitalist and the patient will be admitted for further evaluation and management including nephrology consult.      Final diagnoses:   Acute kidney injury   Hypotension, unspecified hypotension type       ED Disposition  ED Disposition       ED Disposition   Decision to Admit    Condition   --    Comment   Level of Care: Progressive Care [20]   Admitting Physician: EFFIE DIEZ [[id number]]   Attending Physician: EFFIE DIEZ [[id number]]                 No follow-up provider specified.       Medication List      No changes were made to your prescriptions during this visit.            Jose Jacobs MD  08/19/23 0038

## 2023-08-19 NOTE — H&P
"    [facility] Medicine Services  History & Physical    Patient Name: Daquan Xiong  : 1985  MRN: [id number]  Primary Care Physician:  Wolf Carvalho MD  Date of admission: 2023  Date and Time of Service: 2023 at 00 30    Subjective      Chief Complaint: Abnormal lab with nausea and vomiting    History of Present Illness: Daquan Xiong is a 37 y.o. male who presented to [facility] on 2023 complaining of recent nausea and vomiting with \"kidney pain\" and abnormal lab.  Patient reports that for approximately past day he has developed a \"pain in his kidneys\" bilaterally as well as some suprapubic pain.  He has had multiple episodes of nausea as well as nonbloody vomiting and has been unable to tolerate any p.o. intake of liquids or solids.  He was evaluated at primary GI office the previous day and had labs drawn and was found to have URIEL and was referred to the ED.  He denies any fever, sick contacts, changes in bowel habits, dyspnea, chest pain or palpitations.  He does feel his stomach is somewhat distended again but denies any tenderness to palpation.      Review of Systems   Constitutional: Negative.   HENT: Negative.     Eyes: Negative.    Cardiovascular: Negative.    Respiratory: Negative.     Skin: Negative.    Musculoskeletal:  Positive for back pain.   Gastrointestinal:  Positive for nausea and vomiting. Negative for hematemesis, hematochezia and melena.   Genitourinary:  Positive for pelvic pain.   Neurological: Negative.    Psychiatric/Behavioral: Negative.        Personal History     Past Medical History:   Diagnosis Date    Alcohol abuse     1/2-1 pint a day, sober since 2022    Alcoholic cirrhosis 2022    Arthritis     Bipolar affective disorder 2022    Cholecystitis 2022    Chronic constipation 2017    GERD (gastroesophageal reflux disease) 2022    Doing well with pepcid.    Hypertension     Osteoarthritis 2022    " Polysubstance abuse 09/22/2022    Severe Malnutrition (HCC) 06/27/2023    Sleep apnea     Tobacco use disorder 09/22/2022       Past Surgical History:   Procedure Laterality Date    BACK SURGERY      WRIST SURGERY         Family History: family history includes Cirrhosis in his father; Diabetes in his maternal grandfather, maternal grandmother, paternal grandfather, and paternal grandmother; Heart disease in his maternal grandmother. Otherwise pertinent FHx was reviewed and not pertinent to current issue.    Social History:  reports that he has been smoking cigarettes. He has been smoking an average of .5 packs per day. He has never used smokeless tobacco. He reports that he does not currently use alcohol. He reports that he does not currently use drugs after having used the following drugs: Marijuana.    Home Medications:  Prior to Admission Medications       Prescriptions Last Dose Informant Patient Reported? Taking?    allopurinol (ZYLOPRIM) 100 MG tablet   No No    Take 1 tablet by mouth Daily.    baclofen (LIORESAL) 10 MG tablet   Yes No    Take 5-10 mg by mouth 2 (Two) Times a Day As Needed for Muscle Spasms.    cyclobenzaprine (FLEXERIL) 10 MG tablet   Yes No    Take 5-10 mg by mouth 3 (Three) Times a Day As Needed.    melatonin 5 MG tablet tablet   Yes No    Take 1 tablet by mouth At Night As Needed.    midodrine (PROAMATINE) 10 MG tablet   No No    Take 2 tablets (20 mg) by mouth 3 (Three) Times a Day Before Meals    nadolol (CORGARD) 40 MG tablet   Yes No    Take 1 tablet by mouth Daily As Needed.    nicotine (NICODERM CQ) 14 MG/24HR patch   Yes No    Place 1 patch on the skin as directed by provider Daily As Needed.    nicotine polacrilex (NICORETTE) 2 MG gum   Yes No    Chew 1 each As Needed for Smoking Cessation.    oxyCODONE (ROXICODONE) 10 MG tablet   Yes No    Take 1 tablet by mouth Every 4 (Four) to 6 (Six) Hours As Needed for Moderate Pain.    pantoprazole (PROTONIX) 40 MG EC tablet   Yes No     Take 1 tablet by mouth Daily.    riFAXIMin (XIFAXAN) 550 MG tablet   Yes No    Take 1 tablet by mouth 2 (Two) Times a Day.              Allergies:  Allergies   Allergen Reactions    Bee Venom Anaphylaxis    Scopolamine Anaphylaxis       Objective      Vitals:   Temp:  [98.5 øF (36.9 øC)-98.7 øF (37.1 øC)] 98.5 øF (36.9 øC)  Heart Rate:  [54-74] 58  Resp:  [12-19] 12  BP: ()/(20-45) 96/43    Physical Exam  Vitals reviewed.   Constitutional:       General: He is not in acute distress.     Appearance: Normal appearance. He is normal weight. He is not ill-appearing, toxic-appearing or diaphoretic.   HENT:      Head: Normocephalic.      Right Ear: External ear normal.      Left Ear: External ear normal.      Nose: Nose normal.      Mouth/Throat:      Mouth: Mucous membranes are moist.   Eyes:      General: Scleral icterus present.      Extraocular Movements: Extraocular movements intact.   Cardiovascular:      Rate and Rhythm: Normal rate and regular rhythm.      Pulses: Normal pulses.      Heart sounds: Normal heart sounds.   Pulmonary:      Effort: Pulmonary effort is normal.      Breath sounds: Normal breath sounds.   Abdominal:      General: There is distension.      Palpations: Abdomen is soft.      Tenderness: There is no abdominal tenderness.      Comments: Bowel sounds hyperactive   Musculoskeletal:         General: Normal range of motion.      Cervical back: Normal range of motion.      Right lower leg: No edema.      Left lower leg: No edema.   Skin:     General: Skin is warm and dry.      Capillary Refill: Capillary refill takes less than 2 seconds.   Neurological:      General: No focal deficit present.      Mental Status: He is alert and oriented to person, place, and time.   Psychiatric:         Mood and Affect: Mood normal.         Behavior: Behavior normal.         Thought Content: Thought content normal.         Judgment: Judgment normal.        Result Review    Result Review:  I have personally  reviewed the results from the time of this admission to 8/19/2023 00:37 EDT and agree with these findings:  [x]  Laboratory  []  Microbiology  []  Radiology  [x]  EKG/Telemetry   []  Cardiology/Vascular   []  Pathology  []  Old records  []  Other:  Most notable findings include: CMP, CBC, ammonia, lipase, lactate, INR/PT and EKG      Assessment & Plan        Active Hospital Problems:  Active Hospital Problems    Diagnosis     URIEL (acute kidney injury)     GERD (gastroesophageal reflux disease)     Tobacco use disorder     Alcoholic cirrhosis      Plan:     URIEL with a history of alcoholic cirrhosis  -Creatinine: 3.91 with a BUN of 74, BUN/creatinine ratio of 18.9 with a GFR of 19.2 (creatinine on 7/7: 1.39, GFR: 66.5)  -Patient hypotensive in the ED, was given fluid bolus and his home midodrine, will monitor closely and may consider albumin administration  -ALT: 30, AST: 77, alk phos: 232, bilirubin: 4.8, albumin: 2.3, ammonia: 52, lipase: 68  -Lactate: 1.3  -Monitor CMP while admitted  -Hold beta-blocker for now  -Nephrology consulted  -Check UA and monitor I's and O's  -Discussed case with attending physician who recommended addition of 75 g albumin, urine and serum osmolality, urine electrolytes and discussion with ICU provider for possible transfer to intensive care unit due to blood pressure again beginning to trend down and likely need for pressor therapy    Hyperkalemia  Lab Results   Component Value Date    K 5.9 (H) 08/18/2023    MG 2.5 08/18/2023   -Electrolyte replacement protocol ordered  -Monitor potassium and magnesium  -Check EKG  -Lokelma, calcium gluconate and D50 with insulin ordered    GERD  -PPI    Tobacco abuse  -Nicotine patch      DVT prophylaxis:  No DVT prophylaxis order currently exists.    CODE STATUS:       Admission Status:  I believe this patient meets inpatient status.    I discussed the patient's findings and my recommendations with patient and nursing staff.      Signature:  Electronically signed by Greg Marcial PA-C, 08/19/23, 00:37 EDT.  [facility] Hospitalist Team

## 2023-08-20 ENCOUNTER — INPATIENT HOSPITAL (OUTPATIENT)
Dept: URBAN - METROPOLITAN AREA HOSPITAL 84 | Facility: HOSPITAL | Age: 38
End: 2023-08-20
Payer: COMMERCIAL

## 2023-08-20 ENCOUNTER — APPOINTMENT (OUTPATIENT)
Dept: CT IMAGING | Facility: HOSPITAL | Age: 38
DRG: 871 | End: 2023-08-20
Payer: COMMERCIAL

## 2023-08-20 DIAGNOSIS — K74.69 OTHER CIRRHOSIS OF LIVER: ICD-10-CM

## 2023-08-20 LAB
ABO GROUP BLD: NORMAL
ALBUMIN SERPL-MCNC: 4.2 G/DL (ref 3.5–5.2)
ALBUMIN/GLOB SERPL: 3.2 G/DL
ALP SERPL-CCNC: 152 U/L (ref 39–117)
ALT SERPL W P-5'-P-CCNC: 18 U/L (ref 1–41)
AMMONIA BLD-SCNC: 91 UMOL/L (ref 16–60)
ANION GAP SERPL CALCULATED.3IONS-SCNC: 14 MMOL/L (ref 5–15)
ANION GAP SERPL CALCULATED.3IONS-SCNC: 15 MMOL/L (ref 5–15)
ANION GAP SERPL CALCULATED.3IONS-SCNC: 17 MMOL/L (ref 5–15)
APPEARANCE FLD: ABNORMAL
AST SERPL-CCNC: 56 U/L (ref 1–40)
BASOPHILS # BLD AUTO: 0 10*3/MM3 (ref 0–0.2)
BASOPHILS NFR BLD AUTO: 0.3 % (ref 0–1.5)
BILIRUB SERPL-MCNC: 4.2 MG/DL (ref 0–1.2)
BLD GP AB SCN SERPL QL: NEGATIVE
BUN SERPL-MCNC: 47 MG/DL (ref 6–20)
BUN SERPL-MCNC: 52 MG/DL (ref 6–20)
BUN SERPL-MCNC: 58 MG/DL (ref 6–20)
BUN/CREAT SERPL: 17.2 (ref 7–25)
BUN/CREAT SERPL: 17.3 (ref 7–25)
BUN/CREAT SERPL: 19.6 (ref 7–25)
CA-I SERPL ISE-MCNC: 1.14 MMOL/L (ref 1.2–1.3)
CALCIUM SPEC-SCNC: 9.4 MG/DL (ref 8.6–10.5)
CALCIUM SPEC-SCNC: 9.6 MG/DL (ref 8.6–10.5)
CALCIUM SPEC-SCNC: 9.8 MG/DL (ref 8.6–10.5)
CHLORIDE SERPL-SCNC: 95 MMOL/L (ref 98–107)
CHLORIDE SERPL-SCNC: 96 MMOL/L (ref 98–107)
CHLORIDE SERPL-SCNC: 98 MMOL/L (ref 98–107)
CK SERPL-CCNC: 28 U/L (ref 20–200)
CO2 SERPL-SCNC: 24 MMOL/L (ref 22–29)
CO2 SERPL-SCNC: 26 MMOL/L (ref 22–29)
CO2 SERPL-SCNC: 26 MMOL/L (ref 22–29)
COLOR FLD: ABNORMAL
CREAT SERPL-MCNC: 2.71 MG/DL (ref 0.76–1.27)
CREAT SERPL-MCNC: 2.96 MG/DL (ref 0.76–1.27)
CREAT SERPL-MCNC: 3.03 MG/DL (ref 0.76–1.27)
D-LACTATE SERPL-SCNC: 3.9 MMOL/L (ref 0.5–2)
D-LACTATE SERPL-SCNC: 4.1 MMOL/L (ref 0.5–2)
DEPRECATED RDW RBC AUTO: 53.8 FL (ref 37–54)
EGFRCR SERPLBLD CKD-EPI 2021: 26.1 ML/MIN/1.73
EGFRCR SERPLBLD CKD-EPI 2021: 26.9 ML/MIN/1.73
EGFRCR SERPLBLD CKD-EPI 2021: 29.9 ML/MIN/1.73
EOSINOPHIL # BLD AUTO: 0.1 10*3/MM3 (ref 0–0.4)
EOSINOPHIL NFR BLD AUTO: 0.9 % (ref 0.3–6.2)
ERYTHROCYTE [DISTWIDTH] IN BLOOD BY AUTOMATED COUNT: 15.2 % (ref 12.3–15.4)
GLOBULIN UR ELPH-MCNC: 1.3 GM/DL
GLUCOSE SERPL-MCNC: 149 MG/DL (ref 65–99)
GLUCOSE SERPL-MCNC: 151 MG/DL (ref 65–99)
GLUCOSE SERPL-MCNC: 170 MG/DL (ref 65–99)
HCT VFR BLD AUTO: 19.4 % (ref 37.5–51)
HCT VFR BLD AUTO: 20.9 % (ref 37.5–51)
HCT VFR BLD AUTO: 21.4 % (ref 37.5–51)
HGB BLD-MCNC: 6.4 G/DL (ref 13–17.7)
HGB BLD-MCNC: 7 G/DL (ref 13–17.7)
HGB BLD-MCNC: 7.1 G/DL (ref 13–17.7)
INR PPP: 1.65 (ref 0.93–1.1)
LYMPHOCYTES # BLD AUTO: 1.2 10*3/MM3 (ref 0.7–3.1)
LYMPHOCYTES NFR BLD AUTO: 17.6 % (ref 19.6–45.3)
LYMPHOCYTES NFR FLD MANUAL: 66 %
MAGNESIUM SERPL-MCNC: 2.1 MG/DL (ref 1.6–2.6)
MCH RBC QN AUTO: 35.1 PG (ref 26.6–33)
MCHC RBC AUTO-ENTMCNC: 33.8 G/DL (ref 31.5–35.7)
MCV RBC AUTO: 103.9 FL (ref 79–97)
MONOCYTES # BLD AUTO: 1.1 10*3/MM3 (ref 0.1–0.9)
MONOCYTES NFR BLD AUTO: 16.7 % (ref 5–12)
MONOCYTES NFR FLD: 32 %
NEUTROPHILS NFR BLD AUTO: 4.3 10*3/MM3 (ref 1.7–7)
NEUTROPHILS NFR BLD AUTO: 64.5 % (ref 42.7–76)
NEUTROPHILS NFR FLD MANUAL: 2 %
NRBC BLD AUTO-RTO: 0 /100 WBC (ref 0–0.2)
NUC CELL # FLD: 119 /MM3
PLATELET # BLD AUTO: 98 10*3/MM3 (ref 140–450)
PMV BLD AUTO: 9.9 FL (ref 6–12)
POTASSIUM SERPL-SCNC: 3.6 MMOL/L (ref 3.5–5.2)
POTASSIUM SERPL-SCNC: 3.6 MMOL/L (ref 3.5–5.2)
POTASSIUM SERPL-SCNC: 3.9 MMOL/L (ref 3.5–5.2)
PROT SERPL-MCNC: 5.5 G/DL (ref 6–8.5)
PROTHROMBIN TIME: 17.2 SECONDS (ref 9.6–11.7)
RBC # BLD AUTO: 2.01 10*6/MM3 (ref 4.14–5.8)
RH BLD: POSITIVE
SODIUM SERPL-SCNC: 136 MMOL/L (ref 136–145)
SODIUM SERPL-SCNC: 136 MMOL/L (ref 136–145)
SODIUM SERPL-SCNC: 139 MMOL/L (ref 136–145)
T&S EXPIRATION DATE: NORMAL
WBC NRBC COR # BLD: 6.7 10*3/MM3 (ref 3.4–10.8)

## 2023-08-20 PROCEDURE — 85610 PROTHROMBIN TIME: CPT | Performed by: INTERNAL MEDICINE

## 2023-08-20 PROCEDURE — 25010000002 HYDROMORPHONE 1 MG/ML SOLUTION: Performed by: NURSE PRACTITIONER

## 2023-08-20 PROCEDURE — 0W9G3ZZ DRAINAGE OF PERITONEAL CAVITY, PERCUTANEOUS APPROACH: ICD-10-PCS | Performed by: NURSE PRACTITIONER

## 2023-08-20 PROCEDURE — 25010000002 OCTREOTIDE PER 25 MCG: Performed by: NURSE PRACTITIONER

## 2023-08-20 PROCEDURE — 86901 BLOOD TYPING SEROLOGIC RH(D): CPT

## 2023-08-20 PROCEDURE — 86900 BLOOD TYPING SEROLOGIC ABO: CPT

## 2023-08-20 PROCEDURE — 87205 SMEAR GRAM STAIN: CPT | Performed by: NURSE PRACTITIONER

## 2023-08-20 PROCEDURE — 85018 HEMOGLOBIN: CPT | Performed by: STUDENT IN AN ORGANIZED HEALTH CARE EDUCATION/TRAINING PROGRAM

## 2023-08-20 PROCEDURE — 36430 TRANSFUSION BLD/BLD COMPNT: CPT

## 2023-08-20 PROCEDURE — 25010000002 CEFTRIAXONE PER 250 MG: Performed by: INTERNAL MEDICINE

## 2023-08-20 PROCEDURE — 83605 ASSAY OF LACTIC ACID: CPT | Performed by: INTERNAL MEDICINE

## 2023-08-20 PROCEDURE — 85014 HEMATOCRIT: CPT | Performed by: INTERNAL MEDICINE

## 2023-08-20 PROCEDURE — 89051 BODY FLUID CELL COUNT: CPT | Performed by: NURSE PRACTITIONER

## 2023-08-20 PROCEDURE — 82330 ASSAY OF CALCIUM: CPT | Performed by: INTERNAL MEDICINE

## 2023-08-20 PROCEDURE — 94640 AIRWAY INHALATION TREATMENT: CPT

## 2023-08-20 PROCEDURE — 85025 COMPLETE CBC W/AUTO DIFF WBC: CPT | Performed by: PHYSICIAN ASSISTANT

## 2023-08-20 PROCEDURE — 85018 HEMOGLOBIN: CPT | Performed by: INTERNAL MEDICINE

## 2023-08-20 PROCEDURE — 99222 1ST HOSP IP/OBS MODERATE 55: CPT | Performed by: NURSE PRACTITIONER

## 2023-08-20 PROCEDURE — 25010000002 ALBUMIN HUMAN 25% PER 50 ML: Performed by: INTERNAL MEDICINE

## 2023-08-20 PROCEDURE — 80053 COMPREHEN METABOLIC PANEL: CPT | Performed by: NURSE PRACTITIONER

## 2023-08-20 PROCEDURE — 74176 CT ABD & PELVIS W/O CONTRAST: CPT

## 2023-08-20 PROCEDURE — 85014 HEMATOCRIT: CPT | Performed by: STUDENT IN AN ORGANIZED HEALTH CARE EDUCATION/TRAINING PROGRAM

## 2023-08-20 PROCEDURE — 94799 UNLISTED PULMONARY SVC/PX: CPT

## 2023-08-20 PROCEDURE — 84157 ASSAY OF PROTEIN OTHER: CPT | Performed by: NURSE PRACTITIONER

## 2023-08-20 PROCEDURE — 25010000002 ONDANSETRON PER 1 MG: Performed by: PHYSICIAN ASSISTANT

## 2023-08-20 PROCEDURE — P9047 ALBUMIN (HUMAN), 25%, 50ML: HCPCS | Performed by: INTERNAL MEDICINE

## 2023-08-20 PROCEDURE — 83735 ASSAY OF MAGNESIUM: CPT | Performed by: PHYSICIAN ASSISTANT

## 2023-08-20 PROCEDURE — 86850 RBC ANTIBODY SCREEN: CPT

## 2023-08-20 PROCEDURE — 86923 COMPATIBILITY TEST ELECTRIC: CPT

## 2023-08-20 PROCEDURE — 87070 CULTURE OTHR SPECIMN AEROBIC: CPT | Performed by: NURSE PRACTITIONER

## 2023-08-20 PROCEDURE — P9047 ALBUMIN (HUMAN), 25%, 50ML: HCPCS | Performed by: NURSE PRACTITIONER

## 2023-08-20 PROCEDURE — 83605 ASSAY OF LACTIC ACID: CPT | Performed by: NURSE PRACTITIONER

## 2023-08-20 PROCEDURE — 82140 ASSAY OF AMMONIA: CPT | Performed by: STUDENT IN AN ORGANIZED HEALTH CARE EDUCATION/TRAINING PROGRAM

## 2023-08-20 PROCEDURE — 83615 LACTATE (LD) (LDH) ENZYME: CPT | Performed by: NURSE PRACTITIONER

## 2023-08-20 PROCEDURE — P9016 RBC LEUKOCYTES REDUCED: HCPCS

## 2023-08-20 PROCEDURE — 25010000002 ALBUMIN HUMAN 25% PER 50 ML: Performed by: NURSE PRACTITIONER

## 2023-08-20 PROCEDURE — 82550 ASSAY OF CK (CPK): CPT | Performed by: INTERNAL MEDICINE

## 2023-08-20 PROCEDURE — 25010000002 CALCIUM GLUCONATE-NACL 1-0.675 GM/50ML-% SOLUTION: Performed by: INTERNAL MEDICINE

## 2023-08-20 PROCEDURE — 82945 GLUCOSE OTHER FLUID: CPT | Performed by: NURSE PRACTITIONER

## 2023-08-20 RX ORDER — ALBUMIN (HUMAN) 12.5 G/50ML
100 SOLUTION INTRAVENOUS ONCE
Status: COMPLETED | OUTPATIENT
Start: 2023-08-20 | End: 2023-08-20

## 2023-08-20 RX ORDER — ALBUMIN (HUMAN) 12.5 G/50ML
62.5 SOLUTION INTRAVENOUS ONCE
Status: DISCONTINUED | OUTPATIENT
Start: 2023-08-20 | End: 2023-08-20

## 2023-08-20 RX ORDER — ALPRAZOLAM 1 MG/1
1 TABLET ORAL EVERY 8 HOURS PRN
Status: DISCONTINUED | OUTPATIENT
Start: 2023-08-20 | End: 2023-08-24 | Stop reason: HOSPADM

## 2023-08-20 RX ORDER — ALBUMIN (HUMAN) 12.5 G/50ML
112.5 SOLUTION INTRAVENOUS ONCE
Status: DISCONTINUED | OUTPATIENT
Start: 2023-08-20 | End: 2023-08-20

## 2023-08-20 RX ORDER — ACETYLCYSTEINE 200 MG/ML
3 SOLUTION ORAL; RESPIRATORY (INHALATION)
Status: DISPENSED | OUTPATIENT
Start: 2023-08-20 | End: 2023-08-22

## 2023-08-20 RX ORDER — PANTOPRAZOLE SODIUM 40 MG/1
40 TABLET, DELAYED RELEASE ORAL EVERY 12 HOURS
Status: DISCONTINUED | OUTPATIENT
Start: 2023-08-20 | End: 2023-08-20

## 2023-08-20 RX ORDER — ALBUMIN (HUMAN) 12.5 G/50ML
75 SOLUTION INTRAVENOUS ONCE
Status: DISCONTINUED | OUTPATIENT
Start: 2023-08-20 | End: 2023-08-20

## 2023-08-20 RX ORDER — ALBUTEROL SULFATE 0.63 MG/3ML
0.63 SOLUTION RESPIRATORY (INHALATION) EVERY 12 HOURS
Status: DISCONTINUED | OUTPATIENT
Start: 2023-08-20 | End: 2023-08-20

## 2023-08-20 RX ORDER — ALBUMIN (HUMAN) 12.5 G/50ML
50 SOLUTION INTRAVENOUS ONCE
Status: DISCONTINUED | OUTPATIENT
Start: 2023-08-20 | End: 2023-08-20

## 2023-08-20 RX ORDER — ALBUMIN (HUMAN) 12.5 G/50ML
37.5 SOLUTION INTRAVENOUS ONCE
Status: DISCONTINUED | OUTPATIENT
Start: 2023-08-20 | End: 2023-08-20

## 2023-08-20 RX ORDER — PROMETHAZINE HYDROCHLORIDE 25 MG/1
25 SUPPOSITORY RECTAL ONCE
Status: COMPLETED | OUTPATIENT
Start: 2023-08-20 | End: 2023-08-20

## 2023-08-20 RX ORDER — ALBUTEROL SULFATE 2.5 MG/3ML
2.5 SOLUTION RESPIRATORY (INHALATION)
Status: DISPENSED | OUTPATIENT
Start: 2023-08-20 | End: 2023-08-22

## 2023-08-20 RX ORDER — LACTULOSE 10 G/15ML
10 SOLUTION ORAL 2 TIMES DAILY
Status: DISCONTINUED | OUTPATIENT
Start: 2023-08-20 | End: 2023-08-20

## 2023-08-20 RX ORDER — PANTOPRAZOLE SODIUM 40 MG/10ML
40 INJECTION, POWDER, LYOPHILIZED, FOR SOLUTION INTRAVENOUS EVERY 12 HOURS SCHEDULED
Status: DISCONTINUED | OUTPATIENT
Start: 2023-08-20 | End: 2023-08-24

## 2023-08-20 RX ORDER — LACTULOSE 10 G/15ML
10 SOLUTION ORAL 3 TIMES DAILY
Status: DISCONTINUED | OUTPATIENT
Start: 2023-08-20 | End: 2023-08-21

## 2023-08-20 RX ORDER — PHYTONADIONE 2 MG/ML
10 INJECTION, EMULSION INTRAMUSCULAR; INTRAVENOUS; SUBCUTANEOUS ONCE
Status: DISCONTINUED | OUTPATIENT
Start: 2023-08-20 | End: 2023-08-22

## 2023-08-20 RX ORDER — ALBUTEROL SULFATE 0.63 MG/3ML
0.83 SOLUTION RESPIRATORY (INHALATION) EVERY 12 HOURS
Status: DISCONTINUED | OUTPATIENT
Start: 2023-08-21 | End: 2023-08-20

## 2023-08-20 RX ORDER — CALCIUM GLUCONATE 20 MG/ML
1000 INJECTION, SOLUTION INTRAVENOUS ONCE
Status: COMPLETED | OUTPATIENT
Start: 2023-08-20 | End: 2023-08-20

## 2023-08-20 RX ORDER — ALBUMIN (HUMAN) 12.5 G/50ML
87.5 SOLUTION INTRAVENOUS ONCE
Status: DISCONTINUED | OUTPATIENT
Start: 2023-08-20 | End: 2023-08-20

## 2023-08-20 RX ORDER — ALBUMIN (HUMAN) 12.5 G/50ML
100 SOLUTION INTRAVENOUS ONCE
Status: DISCONTINUED | OUTPATIENT
Start: 2023-08-20 | End: 2023-08-20

## 2023-08-20 RX ADMIN — ONDANSETRON 4 MG: 2 INJECTION INTRAMUSCULAR; INTRAVENOUS at 04:26

## 2023-08-20 RX ADMIN — OXYCODONE HYDROCHLORIDE 5 MG: 5 TABLET ORAL at 19:20

## 2023-08-20 RX ADMIN — ALBUTEROL SULFATE 2.5 MG: 2.5 SOLUTION RESPIRATORY (INHALATION) at 19:40

## 2023-08-20 RX ADMIN — SODIUM BICARBONATE 150 MEQ: 84 INJECTION, SOLUTION INTRAVENOUS at 09:00

## 2023-08-20 RX ADMIN — OCTREOTIDE ACETATE 25 MCG/HR: 500 INJECTION, SOLUTION INTRAVENOUS; SUBCUTANEOUS at 12:07

## 2023-08-20 RX ADMIN — SODIUM BICARBONATE 150 MEQ: 84 INJECTION, SOLUTION INTRAVENOUS at 17:23

## 2023-08-20 RX ADMIN — ALPRAZOLAM 1 MG: 1 TABLET ORAL at 20:33

## 2023-08-20 RX ADMIN — LACTULOSE 10 G: 20 SOLUTION ORAL at 10:01

## 2023-08-20 RX ADMIN — OXYCODONE HYDROCHLORIDE 5 MG: 5 TABLET ORAL at 13:18

## 2023-08-20 RX ADMIN — ALBUMIN (HUMAN) 25 G: 0.25 INJECTION, SOLUTION INTRAVENOUS at 06:02

## 2023-08-20 RX ADMIN — MIDODRINE HYDROCHLORIDE 20 MG: 5 TABLET ORAL at 08:21

## 2023-08-20 RX ADMIN — RIFAXIMIN 550 MG: 550 TABLET ORAL at 08:21

## 2023-08-20 RX ADMIN — ALBUMIN (HUMAN) 25 G: 0.25 INJECTION, SOLUTION INTRAVENOUS at 00:21

## 2023-08-20 RX ADMIN — ALBUMIN (HUMAN) 100 G: 0.25 INJECTION, SOLUTION INTRAVENOUS at 15:24

## 2023-08-20 RX ADMIN — PANTOPRAZOLE SODIUM 40 MG: 40 INJECTION, POWDER, LYOPHILIZED, FOR SOLUTION INTRAVENOUS at 20:17

## 2023-08-20 RX ADMIN — CALCIUM CARBONATE 2 TABLET: 500 TABLET, CHEWABLE ORAL at 08:21

## 2023-08-20 RX ADMIN — FEBUXOSTAT 40 MG: 40 TABLET, FILM COATED ORAL at 08:21

## 2023-08-20 RX ADMIN — Medication 0.15 MCG/KG/MIN: at 11:44

## 2023-08-20 RX ADMIN — OXYCODONE HYDROCHLORIDE 5 MG: 5 TABLET ORAL at 00:29

## 2023-08-20 RX ADMIN — Medication 10 ML: at 10:07

## 2023-08-20 RX ADMIN — MIDODRINE HYDROCHLORIDE 20 MG: 5 TABLET ORAL at 22:06

## 2023-08-20 RX ADMIN — PANTOPRAZOLE SODIUM 40 MG: 40 TABLET, DELAYED RELEASE ORAL at 08:21

## 2023-08-20 RX ADMIN — MIDODRINE HYDROCHLORIDE 20 MG: 5 TABLET ORAL at 16:18

## 2023-08-20 RX ADMIN — CALCIUM GLUCONATE 1000 MG: 20 INJECTION, SOLUTION INTRAVENOUS at 11:46

## 2023-08-20 RX ADMIN — Medication 10 ML: at 20:17

## 2023-08-20 RX ADMIN — ONDANSETRON 4 MG: 2 INJECTION INTRAMUSCULAR; INTRAVENOUS at 20:33

## 2023-08-20 RX ADMIN — PROMETHAZINE HYDROCHLORIDE 25 MG: 25 SUPPOSITORY RECTAL at 09:02

## 2023-08-20 RX ADMIN — CALCIUM CARBONATE 2 TABLET: 500 TABLET, CHEWABLE ORAL at 16:28

## 2023-08-20 RX ADMIN — HYDROMORPHONE HYDROCHLORIDE 1 MG: 1 INJECTION, SOLUTION INTRAMUSCULAR; INTRAVENOUS; SUBCUTANEOUS at 10:06

## 2023-08-20 RX ADMIN — CEFTRIAXONE 2000 MG: 2 INJECTION, POWDER, FOR SOLUTION INTRAMUSCULAR; INTRAVENOUS at 10:01

## 2023-08-20 RX ADMIN — ACETYLCYSTEINE 3 ML: 200 SOLUTION ORAL; RESPIRATORY (INHALATION) at 19:40

## 2023-08-20 RX ADMIN — SODIUM BICARBONATE 150 MEQ: 84 INJECTION, SOLUTION INTRAVENOUS at 00:21

## 2023-08-20 RX ADMIN — RIFAXIMIN 550 MG: 550 TABLET ORAL at 20:17

## 2023-08-20 NOTE — PLAN OF CARE
Goal Outcome Evaluation:      Patient continues on levo and bicarb gtts with an octreotide gtt added today. GI consulted. CT of abdomen completed. Paracentesis done at bedside with 4.9 Liters removed. Patient now resting comfortably with family at bedside.

## 2023-08-20 NOTE — PROGRESS NOTES
"NEPHROLOGY PROGRESS NOTE------[facility]    [facility]  [phone]  Saji Waldron MD      Patient Care Team:  Wolf Carvalho MD as PCP - General (Geriatric Medicine)  Fatou Waldron MD as Consulting Physician (Nephrology)      Provider:  Saji Waldron MD  Patient Name: Daquan Xiong  :  1985    SUBJECTIVE:    F/U ARF/URIEL/CRF/CKD/ACIDOIS    Fatigued and weak. Still with some abdominal pain, distension, and ascites. Making some urine.     Medication:  albumin human, 25 g, Intravenous, Q6H  febuxostat, 40 mg, Oral, Daily  HYDROmorphone, 1 mg, Intravenous, Once  midodrine, 20 mg, Oral, Q8H  oxyCODONE, 5 mg, Oral, Once  pantoprazole, 40 mg, Oral, Q AM  promethazine, 25 mg, Rectal, Once  riFAXIMin, 550 mg, Oral, BID  sodium chloride, 10 mL, Intravenous, Q12H      norepinephrine, 0.02-0.3 mcg/kg/min, Last Rate: 0.15 mcg/kg/min (23)  sodium bicarbonate drip (greater than 75 mEq/bag), 150 mEq, Last Rate: 150 mEq (23 0021)        OBJECTIVE    Vital Sign Min/Max for last 24 hours  Temp  Min: 97.2 øF (36.2 øC)  Max: 98 øF (36.7 øC)   BP  Min: 88/47  Max: 114/65   Pulse  Min: 56  Max: 82   No data recorded   SpO2  Min: 93 %  Max: 100 %   No data recorded   No data recorded     Flowsheet Rows      Flowsheet Row First Filed Value   Admission Height 185.4 cm (73\") Documented at 2023   Admission Weight 72.6 kg (160 lb) Documented at 2023            No intake/output data recorded.  I/O last 3 completed shifts:  In: 5506 [I.V.:5506]  Out: 2340 [Urine:2340]    Physical Exam:  General Appearance: alert, appears stated age and cooperative  Head: normocephalic, without obvious abnormality and atraumatic +DRY OP   Eyes: conjunctivae and sclerae normal and no icterus  Neck: supple and no JVD  Lungs: clear to auscultation and respirations regular  Heart: regular rhythm & normal rate and normal S1, S2 " +BETHANY  Chest Wall: no abnormalities observed  Abdomen: normal bowel sounds and  +ASCITES/DISTENSION AND MILD BILAT LQ PAIN ON PALPATION  Extremities: moves extremities well, no edema, no cyanosis  Skin: +SCATTERED SCABS, ABRASIONS, ECCHYMOSIS  Neurologic: Alert, and oriented x 3. No focal deficits    Labs:    WBC WBC   Date Value Ref Range Status   08/20/2023 6.70 3.40 - 10.80 10*3/mm3 Final   08/19/2023 10.70 3.40 - 10.80 10*3/mm3 Final   08/18/2023 9.50 3.40 - 10.80 10*3/mm3 Final      HGB Hemoglobin   Date Value Ref Range Status   08/20/2023 7.0 (L) 13.0 - 17.7 g/dL Final   08/19/2023 8.4 (L) 13.0 - 17.7 g/dL Final   08/18/2023 10.1 (L) 13.0 - 17.7 g/dL Final      HCT Hematocrit   Date Value Ref Range Status   08/20/2023 20.9 (C) 37.5 - 51.0 % Final   08/19/2023 24.7 (L) 37.5 - 51.0 % Final   08/18/2023 30.5 (L) 37.5 - 51.0 % Final      Platelets No results found for: LABPLAT   MCV MCV   Date Value Ref Range Status   08/20/2023 103.9 (H) 79.0 - 97.0 fL Final   08/19/2023 103.7 (H) 79.0 - 97.0 fL Final   08/18/2023 106.3 (H) 79.0 - 97.0 fL Final          Sodium Sodium   Date Value Ref Range Status   08/20/2023 136 136 - 145 mmol/L Final   08/20/2023 136 136 - 145 mmol/L Final   08/19/2023 137 136 - 145 mmol/L Final   08/19/2023 129 (L) 136 - 145 mmol/L Final   08/19/2023 127 (L) 136 - 145 mmol/L Final   08/18/2023 130 (L) 136 - 145 mmol/L Final      Potassium Potassium   Date Value Ref Range Status   08/20/2023 3.6 3.5 - 5.2 mmol/L Final   08/20/2023 3.9 3.5 - 5.2 mmol/L Final   08/19/2023 4.4 3.5 - 5.2 mmol/L Final   08/19/2023 4.6 3.5 - 5.2 mmol/L Final   08/19/2023 5.1 3.5 - 5.2 mmol/L Final   08/18/2023 5.9 (H) 3.5 - 5.2 mmol/L Final      Chloride Chloride   Date Value Ref Range Status   08/20/2023 95 (L) 98 - 107 mmol/L Final   08/20/2023 98 98 - 107 mmol/L Final   08/19/2023 100 98 - 107 mmol/L Final   08/19/2023 96 (L) 98 - 107 mmol/L Final   08/19/2023 95 (L) 98 - 107 mmol/L Final   08/18/2023 98 98 - 107  mmol/L Final      CO2 CO2   Date Value Ref Range Status   08/20/2023 26.0 22.0 - 29.0 mmol/L Final   08/20/2023 24.0 22.0 - 29.0 mmol/L Final   08/19/2023 20.0 (L) 22.0 - 29.0 mmol/L Final   08/19/2023 18.0 (L) 22.0 - 29.0 mmol/L Final   08/19/2023 20.0 (L) 22.0 - 29.0 mmol/L Final   08/18/2023 18.0 (L) 22.0 - 29.0 mmol/L Final      BUN BUN   Date Value Ref Range Status   08/20/2023 52 (H) 6 - 20 mg/dL Final   08/20/2023 58 (H) 6 - 20 mg/dL Final   08/19/2023 62 (H) 6 - 20 mg/dL Final   08/19/2023 65 (H) 6 - 20 mg/dL Final   08/19/2023 69 (H) 6 - 20 mg/dL Final   08/18/2023 74 (H) 6 - 20 mg/dL Final      Creatinine Creatinine   Date Value Ref Range Status   08/20/2023 3.03 (H) 0.76 - 1.27 mg/dL Final   08/20/2023 2.96 (H) 0.76 - 1.27 mg/dL Final   08/19/2023 3.27 (H) 0.76 - 1.27 mg/dL Final   08/19/2023 3.48 (H) 0.76 - 1.27 mg/dL Final   08/19/2023 4.04 (H) 0.76 - 1.27 mg/dL Final   08/18/2023 3.91 (H) 0.76 - 1.27 mg/dL Final      Calcium Calcium   Date Value Ref Range Status   08/20/2023 9.6 8.6 - 10.5 mg/dL Final   08/20/2023 9.4 8.6 - 10.5 mg/dL Final   08/19/2023 9.1 8.6 - 10.5 mg/dL Final   08/19/2023 9.0 8.6 - 10.5 mg/dL Final   08/19/2023 8.6 8.6 - 10.5 mg/dL Final   08/18/2023 8.7 8.6 - 10.5 mg/dL Final      PO4 No components found for: PO4   Albumin Albumin   Date Value Ref Range Status   08/20/2023 4.2 3.5 - 5.2 g/dL Final   08/19/2023 2.5 (L) 3.5 - 5.2 g/dL Final   08/18/2023 2.3 (L) 3.5 - 5.2 g/dL Final      Magnesium Magnesium   Date Value Ref Range Status   08/20/2023 2.1 1.6 - 2.6 mg/dL Final   08/18/2023 2.5 1.6 - 2.6 mg/dL Final      Uric Acid No components found for: URIC ACID     Imaging Results (Last 72 Hours)       Procedure Component Value Units Date/Time    US Renal Bilateral [[id number]] Collected: 08/19/23 1839     Updated: 08/19/23 1842    Narrative:      US RENAL BILATERAL    Date of Exam: 8/19/2023 5:05 PM EDT    Indication: ARF/URIEL/CRF/CKD.    Comparison: CT abdomen and pelvis dated  7/3/2023    Technique: Grayscale and color Doppler ultrasound evaluation of the kidneys and urinary bladder was performed.    Findings:  The right kidney measures 10.1 x 5.3 x 5.4 cm. The left kidney measures 9.2 x 4.6 x 6.1 cm. There is mild dilation of the bilateral renal pelves. There is large volume ascites within the abdomen.      Impression:      Impression:  1. Normal-sized kidneys with mild dilation of the bilateral renal pelves.  2. Large volume ascites.        Electronically Signed: Ulices Valadez    8/19/2023 6:40 PM EDT    Workstation ID: [id number]    XR Chest 1 View [[id number]] Collected: 08/19/23 0649     Updated: 08/19/23 0657    Narrative:      XR CHEST 1 VW    Date of Exam: 8/19/2023 6:40 AM EDT    Indication: central line placement    Comparison: July 3, 2023    Findings:  There is a right internal jugular central line with the tip at the cavoatrial junction. The heart size is borderline enlarged. There is mild pulmonary vascular congestion. There are no focal infiltrates to suggest pneumonia.      Impression:      Impression:  Cardiomegaly with mild pulmonary vascular prominence. Right IJ central line is in place with the tip at the cavoatrial junction. There is no pneumothorax.      Electronically Signed: Deven Machado MD    8/19/2023 6:54 AM EDT    Workstation ID: [id number]            Results for orders placed during the hospital encounter of 08/18/23    XR Chest 1 View    Narrative  XR CHEST 1 VW    Date of Exam: 8/19/2023 6:40 AM EDT    Indication: central line placement    Comparison: July 3, 2023    Findings:  There is a right internal jugular central line with the tip at the cavoatrial junction. The heart size is borderline enlarged. There is mild pulmonary vascular congestion. There are no focal infiltrates to suggest pneumonia.    Impression  Impression:  Cardiomegaly with mild pulmonary vascular prominence. Right IJ central line is in place with the tip at the cavoatrial junction. There is  no pneumothorax.      Electronically Signed: Deven Machado MD  8/19/2023 6:54 AM EDT  Workstation ID: [id number]      Results for orders placed during the hospital encounter of 07/03/23    XR Chest 1 View    Narrative  XR CHEST 1 VW    Date of Exam: 7/3/2023 4:13 PM EDT    Indication: AMS    Comparison: AP portable chest 6/20/2023    Findings:  There is some bandlike atelectasis in the right lower lobe, similar to the prior examination. The previously described patchy left basilar airspace disease appears improved. No new airspace disease is identified. The heart size is normal. No pleural  effusion or pneumothorax is identified.    Impression  Impression:  Improving bibasilar atelectasis or pneumonia since 6/28/2023. No new airspace disease is appreciated.      Electronically Signed: Chanda Clark  7/3/2023 4:27 PM EDT  Workstation ID: [id number]      Results for orders placed during the hospital encounter of 06/23/23    XR Chest 1 View    Narrative  XR CHEST 1 VW    Date of Exam: 6/28/2023 11:45 AM EDT    Indication: eval for pneumonia    Comparison: None available.    Findings:  The lung volumes are low. Patchy airspace disease is seen within the lung bases bilaterally. Probable small bilateral pleural effusions are present. The pulmonary vasculature appears unremarkable. No pneumothorax. No acute osseous abnormality identified.    Impression  Impression:  Low lung volumes with patchy airspace disease seen within the lung bases bilaterally, left greater than right with probable small bilateral pleural effusions, likely related to pneumonia and/or atelectasis.      Electronically Signed: Brynn Higuera  6/28/2023 12:00 PM EDT  Workstation ID: [id number]            ASSESSMENT / PLAN      URIEL (acute kidney injury)    GERD (gastroesophageal reflux disease)    Tobacco use disorder    Alcoholic cirrhosis    Severe malnutrition      1.ARF/URIEL/CRF/CKD---------Nonoliguric. +Recurrent ARF/URIEL on top of known CRF/CKD STG 3A with a  baseline serum creatinine of about 1.5. CRF/CKD STG 3A secondary to HTN NS. +ARF/URIEL is secondary to ATN from hypotension and prerenal state/intravascular volume depletion. Continue to hydrate and given IV Albumin. Avoid hypotension.  No NSAIDs or IV dye. Dose meds for CrCl less than 10 cc/min until ARF/URIEL is resolved     2. HYPERKALEMIA--------Resolved. Secondary to RF and acidosis. Med Rx. Telemetry. K+ restrict diet and follow     3. ETOH CIRRHOSIS/HEPATIC ENCEPHALOPATHY/ASCITES-----Restarted Lactulose. Follow Ammonia. Likely needs Paracentesis. S/P IV Albumin and on Midodrine     4. HYPOMAGNESEMIA--------Follow levels     5. HYPERURICEMIA------Uloric     6. HYPOCALCEMIA-------Replace IV     7. ANEMIA-------H/H stable.       8. HYPOALBUMINEMIA-----Albumin normal. S/P IV Albumin to temporize     9. ELEVATED INR------Related to cirrhosis     10. HYPOTENSION------On Midodrine     11. BIPOLAR DISEASE     12. BASILIO     13. POLYSUBSTANCE ABUSE/TOBACCO USE      14. KETONURIA------Secondary to intravascular volume depletion. IVFs     15. HYPOTENSION-----Levophed, IVFs, IV Albumin, Midodrine     16. ACIDOSIS--------Better but Lactic still up. On Bicarb gtt. Follow lactic. +Type 4 RTA and from stool losses related Lactulose use (when he actually takes it)     17. N/V/GERD/PUD PROPHYLAXIS------IV PPI     18. DVT PROPHYLAXIS------SCDs       Saji Waldron MD  [facility]  [phone]  08/20/23  08:59 EDT

## 2023-08-20 NOTE — CONSULTS
GI CONSULT  NOTE:    Referring Provider:     Cherelle la NP    Chief complaint:    Acute anemia, history of esophageal varices    Subjective    Abnormal labs    History of present illness:     Daquan Xiong is a 37 y.o. male with history of alcohol hepatitis/alcohol cirrhosis complicated by ascites, nonbleeding esophageal varices, hepatic encephalopathy; chronic kidney disease, sleep apnea, bipolar disorder who was sent in by her office after having an elevated creatinine level of 3 as an outpatient.  Patient presented to the ER on 8/18/2023.  He admitted to some nausea vomiting and diffuse abdominal pain.  Patient denies taking any NSAIDs at home.  Patient was on nadolol, Protonix, Xifaxan, midodrine at home.  He was off of his diuretics.  Creatinine this admission has been up to 4.04 was up to 5.12 in the past.  Dr. Waldron is his nephrologist.  Patient was found to be hypotensive in the ER started on IV fluids, given midodrine and eventually had to be placed on vasopressors.    Labs: Sodium 139, creatinine 2.71, total bilirubin 4.2, alk phos 152, AST 56, ALT 18.  Potassium 3.6.  Ammonia 91.  Lactic 4.1.  INR 1.65.  WBC 6.7, hemoglobin  7.1 today, .9, platelets 98.  MELD NA 27.  CT of the abdomen and pelvis without contrast showed no signs of retroperitoneal fluid collection.  Large volume ascites with peritoneal thickening question nonspecific peritonitis.  Volume overload/third spacing with anasarca, moderate bilateral pleural effusions.  Tree-in-bud appearance in the lungs.  Chronic liver disease.      Endo History:  12/2022 EGD (Dr. Calderon) - 3 cords grade 1 varices in lower 3rd of the esophagus, gastritis negative for H pylori     Past Medical History:  Past Medical History:   Diagnosis Date    Alcohol abuse     1/2-1 pint a day, sober since 9/12/2022    Alcoholic cirrhosis 09/01/2022    Arthritis     Bipolar affective disorder 09/22/2022    Cholecystitis 11/08/2022    Chronic constipation 09/05/2017     GERD (gastroesophageal reflux disease) 09/22/2022    Doing well with pepcid.    Hypertension     Osteoarthritis 09/22/2022    Polysubstance abuse 09/22/2022    Severe Malnutrition (HCC) 06/27/2023    Sleep apnea     Tobacco use disorder 09/22/2022       Past Surgical History:  Past Surgical History:   Procedure Laterality Date    BACK SURGERY      WRIST SURGERY         Social History:  Social History     Tobacco Use    Smoking status: Every Day     Packs/day: 0.50     Types: Cigarettes    Smokeless tobacco: Never   Vaping Use    Vaping Use: Never used   Substance Use Topics    Alcohol use: Not Currently     Comment: sober since 9/12/22    Drug use: Not Currently     Types: Marijuana       Family History:  Family History   Problem Relation Age of Onset    Cirrhosis Father     Heart disease Maternal Grandmother     Diabetes Maternal Grandmother     Diabetes Maternal Grandfather     Diabetes Paternal Grandmother     Diabetes Paternal Grandfather        Medications:  Medications Prior to Admission   Medication Sig Dispense Refill Last Dose    allopurinol (ZYLOPRIM) 100 MG tablet Take 1 tablet by mouth Daily. 30 tablet 0 Past Week    cyclobenzaprine (FLEXERIL) 10 MG tablet Take 0.5-1 tablets by mouth 3 (Three) Times a Day As Needed.   Past Month    nadolol (CORGARD) 40 MG tablet Take 1 tablet by mouth Daily As Needed.   Past Week    nicotine (NICODERM CQ) 14 MG/24HR patch Place 1 patch on the skin as directed by provider Daily As Needed.   8/18/2023    nicotine polacrilex (NICORETTE) 2 MG gum Chew 1 each As Needed for Smoking Cessation.   8/18/2023    oxyCODONE (ROXICODONE) 10 MG tablet Take 1 tablet by mouth Every 4 (Four) to 6 (Six) Hours As Needed for Moderate Pain.   8/18/2023    pantoprazole (PROTONIX) 40 MG EC tablet Take 1 tablet by mouth Daily.   8/18/2023    riFAXIMin (XIFAXAN) 550 MG tablet Take 1 tablet by mouth 2 (Two) Times a Day.   Past Week    baclofen (LIORESAL) 10 MG tablet Take 0.5-1 tablets by mouth 2  (Two) Times a Day As Needed for Muscle Spasms.   Unknown    melatonin 5 MG tablet tablet Take 1 tablet by mouth At Night As Needed.   More than a month    midodrine (PROAMATINE) 10 MG tablet Take 2 tablets (20 mg) by mouth 3 (Three) Times a Day Before Meals 180 tablet 0        Scheduled Meds:cefTRIAXone, 2,000 mg, Intravenous, Q24H  febuxostat, 40 mg, Oral, Daily  lactulose, 10 g, Oral, TID  midodrine, 20 mg, Oral, Q8H  oxyCODONE, 5 mg, Oral, Once  pantoprazole, 40 mg, Oral, Q12H  riFAXIMin, 550 mg, Oral, BID  sodium chloride, 10 mL, Intravenous, Q12H  vitamin K1, 10 mg, Oral, Once      Continuous Infusions:norepinephrine, 0.02-0.3 mcg/kg/min, Last Rate: 0.15 mcg/kg/min (08/20/23 1144)  octreotide (SandoSTATIN) infusion, 25 mcg/hr, Last Rate: 25 mcg/hr (08/20/23 1207)  sodium bicarbonate drip (greater than 75 mEq/bag), 150 mEq, Last Rate: 125 mL/hr at 08/20/23 1008      PRN Meds:.  calcium carbonate    melatonin    nitroglycerin    ondansetron **OR** ondansetron    oxyCODONE    [COMPLETED] Insert Peripheral IV **AND** sodium chloride    sodium chloride    sodium chloride    ALLERGIES:  Bee venom and Scopolamine    ROS:  Review of Systems   Gastrointestinal:  Positive for abdominal distention, abdominal pain, diarrhea, nausea and vomiting.     The following systems were reviewed and negative;    Constitution:  No fevers, chills, no unintentional weight loss  Skin: no rash, + jaundice  Eyes:  No blurry vision, no eye pain  HENT:  No change in hearing or smell  Resp:  No dyspnea or cough  CV:  No chest pain or palpitations  :  No dysuria, hematuria  Musculoskeletal:  No leg cramps or arthralgias  Neuro:  No tremor, no numbness  Psych:  No depression or confusion    Objective resting in hospital bed appears acutely ill-appearing.  Very jaundiced.  Wife at bedside.    Vital Signs:   Vitals:    08/20/23 0715 08/20/23 0730 08/20/23 0800 08/20/23 1200   BP: 103/62 105/64     Pulse: 65 66     Resp:       Temp:   98 øF  (36.7 øC) 97.9 øF (36.6 øC)   TempSrc:   Oral Oral   SpO2: 95% 96%     Weight:       Height:           Physical Exam:   General Appearance:    Awake, lethargic in no acute distress   Head:    Normocephalic, without obvious abnormality, atraumatic   Eyes:            Conjunctivae normal, icteric sclerae, pupils equal   Ears:    Ears appear intact with no abnormalities noted   Throat:   No oral lesions, no thrush, oral mucosa moist   Neck:   Supple, no JVD   Lungs:     respirations regular, even and unlabored        Chest Wall:    No abnormalities observed   Abdomen:      soft, nontender, no rebound or guarding, moderate distended, no hepatosplenomegaly   Rectal:     Deferred   Extremities:   Moves all extremities, 2+ edema, no cyanosis   Pulses:   Pulses palpable and equal bilaterally   Skin:   No rash, no jaundice, normal palpation        Neurologic:   Cranial nerves 2 - 12 grossly intact, no asterixis       Results Review:   I reviewed the patient's labs and imaging.  CBC    Results from last 7 days   Lab Units 08/20/23  1234 08/20/23  0602 08/19/23  0404 08/18/23  2315   WBC 10*3/mm3  --  6.70 10.70 9.50   HEMOGLOBIN g/dL 7.1* 7.0* 8.4* 10.1*   PLATELETS 10*3/mm3  --  98* 122* 116*     CMP   Results from last 7 days   Lab Units 08/20/23  1317 08/20/23  1234 08/20/23  0602 08/20/23  0022 08/19/23  1734 08/19/23  0938 08/19/23  0233 08/18/23  2315 08/18/23  2202 08/18/23  2111   SODIUM mmol/L  --  139 136 136 137 129* 127*  --  130*  --    POTASSIUM mmol/L  --  3.6 3.6 3.9 4.4 4.6 5.1  --  5.9*  --    CHLORIDE mmol/L  --  96* 95* 98 100 96* 95*  --  98  --    CO2 mmol/L  --  26.0 26.0 24.0 20.0* 18.0* 20.0*  --  18.0*  --    BUN mg/dL  --  47* 52* 58* 62* 65* 69*  --  74*  --    CREATININE mg/dL  --  2.71* 3.03* 2.96* 3.27* 3.48* 4.04*  --  3.91*  --    GLUCOSE mg/dL  --  149* 170* 151* 146* 83 143*  --  86  --    ALBUMIN g/dL  --   --  4.2  --   --   --  2.5*  --  2.3*  --    BILIRUBIN mg/dL  --   --  4.2*  --   --    --  4.7*  --  4.8*  --    ALK PHOS U/L  --   --  152*  --   --   --  245*  --  232*  --    AST (SGOT) U/L  --   --  56*  --   --   --  84*  --  77*  --    ALT (SGPT) U/L  --   --  18  --   --   --  28  --  30  --    MAGNESIUM mg/dL  --   --  2.1  --   --   --   --   --  2.5  --    LIPASE U/L  --   --   --   --   --   --   --   --   --  68*   AMMONIA umol/L 91*  --   --   --   --   --   --  52  --   --      Cr Clearance Estimated Creatinine Clearance: 38 mL/min (A) (by C-G formula based on SCr of 2.71 mg/dL (H)).  Coag   Results from last 7 days   Lab Units 08/20/23  0602 08/18/23  2315   INR  1.65* 1.37*     HbA1C No results found for: HGBA1C  Blood Glucose   Glucose   Date/Time Value Ref Range Status   08/19/2023 0627 81 70 - 105 mg/dL Final     Comment:     Serial Number: [id number]:  [id number]   08/19/2023 0334 98 70 - 105 mg/dL Final     Comment:     Serial Number: [id number]:  [id number]     Infection   Results from last 7 days   Lab Units 08/18/23  2315   BLOODCX  No growth at 24 hours     UA    Results from last 7 days   Lab Units 08/19/23  0545   NITRITE UA  Negative     Radiology(recent) CT Abdomen Pelvis Without Contrast    Result Date: 8/20/2023  Impression: 1. Negative for retroperitoneal fluid collection. 2. Large volume ascites with peritoneal thickening suspicious for a nonspecific peritonitis. Consider paracentesis. 3. Findings of volume overload/third spacing progressing from prior exam including moderate bilateral pleural effusions, large volume ascites and worsening anasarca. 4. Multifocal tree-in-bud nodular opacities concerning for endobronchial infectious/inflammatory process, possibly early aspiration in the setting of refluxed fluid into a patulous distal esophagus. Consider nasogastric decompression and aspiration risk precaution. 5. Morphologic changes of chronic liver disease. Electronically Signed: Marko Dimas MD  8/20/2023 1:21 PM EDT  Workstation ID: [id number]    XR  Chest 1 View    Result Date: 8/19/2023  Impression: Cardiomegaly with mild pulmonary vascular prominence. Right IJ central line is in place with the tip at the cavoatrial junction. There is no pneumothorax. Electronically Signed: Deven Machado MD  8/19/2023 6:54 AM EDT  Workstation ID: [id number]    US Renal Bilateral    Result Date: 8/19/2023  Impression: 1. Normal-sized kidneys with mild dilation of the bilateral renal pelves. 2. Large volume ascites. Electronically Signed: Ulices Valadez  8/19/2023 6:40 PM EDT  Workstation ID: [id number]       ASSESSMENT:  -History of Alcohol hepatitis sober 7/2022  -ETOH Cirrhosis complicated by ascites, HE and esophageal varices  -Acute renal failure on chronic kidney disease  -Hypotension  -Elevated LFTs  -Elevated ammonia   -Macrocytic anemia  -Thrombocytopenia related to cirrhosis    PLAN:   Patient is currently on nor epi, bicarb drip, octreotide drip as well as getting calcium gluconate.  Patient states he has been sober since 7/9/2022 when he was told he first had cirrhosis.  Patient has his first appointment to see [facility] team on 8/28/2023.  MELD NA 27.  Nephrology is following patient unable to give diuretics.  He is getting IV albumin and hydration.  CT of the abdomen pelvis reveals possible SBP.  Orders have been placed with fluid studies for paracentesis.  Patient is on ceftriaxone  Continue lactulose and Xifaxan this should help with hepatic encephalopathy  Continue midodrine for hypotension next continue PPI  As needed vitamin K for elevated INR  AFP is 4.98 on 6/25/2023.  Right upper quadrant ultrasound done on 6/25/2023 no signs of focal lesions.  Continue to monitor H&H and transfuse for hemoglobin less than 7.  CT did not reveal any retroperitoneal bleeding.  Serum iron was 73 on 8/19/2023.      I discussed the patient's findings and my recommendations with the patient.  MONICA Small  08/20/23  14:06 EDT    Time:

## 2023-08-20 NOTE — PROGRESS NOTES
"Critical Care Progress Note     Daquan Xiong : 1985 MRN:[id number] LOS:1  Rm: 2316/1     Principal Problem: URIEL (acute kidney injury)     Reason for follow up: All the medical problems listed below    Summary     A 37 y.o. old male patient with PMH of Alcoholic cirrhosis, severe malnutrition, GERD, CKD III, substance abuse presents to the hospital with complaints of nausea, vomiting, and \"kidney pain\" and was admitted with a principal diagnosis of URIEL (acute kidney injury).  Nephrology was consulted, treated with bicarb drip.     Also found to be hypotensive, treated with IV fluids without much improvement and needed vasopressors.  Started on empiric ceftriaxone for suspected SBP.  Subsequently, patient's hemoglobin dropped from 10 to 7, GI was consulted for suspected GI/variceal bleed.  CT abdomen pelvis showed large volume ascites with moderate bilateral pleural effusions and tree-in-bud nodular opacities on both lungs.     Significant Events     23 : No acute events overnight.  Tolerating room air with no shortness of air.  Remains on Levophed for pressor support, MAP goal 60 mmHg.  Continues to complain of nausea, vomiting, heartburn.  He reports \"anxiety so bad I cannot sleep\".  Increased abdominal discomfort and distention.  Hemoglobin continues to drop without obvious source of blood loss.  CT abdomen/pelvis pending to evaluate for abdominal source of acute anemia    Assessment / Plan     Septic Shock with lactic acidosis   Does not meet SIRS criteria. Meets sepsis 3 definition with at least 2 point increase in SOFA score along with lactic acidosis and need for vasopressors.  Bedside echo with normal LVEF but large pleural effusion.  Doubt hypovolemia.    Adequately fluid resuscitated, continue with norepinephrine for MAP target of 65.  Blood cultures remain negative so far.  We will do a paracentesis to rule out SBP.  Started on empiric ceftriaxone for possible SBP.  Hold off additional fluids " for now.  Appears volume overloaded  Continue home dose midodrine 20 mg every 8 hours  Adequately fluid resuscitated, no need to mika lactate levels.    Acute kidney injury on CKD III / Metabolic acidosis  Hyperkalemia and hyponatremia resolved  Remains non oliguric.  Baseline creatinine around 1.5.  Appears hypervolemic, possible ATN from septic shock.  Cannot rule out hepatorenal syndrome completely.  Nephrology following, currently on bicarb drip.  Added octreotide.   Monitor Input/Output very closely.   Net IO Since Admission: 3,166 mL [08/20/23 1103]       Acute on chronic anemia / ?  Esophageal variceal bleed.  Nadolol on hold due to shock state.  Started on octreotide drip for possible suspicion of variceal bleed.  Continue with Protonix twice daily.  GI consulted.  Monitor hemoglobin closely.  Transfuse as needed to keep hemoglobin greater than 7.    Alcoholic cirrhosis of liver with recurrent ascites / Portal Hypertension   Transaminitis / Hyperbilirubinemia  Gets frequent paracentesis once every 7 to 10 days.  We will do a paracentesis today.  Not a candidate for TIPS due to hyperammonemia and history of hepatic encephalopathy.  Awaiting liver transplant at [facility].  GI consulted.    Hyperammonemia / history of hepatic encephalopathy   Continue with home rifaximin.    Added scheduled lactulose to titrate for 3-4 bowel movements per day.    Acute thrombocytopenia/ Coagulopathy  Possibly from liver disease.  Some elevation in INR could also be due to nutritional deficiency of vitamin K.  Will give 1 dose of vitamin K.  Hold subQ heparin for now.    Severe malnutrition  Nausea and vomiting   Encourage oral intake   Symptom management, antiemetics  Inpatient dietician consult for malnutrition severity and nutrition recommendations    Hyperuricemia: On allopurinol.  History of substance abuse    Disposition: Remain in ICU, on pressor support    Code status:   Code Status (Patient has no pulse and is not  breathing): CPR (Attempt to Resuscitate)  Medical Interventions (Patient has pulse or is breathing): Full Support       Nutrition:   Diet: Renal Diets; Low Potassium, Low Phosphorus; Texture: Regular Texture (IDDSI 7); Fluid Consistency: Thin (IDDSI 0)   Patient isn't on Tube Feeding     DVT prophylaxis:  Mechanical DVT prophylaxis orders are present.     Subjective / Review of systems     Review of Systems   Constitutional:  Positive for appetite change and fatigue.   Respiratory:  Negative for cough and shortness of breath.    Cardiovascular:  Positive for leg swelling. Negative for chest pain and palpitations.   Gastrointestinal:  Positive for abdominal distention, abdominal pain, nausea and vomiting.   Musculoskeletal:  Positive for arthralgias, back pain and myalgias.   Psychiatric/Behavioral:  Positive for sleep disturbance. The patient is nervous/anxious.       Objective / Physical Exam     Vital signs:  Temp: 98 øF (36.7 øC)  BP: 105/64  Heart Rate: 66  Resp: 8  SpO2: 96 %  Weight: 72.6 kg (160 lb)    Admission Weight: Weight: 72.6 kg (160 lb)  Current Weight: Weight: 72.6 kg (160 lb)    Input/Output in last 24 hours:    Intake/Output Summary (Last 24 hours) at 8/20/2023 1039  Last data filed at 8/20/2023 0400  Gross per 24 hour   Intake 5506 ml   Output 1640 ml   Net 3866 ml      Net IO Since Admission: 3,166 mL [08/20/23 1039]     Physical Exam  Constitutional:       General: He is not in acute distress.     Appearance: He is ill-appearing and toxic-appearing. He is not diaphoretic.   HENT:      Head: Normocephalic and atraumatic.      Comments: Bitemporal wasting     Right Ear: External ear normal.      Left Ear: External ear normal.      Mouth/Throat:      Mouth: Mucous membranes are dry.   Eyes:      General: Scleral icterus present.      Conjunctiva/sclera: Conjunctivae normal.      Pupils: Pupils are equal, round, and reactive to light.      Comments: Loss of periorbital fat pads   Neck:       Comments: Trachea midline  No JVD  Cardiovascular:      Heart sounds: Normal heart sounds, S1 normal and S2 normal. No murmur heard.     Comments: Sinus rhythm  Pulmonary:      Breath sounds: No wheezing or rhonchi.      Comments: Diminished bases  Fine crackles left base  Abdominal:      General: Bowel sounds are normal. There is distension.      Tenderness: There is abdominal tenderness. There is no guarding.   Musculoskeletal:      Cervical back: Neck supple.      Right lower leg: Edema (Pitting) present.      Left lower leg: Edema (Pitting) present.   Skin:     General: Skin is warm and dry.      Coloration: Skin is jaundiced.   Neurological:      Mental Status: He is alert and oriented to person, place, and time.      Comments: No lateralizing deficits   Psychiatric:      Comments: Flat affect        Radiology and Labs     Results from last 7 days   Lab Units 08/20/23  0602 08/19/23  0404 08/18/23  2315   WBC 10*3/mm3 6.70 10.70 9.50   HEMOGLOBIN g/dL 7.0* 8.4* 10.1*   HEMATOCRIT % 20.9* 24.7* 30.5*   PLATELETS 10*3/mm3 98* 122* 116*      Results from last 7 days   Lab Units 08/20/23  0602 08/18/23  2315   PROTIME Seconds 17.2* 14.4*   INR  1.65* 1.37*      Results from last 7 days   Lab Units 08/20/23  0602 08/20/23  0022 08/19/23  1734 08/19/23  0938 08/19/23  0233 08/18/23  2202   SODIUM mmol/L 136 136 137 129* 127* 130*   POTASSIUM mmol/L 3.6 3.9 4.4 4.6 5.1 5.9*   CHLORIDE mmol/L 95* 98 100 96* 95* 98   CO2 mmol/L 26.0 24.0 20.0* 18.0* 20.0* 18.0*   BUN mg/dL 52* 58* 62* 65* 69* 74*   CREATININE mg/dL 3.03* 2.96* 3.27* 3.48* 4.04* 3.91*   GLUCOSE mg/dL 170* 151* 146* 83 143* 86   MAGNESIUM mg/dL 2.1  --   --   --   --  2.5      Results from last 7 days   Lab Units 08/20/23  0602 08/19/23  0233 08/18/23  2202   ALK PHOS U/L 152* 245* 232*   AST (SGOT) U/L 56* 84* 77*   ALT (SGPT) U/L 18 28 30     Results from last 7 days   Lab Units 08/19/23  0406   PH, ARTERIAL pH units 7.460*   PCO2, ARTERIAL mm Hg 26.8*    PO2 ART mm Hg 111.8*   O2 SATURATION ART % 98.7*   FIO2 % 21   HCO3 ART mmol/L 19.0*   BASE EXCESS ART mmol/L -4.1*           Current medications     Scheduled Meds:   calcium gluconate, 1,000 mg, Intravenous, Once  cefTRIAXone, 2,000 mg, Intravenous, Q24H  febuxostat, 40 mg, Oral, Daily  lactulose, 10 g, Oral, TID  midodrine, 20 mg, Oral, Q8H  oxyCODONE, 5 mg, Oral, Once  pantoprazole, 40 mg, Oral, Q AM  riFAXIMin, 550 mg, Oral, BID  sodium chloride, 10 mL, Intravenous, Q12H  vitamin K1, 10 mg, Oral, Once        Continuous Infusions:   norepinephrine, 0.02-0.3 mcg/kg/min, Last Rate: 0.15 mcg/kg/min (08/19/23 2203)  sodium bicarbonate drip (greater than 75 mEq/bag), 150 mEq, Last Rate: 125 mL/hr at 08/20/23 1008          Plan discussed with RN. Reviewed all other data in the last 24 hours, including but not limited to vitals, labs, microbiology, imaging and pertinent notes from other providers.        MONICA Flores   Critical Care  08/20/23   10:39 EDT     Attending Addendum     Subjective: Still having some nausea, some abdominal tenderness as well.  Does get paracentesis every 7 to 10 days, has been trying to delay it as he does not like it.  Admits to poor oral intake.    Exam: Dry oral mucosa.+ Pedal edema, bibasilar Rales.    Assessment / Plan:   Septic shock: Suspected SBP, on empiric ceftriaxone.  Continue with norepinephrine for MAP target of 60-65, continue with midodrine.  Acute blood loss anemia: Suspected variceal bleed, started on octreotide drip.  Continue with Protonix twice daily.  GI consulted.  Monitor hemoglobin closely.    I have personally reviewed all labs and other data, reviewed all other pertinent notes, interviewed and examined this patient today. I have also reviewed the note by APRN, discussed the plan and made relevant changes in the note. I have performed the substantive portion of medical decision making. High complexity decision making and high risk of deterioration.      Aly Zayas MD MPH  Staff Intensivist  08/20/23   14:21 EDT

## 2023-08-20 NOTE — PROCEDURES
Paracentesis Procedure Note  INDICATION: clinically significant ascites  PROCEDURE : Cherelle ANDERSON  ULTRASOUND USED TO STEW LOCATION: yes  INFORMED CONSENT OBTAINED: yes. The patient was given time for any questions to be answered.    PROCEDURE SUMMARY: A time-out was performed with the patient and the RN at bedside. My hands were washed immediately prior to the procedure. Make, sterile gown and sterile gloves were used throughout the procedure. The area was cleansed and draped in usual sterile fashion using chlorhexidine scrub. The RLQ of the abdomen was prepped and draped in a sterile fashion using chlorhexidine scrub which was given time to completely dry. 1% lidocaine was used to numb the skin, soft tissue and peritoneum. A small skin nick was made with a #11 scalpel. The paracentesis catheter-over introducer needle was then inserted and advanced with negative pressure until serosanguineous, clear fluid was aspirated. The introducer needle was withdrawn as the catheter was gently advanced into the peritoneal space.  Approximately 60 mL of ascites fluid was collected and sent for laboratory analysis. The catheter was then connected to the vacutainer and 4.9 liters of additional ascites fluid was drained. The catheter was removed and no leaking was noted. A bandage was placed over the puncture wound. The patient tolerated the procedure well without any immediate complications. Estimated blood loss was 0ml.    Albumen administered per protocol if clinically appropriate

## 2023-08-21 ENCOUNTER — ANESTHESIA (OUTPATIENT)
Dept: GASTROENTEROLOGY | Facility: HOSPITAL | Age: 38
DRG: 871 | End: 2023-08-21
Payer: COMMERCIAL

## 2023-08-21 ENCOUNTER — ANESTHESIA EVENT (OUTPATIENT)
Dept: GASTROENTEROLOGY | Facility: HOSPITAL | Age: 38
DRG: 871 | End: 2023-08-21
Payer: COMMERCIAL

## 2023-08-21 ENCOUNTER — INPATIENT HOSPITAL (OUTPATIENT)
Dept: URBAN - METROPOLITAN AREA HOSPITAL 84 | Facility: HOSPITAL | Age: 38
End: 2023-08-21
Payer: COMMERCIAL

## 2023-08-21 DIAGNOSIS — I85.00 ESOPHAGEAL VARICES WITHOUT BLEEDING: ICD-10-CM

## 2023-08-21 DIAGNOSIS — D50.0 IRON DEFICIENCY ANEMIA SECONDARY TO BLOOD LOSS (CHRONIC): ICD-10-CM

## 2023-08-21 DIAGNOSIS — K76.6 PORTAL HYPERTENSION: ICD-10-CM

## 2023-08-21 DIAGNOSIS — K31.89 OTHER DISEASES OF STOMACH AND DUODENUM: ICD-10-CM

## 2023-08-21 DIAGNOSIS — K20.80 OTHER ESOPHAGITIS WITHOUT BLEEDING: ICD-10-CM

## 2023-08-21 PROBLEM — D64.9 ANEMIA: Status: ACTIVE | Noted: 2023-08-18

## 2023-08-21 LAB
ALBUMIN SERPL-MCNC: 4.6 G/DL (ref 3.5–5.2)
ALBUMIN/GLOB SERPL: 5.1 G/DL
ALP SERPL-CCNC: 116 U/L (ref 39–117)
ALT SERPL W P-5'-P-CCNC: 15 U/L (ref 1–41)
AMMONIA BLD-SCNC: 74 UMOL/L (ref 16–60)
ANION GAP SERPL CALCULATED.3IONS-SCNC: 14 MMOL/L (ref 5–15)
AST SERPL-CCNC: 44 U/L (ref 1–40)
BASOPHILS # BLD AUTO: 0 10*3/MM3 (ref 0–0.2)
BASOPHILS NFR BLD AUTO: 0.4 % (ref 0–1.5)
BH BB BLOOD EXPIRATION DATE: NORMAL
BH BB BLOOD TYPE BARCODE: 6200
BH BB DISPENSE STATUS: NORMAL
BH BB PRODUCT CODE: NORMAL
BH BB UNIT NUMBER: NORMAL
BILIRUB SERPL-MCNC: 4.7 MG/DL (ref 0–1.2)
BUN SERPL-MCNC: 40 MG/DL (ref 6–20)
BUN/CREAT SERPL: 16.6 (ref 7–25)
CA-I SERPL ISE-MCNC: 1.12 MMOL/L (ref 1.2–1.3)
CALCIUM SPEC-SCNC: 9.9 MG/DL (ref 8.6–10.5)
CHLORIDE SERPL-SCNC: 94 MMOL/L (ref 98–107)
CO2 SERPL-SCNC: 32 MMOL/L (ref 22–29)
CREAT SERPL-MCNC: 2.41 MG/DL (ref 0.76–1.27)
CROSSMATCH INTERPRETATION: NORMAL
DEPRECATED RDW RBC AUTO: 58.6 FL (ref 37–54)
EGFRCR SERPLBLD CKD-EPI 2021: 34.4 ML/MIN/1.73
EOSINOPHIL # BLD AUTO: 0.2 10*3/MM3 (ref 0–0.4)
EOSINOPHIL NFR BLD AUTO: 2.5 % (ref 0.3–6.2)
ERYTHROCYTE [DISTWIDTH] IN BLOOD BY AUTOMATED COUNT: 16.4 % (ref 12.3–15.4)
GLOBULIN UR ELPH-MCNC: 0.9 GM/DL
GLUCOSE FLD-MCNC: 115 MG/DL
GLUCOSE SERPL-MCNC: 145 MG/DL (ref 65–99)
HCT VFR BLD AUTO: 22.8 % (ref 37.5–51)
HGB BLD-MCNC: 7.7 G/DL (ref 13–17.7)
INR PPP: 1.81 (ref 0.93–1.1)
LDH FLD-CCNC: 126 U/L
LYMPHOCYTES # BLD AUTO: 1.7 10*3/MM3 (ref 0.7–3.1)
LYMPHOCYTES NFR BLD AUTO: 22.1 % (ref 19.6–45.3)
MAGNESIUM SERPL-MCNC: 1.9 MG/DL (ref 1.6–2.6)
MCH RBC QN AUTO: 33.6 PG (ref 26.6–33)
MCHC RBC AUTO-ENTMCNC: 33.7 G/DL (ref 31.5–35.7)
MCV RBC AUTO: 99.7 FL (ref 79–97)
MONOCYTES # BLD AUTO: 1.2 10*3/MM3 (ref 0.1–0.9)
MONOCYTES NFR BLD AUTO: 15.6 % (ref 5–12)
NEUTROPHILS NFR BLD AUTO: 4.5 10*3/MM3 (ref 1.7–7)
NEUTROPHILS NFR BLD AUTO: 59.4 % (ref 42.7–76)
NRBC BLD AUTO-RTO: 0 /100 WBC (ref 0–0.2)
PHOSPHATE SERPL-MCNC: 3.2 MG/DL (ref 2.5–4.5)
PLATELET # BLD AUTO: 67 10*3/MM3 (ref 140–450)
PMV BLD AUTO: 10 FL (ref 6–12)
POTASSIUM SERPL-SCNC: 3 MMOL/L (ref 3.5–5.2)
POTASSIUM SERPL-SCNC: 3.3 MMOL/L (ref 3.5–5.2)
PROT FLD-MCNC: 1.6 G/DL
PROT SERPL-MCNC: 5.5 G/DL (ref 6–8.5)
PROTHROMBIN TIME: 18.7 SECONDS (ref 9.6–11.7)
RBC # BLD AUTO: 2.28 10*6/MM3 (ref 4.14–5.8)
SODIUM SERPL-SCNC: 140 MMOL/L (ref 136–145)
UNIT  ABO: NORMAL
UNIT  RH: NORMAL
WBC NRBC COR # BLD: 7.5 10*3/MM3 (ref 3.4–10.8)

## 2023-08-21 PROCEDURE — 25010000002 ONDANSETRON PER 1 MG: Performed by: PHYSICIAN ASSISTANT

## 2023-08-21 PROCEDURE — 25010000002 PROPOFOL 200 MG/20ML EMULSION: Performed by: NURSE ANESTHETIST, CERTIFIED REGISTERED

## 2023-08-21 PROCEDURE — 84132 ASSAY OF SERUM POTASSIUM: CPT | Performed by: INTERNAL MEDICINE

## 2023-08-21 PROCEDURE — 94799 UNLISTED PULMONARY SVC/PX: CPT

## 2023-08-21 PROCEDURE — P9047 ALBUMIN (HUMAN), 25%, 50ML: HCPCS | Performed by: INTERNAL MEDICINE

## 2023-08-21 PROCEDURE — 82330 ASSAY OF CALCIUM: CPT | Performed by: INTERNAL MEDICINE

## 2023-08-21 PROCEDURE — 25010000002 OCTREOTIDE PER 25 MCG: Performed by: INTERNAL MEDICINE

## 2023-08-21 PROCEDURE — 25010000002 ALBUMIN HUMAN 25% PER 50 ML: Performed by: INTERNAL MEDICINE

## 2023-08-21 PROCEDURE — 80053 COMPREHEN METABOLIC PANEL: CPT | Performed by: PHYSICIAN ASSISTANT

## 2023-08-21 PROCEDURE — 84100 ASSAY OF PHOSPHORUS: CPT | Performed by: INTERNAL MEDICINE

## 2023-08-21 PROCEDURE — 82140 ASSAY OF AMMONIA: CPT | Performed by: STUDENT IN AN ORGANIZED HEALTH CARE EDUCATION/TRAINING PROGRAM

## 2023-08-21 PROCEDURE — 97166 OT EVAL MOD COMPLEX 45 MIN: CPT

## 2023-08-21 PROCEDURE — 97162 PT EVAL MOD COMPLEX 30 MIN: CPT

## 2023-08-21 PROCEDURE — 43235 EGD DIAGNOSTIC BRUSH WASH: CPT | Performed by: INTERNAL MEDICINE

## 2023-08-21 PROCEDURE — 97116 GAIT TRAINING THERAPY: CPT

## 2023-08-21 PROCEDURE — 0DJ08ZZ INSPECTION OF UPPER INTESTINAL TRACT, VIA NATURAL OR ARTIFICIAL OPENING ENDOSCOPIC: ICD-10-PCS | Performed by: INTERNAL MEDICINE

## 2023-08-21 PROCEDURE — 85610 PROTHROMBIN TIME: CPT | Performed by: INTERNAL MEDICINE

## 2023-08-21 PROCEDURE — 25010000002 CEFTRIAXONE PER 250 MG: Performed by: INTERNAL MEDICINE

## 2023-08-21 PROCEDURE — 25010000002 METOCLOPRAMIDE PER 10 MG: Performed by: INTERNAL MEDICINE

## 2023-08-21 PROCEDURE — 25010000002 OCTREOTIDE PER 25 MCG: Performed by: NURSE PRACTITIONER

## 2023-08-21 PROCEDURE — 85025 COMPLETE CBC W/AUTO DIFF WBC: CPT | Performed by: PHYSICIAN ASSISTANT

## 2023-08-21 PROCEDURE — 83735 ASSAY OF MAGNESIUM: CPT | Performed by: PHYSICIAN ASSISTANT

## 2023-08-21 PROCEDURE — 25010000002 HYDROCORTISONE SOD SUC (PF) 100 MG RECONSTITUTED SOLUTION: Performed by: INTERNAL MEDICINE

## 2023-08-21 RX ORDER — LACTULOSE 10 G/15ML
20 SOLUTION ORAL 3 TIMES DAILY
Status: DISCONTINUED | OUTPATIENT
Start: 2023-08-21 | End: 2023-08-24 | Stop reason: HOSPADM

## 2023-08-21 RX ORDER — ALBUMIN (HUMAN) 12.5 G/50ML
25 SOLUTION INTRAVENOUS DAILY PRN
Status: DISCONTINUED | OUTPATIENT
Start: 2023-08-21 | End: 2023-08-24 | Stop reason: HOSPADM

## 2023-08-21 RX ORDER — LABETALOL HYDROCHLORIDE 5 MG/ML
5 INJECTION, SOLUTION INTRAVENOUS
Status: DISCONTINUED | OUTPATIENT
Start: 2023-08-21 | End: 2023-08-21 | Stop reason: HOSPADM

## 2023-08-21 RX ORDER — LIDOCAINE HYDROCHLORIDE 10 MG/ML
INJECTION, SOLUTION EPIDURAL; INFILTRATION; INTRACAUDAL; PERINEURAL AS NEEDED
Status: DISCONTINUED | OUTPATIENT
Start: 2023-08-21 | End: 2023-08-21 | Stop reason: SURG

## 2023-08-21 RX ORDER — ONDANSETRON 4 MG/1
4 TABLET, FILM COATED ORAL EVERY 6 HOURS PRN
Status: DISCONTINUED | OUTPATIENT
Start: 2023-08-21 | End: 2023-08-24 | Stop reason: HOSPADM

## 2023-08-21 RX ORDER — POTASSIUM CHLORIDE 20 MEQ/1
40 TABLET, EXTENDED RELEASE ORAL ONCE
Status: COMPLETED | OUTPATIENT
Start: 2023-08-21 | End: 2023-08-22

## 2023-08-21 RX ORDER — POTASSIUM CHLORIDE 20 MEQ/1
40 TABLET, EXTENDED RELEASE ORAL ONCE
Status: DISCONTINUED | OUTPATIENT
Start: 2023-08-21 | End: 2023-08-21

## 2023-08-21 RX ORDER — LIDOCAINE HYDROCHLORIDE 20 MG/ML
10 INJECTION, SOLUTION INFILTRATION; PERINEURAL AS NEEDED
Status: DISCONTINUED | OUTPATIENT
Start: 2023-08-21 | End: 2023-08-24 | Stop reason: HOSPADM

## 2023-08-21 RX ORDER — SODIUM CHLORIDE 9 MG/ML
INJECTION, SOLUTION INTRAVENOUS CONTINUOUS PRN
Status: DISCONTINUED | OUTPATIENT
Start: 2023-08-21 | End: 2023-08-21 | Stop reason: SURG

## 2023-08-21 RX ORDER — LIDOCAINE HYDROCHLORIDE 10 MG/ML
10 INJECTION, SOLUTION INFILTRATION; PERINEURAL AS NEEDED
Status: DISCONTINUED | OUTPATIENT
Start: 2023-08-21 | End: 2023-08-24 | Stop reason: HOSPADM

## 2023-08-21 RX ORDER — IPRATROPIUM BROMIDE AND ALBUTEROL SULFATE 2.5; .5 MG/3ML; MG/3ML
3 SOLUTION RESPIRATORY (INHALATION) ONCE AS NEEDED
Status: DISCONTINUED | OUTPATIENT
Start: 2023-08-21 | End: 2023-08-21 | Stop reason: HOSPADM

## 2023-08-21 RX ORDER — ONDANSETRON 2 MG/ML
4 INJECTION INTRAMUSCULAR; INTRAVENOUS ONCE AS NEEDED
Status: DISCONTINUED | OUTPATIENT
Start: 2023-08-21 | End: 2023-08-21 | Stop reason: HOSPADM

## 2023-08-21 RX ORDER — PROPOFOL 10 MG/ML
INJECTION, EMULSION INTRAVENOUS AS NEEDED
Status: DISCONTINUED | OUTPATIENT
Start: 2023-08-21 | End: 2023-08-21 | Stop reason: SURG

## 2023-08-21 RX ORDER — ALBUMIN (HUMAN) 12.5 G/50ML
75 SOLUTION INTRAVENOUS ONCE
Status: COMPLETED | OUTPATIENT
Start: 2023-08-21 | End: 2023-08-21

## 2023-08-21 RX ORDER — OCTREOTIDE ACETATE 100 UG/ML
100 INJECTION, SOLUTION INTRAVENOUS; SUBCUTANEOUS 3 TIMES DAILY
Status: DISCONTINUED | OUTPATIENT
Start: 2023-08-21 | End: 2023-08-24

## 2023-08-21 RX ORDER — ONDANSETRON 2 MG/ML
4 INJECTION INTRAMUSCULAR; INTRAVENOUS EVERY 6 HOURS PRN
Status: DISCONTINUED | OUTPATIENT
Start: 2023-08-21 | End: 2023-08-24 | Stop reason: HOSPADM

## 2023-08-21 RX ORDER — POTASSIUM CHLORIDE 20 MEQ/1
40 TABLET, EXTENDED RELEASE ORAL EVERY 4 HOURS
Status: DISCONTINUED | OUTPATIENT
Start: 2023-08-21 | End: 2023-08-21

## 2023-08-21 RX ORDER — EPHEDRINE SULFATE 5 MG/ML
5 INJECTION INTRAVENOUS ONCE AS NEEDED
Status: DISCONTINUED | OUTPATIENT
Start: 2023-08-21 | End: 2023-08-21 | Stop reason: HOSPADM

## 2023-08-21 RX ORDER — ALBUMIN (HUMAN) 12.5 G/50ML
12.5 SOLUTION INTRAVENOUS DAILY PRN
Status: DISCONTINUED | OUTPATIENT
Start: 2023-08-21 | End: 2023-08-24 | Stop reason: HOSPADM

## 2023-08-21 RX ORDER — METOCLOPRAMIDE HYDROCHLORIDE 5 MG/ML
10 INJECTION INTRAMUSCULAR; INTRAVENOUS EVERY 6 HOURS
Status: DISCONTINUED | OUTPATIENT
Start: 2023-08-21 | End: 2023-08-24

## 2023-08-21 RX ORDER — EPHEDRINE SULFATE 5 MG/ML
INJECTION INTRAVENOUS AS NEEDED
Status: DISCONTINUED | OUTPATIENT
Start: 2023-08-21 | End: 2023-08-21 | Stop reason: SURG

## 2023-08-21 RX ORDER — POTASSIUM CHLORIDE 1.5 G/1.58G
40 POWDER, FOR SOLUTION ORAL EVERY 4 HOURS
Status: COMPLETED | OUTPATIENT
Start: 2023-08-21 | End: 2023-08-21

## 2023-08-21 RX ORDER — HYDRALAZINE HYDROCHLORIDE 20 MG/ML
5 INJECTION INTRAMUSCULAR; INTRAVENOUS
Status: DISCONTINUED | OUTPATIENT
Start: 2023-08-21 | End: 2023-08-21 | Stop reason: HOSPADM

## 2023-08-21 RX ADMIN — HYDROCORTISONE SODIUM SUCCINATE 50 MG: 100 INJECTION, POWDER, FOR SOLUTION INTRAMUSCULAR; INTRAVENOUS at 09:38

## 2023-08-21 RX ADMIN — EPHEDRINE SULFATE 5 MG: 5 INJECTION INTRAVENOUS at 14:06

## 2023-08-21 RX ADMIN — LACTULOSE 20 G: 20 SOLUTION ORAL at 21:07

## 2023-08-21 RX ADMIN — RIFAXIMIN 550 MG: 550 TABLET ORAL at 21:08

## 2023-08-21 RX ADMIN — ALBUMIN (HUMAN) 75 G: 0.25 INJECTION, SOLUTION INTRAVENOUS at 09:38

## 2023-08-21 RX ADMIN — HYDROCORTISONE SODIUM SUCCINATE 50 MG: 100 INJECTION, POWDER, FOR SOLUTION INTRAMUSCULAR; INTRAVENOUS at 21:07

## 2023-08-21 RX ADMIN — Medication 10 ML: at 21:08

## 2023-08-21 RX ADMIN — PROPOFOL 70 MG: 10 INJECTION, EMULSION INTRAVENOUS at 14:03

## 2023-08-21 RX ADMIN — SODIUM BICARBONATE 150 MEQ: 84 INJECTION, SOLUTION INTRAVENOUS at 10:17

## 2023-08-21 RX ADMIN — POTASSIUM CHLORIDE 40 MEQ: 1.5 POWDER, FOR SOLUTION ORAL at 15:17

## 2023-08-21 RX ADMIN — Medication 10 ML: at 08:44

## 2023-08-21 RX ADMIN — OXYCODONE HYDROCHLORIDE 5 MG: 5 TABLET ORAL at 06:32

## 2023-08-21 RX ADMIN — METOCLOPRAMIDE 10 MG: 5 INJECTION, SOLUTION INTRAMUSCULAR; INTRAVENOUS at 21:07

## 2023-08-21 RX ADMIN — OXYCODONE HYDROCHLORIDE 5 MG: 5 TABLET ORAL at 15:16

## 2023-08-21 RX ADMIN — CEFTRIAXONE 2000 MG: 2 INJECTION, POWDER, FOR SOLUTION INTRAMUSCULAR; INTRAVENOUS at 09:38

## 2023-08-21 RX ADMIN — METOCLOPRAMIDE 10 MG: 5 INJECTION, SOLUTION INTRAMUSCULAR; INTRAVENOUS at 15:31

## 2023-08-21 RX ADMIN — PANTOPRAZOLE SODIUM 40 MG: 40 INJECTION, POWDER, LYOPHILIZED, FOR SOLUTION INTRAVENOUS at 08:33

## 2023-08-21 RX ADMIN — PANTOPRAZOLE SODIUM 40 MG: 40 INJECTION, POWDER, LYOPHILIZED, FOR SOLUTION INTRAVENOUS at 21:07

## 2023-08-21 RX ADMIN — LIDOCAINE HYDROCHLORIDE 30 MG: 10 INJECTION, SOLUTION EPIDURAL; INFILTRATION; INTRACAUDAL; PERINEURAL at 14:03

## 2023-08-21 RX ADMIN — HYDROCORTISONE SODIUM SUCCINATE 50 MG: 100 INJECTION, POWDER, FOR SOLUTION INTRAMUSCULAR; INTRAVENOUS at 15:15

## 2023-08-21 RX ADMIN — OCTREOTIDE ACETATE 25 MCG/HR: 500 INJECTION, SOLUTION INTRAVENOUS; SUBCUTANEOUS at 04:51

## 2023-08-21 RX ADMIN — SODIUM CHLORIDE: 9 INJECTION, SOLUTION INTRAVENOUS at 13:58

## 2023-08-21 RX ADMIN — MIDODRINE HYDROCHLORIDE 20 MG: 5 TABLET ORAL at 15:16

## 2023-08-21 RX ADMIN — POTASSIUM CHLORIDE 40 MEQ: 1500 TABLET, EXTENDED RELEASE ORAL at 09:38

## 2023-08-21 RX ADMIN — SODIUM BICARBONATE 150 MEQ: 84 INJECTION, SOLUTION INTRAVENOUS at 01:13

## 2023-08-21 RX ADMIN — RIFAXIMIN 550 MG: 550 TABLET ORAL at 08:33

## 2023-08-21 RX ADMIN — MIDODRINE HYDROCHLORIDE 20 MG: 5 TABLET ORAL at 06:32

## 2023-08-21 RX ADMIN — MIDODRINE HYDROCHLORIDE 20 MG: 5 TABLET ORAL at 21:07

## 2023-08-21 RX ADMIN — OCTREOTIDE ACETATE 100 MCG: 100 INJECTION, SOLUTION INTRAVENOUS; SUBCUTANEOUS at 15:31

## 2023-08-21 RX ADMIN — LACTULOSE 20 G: 20 SOLUTION ORAL at 15:16

## 2023-08-21 RX ADMIN — OCTREOTIDE ACETATE 100 MCG: 100 INJECTION, SOLUTION INTRAVENOUS; SUBCUTANEOUS at 21:07

## 2023-08-21 RX ADMIN — ONDANSETRON 4 MG: 2 INJECTION INTRAMUSCULAR; INTRAVENOUS at 08:48

## 2023-08-21 RX ADMIN — ALPRAZOLAM 1 MG: 1 TABLET ORAL at 21:06

## 2023-08-21 RX ADMIN — FEBUXOSTAT 40 MG: 40 TABLET, FILM COATED ORAL at 08:33

## 2023-08-21 RX ADMIN — POTASSIUM CHLORIDE 40 MEQ: 1.5 POWDER, FOR SOLUTION ORAL at 17:09

## 2023-08-21 RX ADMIN — Medication 0.1 MCG/KG/MIN: at 04:05

## 2023-08-21 RX ADMIN — OXYCODONE HYDROCHLORIDE 5 MG: 5 TABLET ORAL at 21:06

## 2023-08-21 NOTE — PAYOR COMM NOTE
"CLINICALS FOR PENDING INPATIENT PRECERT:        PENDING AUTH # [id number]  Daquan Xiong (37 y.o. Male) 1985          AUTHORIZATION PENDING:   PLEASE CALL OR FAX DETERMINATION TO CONTACT BELOW. THANK YOU.        Jerica Eldridge, RN MSN  /UR  [facility]  [phone] office  [phone] fax  [email or web address]    [facility]  NPI: [id number]  Tax: [id number]            Daquan Xiong (37 y.o. Male)       Date of Birth   1985    Social Security Number       Address   [address] [place] [address]    Home Phone   [phone]    MRN   [id number]       Taoism   Restorationist    Marital Status                               Admission Date   8/18/23    Admission Type   Emergency    Admitting Provider   Aly Zayas MD    Attending Provider   Aly Zayas MD    Department, Room/Bed   [facility] INTENSIVE CARE UNIT, 2316/1       Discharge Date       Discharge Disposition       Discharge Destination                                 Attending Provider: Aly Zayas MD    Allergies: Bee Venom, Scopolamine    Isolation: None   Infection: None   Code Status: CPR    Ht: 185.4 cm (73\")   Wt: 76.1 kg (167 lb 12.3 oz)    Admission Cmt: None   Principal Problem: URIEL (acute kidney injury) [N17.9]                   Active Insurance as of 8/18/2023       Primary Coverage       Payor Plan Insurance Group Employer/Plan Group    ANTHEM BLUE CROSS ANTH BLUE CROSS BLUE SHIELD PPO [id number]       Payor Plan Address Payor Plan Phone Number Payor Plan Fax Number Effective Dates    [address] [phone]  1/1/2023 - None Entered    [facility] [id number]         Subscriber Name Subscriber Birth Date Member ID       ANA XIONG 3/25/1986 [id number]               Secondary Coverage       Payor Plan Insurance Group Employer/Plan Group    ANTHEM MEDICAID [facility] [id number]       Payor Plan Address Payor Plan Phone Number Payor Plan " "Fax Number Effective Dates    MAIL STOP:   1/15/2023 - None Entered    [address] [address]       [facility] [address]         Subscriber Name Subscriber Birth Date Member ID       AMISH XIONG 1985 [id number]                     Emergency Contacts        (Rel.) Home Phone Work Phone Mobile Phone    Lana Xiong (Spouse) [phone] -- [phone]    Alejandra Xiong (Daughter) -- -- [phone]    Lizz Wiggins (Mother) -- -- [phone]          23 0058  Inpatient Admission  Once     Completed     Level of Care: Telemetry  Diagnosis: URIEL (acute kidney injury) [400943]  Admitting Physician: EFFIE DIEZ [[id number]]  Attending Physician: EFFIE DIEZ [[id number]]  Certification: I Certify That Inpatient Hospital Services Are Medically Necessary For Greater Than 2 Midnights               History & Physical        Greg Marcial PA-C at 23 0037       Attestation signed by Jose Jacobs MD at 23 1602    SUPERVISE: For this patient encounter, I reviewed the APC's documentation, treatment plan, and medical decision making.                    [facility] Medicine Services  History & Physical    Patient Name: Amish Xiong  : 1985  MRN: [id number]  Primary Care Physician:  Wolf Carvalho MD  Date of admission: 2023  Date and Time of Service: 2023 at 00 30    Subjective      Chief Complaint: Abnormal lab with nausea and vomiting    History of Present Illness: Amish Xiong is a 37 y.o. male who presented to [facility] on 2023 complaining of recent nausea and vomiting with \"kidney pain\" and abnormal lab.  Patient reports that for approximately past day he has developed a \"pain in his kidneys\" bilaterally as well as some suprapubic pain.  He has had multiple episodes of nausea as well as nonbloody vomiting and has been unable to tolerate any p.o. intake of liquids or solids.  He was evaluated at primary GI office the previous day " and had labs drawn and was found to have URIEL and was referred to the ED.  He denies any fever, sick contacts, changes in bowel habits, dyspnea, chest pain or palpitations.  He does feel his stomach is somewhat distended again but denies any tenderness to palpation.      Review of Systems   Constitutional: Negative.   HENT: Negative.     Eyes: Negative.    Cardiovascular: Negative.    Respiratory: Negative.     Skin: Negative.    Musculoskeletal:  Positive for back pain.   Gastrointestinal:  Positive for nausea and vomiting. Negative for hematemesis, hematochezia and melena.   Genitourinary:  Positive for pelvic pain.   Neurological: Negative.    Psychiatric/Behavioral: Negative.        Personal History     Past Medical History:   Diagnosis Date    Alcohol abuse     1/2-1 pint a day, sober since 9/12/2022    Alcoholic cirrhosis 09/01/2022    Arthritis     Bipolar affective disorder 09/22/2022    Cholecystitis 11/08/2022    Chronic constipation 09/05/2017    GERD (gastroesophageal reflux disease) 09/22/2022    Doing well with pepcid.    Hypertension     Osteoarthritis 09/22/2022    Polysubstance abuse 09/22/2022    Severe Malnutrition (HCC) 06/27/2023    Sleep apnea     Tobacco use disorder 09/22/2022       Past Surgical History:   Procedure Laterality Date    BACK SURGERY      WRIST SURGERY         Family History: family history includes Cirrhosis in his father; Diabetes in his maternal grandfather, maternal grandmother, paternal grandfather, and paternal grandmother; Heart disease in his maternal grandmother. Otherwise pertinent FHx was reviewed and not pertinent to current issue.    Social History:  reports that he has been smoking cigarettes. He has been smoking an average of .5 packs per day. He has never used smokeless tobacco. He reports that he does not currently use alcohol. He reports that he does not currently use drugs after having used the following drugs: Marijuana.    Home Medications:  Prior to  Admission Medications       Prescriptions Last Dose Informant Patient Reported? Taking?    allopurinol (ZYLOPRIM) 100 MG tablet   No No    Take 1 tablet by mouth Daily.    baclofen (LIORESAL) 10 MG tablet   Yes No    Take 5-10 mg by mouth 2 (Two) Times a Day As Needed for Muscle Spasms.    cyclobenzaprine (FLEXERIL) 10 MG tablet   Yes No    Take 5-10 mg by mouth 3 (Three) Times a Day As Needed.    melatonin 5 MG tablet tablet   Yes No    Take 1 tablet by mouth At Night As Needed.    midodrine (PROAMATINE) 10 MG tablet   No No    Take 2 tablets (20 mg) by mouth 3 (Three) Times a Day Before Meals    nadolol (CORGARD) 40 MG tablet   Yes No    Take 1 tablet by mouth Daily As Needed.    nicotine (NICODERM CQ) 14 MG/24HR patch   Yes No    Place 1 patch on the skin as directed by provider Daily As Needed.    nicotine polacrilex (NICORETTE) 2 MG gum   Yes No    Chew 1 each As Needed for Smoking Cessation.    oxyCODONE (ROXICODONE) 10 MG tablet   Yes No    Take 1 tablet by mouth Every 4 (Four) to 6 (Six) Hours As Needed for Moderate Pain.    pantoprazole (PROTONIX) 40 MG EC tablet   Yes No    Take 1 tablet by mouth Daily.    riFAXIMin (XIFAXAN) 550 MG tablet   Yes No    Take 1 tablet by mouth 2 (Two) Times a Day.              Allergies:  Allergies   Allergen Reactions    Bee Venom Anaphylaxis    Scopolamine Anaphylaxis       Objective      Vitals:   Temp:  [98.5 øF (36.9 øC)-98.7 øF (37.1 øC)] 98.5 øF (36.9 øC)  Heart Rate:  [54-74] 58  Resp:  [12-19] 12  BP: ()/(20-45) 96/43    Physical Exam  Vitals reviewed.   Constitutional:       General: He is not in acute distress.     Appearance: Normal appearance. He is normal weight. He is not ill-appearing, toxic-appearing or diaphoretic.   HENT:      Head: Normocephalic.      Right Ear: External ear normal.      Left Ear: External ear normal.      Nose: Nose normal.      Mouth/Throat:      Mouth: Mucous membranes are moist.   Eyes:      General: Scleral icterus present.       Extraocular Movements: Extraocular movements intact.   Cardiovascular:      Rate and Rhythm: Normal rate and regular rhythm.      Pulses: Normal pulses.      Heart sounds: Normal heart sounds.   Pulmonary:      Effort: Pulmonary effort is normal.      Breath sounds: Normal breath sounds.   Abdominal:      General: There is distension.      Palpations: Abdomen is soft.      Tenderness: There is no abdominal tenderness.      Comments: Bowel sounds hyperactive   Musculoskeletal:         General: Normal range of motion.      Cervical back: Normal range of motion.      Right lower leg: No edema.      Left lower leg: No edema.   Skin:     General: Skin is warm and dry.      Capillary Refill: Capillary refill takes less than 2 seconds.   Neurological:      General: No focal deficit present.      Mental Status: He is alert and oriented to person, place, and time.   Psychiatric:         Mood and Affect: Mood normal.         Behavior: Behavior normal.         Thought Content: Thought content normal.         Judgment: Judgment normal.        Result Review    Result Review:  I have personally reviewed the results from the time of this admission to 8/19/2023 00:37 EDT and agree with these findings:  [x]  Laboratory  []  Microbiology  []  Radiology  [x]  EKG/Telemetry   []  Cardiology/Vascular   []  Pathology  []  Old records  []  Other:  Most notable findings include: CMP, CBC, ammonia, lipase, lactate, INR/PT and EKG      Assessment & Plan        Active Hospital Problems:  Active Hospital Problems    Diagnosis     URIEL (acute kidney injury)     GERD (gastroesophageal reflux disease)     Tobacco use disorder     Alcoholic cirrhosis      Plan:     URIEL with a history of alcoholic cirrhosis  -Creatinine: 3.91 with a BUN of 74, BUN/creatinine ratio of 18.9 with a GFR of 19.2 (creatinine on 7/7: 1.39, GFR: 66.5)  -Patient hypotensive in the ED, was given fluid bolus and his home midodrine, will monitor closely and may consider albumin  administration  -ALT: 30, AST: 77, alk phos: 232, bilirubin: 4.8, albumin: 2.3, ammonia: 52, lipase: 68  -Lactate: 1.3  -Monitor CMP while admitted  -Hold beta-blocker for now  -Nephrology consulted  -Check UA and monitor I's and O's  -Discussed case with attending physician who recommended addition of 75 g albumin, urine and serum osmolality, urine electrolytes and discussion with ICU provider for possible transfer to intensive care unit due to blood pressure again beginning to trend down and likely need for pressor therapy    Hyperkalemia  Lab Results   Component Value Date    K 5.9 (H) 08/18/2023    MG 2.5 08/18/2023   -Electrolyte replacement protocol ordered  -Monitor potassium and magnesium  -Check EKG  -Lokelma, calcium gluconate and D50 with insulin ordered    GERD  -PPI    Tobacco abuse  -Nicotine patch      DVT prophylaxis:  No DVT prophylaxis order currently exists.    CODE STATUS:       Admission Status:  I believe this patient meets inpatient status.    I discussed the patient's findings and my recommendations with patient and nursing staff.      Signature: Electronically signed by Greg Marcial PA-C, 08/19/23, 00:37 EDT.  [facility] Hospitalist Team      Electronically signed by Jose Jacobs MD at 08/19/23 1602          Emergency Department Notes        Jose Jacobs MD at 08/18/23 2103          Subjective   History of Present Illness  Chief complaint: Abnormal labs    37-year-old male with a history of alcoholic cirrhosis presents with abnormal labs.  Patient states he had blood work done yesterday at his GI office and he was told to come to the hospital because his creatinine was high.  He has had nausea and vomiting.  He has had some diffuse abdominal discomfort.  He denies fever.    History provided by:  Patient    Review of Systems   Constitutional:  Negative for fever.   HENT:  Negative for congestion.    Respiratory:  Negative for cough and shortness of breath.    Cardiovascular:   "Negative for chest pain.   Gastrointestinal:  Positive for abdominal pain, nausea and vomiting.   Genitourinary:  Negative for dysuria.   Musculoskeletal:  Negative for back pain.   Neurological:  Negative for headaches.   Psychiatric/Behavioral:  Negative for confusion.      Past Medical History:   Diagnosis Date    Alcohol abuse     1/2-1 pint a day, sober since 9/12/2022    Alcoholic cirrhosis 09/01/2022    Arthritis     Bipolar affective disorder 09/22/2022    Cholecystitis 11/08/2022    Chronic constipation 09/05/2017    GERD (gastroesophageal reflux disease) 09/22/2022    Doing well with pepcid.    Hypertension     Osteoarthritis 09/22/2022    Polysubstance abuse 09/22/2022    Severe Malnutrition (HCC) 06/27/2023    Sleep apnea     Tobacco use disorder 09/22/2022       Allergies   Allergen Reactions    Bee Venom Anaphylaxis    Scopolamine Anaphylaxis       Past Surgical History:   Procedure Laterality Date    BACK SURGERY      WRIST SURGERY         Family History   Problem Relation Age of Onset    Cirrhosis Father     Heart disease Maternal Grandmother     Diabetes Maternal Grandmother     Diabetes Maternal Grandfather     Diabetes Paternal Grandmother     Diabetes Paternal Grandfather        Social History     Socioeconomic History    Marital status:    Tobacco Use    Smoking status: Every Day     Packs/day: 0.50     Types: Cigarettes    Smokeless tobacco: Never   Vaping Use    Vaping Use: Never used   Substance and Sexual Activity    Alcohol use: Not Currently     Comment: sober since 9/12/22    Drug use: Not Currently     Types: Marijuana    Sexual activity: Defer       BP 96/43 (BP Location: Left arm, Patient Position: Lying)   Pulse 58   Temp 98.5 øF (36.9 øC) (Oral)   Resp 12   Ht 185.4 cm (73\")   Wt 72.6 kg (160 lb)   SpO2 98%   BMI 21.11 kg/mý       Objective   Physical Exam  Vitals and nursing note reviewed.   Constitutional:       Comments: Chronically ill-appearing   HENT:      Head: " Normocephalic and atraumatic.      Mouth/Throat:      Mouth: Mucous membranes are moist.   Eyes:      General: Scleral icterus present.   Cardiovascular:      Rate and Rhythm: Normal rate and regular rhythm.      Heart sounds: Normal heart sounds.   Pulmonary:      Effort: Pulmonary effort is normal. No respiratory distress.      Breath sounds: Normal breath sounds.   Abdominal:      Palpations: Abdomen is soft.      Comments: Mild tenderness to palpation diffusely, no rebound or guarding   Skin:     Coloration: Skin is jaundiced.   Neurological:      Mental Status: He is alert and oriented to person, place, and time.       Procedures          ED Course      Results for orders placed or performed during the hospital encounter of 08/18/23   Comprehensive Metabolic Panel    Specimen: Arm, Right; Blood   Result Value Ref Range    Glucose 86 65 - 99 mg/dL    BUN 74 (H) 6 - 20 mg/dL    Creatinine 3.91 (H) 0.76 - 1.27 mg/dL    Sodium 130 (L) 136 - 145 mmol/L    Potassium 5.9 (H) 3.5 - 5.2 mmol/L    Chloride 98 98 - 107 mmol/L    CO2 18.0 (L) 22.0 - 29.0 mmol/L    Calcium 8.7 8.6 - 10.5 mg/dL    Total Protein 4.6 (L) 6.0 - 8.5 g/dL    Albumin 2.3 (L) 3.5 - 5.2 g/dL    ALT (SGPT) 30 1 - 41 U/L    AST (SGOT) 77 (H) 1 - 40 U/L    Alkaline Phosphatase 232 (H) 39 - 117 U/L    Total Bilirubin 4.8 (H) 0.0 - 1.2 mg/dL    Globulin 2.3 gm/dL    A/G Ratio 1.0 g/dL    BUN/Creatinine Ratio 18.9 7.0 - 25.0    Anion Gap 14.0 5.0 - 15.0 mmol/L    eGFR 19.2 (L) >60.0 mL/min/1.73   Lipase    Specimen: Arm, Right; Blood   Result Value Ref Range    Lipase 68 (H) 13 - 60 U/L   Protime-INR    Specimen: Blood   Result Value Ref Range    Protime 14.4 (H) 9.6 - 11.7 Seconds    INR 1.37 (H) 0.93 - 1.10   Ammonia    Specimen: Blood   Result Value Ref Range    Ammonia 52 16 - 60 umol/L   Magnesium    Specimen: Arm, Right; Blood   Result Value Ref Range    Magnesium 2.5 1.6 - 2.6 mg/dL   CBC Auto Differential    Specimen: Arm, Right; Blood   Result  Value Ref Range    WBC 9.50 3.40 - 10.80 10*3/mm3    RBC 2.87 (L) 4.14 - 5.80 10*6/mm3    Hemoglobin 10.1 (L) 13.0 - 17.7 g/dL    Hematocrit 30.5 (L) 37.5 - 51.0 %    .3 (H) 79.0 - 97.0 fL    MCH 35.2 (H) 26.6 - 33.0 pg    MCHC 33.1 31.5 - 35.7 g/dL    RDW 15.4 12.3 - 15.4 %    RDW-SD 57.3 (H) 37.0 - 54.0 fl    MPV 10.0 6.0 - 12.0 fL    Platelets 116 (L) 140 - 450 10*3/mm3    Neutrophil % 57.8 42.7 - 76.0 %    Lymphocyte % 21.2 19.6 - 45.3 %    Monocyte % 17.0 (H) 5.0 - 12.0 %    Eosinophil % 3.3 0.3 - 6.2 %    Basophil % 0.7 0.0 - 1.5 %    Neutrophils, Absolute 5.50 1.70 - 7.00 10*3/mm3    Lymphocytes, Absolute 2.00 0.70 - 3.10 10*3/mm3    Monocytes, Absolute 1.60 (H) 0.10 - 0.90 10*3/mm3    Eosinophils, Absolute 0.30 0.00 - 0.40 10*3/mm3    Basophils, Absolute 0.10 0.00 - 0.20 10*3/mm3    nRBC 0.0 0.0 - 0.2 /100 WBC   POC Lactate    Specimen: Blood   Result Value Ref Range    Lactate 1.3 0.3 - 2.0 mmol/L                                          Medical Decision Making  Amount and/or Complexity of Data Reviewed  Labs: ordered.    Risk  Prescription drug management.      Patient had the above evaluation.  Results were discussed with the patient.  Patient was hypotensive on arrival.  He was started on IV fluids.  Patient also states he missed 3 doses of his midodrine today because of nausea.  He was given a dose of midodrine.  His blood pressure is starting to come up.  Patient has acute kidney injury with a BUN of 30.91 and BUN of 74.  Potassium is a little elevated at 5.9.  Ammonia level is normal.  Lipase is 68.  White blood cell count is normal.  Hemoglobin is 10.1.  I discussed with the provider on-call for the hospitalist and the patient will be admitted for further evaluation and management including nephrology consult.      Final diagnoses:   Acute kidney injury   Hypotension, unspecified hypotension type       ED Disposition  ED Disposition       ED Disposition   Decision to Admit    Condition   --     "Comment   Level of Care: Progressive Care [20]   Admitting Physician: EFFIE DIEZ [[id number]]   Attending Physician: EFFIE DIEZ [[id number]]                 No follow-up provider specified.       Medication List      No changes were made to your prescriptions during this visit.            Jose Jacobs MD  23      Electronically signed by Jose Jacobs MD at 23          Physician Progress Notes (all)        Aly Zayas MD at 23 1043            Critical Care Progress Note     Daquan Xiong : 1985 MRN:[id number] LOS:2  Rm: 2316/1     Principal Problem: URIEL (acute kidney injury)     Reason for follow up: All the medical problems listed below    Summary     A 37 y.o. old male patient with PMH of Alcoholic cirrhosis, severe malnutrition, GERD, CKD III, substance abuse presents to the hospital with complaints of nausea, vomiting, and \"kidney pain\" and was admitted with a principal diagnosis of URIEL (acute kidney injury).  Nephrology was consulted, treated with bicarb drip.     Also found to be hypotensive, treated with IV fluids without much improvement and needed vasopressors.  Started on empiric ceftriaxone for suspected SBP.  Subsequently, patient's hemoglobin dropped from 10 to 7, GI was consulted for suspected GI/variceal bleed.  CT abdomen pelvis showed large volume ascites with moderate bilateral pleural effusions and tree-in-bud nodular opacities on both lungs.  S/p paracentesis on  with removal of 5 L of ascitic fluid, mostly lymphocyte predominant with 119 neutrophils.  EGD and colonoscopy planned for .     Significant Events     23 : Continue with midodrine.  Will give IV albumin for crystalloid refractory shock and possible hepatorenal syndrome.  Added stress dose steroids, increase lactulose to titrate for 3-4 bowel movements per day.  Replace potassium.  EGD and colonoscopy today.    Assessment / Plan     Septic Shock with lactic " acidosis, likely due to pneumonia  Does not meet SIRS criteria. Meets sepsis 3 definition with at least 2 point increase in SOFA score along with lactic acidosis and need for vasopressors.  CT abdomen showed tree-in-bud nodular opacities in both lungs.  Ascitic fluid with 119 neutrophils, mostly lymphocyte predominant.  Continue with ceftriaxone for now.  Bedside echo with normal LVEF but large left pleural effusion.  Doubt hypovolemia.    Adequately fluid resuscitated, continue with norepinephrine for MAP target of 65.  Blood cultures remain negative so far.   Continue home dose midodrine 20 mg every 8 hours    Acute kidney injury on CKD III   Metabolic acidosis resolved  Remains non oliguric.  Baseline creatinine around 1.5.  Appears hypervolemic, possible ATN from septic shock.  Cannot rule out hepatorenal syndrome completely.  Nephrology following, currently on bicarb drip.  Continue with IV albumin and octreotide.   Monitor Input/Output very closely.   Net IO Since Admission: 2,506 mL [08/21/23 1056]       Acute on chronic anemia / ?  Esophageal variceal bleed.  Nadolol on hold due to shock state.  Started on octreotide drip for possible suspicion of variceal bleed.  Continue with Protonix twice daily.  GI consulted.  Monitor hemoglobin closely.  Transfuse as needed to keep hemoglobin greater than 7.  Received 1 PRBC since admission.  Scheduled for EGD/colonoscopy on 8/21.    Alcoholic cirrhosis of liver with recurrent ascites / Portal Hypertension   Transaminitis / Hyperbilirubinemia  Gets frequent paracentesis once every 7 to 10 days.  S/p paracentesis on 8/21 with removal of 5 L.  Not a candidate for TIPS due to hyperammonemia and history of hepatic encephalopathy.  Awaiting liver transplant at [facility].  GI following.    Hyperammonemia / history of hepatic encephalopathy   Continue with home rifaximin.    Increase scheduled lactulose to titrate for 3-4 bowel movements per day.    Acute thrombocytopenia/  Coagulopathy  Possibly from liver disease.  Some elevation in INR could also be due to nutritional deficiency of vitamin K.  S/p 1 dose of vitamin K.  Check fibrinogen to rule out sepsis induced DIC.  Hold subQ heparin for now.    Severe malnutrition : Nutrition following  Hyperuricemia: On allopurinol.  History of substance abuse    Code status:   Code Status (Patient has no pulse and is not breathing): CPR (Attempt to Resuscitate)  Medical Interventions (Patient has pulse or is breathing): Full Support       Nutrition:   NPO Diet NPO Type: Strict NPO   Patient isn't on Tube Feeding     DVT prophylaxis:  Mechanical DVT prophylaxis orders are present.     Subjective / Review of systems     Review of Systems   Feels better, thirsty.  Appetite is marginally improved but still not eating or drinking well.  No abdominal pain.  No nausea or vomiting.  No chest pain or shortness of breath.  Objective / Physical Exam     Vital signs:  Temp: 98.2 øF (36.8 øC)  BP: 102/52  Heart Rate: 71  Resp: 11  SpO2: 94 %  Weight: 76.1 kg (167 lb 12.3 oz)    Admission Weight: Weight: 72.6 kg (160 lb)  Current Weight: Weight: 76.1 kg (167 lb 12.3 oz)    Input/Output in last 24 hours:    Intake/Output Summary (Last 24 hours) at 8/21/2023 1056  Last data filed at 8/21/2023 1017  Gross per 24 hour   Intake 5880 ml   Output 6540 ml   Net -660 ml      Net IO Since Admission: 2,506 mL [08/21/23 1056]     Physical Exam  Vitals and nursing note reviewed.   Constitutional:       General: He is awake. He is not in acute distress.     Appearance: Normal appearance. He is ill-appearing and toxic-appearing. He is not diaphoretic.   HENT:      Head: Normocephalic and atraumatic.      Comments: Bitemporal wasting     Mouth/Throat:      Mouth: Mucous membranes are dry.      Pharynx: Oropharynx is clear.   Eyes:      General: Scleral icterus present.      Extraocular Movements: Extraocular movements intact.      Conjunctiva/sclera: Conjunctivae normal.       Comments: Loss of periorbital fat pads   Cardiovascular:      Rate and Rhythm: Normal rate.      Heart sounds: Normal heart sounds, S1 normal and S2 normal. No murmur heard.    No gallop.      Comments: Sinus rhythm  Pulmonary:      Breath sounds: Rales (Bibasilar) present. No wheezing or rhonchi.   Abdominal:      General: Bowel sounds are normal. There is distension.      Palpations: Abdomen is soft.      Tenderness: There is no abdominal tenderness. There is no guarding.   Musculoskeletal:         General: No tenderness.      Right lower leg: Edema present.      Left lower leg: Edema present.   Skin:     General: Skin is warm and dry.      Coloration: Skin is jaundiced.   Neurological:      General: No focal deficit present.      Mental Status: He is alert and oriented to person, place, and time.   Psychiatric:         Behavior: Behavior is cooperative.        Radiology and Labs     Results from last 7 days   Lab Units 08/21/23  0452 08/20/23 2027 08/20/23  1234 08/20/23  0602 08/19/23  0404 08/18/23  2315   WBC 10*3/mm3 7.50  --   --  6.70 10.70 9.50   HEMOGLOBIN g/dL 7.7* 6.4* 7.1* 7.0* 8.4* 10.1*   HEMATOCRIT % 22.8* 19.4* 21.4* 20.9* 24.7* 30.5*   PLATELETS 10*3/mm3 67*  --   --  98* 122* 116*      Results from last 7 days   Lab Units 08/21/23  0452 08/20/23  0602 08/18/23  2315   PROTIME Seconds 18.7* 17.2* 14.4*   INR  1.81* 1.65* 1.37*      Results from last 7 days   Lab Units 08/21/23  0452 08/20/23  1234 08/20/23  0602 08/20/23  0022 08/19/23  1734 08/19/23  0233 08/18/23  2202   SODIUM mmol/L 140 139 136 136 137   < > 130*   POTASSIUM mmol/L 3.0* 3.6 3.6 3.9 4.4   < > 5.9*   CHLORIDE mmol/L 94* 96* 95* 98 100   < > 98   CO2 mmol/L 32.0* 26.0 26.0 24.0 20.0*   < > 18.0*   BUN mg/dL 40* 47* 52* 58* 62*   < > 74*   CREATININE mg/dL 2.41* 2.71* 3.03* 2.96* 3.27*   < > 3.91*   GLUCOSE mg/dL 145* 149* 170* 151* 146*   < > 86   MAGNESIUM mg/dL 1.9  --  2.1  --   --   --  2.5   PHOSPHORUS mg/dL 3.2  --   --   " --   --   --   --     < > = values in this interval not displayed.            Current medications     Scheduled Meds:   acetylcysteine, 3 mL, Nebulization, Q12H - RT  albuterol, 2.5 mg, Nebulization, Q12H - RT  cefTRIAXone, 2,000 mg, Intravenous, Q24H  febuxostat, 40 mg, Oral, Daily  hydrocortisone sodium succinate, 50 mg, Intravenous, Q6H  lactulose, 20 g, Oral, TID  midodrine, 20 mg, Oral, Q8H  oxyCODONE, 5 mg, Oral, Once  pantoprazole, 40 mg, Intravenous, Q12H  potassium chloride, 40 mEq, Oral, Q4H  riFAXIMin, 550 mg, Oral, BID  sodium chloride, 10 mL, Intravenous, Q12H  vitamin K1, 10 mg, Oral, Once        Continuous Infusions:   norepinephrine, 0.02-0.3 mcg/kg/min, Last Rate: 0.06 mcg/kg/min (23 0636)  octreotide (SandoSTATIN) infusion, 25 mcg/hr, Last Rate: 25 mcg/hr (23 0451)  sodium bicarbonate drip (greater than 75 mEq/bag), 150 mEq, Last Rate: 150 mEq (23 1017)          Plan discussed with RN. Reviewed all other data in the last 24 hours, including but not limited to vitals, labs, microbiology, imaging and pertinent notes from other providers. High complexity decision making and high risk of deterioration.      Aly Zayas MD   Critical Care  23   10:56 EDT           Electronically signed by Aly Zayas MD at 23 1056       Aly Zayas MD at 23 1038            Critical Care Progress Note     Daquan RITCHIE Xiong : 1985 MRN:[id number] LOS:1  Rm: 2316/1     Principal Problem: URIEL (acute kidney injury)     Reason for follow up: All the medical problems listed below    Summary     A 37 y.o. old male patient with PMH of Alcoholic cirrhosis, severe malnutrition, GERD, CKD III, substance abuse presents to the hospital with complaints of nausea, vomiting, and \"kidney pain\" and was admitted with a principal diagnosis of URIEL (acute kidney injury).  Nephrology was consulted, treated with bicarb drip.     Also found to be hypotensive, treated with IV fluids " "without much improvement and needed vasopressors.  Started on empiric ceftriaxone for suspected SBP.  Subsequently, patient's hemoglobin dropped from 10 to 7, GI was consulted for suspected GI/variceal bleed.  CT abdomen pelvis showed large volume ascites with moderate bilateral pleural effusions and tree-in-bud nodular opacities on both lungs.     Significant Events     08/20/23 : No acute events overnight.  Tolerating room air with no shortness of air.  Remains on Levophed for pressor support, MAP goal 60 mmHg.  Continues to complain of nausea, vomiting, heartburn.  He reports \"anxiety so bad I cannot sleep\".  Increased abdominal discomfort and distention.  Hemoglobin continues to drop without obvious source of blood loss.  CT abdomen/pelvis pending to evaluate for abdominal source of acute anemia    Assessment / Plan     Septic Shock with lactic acidosis   Does not meet SIRS criteria. Meets sepsis 3 definition with at least 2 point increase in SOFA score along with lactic acidosis and need for vasopressors.  Bedside echo with normal LVEF but large pleural effusion.  Doubt hypovolemia.    Adequately fluid resuscitated, continue with norepinephrine for MAP target of 65.  Blood cultures remain negative so far.  We will do a paracentesis to rule out SBP.  Started on empiric ceftriaxone for possible SBP.  Hold off additional fluids for now.  Appears volume overloaded  Continue home dose midodrine 20 mg every 8 hours  Adequately fluid resuscitated, no need to mika lactate levels.    Acute kidney injury on CKD III / Metabolic acidosis  Hyperkalemia and hyponatremia resolved  Remains non oliguric.  Baseline creatinine around 1.5.  Appears hypervolemic, possible ATN from septic shock.  Cannot rule out hepatorenal syndrome completely.  Nephrology following, currently on bicarb drip.  Added octreotide.   Monitor Input/Output very closely.   Net IO Since Admission: 3,166 mL [08/20/23 1103]       Acute on chronic anemia / ?  " Esophageal variceal bleed.  Nadolol on hold due to shock state.  Started on octreotide drip for possible suspicion of variceal bleed.  Continue with Protonix twice daily.  GI consulted.  Monitor hemoglobin closely.  Transfuse as needed to keep hemoglobin greater than 7.    Alcoholic cirrhosis of liver with recurrent ascites / Portal Hypertension   Transaminitis / Hyperbilirubinemia  Gets frequent paracentesis once every 7 to 10 days.  We will do a paracentesis today.  Not a candidate for TIPS due to hyperammonemia and history of hepatic encephalopathy.  Awaiting liver transplant at [facility].  GI consulted.    Hyperammonemia / history of hepatic encephalopathy   Continue with home rifaximin.    Added scheduled lactulose to titrate for 3-4 bowel movements per day.    Acute thrombocytopenia/ Coagulopathy  Possibly from liver disease.  Some elevation in INR could also be due to nutritional deficiency of vitamin K.  Will give 1 dose of vitamin K.  Hold subQ heparin for now.    Severe malnutrition  Nausea and vomiting   Encourage oral intake   Symptom management, antiemetics  Inpatient dietician consult for malnutrition severity and nutrition recommendations    Hyperuricemia: On allopurinol.  History of substance abuse    Disposition: Remain in ICU, on pressor support    Code status:   Code Status (Patient has no pulse and is not breathing): CPR (Attempt to Resuscitate)  Medical Interventions (Patient has pulse or is breathing): Full Support       Nutrition:   Diet: Renal Diets; Low Potassium, Low Phosphorus; Texture: Regular Texture (IDDSI 7); Fluid Consistency: Thin (IDDSI 0)   Patient isn't on Tube Feeding     DVT prophylaxis:  Mechanical DVT prophylaxis orders are present.     Subjective / Review of systems     Review of Systems   Constitutional:  Positive for appetite change and fatigue.   Respiratory:  Negative for cough and shortness of breath.    Cardiovascular:  Positive for leg swelling. Negative for chest pain  and palpitations.   Gastrointestinal:  Positive for abdominal distention, abdominal pain, nausea and vomiting.   Musculoskeletal:  Positive for arthralgias, back pain and myalgias.   Psychiatric/Behavioral:  Positive for sleep disturbance. The patient is nervous/anxious.       Objective / Physical Exam     Vital signs:  Temp: 98 øF (36.7 øC)  BP: 105/64  Heart Rate: 66  Resp: 8  SpO2: 96 %  Weight: 72.6 kg (160 lb)    Admission Weight: Weight: 72.6 kg (160 lb)  Current Weight: Weight: 72.6 kg (160 lb)    Input/Output in last 24 hours:    Intake/Output Summary (Last 24 hours) at 8/20/2023 1039  Last data filed at 8/20/2023 0400  Gross per 24 hour   Intake 5506 ml   Output 1640 ml   Net 3866 ml      Net IO Since Admission: 3,166 mL [08/20/23 1039]     Physical Exam  Constitutional:       General: He is not in acute distress.     Appearance: He is ill-appearing and toxic-appearing. He is not diaphoretic.   HENT:      Head: Normocephalic and atraumatic.      Comments: Bitemporal wasting     Right Ear: External ear normal.      Left Ear: External ear normal.      Mouth/Throat:      Mouth: Mucous membranes are dry.   Eyes:      General: Scleral icterus present.      Conjunctiva/sclera: Conjunctivae normal.      Pupils: Pupils are equal, round, and reactive to light.      Comments: Loss of periorbital fat pads   Neck:      Comments: Trachea midline  No JVD  Cardiovascular:      Heart sounds: Normal heart sounds, S1 normal and S2 normal. No murmur heard.     Comments: Sinus rhythm  Pulmonary:      Breath sounds: No wheezing or rhonchi.      Comments: Diminished bases  Fine crackles left base  Abdominal:      General: Bowel sounds are normal. There is distension.      Tenderness: There is abdominal tenderness. There is no guarding.   Musculoskeletal:      Cervical back: Neck supple.      Right lower leg: Edema (Pitting) present.      Left lower leg: Edema (Pitting) present.   Skin:     General: Skin is warm and dry.       Coloration: Skin is jaundiced.   Neurological:      Mental Status: He is alert and oriented to person, place, and time.      Comments: No lateralizing deficits   Psychiatric:      Comments: Flat affect        Radiology and Labs     Results from last 7 days   Lab Units 08/20/23  0602 08/19/23  0404 08/18/23  2315   WBC 10*3/mm3 6.70 10.70 9.50   HEMOGLOBIN g/dL 7.0* 8.4* 10.1*   HEMATOCRIT % 20.9* 24.7* 30.5*   PLATELETS 10*3/mm3 98* 122* 116*      Results from last 7 days   Lab Units 08/20/23  0602 08/18/23  2315   PROTIME Seconds 17.2* 14.4*   INR  1.65* 1.37*      Results from last 7 days   Lab Units 08/20/23  0602 08/20/23  0022 08/19/23  1734 08/19/23  0938 08/19/23  0233 08/18/23  2202   SODIUM mmol/L 136 136 137 129* 127* 130*   POTASSIUM mmol/L 3.6 3.9 4.4 4.6 5.1 5.9*   CHLORIDE mmol/L 95* 98 100 96* 95* 98   CO2 mmol/L 26.0 24.0 20.0* 18.0* 20.0* 18.0*   BUN mg/dL 52* 58* 62* 65* 69* 74*   CREATININE mg/dL 3.03* 2.96* 3.27* 3.48* 4.04* 3.91*   GLUCOSE mg/dL 170* 151* 146* 83 143* 86   MAGNESIUM mg/dL 2.1  --   --   --   --  2.5      Results from last 7 days   Lab Units 08/20/23  0602 08/19/23  0233 08/18/23  2202   ALK PHOS U/L 152* 245* 232*   AST (SGOT) U/L 56* 84* 77*   ALT (SGPT) U/L 18 28 30     Results from last 7 days   Lab Units 08/19/23  0406   PH, ARTERIAL pH units 7.460*   PCO2, ARTERIAL mm Hg 26.8*   PO2 ART mm Hg 111.8*   O2 SATURATION ART % 98.7*   FIO2 % 21   HCO3 ART mmol/L 19.0*   BASE EXCESS ART mmol/L -4.1*           Current medications     Scheduled Meds:   calcium gluconate, 1,000 mg, Intravenous, Once  cefTRIAXone, 2,000 mg, Intravenous, Q24H  febuxostat, 40 mg, Oral, Daily  lactulose, 10 g, Oral, TID  midodrine, 20 mg, Oral, Q8H  oxyCODONE, 5 mg, Oral, Once  pantoprazole, 40 mg, Oral, Q AM  riFAXIMin, 550 mg, Oral, BID  sodium chloride, 10 mL, Intravenous, Q12H  vitamin K1, 10 mg, Oral, Once        Continuous Infusions:   norepinephrine, 0.02-0.3 mcg/kg/min, Last Rate: 0.15 mcg/kg/min  (23 2203)  sodium bicarbonate drip (greater than 75 mEq/bag), 150 mEq, Last Rate: 125 mL/hr at 23 1008          Plan discussed with RN. Reviewed all other data in the last 24 hours, including but not limited to vitals, labs, microbiology, imaging and pertinent notes from other providers.        MONICA Flores   Critical Care  23   10:39 EDT     Attending Addendum     Subjective: Still having some nausea, some abdominal tenderness as well.  Does get paracentesis every 7 to 10 days, has been trying to delay it as he does not like it.  Admits to poor oral intake.    Exam: Dry oral mucosa.+ Pedal edema, bibasilar Rales.    Assessment / Plan:   Septic shock: Suspected SBP, on empiric ceftriaxone.  Continue with norepinephrine for MAP target of 60-65, continue with midodrine.  Acute blood loss anemia: Suspected variceal bleed, started on octreotide drip.  Continue with Protonix twice daily.  GI consulted.  Monitor hemoglobin closely.    I have personally reviewed all labs and other data, reviewed all other pertinent notes, interviewed and examined this patient today. I have also reviewed the note by APRN, discussed the plan and made relevant changes in the note. I have performed the substantive portion of medical decision making. High complexity decision making and high risk of deterioration.    Dr. Aly Zayas MD MPH  Staff Intensivist  23   14:21 EDT          Electronically signed by Aly Zayas MD at 23 1423       Fatou Waldron MD at 23 0859          NEPHROLOGY PROGRESS NOTE------[facility]    [facility]  [phone]  Saji Waldron MD      Patient Care Team:  Wolf Carvalho MD as PCP - General (Geriatric Medicine)  Fatou Waldron MD as Consulting Physician (Nephrology)      Provider:  Saji Waldron MD  Patient Name: Daquan Xiong  :   "1985    SUBJECTIVE:    F/U ARF/URIEL/CRF/CKD/ACIDOIS    Fatigued and weak. Still with some abdominal pain, distension, and ascites. Making some urine.     Medication:  albumin human, 25 g, Intravenous, Q6H  febuxostat, 40 mg, Oral, Daily  HYDROmorphone, 1 mg, Intravenous, Once  midodrine, 20 mg, Oral, Q8H  oxyCODONE, 5 mg, Oral, Once  pantoprazole, 40 mg, Oral, Q AM  promethazine, 25 mg, Rectal, Once  riFAXIMin, 550 mg, Oral, BID  sodium chloride, 10 mL, Intravenous, Q12H      norepinephrine, 0.02-0.3 mcg/kg/min, Last Rate: 0.15 mcg/kg/min (08/19/23 2203)  sodium bicarbonate drip (greater than 75 mEq/bag), 150 mEq, Last Rate: 150 mEq (08/20/23 0021)        OBJECTIVE    Vital Sign Min/Max for last 24 hours  Temp  Min: 97.2 øF (36.2 øC)  Max: 98 øF (36.7 øC)   BP  Min: 88/47  Max: 114/65   Pulse  Min: 56  Max: 82   No data recorded   SpO2  Min: 93 %  Max: 100 %   No data recorded   No data recorded     Flowsheet Rows      Flowsheet Row First Filed Value   Admission Height 185.4 cm (73\") Documented at 08/18/2023 2025   Admission Weight 72.6 kg (160 lb) Documented at 08/18/2023 2025            No intake/output data recorded.  I/O last 3 completed shifts:  In: 5506 [I.V.:5506]  Out: 2340 [Urine:2340]    Physical Exam:  General Appearance: alert, appears stated age and cooperative  Head: normocephalic, without obvious abnormality and atraumatic +DRY OP   Eyes: conjunctivae and sclerae normal and no icterus  Neck: supple and no JVD  Lungs: clear to auscultation and respirations regular  Heart: regular rhythm & normal rate and normal S1, S2 +BETHANY  Chest Wall: no abnormalities observed  Abdomen: normal bowel sounds and  +ASCITES/DISTENSION AND MILD BILAT LQ PAIN ON PALPATION  Extremities: moves extremities well, no edema, no cyanosis  Skin: +SCATTERED SCABS, ABRASIONS, ECCHYMOSIS  Neurologic: Alert, and oriented x 3. No focal deficits    Labs:    WBC WBC   Date Value Ref Range Status   08/20/2023 6.70 3.40 - 10.80 " 10*3/mm3 Final   08/19/2023 10.70 3.40 - 10.80 10*3/mm3 Final   08/18/2023 9.50 3.40 - 10.80 10*3/mm3 Final      HGB Hemoglobin   Date Value Ref Range Status   08/20/2023 7.0 (L) 13.0 - 17.7 g/dL Final   08/19/2023 8.4 (L) 13.0 - 17.7 g/dL Final   08/18/2023 10.1 (L) 13.0 - 17.7 g/dL Final      HCT Hematocrit   Date Value Ref Range Status   08/20/2023 20.9 (C) 37.5 - 51.0 % Final   08/19/2023 24.7 (L) 37.5 - 51.0 % Final   08/18/2023 30.5 (L) 37.5 - 51.0 % Final      Platelets No results found for: LABPLAT   MCV MCV   Date Value Ref Range Status   08/20/2023 103.9 (H) 79.0 - 97.0 fL Final   08/19/2023 103.7 (H) 79.0 - 97.0 fL Final   08/18/2023 106.3 (H) 79.0 - 97.0 fL Final          Sodium Sodium   Date Value Ref Range Status   08/20/2023 136 136 - 145 mmol/L Final   08/20/2023 136 136 - 145 mmol/L Final   08/19/2023 137 136 - 145 mmol/L Final   08/19/2023 129 (L) 136 - 145 mmol/L Final   08/19/2023 127 (L) 136 - 145 mmol/L Final   08/18/2023 130 (L) 136 - 145 mmol/L Final      Potassium Potassium   Date Value Ref Range Status   08/20/2023 3.6 3.5 - 5.2 mmol/L Final   08/20/2023 3.9 3.5 - 5.2 mmol/L Final   08/19/2023 4.4 3.5 - 5.2 mmol/L Final   08/19/2023 4.6 3.5 - 5.2 mmol/L Final   08/19/2023 5.1 3.5 - 5.2 mmol/L Final   08/18/2023 5.9 (H) 3.5 - 5.2 mmol/L Final      Chloride Chloride   Date Value Ref Range Status   08/20/2023 95 (L) 98 - 107 mmol/L Final   08/20/2023 98 98 - 107 mmol/L Final   08/19/2023 100 98 - 107 mmol/L Final   08/19/2023 96 (L) 98 - 107 mmol/L Final   08/19/2023 95 (L) 98 - 107 mmol/L Final   08/18/2023 98 98 - 107 mmol/L Final      CO2 CO2   Date Value Ref Range Status   08/20/2023 26.0 22.0 - 29.0 mmol/L Final   08/20/2023 24.0 22.0 - 29.0 mmol/L Final   08/19/2023 20.0 (L) 22.0 - 29.0 mmol/L Final   08/19/2023 18.0 (L) 22.0 - 29.0 mmol/L Final   08/19/2023 20.0 (L) 22.0 - 29.0 mmol/L Final   08/18/2023 18.0 (L) 22.0 - 29.0 mmol/L Final      BUN BUN   Date Value Ref Range Status    08/20/2023 52 (H) 6 - 20 mg/dL Final   08/20/2023 58 (H) 6 - 20 mg/dL Final   08/19/2023 62 (H) 6 - 20 mg/dL Final   08/19/2023 65 (H) 6 - 20 mg/dL Final   08/19/2023 69 (H) 6 - 20 mg/dL Final   08/18/2023 74 (H) 6 - 20 mg/dL Final      Creatinine Creatinine   Date Value Ref Range Status   08/20/2023 3.03 (H) 0.76 - 1.27 mg/dL Final   08/20/2023 2.96 (H) 0.76 - 1.27 mg/dL Final   08/19/2023 3.27 (H) 0.76 - 1.27 mg/dL Final   08/19/2023 3.48 (H) 0.76 - 1.27 mg/dL Final   08/19/2023 4.04 (H) 0.76 - 1.27 mg/dL Final   08/18/2023 3.91 (H) 0.76 - 1.27 mg/dL Final      Calcium Calcium   Date Value Ref Range Status   08/20/2023 9.6 8.6 - 10.5 mg/dL Final   08/20/2023 9.4 8.6 - 10.5 mg/dL Final   08/19/2023 9.1 8.6 - 10.5 mg/dL Final   08/19/2023 9.0 8.6 - 10.5 mg/dL Final   08/19/2023 8.6 8.6 - 10.5 mg/dL Final   08/18/2023 8.7 8.6 - 10.5 mg/dL Final      PO4 No components found for: PO4   Albumin Albumin   Date Value Ref Range Status   08/20/2023 4.2 3.5 - 5.2 g/dL Final   08/19/2023 2.5 (L) 3.5 - 5.2 g/dL Final   08/18/2023 2.3 (L) 3.5 - 5.2 g/dL Final      Magnesium Magnesium   Date Value Ref Range Status   08/20/2023 2.1 1.6 - 2.6 mg/dL Final   08/18/2023 2.5 1.6 - 2.6 mg/dL Final      Uric Acid No components found for: URIC ACID     Imaging Results (Last 72 Hours)       Procedure Component Value Units Date/Time    US Renal Bilateral [[id number]] Collected: 08/19/23 1839     Updated: 08/19/23 1842    Narrative:      US RENAL BILATERAL    Date of Exam: 8/19/2023 5:05 PM EDT    Indication: ARF/URIEL/CRF/CKD.    Comparison: CT abdomen and pelvis dated 7/3/2023    Technique: Grayscale and color Doppler ultrasound evaluation of the kidneys and urinary bladder was performed.    Findings:  The right kidney measures 10.1 x 5.3 x 5.4 cm. The left kidney measures 9.2 x 4.6 x 6.1 cm. There is mild dilation of the bilateral renal pelves. There is large volume ascites within the abdomen.      Impression:      Impression:  1.  Normal-sized kidneys with mild dilation of the bilateral renal pelves.  2. Large volume ascites.        Electronically Signed: Ulices Valadez    8/19/2023 6:40 PM EDT    Workstation ID: [id number]    XR Chest 1 View [[id number]] Collected: 08/19/23 0649     Updated: 08/19/23 0657    Narrative:      XR CHEST 1 VW    Date of Exam: 8/19/2023 6:40 AM EDT    Indication: central line placement    Comparison: July 3, 2023    Findings:  There is a right internal jugular central line with the tip at the cavoatrial junction. The heart size is borderline enlarged. There is mild pulmonary vascular congestion. There are no focal infiltrates to suggest pneumonia.      Impression:      Impression:  Cardiomegaly with mild pulmonary vascular prominence. Right IJ central line is in place with the tip at the cavoatrial junction. There is no pneumothorax.      Electronically Signed: Deven Machado MD    8/19/2023 6:54 AM EDT    Workstation ID: [id number]            Results for orders placed during the hospital encounter of 08/18/23    XR Chest 1 View    Narrative  XR CHEST 1 VW    Date of Exam: 8/19/2023 6:40 AM EDT    Indication: central line placement    Comparison: July 3, 2023    Findings:  There is a right internal jugular central line with the tip at the cavoatrial junction. The heart size is borderline enlarged. There is mild pulmonary vascular congestion. There are no focal infiltrates to suggest pneumonia.    Impression  Impression:  Cardiomegaly with mild pulmonary vascular prominence. Right IJ central line is in place with the tip at the cavoatrial junction. There is no pneumothorax.      Electronically Signed: Deven Machado MD  8/19/2023 6:54 AM EDT  Workstation ID: [id number]      Results for orders placed during the hospital encounter of 07/03/23    XR Chest 1 View    Narrative  XR CHEST 1 VW    Date of Exam: 7/3/2023 4:13 PM EDT    Indication: AMS    Comparison: AP portable chest 6/20/2023    Findings:  There is some bandlike  atelectasis in the right lower lobe, similar to the prior examination. The previously described patchy left basilar airspace disease appears improved. No new airspace disease is identified. The heart size is normal. No pleural  effusion or pneumothorax is identified.    Impression  Impression:  Improving bibasilar atelectasis or pneumonia since 6/28/2023. No new airspace disease is appreciated.      Electronically Signed: Chanda Clark  7/3/2023 4:27 PM EDT  Workstation ID: [id number]      Results for orders placed during the hospital encounter of 06/23/23    XR Chest 1 View    Narrative  XR CHEST 1 VW    Date of Exam: 6/28/2023 11:45 AM EDT    Indication: eval for pneumonia    Comparison: None available.    Findings:  The lung volumes are low. Patchy airspace disease is seen within the lung bases bilaterally. Probable small bilateral pleural effusions are present. The pulmonary vasculature appears unremarkable. No pneumothorax. No acute osseous abnormality identified.    Impression  Impression:  Low lung volumes with patchy airspace disease seen within the lung bases bilaterally, left greater than right with probable small bilateral pleural effusions, likely related to pneumonia and/or atelectasis.      Electronically Signed: Brynn Higuera  6/28/2023 12:00 PM EDT  Workstation ID: [id number]            ASSESSMENT / PLAN      URIEL (acute kidney injury)    GERD (gastroesophageal reflux disease)    Tobacco use disorder    Alcoholic cirrhosis    Severe malnutrition      1.ARF/URIEL/CRF/CKD---------Nonoliguric. +Recurrent ARF/URIEL on top of known CRF/CKD STG 3A with a baseline serum creatinine of about 1.5. CRF/CKD STG 3A secondary to HTN NS. +ARF/URIEL is secondary to ATN from hypotension and prerenal state/intravascular volume depletion. Continue to hydrate and given IV Albumin. Avoid hypotension.  No NSAIDs or IV dye. Dose meds for CrCl less than 10 cc/min until ARF/URIEL is resolved     2. HYPERKALEMIA--------Resolved. Secondary  to RF and acidosis. Med Rx. Telemetry. K+ restrict diet and follow     3. ETOH CIRRHOSIS/HEPATIC ENCEPHALOPATHY/ASCITES-----Restarted Lactulose. Follow Ammonia. Likely needs Paracentesis. S/P IV Albumin and on Midodrine     4. HYPOMAGNESEMIA--------Follow levels     5. HYPERURICEMIA------Uloric     6. HYPOCALCEMIA-------Replace IV     7. ANEMIA-------H/H stable.       8. HYPOALBUMINEMIA-----Albumin normal. S/P IV Albumin to temporize     9. ELEVATED INR------Related to cirrhosis     10. HYPOTENSION------On Midodrine     11. BIPOLAR DISEASE     12. BASILIO     13. POLYSUBSTANCE ABUSE/TOBACCO USE      14. KETONURIA------Secondary to intravascular volume depletion. IVFs     15. HYPOTENSION-----Levophed, IVFs, IV Albumin, Midodrine     16. ACIDOSIS--------Better but Lactic still up. On Bicarb gtt. Follow lactic. +Type 4 RTA and from stool losses related Lactulose use (when he actually takes it)     17. N/V/GERD/PUD PROPHYLAXIS------IV PPI     18. DVT PROPHYLAXIS------SCDs       Saji Waldron MD  [facility]  [phone]  08/20/23  08:59 EDT      Electronically signed by Fatou Waldron MD at 08/20/23 1581          Consult Notes (all)        Celsa Denis APRN at 08/20/23 8401       Attestation signed by Yovani Juares MD at 08/20/23 9662    I have reviewed this documentation and agree.  37-year-old man well-known to our group with history of alcoholic cirrhosis as well as esophageal varices.  He was admitted with increasing weakness.  He presented emergency room on 8/18/2023.  He has had some nausea and vomiting which has been nonbloody.  He denies taking aspirin or nonsteroidals at home.  He was on nadolol, pantoprazole, Xifaxan, and midodrine.  He is not taking diuretics due to recent increase in creatinine.  He is followed by Dr. Waldron with nephrology.  On exam he is jaundiced.  He appears weak and lethargic.  His abdomen is nondistended with good bowel  sounds and he is nontender.  He has 1+ ankle edema.  Creatinine 2.71.  Bilirubin 4.2.  Ammonia 91.  Lactic acid 4.1.  INR 1.65.  White blood count 6.7.  Hemoglobin 7.1.  CT scan shows ascites with evidence of volume overload and moderate bilateral pleural effusions.  This is a 37-year-old man with alcoholic cirrhosis now with acute kidney injury.  He has been evaluated by nephrology.  He is on norepinephrine a bicarb drip and octreotide.  We will give IV albumin.  We will continue IV fluids.  Monitor renal function closely.  There is no indication for endoscopy acutely.  I would continue midodrine and octreotide and we will plan to discontinue beta-blockers.  We will follow closely with you.  I saw the patient today with Celsa Denis NP.  I have performed a complete history and physical examination and reviewed appropriate laboratory studies and radiographic exams.  I have completed the majority and substantive portion of the history and physical examination.  I completed the majority and substantive portion of the medical decision making.    Yovani Juares M.D.                    GI CONSULT  NOTE:    Referring Provider:     Cherelle la NP    Chief complaint:    Acute anemia, history of esophageal varices    Subjective    Abnormal labs    History of present illness:     Daquan Xiong is a 37 y.o. male with history of alcohol hepatitis/alcohol cirrhosis complicated by ascites, nonbleeding esophageal varices, hepatic encephalopathy; chronic kidney disease, sleep apnea, bipolar disorder who was sent in by her office after having an elevated creatinine level of 3 as an outpatient.  Patient presented to the ER on 8/18/2023.  He admitted to some nausea vomiting and diffuse abdominal pain.  Patient denies taking any NSAIDs at home.  Patient was on nadolol, Protonix, Xifaxan, midodrine at home.  He was off of his diuretics.  Creatinine this admission has been up to 4.04 was up to 5.12 in the past.  Dr. Wadlron is his  nephrologist.  Patient was found to be hypotensive in the ER started on IV fluids, given midodrine and eventually had to be placed on vasopressors.    Labs: Sodium 139, creatinine 2.71, total bilirubin 4.2, alk phos 152, AST 56, ALT 18.  Potassium 3.6.  Ammonia 91.  Lactic 4.1.  INR 1.65.  WBC 6.7, hemoglobin  7.1 today, .9, platelets 98.  MELD NA 27.  CT of the abdomen and pelvis without contrast showed no signs of retroperitoneal fluid collection.  Large volume ascites with peritoneal thickening question nonspecific peritonitis.  Volume overload/third spacing with anasarca, moderate bilateral pleural effusions.  Tree-in-bud appearance in the lungs.  Chronic liver disease.      Endo History:  12/2022 EGD (Dr. Calderon) - 3 cords grade 1 varices in lower 3rd of the esophagus, gastritis negative for H pylori     Past Medical History:  Past Medical History:   Diagnosis Date    Alcohol abuse     1/2-1 pint a day, sober since 9/12/2022    Alcoholic cirrhosis 09/01/2022    Arthritis     Bipolar affective disorder 09/22/2022    Cholecystitis 11/08/2022    Chronic constipation 09/05/2017    GERD (gastroesophageal reflux disease) 09/22/2022    Doing well with pepcid.    Hypertension     Osteoarthritis 09/22/2022    Polysubstance abuse 09/22/2022    Severe Malnutrition (HCC) 06/27/2023    Sleep apnea     Tobacco use disorder 09/22/2022       Past Surgical History:  Past Surgical History:   Procedure Laterality Date    BACK SURGERY      WRIST SURGERY         Social History:  Social History     Tobacco Use    Smoking status: Every Day     Packs/day: 0.50     Types: Cigarettes    Smokeless tobacco: Never   Vaping Use    Vaping Use: Never used   Substance Use Topics    Alcohol use: Not Currently     Comment: sober since 9/12/22    Drug use: Not Currently     Types: Marijuana       Family History:  Family History   Problem Relation Age of Onset    Cirrhosis Father     Heart disease Maternal Grandmother     Diabetes Maternal  Grandmother     Diabetes Maternal Grandfather     Diabetes Paternal Grandmother     Diabetes Paternal Grandfather        Medications:  Medications Prior to Admission   Medication Sig Dispense Refill Last Dose    allopurinol (ZYLOPRIM) 100 MG tablet Take 1 tablet by mouth Daily. 30 tablet 0 Past Week    cyclobenzaprine (FLEXERIL) 10 MG tablet Take 0.5-1 tablets by mouth 3 (Three) Times a Day As Needed.   Past Month    nadolol (CORGARD) 40 MG tablet Take 1 tablet by mouth Daily As Needed.   Past Week    nicotine (NICODERM CQ) 14 MG/24HR patch Place 1 patch on the skin as directed by provider Daily As Needed.   8/18/2023    nicotine polacrilex (NICORETTE) 2 MG gum Chew 1 each As Needed for Smoking Cessation.   8/18/2023    oxyCODONE (ROXICODONE) 10 MG tablet Take 1 tablet by mouth Every 4 (Four) to 6 (Six) Hours As Needed for Moderate Pain.   8/18/2023    pantoprazole (PROTONIX) 40 MG EC tablet Take 1 tablet by mouth Daily.   8/18/2023    riFAXIMin (XIFAXAN) 550 MG tablet Take 1 tablet by mouth 2 (Two) Times a Day.   Past Week    baclofen (LIORESAL) 10 MG tablet Take 0.5-1 tablets by mouth 2 (Two) Times a Day As Needed for Muscle Spasms.   Unknown    melatonin 5 MG tablet tablet Take 1 tablet by mouth At Night As Needed.   More than a month    midodrine (PROAMATINE) 10 MG tablet Take 2 tablets (20 mg) by mouth 3 (Three) Times a Day Before Meals 180 tablet 0        Scheduled Meds:cefTRIAXone, 2,000 mg, Intravenous, Q24H  febuxostat, 40 mg, Oral, Daily  lactulose, 10 g, Oral, TID  midodrine, 20 mg, Oral, Q8H  oxyCODONE, 5 mg, Oral, Once  pantoprazole, 40 mg, Oral, Q12H  riFAXIMin, 550 mg, Oral, BID  sodium chloride, 10 mL, Intravenous, Q12H  vitamin K1, 10 mg, Oral, Once      Continuous Infusions:norepinephrine, 0.02-0.3 mcg/kg/min, Last Rate: 0.15 mcg/kg/min (08/20/23 1144)  octreotide (SandoSTATIN) infusion, 25 mcg/hr, Last Rate: 25 mcg/hr (08/20/23 1207)  sodium bicarbonate drip (greater than 75 mEq/bag), 150 mEq,  Last Rate: 125 mL/hr at 08/20/23 1008      PRN Meds:.  calcium carbonate    melatonin    nitroglycerin    ondansetron **OR** ondansetron    oxyCODONE    [COMPLETED] Insert Peripheral IV **AND** sodium chloride    sodium chloride    sodium chloride    ALLERGIES:  Bee venom and Scopolamine    ROS:  Review of Systems   Gastrointestinal:  Positive for abdominal distention, abdominal pain, diarrhea, nausea and vomiting.     The following systems were reviewed and negative;    Constitution:  No fevers, chills, no unintentional weight loss  Skin: no rash, + jaundice  Eyes:  No blurry vision, no eye pain  HENT:  No change in hearing or smell  Resp:  No dyspnea or cough  CV:  No chest pain or palpitations  :  No dysuria, hematuria  Musculoskeletal:  No leg cramps or arthralgias  Neuro:  No tremor, no numbness  Psych:  No depression or confusion    Objective resting in hospital bed appears acutely ill-appearing.  Very jaundiced.  Wife at bedside.    Vital Signs:   Vitals:    08/20/23 0715 08/20/23 0730 08/20/23 0800 08/20/23 1200   BP: 103/62 105/64     Pulse: 65 66     Resp:       Temp:   98 øF (36.7 øC) 97.9 øF (36.6 øC)   TempSrc:   Oral Oral   SpO2: 95% 96%     Weight:       Height:           Physical Exam:   General Appearance:    Awake, lethargic in no acute distress   Head:    Normocephalic, without obvious abnormality, atraumatic   Eyes:            Conjunctivae normal, icteric sclerae, pupils equal   Ears:    Ears appear intact with no abnormalities noted   Throat:   No oral lesions, no thrush, oral mucosa moist   Neck:   Supple, no JVD   Lungs:     respirations regular, even and unlabored        Chest Wall:    No abnormalities observed   Abdomen:      soft, nontender, no rebound or guarding, moderate distended, no hepatosplenomegaly   Rectal:     Deferred   Extremities:   Moves all extremities, 2+ edema, no cyanosis   Pulses:   Pulses palpable and equal bilaterally   Skin:   No rash, no jaundice, normal palpation         Neurologic:   Cranial nerves 2 - 12 grossly intact, no asterixis       Results Review:   I reviewed the patient's labs and imaging.  CBC    Results from last 7 days   Lab Units 08/20/23  1234 08/20/23  0602 08/19/23  0404 08/18/23  2315   WBC 10*3/mm3  --  6.70 10.70 9.50   HEMOGLOBIN g/dL 7.1* 7.0* 8.4* 10.1*   PLATELETS 10*3/mm3  --  98* 122* 116*     CMP   Results from last 7 days   Lab Units 08/20/23  1317 08/20/23  1234 08/20/23  0602 08/20/23  0022 08/19/23  1734 08/19/23  0938 08/19/23  0233 08/18/23  2315 08/18/23  2202 08/18/23  2111   SODIUM mmol/L  --  139 136 136 137 129* 127*  --  130*  --    POTASSIUM mmol/L  --  3.6 3.6 3.9 4.4 4.6 5.1  --  5.9*  --    CHLORIDE mmol/L  --  96* 95* 98 100 96* 95*  --  98  --    CO2 mmol/L  --  26.0 26.0 24.0 20.0* 18.0* 20.0*  --  18.0*  --    BUN mg/dL  --  47* 52* 58* 62* 65* 69*  --  74*  --    CREATININE mg/dL  --  2.71* 3.03* 2.96* 3.27* 3.48* 4.04*  --  3.91*  --    GLUCOSE mg/dL  --  149* 170* 151* 146* 83 143*  --  86  --    ALBUMIN g/dL  --   --  4.2  --   --   --  2.5*  --  2.3*  --    BILIRUBIN mg/dL  --   --  4.2*  --   --   --  4.7*  --  4.8*  --    ALK PHOS U/L  --   --  152*  --   --   --  245*  --  232*  --    AST (SGOT) U/L  --   --  56*  --   --   --  84*  --  77*  --    ALT (SGPT) U/L  --   --  18  --   --   --  28  --  30  --    MAGNESIUM mg/dL  --   --  2.1  --   --   --   --   --  2.5  --    LIPASE U/L  --   --   --   --   --   --   --   --   --  68*   AMMONIA umol/L 91*  --   --   --   --   --   --  52  --   --      Cr Clearance Estimated Creatinine Clearance: 38 mL/min (A) (by C-G formula based on SCr of 2.71 mg/dL (H)).  Coag   Results from last 7 days   Lab Units 08/20/23  0602 08/18/23  2315   INR  1.65* 1.37*     HbA1C No results found for: HGBA1C  Blood Glucose   Glucose   Date/Time Value Ref Range Status   08/19/2023 0627 81 70 - 105 mg/dL Final     Comment:     Serial Number: [id number]:  [id number]   08/19/2023 0334 98 70 -  105 mg/dL Final     Comment:     Serial Number: [id number]:  [id number]     Infection   Results from last 7 days   Lab Units 08/18/23  2315   BLOODCX  No growth at 24 hours     UA    Results from last 7 days   Lab Units 08/19/23  0545   NITRITE UA  Negative     Radiology(recent) CT Abdomen Pelvis Without Contrast    Result Date: 8/20/2023  Impression: 1. Negative for retroperitoneal fluid collection. 2. Large volume ascites with peritoneal thickening suspicious for a nonspecific peritonitis. Consider paracentesis. 3. Findings of volume overload/third spacing progressing from prior exam including moderate bilateral pleural effusions, large volume ascites and worsening anasarca. 4. Multifocal tree-in-bud nodular opacities concerning for endobronchial infectious/inflammatory process, possibly early aspiration in the setting of refluxed fluid into a patulous distal esophagus. Consider nasogastric decompression and aspiration risk precaution. 5. Morphologic changes of chronic liver disease. Electronically Signed: Marko Dimas MD  8/20/2023 1:21 PM EDT  Workstation ID: [id number]    XR Chest 1 View    Result Date: 8/19/2023  Impression: Cardiomegaly with mild pulmonary vascular prominence. Right IJ central line is in place with the tip at the cavoatrial junction. There is no pneumothorax. Electronically Signed: Deven Machado MD  8/19/2023 6:54 AM EDT  Workstation ID: [id number]    US Renal Bilateral    Result Date: 8/19/2023  Impression: 1. Normal-sized kidneys with mild dilation of the bilateral renal pelves. 2. Large volume ascites. Electronically Signed: Ulices Valadez  8/19/2023 6:40 PM EDT  Workstation ID: [id number]       ASSESSMENT:  -History of Alcohol hepatitis sober 7/2022  -ETOH Cirrhosis complicated by ascites, HE and esophageal varices  -Acute renal failure on chronic kidney disease  -Hypotension  -Elevated LFTs  -Elevated ammonia   -Macrocytic anemia  -Thrombocytopenia related to cirrhosis    PLAN:    Patient is currently on nor epi, bicarb drip, octreotide drip as well as getting calcium gluconate.  Patient states he has been sober since 7/9/2022 when he was told he first had cirrhosis.  Patient has his first appointment to see [facility] transplant team on 8/28/2023.  MELD NA 27.  Nephrology is following patient unable to give diuretics.  He is getting IV albumin and hydration.  CT of the abdomen pelvis reveals possible SBP.  Orders have been placed with fluid studies for paracentesis.  Patient is on ceftriaxone  Continue lactulose and Xifaxan this should help with hepatic encephalopathy  Continue midodrine for hypotension next continue PPI  As needed vitamin K for elevated INR  AFP is 4.98 on 6/25/2023.  Right upper quadrant ultrasound done on 6/25/2023 no signs of focal lesions.  Continue to monitor H&H and transfuse for hemoglobin less than 7.  CT did not reveal any retroperitoneal bleeding.  Serum iron was 73 on 8/19/2023.      I discussed the patient's findings and my recommendations with the patient.  MONICA Small  08/20/23  14:06 EDT    Time:           Electronically signed by Yovani Juares MD at 08/20/23 1455       Fatou Waldron MD at 08/19/23 0843        Consult Orders    1. Inpatient Nephrology Consult [[id number]] ordered by Greg Marcial PA-C at 08/19/23 0048                     NEPHROLOGY CONSULTATION-----[facility]    [facility]  [phone]  Saji Waldron MD    Patient Care Team:  Wolf Carvalho MD as PCP - General (Geriatric Medicine)  Fatou Waldron MD as Consulting Physician (Nephrology)    CC/REASON FOR CONSULTATION: RENAL FAILURE/ELEVATED SERUM CREATININE    PHYSICIAN REQUESTING CONSULTATION:     History of Present Illness    HPI    Patient is a 37 y.o. WM, very well known to me, whom I was asked to see in consultation for evaluation and management of renal failure/elevated  serum creatinine.  Patient was admitted after presenting to ER with c/o n/v and hypotension and abnormal renal function labs. Per patient he has not been taking Lactulose as prescribed and missed last outpatient paracentesis.  No NSAIDs or recent IV dye exposure. No known h/o TB, rheumatic fever,  SLE. +EtoH related cirrhosis and does bleed/bruise easily. Reports some decreased urine output and urinary hesitancy.  Some dysuria. No edema or fluid retention outside of ascites/abdominal distension. Was not on diuretics prior to admission. Was not on ACE-I/ARB prior to admission. No herbal med use. No seizures, tremors, twitches, HA, blurry vision. No polyuria or polydypsia.      Review of Systems   Constitutional:  Positive for activity change, appetite change and fatigue. Negative for chills, diaphoresis, fever and unexpected weight change.   HENT:  Negative for congestion, dental problem, drooling, ear discharge, ear pain, facial swelling, hearing loss, mouth sores, nosebleeds, postnasal drip, rhinorrhea, sinus pressure, sinus pain, sneezing, sore throat, tinnitus, trouble swallowing and voice change.    Eyes:  Negative for photophobia, pain, discharge, redness, itching and visual disturbance.   Respiratory:  Negative for apnea, cough, choking, chest tightness, shortness of breath, wheezing and stridor.    Cardiovascular:  Negative for chest pain, palpitations and leg swelling.   Gastrointestinal:  Positive for abdominal pain, nausea and vomiting. Negative for abdominal distention, anal bleeding, blood in stool, constipation, diarrhea and rectal pain.   Endocrine: Negative for cold intolerance, heat intolerance, polydipsia, polyphagia and polyuria.   Genitourinary:  Positive for decreased urine volume, difficulty urinating and dysuria. Negative for enuresis, flank pain, frequency, genital sores, hematuria and urgency.   Musculoskeletal:  Positive for arthralgias, back pain and myalgias. Negative for gait problem,  joint swelling, neck pain and neck stiffness.   Skin:  Negative for color change, pallor, rash and wound.   Allergic/Immunologic: Negative for environmental allergies, food allergies and immunocompromised state.   Neurological:  Negative for dizziness, tremors, seizures, syncope, facial asymmetry, speech difficulty, weakness, light-headedness, numbness and headaches.   Hematological:  Negative for adenopathy. Bruises/bleeds easily.   Psychiatric/Behavioral:  Positive for dysphoric mood. Negative for agitation, behavioral problems, confusion, decreased concentration, hallucinations, self-injury, sleep disturbance and suicidal ideas. The patient is not nervous/anxious and is not hyperactive.         Past Medical History:   Diagnosis Date    Alcohol abuse     1/2-1 pint a day, sober since 9/12/2022    Alcoholic cirrhosis 09/01/2022    Arthritis     Bipolar affective disorder 09/22/2022    Cholecystitis 11/08/2022    Chronic constipation 09/05/2017    GERD (gastroesophageal reflux disease) 09/22/2022    Doing well with pepcid.    Hypertension     Osteoarthritis 09/22/2022    Polysubstance abuse 09/22/2022    Severe Malnutrition (HCC) 06/27/2023    Sleep apnea     Tobacco use disorder 09/22/2022       Past Surgical History:   Procedure Laterality Date    BACK SURGERY      WRIST SURGERY         Family History   Problem Relation Age of Onset    Cirrhosis Father     Heart disease Maternal Grandmother     Diabetes Maternal Grandmother     Diabetes Maternal Grandfather     Diabetes Paternal Grandmother     Diabetes Paternal Grandfather        Social History     Tobacco Use    Smoking status: Every Day     Packs/day: 0.50     Types: Cigarettes    Smokeless tobacco: Never   Vaping Use    Vaping Use: Never used   Substance Use Topics    Alcohol use: Not Currently     Comment: sober since 9/12/22    Drug use: Not Currently     Types: Marijuana       Home Meds:   Medications Prior to Admission   Medication Sig Dispense Refill Last  Dose    allopurinol (ZYLOPRIM) 100 MG tablet Take 1 tablet by mouth Daily. 30 tablet 0 Past Week    cyclobenzaprine (FLEXERIL) 10 MG tablet Take 0.5-1 tablets by mouth 3 (Three) Times a Day As Needed.   Past Month    nadolol (CORGARD) 40 MG tablet Take 1 tablet by mouth Daily As Needed.   Past Week    nicotine (NICODERM CQ) 14 MG/24HR patch Place 1 patch on the skin as directed by provider Daily As Needed.   8/18/2023    nicotine polacrilex (NICORETTE) 2 MG gum Chew 1 each As Needed for Smoking Cessation.   8/18/2023    oxyCODONE (ROXICODONE) 10 MG tablet Take 1 tablet by mouth Every 4 (Four) to 6 (Six) Hours As Needed for Moderate Pain.   8/18/2023    pantoprazole (PROTONIX) 40 MG EC tablet Take 1 tablet by mouth Daily.   8/18/2023    riFAXIMin (XIFAXAN) 550 MG tablet Take 1 tablet by mouth 2 (Two) Times a Day.   Past Week    baclofen (LIORESAL) 10 MG tablet Take 0.5-1 tablets by mouth 2 (Two) Times a Day As Needed for Muscle Spasms.   Unknown    melatonin 5 MG tablet tablet Take 1 tablet by mouth At Night As Needed.   More than a month    midodrine (PROAMATINE) 10 MG tablet Take 2 tablets (20 mg) by mouth 3 (Three) Times a Day Before Meals 180 tablet 0        Scheduled Meds:  albumin human, 50 g, Intravenous, Once  midodrine, 20 mg, Oral, Q8H  oxyCODONE, 5 mg, Oral, Once  riFAXIMin, 550 mg, Oral, BID  sodium chloride, 10 mL, Intravenous, Q12H        Continuous Infusions:  norepinephrine, 0.02-0.3 mcg/kg/min, Last Rate: 0.05 mcg/kg/min (08/19/23 0821)  sodium chloride, 75 mL/hr  sodium chloride, 100 mL/hr, Last Rate: 100 mL/hr (08/19/23 0703)        PRN Meds:    melatonin    nitroglycerin    ondansetron **OR** ondansetron    [COMPLETED] Insert Peripheral IV **AND** sodium chloride    sodium chloride    sodium chloride    Allergies:  Bee venom and Scopolamine    OBJECTIVE    Vital Signs  Temp:  [96 øF (35.6 øC)-98.7 øF (37.1 øC)] 96 øF (35.6 øC)  Heart Rate:  [54-74] 63  Resp:  [8-19] 8  BP: ()/(20-45)  85/37    I/O this shift:  In: -   Out: 250 [Urine:250]  I/O last 3 completed shifts:  In: -   Out: 200 [Urine:200]    Physical Exam:  General Appearance: alert, appears stated age and cooperative  Head: normocephalic, without obvious abnormality and atraumatic +DRY OP   Eyes: conjunctivae and sclerae normal and no icterus  Neck: supple and no JVD  Lungs: clear to auscultation and respirations regular  Heart: regular rhythm & normal rate and normal S1, S2 +BETHANY  Chest Wall: no abnormalities observed  Abdomen: normal bowel sounds and  +ASCITES/DISTENSION AND MILD BILAT LQ PAIN ON PALPATION  Extremities: moves extremities well, no edema, no cyanosis  Skin: +SCATTERED SCABS, ABRASIONS, ECCHYMOSIS  Neurologic: Alert, and oriented x 3. No focal deficits    Results Review:    I reviewed the patient's new clinical results.    WBC WBC   Date Value Ref Range Status   08/19/2023 10.70 3.40 - 10.80 10*3/mm3 Final   08/18/2023 9.50 3.40 - 10.80 10*3/mm3 Final      HGB Hemoglobin   Date Value Ref Range Status   08/19/2023 8.4 (L) 13.0 - 17.7 g/dL Final   08/18/2023 10.1 (L) 13.0 - 17.7 g/dL Final      HCT Hematocrit   Date Value Ref Range Status   08/19/2023 24.7 (L) 37.5 - 51.0 % Final   08/18/2023 30.5 (L) 37.5 - 51.0 % Final      Platelets No results found for: LABPLAT   MCV MCV   Date Value Ref Range Status   08/19/2023 103.7 (H) 79.0 - 97.0 fL Final   08/18/2023 106.3 (H) 79.0 - 97.0 fL Final          Sodium Sodium   Date Value Ref Range Status   08/19/2023 127 (L) 136 - 145 mmol/L Final   08/18/2023 130 (L) 136 - 145 mmol/L Final      Potassium Potassium   Date Value Ref Range Status   08/19/2023 5.1 3.5 - 5.2 mmol/L Final   08/18/2023 5.9 (H) 3.5 - 5.2 mmol/L Final      Chloride Chloride   Date Value Ref Range Status   08/19/2023 95 (L) 98 - 107 mmol/L Final   08/18/2023 98 98 - 107 mmol/L Final      CO2 CO2   Date Value Ref Range Status   08/19/2023 20.0 (L) 22.0 - 29.0 mmol/L Final   08/18/2023 18.0 (L) 22.0 - 29.0 mmol/L  Final      BUN BUN   Date Value Ref Range Status   08/19/2023 69 (H) 6 - 20 mg/dL Final   08/18/2023 74 (H) 6 - 20 mg/dL Final      Creatinine Creatinine   Date Value Ref Range Status   08/19/2023 4.04 (H) 0.76 - 1.27 mg/dL Final   08/18/2023 3.91 (H) 0.76 - 1.27 mg/dL Final      Calcium Calcium   Date Value Ref Range Status   08/19/2023 8.6 8.6 - 10.5 mg/dL Final   08/18/2023 8.7 8.6 - 10.5 mg/dL Final      PO4 No results found for: CAPO4   Albumin Albumin   Date Value Ref Range Status   08/19/2023 2.5 (L) 3.5 - 5.2 g/dL Final   08/18/2023 2.3 (L) 3.5 - 5.2 g/dL Final      Magnesium Magnesium   Date Value Ref Range Status   08/18/2023 2.5 1.6 - 2.6 mg/dL Final      Uric Acid No results found for: URICACID       Imaging Results (Last 72 Hours)       Procedure Component Value Units Date/Time    XR Chest 1 View [[id number]] Collected: 08/19/23 0649     Updated: 08/19/23 0657    Narrative:      XR CHEST 1 VW    Date of Exam: 8/19/2023 6:40 AM EDT    Indication: central line placement    Comparison: July 3, 2023    Findings:  There is a right internal jugular central line with the tip at the cavoatrial junction. The heart size is borderline enlarged. There is mild pulmonary vascular congestion. There are no focal infiltrates to suggest pneumonia.      Impression:      Impression:  Cardiomegaly with mild pulmonary vascular prominence. Right IJ central line is in place with the tip at the cavoatrial junction. There is no pneumothorax.      Electronically Signed: Deven Machado MD    8/19/2023 6:54 AM EDT    Workstation ID: [id number]              Results for orders placed during the hospital encounter of 08/18/23    XR Chest 1 View    Narrative  XR CHEST 1 VW    Date of Exam: 8/19/2023 6:40 AM EDT    Indication: central line placement    Comparison: July 3, 2023    Findings:  There is a right internal jugular central line with the tip at the cavoatrial junction. The heart size is borderline enlarged. There is mild pulmonary  vascular congestion. There are no focal infiltrates to suggest pneumonia.    Impression  Impression:  Cardiomegaly with mild pulmonary vascular prominence. Right IJ central line is in place with the tip at the cavoatrial junction. There is no pneumothorax.      Electronically Signed: Deven Machado MD  8/19/2023 6:54 AM EDT  Workstation ID: [id number]      Results for orders placed during the hospital encounter of 07/03/23    XR Chest 1 View    Narrative  XR CHEST 1 VW    Date of Exam: 7/3/2023 4:13 PM EDT    Indication: AMS    Comparison: AP portable chest 6/20/2023    Findings:  There is some bandlike atelectasis in the right lower lobe, similar to the prior examination. The previously described patchy left basilar airspace disease appears improved. No new airspace disease is identified. The heart size is normal. No pleural  effusion or pneumothorax is identified.    Impression  Impression:  Improving bibasilar atelectasis or pneumonia since 6/28/2023. No new airspace disease is appreciated.      Electronically Signed: Chanda Clark  7/3/2023 4:27 PM EDT  Workstation ID: [id number]      Results for orders placed during the hospital encounter of 06/23/23    XR Chest 1 View    Narrative  XR CHEST 1 VW    Date of Exam: 6/28/2023 11:45 AM EDT    Indication: eval for pneumonia    Comparison: None available.    Findings:  The lung volumes are low. Patchy airspace disease is seen within the lung bases bilaterally. Probable small bilateral pleural effusions are present. The pulmonary vasculature appears unremarkable. No pneumothorax. No acute osseous abnormality identified.    Impression  Impression:  Low lung volumes with patchy airspace disease seen within the lung bases bilaterally, left greater than right with probable small bilateral pleural effusions, likely related to pneumonia and/or atelectasis.      Electronically Signed: Brynn Higuera  6/28/2023 12:00 PM EDT  Workstation ID: [id number]            ASSESSMENT / PLAN       URIEL (acute kidney injury)    GERD (gastroesophageal reflux disease)    Tobacco use disorder    Alcoholic cirrhosis    Severe malnutrition      1.ARF/URIEL/CRF/CKD---------Nonoliguric. +Recurrent ARF/URIEL on top of known CRF/CKD STG 3A with a baseline serum creatinine of about 1.5. CRF/CKD STG 3A secondary to HTN NS. +ARF/URIEL is secondary to ATN from hypotension and prerenal state/intravascular volume depletion. Hydrate and give IV Albumin. Avoid hypotension. Check urine and serum studies and PVR and renal US. No NSAIDs or IV dye. Dose meds for CrCl less than 10 cc/min until ARF/URIEL is resolved     2. HYPERKALEMIA--------Secondary to RF and acidosis. Med Rx. Telemetry. K+ restrict diet and follow     3. ETOH CIRRHOSIS/HEPATIC ENCEPHALOPATHY/ASCITES-----Restart Lactulose. Follow Ammonia. Follow for paracentesis need. IV Albumin and Midodrine     4. HYPOMAGNESEMIA--------Follow levels     5. HYPERURICEMIA------Uloric     6. HYPOCALCEMIA-------Replace IV     7. ANEMIA-------H/H stable. Check Fe and follow for iron/PRBC need     8. HYPOALBUMINEMIA-----IV Albumin to temporize     9. ELEVATED INR------Related to cirrhosis     10. HYPOTENSION------On Midodrine     11. BIPOLAR DISEASE     12. BASILIO     13. POLYSUBSTANCE ABUSE/TOBACCO USE      14. KETONURIA------Secondary to intravascular volume depletion. IVFs    15. HYPOTENSION-----Levophed, IVFs, IV Albumin, Midodrine    16. ACIDOSIS--------Bicarb gtt ordered. Follow lactic. +Type 4 RTA and from stool losses related Lactulose use (when he actually takes it)    17. N/V/GERD/PUD PROPHYLAXIS------IV PPI    18. DVT PROPHYLAXIS------SCDs      I discussed the patient's findings and my recommendations with patient, nursing staff, and consulting provider    Will follow along closely. Thank you for allowing us to see this patient in renal consultation.    [facility]  [phone]  JayaMD Saji Butler MD  08/19/23  08:43  "EDT              Electronically signed by Fatou Waldron MD at 23 0949       Loren Cardona APRN at 23 0419            Critical Care Consult Note   Daquan Xiong : 1985 MRN:[id number] LOS:0 ROOM:      Reason for admission: URIEL (acute kidney injury)     Assessment / Plan     Hypotension due to poor oral intake and GI loss  Severe malnutrition  Nausea and vomiting   Gentle hydration with maintenance fluids   Encourage oral intake   Zofran for nausea  Inpatient dietician consult for malnutrition severity  Albumin   Home dose midodrine restarted    Acute on Chronic kidney injury  Hyperkalemia-Resolved  Hyponatremia  Support blood pressure  Monitor intake and output   Monitor electrolytes   Nephrology consulted   IVF for gentle hydration, avoid fluid overload    History of alcoholic cirrhosis   History of hepatic encephalopathy   Rifaximin home dose restarted       Code Status (Patient has no pulse and is not breathing): CPR (Attempt to Resuscitate)  Medical Interventions (Patient has pulse or is breathing): Full Support       Nutrition: NPO Diet NPO Type: Strict NPO Patient isn't on Tube Feeding     DVT prophylaxis:  Mechanical DVT prophylaxis orders are present.     History of Present illness     A 37 y.o. old male patient with PMH of Alcoholic cirrhosis, severe malnutrition, GERD, CKD III presents to the hospital with complaints of nausea, vomiting, and \"kidney pain\". Patient reports that he has been unable to take most of his medications as well as any oral intake due to nausea and vomiting. Patient had an appointment with his GI provider on  and labs drawn from that time showed he had a significant increase in creatine. He was then referred to the ER. The patient was initially found to hypotensive. He was started on IVF, given a dose of midodrine and his blood pressure did improve. However, shortly after fluid was finished, the patient's again became hypotensive. I " was called by the hospitilist service to evaluate the patient for vasopressor need. Patient was accepted to ICU service.     ACP: CPR, Full Intervention. Patient's spouse Lana Xiong is the patient's decision maker in the event he is unable.     Patient was seen and examined on 08/19/23 at 04:20 EDT .    Subjective / Review of systems     Review of Systems   Constitutional:  Positive for fatigue. Negative for chills.   Respiratory:  Negative for chest tightness and shortness of breath.    Cardiovascular:  Negative for chest pain.   Gastrointestinal:  Positive for nausea and vomiting. Negative for abdominal pain and diarrhea.   Genitourinary:  Negative for flank pain.   Neurological:  Negative for dizziness and light-headedness.      Past Medical/Surgical/Social/Family History & Allergies     Past Medical History:   Diagnosis Date    Alcohol abuse     1/2-1 pint a day, sober since 9/12/2022    Alcoholic cirrhosis 09/01/2022    Arthritis     Bipolar affective disorder 09/22/2022    Cholecystitis 11/08/2022    Chronic constipation 09/05/2017    GERD (gastroesophageal reflux disease) 09/22/2022    Doing well with pepcid.    Hypertension     Osteoarthritis 09/22/2022    Polysubstance abuse 09/22/2022    Severe Malnutrition (HCC) 06/27/2023    Sleep apnea     Tobacco use disorder 09/22/2022      Past Surgical History:   Procedure Laterality Date    BACK SURGERY      WRIST SURGERY        Social History     Socioeconomic History    Marital status:    Tobacco Use    Smoking status: Every Day     Packs/day: 0.50     Types: Cigarettes    Smokeless tobacco: Never   Vaping Use    Vaping Use: Never used   Substance and Sexual Activity    Alcohol use: Not Currently     Comment: sober since 9/12/22    Drug use: Not Currently     Types: Marijuana    Sexual activity: Defer      Family History   Problem Relation Age of Onset    Cirrhosis Father     Heart disease Maternal Grandmother     Diabetes Maternal Grandmother      Diabetes Maternal Grandfather     Diabetes Paternal Grandmother     Diabetes Paternal Grandfather       Allergies   Allergen Reactions    Bee Venom Anaphylaxis    Scopolamine Anaphylaxis        Home Medications     Prior to Admission medications    Medication Sig Start Date End Date Taking? Authorizing Provider   allopurinol (ZYLOPRIM) 100 MG tablet Take 1 tablet by mouth Daily. 6/8/23  Yes Luis Enrique Byrne MD   cyclobenzaprine (FLEXERIL) 10 MG tablet Take 0.5-1 tablets by mouth 3 (Three) Times a Day As Needed.   Yes Nathaly Pop MD   nadolol (CORGARD) 40 MG tablet Take 1 tablet by mouth Daily As Needed.   Yes Nathaly Pop MD   nicotine (NICODERM CQ) 14 MG/24HR patch Place 1 patch on the skin as directed by provider Daily As Needed.   Yes Nathaly Pop MD   nicotine polacrilex (NICORETTE) 2 MG gum Chew 1 each As Needed for Smoking Cessation.   Yes Nathaly Pop MD   oxyCODONE (ROXICODONE) 10 MG tablet Take 1 tablet by mouth Every 4 (Four) to 6 (Six) Hours As Needed for Moderate Pain.   Yes Nathaly Pop MD   pantoprazole (PROTONIX) 40 MG EC tablet Take 1 tablet by mouth Daily.   Yes Nathaly Pop MD   riFAXIMin (XIFAXAN) 550 MG tablet Take 1 tablet by mouth 2 (Two) Times a Day.   Yes Nathaly Pop MD   baclofen (LIORESAL) 10 MG tablet Take 0.5-1 tablets by mouth 2 (Two) Times a Day As Needed for Muscle Spasms.    Nathaly Pop MD   melatonin 5 MG tablet tablet Take 1 tablet by mouth At Night As Needed.    Nathaly Pop MD   midodrine (PROAMATINE) 10 MG tablet Take 2 tablets (20 mg) by mouth 3 (Three) Times a Day Before Meals 6/28/23 7/28/23  Ward Adams MD        Objective / Physical Exam     Vital signs:  Temp: 98.5 øF (36.9 øC)  BP: (!) 86/42  Heart Rate: 56  Resp: 10  SpO2: 97 %  Weight: 72.6 kg (160 lb)    Admission Weight: Weight: 72.6 kg (160 lb)    Physical Exam  Vitals and nursing note reviewed.   Constitutional:        General: He is not in acute distress.     Appearance: He is ill-appearing (Chronically).   HENT:      Head: Normocephalic.      Mouth/Throat:      Mouth: Mucous membranes are dry.      Pharynx: Oropharynx is clear.   Eyes:      Extraocular Movements: Extraocular movements intact.      Conjunctiva/sclera: Conjunctivae normal.      Pupils: Pupils are equal, round, and reactive to light.   Cardiovascular:      Rate and Rhythm: Normal rate and regular rhythm.      Pulses: Normal pulses.      Heart sounds: Normal heart sounds.   Pulmonary:      Effort: Pulmonary effort is normal.      Breath sounds: Normal breath sounds.   Abdominal:      General: Bowel sounds are normal.      Palpations: Abdomen is soft.   Musculoskeletal:      Right lower leg: Edema (+++) present.      Left lower leg: Edema (+++) present.   Skin:     General: Skin is warm and dry.      Coloration: Skin is jaundiced.      Findings: No rash.   Neurological:      Mental Status: He is alert and oriented to person, place, and time.   Psychiatric:         Mood and Affect: Mood normal.         Behavior: Behavior normal.        Labs     Results from last 7 days   Lab Units 08/19/23  0404 08/18/23  2315   WBC 10*3/mm3 10.70 9.50   HEMATOCRIT % 24.7* 30.5*   PLATELETS 10*3/mm3 122* 116*      Results from last 7 days   Lab Units 08/19/23  0233 08/18/23  2202   SODIUM mmol/L 127* 130*   POTASSIUM mmol/L 5.1 5.9*   CHLORIDE mmol/L 95* 98   CO2 mmol/L 20.0* 18.0*   BUN mg/dL 69* 74*   CREATININE mg/dL 4.04* 3.91*        Imaging     XR Chest 1 View    Result Date: 8/19/2023  Impression: Cardiomegaly with mild pulmonary vascular prominence. Right IJ central line is in place with the tip at the cavoatrial junction. There is no pneumothorax. Electronically Signed: Deven Machado MD  8/19/2023 6:54 AM EDT  Workstation ID: [id number]      Chest X ray: My independent assessment showed no infiltrates or effusions    EKG: My independent evaluation showed sinus bradycardia, no ST  -T changes    Current Medications     Scheduled Meds:  albumin human, 50 g, Intravenous, Once  midodrine, 20 mg, Oral, Q8H  oxyCODONE, 5 mg, Oral, Once  riFAXIMin, 550 mg, Oral, BID  sodium chloride, 10 mL, Intravenous, Q12H         Continuous Infusions:  norepinephrine, 0.02-0.3 mcg/kg/min  sodium chloride, 75 mL/hr         MONICA Rojas   Critical Care  08/19/23   04:20 EDT         Electronically signed by Loren Cardona APRN at 08/19/23 4347

## 2023-08-21 NOTE — PROGRESS NOTES
"Critical Care Progress Note     Daquan Xiong : 1985 MRN:[id number] LOS:2  Rm: 2316/1     Principal Problem: URIEL (acute kidney injury)     Reason for follow up: All the medical problems listed below    Summary     A 37 y.o. old male patient with PMH of Alcoholic cirrhosis, severe malnutrition, GERD, CKD III, substance abuse presents to the hospital with complaints of nausea, vomiting, and \"kidney pain\" and was admitted with a principal diagnosis of URIEL (acute kidney injury).  Nephrology was consulted, treated with bicarb drip.     Also found to be hypotensive, treated with IV fluids without much improvement and needed vasopressors.  Started on empiric ceftriaxone for suspected SBP.  Subsequently, patient's hemoglobin dropped from 10 to 7, GI was consulted for suspected GI/variceal bleed.  CT abdomen pelvis showed large volume ascites with moderate bilateral pleural effusions and tree-in-bud nodular opacities on both lungs.  S/p paracentesis on  with removal of 5 L of ascitic fluid, mostly lymphocyte predominant with 119 neutrophils.  EGD and colonoscopy planned for .     Significant Events     23 : Continue with midodrine.  Will give IV albumin for crystalloid refractory shock and possible hepatorenal syndrome.  Added stress dose steroids, increase lactulose to titrate for 3-4 bowel movements per day.  Replace potassium.  EGD and colonoscopy today.    Assessment / Plan     Septic Shock with lactic acidosis, likely due to pneumonia  Does not meet SIRS criteria. Meets sepsis 3 definition with at least 2 point increase in SOFA score along with lactic acidosis and need for vasopressors.  CT abdomen showed tree-in-bud nodular opacities in both lungs.  Ascitic fluid with 119 neutrophils, mostly lymphocyte predominant.  Continue with ceftriaxone for now.  Bedside echo with normal LVEF but large left pleural effusion.  Doubt hypovolemia.    Adequately fluid resuscitated, continue with norepinephrine " for MAP target of 65.  Blood cultures remain negative so far.   Continue home dose midodrine 20 mg every 8 hours    Acute kidney injury on CKD III   Metabolic acidosis resolved  Remains non oliguric.  Baseline creatinine around 1.5.  Appears hypervolemic, possible ATN from septic shock.  Cannot rule out hepatorenal syndrome completely.  Nephrology following, currently on bicarb drip.  Continue with IV albumin and octreotide.   Monitor Input/Output very closely.   Net IO Since Admission: 2,506 mL [08/21/23 1056]       Acute on chronic anemia / ?  Esophageal variceal bleed.  Nadolol on hold due to shock state.  Started on octreotide drip for possible suspicion of variceal bleed.  Continue with Protonix twice daily.  GI consulted.  Monitor hemoglobin closely.  Transfuse as needed to keep hemoglobin greater than 7.  Received 1 PRBC since admission.  Scheduled for EGD/colonoscopy on 8/21.    Alcoholic cirrhosis of liver with recurrent ascites / Portal Hypertension   Transaminitis / Hyperbilirubinemia  Gets frequent paracentesis once every 7 to 10 days.  S/p paracentesis on 8/21 with removal of 5 L.  Not a candidate for TIPS due to hyperammonemia and history of hepatic encephalopathy.  Awaiting liver transplant at [facility].  GI following.    Hyperammonemia / history of hepatic encephalopathy   Continue with home rifaximin.    Increase scheduled lactulose to titrate for 3-4 bowel movements per day.    Acute thrombocytopenia/ Coagulopathy  Possibly from liver disease.  Some elevation in INR could also be due to nutritional deficiency of vitamin K.  S/p 1 dose of vitamin K.  Check fibrinogen to rule out sepsis induced DIC.  Hold subQ heparin for now.    Severe malnutrition : Nutrition following  Hyperuricemia: On allopurinol.  History of substance abuse    Code status:   Code Status (Patient has no pulse and is not breathing): CPR (Attempt to Resuscitate)  Medical Interventions (Patient has pulse or is breathing): Full  Support       Nutrition:   NPO Diet NPO Type: Strict NPO   Patient isn't on Tube Feeding     DVT prophylaxis:  Mechanical DVT prophylaxis orders are present.     Subjective / Review of systems     Review of Systems   Feels better, thirsty.  Appetite is marginally improved but still not eating or drinking well.  No abdominal pain.  No nausea or vomiting.  No chest pain or shortness of breath.  Objective / Physical Exam     Vital signs:  Temp: 98.2 øF (36.8 øC)  BP: 102/52  Heart Rate: 71  Resp: 11  SpO2: 94 %  Weight: 76.1 kg (167 lb 12.3 oz)    Admission Weight: Weight: 72.6 kg (160 lb)  Current Weight: Weight: 76.1 kg (167 lb 12.3 oz)    Input/Output in last 24 hours:    Intake/Output Summary (Last 24 hours) at 8/21/2023 1056  Last data filed at 8/21/2023 1017  Gross per 24 hour   Intake 5880 ml   Output 6540 ml   Net -660 ml      Net IO Since Admission: 2,506 mL [08/21/23 1056]     Physical Exam  Vitals and nursing note reviewed.   Constitutional:       General: He is awake. He is not in acute distress.     Appearance: Normal appearance. He is ill-appearing and toxic-appearing. He is not diaphoretic.   HENT:      Head: Normocephalic and atraumatic.      Comments: Bitemporal wasting     Mouth/Throat:      Mouth: Mucous membranes are dry.      Pharynx: Oropharynx is clear.   Eyes:      General: Scleral icterus present.      Extraocular Movements: Extraocular movements intact.      Conjunctiva/sclera: Conjunctivae normal.      Comments: Loss of periorbital fat pads   Cardiovascular:      Rate and Rhythm: Normal rate.      Heart sounds: Normal heart sounds, S1 normal and S2 normal. No murmur heard.    No gallop.      Comments: Sinus rhythm  Pulmonary:      Breath sounds: Rales (Bibasilar) present. No wheezing or rhonchi.   Abdominal:      General: Bowel sounds are normal. There is distension.      Palpations: Abdomen is soft.      Tenderness: There is no abdominal tenderness. There is no guarding.   Musculoskeletal:          General: No tenderness.      Right lower leg: Edema present.      Left lower leg: Edema present.   Skin:     General: Skin is warm and dry.      Coloration: Skin is jaundiced.   Neurological:      General: No focal deficit present.      Mental Status: He is alert and oriented to person, place, and time.   Psychiatric:         Behavior: Behavior is cooperative.        Radiology and Labs     Results from last 7 days   Lab Units 08/21/23  0452 08/20/23  2027 08/20/23  1234 08/20/23  0602 08/19/23  0404 08/18/23  2315   WBC 10*3/mm3 7.50  --   --  6.70 10.70 9.50   HEMOGLOBIN g/dL 7.7* 6.4* 7.1* 7.0* 8.4* 10.1*   HEMATOCRIT % 22.8* 19.4* 21.4* 20.9* 24.7* 30.5*   PLATELETS 10*3/mm3 67*  --   --  98* 122* 116*      Results from last 7 days   Lab Units 08/21/23  0452 08/20/23  0602 08/18/23  2315   PROTIME Seconds 18.7* 17.2* 14.4*   INR  1.81* 1.65* 1.37*      Results from last 7 days   Lab Units 08/21/23  0452 08/20/23  1234 08/20/23  0602 08/20/23  0022 08/19/23  1734 08/19/23  0233 08/18/23  2202   SODIUM mmol/L 140 139 136 136 137   < > 130*   POTASSIUM mmol/L 3.0* 3.6 3.6 3.9 4.4   < > 5.9*   CHLORIDE mmol/L 94* 96* 95* 98 100   < > 98   CO2 mmol/L 32.0* 26.0 26.0 24.0 20.0*   < > 18.0*   BUN mg/dL 40* 47* 52* 58* 62*   < > 74*   CREATININE mg/dL 2.41* 2.71* 3.03* 2.96* 3.27*   < > 3.91*   GLUCOSE mg/dL 145* 149* 170* 151* 146*   < > 86   MAGNESIUM mg/dL 1.9  --  2.1  --   --   --  2.5   PHOSPHORUS mg/dL 3.2  --   --   --   --   --   --     < > = values in this interval not displayed.            Current medications     Scheduled Meds:   acetylcysteine, 3 mL, Nebulization, Q12H - RT  albuterol, 2.5 mg, Nebulization, Q12H - RT  cefTRIAXone, 2,000 mg, Intravenous, Q24H  febuxostat, 40 mg, Oral, Daily  hydrocortisone sodium succinate, 50 mg, Intravenous, Q6H  lactulose, 20 g, Oral, TID  midodrine, 20 mg, Oral, Q8H  oxyCODONE, 5 mg, Oral, Once  pantoprazole, 40 mg, Intravenous, Q12H  potassium chloride, 40 mEq,  Oral, Q4H  riFAXIMin, 550 mg, Oral, BID  sodium chloride, 10 mL, Intravenous, Q12H  vitamin K1, 10 mg, Oral, Once        Continuous Infusions:   norepinephrine, 0.02-0.3 mcg/kg/min, Last Rate: 0.06 mcg/kg/min (08/21/23 0636)  octreotide (SandoSTATIN) infusion, 25 mcg/hr, Last Rate: 25 mcg/hr (08/21/23 0451)  sodium bicarbonate drip (greater than 75 mEq/bag), 150 mEq, Last Rate: 150 mEq (08/21/23 1017)          Plan discussed with RN. Reviewed all other data in the last 24 hours, including but not limited to vitals, labs, microbiology, imaging and pertinent notes from other providers. High complexity decision making and high risk of deterioration.      Aly Zayas MD   Critical Care  08/21/23   10:56 EDT

## 2023-08-21 NOTE — THERAPY EVALUATION
Patient Name: Daquan Xiong  : 1985    MRN: [id number]                              Today's Date: 2023       Admit Date: 2023    Visit Dx:     ICD-10-CM ICD-9-CM   1. Acute kidney injury  N17.9 584.9   2. Hypotension, unspecified hypotension type  I95.9 458.9   3. Alcoholic cirrhosis, unspecified whether ascites present  K70.30 571.2   4. Anemia, unspecified type  D64.9 285.9   5. Alcoholic cirrhosis of liver with ascites  K70.31 571.2   6. Other ascites  R18.8 789.59     Patient Active Problem List   Diagnosis    Bipolar affective disorder    GERD (gastroesophageal reflux disease)    Osteoarthritis    Tobacco use disorder    Polysubstance abuse    Obstructive sleep apnea    URIEL (acute kidney injury)    Alcoholic cirrhosis    Chronic constipation    Severe malnutrition    Hepatic encephalopathy    Essential hypertension    CKD (chronic kidney disease) stage 3, GFR 30-59 ml/min    Moderate malnutrition    Anemia     Past Medical History:   Diagnosis Date    Alcohol abuse     1/2-1 pint a day, sober since 2022    Alcoholic cirrhosis 2022    Arthritis     Bipolar affective disorder 2022    Cholecystitis 2022    Chronic constipation 2017    GERD (gastroesophageal reflux disease) 2022    Doing well with pepcid.    Hypertension     Osteoarthritis 2022    Polysubstance abuse 2022    Severe Malnutrition (HCC) 2023    Sleep apnea     Tobacco use disorder 2022     Past Surgical History:   Procedure Laterality Date    BACK SURGERY      WRIST SURGERY        General Information       Row Name 23 1531          Physical Therapy Time and Intention    Document Type evaluation  -EL     Mode of Treatment individual therapy  -EL       Row Name 23 1531          General Information    Prior Level of Function independent:;all household mobility;mod assist:;ADL's  Spouse assists with dressing/bathing  -EL       Row Name 23 1531          Living  Environment    People in Home spouse  -EL       Row Name 08/21/23 1531          Home Main Entrance    Number of Stairs, Main Entrance none  -EL       Row Name 08/21/23 1531          Stairs Within Home, Primary    Number of Stairs, Within Home, Primary none  -EL       Row Name 08/21/23 1531          Cognition    Orientation Status (Cognition) oriented x 4  -EL       Row Name 08/21/23 1531          Safety Issues, Functional Mobility    Impairments Affecting Function (Mobility) pain;strength;balance  -EL               User Key  (r) = Recorded By, (t) = Taken By, (c) = Cosigned By      Initials Name Provider Type    Bob Serna, PT Physical Therapist                   Mobility       Row Name 08/21/23 1532          Bed Mobility    Bed Mobility supine-sit  -EL     Supine-Sit [place] (Bed Mobility) minimum assist (75% patient effort)  -EL     Comment, (Bed Mobility) Provided hand for pt to pull up from  -EL       Row Name 08/21/23 1532          Bed-Chair Transfer    Bed-Chair [place] (Transfers) contact guard  -EL     Assistive Device (Bed-Chair Transfers) cane, straight  -EL       Row Name 08/21/23 1532          Sit-Stand Transfer    Sit-Stand [place] (Transfers) contact guard  -EL     Assistive Device (Sit-Stand Transfers) cane, straight  -EL       Row Name 08/21/23 1532          Gait/Stairs (Locomotion)    [place] Level (Gait) contact guard  -EL     Assistive Device (Gait) cane, quad  -EL     Distance in Feet (Gait) 40  -EL     Deviations/Abnormal Patterns (Gait) gait speed decreased  -EL     Comment, (Gait/Stairs) reports stiffiness inititally, but improved with mobility  -EL               User Key  (r) = Recorded By, (t) = Taken By, (c) = Cosigned By      Initials Name Provider Type    Bob Serna, PT Physical Therapist                   Obj/Interventions       Row Name 08/21/23 1533          Range of Motion Comprehensive    General Range of Motion bilateral lower extremity ROM WFL  -EL        Row Name 08/21/23 1533          Strength Comprehensive (MMT)    General Manual Muscle Testing (MMT) Assessment lower extremity strength deficits identified  -EL     Comment, General Manual Muscle Testing (MMT) Assessment BLE 3+/5 gross  -EL       Row Name 08/21/23 1533          Balance    Balance Assessment sitting static balance;standing static balance;standing dynamic balance  -EL     Static Sitting Balance independent  -EL     Static Standing Balance contact guard  -EL     Dynamic Standing Balance contact guard  -EL       Row Name 08/21/23 1533          Sensory Assessment (Somatosensory)    Sensory Assessment (Somatosensory) sensation intact  -EL               User Key  (r) = Recorded By, (t) = Taken By, (c) = Cosigned By      Initials Name Provider Type    EL Bob Hairston, PT Physical Therapist                   Goals/Plan       Row Name 08/21/23 1541          Bed Mobility Goal 1 (PT)    Activity/Assistive Device (Bed Mobility Goal 1, PT) bed mobility activities, all  -EL     [place] Level/Cues Needed (Bed Mobility Goal 1, PT) modified independence  -EL     Time Frame (Bed Mobility Goal 1, PT) long term goal (LTG);2 weeks  -EL       Row Name 08/21/23 1541          Transfer Goal 1 (PT)    Activity/Assistive Device (Transfer Goal 1, PT) transfers, all;cane, straight  -EL     [place] Level/Cues Needed (Transfer Goal 1, PT) modified independence  -EL     Time Frame (Transfer Goal 1, PT) long term goal (LTG);2 weeks  -EL       Row Name 08/21/23 1541          Gait Training Goal 1 (PT)    Activity/Assistive Device (Gait Training Goal 1, PT) gait (walking locomotion);cane, straight  -EL     [place] Level (Gait Training Goal 1, PT) modified independence  -EL     Distance (Gait Training Goal 1, PT) 120  -EL     Time Frame (Gait Training Goal 1, PT) long term goal (LTG);2 weeks  -EL       Row Name 08/21/23 1541          Therapy Assessment/Plan (PT)    Planned Therapy Interventions (PT) neuromuscular  re-education;balance training;bed mobility training;transfer training;gait training;patient/family education;strengthening  -EL               User Key  (r) = Recorded By, (t) = Taken By, (c) = Cosigned By      Initials Name Provider Type    Bob Serna, PT Physical Therapist                   Clinical Impression       Row Name 08/21/23 9858          Pain    Additional Documentation Pain Scale: FACES Pre/Post-Treatment (Group)  -EL       Row Name 08/21/23 5729          Pain Scale: FACES Pre/Post-Treatment    Pain: FACES Scale, Pretreatment 2-->hurts little bit  -EL     Posttreatment Pain Rating 2-->hurts little bit  -EL     Pain Location - abdomen  -EL       Row Name 08/21/23 0297          Plan of Care Review    Plan of Care Reviewed With patient  -EL     Outcome Evaluation Pt is a 36 YO M admitted with URIEL, hx of ETOH cirrhosis, paracentesis yesterday with 4.9 L removed. Pt reports living at home with spouse, and states his mother can sit with him while spouse is at work. Pt typically requires assistance from spouse for ADLs, is independent with ambulation with SPC and 1 recent fall. Pt this date requires MIN A to come to sitting EOB and ambulates in room with CGA using SPC. Pt c/o being stiff and having minor abdominal pain, but mobility is not far from baseline. Pt demosntrates global weakness and would likely benefit from HHPT at d/c.  -EL       Row Name 08/21/23 6140          Therapy Assessment/Plan (PT)    Rehab Potential (PT) good, to achieve stated therapy goals  -EL     Criteria for Skilled Interventions Met (PT) yes  -EL     Therapy Frequency (PT) 3 times/wk  -EL     Predicted Duration of Therapy Intervention (PT) until d/c  -EL       Row Name 08/21/23 6213          Vital Signs    O2 Delivery Pre Treatment room air  -EL     O2 Delivery Intra Treatment room air  -EL     O2 Delivery Post Treatment room air  -EL     Pre Patient Position Supine  -EL     Intra Patient Position Standing  -EL     Post Patient  Position Sitting  -EL       Row Name 08/21/23 1534          Positioning and Restraints    Pre-Treatment Position in bed  -EL     Post Treatment Position chair  -EL     In Chair notified nsg;reclined;call light within reach;encouraged to call for assist;exit alarm on  -EL               User Key  (r) = Recorded By, (t) = Taken By, (c) = Cosigned By      Initials Name Provider Type    Bob Serna PT Physical Therapist                   Outcome Measures       Row Name 08/21/23 1542          How much help from another person do you currently need...    Turning from your back to your side while in flat bed without using bedrails? 3  -EL     Moving from lying on back to sitting on the side of a flat bed without bedrails? 3  -EL     Moving to and from a bed to a chair (including a wheelchair)? 3  -EL     Standing up from a chair using your arms (e.g., wheelchair, bedside chair)? 3  -EL     Climbing 3-5 steps with a railing? 2  -EL     To walk in hospital room? 3  -EL     AM-PAC 6 Clicks Score (PT) 17  -EL     Highest level of mobility 5 --> Static standing  -EL       Row Name 08/21/23 1542          Functional Assessment    Outcome Measure Options AM-PAC 6 Clicks Basic Mobility (PT)  -EL               User Key  (r) = Recorded By, (t) = Taken By, (c) = Cosigned By      Initials Name Provider Type    Bob Serna PT Physical Therapist                                 Physical Therapy Education       Title: PT OT SLP Therapies (In Progress)       Topic: Physical Therapy (In Progress)       Point: Mobility training (Done)       Learning Progress Summary             Patient Acceptance, E,TB, VU by JEFFREY at 8/21/2023 1542                         Point: Home exercise program (Not Started)       Learner Progress:  Not documented in this visit.              Point: Body mechanics (Not Started)       Learner Progress:  Not documented in this visit.              Point: Precautions (Not Started)       Learner Progress:  Not documented  in this visit.                              User Key       Initials Effective Dates Name Provider Type Discipline    EL 06/23/20 -  Bob Hairston, PT Physical Therapist PT                  PT Recommendation and Plan  Planned Therapy Interventions (PT): neuromuscular re-education, balance training, bed mobility training, transfer training, gait training, patient/family education, strengthening  Plan of Care Reviewed With: patient  Outcome Evaluation: Pt is a 36 YO M admitted with URIEL, hx of ETOH cirrhosis, paracentesis yesterday with 4.9 L removed. Pt reports living at home with spouse, and states his mother can sit with him while spouse is at work. Pt typically requires assistance from spouse for ADLs, is independent with ambulation with SPC and 1 recent fall. Pt this date requires MIN A to come to sitting EOB and ambulates in room with CGA using SPC. Pt c/o being stiff and having minor abdominal pain, but mobility is not far from baseline. Pt demosntrates global weakness and would likely benefit from HHPT at d/c.     Time Calculation:   PT Evaluation Complexity  History, PT Evaluation Complexity: 1-2 personal factors and/or comorbidities  Examination of Body Systems (PT Eval Complexity): total of 3 or more elements  Clinical Presentation (PT Evaluation Complexity): evolving  Clinical Decision Making (PT Evaluation Complexity): moderate complexity  Overall Complexity (PT Evaluation Complexity): moderate complexity     PT Charges       Row Name 08/21/23 1542             Time Calculation    Start Time 0943  -EL      Stop Time 1020  -EL      Time Calculation (min) 37 min  -EL      PT Received On 08/21/23  -EL      PT - Next Appointment 08/23/23  -EL      PT Goal Re-Cert Due Date 09/04/23  -EL         Time Calculation- PT    Total Timed Code Minutes- PT 10 minute(s)  -EL                User Key  (r) = Recorded By, (t) = Taken By, (c) = Cosigned By      Initials Name Provider Type    EL Bob Hairston, PT Physical Therapist                   Therapy Charges for Today       Code Description Service Date Service Provider Modifiers Qty    67259113304 HC PT EVAL MOD COMPLEXITY 4 8/21/2023 Bob Hairston, PT GP 1    89339679054 HC GAIT TRAINING EA 15 MIN 8/21/2023 Bob Hairston, PT GP 1            PT G-Codes  Outcome Measure Options: AM-PAC 6 Clicks Basic Mobility (PT)  AM-PAC 6 Clicks Score (PT): 17  PT Discharge Summary  Anticipated Discharge Disposition (PT): home with home health    Bob Hairston PT  8/21/2023

## 2023-08-21 NOTE — PLAN OF CARE
Goal Outcome Evaluation:  Plan of Care Reviewed With: patient           Outcome Evaluation: Pt is a 38 YO M admitted with URIEL, hx of ETOH cirrhosis, paracentesis yesterday with 4.9 L removed. Pt reports living at home with spouse, and states his mother can sit with him while spouse is at work. Pt typically requires assistance from spouse for ADLs, is independent with ambulation with SPC and 1 recent fall. Pt this date requires MIN A to come to sitting EOB and ambulates in room with CGA using SPC. Pt c/o being stiff and having minor abdominal pain, but mobility is not far from baseline. Pt demosntrates global weakness and would likely benefit from HHPT at d/c.      Anticipated Discharge Disposition (PT): home with home health

## 2023-08-21 NOTE — PROGRESS NOTES
"NEPHROLOGY PROGRESS NOTE------[facility]    [facility]  [phone]  Lam Alexis MD      Patient Care Team:  Wolf Carvalho MD as PCP - General (Geriatric Medicine)  Fatou Waldron MD as Consulting Physician (Nephrology)      Provider:  Lam Alexis MD  Patient Name: Daquan Xiong  :  1985    SUBJECTIVE:    F/U URIEL/CKD/acidosis    Feels tired.  Making more urine.     Medication:  acetylcysteine, 3 mL, Nebulization, Q12H - RT  albuterol, 2.5 mg, Nebulization, Q12H - RT  cefTRIAXone, 2,000 mg, Intravenous, Q24H  febuxostat, 40 mg, Oral, Daily  hydrocortisone sodium succinate, 50 mg, Intravenous, Q6H  lactulose, 20 g, Oral, TID  midodrine, 20 mg, Oral, Q8H  oxyCODONE, 5 mg, Oral, Once  pantoprazole, 40 mg, Intravenous, Q12H  potassium chloride, 40 mEq, Oral, Q4H  riFAXIMin, 550 mg, Oral, BID  sodium chloride, 10 mL, Intravenous, Q12H  vitamin K1, 10 mg, Oral, Once      norepinephrine, 0.02-0.3 mcg/kg/min, Last Rate: 0.03 mcg/kg/min (23 1206)  octreotide (SandoSTATIN) infusion, 25 mcg/hr, Last Rate: 25 mcg/hr (23 0451)  sodium bicarbonate drip (greater than 75 mEq/bag), 150 mEq, Last Rate: 150 mEq (23 1017)        OBJECTIVE    Vital Sign Min/Max for last 24 hours  Temp  Min: 97.8 øF (36.6 øC)  Max: 99 øF (37.2 øC)   BP  Min: 93/49  Max: 111/59   Pulse  Min: 60  Max: 77   Resp  Min: 11  Max: 17   SpO2  Min: 90 %  Max: 98 %   No data recorded   Weight  Min: 76.1 kg (167 lb 12.3 oz)  Max: 76.1 kg (167 lb 12.3 oz)     Flowsheet Rows      Flowsheet Row First Filed Value   Admission Height 185.4 cm (73\") Documented at 2023   Admission Weight 72.6 kg (160 lb) Documented at 2023            I/O this shift:  In: 1095 [P.O.:300; I.V.:795]  Out: 265 [Urine:265]  I/O last 3 completed shifts:  In: 6985 [P.O.:120; I.V.:6565; Blood:300]  Out: 7275 [Urine:2375; Other:4900]    Physical Exam:  General Appearance: " alert, appears stated age and cooperative  Head: normocephalic, without obvious abnormality and atraumatic +DRY OP   Eyes: conjunctivae and sclerae normal and no icterus  Neck: supple and no JVD  Lungs: clear to auscultation and respirations regular  Heart: regular rhythm & normal rate and normal S1, S2 +BETHANY  Chest Wall: no abnormalities observed  Abdomen: normal bowel sounds and  +ASCITES/DISTENSION AND MILD BILAT LQ PAIN ON PALPATION  Extremities: moves extremities well, 1+ edema, no cyanosis  Skin: +SCATTERED SCABS, ABRASIONS, ECCHYMOSIS  Neurologic: Alert, and oriented x 3. No focal deficits    Labs:    WBC WBC   Date Value Ref Range Status   08/21/2023 7.50 3.40 - 10.80 10*3/mm3 Final   08/20/2023 6.70 3.40 - 10.80 10*3/mm3 Final   08/19/2023 10.70 3.40 - 10.80 10*3/mm3 Final   08/18/2023 9.50 3.40 - 10.80 10*3/mm3 Final      HGB Hemoglobin   Date Value Ref Range Status   08/21/2023 7.7 (L) 13.0 - 17.7 g/dL Final   08/20/2023 6.4 (C) 13.0 - 17.7 g/dL Final   08/20/2023 7.1 (L) 13.0 - 17.7 g/dL Final   08/20/2023 7.0 (L) 13.0 - 17.7 g/dL Final   08/19/2023 8.4 (L) 13.0 - 17.7 g/dL Final   08/18/2023 10.1 (L) 13.0 - 17.7 g/dL Final      HCT Hematocrit   Date Value Ref Range Status   08/21/2023 22.8 (L) 37.5 - 51.0 % Final   08/20/2023 19.4 (C) 37.5 - 51.0 % Final   08/20/2023 21.4 (L) 37.5 - 51.0 % Final   08/20/2023 20.9 (C) 37.5 - 51.0 % Final   08/19/2023 24.7 (L) 37.5 - 51.0 % Final   08/18/2023 30.5 (L) 37.5 - 51.0 % Final      Platelets No results found for: LABPLAT   MCV MCV   Date Value Ref Range Status   08/21/2023 99.7 (H) 79.0 - 97.0 fL Final   08/20/2023 103.9 (H) 79.0 - 97.0 fL Final   08/19/2023 103.7 (H) 79.0 - 97.0 fL Final   08/18/2023 106.3 (H) 79.0 - 97.0 fL Final          Sodium Sodium   Date Value Ref Range Status   08/21/2023 140 136 - 145 mmol/L Final   08/20/2023 139 136 - 145 mmol/L Final   08/20/2023 136 136 - 145 mmol/L Final   08/20/2023 136 136 - 145 mmol/L Final   08/19/2023 137 136  - 145 mmol/L Final   08/19/2023 129 (L) 136 - 145 mmol/L Final   08/19/2023 127 (L) 136 - 145 mmol/L Final   08/18/2023 130 (L) 136 - 145 mmol/L Final      Potassium Potassium   Date Value Ref Range Status   08/21/2023 3.0 (L) 3.5 - 5.2 mmol/L Final   08/20/2023 3.6 3.5 - 5.2 mmol/L Final   08/20/2023 3.6 3.5 - 5.2 mmol/L Final   08/20/2023 3.9 3.5 - 5.2 mmol/L Final   08/19/2023 4.4 3.5 - 5.2 mmol/L Final   08/19/2023 4.6 3.5 - 5.2 mmol/L Final   08/19/2023 5.1 3.5 - 5.2 mmol/L Final   08/18/2023 5.9 (H) 3.5 - 5.2 mmol/L Final      Chloride Chloride   Date Value Ref Range Status   08/21/2023 94 (L) 98 - 107 mmol/L Final   08/20/2023 96 (L) 98 - 107 mmol/L Final   08/20/2023 95 (L) 98 - 107 mmol/L Final   08/20/2023 98 98 - 107 mmol/L Final   08/19/2023 100 98 - 107 mmol/L Final   08/19/2023 96 (L) 98 - 107 mmol/L Final   08/19/2023 95 (L) 98 - 107 mmol/L Final   08/18/2023 98 98 - 107 mmol/L Final      CO2 CO2   Date Value Ref Range Status   08/21/2023 32.0 (H) 22.0 - 29.0 mmol/L Final   08/20/2023 26.0 22.0 - 29.0 mmol/L Final   08/20/2023 26.0 22.0 - 29.0 mmol/L Final   08/20/2023 24.0 22.0 - 29.0 mmol/L Final   08/19/2023 20.0 (L) 22.0 - 29.0 mmol/L Final   08/19/2023 18.0 (L) 22.0 - 29.0 mmol/L Final   08/19/2023 20.0 (L) 22.0 - 29.0 mmol/L Final   08/18/2023 18.0 (L) 22.0 - 29.0 mmol/L Final      BUN BUN   Date Value Ref Range Status   08/21/2023 40 (H) 6 - 20 mg/dL Final   08/20/2023 47 (H) 6 - 20 mg/dL Final   08/20/2023 52 (H) 6 - 20 mg/dL Final   08/20/2023 58 (H) 6 - 20 mg/dL Final   08/19/2023 62 (H) 6 - 20 mg/dL Final   08/19/2023 65 (H) 6 - 20 mg/dL Final   08/19/2023 69 (H) 6 - 20 mg/dL Final   08/18/2023 74 (H) 6 - 20 mg/dL Final      Creatinine Creatinine   Date Value Ref Range Status   08/21/2023 2.41 (H) 0.76 - 1.27 mg/dL Final   08/20/2023 2.71 (H) 0.76 - 1.27 mg/dL Final   08/20/2023 3.03 (H) 0.76 - 1.27 mg/dL Final   08/20/2023 2.96 (H) 0.76 - 1.27 mg/dL Final   08/19/2023 3.27 (H) 0.76 - 1.27  mg/dL Final   08/19/2023 3.48 (H) 0.76 - 1.27 mg/dL Final   08/19/2023 4.04 (H) 0.76 - 1.27 mg/dL Final   08/18/2023 3.91 (H) 0.76 - 1.27 mg/dL Final      Calcium Calcium   Date Value Ref Range Status   08/21/2023 9.9 8.6 - 10.5 mg/dL Final   08/20/2023 9.8 8.6 - 10.5 mg/dL Final   08/20/2023 9.6 8.6 - 10.5 mg/dL Final   08/20/2023 9.4 8.6 - 10.5 mg/dL Final   08/19/2023 9.1 8.6 - 10.5 mg/dL Final   08/19/2023 9.0 8.6 - 10.5 mg/dL Final   08/19/2023 8.6 8.6 - 10.5 mg/dL Final   08/18/2023 8.7 8.6 - 10.5 mg/dL Final      PO4 No components found for: PO4   Albumin Albumin   Date Value Ref Range Status   08/21/2023 4.6 3.5 - 5.2 g/dL Final   08/20/2023 4.2 3.5 - 5.2 g/dL Final   08/19/2023 2.5 (L) 3.5 - 5.2 g/dL Final   08/18/2023 2.3 (L) 3.5 - 5.2 g/dL Final      Magnesium Magnesium   Date Value Ref Range Status   08/21/2023 1.9 1.6 - 2.6 mg/dL Final   08/20/2023 2.1 1.6 - 2.6 mg/dL Final   08/18/2023 2.5 1.6 - 2.6 mg/dL Final      Uric Acid No components found for: URIC ACID     Imaging Results (Last 72 Hours)       Procedure Component Value Units Date/Time    CT Abdomen Pelvis Without Contrast [[id number]] Collected: 08/20/23 1315     Updated: 08/20/23 1324    Narrative:      CT ABDOMEN PELVIS WO CONTRAST    Date of Exam: 8/20/2023 11:23 AM EDT    Indication: Retroperitoneal bleed suspected.    Comparison: CT abdomen pelvis 7/3/2023    Technique: Axial CT images were obtained of the abdomen and pelvis without the administration of contrast. Sagittal and coronal reconstructions were performed.  Automated exposure control and iterative reconstruction methods were used.      Findings:  Moderate low-density bilateral pleural effusions increasing from prior exam. Hypodense blood pooling suggestive of anemia. Bibasilar atelectasis. Superimposed tree-in-bud nodular opacities in the right middle lobe and lingula.    Small liver with morphologic changes of chronic underlying liver disease. Gallbladder unremarkable. No biliary  dilation. No signs of active pancreatitis. Spleen grossly unremarkable. No suspicious adrenal nodule.    Urinary bladder partially collapsed around Rosario catheter with presumed instrumentation related intraluminal gas. No hydronephrosis. Symmetric renal size. Negative for nephrolithiasis. Prostate within normal limits in size.    Mild to moderate gastric distention with ingested fluid/debris refluxing into the distal esophagus which appears patulous. No signs of bowel obstruction.    Large volume ascites with apparent peritoneal thickening more conspicuous from prior exams. No organized collection or free air. Negative for retroperitoneal fluid collection.    Aortic atherosclerotic disease without aneurysm. Prominent central mesenteric and retroperitoneal nodes likely reactive and not significantly changed from prior exam.    High riding testicles located in the bilateral inguinal canals. Diffuse body wall edema worsening from prior exam. Degenerative changes in the lumbar spine without acute fracture or aggressive bone lesion.      Impression:      Impression:  1. Negative for retroperitoneal fluid collection.  2. Large volume ascites with peritoneal thickening suspicious for a nonspecific peritonitis. Consider paracentesis.  3. Findings of volume overload/third spacing progressing from prior exam including moderate bilateral pleural effusions, large volume ascites and worsening anasarca.  4. Multifocal tree-in-bud nodular opacities concerning for endobronchial infectious/inflammatory process, possibly early aspiration in the setting of refluxed fluid into a patulous distal esophagus. Consider nasogastric decompression and aspiration risk   precaution.  5. Morphologic changes of chronic liver disease.            Electronically Signed: Marko Dimas MD    8/20/2023 1:21 PM EDT    Workstation ID: [id number]    US Renal Bilateral [[id number]] Collected: 08/19/23 1839     Updated: 08/19/23 1842    Narrative:      US  RENAL BILATERAL    Date of Exam: 8/19/2023 5:05 PM EDT    Indication: ARF/URIEL/CRF/CKD.    Comparison: CT abdomen and pelvis dated 7/3/2023    Technique: Grayscale and color Doppler ultrasound evaluation of the kidneys and urinary bladder was performed.    Findings:  The right kidney measures 10.1 x 5.3 x 5.4 cm. The left kidney measures 9.2 x 4.6 x 6.1 cm. There is mild dilation of the bilateral renal pelves. There is large volume ascites within the abdomen.      Impression:      Impression:  1. Normal-sized kidneys with mild dilation of the bilateral renal pelves.  2. Large volume ascites.        Electronically Signed: Ulices Valadez    8/19/2023 6:40 PM EDT    Workstation ID: [id number]    XR Chest 1 View [[id number]] Collected: 08/19/23 0649     Updated: 08/19/23 0657    Narrative:      XR CHEST 1 VW    Date of Exam: 8/19/2023 6:40 AM EDT    Indication: central line placement    Comparison: July 3, 2023    Findings:  There is a right internal jugular central line with the tip at the cavoatrial junction. The heart size is borderline enlarged. There is mild pulmonary vascular congestion. There are no focal infiltrates to suggest pneumonia.      Impression:      Impression:  Cardiomegaly with mild pulmonary vascular prominence. Right IJ central line is in place with the tip at the cavoatrial junction. There is no pneumothorax.      Electronically Signed: Deven Machado MD    8/19/2023 6:54 AM EDT    Workstation ID: [id number]            Results for orders placed during the hospital encounter of 08/18/23    XR Chest 1 View    Narrative  XR CHEST 1 VW    Date of Exam: 8/19/2023 6:40 AM EDT    Indication: central line placement    Comparison: July 3, 2023    Findings:  There is a right internal jugular central line with the tip at the cavoatrial junction. The heart size is borderline enlarged. There is mild pulmonary vascular congestion. There are no focal infiltrates to suggest  pneumonia.    Impression  Impression:  Cardiomegaly with mild pulmonary vascular prominence. Right IJ central line is in place with the tip at the cavoatrial junction. There is no pneumothorax.      Electronically Signed: Deven Machado MD  8/19/2023 6:54 AM EDT  Workstation ID: [id number]      Results for orders placed during the hospital encounter of 07/03/23    XR Chest 1 View    Narrative  XR CHEST 1 VW    Date of Exam: 7/3/2023 4:13 PM EDT    Indication: AMS    Comparison: AP portable chest 6/20/2023    Findings:  There is some bandlike atelectasis in the right lower lobe, similar to the prior examination. The previously described patchy left basilar airspace disease appears improved. No new airspace disease is identified. The heart size is normal. No pleural  effusion or pneumothorax is identified.    Impression  Impression:  Improving bibasilar atelectasis or pneumonia since 6/28/2023. No new airspace disease is appreciated.      Electronically Signed: Chanda Clark  7/3/2023 4:27 PM EDT  Workstation ID: [id number]      Results for orders placed during the hospital encounter of 06/23/23    XR Chest 1 View    Narrative  XR CHEST 1 VW    Date of Exam: 6/28/2023 11:45 AM EDT    Indication: eval for pneumonia    Comparison: None available.    Findings:  The lung volumes are low. Patchy airspace disease is seen within the lung bases bilaterally. Probable small bilateral pleural effusions are present. The pulmonary vasculature appears unremarkable. No pneumothorax. No acute osseous abnormality identified.    Impression  Impression:  Low lung volumes with patchy airspace disease seen within the lung bases bilaterally, left greater than right with probable small bilateral pleural effusions, likely related to pneumonia and/or atelectasis.      Electronically Signed: Brynn Higuera  6/28/2023 12:00 PM EDT  Workstation ID: [id number]            ASSESSMENT / PLAN      URIEL (acute kidney injury)    GERD (gastroesophageal reflux  disease)    Tobacco use disorder    Alcoholic cirrhosis    Severe malnutrition    Anemia      1.URIEL/CKD ---------. +Recurrent ARF/URIEL on top of known CRF/CKD STG 3A with a baseline serum creatinine of about 1.5. CRF/CKD STG 3A secondary to HTN NS. +ARF/URIEL is secondary to ATN from hypotension and prerenal state/intravascular volume depletion. Avoid hypotension.  No NSAIDs or IV dye. Dose meds for CrCl less than 10 cc/min until ARF/URIEL is resolved     2. HYPERKALEMIA--------Resolved. Secondary to RF and acidosis. Med Rx. Telemetry. K+ restrict diet and follow     3. ETOH CIRRHOSIS/HEPATIC ENCEPHALOPATHY/ASCITES-----Restarted Lactulose. Follow Ammonia. Likely needs Paracentesis. S/P IV Albumin and on Midodrine     4. HYPOMAGNESEMIA--------Follow levels     5. HYPERURICEMIA------Uloric     6. HYPOCALCEMIA-------Replace IV     7. ANEMIA-------H/H stable.       8. HYPOALBUMINEMIA-----Albumin normal. S/P IV Albumin to temporize     9. ELEVATED INR------Related to cirrhosis     10. HYPOTENSION------On Midodrine     11. BIPOLAR DISEASE     12. BASILIO     13. POLYSUBSTANCE ABUSE/TOBACCO USE      14. KETONURIA------Secondary to intravascular volume depletion. IVFs     15. HYPOTENSION-----Levophed, IVFs, IV Albumin, Midodrine     16. ACIDOSIS-------- +Type 4 RTA and from stool losses related Lactulose use (when he actually takes it)     17. N/V/GERD/PUD PROPHYLAXIS------IV PPI     18. DVT PROPHYLAXIS------SCDs      Patient nonoliguric, making more urine  Creatinine still trending up  Acidosis resolved.  Stop IV bicarb  Replace potassium  Monitor renal function fluid status electrolytes       Lam Alexis MD  [facility]  [phone]  08/21/23  12:08 EDT

## 2023-08-21 NOTE — THERAPY EVALUATION
Patient Name: Daquan Xiong  : 1985    MRN: [id number]                              Today's Date: 2023       Admit Date: 2023    Visit Dx:     ICD-10-CM ICD-9-CM   1. Acute kidney injury  N17.9 584.9   2. Hypotension, unspecified hypotension type  I95.9 458.9   3. Alcoholic cirrhosis, unspecified whether ascites present  K70.30 571.2   4. Anemia, unspecified type  D64.9 285.9   5. Alcoholic cirrhosis of liver with ascites  K70.31 571.2   6. Other ascites  R18.8 789.59     Patient Active Problem List   Diagnosis    Bipolar affective disorder    GERD (gastroesophageal reflux disease)    Osteoarthritis    Tobacco use disorder    Polysubstance abuse    Obstructive sleep apnea    URIEL (acute kidney injury)    Alcoholic cirrhosis    Chronic constipation    Severe malnutrition    Hepatic encephalopathy    Essential hypertension    CKD (chronic kidney disease) stage 3, GFR 30-59 ml/min    Moderate malnutrition    Anemia     Past Medical History:   Diagnosis Date    Alcohol abuse     1/2-1 pint a day, sober since 2022    Alcoholic cirrhosis 2022    Arthritis     Bipolar affective disorder 2022    Cholecystitis 2022    Chronic constipation 2017    GERD (gastroesophageal reflux disease) 2022    Doing well with pepcid.    Hypertension     Osteoarthritis 2022    Polysubstance abuse 2022    Severe Malnutrition (HCC) 2023    Sleep apnea     Tobacco use disorder 2022     Past Surgical History:   Procedure Laterality Date    BACK SURGERY      WRIST SURGERY        General Information       Row Name 23 1532          OT Time and Intention    Document Type evaluation  -BL       Row Name 23 1532          General Information    Patient Profile Reviewed yes  -BL     Prior Level of Function independent:;all household mobility;mod assist:  Pt requires assistance with LB ADLs  -BL       Row Name 23 1535          Living Environment    People in Home  spouse  -       Row Name 08/21/23 1532          Home Main Entrance    Number of Stairs, Main Entrance none  -BL       Row Name 08/21/23 1532          Cognition    Orientation Status (Cognition) oriented x 4  -BL       Row Name 08/21/23 1532          Safety Issues, Functional Mobility    Impairments Affecting Function (Mobility) pain;strength;balance  -               User Key  (r) = Recorded By, (t) = Taken By, (c) = Cosigned By      Initials Name Provider Type     Aniyah Barnett OT Occupational Therapist                     Mobility/ADL's       Row Name 08/21/23 1533          Bed Mobility    Bed Mobility supine-sit  -BL     Supine-Sit Oliver (Bed Mobility) minimum assist (75% patient effort)  -       Row Name 08/21/23 1533          Bed-Chair Transfer    Bed-Chair Oliver (Transfers) contact guard  -     Assistive Device (Bed-Chair Transfers) cane, straight  -       Row Name 08/21/23 1533          Sit-Stand Transfer    Sit-Stand Oliver (Transfers) contact guard  -     Assistive Device (Sit-Stand Transfers) cane, straight  -       Row Name 08/21/23 1533          Activities of Daily Living    BADL Assessment/Intervention lower body dressing  -       Row Name 08/21/23 1533          Lower Body Dressing Assessment/Training    Oliver Level (Lower Body Dressing) dependent (less than 25% patient effort);don;socks  -               User Key  (r) = Recorded By, (t) = Taken By, (c) = Cosigned By      Initials Name Provider Type     Aniyah Barnett OT Occupational Therapist                   Obj/Interventions       Row Name 08/21/23 1534          Sensory Assessment (Somatosensory)    Sensory Assessment (Somatosensory) sensation intact  -       Row Name 08/21/23 1534          Vision Assessment/Intervention    Visual Impairment/Limitations WFL  -       Row Name 08/21/23 1534          Range of Motion Comprehensive    General Range of Motion bilateral upper extremity ROM WFL  -        Row Name 08/21/23 1534          Strength Comprehensive (MMT)    Comment, General Manual Muscle Testing (MMT) Assessment BUEs grossly 5/5  -BL       Row Name 08/21/23 1534          Balance    Static Sitting Balance independent  -BL     Static Standing Balance contact guard  -BL     Dynamic Standing Balance contact guard  -BL               User Key  (r) = Recorded By, (t) = Taken By, (c) = Cosigned By      Initials Name Provider Type    BL Aniyah Barnett OT Occupational Therapist                   Goals/Plan       Row Name 08/21/23 1546          Dressing Goal 1 (OT)    Activity/Device (Dressing Goal 1, OT) upper body dressing  -BL     C[place]/Cues Needed (Dressing Goal 1, OT) contact guard required  -BL     Time Frame (Dressing Goal 1, OT) 2 weeks  -BL       Row Name 08/21/23 1546          Toileting Goal 1 (OT)    Activity/Device (Toileting Goal 1, OT) toileting skills, all  -BL     [place]es Level/Cues Needed (Toileting Goal 1, OT) contact guard required  -BL     Time Frame (Toileting Goal 1, OT) 2 weeks  -BL       Row Name 08/21/23 1546          Grooming Goal 1 (OT)    Activity/Device (Grooming Goal 1, OT) grooming skills, all  -BL     C[place]es (Grooming Goal 1, OT) modified independence  -BL     Time Frame (Grooming Goal 1, OT) 2 weeks  -BL       Row Name 08/21/23 1546          Therapy Assessment/Plan (OT)    Planned Therapy Interventions (OT) activity tolerance training;BADL retraining;cognitive/visual perception retraining;functional balance retraining;IADL retraining;occupation/activity based interventions;passive ROM/stretching;ROM/therapeutic exercise;patient/caregiver education/training;strengthening exercise;transfer/mobility retraining;edema control/reduction  -BL               User Key  (r) = Recorded By, (t) = Taken By, (c) = Cosigned By      Initials Name Provider Type    BL Aniyah Barnett OT Occupational Therapist                   Clinical Impression       Row Name 08/21/23 154           Pain Assessment    Pretreatment Pain Rating 0/10 - no pain  -BL     Posttreatment Pain Rating 0/10 - no pain  -BL       Row Name 08/21/23 1544          Pain Scale: FACES Pre/Post-Treatment    Pain: FACES Scale, Pretreatment 2-->hurts little bit  -BL     Posttreatment Pain Rating 2-->hurts little bit  -BL     Pain Location - abdomen  -BL       Row Name 08/21/23 1544          Plan of Care Review    Plan of Care Reviewed With patient  -BL     Outcome Evaluation 38 YO M admitted with URIEL, hx of ETOH cirrhosis, paracentesis yesterday with 4.9 L removed. Pt reports living at home with spouse, and states his mother can sit with him while spouse is at work. Pt typically requires assistance from spouse for ADLs, is independent with ambulation with SPC and 1 recent fall. At time of evaluation, pt A&OX4. Pt required min A for bed mobility and CGA for STS and functional mobility with cane. Pt with increased weakness and fatigue. OT recommending home with HHOT at d/c. OT will follow.  -       Row Name 08/21/23 1544          Therapy Assessment/Plan (OT)    Criteria for Skilled Therapeutic Interventions Met (OT) yes;skilled treatment is necessary  -     Therapy Frequency (OT) 3 times/wk  -       Row Name 08/21/23 1544          Therapy Plan Review/Discharge Plan (OT)    Anticipated Discharge Disposition (OT) home with home health  -       Row Name 08/21/23 1546          Positioning and Restraints    Pre-Treatment Position in bed  -BL     Post Treatment Position chair  -BL     In Chair notified nsg;call light within reach;encouraged to call for assist  -BL               User Key  (r) = Recorded By, (t) = Taken By, (c) = Cosigned By      Initials Name Provider Type    BL Aniyah Barnett, OT Occupational Therapist                   Outcome Measures       Row Name 08/21/23 3462          How much help from another person do you currently need...    Turning from your back to your side while in flat bed without using  bedrails? 3  -EL     Moving from lying on back to sitting on the side of a flat bed without bedrails? 3  -EL     Moving to and from a bed to a chair (including a wheelchair)? 3  -EL     Standing up from a chair using your arms (e.g., wheelchair, bedside chair)? 3  -EL     Climbing 3-5 steps with a railing? 2  -EL     To walk in hospital room? 3  -EL     AM-PAC 6 Clicks Score (PT) 17  -EL     Highest level of mobility 5 --> Static standing  -EL       Row Name 08/21/23 1542          Functional Assessment    Outcome Measure Options AM-PAC 6 Clicks Basic Mobility (PT)  -EL               User Key  (r) = Recorded By, (t) = Taken By, (c) = Cosigned By      Initials Name Provider Type    Bob Serna PT Physical Therapist                    Occupational Therapy Education       Title: PT OT SLP Therapies (In Progress)       Topic: Occupational Therapy (Done)       Point: ADL training (Done)       Description:   Instruct learner(s) on proper safety adaptation and remediation techniques during self care or transfers.   Instruct in proper use of assistive devices.                  Learning Progress Summary             Patient Acceptance, E,TB, VU by BL at 8/21/2023 1547                         Point: Home exercise program (Done)       Description:   Instruct learner(s) on appropriate technique for monitoring, assisting and/or progressing therapeutic exercises/activities.                  Learning Progress Summary             Patient Acceptance, E,TB, VU by BL at 8/21/2023 1547                         Point: Precautions (Done)       Description:   Instruct learner(s) on prescribed precautions during self-care and functional transfers.                  Learning Progress Summary             Patient Acceptance, E,TB, VU by BL at 8/21/2023 1547                         Point: Body mechanics (Done)       Description:   Instruct learner(s) on proper positioning and spine alignment during self-care, functional mobility activities  and/or exercises.                  Learning Progress Summary             Patient Acceptance, E,TB, VU by  at 8/21/2023 1547                                         User Key       Initials Effective Dates Name Provider Type Discipline     09/22/22 -  Aniyah Barnett OT Occupational Therapist OT                  OT Recommendation and Plan  Planned Therapy Interventions (OT): activity tolerance training, BADL retraining, cognitive/visual perception retraining, functional balance retraining, IADL retraining, occupation/activity based interventions, passive ROM/stretching, ROM/therapeutic exercise, patient/caregiver education/training, strengthening exercise, transfer/mobility retraining, edema control/reduction  Therapy Frequency (OT): 3 times/wk  Plan of Care Review  Plan of Care Reviewed With: patient  Outcome Evaluation: 36 YO M admitted with URIEL, hx of ETOH cirrhosis, paracentesis yesterday with 4.9 L removed. Pt reports living at home with spouse, and states his mother can sit with him while spouse is at work. Pt typically requires assistance from spouse for ADLs, is independent with ambulation with SPC and 1 recent fall. At time of evaluation, pt A&OX4. Pt required min A for bed mobility and CGA for STS and functional mobility with cane. Pt with increased weakness and fatigue. OT recommending home with HHOT at d/c. OT will follow.     Time Calculation:         Time Calculation- OT       Row Name 08/21/23 1547             Time Calculation- OT    OT Start Time 0943  -      OT Stop Time 1020  -      OT Time Calculation (min) 37 min  -BL      Total Timed Code Minutes- OT --  -BL      OT Received On 08/21/23  -      OT - Next Appointment 08/23/23  -      OT Goal Re-Cert Due Date 09/04/23  -                User Key  (r) = Recorded By, (t) = Taken By, (c) = Cosigned By      Initials Name Provider Type     Aniyah Barnett OT Occupational Therapist                  Therapy Charges for Today       Code  Description Service Date Service Provider Modifiers Qty    [id number] HC OT EVAL MOD COMPLEXITY 4 8/21/2023 Aniyah Barnett OT GO 1                 Aniyah Barnett OT  8/21/2023

## 2023-08-21 NOTE — ANESTHESIA PREPROCEDURE EVALUATION
Anesthesia Evaluation     NPO Solid Status: > 8 hours  NPO Liquid Status: > 8 hours           Airway   Mallampati: I  TM distance: >3 FB  Neck ROM: full  No difficulty expected  Dental - normal exam     Pulmonary - normal exam   (+) ,sleep apnea  Cardiovascular - normal exam    (+) hypertension    ROS comment: Sepsis  low dose levophed    Neuro/Psych  (+) psychiatric history Bipolar  GI/Hepatic/Renal/Endo    (+) GERD, liver disease cirrhosis, renal disease    ROS Comment: Chronic acites. 5L removed 8/21/23    Musculoskeletal     Abdominal  - normal exam    Bowel sounds: normal.   Substance History   (+) alcohol use     OB/GYN          Other   arthritis,                     Anesthesia Plan    ASA 4     MAC   total IV anesthesia  intravenous induction     Anesthetic plan, risks, benefits, and alternatives have been provided, discussed and informed consent has been obtained with: patient.  Pre-procedure education provided  Plan discussed with CRNA.    CODE STATUS:    Code Status (Patient has no pulse and is not breathing): CPR (Attempt to Resuscitate)  Medical Interventions (Patient has pulse or is breathing): Full Support

## 2023-08-21 NOTE — CONSULTS
Nutrition Services    Patient Name: Daquan Xiong  YOB: 1985  MRN: [id number]  Admission date: 8/18/2023    Comment:  -- MD desires to hold off on tube feeding at this time.  RD will continue to monitor for ability to feed.      -- Severe chronic disease related to malnutrition related to past medical history including CKD, alcoholic cirrhosis as evidenced by 37% weight loss x 11 months, likely PO intakes less than 75% x 1 month, severe fat/muscle loss per physical exam and severe fluid accumulation.  See MSA below.      PPE Documentation        PPE Worn By Provider gloves   PPE Worn By Patient  None      CLINICAL NUTRITION ASSESSMENT      Reason for Assessment 8/21: Consult for tube feeding, consult for Malnutrition Severity Assessment      H&P      Past Medical History:   Diagnosis Date    Alcohol abuse     1/2-1 pint a day, sober since 9/12/2022    Alcoholic cirrhosis 09/01/2022    Arthritis     Bipolar affective disorder 09/22/2022    Cholecystitis 11/08/2022    Chronic constipation 09/05/2017    GERD (gastroesophageal reflux disease) 09/22/2022    Doing well with pepcid.    Hypertension     Osteoarthritis 09/22/2022    Polysubstance abuse 09/22/2022    Severe Malnutrition (HCC) 06/27/2023    Sleep apnea     Tobacco use disorder 09/22/2022       Past Surgical History:   Procedure Laterality Date    BACK SURGERY      WRIST SURGERY          Current Problems   URIEL  - Nephrology following    Alcohol cirrhosis  - GI following  - paracentesis 8/20 with ~4.9L fluid removal    Hyperkalemia  - resolved    GERD    Tobacco abuse       Encounter Information        Trending Narrative     8/21: Admitted for nausea and vomiting.  RD visited patient at bedside.  Patient asleep in recliner.  Noted patient previously worked with PT/OT, likely tired.  NFPE completed, consistent with nutrition diagnosis of malnutrition using AND/ASPEN criteria. See MSA below.        Anthropometrics        Current Height, Weight  "Height: 185.4 cm (73\")  Weight: 76.1 kg (167 lb 12.3 oz) (08/21/23 0600)       Ideal Body Weight (IBW) 184#   Usual Body Weight (UBW) Unable to obtain from patient        Trending Weight Hx     This admission: 8/21: 167#             PTA: 37% weight loss x 11 months - significant     Wt Readings from Last 30 Encounters:   08/21/23 0600 76.1 kg (167 lb 12.3 oz)   08/18/23 2025 72.6 kg (160 lb)   07/07/23 0508 89.6 kg (197 lb 8.5 oz)   07/06/23 0551 89.7 kg (197 lb 12 oz)   07/04/23 0600 89.6 kg (197 lb 8.5 oz)   07/03/23 2100 88.7 kg (195 lb 8.8 oz)   07/03/23 1449 92.8 kg (204 lb 9.4 oz)   07/03/23 1443 83.1 kg (183 lb 4.8 oz)   06/28/23 0500 83.1 kg (183 lb 3.2 oz)   06/27/23 0500 81.2 kg (179 lb 0.2 oz)   06/24/23 1716 78.5 kg (173 lb 1 oz)   06/24/23 0510 79.5 kg (175 lb 4.3 oz)   06/23/23 0855 75.3 kg (166 lb)   06/05/23 1916 75.5 kg (166 lb 7.2 oz)   06/05/23 1500 74.3 kg (163 lb 12.8 oz)   09/23/22 0549 120 kg (263 lb 12.8 oz)   09/22/22 0559 119 kg (263 lb)   09/20/22 2047 118 kg (259 lb 12.8 oz)   09/20/22 1331 120 kg (265 lb 6.9 oz)      BMI kg/m2 Body mass index is 22.13 kg/mý.       Labs        Pertinent Labs    Results from last 7 days   Lab Units 08/21/23  0452 08/20/23  1234 08/20/23  0602 08/19/23  0938 08/19/23  0233   SODIUM mmol/L 140 139 136   < > 127*   POTASSIUM mmol/L 3.0* 3.6 3.6   < > 5.1   CHLORIDE mmol/L 94* 96* 95*   < > 95*   CO2 mmol/L 32.0* 26.0 26.0   < > 20.0*   BUN mg/dL 40* 47* 52*   < > 69*   CREATININE mg/dL 2.41* 2.71* 3.03*   < > 4.04*   CALCIUM mg/dL 9.9 9.8 9.6   < > 8.6   BILIRUBIN mg/dL 4.7*  --  4.2*  --  4.7*   ALK PHOS U/L 116  --  152*  --  245*   ALT (SGPT) U/L 15  --  18  --  28   AST (SGOT) U/L 44*  --  56*  --  84*   GLUCOSE mg/dL 145* 149* 170*   < > 143*    < > = values in this interval not displayed.     Results from last 7 days   Lab Units 08/21/23  0452 08/20/23  1234 08/20/23  0602 08/18/23  2315 08/18/23  2202   MAGNESIUM mg/dL 1.9  --  2.1  --  2.5 " "  PHOSPHORUS mg/dL 3.2  --   --   --   --    HEMOGLOBIN g/dL 7.7*   < > 7.0*   < >  --    HEMATOCRIT % 22.8*   < > 20.9*   < >  --     < > = values in this interval not displayed.     No results found for: COVID19  No results found for: HGBA1C     Medications    Scheduled Medications acetylcysteine, 3 mL, Nebulization, Q12H - RT  albuterol, 2.5 mg, Nebulization, Q12H - RT  cefTRIAXone, 2,000 mg, Intravenous, Q24H  febuxostat, 40 mg, Oral, Daily  lactulose, 10 g, Oral, TID  midodrine, 20 mg, Oral, Q8H  oxyCODONE, 5 mg, Oral, Once  pantoprazole, 40 mg, Intravenous, Q12H  riFAXIMin, 550 mg, Oral, BID  sodium chloride, 10 mL, Intravenous, Q12H  vitamin K1, 10 mg, Oral, Once        Infusions norepinephrine, 0.02-0.3 mcg/kg/min, Last Rate: 0.06 mcg/kg/min (08/21/23 0636)  octreotide (SandoSTATIN) infusion, 25 mcg/hr, Last Rate: 25 mcg/hr (08/21/23 0451)  sodium bicarbonate drip (greater than 75 mEq/bag), 150 mEq, Last Rate: 150 mEq (08/21/23 0113)        PRN Medications   ALPRAZolam    calcium carbonate    melatonin    nitroglycerin    ondansetron **OR** ondansetron    oxyCODONE    [COMPLETED] Insert Peripheral IV **AND** sodium chloride    sodium chloride    sodium chloride     Physical Findings        Trending Physical   Appearance, NFPE 8/21: NFPE completed, consistent with nutrition diagnosis of malnutrition using AND/ASPEN criteria. See MSA below.      --  Edema  3+ abdomen, legs, ankles, feet      Bowel Function No BM since admission (x 2 days ago)     Tubes No feeding tube      Chewing/Swallowing No known issues      Skin Intact        Estimated/Assessed Needs    Estimated 8/21/23   Energy Requirements    Height for Calculation  Height: 185.4 cm (73\")   Weight for Calculation 76.1 kg CBW   Method for Estimation  20-30 kcal/kg   EST Needs (kcal/day) 7646-1688 kcal/day       Protein Requirements    Weight for Calculation 76.1 kg CBW   EST Protein Needs (g/kg) 0.8-1.0 g/kg (URIEL with no HD)   EST Daily Needs (g/day) " 61-76 g/day        Fluid Requirements     Estimated Needs (mL/day) 500 mL + total UOP       Fluid Deficit    Current Na Level (mEq/L)    Desired Na Level (mEq/L)    Estimated Fluid Deficit (L)       Current Nutrition Orders & Evaluation of Intake       Oral Nutrition     Food Allergies NKFA   Current PO Diet NPO Diet NPO Type: Strict NPO   Supplement None ordered    PO Evaluation     Trending % PO Intake 8/21: 0% since admission x 4 documented meals      Enteral Nutrition    Enteral Route    TF Modular    TF Delivery Method    Current TF Order    Current Water Flush     TF Residual/Tolerance     TF Observation         Parenteral Nutrition     TPN Route    Current TPN Order    Lipids (mL/%/frequency)     MVI Frequency     Trace Element Frequency     Total # Days on TPN    TPN Observation         Nutrition Course Details    PO Diets: Low Phos, Low Potassium 8/19  Full liquids 8/20 to current 8/21   Nutrition Support: Plans to begin tube feeding today      Nutritional Risk Screening        NRS-2002 Score          Nutrition Diagnosis         Nutrition Dx Problem 1 Severe chronic disease related to malnutrition related to past medical history including CKD, alcoholic cirrhosis as evidenced by 37% weight loss x 11 months, likely PO intakes less than 75% x 1 month, severe fat/muscle loss per physical exam and severe fluid accumulation.        Nutrition Dx Problem 2        Intervention Goal         Intervention Goal(s) Begin nutrition when able      Nutrition Intervention        RD Action Monitor for ability to feed  Completed NFPE     Nutrition Prescription          Diet Prescription NPO   Supplement Prescription      Enteral Prescription Initial Goal:  *initial goal conservative d/t risk of RFS     Isosource HN at 20mL/hr + 20mL/hr water flush      End Goal:    Isosource HN at 65 mL/hr + water flush per clinical picture      Calories  1716 kcals (in range)    Protein  77 g (101% upper end)    Free water  1172 mL     Flushes  Will monitor hydration status      The above end goal rate is for 22 hrs/day to assume interruptions for ADLs. Water flushes adjusted based on clinical picture + Rx flushes/IV fluids          TPN Prescription      Monitor/Evaluation        Monitor Per protocol, I&O, PO intake, Supplement intake, Pertinent labs, EN delivery/tolerance, Weight, Skin status, GI status, Symptoms     Malnutrition Severity Assessment      Patient meets criteria for : Severe Malnutrition  Malnutrition Type (last 8 hours)       Malnutrition Severity Assessment       Row Name 08/21/23 1203       Malnutrition Severity Assessment    Malnutrition Type Chronic Disease - Related Malnutrition      Row Name 08/21/23 1203       Insufficient Energy Intake     Insufficient Energy Intake Findings Severe    Insufficient Energy Intake  <75% of est. energy requirement for > or equal to 1 month      Row Name 08/21/23 1203       Unintentional Weight Loss     Unintentional Weight Loss Findings Severe    Unintentional Weight Loss  Weight loss greater than 20% in one year      Row Name 08/21/23 1203       Muscle Loss    Loss of Muscle Mass Findings Severe    [place] Region Severe - deep hollowing/scooping, lack of muscle to touch, facial bones well defined    Clavicle Bone Region Severe - protruding prominent bone    Acromion Bone Region Severe - squared shoulders, bones, and acromion process protrusion prominent    Scapular Bone Region --  Supine    Dorsal Hand Region Severe - prominent depression    Patellar Region --  edema    Anterior Thigh Region --  edema    Posterior Calf Region --  edema      Row Name 08/21/23 1203       Fat Loss    Subcutaneous Fat Loss Findings Severe    Orbital Region  Severe - pronounced hollowness/depression, dark circles, loose saggy skin    Upper Arm Region Severe - mostly skin, very little space between folds, fingers touch    Thoracic & Lumbar Region --  JORGE      Row Name 08/21/23 1203       Fluid Accumulation (Edema)     Fluid Acumulation Findings Severe    Fluid Accumulation  Severe equals 3+ or 4+ pitting edema      Row Name 08/21/23 1203       Criteria Met (Must meet criteria for severity in at least 2 of these categories: M Wasting, Fat Loss, Fluid, Secondary Signs, Wt. Status, Intake)    Patient meets criteria for  Severe Malnutrition                     Electronically signed by:  Ayse Burgos RD  08/21/23 07:50 EDT

## 2023-08-21 NOTE — PLAN OF CARE
Goal Outcome Evaluation:                   Eduardo improving. FC removed today and has since voided. Had multiple Bms today without blood present. Intake is very poor. EGD done today showing esophagitis, portal HTN gastropathy. Bicarb and octreotide gtts stopped. Levophed still infusing.

## 2023-08-21 NOTE — PLAN OF CARE
Goal Outcome Evaluation:  Plan of Care Reviewed With: patient           Outcome Evaluation: 38 YO M admitted with URIEL, hx of ETOH cirrhosis, paracentesis yesterday with 4.9 L removed. Pt reports living at home with spouse, and states his mother can sit with him while spouse is at work. Pt typically requires assistance from spouse for ADLs, is independent with ambulation with SPC and 1 recent fall. At time of evaluation, pt A&OX4. Pt required min A for bed mobility and CGA for STS and functional mobility with cane. Pt with increased weakness and fatigue. OT recommending home with HHOT at d/c. OT will follow.      Anticipated Discharge Disposition (OT): home with home health

## 2023-08-21 NOTE — ANESTHESIA POSTPROCEDURE EVALUATION
Patient: Daquan Xiong    Procedure Summary       Date: 08/21/23 Room / Location: [facility] ENDOSCOPY 1 / [facility] ENDOSCOPY    Anesthesia Start: 1358 Anesthesia Stop: 1419    Procedure: ESOPHAGOGASTRODUODENOSCOPY Diagnosis:       Alcoholic cirrhosis, unspecified whether ascites present      Anemia, unspecified type      (Alcoholic cirrhosis, unspecified whether ascites present [K70.30])      (Anemia, unspecified type [D64.9])    Surgeons: Juan Padilla MD Provider: Dev Mccoy MD    Anesthesia Type: MAC ASA Status: 4            Anesthesia Type: MAC    Vitals  Vitals Value Taken Time   BP 96/51 08/21/23 1421   Temp     Pulse 60 08/21/23 1423   Resp 12 08/21/23 1417   SpO2 96 % 08/21/23 1423   Vitals shown include unvalidated device data.        Post Anesthesia Care and Evaluation    Patient location during evaluation: PACU  Patient participation: complete - patient participated  Level of consciousness: awake  Pain scale: See nurse's notes for pain score.  Pain management: adequate    Airway patency: patent  Anesthetic complications: No anesthetic complications  PONV Status: none  Cardiovascular status: acceptable  Respiratory status: acceptable and spontaneous ventilation  Hydration status: acceptable    Comments: Patient seen and examined postoperatively; vital signs stable; SpO2 greater than or equal to 90%; cardiopulmonary status stable; nausea/vomiting adequately controlled; pain adequately controlled; no apparent anesthesia complications; patient discharged from anesthesia care when discharge criteria were met

## 2023-08-21 NOTE — OP NOTE
ESOPHAGOGASTRODUODENOSCOPY Procedure Report    Patient Name:  Daquan Xiong  YOB: 1985    Date of Surgery:  8/21/2023     Preoperative diagnosis:  Acute on chronic blood loss anemia  Portal hypertension    Postoperative diagnosis:  Grade D erosive esophagitis  Small F1 esophageal varices without bleeding  Moderately severe portal hypertensive gastropathy  Retained undigested food in the stomach consistent with delayed gastric emptying  Normal duodenum        Procedure(s):  ESOPHAGOGASTRODUODENOSCOPY diagnostic    Staff:  Surgeon(s):  Juan Padilla MD      Anesthesia: Monitored Anesthesia Care    Implants:    Nothing was implanted during the procedure    Specimen:        See Below    Estimated blood loss: Minimal     Complications:  None    Description of Procedure:  Informed consent was obtained for the procedure, including sedation.  Risks of perforation, hemorrhage, adverse drug reaction and aspiration were discussed.  The patient was brought into the endoscopy suite. Continuous cardiopulmonary monitoring was performed. The patient was placed in the left lateral decubitus position.  The bite block was inserted into the patient's mouth. After adequate sedation was attained, the Olympus gastroscope was inserted into the patient's mouth and advanced to the second portion of the duodenum without difficulty.  Circumferential examination was performed. A retroflex exam was performed in the patient's stomach.  On completion of the exam, the bowel was decompressed, the scope was removed from the patient, the patient tolerated the procedure well, there were no immediate post-operative complications.     Examination of the esophagus: Grade D erosive esophagitis with small nonbleeding varices underlying.  No active bleeding identified.  Examination of the stomach: Moderately severe portal hypertensive gastropathy in the stomach body and fundus.  No evidence of gastric varices.  Large amount of  retained undigested food in the stomach consistent with delayed gastric emptying limiting evaluation of the greater curvature in the stomach body and fundus.  No evidence of gastric outlet obstruction.  Examination of the duodenum: Normal to second portion of duodenum    Impression:  Grade D erosive esophagitis  Small F1 esophageal varices without bleeding  Moderately severe portal hypertensive gastropathy  Retained undigested food in the stomach consistent with delayed gastric emptying  Normal duodenum    Recommendations:  Discontinue octreotide drip and switch to subcutaneous 100 mg every 8 hours for renal perfusion  Continue pantoprazole 40 mg IV every 12 hours and add metoclopramide 10 mg IV every 6 hours  Advance diet as tolerated to 2 g sodium  Monitor hemoglobin and transfuse if less than 7.0  Hold off on nonselective beta-blocker for portal hypertension due to decompensated cirrhosis  We will follow along      We appreciate the referral    Electronically signed by Juan Padilla MD, 08/21/23, 2:23 PM EDT.

## 2023-08-22 ENCOUNTER — INPATIENT HOSPITAL (OUTPATIENT)
Dept: URBAN - METROPOLITAN AREA HOSPITAL 84 | Facility: HOSPITAL | Age: 38
End: 2023-08-22
Payer: COMMERCIAL

## 2023-08-22 DIAGNOSIS — K76.82 HEPATIC ENCEPHALOPATHY: ICD-10-CM

## 2023-08-22 DIAGNOSIS — K70.31 ALCOHOLIC CIRRHOSIS OF LIVER WITH ASCITES: ICD-10-CM

## 2023-08-22 DIAGNOSIS — R14.2 ERUCTATION: ICD-10-CM

## 2023-08-22 DIAGNOSIS — R94.5 ABNORMAL RESULTS OF LIVER FUNCTION STUDIES: ICD-10-CM

## 2023-08-22 DIAGNOSIS — I85.00 ESOPHAGEAL VARICES WITHOUT BLEEDING: ICD-10-CM

## 2023-08-22 DIAGNOSIS — D53.9 NUTRITIONAL ANEMIA, UNSPECIFIED: ICD-10-CM

## 2023-08-22 DIAGNOSIS — K21.9 GASTRO-ESOPHAGEAL REFLUX DISEASE WITHOUT ESOPHAGITIS: ICD-10-CM

## 2023-08-22 LAB
ALBUMIN SERPL-MCNC: 4.7 G/DL (ref 3.5–5.2)
ALBUMIN/GLOB SERPL: 4.7 G/DL
ALP SERPL-CCNC: 103 U/L (ref 39–117)
ALT SERPL W P-5'-P-CCNC: 13 U/L (ref 1–41)
AMMONIA BLD-SCNC: 54 UMOL/L (ref 16–60)
ANION GAP SERPL CALCULATED.3IONS-SCNC: 17 MMOL/L (ref 5–15)
AST SERPL-CCNC: 54 U/L (ref 1–40)
BASOPHILS # BLD AUTO: 0 10*3/MM3 (ref 0–0.2)
BASOPHILS NFR BLD AUTO: 0.1 % (ref 0–1.5)
BILIRUB SERPL-MCNC: 4.7 MG/DL (ref 0–1.2)
BUN SERPL-MCNC: 35 MG/DL (ref 6–20)
BUN/CREAT SERPL: 16.4 (ref 7–25)
CALCIUM SPEC-SCNC: 9.8 MG/DL (ref 8.6–10.5)
CHLORIDE SERPL-SCNC: 94 MMOL/L (ref 98–107)
CO2 SERPL-SCNC: 31 MMOL/L (ref 22–29)
CREAT SERPL-MCNC: 2.14 MG/DL (ref 0.76–1.27)
DEPRECATED RDW RBC AUTO: 58.6 FL (ref 37–54)
EGFRCR SERPLBLD CKD-EPI 2021: 39.7 ML/MIN/1.73
EOSINOPHIL # BLD AUTO: 0 10*3/MM3 (ref 0–0.4)
EOSINOPHIL NFR BLD AUTO: 0 % (ref 0.3–6.2)
ERYTHROCYTE [DISTWIDTH] IN BLOOD BY AUTOMATED COUNT: 16.7 % (ref 12.3–15.4)
FIBRINOGEN PPP-MCNC: 88 MG/DL (ref 210–450)
GLOBULIN UR ELPH-MCNC: 1 GM/DL
GLUCOSE SERPL-MCNC: 138 MG/DL (ref 65–99)
HCT VFR BLD AUTO: 23.3 % (ref 37.5–51)
HGB BLD-MCNC: 7.8 G/DL (ref 13–17.7)
INR PPP: 1.79 (ref 0.93–1.1)
LYMPHOCYTES # BLD AUTO: 0.7 10*3/MM3 (ref 0.7–3.1)
LYMPHOCYTES NFR BLD AUTO: 13.2 % (ref 19.6–45.3)
MAGNESIUM SERPL-MCNC: 1.8 MG/DL (ref 1.6–2.6)
MCH RBC QN AUTO: 34.1 PG (ref 26.6–33)
MCHC RBC AUTO-ENTMCNC: 33.4 G/DL (ref 31.5–35.7)
MCV RBC AUTO: 102 FL (ref 79–97)
MONOCYTES # BLD AUTO: 0.7 10*3/MM3 (ref 0.1–0.9)
MONOCYTES NFR BLD AUTO: 13 % (ref 5–12)
NEUTROPHILS NFR BLD AUTO: 3.8 10*3/MM3 (ref 1.7–7)
NEUTROPHILS NFR BLD AUTO: 73.7 % (ref 42.7–76)
NRBC BLD AUTO-RTO: 0 /100 WBC (ref 0–0.2)
PHOSPHATE SERPL-MCNC: 3.1 MG/DL (ref 2.5–4.5)
PLATELET # BLD AUTO: 59 10*3/MM3 (ref 140–450)
PMV BLD AUTO: 9.7 FL (ref 6–12)
POTASSIUM SERPL-SCNC: 3.2 MMOL/L (ref 3.5–5.2)
POTASSIUM SERPL-SCNC: 3.7 MMOL/L (ref 3.5–5.2)
PROT SERPL-MCNC: 5.7 G/DL (ref 6–8.5)
PROTHROMBIN TIME: 18.5 SECONDS (ref 9.6–11.7)
RBC # BLD AUTO: 2.29 10*6/MM3 (ref 4.14–5.8)
SODIUM SERPL-SCNC: 142 MMOL/L (ref 136–145)
WBC NRBC COR # BLD: 5.1 10*3/MM3 (ref 3.4–10.8)

## 2023-08-22 PROCEDURE — 83735 ASSAY OF MAGNESIUM: CPT | Performed by: INTERNAL MEDICINE

## 2023-08-22 PROCEDURE — 25010000002 METOCLOPRAMIDE PER 10 MG: Performed by: INTERNAL MEDICINE

## 2023-08-22 PROCEDURE — 25010000002 HYDROCORTISONE SOD SUC (PF) 100 MG RECONSTITUTED SOLUTION: Performed by: INTERNAL MEDICINE

## 2023-08-22 PROCEDURE — 80053 COMPREHEN METABOLIC PANEL: CPT | Performed by: INTERNAL MEDICINE

## 2023-08-22 PROCEDURE — 0 PHYTONADIONE 10 MG/ML SOLUTION: Performed by: INTERNAL MEDICINE

## 2023-08-22 PROCEDURE — 36430 TRANSFUSION BLD/BLD COMPNT: CPT

## 2023-08-22 PROCEDURE — 25010000002 CEFTRIAXONE PER 250 MG: Performed by: INTERNAL MEDICINE

## 2023-08-22 PROCEDURE — 85025 COMPLETE CBC W/AUTO DIFF WBC: CPT | Performed by: PHYSICIAN ASSISTANT

## 2023-08-22 PROCEDURE — 85384 FIBRINOGEN ACTIVITY: CPT | Performed by: INTERNAL MEDICINE

## 2023-08-22 PROCEDURE — 86927 PLASMA FRESH FROZEN: CPT

## 2023-08-22 PROCEDURE — 25010000002 OCTREOTIDE PER 25 MCG: Performed by: INTERNAL MEDICINE

## 2023-08-22 PROCEDURE — P9012 CRYOPRECIPITATE EACH UNIT: HCPCS

## 2023-08-22 PROCEDURE — 82140 ASSAY OF AMMONIA: CPT | Performed by: INTERNAL MEDICINE

## 2023-08-22 PROCEDURE — 84132 ASSAY OF SERUM POTASSIUM: CPT | Performed by: HOSPITALIST

## 2023-08-22 PROCEDURE — 84100 ASSAY OF PHOSPHORUS: CPT | Performed by: INTERNAL MEDICINE

## 2023-08-22 PROCEDURE — 99232 SBSQ HOSP IP/OBS MODERATE 35: CPT | Performed by: NURSE PRACTITIONER

## 2023-08-22 PROCEDURE — 85610 PROTHROMBIN TIME: CPT | Performed by: INTERNAL MEDICINE

## 2023-08-22 RX ORDER — PHYTONADIONE 2 MG/ML
10 INJECTION, EMULSION INTRAMUSCULAR; INTRAVENOUS; SUBCUTANEOUS ONCE
Status: COMPLETED | OUTPATIENT
Start: 2023-08-22 | End: 2023-08-22

## 2023-08-22 RX ORDER — SUCRALFATE 1 G/1
1 TABLET ORAL
Status: DISCONTINUED | OUTPATIENT
Start: 2023-08-22 | End: 2023-08-24 | Stop reason: HOSPADM

## 2023-08-22 RX ORDER — POTASSIUM CHLORIDE 20 MEQ/1
40 TABLET, EXTENDED RELEASE ORAL ONCE
Status: COMPLETED | OUTPATIENT
Start: 2023-08-22 | End: 2023-08-22

## 2023-08-22 RX ADMIN — METOCLOPRAMIDE 10 MG: 5 INJECTION, SOLUTION INTRAMUSCULAR; INTRAVENOUS at 09:50

## 2023-08-22 RX ADMIN — Medication 10 ML: at 08:29

## 2023-08-22 RX ADMIN — METOCLOPRAMIDE 10 MG: 5 INJECTION, SOLUTION INTRAMUSCULAR; INTRAVENOUS at 06:17

## 2023-08-22 RX ADMIN — PANTOPRAZOLE SODIUM 40 MG: 40 INJECTION, POWDER, LYOPHILIZED, FOR SOLUTION INTRAVENOUS at 08:29

## 2023-08-22 RX ADMIN — MIDODRINE HYDROCHLORIDE 20 MG: 5 TABLET ORAL at 21:18

## 2023-08-22 RX ADMIN — OCTREOTIDE ACETATE 100 MCG: 100 INJECTION, SOLUTION INTRAVENOUS; SUBCUTANEOUS at 15:00

## 2023-08-22 RX ADMIN — OXYCODONE HYDROCHLORIDE 5 MG: 5 TABLET ORAL at 12:28

## 2023-08-22 RX ADMIN — HYDROCORTISONE SODIUM SUCCINATE 50 MG: 100 INJECTION, POWDER, FOR SOLUTION INTRAMUSCULAR; INTRAVENOUS at 06:17

## 2023-08-22 RX ADMIN — CEFTRIAXONE 2000 MG: 2 INJECTION, POWDER, FOR SOLUTION INTRAMUSCULAR; INTRAVENOUS at 09:51

## 2023-08-22 RX ADMIN — LACTULOSE 20 G: 20 SOLUTION ORAL at 15:00

## 2023-08-22 RX ADMIN — POTASSIUM CHLORIDE 40 MEQ: 1500 TABLET, EXTENDED RELEASE ORAL at 06:18

## 2023-08-22 RX ADMIN — FEBUXOSTAT 40 MG: 40 TABLET, FILM COATED ORAL at 08:29

## 2023-08-22 RX ADMIN — HYDROCORTISONE SODIUM SUCCINATE 50 MG: 100 INJECTION, POWDER, FOR SOLUTION INTRAMUSCULAR; INTRAVENOUS at 09:50

## 2023-08-22 RX ADMIN — OCTREOTIDE ACETATE 100 MCG: 100 INJECTION, SOLUTION INTRAVENOUS; SUBCUTANEOUS at 08:29

## 2023-08-22 RX ADMIN — OXYCODONE HYDROCHLORIDE 5 MG: 5 TABLET ORAL at 21:19

## 2023-08-22 RX ADMIN — PANTOPRAZOLE SODIUM 40 MG: 40 INJECTION, POWDER, LYOPHILIZED, FOR SOLUTION INTRAVENOUS at 21:18

## 2023-08-22 RX ADMIN — POTASSIUM CHLORIDE 40 MEQ: 1500 TABLET, EXTENDED RELEASE ORAL at 00:14

## 2023-08-22 RX ADMIN — PHYTONADIONE 10 MG: 10 INJECTION, EMULSION INTRAMUSCULAR; INTRAVENOUS; SUBCUTANEOUS at 12:25

## 2023-08-22 RX ADMIN — LACTULOSE 20 G: 20 SOLUTION ORAL at 21:18

## 2023-08-22 RX ADMIN — RIFAXIMIN 550 MG: 550 TABLET ORAL at 21:18

## 2023-08-22 RX ADMIN — HYDROCORTISONE SODIUM SUCCINATE 50 MG: 100 INJECTION, POWDER, FOR SOLUTION INTRAMUSCULAR; INTRAVENOUS at 15:00

## 2023-08-22 RX ADMIN — Medication 10 ML: at 21:20

## 2023-08-22 RX ADMIN — RIFAXIMIN 550 MG: 550 TABLET ORAL at 08:29

## 2023-08-22 RX ADMIN — METOCLOPRAMIDE 10 MG: 5 INJECTION, SOLUTION INTRAMUSCULAR; INTRAVENOUS at 15:00

## 2023-08-22 RX ADMIN — MIDODRINE HYDROCHLORIDE 20 MG: 5 TABLET ORAL at 06:17

## 2023-08-22 RX ADMIN — MIDODRINE HYDROCHLORIDE 20 MG: 5 TABLET ORAL at 13:12

## 2023-08-22 RX ADMIN — HYDROCORTISONE SODIUM SUCCINATE 50 MG: 100 INJECTION, POWDER, FOR SOLUTION INTRAMUSCULAR; INTRAVENOUS at 21:18

## 2023-08-22 RX ADMIN — OCTREOTIDE ACETATE 100 MCG: 100 INJECTION, SOLUTION INTRAVENOUS; SUBCUTANEOUS at 21:18

## 2023-08-22 RX ADMIN — OXYCODONE HYDROCHLORIDE 5 MG: 5 TABLET ORAL at 06:20

## 2023-08-22 RX ADMIN — METOCLOPRAMIDE 10 MG: 5 INJECTION, SOLUTION INTRAMUSCULAR; INTRAVENOUS at 21:18

## 2023-08-22 NOTE — PAYOR COMM NOTE
"CLINICAL UPDATE 08/22/2023        Daquan Xiong (37 y.o. Male) 1985  AUTH # [id number]         AUTHORIZATION PENDING:   PLEASE CALL OR FAX DETERMINATION TO CONTACT BELOW. THANK YOU.        Jerica Eldridge RN MSN  /UR  [facility]  [phone] office  [phone] fax  [email or web address]    [facility]  NPI: [id number]  Tax: [id number]            Daquan Xiong (37 y.o. Male)       Date of Birth   1985    Social Security Number       Address   [address] [place] [address]    Home Phone   [phone]    MRN   [id number]       Episcopalian   Confucianist    Marital Status                               Admission Date   8/18/23    Admission Type   Emergency    Admitting Provider   Aly Zayas MD    Attending Provider   Aly Zayas MD    Department, Room/Bed   [facility] UNIT, 2316/1       Discharge Date       Discharge Disposition       Discharge Destination                                 Attending Provider: Aly Zayas MD    Allergies: Bee Venom, Scopolamine    Isolation: None   Infection: None   Code Status: CPR    Ht: 185.4 cm (73\")   Wt: 76.1 kg (167 lb 12.3 oz)    Admission Cmt: None   Principal Problem: URIEL (acute kidney injury) [N17.9]                   Active Insurance as of 8/18/2023       Primary Coverage       Payor Plan Insurance Group Employer/Plan Group    ANTHEM BLUE CROSS ANTHEM BLUE CROSS BLUE SHIELD PPO [id number]       Payor Plan Address Payor Plan Phone Number Payor Plan Fax Number Effective Dates    [address] [phone]  1/1/2023 - None Entered    [facility] [address]         Subscriber Name Subscriber Birth Date Member ID       ANA XIONG 3/25/1986 [id number]               Secondary Coverage       Payor Plan Insurance Group Employer/Plan Group    ANTHEM MEDICAID [facility] - ANTH [id number]       Payor Plan Address Payor Plan Phone Number Payor Plan Fax Number Effective " Dates    MAIL STOP:   1/15/2023 - None Entered    [address] [address]       [facility] [address]         Subscriber Name Subscriber Birth Date Member ID       AMISH XIONG 1985 [id number]                     Emergency Contacts        (Rel.) Home Phone Work Phone Mobile Phone    Lana Xiong (Spouse) [phone] -- [phone]    Alejandra Xiong (Daughter) -- -- [phone]    Lizz Wiggins (Mother) -- -- [phone]                 Physician Progress Notes (last 24 hours)        Lam Alexis MD at 23 9601          NEPHROLOGY PROGRESS NOTE------[facility]    [facility]  [phone]  Lam Alexis MD      Patient Care Team:  Wolf Carvalho MD as PCP - General (Geriatric Medicine)  Fatou Waldron MD as Consulting Physician (Nephrology)      Provider:  Lam Alexis MD  Patient Name: Amish Xiong  :  1985    SUBJECTIVE:    F/U URIEL/CKD/acidosis    Feels tired.  Making more urine.     Medication:  acetylcysteine, 3 mL, Nebulization, Q12H - RT  albuterol, 2.5 mg, Nebulization, Q12H - RT  cefTRIAXone, 2,000 mg, Intravenous, Q24H  febuxostat, 40 mg, Oral, Daily  hydrocortisone sodium succinate, 50 mg, Intravenous, Q6H  lactulose, 20 g, Oral, TID  midodrine, 20 mg, Oral, Q8H  oxyCODONE, 5 mg, Oral, Once  pantoprazole, 40 mg, Intravenous, Q12H  potassium chloride, 40 mEq, Oral, Q4H  riFAXIMin, 550 mg, Oral, BID  sodium chloride, 10 mL, Intravenous, Q12H  vitamin K1, 10 mg, Oral, Once      norepinephrine, 0.02-0.3 mcg/kg/min, Last Rate: 0.03 mcg/kg/min (23 1206)  octreotide (SandoSTATIN) infusion, 25 mcg/hr, Last Rate: 25 mcg/hr (23 0451)  sodium bicarbonate drip (greater than 75 mEq/bag), 150 mEq, Last Rate: 150 mEq (23 1017)        OBJECTIVE    Vital Sign Min/Max for last 24 hours  Temp  Min: 97.8 øF (36.6 øC)  Max: 99 øF (37.2 øC)   BP  Min: 93/49  Max: 111/59   Pulse  Min: 60  Max: 77  "  Resp  Min: 11  Max: 17   SpO2  Min: 90 %  Max: 98 %   No data recorded   Weight  Min: 76.1 kg (167 lb 12.3 oz)  Max: 76.1 kg (167 lb 12.3 oz)     Flowsheet Rows      Flowsheet Row First Filed Value   Admission Height 185.4 cm (73\") Documented at 08/18/2023 2025   Admission Weight 72.6 kg (160 lb) Documented at 08/18/2023 2025            I/O this shift:  In: 1095 [P.O.:300; I.V.:795]  Out: 265 [Urine:265]  I/O last 3 completed shifts:  In: 6985 [P.O.:120; I.V.:6565; Blood:300]  Out: 7275 [Urine:2375; Other:4900]    Physical Exam:  General Appearance: alert, appears stated age and cooperative  Head: normocephalic, without obvious abnormality and atraumatic +DRY OP   Eyes: conjunctivae and sclerae normal and no icterus  Neck: supple and no JVD  Lungs: clear to auscultation and respirations regular  Heart: regular rhythm & normal rate and normal S1, S2 +BETHANY  Chest Wall: no abnormalities observed  Abdomen: normal bowel sounds and  +ASCITES/DISTENSION AND MILD BILAT LQ PAIN ON PALPATION  Extremities: moves extremities well, 1+ edema, no cyanosis  Skin: +SCATTERED SCABS, ABRASIONS, ECCHYMOSIS  Neurologic: Alert, and oriented x 3. No focal deficits    Labs:    WBC WBC   Date Value Ref Range Status   08/21/2023 7.50 3.40 - 10.80 10*3/mm3 Final   08/20/2023 6.70 3.40 - 10.80 10*3/mm3 Final   08/19/2023 10.70 3.40 - 10.80 10*3/mm3 Final   08/18/2023 9.50 3.40 - 10.80 10*3/mm3 Final      HGB Hemoglobin   Date Value Ref Range Status   08/21/2023 7.7 (L) 13.0 - 17.7 g/dL Final   08/20/2023 6.4 (C) 13.0 - 17.7 g/dL Final   08/20/2023 7.1 (L) 13.0 - 17.7 g/dL Final   08/20/2023 7.0 (L) 13.0 - 17.7 g/dL Final   08/19/2023 8.4 (L) 13.0 - 17.7 g/dL Final   08/18/2023 10.1 (L) 13.0 - 17.7 g/dL Final      HCT Hematocrit   Date Value Ref Range Status   08/21/2023 22.8 (L) 37.5 - 51.0 % Final   08/20/2023 19.4 (C) 37.5 - 51.0 % Final   08/20/2023 21.4 (L) 37.5 - 51.0 % Final   08/20/2023 20.9 (C) 37.5 - 51.0 % Final   08/19/2023 24.7 " (L) 37.5 - 51.0 % Final   08/18/2023 30.5 (L) 37.5 - 51.0 % Final      Platelets No results found for: LABPLAT   MCV MCV   Date Value Ref Range Status   08/21/2023 99.7 (H) 79.0 - 97.0 fL Final   08/20/2023 103.9 (H) 79.0 - 97.0 fL Final   08/19/2023 103.7 (H) 79.0 - 97.0 fL Final   08/18/2023 106.3 (H) 79.0 - 97.0 fL Final          Sodium Sodium   Date Value Ref Range Status   08/21/2023 140 136 - 145 mmol/L Final   08/20/2023 139 136 - 145 mmol/L Final   08/20/2023 136 136 - 145 mmol/L Final   08/20/2023 136 136 - 145 mmol/L Final   08/19/2023 137 136 - 145 mmol/L Final   08/19/2023 129 (L) 136 - 145 mmol/L Final   08/19/2023 127 (L) 136 - 145 mmol/L Final   08/18/2023 130 (L) 136 - 145 mmol/L Final      Potassium Potassium   Date Value Ref Range Status   08/21/2023 3.0 (L) 3.5 - 5.2 mmol/L Final   08/20/2023 3.6 3.5 - 5.2 mmol/L Final   08/20/2023 3.6 3.5 - 5.2 mmol/L Final   08/20/2023 3.9 3.5 - 5.2 mmol/L Final   08/19/2023 4.4 3.5 - 5.2 mmol/L Final   08/19/2023 4.6 3.5 - 5.2 mmol/L Final   08/19/2023 5.1 3.5 - 5.2 mmol/L Final   08/18/2023 5.9 (H) 3.5 - 5.2 mmol/L Final      Chloride Chloride   Date Value Ref Range Status   08/21/2023 94 (L) 98 - 107 mmol/L Final   08/20/2023 96 (L) 98 - 107 mmol/L Final   08/20/2023 95 (L) 98 - 107 mmol/L Final   08/20/2023 98 98 - 107 mmol/L Final   08/19/2023 100 98 - 107 mmol/L Final   08/19/2023 96 (L) 98 - 107 mmol/L Final   08/19/2023 95 (L) 98 - 107 mmol/L Final   08/18/2023 98 98 - 107 mmol/L Final      CO2 CO2   Date Value Ref Range Status   08/21/2023 32.0 (H) 22.0 - 29.0 mmol/L Final   08/20/2023 26.0 22.0 - 29.0 mmol/L Final   08/20/2023 26.0 22.0 - 29.0 mmol/L Final   08/20/2023 24.0 22.0 - 29.0 mmol/L Final   08/19/2023 20.0 (L) 22.0 - 29.0 mmol/L Final   08/19/2023 18.0 (L) 22.0 - 29.0 mmol/L Final   08/19/2023 20.0 (L) 22.0 - 29.0 mmol/L Final   08/18/2023 18.0 (L) 22.0 - 29.0 mmol/L Final      BUN BUN   Date Value Ref Range Status   08/21/2023 40 (H) 6 - 20  mg/dL Final   08/20/2023 47 (H) 6 - 20 mg/dL Final   08/20/2023 52 (H) 6 - 20 mg/dL Final   08/20/2023 58 (H) 6 - 20 mg/dL Final   08/19/2023 62 (H) 6 - 20 mg/dL Final   08/19/2023 65 (H) 6 - 20 mg/dL Final   08/19/2023 69 (H) 6 - 20 mg/dL Final   08/18/2023 74 (H) 6 - 20 mg/dL Final      Creatinine Creatinine   Date Value Ref Range Status   08/21/2023 2.41 (H) 0.76 - 1.27 mg/dL Final   08/20/2023 2.71 (H) 0.76 - 1.27 mg/dL Final   08/20/2023 3.03 (H) 0.76 - 1.27 mg/dL Final   08/20/2023 2.96 (H) 0.76 - 1.27 mg/dL Final   08/19/2023 3.27 (H) 0.76 - 1.27 mg/dL Final   08/19/2023 3.48 (H) 0.76 - 1.27 mg/dL Final   08/19/2023 4.04 (H) 0.76 - 1.27 mg/dL Final   08/18/2023 3.91 (H) 0.76 - 1.27 mg/dL Final      Calcium Calcium   Date Value Ref Range Status   08/21/2023 9.9 8.6 - 10.5 mg/dL Final   08/20/2023 9.8 8.6 - 10.5 mg/dL Final   08/20/2023 9.6 8.6 - 10.5 mg/dL Final   08/20/2023 9.4 8.6 - 10.5 mg/dL Final   08/19/2023 9.1 8.6 - 10.5 mg/dL Final   08/19/2023 9.0 8.6 - 10.5 mg/dL Final   08/19/2023 8.6 8.6 - 10.5 mg/dL Final   08/18/2023 8.7 8.6 - 10.5 mg/dL Final      PO4 No components found for: PO4   Albumin Albumin   Date Value Ref Range Status   08/21/2023 4.6 3.5 - 5.2 g/dL Final   08/20/2023 4.2 3.5 - 5.2 g/dL Final   08/19/2023 2.5 (L) 3.5 - 5.2 g/dL Final   08/18/2023 2.3 (L) 3.5 - 5.2 g/dL Final      Magnesium Magnesium   Date Value Ref Range Status   08/21/2023 1.9 1.6 - 2.6 mg/dL Final   08/20/2023 2.1 1.6 - 2.6 mg/dL Final   08/18/2023 2.5 1.6 - 2.6 mg/dL Final      Uric Acid No components found for: URIC ACID     Imaging Results (Last 72 Hours)       Procedure Component Value Units Date/Time    CT Abdomen Pelvis Without Contrast [[id number]] Collected: 08/20/23 1315     Updated: 08/20/23 1324    Narrative:      CT ABDOMEN PELVIS WO CONTRAST    Date of Exam: 8/20/2023 11:23 AM EDT    Indication: Retroperitoneal bleed suspected.    Comparison: CT abdomen pelvis 7/3/2023    Technique: Axial CT images were  obtained of the abdomen and pelvis without the administration of contrast. Sagittal and coronal reconstructions were performed.  Automated exposure control and iterative reconstruction methods were used.      Findings:  Moderate low-density bilateral pleural effusions increasing from prior exam. Hypodense blood pooling suggestive of anemia. Bibasilar atelectasis. Superimposed tree-in-bud nodular opacities in the right middle lobe and lingula.    Small liver with morphologic changes of chronic underlying liver disease. Gallbladder unremarkable. No biliary dilation. No signs of active pancreatitis. Spleen grossly unremarkable. No suspicious adrenal nodule.    Urinary bladder partially collapsed around Rosario catheter with presumed instrumentation related intraluminal gas. No hydronephrosis. Symmetric renal size. Negative for nephrolithiasis. Prostate within normal limits in size.    Mild to moderate gastric distention with ingested fluid/debris refluxing into the distal esophagus which appears patulous. No signs of bowel obstruction.    Large volume ascites with apparent peritoneal thickening more conspicuous from prior exams. No organized collection or free air. Negative for retroperitoneal fluid collection.    Aortic atherosclerotic disease without aneurysm. Prominent central mesenteric and retroperitoneal nodes likely reactive and not significantly changed from prior exam.    High riding testicles located in the bilateral inguinal canals. Diffuse body wall edema worsening from prior exam. Degenerative changes in the lumbar spine without acute fracture or aggressive bone lesion.      Impression:      Impression:  1. Negative for retroperitoneal fluid collection.  2. Large volume ascites with peritoneal thickening suspicious for a nonspecific peritonitis. Consider paracentesis.  3. Findings of volume overload/third spacing progressing from prior exam including moderate bilateral pleural effusions, large volume ascites  and worsening anasarca.  4. Multifocal tree-in-bud nodular opacities concerning for endobronchial infectious/inflammatory process, possibly early aspiration in the setting of refluxed fluid into a patulous distal esophagus. Consider nasogastric decompression and aspiration risk   precaution.  5. Morphologic changes of chronic liver disease.            Electronically Signed: Marko Dimas MD    8/20/2023 1:21 PM EDT    Workstation ID: [id number]    US Renal Bilateral [[id number]] Collected: 08/19/23 1839     Updated: 08/19/23 1842    Narrative:      US RENAL BILATERAL    Date of Exam: 8/19/2023 5:05 PM EDT    Indication: ARF/URIEL/CRF/CKD.    Comparison: CT abdomen and pelvis dated 7/3/2023    Technique: Grayscale and color Doppler ultrasound evaluation of the kidneys and urinary bladder was performed.    Findings:  The right kidney measures 10.1 x 5.3 x 5.4 cm. The left kidney measures 9.2 x 4.6 x 6.1 cm. There is mild dilation of the bilateral renal pelves. There is large volume ascites within the abdomen.      Impression:      Impression:  1. Normal-sized kidneys with mild dilation of the bilateral renal pelves.  2. Large volume ascites.        Electronically Signed: Ulices Valadez    8/19/2023 6:40 PM EDT    Workstation ID: [id number]    XR Chest 1 View [[id number]] Collected: 08/19/23 0649     Updated: 08/19/23 0657    Narrative:      XR CHEST 1 VW    Date of Exam: 8/19/2023 6:40 AM EDT    Indication: central line placement    Comparison: July 3, 2023    Findings:  There is a right internal jugular central line with the tip at the cavoatrial junction. The heart size is borderline enlarged. There is mild pulmonary vascular congestion. There are no focal infiltrates to suggest pneumonia.      Impression:      Impression:  Cardiomegaly with mild pulmonary vascular prominence. Right IJ central line is in place with the tip at the cavoatrial junction. There is no pneumothorax.      Electronically Signed: Deven Machado MD     8/19/2023 6:54 AM EDT    Workstation ID: [id number]            Results for orders placed during the hospital encounter of 08/18/23    XR Chest 1 View    Narrative  XR CHEST 1 VW    Date of Exam: 8/19/2023 6:40 AM EDT    Indication: central line placement    Comparison: July 3, 2023    Findings:  There is a right internal jugular central line with the tip at the cavoatrial junction. The heart size is borderline enlarged. There is mild pulmonary vascular congestion. There are no focal infiltrates to suggest pneumonia.    Impression  Impression:  Cardiomegaly with mild pulmonary vascular prominence. Right IJ central line is in place with the tip at the cavoatrial junction. There is no pneumothorax.      Electronically Signed: Deven Machado MD  8/19/2023 6:54 AM EDT  Workstation ID: [id number]      Results for orders placed during the hospital encounter of 07/03/23    XR Chest 1 View    Narrative  XR CHEST 1 VW    Date of Exam: 7/3/2023 4:13 PM EDT    Indication: AMS    Comparison: AP portable chest 6/20/2023    Findings:  There is some bandlike atelectasis in the right lower lobe, similar to the prior examination. The previously described patchy left basilar airspace disease appears improved. No new airspace disease is identified. The heart size is normal. No pleural  effusion or pneumothorax is identified.    Impression  Impression:  Improving bibasilar atelectasis or pneumonia since 6/28/2023. No new airspace disease is appreciated.      Electronically Signed: Chanda Clark  7/3/2023 4:27 PM EDT  Workstation ID: [id number]      Results for orders placed during the hospital encounter of 06/23/23    XR Chest 1 View    Narrative  XR CHEST 1 VW    Date of Exam: 6/28/2023 11:45 AM EDT    Indication: eval for pneumonia    Comparison: None available.    Findings:  The lung volumes are low. Patchy airspace disease is seen within the lung bases bilaterally. Probable small bilateral pleural effusions are present. The pulmonary  vasculature appears unremarkable. No pneumothorax. No acute osseous abnormality identified.    Impression  Impression:  Low lung volumes with patchy airspace disease seen within the lung bases bilaterally, left greater than right with probable small bilateral pleural effusions, likely related to pneumonia and/or atelectasis.      Electronically Signed: Brynn Higuera  6/28/2023 12:00 PM EDT  Workstation ID: [id number]            ASSESSMENT / PLAN      URIEL (acute kidney injury)    GERD (gastroesophageal reflux disease)    Tobacco use disorder    Alcoholic cirrhosis    Severe malnutrition    Anemia      1.URIEL/CKD ---------. +Recurrent ARF/URIEL on top of known CRF/CKD STG 3A with a baseline serum creatinine of about 1.5. CRF/CKD STG 3A secondary to HTN NS. +ARF/URIEL is secondary to ATN from hypotension and prerenal state/intravascular volume depletion. Avoid hypotension.  No NSAIDs or IV dye. Dose meds for CrCl less than 10 cc/min until ARF/URIEL is resolved     2. HYPERKALEMIA--------Resolved. Secondary to RF and acidosis. Med Rx. Telemetry. K+ restrict diet and follow     3. ETOH CIRRHOSIS/HEPATIC ENCEPHALOPATHY/ASCITES-----Restarted Lactulose. Follow Ammonia. Likely needs Paracentesis. S/P IV Albumin and on Midodrine     4. HYPOMAGNESEMIA--------Follow levels     5. HYPERURICEMIA------Uloric     6. HYPOCALCEMIA-------Replace IV     7. ANEMIA-------H/H stable.       8. HYPOALBUMINEMIA-----Albumin normal. S/P IV Albumin to temporize     9. ELEVATED INR------Related to cirrhosis     10. HYPOTENSION------On Midodrine     11. BIPOLAR DISEASE     12. BASILIO     13. POLYSUBSTANCE ABUSE/TOBACCO USE      14. KETONURIA------Secondary to intravascular volume depletion. IVFs     15. HYPOTENSION-----Levophed, IVFs, IV Albumin, Midodrine     16. ACIDOSIS-------- +Type 4 RTA and from stool losses related Lactulose use (when he actually takes it)     17. N/V/GERD/PUD PROPHYLAXIS------IV PPI     18. DVT PROPHYLAXIS------SCDs      Patient  "nonoliguric, making more urine  Creatinine still trending up  Acidosis resolved.  Stop IV bicarb  Replace potassium  Monitor renal function fluid status electrolytes       Lam Alexis MD  [facility]  [phone]  23  12:08 EDT      Electronically signed by Lam Alexis MD at 23 1353       Aly Zayas MD at 23 1043            Critical Care Progress Note     Daquan Xiong : 1985 MRN:[id number] LOS:2  Rm: 2316/1     Principal Problem: URIEL (acute kidney injury)     Reason for follow up: All the medical problems listed below    Summary     A 37 y.o. old male patient with PMH of Alcoholic cirrhosis, severe malnutrition, GERD, CKD III, substance abuse presents to the hospital with complaints of nausea, vomiting, and \"kidney pain\" and was admitted with a principal diagnosis of URIEL (acute kidney injury).  Nephrology was consulted, treated with bicarb drip.     Also found to be hypotensive, treated with IV fluids without much improvement and needed vasopressors.  Started on empiric ceftriaxone for suspected SBP.  Subsequently, patient's hemoglobin dropped from 10 to 7, GI was consulted for suspected GI/variceal bleed.  CT abdomen pelvis showed large volume ascites with moderate bilateral pleural effusions and tree-in-bud nodular opacities on both lungs.  S/p paracentesis on  with removal of 5 L of ascitic fluid, mostly lymphocyte predominant with 119 neutrophils.  EGD and colonoscopy planned for .     Significant Events     23 : Continue with midodrine.  Will give IV albumin for crystalloid refractory shock and possible hepatorenal syndrome.  Added stress dose steroids, increase lactulose to titrate for 3-4 bowel movements per day.  Replace potassium.  EGD and colonoscopy today.    Assessment / Plan     Septic Shock with lactic acidosis, likely due to pneumonia  Does not meet SIRS criteria. Meets sepsis 3 definition with at least 2 point " increase in SOFA score along with lactic acidosis and need for vasopressors.  CT abdomen showed tree-in-bud nodular opacities in both lungs.  Ascitic fluid with 119 neutrophils, mostly lymphocyte predominant.  Continue with ceftriaxone for now.  Bedside echo with normal LVEF but large left pleural effusion.  Doubt hypovolemia.    Adequately fluid resuscitated, continue with norepinephrine for MAP target of 65.  Blood cultures remain negative so far.   Continue home dose midodrine 20 mg every 8 hours    Acute kidney injury on CKD III   Metabolic acidosis resolved  Remains non oliguric.  Baseline creatinine around 1.5.  Appears hypervolemic, possible ATN from septic shock.  Cannot rule out hepatorenal syndrome completely.  Nephrology following, currently on bicarb drip.  Continue with IV albumin and octreotide.   Monitor Input/Output very closely.   Net IO Since Admission: 2,506 mL [08/21/23 1056]       Acute on chronic anemia / ?  Esophageal variceal bleed.  Nadolol on hold due to shock state.  Started on octreotide drip for possible suspicion of variceal bleed.  Continue with Protonix twice daily.  GI consulted.  Monitor hemoglobin closely.  Transfuse as needed to keep hemoglobin greater than 7.  Received 1 PRBC since admission.  Scheduled for EGD/colonoscopy on 8/21.    Alcoholic cirrhosis of liver with recurrent ascites / Portal Hypertension   Transaminitis / Hyperbilirubinemia  Gets frequent paracentesis once every 7 to 10 days.  S/p paracentesis on 8/21 with removal of 5 L.  Not a candidate for TIPS due to hyperammonemia and history of hepatic encephalopathy.  Awaiting liver transplant at [facility].  GI following.    Hyperammonemia / history of hepatic encephalopathy   Continue with home rifaximin.    Increase scheduled lactulose to titrate for 3-4 bowel movements per day.    Acute thrombocytopenia/ Coagulopathy  Possibly from liver disease.  Some elevation in INR could also be due to nutritional deficiency of  vitamin K.  S/p 1 dose of vitamin K.  Check fibrinogen to rule out sepsis induced DIC.  Hold subQ heparin for now.    Severe malnutrition : Nutrition following  Hyperuricemia: On allopurinol.  History of substance abuse    Code status:   Code Status (Patient has no pulse and is not breathing): CPR (Attempt to Resuscitate)  Medical Interventions (Patient has pulse or is breathing): Full Support       Nutrition:   NPO Diet NPO Type: Strict NPO   Patient isn't on Tube Feeding     DVT prophylaxis:  Mechanical DVT prophylaxis orders are present.     Subjective / Review of systems     Review of Systems   Feels better, thirsty.  Appetite is marginally improved but still not eating or drinking well.  No abdominal pain.  No nausea or vomiting.  No chest pain or shortness of breath.  Objective / Physical Exam     Vital signs:  Temp: 98.2 øF (36.8 øC)  BP: 102/52  Heart Rate: 71  Resp: 11  SpO2: 94 %  Weight: 76.1 kg (167 lb 12.3 oz)    Admission Weight: Weight: 72.6 kg (160 lb)  Current Weight: Weight: 76.1 kg (167 lb 12.3 oz)    Input/Output in last 24 hours:    Intake/Output Summary (Last 24 hours) at 8/21/2023 1056  Last data filed at 8/21/2023 1017  Gross per 24 hour   Intake 5880 ml   Output 6540 ml   Net -660 ml      Net IO Since Admission: 2,506 mL [08/21/23 1056]     Physical Exam  Vitals and nursing note reviewed.   Constitutional:       General: He is awake. He is not in acute distress.     Appearance: Normal appearance. He is ill-appearing and toxic-appearing. He is not diaphoretic.   HENT:      Head: Normocephalic and atraumatic.      Comments: Bitemporal wasting     Mouth/Throat:      Mouth: Mucous membranes are dry.      Pharynx: Oropharynx is clear.   Eyes:      General: Scleral icterus present.      Extraocular Movements: Extraocular movements intact.      Conjunctiva/sclera: Conjunctivae normal.      Comments: Loss of periorbital fat pads   Cardiovascular:      Rate and Rhythm: Normal rate.      Heart sounds:  Normal heart sounds, S1 normal and S2 normal. No murmur heard.    No gallop.      Comments: Sinus rhythm  Pulmonary:      Breath sounds: Rales (Bibasilar) present. No wheezing or rhonchi.   Abdominal:      General: Bowel sounds are normal. There is distension.      Palpations: Abdomen is soft.      Tenderness: There is no abdominal tenderness. There is no guarding.   Musculoskeletal:         General: No tenderness.      Right lower leg: Edema present.      Left lower leg: Edema present.   Skin:     General: Skin is warm and dry.      Coloration: Skin is jaundiced.   Neurological:      General: No focal deficit present.      Mental Status: He is alert and oriented to person, place, and time.   Psychiatric:         Behavior: Behavior is cooperative.        Radiology and Labs     Results from last 7 days   Lab Units 08/21/23 0452 08/20/23 2027 08/20/23  1234 08/20/23  0602 08/19/23  0404 08/18/23  2315   WBC 10*3/mm3 7.50  --   --  6.70 10.70 9.50   HEMOGLOBIN g/dL 7.7* 6.4* 7.1* 7.0* 8.4* 10.1*   HEMATOCRIT % 22.8* 19.4* 21.4* 20.9* 24.7* 30.5*   PLATELETS 10*3/mm3 67*  --   --  98* 122* 116*      Results from last 7 days   Lab Units 08/21/23  0452 08/20/23  0602 08/18/23  2315   PROTIME Seconds 18.7* 17.2* 14.4*   INR  1.81* 1.65* 1.37*      Results from last 7 days   Lab Units 08/21/23  0452 08/20/23  1234 08/20/23  0602 08/20/23  0022 08/19/23  1734 08/19/23  0233 08/18/23  2202   SODIUM mmol/L 140 139 136 136 137   < > 130*   POTASSIUM mmol/L 3.0* 3.6 3.6 3.9 4.4   < > 5.9*   CHLORIDE mmol/L 94* 96* 95* 98 100   < > 98   CO2 mmol/L 32.0* 26.0 26.0 24.0 20.0*   < > 18.0*   BUN mg/dL 40* 47* 52* 58* 62*   < > 74*   CREATININE mg/dL 2.41* 2.71* 3.03* 2.96* 3.27*   < > 3.91*   GLUCOSE mg/dL 145* 149* 170* 151* 146*   < > 86   MAGNESIUM mg/dL 1.9  --  2.1  --   --   --  2.5   PHOSPHORUS mg/dL 3.2  --   --   --   --   --   --     < > = values in this interval not displayed.            Current medications     Scheduled  Meds:   acetylcysteine, 3 mL, Nebulization, Q12H - RT  albuterol, 2.5 mg, Nebulization, Q12H - RT  cefTRIAXone, 2,000 mg, Intravenous, Q24H  febuxostat, 40 mg, Oral, Daily  hydrocortisone sodium succinate, 50 mg, Intravenous, Q6H  lactulose, 20 g, Oral, TID  midodrine, 20 mg, Oral, Q8H  oxyCODONE, 5 mg, Oral, Once  pantoprazole, 40 mg, Intravenous, Q12H  potassium chloride, 40 mEq, Oral, Q4H  riFAXIMin, 550 mg, Oral, BID  sodium chloride, 10 mL, Intravenous, Q12H  vitamin K1, 10 mg, Oral, Once        Continuous Infusions:   norepinephrine, 0.02-0.3 mcg/kg/min, Last Rate: 0.06 mcg/kg/min (08/21/23 0636)  octreotide (SandoSTATIN) infusion, 25 mcg/hr, Last Rate: 25 mcg/hr (08/21/23 0451)  sodium bicarbonate drip (greater than 75 mEq/bag), 150 mEq, Last Rate: 150 mEq (08/21/23 1017)          Plan discussed with RN. Reviewed all other data in the last 24 hours, including but not limited to vitals, labs, microbiology, imaging and pertinent notes from other providers. High complexity decision making and high risk of deterioration.      Aly Zayas MD   Critical Care  08/21/23   10:56 EDT           Electronically signed by Aly Zayas MD at 08/21/23 7854

## 2023-08-22 NOTE — PROGRESS NOTES
"NEPHROLOGY PROGRESS NOTE------[facility]    [facility]  [phone]  Lam Alexis MD      Patient Care Team:  Wolf Carvalho MD as PCP - General (Geriatric Medicine)  Fatou Waldron MD as Consulting Physician (Nephrology)      Provider:  Lam Alexis MD  Patient Name: Daquan Xiong  :  1985    SUBJECTIVE:    F/U URIEL/CKD/acidosis  No chest pain or SOA    Medication:  cefTRIAXone, 2,000 mg, Intravenous, Q24H  febuxostat, 40 mg, Oral, Daily  hydrocortisone sodium succinate, 50 mg, Intravenous, Q6H  lactulose, 20 g, Oral, TID  metoclopramide, 10 mg, Intravenous, Q6H  midodrine, 20 mg, Oral, Q8H  octreotide, 100 mcg, Intravenous, TID  oxyCODONE, 5 mg, Oral, Once  pantoprazole, 40 mg, Intravenous, Q12H  riFAXIMin, 550 mg, Oral, BID  sodium chloride, 10 mL, Intravenous, Q12H             OBJECTIVE    Vital Sign Min/Max for last 24 hours  Temp  Min: 97.7 øF (36.5 øC)  Max: 98.8 øF (37.1 øC)   BP  Min: 89/53  Max: 104/54   Pulse  Min: 46  Max: 71   Resp  Min: 11  Max: 16   SpO2  Min: 0 %  Max: 100 %   No data recorded   No data recorded     Flowsheet Rows      Flowsheet Row First Filed Value   Admission Height 185.4 cm (73\") Documented at 2023   Admission Weight 72.6 kg (160 lb) Documented at 2023            I/O this shift:  In: 480 [P.O.:480]  Out: 250 [Urine:250]  I/O last 3 completed shifts:  In: 7366 [P.O.:1620; I.V.:5446; Blood:300]  Out: 1515 [Urine:1515]    Physical Exam:  General Appearance: alert, appears stated age and cooperative  Head: normocephalic, without obvious abnormality and atraumatic +DRY OP   Eyes: conjunctivae and sclerae normal and no icterus  Neck: supple and no JVD  Lungs: clear to auscultation and respirations regular  Heart: regular rhythm & normal rate and normal S1, S2 +BETHANY  Chest Wall: no abnormalities observed  Abdomen: normal bowel sounds and  +ASCITES/DISTENSION AND MILD BILAT LQ PAIN ON " PALPATION  Extremities: moves extremities well, 1+ edema, no cyanosis  Skin: +SCATTERED SCABS, ABRASIONS, ECCHYMOSIS  Neurologic: Alert, and oriented x 3. No focal deficits    Labs:    WBC WBC   Date Value Ref Range Status   08/22/2023 5.10 3.40 - 10.80 10*3/mm3 Final   08/21/2023 7.50 3.40 - 10.80 10*3/mm3 Final   08/20/2023 6.70 3.40 - 10.80 10*3/mm3 Final      HGB Hemoglobin   Date Value Ref Range Status   08/22/2023 7.8 (L) 13.0 - 17.7 g/dL Final   08/21/2023 7.7 (L) 13.0 - 17.7 g/dL Final   08/20/2023 6.4 (C) 13.0 - 17.7 g/dL Final   08/20/2023 7.1 (L) 13.0 - 17.7 g/dL Final   08/20/2023 7.0 (L) 13.0 - 17.7 g/dL Final      HCT Hematocrit   Date Value Ref Range Status   08/22/2023 23.3 (L) 37.5 - 51.0 % Final   08/21/2023 22.8 (L) 37.5 - 51.0 % Final   08/20/2023 19.4 (C) 37.5 - 51.0 % Final   08/20/2023 21.4 (L) 37.5 - 51.0 % Final   08/20/2023 20.9 (C) 37.5 - 51.0 % Final      Platelets No results found for: LABPLAT   MCV MCV   Date Value Ref Range Status   08/22/2023 102.0 (H) 79.0 - 97.0 fL Final   08/21/2023 99.7 (H) 79.0 - 97.0 fL Final   08/20/2023 103.9 (H) 79.0 - 97.0 fL Final          Sodium Sodium   Date Value Ref Range Status   08/22/2023 142 136 - 145 mmol/L Final   08/21/2023 140 136 - 145 mmol/L Final   08/20/2023 139 136 - 145 mmol/L Final   08/20/2023 136 136 - 145 mmol/L Final   08/20/2023 136 136 - 145 mmol/L Final   08/19/2023 137 136 - 145 mmol/L Final      Potassium Potassium   Date Value Ref Range Status   08/22/2023 3.2 (L) 3.5 - 5.2 mmol/L Final   08/21/2023 3.3 (L) 3.5 - 5.2 mmol/L Final   08/21/2023 3.0 (L) 3.5 - 5.2 mmol/L Final   08/20/2023 3.6 3.5 - 5.2 mmol/L Final   08/20/2023 3.6 3.5 - 5.2 mmol/L Final   08/20/2023 3.9 3.5 - 5.2 mmol/L Final   08/19/2023 4.4 3.5 - 5.2 mmol/L Final      Chloride Chloride   Date Value Ref Range Status   08/22/2023 94 (L) 98 - 107 mmol/L Final   08/21/2023 94 (L) 98 - 107 mmol/L Final   08/20/2023 96 (L) 98 - 107 mmol/L Final   08/20/2023 95 (L) 98  - 107 mmol/L Final   08/20/2023 98 98 - 107 mmol/L Final   08/19/2023 100 98 - 107 mmol/L Final      CO2 CO2   Date Value Ref Range Status   08/22/2023 31.0 (H) 22.0 - 29.0 mmol/L Final   08/21/2023 32.0 (H) 22.0 - 29.0 mmol/L Final   08/20/2023 26.0 22.0 - 29.0 mmol/L Final   08/20/2023 26.0 22.0 - 29.0 mmol/L Final   08/20/2023 24.0 22.0 - 29.0 mmol/L Final   08/19/2023 20.0 (L) 22.0 - 29.0 mmol/L Final      BUN BUN   Date Value Ref Range Status   08/22/2023 35 (H) 6 - 20 mg/dL Final   08/21/2023 40 (H) 6 - 20 mg/dL Final   08/20/2023 47 (H) 6 - 20 mg/dL Final   08/20/2023 52 (H) 6 - 20 mg/dL Final   08/20/2023 58 (H) 6 - 20 mg/dL Final   08/19/2023 62 (H) 6 - 20 mg/dL Final      Creatinine Creatinine   Date Value Ref Range Status   08/22/2023 2.14 (H) 0.76 - 1.27 mg/dL Final   08/21/2023 2.41 (H) 0.76 - 1.27 mg/dL Final   08/20/2023 2.71 (H) 0.76 - 1.27 mg/dL Final   08/20/2023 3.03 (H) 0.76 - 1.27 mg/dL Final   08/20/2023 2.96 (H) 0.76 - 1.27 mg/dL Final   08/19/2023 3.27 (H) 0.76 - 1.27 mg/dL Final      Calcium Calcium   Date Value Ref Range Status   08/22/2023 9.8 8.6 - 10.5 mg/dL Final   08/21/2023 9.9 8.6 - 10.5 mg/dL Final   08/20/2023 9.8 8.6 - 10.5 mg/dL Final   08/20/2023 9.6 8.6 - 10.5 mg/dL Final   08/20/2023 9.4 8.6 - 10.5 mg/dL Final   08/19/2023 9.1 8.6 - 10.5 mg/dL Final      PO4 No components found for: PO4   Albumin Albumin   Date Value Ref Range Status   08/22/2023 4.7 3.5 - 5.2 g/dL Final   08/21/2023 4.6 3.5 - 5.2 g/dL Final   08/20/2023 4.2 3.5 - 5.2 g/dL Final      Magnesium Magnesium   Date Value Ref Range Status   08/22/2023 1.8 1.6 - 2.6 mg/dL Final   08/21/2023 1.9 1.6 - 2.6 mg/dL Final   08/20/2023 2.1 1.6 - 2.6 mg/dL Final      Uric Acid No components found for: URIC ACID     Imaging Results (Last 72 Hours)       Procedure Component Value Units Date/Time    CT Abdomen Pelvis Without Contrast [[id number]] Collected: 08/20/23 1315     Updated: 08/20/23 1324    Narrative:      CT ABDOMEN  PELVIS WO CONTRAST    Date of Exam: 8/20/2023 11:23 AM EDT    Indication: Retroperitoneal bleed suspected.    Comparison: CT abdomen pelvis 7/3/2023    Technique: Axial CT images were obtained of the abdomen and pelvis without the administration of contrast. Sagittal and coronal reconstructions were performed.  Automated exposure control and iterative reconstruction methods were used.      Findings:  Moderate low-density bilateral pleural effusions increasing from prior exam. Hypodense blood pooling suggestive of anemia. Bibasilar atelectasis. Superimposed tree-in-bud nodular opacities in the right middle lobe and lingula.    Small liver with morphologic changes of chronic underlying liver disease. Gallbladder unremarkable. No biliary dilation. No signs of active pancreatitis. Spleen grossly unremarkable. No suspicious adrenal nodule.    Urinary bladder partially collapsed around Rosario catheter with presumed instrumentation related intraluminal gas. No hydronephrosis. Symmetric renal size. Negative for nephrolithiasis. Prostate within normal limits in size.    Mild to moderate gastric distention with ingested fluid/debris refluxing into the distal esophagus which appears patulous. No signs of bowel obstruction.    Large volume ascites with apparent peritoneal thickening more conspicuous from prior exams. No organized collection or free air. Negative for retroperitoneal fluid collection.    Aortic atherosclerotic disease without aneurysm. Prominent central mesenteric and retroperitoneal nodes likely reactive and not significantly changed from prior exam.    High riding testicles located in the bilateral inguinal canals. Diffuse body wall edema worsening from prior exam. Degenerative changes in the lumbar spine without acute fracture or aggressive bone lesion.      Impression:      Impression:  1. Negative for retroperitoneal fluid collection.  2. Large volume ascites with peritoneal thickening suspicious for a  nonspecific peritonitis. Consider paracentesis.  3. Findings of volume overload/third spacing progressing from prior exam including moderate bilateral pleural effusions, large volume ascites and worsening anasarca.  4. Multifocal tree-in-bud nodular opacities concerning for endobronchial infectious/inflammatory process, possibly early aspiration in the setting of refluxed fluid into a patulous distal esophagus. Consider nasogastric decompression and aspiration risk   precaution.  5. Morphologic changes of chronic liver disease.            Electronically Signed: Marko Dimas MD    8/20/2023 1:21 PM EDT    Workstation ID: [id number]    US Renal Bilateral [[id number]] Collected: 08/19/23 1839     Updated: 08/19/23 1842    Narrative:      US RENAL BILATERAL    Date of Exam: 8/19/2023 5:05 PM EDT    Indication: ARF/URIEL/CRF/CKD.    Comparison: CT abdomen and pelvis dated 7/3/2023    Technique: Grayscale and color Doppler ultrasound evaluation of the kidneys and urinary bladder was performed.    Findings:  The right kidney measures 10.1 x 5.3 x 5.4 cm. The left kidney measures 9.2 x 4.6 x 6.1 cm. There is mild dilation of the bilateral renal pelves. There is large volume ascites within the abdomen.      Impression:      Impression:  1. Normal-sized kidneys with mild dilation of the bilateral renal pelves.  2. Large volume ascites.        Electronically Signed: Ulices Valadez    8/19/2023 6:40 PM EDT    Workstation ID: [id number]            Results for orders placed during the hospital encounter of 08/18/23    XR Chest 1 View    Narrative  XR CHEST 1 VW    Date of Exam: 8/19/2023 6:40 AM EDT    Indication: central line placement    Comparison: July 3, 2023    Findings:  There is a right internal jugular central line with the tip at the cavoatrial junction. The heart size is borderline enlarged. There is mild pulmonary vascular congestion. There are no focal infiltrates to suggest  pneumonia.    Impression  Impression:  Cardiomegaly with mild pulmonary vascular prominence. Right IJ central line is in place with the tip at the cavoatrial junction. There is no pneumothorax.      Electronically Signed: Deven Machado MD  8/19/2023 6:54 AM EDT  Workstation ID: [id number]      Results for orders placed during the hospital encounter of 07/03/23    XR Chest 1 View    Narrative  XR CHEST 1 VW    Date of Exam: 7/3/2023 4:13 PM EDT    Indication: AMS    Comparison: AP portable chest 6/20/2023    Findings:  There is some bandlike atelectasis in the right lower lobe, similar to the prior examination. The previously described patchy left basilar airspace disease appears improved. No new airspace disease is identified. The heart size is normal. No pleural  effusion or pneumothorax is identified.    Impression  Impression:  Improving bibasilar atelectasis or pneumonia since 6/28/2023. No new airspace disease is appreciated.      Electronically Signed: Chanda Clark  7/3/2023 4:27 PM EDT  Workstation ID: [id number]      Results for orders placed during the hospital encounter of 06/23/23    XR Chest 1 View    Narrative  XR CHEST 1 VW    Date of Exam: 6/28/2023 11:45 AM EDT    Indication: eval for pneumonia    Comparison: None available.    Findings:  The lung volumes are low. Patchy airspace disease is seen within the lung bases bilaterally. Probable small bilateral pleural effusions are present. The pulmonary vasculature appears unremarkable. No pneumothorax. No acute osseous abnormality identified.    Impression  Impression:  Low lung volumes with patchy airspace disease seen within the lung bases bilaterally, left greater than right with probable small bilateral pleural effusions, likely related to pneumonia and/or atelectasis.      Electronically Signed: Brynn Higuera  6/28/2023 12:00 PM EDT  Workstation ID: [id number]            ASSESSMENT / PLAN      URIEL (acute kidney injury)    GERD (gastroesophageal reflux  disease)    Tobacco use disorder    Alcoholic cirrhosis    Severe malnutrition    Anemia      1.URIEL/CKD ---------. +Recurrent ARF/URIEL on top of known CRF/CKD STG 3A with a baseline serum creatinine of about 1.5. CRF/CKD STG 3A secondary to HTN NS. +ARF/URIEL is secondary to ATN from hypotension and prerenal state/intravascular volume depletion. Avoid hypotension.  No NSAIDs or IV dye. Dose meds for CrCl less than 10 cc/min until ARF/URIEL is resolved     2. HYPERKALEMIA--------Resolved. Secondary to RF and acidosis. Med Rx. Telemetry. K+ restrict diet and follow     3. ETOH CIRRHOSIS/HEPATIC ENCEPHALOPATHY/ASCITES-----Restarted Lactulose. Follow Ammonia. Likely needs Paracentesis. S/P IV Albumin and on Midodrine     4. HYPOMAGNESEMIA--------Follow levels     5. HYPERURICEMIA------Uloric     6. HYPOCALCEMIA-------Replace IV     7. ANEMIA-------H/H stable.       8. HYPOALBUMINEMIA-----Albumin normal. S/P IV Albumin to temporize     9. ELEVATED INR------Related to cirrhosis     10. HYPOTENSION------On Midodrine     11. BIPOLAR DISEASE     12. BASILIO     13. POLYSUBSTANCE ABUSE/TOBACCO USE      14. KETONURIA------Secondary to intravascular volume depletion. IVFs     15. HYPOTENSION-----Levophed, IVFs, IV Albumin, Midodrine     16. ACIDOSIS-------- +Type 4 RTA and from stool losses related Lactulose use (when he actually takes it)     17. N/V/GERD/PUD PROPHYLAXIS------IV PPI     18. DVT PROPHYLAXIS------SCDs      Patient nonoliguric, making urine  Creatinine better, off IVF  Replace potassium  Monitor renal function fluid status electrolytes       Lam Alexis MD  [facility]  [phone]  08/22/23  12:56 EDT

## 2023-08-22 NOTE — PROGRESS NOTES
"Nutrition Services    Patient Name: Daquan Xiong  YOB: 1985  MRN: [id number]  Admission date: 8/18/2023    PPE Documentation        PPE Worn By Provider Did not enter room for this encounter   PPE Worn By Patient  N/A     PROGRESS NOTE      Encounter Information: Progress note to determine plan for nutrition. Pt previously with encephalopathy and concern for possible need for TF. Pt now more oriented and able to take PO, though intake remains poor overall. No immediate plan for TF at this time, though pt could still be a good candidate for enteral nutrition support, given his malnourished state. Pt has reported to GI provider that he dislikes \"protein drinks.\" Will try Magic Cup instead as an option for additional nutrition.        PO Diet: Diet: Cardiac Diets; Low Sodium (2g); Texture: Regular Texture (IDDSI 7); Fluid Consistency: Thin (IDDSI 0)   PO Supplements: None ordered   PO Intake:  25% intake at recent meal -- poor intakes overall        Current nutrition support: None currently ordered   Nutrition support review:        Labs (reviewed below): BUN/Cr elevated - C/W clinical course        GI Function:  Stool Output  Stool Unmeasured Occurrence: 1 (08/22/23 0600)  Bowel Incontinence: Yes (08/21/23 1509)  Stool Amount: moderate (08/21/23 1509)          Nutrition Intervention Updates: Continue oral diet     Add Magic Cups at lunch/dinner (Provides 580 kcals, 18 g protein if consumed)     Encourage PO intakes     Results from last 7 days   Lab Units 08/22/23  1656 08/22/23  0432 08/21/23  2130 08/21/23  0452 08/20/23  1234 08/20/23  0602   SODIUM mmol/L  --  142  --  140 139 136   POTASSIUM mmol/L 3.7 3.2* 3.3* 3.0* 3.6 3.6   CHLORIDE mmol/L  --  94*  --  94* 96* 95*   CO2 mmol/L  --  31.0*  --  32.0* 26.0 26.0   BUN mg/dL  --  35*  --  40* 47* 52*   CREATININE mg/dL  --  2.14*  --  2.41* 2.71* 3.03*   CALCIUM mg/dL  --  9.8  --  9.9 9.8 9.6   BILIRUBIN mg/dL  --  4.7*  --  4.7*  --  4.2*   ALK " PHOS U/L  --  103  --  116  --  152*   ALT (SGPT) U/L  --  13  --  15  --  18   AST (SGOT) U/L  --  54*  --  44*  --  56*   GLUCOSE mg/dL  --  138*  --  145* 149* 170*     Results from last 7 days   Lab Units 08/22/23  0432 08/21/23  0452 08/20/23  1234 08/20/23  0602   MAGNESIUM mg/dL 1.8 1.9  --  2.1   PHOSPHORUS mg/dL 3.1 3.2   < >  --    HEMOGLOBIN g/dL 7.8* 7.7*   < > 7.0*   HEMATOCRIT % 23.3* 22.8*   < > 20.9*    < > = values in this interval not displayed.     No results found for: COVID19  No results found for: HGBA1C      RD to follow up per protocol.    Electronically signed by:  Maddison Ventura RD  08/22/23 18:11 EDT

## 2023-08-22 NOTE — CONSULTS
[facility] Medicine Services   Consult Note    Patient Name: Daquan Xiong  : 1985  MRN: [id number]  Primary Care Physician:  Wolf Carvalho MD  Referring Physician: No ref. provider found  Date of admission: 2023  Date and Time of Care:     Inpatient Hospitalist Consult  Consult performed by: Philly Johnson MD  Consult ordered by: Aly Zayas MD        Subjective      Reason for Consult/ Chief Complaint: nausea, vomiting    Consult Requested By: Dr. Zayas    History of Present Illness: Daquan Xiong is a 37 y.o. male Patient is a 37-year-old gentleman was brought in on 2023 chief complaint of abnormal labs with nausea and vomiting.  Patient was admitted in ICU treated for septic shock likely secondary to pneumonia was treated with antibiotics and vasopressin placed on midodrine hydrocortisone treated with fluids he had acute on chronic kidney disease was seen by nephrology with improvement of his kidney function.  Fernie also had acute on chronic anemia chronic transfusion but had an EGD that showed no bleeding but showed nonbleeding esophageal varices as well as esophagitis.  He also had a paracentesis for ascites.  Currently patient is feeling better denies any fevers chills or sweats.  No abdominal pain.  Still feels weak.  Denies any shortness of breath or cough.        Personal History     Past Medical History:   Diagnosis Date    Alcohol abuse     1/2-1 pint a day, sober since 2022    Alcoholic cirrhosis 2022    Arthritis     Bipolar affective disorder 2022    Cholecystitis 2022    Chronic constipation 2017    GERD (gastroesophageal reflux disease) 2022    Doing well with pepcid.    Hypertension     Osteoarthritis 2022    Polysubstance abuse 2022    Severe Malnutrition (HCC) 2023    Sleep apnea     Tobacco use disorder 2022       Past Surgical History:   Procedure Laterality Date    BACK SURGERY      WRIST  SURGERY         Family History: family history includes Cirrhosis in his father; Diabetes in his maternal grandfather, maternal grandmother, paternal grandfather, and paternal grandmother; Heart disease in his maternal grandmother. Otherwise pertinent FHx was reviewed and not pertinent to current issue.    Social History:  reports that he has been smoking cigarettes. He has been smoking an average of .5 packs per day. He has never used smokeless tobacco. He reports that he does not currently use alcohol. He reports that he does not currently use drugs after having used the following drugs: Marijuana.    Home Medications:   allopurinol, baclofen, cyclobenzaprine, melatonin, midodrine, nadolol, nicotine, nicotine polacrilex, oxyCODONE, pantoprazole, and riFAXIMin    Allergies:  Allergies   Allergen Reactions    Bee Venom Anaphylaxis    Scopolamine Anaphylaxis         Objective      Vitals:  Temp:  [97.7 øF (36.5 øC)-98.8 øF (37.1 øC)] 97.9 øF (36.6 øC)  Heart Rate:  [46-71] 58  Resp:  [11-16] 14  BP: ()/(39-65) 89/47  Flow (L/min):  [2-5] 2    Physical Exam  General: No acute distress  HEENT: Neck supple, normal oral mucosa, no masses, no lymphadenopathy  Lungs: diminished breath sounds bilateral bases, no wheezing, no crackles, no rhonchi. Equal excursions.   CV - Normal S1/S2, no murmur, regular rate and rhythm   Abdomen - Soft, nontender, + mildly distended, normal bowel sounds  Extremities - + bilateral LE pitting edema, no erythema  Neuro - No focal weakness, normal sensation, generalized weakness.  Psych - Alert and oriented x3  Skin - no wounds or lesions.       Result Review    Result Review:  I have personally reviewed the results from the time of this admission to 8/22/2023 13:23 EDT and agree with these findings:  [x]  Laboratory  [x]  Microbiology  [x]  Radiology  [x]  EKG/Telemetry   [x]  Cardiology/Vascular   []  Pathology  [x]  Old records  []  Other:  Most notable findings include:        Assessment & Plan        Active Hospital Problems:  Active Hospital Problems    Diagnosis     **URIEL (acute kidney injury)     Anemia     Severe malnutrition     GERD (gastroesophageal reflux disease)     Tobacco use disorder     Alcoholic cirrhosis      Plan:       Septic shock  - likely secondary to bacterial Pneumonia  - Continue Rocephin for 5 days.   - s/p Vasopressin  - on Midodrine.   - wean off Hydrocortisone as tolerated     URIEL on CKD III  - Hyperkalemia - Resolved.   - creatinine trending down.     Bilateral Pleural effusions  - likely due to Ascites  - s/p paracentesis.     Acute on chronic anemia  - s/p EGD 8/21 - with nonbleeding esophageal varices and esophagitis  - s/p 1 unit PRBC since admission   - On Octreotide, PPI IV Q12 and Sucralfate.  - GI on consult.   - Nodalol on hold due to hypotension     Alcoholic liver cirrhosis with ascites  - s/p Paracentesis 5L - 8/20  - awaiting liver transplant at [facility].     Hyperammonemia  - continue Rifaximin and lactulose.      Thrombocytopenia  - trending down. Continue to monitor closely. Likely     Coagulopathy - s/p Vitamin K    Elevated bilirubin    Severe malnutrition     Hyperuricemia - Allopurinol    H/o Alcohol abuse - Sober since July 2022           This patient has been  and discussed with . 08/22/23      Signature: Electronically signed by Philly Johnson MD, 08/22/23, 13:23 EDT.  [facility] Hospitalist Team

## 2023-08-22 NOTE — PROGRESS NOTES
LOS: 3 days   Patient Care Team:  Wolf Carvalho MD as PCP - General (Geriatric Medicine)  Fatou Waldron MD as Consulting Physician (Nephrology)      Subjective     Subjective: Patient complains of belching and acid reflux symptoms.  No nausea/vomiting or abdominal pain.  No bowel movement yet today.  He has been eating some.      ROS:   Review of Systems   Constitutional:  Negative for chills and fever.   Respiratory:  Negative for cough and shortness of breath.    Cardiovascular:  Negative for chest pain.   Gastrointestinal:  Negative for abdominal pain, blood in stool, nausea and vomiting.   Neurological:  Positive for weakness. Negative for dizziness.   Psychiatric/Behavioral:  Negative for agitation and confusion.       Medication Review:   Scheduled Meds:cefTRIAXone, 2,000 mg, Intravenous, Q24H  febuxostat, 40 mg, Oral, Daily  hydrocortisone sodium succinate, 50 mg, Intravenous, Q6H  lactulose, 20 g, Oral, TID  metoclopramide, 10 mg, Intravenous, Q6H  midodrine, 20 mg, Oral, Q8H  octreotide, 100 mcg, Intravenous, TID  oxyCODONE, 5 mg, Oral, Once  pantoprazole, 40 mg, Intravenous, Q12H  riFAXIMin, 550 mg, Oral, BID  sodium chloride, 10 mL, Intravenous, Q12H      Continuous Infusions:   PRN Meds:.  albumin human    albumin human    ALPRAZolam    calcium carbonate    lactated ringers    lidocaine    lidocaine    lidocaine    Magnesium Standard Dose Replacement - Follow Nurse / BPA Driven Protocol    melatonin    nitroglycerin    ondansetron **OR** ondansetron    oxyCODONE    Phosphorus Replacement - Follow Nurse / BPA Driven Protocol    Potassium Replacement - Follow Nurse / BPA Driven Protocol    [COMPLETED] Insert Peripheral IV **AND** sodium chloride    sodium chloride    sodium chloride      Objective     Vital Signs  Temp:  [97.7 øF (36.5 øC)-98.8 øF (37.1 øC)] 97.9 øF (36.6 øC)  Heart Rate:  [46-71] 54  Resp:  [11-16] 14  BP: ()/(39-65) 96/48    Physical Exam:    General Appearance:     Awake and alert, in no acute distress   Head:    Normocephalic, without obvious abnormality   Eyes:          Conjunctivae normal, icteric sclera   Ears:    Hearing intact   Throat:   No oral lesions, no thrush, oral mucosa moist   Neck:   No adenopathy, supple, no JVD   Lungs:     Respirations regular, even and unlabored       Abdomen:     Soft, non-tender, no rebound or guarding, non-distended   Rectal:     Deferred   Extremities:   No edema, no cyanosis, no redness   Skin:   No bleeding, bruising or rash, +jaundice   Neurologic:   Sensation intact        Results Review:    CBC  Results from last 7 days   Lab Units 08/22/23  0432 08/21/23  0452 08/20/23  2027 08/20/23  1234 08/20/23  0602 08/19/23  0404 08/18/23  2315   RBC 10*6/mm3 2.29* 2.28*  --   --  2.01* 2.39* 2.87*   WBC 10*3/mm3 5.10 7.50  --   --  6.70 10.70 9.50   HEMOGLOBIN g/dL 7.8* 7.7* 6.4* 7.1* 7.0* 8.4* 10.1*   PLATELETS 10*3/mm3 59* 67*  --   --  98* 122* 116*       CMP  Results from last 7 days   Lab Units 08/22/23  0432 08/21/23  2130 08/21/23  0452 08/20/23  1317 08/20/23  1234 08/20/23  0602 08/20/23  0022 08/19/23  1734 08/19/23  0938 08/19/23  0233 08/18/23  2315 08/18/23  2202   SODIUM mmol/L 142  --  140  --  139 136 136 137 129* 127*  --  130*   POTASSIUM mmol/L 3.2* 3.3* 3.0*  --  3.6 3.6 3.9 4.4 4.6 5.1  --  5.9*   CHLORIDE mmol/L 94*  --  94*  --  96* 95* 98 100 96* 95*  --  98   CO2 mmol/L 31.0*  --  32.0*  --  26.0 26.0 24.0 20.0* 18.0* 20.0*  --  18.0*   BUN mg/dL 35*  --  40*  --  47* 52* 58* 62* 65* 69*  --  74*   CREATININE mg/dL 2.14*  --  2.41*  --  2.71* 3.03* 2.96* 3.27* 3.48* 4.04*  --  3.91*   GLUCOSE mg/dL 138*  --  145*  --  149* 170* 151* 146* 83 143*  --  86   ALBUMIN g/dL 4.7  --  4.6  --   --  4.2  --   --   --  2.5*  --  2.3*   BILIRUBIN mg/dL 4.7*  --  4.7*  --   --  4.2*  --   --   --  4.7*  --  4.8*   ALK PHOS U/L 103  --  116  --   --  152*  --   --   --  245*  --  232*   AST (SGOT) U/L 54*  --  44*  --   --  56*   --   --   --  84*  --  77*   ALT (SGPT) U/L 13  --  15  --   --  18  --   --   --  28  --  30   AMMONIA umol/L 54  --  74* 91*  --   --   --   --   --   --  52  --        Amylase and Lipase  Results from last 7 days   Lab Units 08/18/23  2111   LIPASE U/L 68*       CRP         Imaging Results (Last 24 Hours)       ** No results found for the last 24 hours. **              Assessment & Plan   37-year-old female admitted 8/18/2023 with elevated creatinine at 3 as outpatient.  Has alcohol hepatitis/cirrhosis that has been complicated by ascites, nonbleeding esophageal varices, and hepatic encephalopathy.  Sober from alcohol since July 2022.    -ETOH Cirrhosis complicated by ascites, HE, and esophageal varices  -Acute renal failure on chronic kidney disease  -Hypotension  -Elevated LFTs  -Elevated ammonia   -Macrocytic anemia  -Belching/GERD    Status post EGD 8/21/2023 by Dr. Padilla -grade D erosive esophagitis, small F1 esophageal varices without bleeding, moderate portal hypertensive gastropathy, and retained undigested food in the stomach.    PLAN:   Patient reports feeling well today aside from frequent belching and acid reflux symptoms.  Underwent EGD yesterday with findings of grade D erosive esophagitis.  He is on IV PPI twice daily and Reglan was started yesterday.  Add Carafate suspension.  Hemoglobin 7.8.  Platelets 59.  WBC normal.  Creatinine slowly trending down at 2.14 today.  Bilirubin 4.7.  He continues on octreotide subcutaneous and midodrine.  He is being treated with lactulose and Xifaxan without confusion today.  Continue to encourage nutrition with 2 g sodium diet.  He has tried protein supplements but does not like these.  MELD sodium score is 26.  Continue to monitor H&H and transfuse as needed.  AFP is 4.98 on 6/25/2023.  Right upper quadrant ultrasound done on 6/25/2023 no signs of focal lesions.  He has an appointment with liver transplant team next week on 8/28/2023.    Maria C Villela,  MONICA  08/22/23  13:09 EDT

## 2023-08-22 NOTE — PROGRESS NOTES
"Critical Care Progress Note     Daquan Xiong : 1985 MRN:[id number] LOS:3  Rm: 2316/1     Principal Problem: URIEL (acute kidney injury)     Reason for follow up: All the medical problems listed below    Summary     A 37 y.o. old male patient with PMH of Alcoholic cirrhosis, severe malnutrition, GERD, CKD III, substance abuse presents to the hospital with complaints of nausea, vomiting, and \"kidney pain\" and was admitted with a principal diagnosis of URIEL (acute kidney injury).  Nephrology was consulted, treated with bicarb drip.     Also found to be hypotensive, treated with IV fluids without much improvement and needed vasopressors.  Started on empiric ceftriaxone for suspected SBP.  Subsequently, patient's hemoglobin dropped from 10 to 7, GI was consulted for suspected GI/variceal bleed.  CT abdomen pelvis showed large volume ascites with moderate bilateral pleural effusions and tree-in-bud nodular opacities on both lungs.  S/p paracentesis on  with removal of 5 L of ascitic fluid, mostly lymphocyte predominant with 119 neutrophils.  EGD on  showed grade D erosive esophagitis with small nonbleeding esophageal varices, Portal hypertensive gastropathy.      Significant Events     23 : Continue with midodrine.  Off vasopressors, transfer to PCU under hospitalist service.  We will give 1 unit of cryoprecipitate for hypofibrinogenemia.    Assessment / Plan     Septic Shock with lactic acidosis, likely due to pneumonia  Does not meet SIRS criteria. Meets sepsis 3 definition with at least 2 point increase in SOFA score along with lactic acidosis and need for vasopressors.  CT abdomen showed tree-in-bud nodular opacities in both lungs.  Ascitic fluid with 119 neutrophils, mostly lymphocyte predominant.  Continue with ceftriaxone to finish a 5-day course.  Ascitic fluid and blood cultures remain negative.  Off vasopressors.  Continue home dose midodrine 20 mg every 8 hours    Acute kidney injury on CKD " III   Remains non oliguric.  Baseline creatinine around 1.5.  Appears hypervolemic, possible ATN from septic shock.  Cannot rule out hepatorenal syndrome completely.  Nephrology following, currently on midodrine, IV albumin and octreotide.   Monitor Input/Output very closely.   Net IO Since Admission: 3,282 mL [08/22/23 1132]       Acute on chronic anemia / ?  Esophageal variceal bleed.  Nadolol on hold due to shock state.  EGD showed nonbleeding esophageal varices and esophagitis.  GI following, currently on Protonix and subcu octreotide.    Monitor hemoglobin closely.  Transfuse as needed to keep hemoglobin greater than 7.  Received 1 PRBC since admission.    Alcoholic cirrhosis of liver with recurrent ascites / Portal Hypertension   Transaminitis / Hyperbilirubinemia  Gets frequent paracentesis once every 7 to 10 days.  S/p paracentesis on 8/20 with removal of 5 L.  Not a candidate for TIPS due to hyperammonemia and history of hepatic encephalopathy.  Awaiting liver transplant at [facility].  GI following.    Hyperammonemia / history of hepatic encephalopathy   Continue with home rifaximin.    On scheduled lactulose to titrate for 3-4 bowel movements per day.    Acute thrombocytopenia/ Coagulopathy  Possibly from liver disease and some component of consumptive coagulopathy from blood loss.  Some elevation in INR could also be due to nutritional deficiency of vitamin K.  We will give 1 dose of vitamin K.  Low fibrinogen suspicious for sepsis induced DIC.  Will give 5 units of cryoprecipitate.  Hold subQ heparin for now.    Severe malnutrition : Nutrition following  Hyperuricemia: On allopurinol.  History of substance abuse    Code status:   Code Status (Patient has no pulse and is not breathing): CPR (Attempt to Resuscitate)  Medical Interventions (Patient has pulse or is breathing): Full Support       Nutrition:   Diet: Cardiac Diets; Low Sodium (2g); Texture: Regular Texture (IDDSI 7); Fluid Consistency: Thin  (IDDSI 0)   Patient isn't on Tube Feeding     DVT prophylaxis:  Mechanical DVT prophylaxis orders are present.     Subjective / Review of systems     Review of Systems   Feels better, denies any abdominal pain.  No nausea or vomiting.  No other specific complaints.  Objective / Physical Exam     Vital signs:  Temp: 98 øF (36.7 øC)  BP: 92/48  Heart Rate: 55  Resp: 14  SpO2: 96 %  Weight: 76.1 kg (167 lb 12.3 oz)    Admission Weight: Weight: 72.6 kg (160 lb)  Current Weight: Weight: 76.1 kg (167 lb 12.3 oz)    Input/Output in last 24 hours:    Intake/Output Summary (Last 24 hours) at 8/22/2023 1132  Last data filed at 8/22/2023 0900  Gross per 24 hour   Intake 1726 ml   Output 950 ml   Net 776 ml      Net IO Since Admission: 3,282 mL [08/22/23 1132]     Physical Exam  Vitals and nursing note reviewed.   Constitutional:       General: He is awake. He is not in acute distress.     Appearance: Normal appearance. He is ill-appearing and toxic-appearing. He is not diaphoretic.   HENT:      Head: Normocephalic and atraumatic.      Comments: Bitemporal wasting     Mouth/Throat:      Mouth: Mucous membranes are dry.      Pharynx: Oropharynx is clear.   Eyes:      General: Scleral icterus present.      Extraocular Movements: Extraocular movements intact.      Conjunctiva/sclera: Conjunctivae normal.      Comments: Loss of periorbital fat pads   Cardiovascular:      Rate and Rhythm: Normal rate.      Heart sounds: Normal heart sounds, S1 normal and S2 normal. No murmur heard.    No gallop.      Comments: Sinus rhythm  Pulmonary:      Breath sounds: Rales (Bibasilar) present. No wheezing or rhonchi.   Abdominal:      General: Bowel sounds are normal. There is distension.      Palpations: Abdomen is soft.      Tenderness: There is no abdominal tenderness. There is no guarding.   Musculoskeletal:         General: No tenderness.      Right lower leg: Edema present.      Left lower leg: Edema present.   Skin:     General: Skin is  warm and dry.      Coloration: Skin is jaundiced.   Neurological:      General: No focal deficit present.      Mental Status: He is alert and oriented to person, place, and time.   Psychiatric:         Behavior: Behavior is cooperative.        Radiology and Labs     Results from last 7 days   Lab Units 08/22/23  0432 08/21/23  0452 08/20/23 2027 08/20/23  1234 08/20/23  0602 08/19/23  0404 08/18/23  2315   WBC 10*3/mm3 5.10 7.50  --   --  6.70 10.70 9.50   HEMOGLOBIN g/dL 7.8* 7.7* 6.4* 7.1* 7.0* 8.4* 10.1*   HEMATOCRIT % 23.3* 22.8* 19.4* 21.4* 20.9* 24.7* 30.5*   PLATELETS 10*3/mm3 59* 67*  --   --  98* 122* 116*      Results from last 7 days   Lab Units 08/22/23  0432 08/21/23  0452 08/20/23  0602 08/18/23  2315   PROTIME Seconds 18.5* 18.7* 17.2* 14.4*   INR  1.79* 1.81* 1.65* 1.37*      Results from last 7 days   Lab Units 08/22/23 0432 08/21/23 2130 08/21/23 0452 08/20/23  1234 08/20/23  0602 08/20/23  0022 08/19/23  0233 08/18/23  2202   SODIUM mmol/L 142  --  140 139 136 136   < > 130*   POTASSIUM mmol/L 3.2* 3.3* 3.0* 3.6 3.6 3.9   < > 5.9*   CHLORIDE mmol/L 94*  --  94* 96* 95* 98   < > 98   CO2 mmol/L 31.0*  --  32.0* 26.0 26.0 24.0   < > 18.0*   BUN mg/dL 35*  --  40* 47* 52* 58*   < > 74*   CREATININE mg/dL 2.14*  --  2.41* 2.71* 3.03* 2.96*   < > 3.91*   GLUCOSE mg/dL 138*  --  145* 149* 170* 151*   < > 86   MAGNESIUM mg/dL 1.8  --  1.9  --  2.1  --   --  2.5   PHOSPHORUS mg/dL 3.1  --  3.2  --   --   --   --   --     < > = values in this interval not displayed.            Current medications     Scheduled Meds:   cefTRIAXone, 2,000 mg, Intravenous, Q24H  febuxostat, 40 mg, Oral, Daily  hydrocortisone sodium succinate, 50 mg, Intravenous, Q6H  lactulose, 20 g, Oral, TID  metoclopramide, 10 mg, Intravenous, Q6H  midodrine, 20 mg, Oral, Q8H  octreotide, 100 mcg, Intravenous, TID  oxyCODONE, 5 mg, Oral, Once  pantoprazole, 40 mg, Intravenous, Q12H  riFAXIMin, 550 mg, Oral, BID  sodium chloride, 10 mL,  Intravenous, Q12H  vitamin K1, 10 mg, Oral, Once        Continuous Infusions:            Plan discussed with RN. Reviewed all other data in the last 24 hours, including but not limited to vitals, labs, microbiology, imaging and pertinent notes from other providers. High complexity decision making and high risk of deterioration.      Aly Zayas MD   Critical Care  08/22/23   11:32 EDT

## 2023-08-22 NOTE — PLAN OF CARE
Goal Outcome Evaluation:               Received 1 unit of cryoprecipitate today. Levophed is off. BP remains borderline with midodrine on board. CL remains in place for this reason.

## 2023-08-22 NOTE — DISCHARGE PLACEMENT REQUEST
"Amish Xiong (37 y.o. Male)       Date of Birth   1985    Social Security Number       Address   [address] [place] [address]    Home Phone   [phone]    MRN   [id number]       Protestant   [facility]    Marital Status                               Admission Date   8/18/23    Admission Type   Emergency    Admitting Provider   Aly Zayas MD    Attending Provider   Aly Zayas MD    Department, Room/Bed   [facility] INTENSIVE CARE UNIT, 2316/1       Discharge Date       Discharge Disposition       Discharge Destination                                 Attending Provider: Aly Zayas MD    Allergies: Bee Venom, Scopolamine    Isolation: None   Infection: None   Code Status: CPR    Ht: 185.4 cm (73\")   Wt: 76.1 kg (167 lb 12.3 oz)    Admission Cmt: None   Principal Problem: URIEL (acute kidney injury) [N17.9]                   Active Insurance as of 8/18/2023       Primary Coverage       Payor Plan Insurance Group Employer/Plan Group    [employer]HEM BLUE CROSS ANTHEM BLUE CROSS BLUE SHIELD PPO [id number]       Payor Plan Address Payor Plan Phone Number Payor Plan Fax Number Effective Dates    [address] [phone]  1/1/2023 - None Entered    [facility] [id number]         Subscriber Name Subscriber Birth Date Member ID       ANA XIONG [birth year]/25/1986 [id number]               Secondary Coverage       Payor Plan Insurance Group Employer/Plan Group    ANTHEM MEDICAID [facility] [id number]       Payor Plan Address Payor Plan Phone Number Payor Plan Fax Number Effective Dates    MAIL STOP:   1/15/2023 - None Entered    [address] [address]       [facility] [address]         Subscriber Name Subscriber Birth Date Member ID       AMISH XIONG 1985 [id number]                     Emergency Contacts        (Rel.) Home Phone Work Phone Mobile Phone    Alec Xiongi (Spouse) [phone] -- [phone]    XiongAlejandra (Daughter) -- -- " [phone]    Lizz Wiggins (Mother) -- -- [phone]

## 2023-08-23 ENCOUNTER — INPATIENT HOSPITAL (OUTPATIENT)
Dept: URBAN - METROPOLITAN AREA HOSPITAL 84 | Facility: HOSPITAL | Age: 38
End: 2023-08-23
Payer: COMMERCIAL

## 2023-08-23 DIAGNOSIS — K21.9 GASTRO-ESOPHAGEAL REFLUX DISEASE WITHOUT ESOPHAGITIS: ICD-10-CM

## 2023-08-23 DIAGNOSIS — E72.20 DISORDER OF UREA CYCLE METABOLISM, UNSPECIFIED: ICD-10-CM

## 2023-08-23 DIAGNOSIS — D53.9 NUTRITIONAL ANEMIA, UNSPECIFIED: ICD-10-CM

## 2023-08-23 DIAGNOSIS — K70.31 ALCOHOLIC CIRRHOSIS OF LIVER WITH ASCITES: ICD-10-CM

## 2023-08-23 DIAGNOSIS — K76.82 HEPATIC ENCEPHALOPATHY: ICD-10-CM

## 2023-08-23 DIAGNOSIS — I85.00 ESOPHAGEAL VARICES WITHOUT BLEEDING: ICD-10-CM

## 2023-08-23 DIAGNOSIS — R74.01 ELEVATION OF LEVELS OF LIVER TRANSAMINASE LEVELS: ICD-10-CM

## 2023-08-23 LAB
ALBUMIN SERPL-MCNC: 4.3 G/DL (ref 3.5–5.2)
ALBUMIN/GLOB SERPL: 3.9 G/DL
ALP SERPL-CCNC: 108 U/L (ref 39–117)
ALT SERPL W P-5'-P-CCNC: 38 U/L (ref 1–41)
ANION GAP SERPL CALCULATED.3IONS-SCNC: 15 MMOL/L (ref 5–15)
AST SERPL-CCNC: 113 U/L (ref 1–40)
BACTERIA SPEC AEROBE CULT: NORMAL
BASOPHILS # BLD AUTO: 0 10*3/MM3 (ref 0–0.2)
BASOPHILS NFR BLD AUTO: 0.2 % (ref 0–1.5)
BH BB BLOOD EXPIRATION DATE: NORMAL
BH BB BLOOD TYPE BARCODE: 6200
BH BB DISPENSE STATUS: NORMAL
BH BB PRODUCT CODE: NORMAL
BH BB UNIT NUMBER: NORMAL
BILIRUB SERPL-MCNC: 4.1 MG/DL (ref 0–1.2)
BUN SERPL-MCNC: 38 MG/DL (ref 6–20)
BUN/CREAT SERPL: 20.1 (ref 7–25)
CALCIUM SPEC-SCNC: 9.5 MG/DL (ref 8.6–10.5)
CHLORIDE SERPL-SCNC: 95 MMOL/L (ref 98–107)
CO2 SERPL-SCNC: 29 MMOL/L (ref 22–29)
CREAT SERPL-MCNC: 1.89 MG/DL (ref 0.76–1.27)
DEPRECATED RDW RBC AUTO: 58.6 FL (ref 37–54)
EGFRCR SERPLBLD CKD-EPI 2021: 46 ML/MIN/1.73
EOSINOPHIL # BLD AUTO: 0 10*3/MM3 (ref 0–0.4)
EOSINOPHIL NFR BLD AUTO: 0 % (ref 0.3–6.2)
ERYTHROCYTE [DISTWIDTH] IN BLOOD BY AUTOMATED COUNT: 16.6 % (ref 12.3–15.4)
GLOBULIN UR ELPH-MCNC: 1.1 GM/DL
GLUCOSE SERPL-MCNC: 145 MG/DL (ref 65–99)
HCT VFR BLD AUTO: 25.1 % (ref 37.5–51)
HGB BLD-MCNC: 8.3 G/DL (ref 13–17.7)
INR PPP: 1.67 (ref 0.93–1.1)
LYMPHOCYTES # BLD AUTO: 0.8 10*3/MM3 (ref 0.7–3.1)
LYMPHOCYTES NFR BLD AUTO: 8.5 % (ref 19.6–45.3)
MAGNESIUM SERPL-MCNC: 1.8 MG/DL (ref 1.6–2.6)
MCH RBC QN AUTO: 34.2 PG (ref 26.6–33)
MCHC RBC AUTO-ENTMCNC: 33.2 G/DL (ref 31.5–35.7)
MCV RBC AUTO: 102.9 FL (ref 79–97)
MONOCYTES # BLD AUTO: 1 10*3/MM3 (ref 0.1–0.9)
MONOCYTES NFR BLD AUTO: 9.9 % (ref 5–12)
NEUTROPHILS NFR BLD AUTO: 8.1 10*3/MM3 (ref 1.7–7)
NEUTROPHILS NFR BLD AUTO: 81.4 % (ref 42.7–76)
NRBC BLD AUTO-RTO: 0 /100 WBC (ref 0–0.2)
PHOSPHATE SERPL-MCNC: 2.8 MG/DL (ref 2.5–4.5)
PLATELET # BLD AUTO: 60 10*3/MM3 (ref 140–450)
PMV BLD AUTO: 9.5 FL (ref 6–12)
POTASSIUM SERPL-SCNC: 3.6 MMOL/L (ref 3.5–5.2)
PROT SERPL-MCNC: 5.4 G/DL (ref 6–8.5)
PROTHROMBIN TIME: 17.4 SECONDS (ref 9.6–11.7)
RBC # BLD AUTO: 2.44 10*6/MM3 (ref 4.14–5.8)
SODIUM SERPL-SCNC: 139 MMOL/L (ref 136–145)
UNIT  ABO: NORMAL
UNIT  RH: NORMAL
WBC NRBC COR # BLD: 10 10*3/MM3 (ref 3.4–10.8)

## 2023-08-23 PROCEDURE — 25010000002 HYDROCORTISONE SOD SUC (PF) 100 MG RECONSTITUTED SOLUTION: Performed by: INTERNAL MEDICINE

## 2023-08-23 PROCEDURE — 25010000002 CEFTRIAXONE PER 250 MG: Performed by: INTERNAL MEDICINE

## 2023-08-23 PROCEDURE — 97116 GAIT TRAINING THERAPY: CPT

## 2023-08-23 PROCEDURE — 25010000002 METOCLOPRAMIDE PER 10 MG: Performed by: INTERNAL MEDICINE

## 2023-08-23 PROCEDURE — 63710000001 ONDANSETRON PER 8 MG: Performed by: INTERNAL MEDICINE

## 2023-08-23 PROCEDURE — 85025 COMPLETE CBC W/AUTO DIFF WBC: CPT | Performed by: HOSPITALIST

## 2023-08-23 PROCEDURE — 25010000002 OCTREOTIDE PER 25 MCG: Performed by: INTERNAL MEDICINE

## 2023-08-23 PROCEDURE — 97112 NEUROMUSCULAR REEDUCATION: CPT

## 2023-08-23 PROCEDURE — 99232 SBSQ HOSP IP/OBS MODERATE 35: CPT | Performed by: NURSE PRACTITIONER

## 2023-08-23 PROCEDURE — 84100 ASSAY OF PHOSPHORUS: CPT | Performed by: HOSPITALIST

## 2023-08-23 PROCEDURE — 80053 COMPREHEN METABOLIC PANEL: CPT | Performed by: INTERNAL MEDICINE

## 2023-08-23 PROCEDURE — 97530 THERAPEUTIC ACTIVITIES: CPT

## 2023-08-23 PROCEDURE — 83735 ASSAY OF MAGNESIUM: CPT | Performed by: HOSPITALIST

## 2023-08-23 PROCEDURE — 85610 PROTHROMBIN TIME: CPT | Performed by: INTERNAL MEDICINE

## 2023-08-23 RX ORDER — OXYCODONE HYDROCHLORIDE 5 MG/1
10 TABLET ORAL EVERY 6 HOURS PRN
Status: DISCONTINUED | OUTPATIENT
Start: 2023-08-23 | End: 2023-08-24 | Stop reason: HOSPADM

## 2023-08-23 RX ADMIN — PANTOPRAZOLE SODIUM 40 MG: 40 INJECTION, POWDER, LYOPHILIZED, FOR SOLUTION INTRAVENOUS at 09:56

## 2023-08-23 RX ADMIN — METOCLOPRAMIDE 10 MG: 5 INJECTION, SOLUTION INTRAMUSCULAR; INTRAVENOUS at 09:56

## 2023-08-23 RX ADMIN — OXYCODONE HYDROCHLORIDE 5 MG: 5 TABLET ORAL at 14:02

## 2023-08-23 RX ADMIN — ALPRAZOLAM 1 MG: 1 TABLET ORAL at 16:41

## 2023-08-23 RX ADMIN — OXYCODONE HYDROCHLORIDE 5 MG: 5 TABLET ORAL at 21:22

## 2023-08-23 RX ADMIN — OCTREOTIDE ACETATE 100 MCG: 100 INJECTION, SOLUTION INTRAVENOUS; SUBCUTANEOUS at 21:16

## 2023-08-23 RX ADMIN — METOCLOPRAMIDE 10 MG: 5 INJECTION, SOLUTION INTRAMUSCULAR; INTRAVENOUS at 03:37

## 2023-08-23 RX ADMIN — METOCLOPRAMIDE 10 MG: 5 INJECTION, SOLUTION INTRAMUSCULAR; INTRAVENOUS at 21:15

## 2023-08-23 RX ADMIN — Medication 10 ML: at 09:58

## 2023-08-23 RX ADMIN — RIFAXIMIN 550 MG: 550 TABLET ORAL at 09:56

## 2023-08-23 RX ADMIN — CEFTRIAXONE 2000 MG: 2 INJECTION, POWDER, FOR SOLUTION INTRAMUSCULAR; INTRAVENOUS at 09:56

## 2023-08-23 RX ADMIN — LACTULOSE 20 G: 20 SOLUTION ORAL at 16:26

## 2023-08-23 RX ADMIN — OCTREOTIDE ACETATE 100 MCG: 100 INJECTION, SOLUTION INTRAVENOUS; SUBCUTANEOUS at 09:56

## 2023-08-23 RX ADMIN — MIDODRINE HYDROCHLORIDE 20 MG: 5 TABLET ORAL at 14:00

## 2023-08-23 RX ADMIN — SUCRALFATE 1 G: 1 TABLET ORAL at 16:27

## 2023-08-23 RX ADMIN — ONDANSETRON HYDROCHLORIDE 4 MG: 4 TABLET, FILM COATED ORAL at 00:43

## 2023-08-23 RX ADMIN — METOCLOPRAMIDE 10 MG: 5 INJECTION, SOLUTION INTRAMUSCULAR; INTRAVENOUS at 16:27

## 2023-08-23 RX ADMIN — HYDROCORTISONE SODIUM SUCCINATE 50 MG: 100 INJECTION, POWDER, FOR SOLUTION INTRAMUSCULAR; INTRAVENOUS at 03:36

## 2023-08-23 RX ADMIN — ALPRAZOLAM 1 MG: 1 TABLET ORAL at 00:43

## 2023-08-23 RX ADMIN — MIDODRINE HYDROCHLORIDE 20 MG: 5 TABLET ORAL at 06:29

## 2023-08-23 RX ADMIN — OXYCODONE HYDROCHLORIDE 5 MG: 5 TABLET ORAL at 06:44

## 2023-08-23 RX ADMIN — Medication 10 ML: at 21:16

## 2023-08-23 RX ADMIN — SUCRALFATE 1 G: 1 TABLET ORAL at 06:38

## 2023-08-23 RX ADMIN — SUCRALFATE 1 G: 1 TABLET ORAL at 12:19

## 2023-08-23 RX ADMIN — OCTREOTIDE ACETATE 100 MCG: 100 INJECTION, SOLUTION INTRAVENOUS; SUBCUTANEOUS at 17:59

## 2023-08-23 RX ADMIN — PANTOPRAZOLE SODIUM 40 MG: 40 INJECTION, POWDER, LYOPHILIZED, FOR SOLUTION INTRAVENOUS at 21:16

## 2023-08-23 RX ADMIN — FEBUXOSTAT 40 MG: 40 TABLET, FILM COATED ORAL at 09:56

## 2023-08-23 RX ADMIN — MIDODRINE HYDROCHLORIDE 20 MG: 5 TABLET ORAL at 21:16

## 2023-08-23 RX ADMIN — RIFAXIMIN 550 MG: 550 TABLET ORAL at 21:16

## 2023-08-23 RX ADMIN — LACTULOSE 20 G: 20 SOLUTION ORAL at 21:16

## 2023-08-23 NOTE — PROGRESS NOTES
"NEPHROLOGY PROGRESS NOTE------[facility]    [facility]  [phone]  Lam Alexis MD      Patient Care Team:  Wolf Carvalho MD as PCP - General (Geriatric Medicine)  Fatou Waldron MD as Consulting Physician (Nephrology)      Provider:  Lam Alexis MD  Patient Name: Daquan Xiong  :  1985    SUBJECTIVE:    F/U URIEL/CKD/acidosis  No chest pain or SOA    Medication:  cefTRIAXone, 2,000 mg, Intravenous, Q24H  febuxostat, 40 mg, Oral, Daily  lactulose, 20 g, Oral, TID  metoclopramide, 10 mg, Intravenous, Q6H  midodrine, 20 mg, Oral, Q8H  octreotide, 100 mcg, Intravenous, TID  oxyCODONE, 5 mg, Oral, Once  pantoprazole, 40 mg, Intravenous, Q12H  riFAXIMin, 550 mg, Oral, BID  sodium chloride, 10 mL, Intravenous, Q12H  sucralfate, 1 g, Oral, TID AC             OBJECTIVE    Vital Sign Min/Max for last 24 hours  Temp  Min: 96.5 øF (35.8 øC)  Max: 98.1 øF (36.7 øC)   BP  Min: 84/46  Max: 103/59   Pulse  Min: 49  Max: 67   Resp  Min: 13  Max: 18   SpO2  Min: 91 %  Max: 99 %   No data recorded   Weight  Min: 82.1 kg (181 lb)  Max: 82.1 kg (181 lb)     Flowsheet Rows      Flowsheet Row First Filed Value   Admission Height 185.4 cm (73\") Documented at 2023   Admission Weight 72.6 kg (160 lb) Documented at 2023            I/O this shift:  In: -   Out: 250 [Urine:250]  I/O last 3 completed shifts:  In: 1426 [P.O.:1320; I.V.:86; Blood:20]  Out: 1700 [Urine:1700]    Physical Exam:  General Appearance: alert, appears stated age and cooperative  Head: normocephalic, without obvious abnormality and atraumatic +DRY OP   Eyes: conjunctivae and sclerae normal and no icterus  Neck: supple and no JVD  Lungs: clear to auscultation and respirations regular  Heart: regular rhythm & normal rate and normal S1, S2 +BETHANY  Chest Wall: no abnormalities observed  Abdomen: normal bowel sounds and  +ASCITES/DISTENSION AND MILD BILAT LQ PAIN ON " PALPATION  Extremities: moves extremities well, 1+ edema, no cyanosis  Skin: +SCATTERED SCABS, ABRASIONS, ECCHYMOSIS  Neurologic: Alert, and oriented x 3. No focal deficits    Labs:    WBC WBC   Date Value Ref Range Status   08/23/2023 10.00 3.40 - 10.80 10*3/mm3 Final   08/22/2023 5.10 3.40 - 10.80 10*3/mm3 Final   08/21/2023 7.50 3.40 - 10.80 10*3/mm3 Final      HGB Hemoglobin   Date Value Ref Range Status   08/23/2023 8.3 (L) 13.0 - 17.7 g/dL Final   08/22/2023 7.8 (L) 13.0 - 17.7 g/dL Final   08/21/2023 7.7 (L) 13.0 - 17.7 g/dL Final   08/20/2023 6.4 (C) 13.0 - 17.7 g/dL Final   08/20/2023 7.1 (L) 13.0 - 17.7 g/dL Final      HCT Hematocrit   Date Value Ref Range Status   08/23/2023 25.1 (L) 37.5 - 51.0 % Final   08/22/2023 23.3 (L) 37.5 - 51.0 % Final   08/21/2023 22.8 (L) 37.5 - 51.0 % Final   08/20/2023 19.4 (C) 37.5 - 51.0 % Final   08/20/2023 21.4 (L) 37.5 - 51.0 % Final      Platelets No results found for: LABPLAT   MCV MCV   Date Value Ref Range Status   08/23/2023 102.9 (H) 79.0 - 97.0 fL Final   08/22/2023 102.0 (H) 79.0 - 97.0 fL Final   08/21/2023 99.7 (H) 79.0 - 97.0 fL Final          Sodium Sodium   Date Value Ref Range Status   08/23/2023 139 136 - 145 mmol/L Final   08/22/2023 142 136 - 145 mmol/L Final   08/21/2023 140 136 - 145 mmol/L Final   08/20/2023 139 136 - 145 mmol/L Final      Potassium Potassium   Date Value Ref Range Status   08/23/2023 3.6 3.5 - 5.2 mmol/L Final   08/22/2023 3.7 3.5 - 5.2 mmol/L Final   08/22/2023 3.2 (L) 3.5 - 5.2 mmol/L Final   08/21/2023 3.3 (L) 3.5 - 5.2 mmol/L Final   08/21/2023 3.0 (L) 3.5 - 5.2 mmol/L Final   08/20/2023 3.6 3.5 - 5.2 mmol/L Final      Chloride Chloride   Date Value Ref Range Status   08/23/2023 95 (L) 98 - 107 mmol/L Final   08/22/2023 94 (L) 98 - 107 mmol/L Final   08/21/2023 94 (L) 98 - 107 mmol/L Final   08/20/2023 96 (L) 98 - 107 mmol/L Final      CO2 CO2   Date Value Ref Range Status   08/23/2023 29.0 22.0 - 29.0 mmol/L Final   08/22/2023  31.0 (H) 22.0 - 29.0 mmol/L Final   08/21/2023 32.0 (H) 22.0 - 29.0 mmol/L Final   08/20/2023 26.0 22.0 - 29.0 mmol/L Final      BUN BUN   Date Value Ref Range Status   08/23/2023 38 (H) 6 - 20 mg/dL Final   08/22/2023 35 (H) 6 - 20 mg/dL Final   08/21/2023 40 (H) 6 - 20 mg/dL Final   08/20/2023 47 (H) 6 - 20 mg/dL Final      Creatinine Creatinine   Date Value Ref Range Status   08/23/2023 1.89 (H) 0.76 - 1.27 mg/dL Final   08/22/2023 2.14 (H) 0.76 - 1.27 mg/dL Final   08/21/2023 2.41 (H) 0.76 - 1.27 mg/dL Final   08/20/2023 2.71 (H) 0.76 - 1.27 mg/dL Final      Calcium Calcium   Date Value Ref Range Status   08/23/2023 9.5 8.6 - 10.5 mg/dL Final   08/22/2023 9.8 8.6 - 10.5 mg/dL Final   08/21/2023 9.9 8.6 - 10.5 mg/dL Final   08/20/2023 9.8 8.6 - 10.5 mg/dL Final      PO4 No components found for: PO4   Albumin Albumin   Date Value Ref Range Status   08/23/2023 4.3 3.5 - 5.2 g/dL Final   08/22/2023 4.7 3.5 - 5.2 g/dL Final   08/21/2023 4.6 3.5 - 5.2 g/dL Final      Magnesium Magnesium   Date Value Ref Range Status   08/23/2023 1.8 1.6 - 2.6 mg/dL Final   08/22/2023 1.8 1.6 - 2.6 mg/dL Final   08/21/2023 1.9 1.6 - 2.6 mg/dL Final      Uric Acid No components found for: URIC ACID     Imaging Results (Last 72 Hours)       Procedure Component Value Units Date/Time    CT Abdomen Pelvis Without Contrast [[id number]] Collected: 08/20/23 1315     Updated: 08/20/23 1324    Narrative:      CT ABDOMEN PELVIS WO CONTRAST    Date of Exam: 8/20/2023 11:23 AM EDT    Indication: Retroperitoneal bleed suspected.    Comparison: CT abdomen pelvis 7/3/2023    Technique: Axial CT images were obtained of the abdomen and pelvis without the administration of contrast. Sagittal and coronal reconstructions were performed.  Automated exposure control and iterative reconstruction methods were used.      Findings:  Moderate low-density bilateral pleural effusions increasing from prior exam. Hypodense blood pooling suggestive of anemia. Bibasilar  atelectasis. Superimposed tree-in-bud nodular opacities in the right middle lobe and lingula.    Small liver with morphologic changes of chronic underlying liver disease. Gallbladder unremarkable. No biliary dilation. No signs of active pancreatitis. Spleen grossly unremarkable. No suspicious adrenal nodule.    Urinary bladder partially collapsed around Rosario catheter with presumed instrumentation related intraluminal gas. No hydronephrosis. Symmetric renal size. Negative for nephrolithiasis. Prostate within normal limits in size.    Mild to moderate gastric distention with ingested fluid/debris refluxing into the distal esophagus which appears patulous. No signs of bowel obstruction.    Large volume ascites with apparent peritoneal thickening more conspicuous from prior exams. No organized collection or free air. Negative for retroperitoneal fluid collection.    Aortic atherosclerotic disease without aneurysm. Prominent central mesenteric and retroperitoneal nodes likely reactive and not significantly changed from prior exam.    High riding testicles located in the bilateral inguinal canals. Diffuse body wall edema worsening from prior exam. Degenerative changes in the lumbar spine without acute fracture or aggressive bone lesion.      Impression:      Impression:  1. Negative for retroperitoneal fluid collection.  2. Large volume ascites with peritoneal thickening suspicious for a nonspecific peritonitis. Consider paracentesis.  3. Findings of volume overload/third spacing progressing from prior exam including moderate bilateral pleural effusions, large volume ascites and worsening anasarca.  4. Multifocal tree-in-bud nodular opacities concerning for endobronchial infectious/inflammatory process, possibly early aspiration in the setting of refluxed fluid into a patulous distal esophagus. Consider nasogastric decompression and aspiration risk   precaution.  5. Morphologic changes of chronic liver  disease.            Electronically Signed: Marko Dimas MD    8/20/2023 1:21 PM EDT    Workstation ID: [id number]            Results for orders placed during the hospital encounter of 08/18/23    XR Chest 1 View    Narrative  XR CHEST 1 VW    Date of Exam: 8/19/2023 6:40 AM EDT    Indication: central line placement    Comparison: July 3, 2023    Findings:  There is a right internal jugular central line with the tip at the cavoatrial junction. The heart size is borderline enlarged. There is mild pulmonary vascular congestion. There are no focal infiltrates to suggest pneumonia.    Impression  Impression:  Cardiomegaly with mild pulmonary vascular prominence. Right IJ central line is in place with the tip at the cavoatrial junction. There is no pneumothorax.      Electronically Signed: Deven Machado MD  8/19/2023 6:54 AM EDT  Workstation ID: [id number]      Results for orders placed during the hospital encounter of 07/03/23    XR Chest 1 View    Narrative  XR CHEST 1 VW    Date of Exam: 7/3/2023 4:13 PM EDT    Indication: AMS    Comparison: AP portable chest 6/20/2023    Findings:  There is some bandlike atelectasis in the right lower lobe, similar to the prior examination. The previously described patchy left basilar airspace disease appears improved. No new airspace disease is identified. The heart size is normal. No pleural  effusion or pneumothorax is identified.    Impression  Impression:  Improving bibasilar atelectasis or pneumonia since 6/28/2023. No new airspace disease is appreciated.      Electronically Signed: Chanda Clark  7/3/2023 4:27 PM EDT  Workstation ID: [id number]      Results for orders placed during the hospital encounter of 06/23/23    XR Chest 1 View    Narrative  XR CHEST 1 VW    Date of Exam: 6/28/2023 11:45 AM EDT    Indication: eval for pneumonia    Comparison: None available.    Findings:  The lung volumes are low. Patchy airspace disease is seen within the lung bases bilaterally. Probable  small bilateral pleural effusions are present. The pulmonary vasculature appears unremarkable. No pneumothorax. No acute osseous abnormality identified.    Impression  Impression:  Low lung volumes with patchy airspace disease seen within the lung bases bilaterally, left greater than right with probable small bilateral pleural effusions, likely related to pneumonia and/or atelectasis.      Electronically Signed: Brynn Higuera  6/28/2023 12:00 PM EDT  Workstation ID: [id number]            ASSESSMENT / PLAN      URIEL (acute kidney injury)    GERD (gastroesophageal reflux disease)    Tobacco use disorder    Alcoholic cirrhosis    Severe malnutrition    Anemia      1.URIEL/CKD ---------. +Recurrent ARF/URIEL on top of known CRF/CKD STG 3A with a baseline serum creatinine of about 1.5. CRF/CKD STG 3A secondary to HTN NS. +ARF/URIEL is secondary to ATN from hypotension and prerenal state/intravascular volume depletion. Avoid hypotension.  No NSAIDs or IV dye. Dose meds for CrCl less than 10 cc/min until ARF/URIEL is resolved     2. HYPERKALEMIA--------Resolved. Secondary to RF and acidosis. Med Rx. Telemetry. K+ restrict diet and follow     3. ETOH CIRRHOSIS/HEPATIC ENCEPHALOPATHY/ASCITES-----Restarted Lactulose. Follow Ammonia. Likely needs Paracentesis. S/P IV Albumin and on Midodrine     4. HYPOMAGNESEMIA--------Follow levels     5. HYPERURICEMIA------Uloric     6. HYPOCALCEMIA-------Replace IV     7. ANEMIA-------H/H stable.       8. HYPOALBUMINEMIA-----Albumin normal. S/P IV Albumin to temporize     9. ELEVATED INR------Related to cirrhosis     10. HYPOTENSION------On Midodrine     11. BIPOLAR DISEASE     12. BASILIO     13. POLYSUBSTANCE ABUSE/TOBACCO USE      14. KETONURIA------Secondary to intravascular volume depletion. IVFs     15. HYPOTENSION-----Levophed, IVFs, IV Albumin, Midodrine     16. ACIDOSIS-------- +Type 4 RTA and from stool losses related Lactulose use (when he actually takes it)     17. N/V/GERD/PUD  PROPHYLAXIS------IV PPI     18. DVT PROPHYLAXIS------SCDs      Patient nonoliguric, making urine  Creatinine stable  Blood pressure still soft, on midodrine  Monitor renal function fluid status electrolytes       Lam Alexis MD  [facility]  [phone]  08/23/23  10:38 EDT

## 2023-08-23 NOTE — PLAN OF CARE
Problem: Adult Inpatient Plan of Care  Goal: Plan of Care Review  Outcome: Ongoing, Progressing  Flowsheets (Taken 8/23/2023 0635)  Progress: no change  Plan of Care Reviewed With: patient  Goal: Patient-Specific Goal (Individualized)  Outcome: Ongoing, Progressing  Goal: Absence of Hospital-Acquired Illness or Injury  Outcome: Ongoing, Progressing  Intervention: Identify and Manage Fall Risk  Recent Flowsheet Documentation  Taken 8/23/2023 0600 by Kylie Beyer RN  Safety Promotion/Fall Prevention: safety round/check completed  Taken 8/23/2023 0400 by Kylie Beyer RN  Safety Promotion/Fall Prevention: safety round/check completed  Taken 8/23/2023 0200 by Kylie Beyer RN  Safety Promotion/Fall Prevention:   activity supervised   assistive device/personal items within reach   clutter free environment maintained   safety round/check completed  Taken 8/23/2023 0000 by Kylie Beyer RN  Safety Promotion/Fall Prevention:   activity supervised   assistive device/personal items within reach   clutter free environment maintained   safety round/check completed  Taken 8/22/2023 2200 by Kylie Beyer RN  Safety Promotion/Fall Prevention:   activity supervised   assistive device/personal items within reach   clutter free environment maintained   safety round/check completed  Taken 8/22/2023 2000 by Kylie Beyer RN  Safety Promotion/Fall Prevention: safety round/check completed  Intervention: Prevent Skin Injury  Recent Flowsheet Documentation  Taken 8/23/2023 0600 by Kylie Beyer RN  Body Position: position changed independently  Taken 8/23/2023 0400 by Kylie Beyer RN  Body Position:   turned   position changed independently  Skin Protection: tubing/devices free from skin contact  Taken 8/23/2023 0200 by Kylie Beyer RN  Body Position: position changed independently  Taken 8/23/2023 0000 by Kylie Beyer RN  Body Position: position changed independently  Skin Protection: tubing/devices free from  skin contact  Taken 8/22/2023 2200 by Kylie Beyer RN  Body Position: position changed independently  Taken 8/22/2023 2000 by Kylie Beyer RN  Body Position: position changed independently  Skin Protection: tubing/devices free from skin contact  Intervention: Prevent and Manage VTE (Venous Thromboembolism) Risk  Recent Flowsheet Documentation  Taken 8/23/2023 0600 by Kylie Beyer RN  Activity Management: bedrest  Taken 8/23/2023 0400 by Kylie Beyer RN  Activity Management: bedrest  Taken 8/23/2023 0200 by Kylie Beyer RN  Activity Management: bedrest  Taken 8/23/2023 0000 by Kylie Beyer RN  Activity Management: bedrest  VTE Prevention/Management: patient refused intervention  Taken 8/22/2023 2200 by Kylie Beyer RN  Activity Management: bedrest  Taken 8/22/2023 2000 by Kylie Beyer RN  Activity Management: bedrest  VTE Prevention/Management: patient refused intervention  Intervention: Prevent Infection  Recent Flowsheet Documentation  Taken 8/23/2023 0600 by Kylie Beyer RN  Infection Prevention: cohorting utilized  Taken 8/23/2023 0400 by Kylie Beyer RN  Infection Prevention: cohorting utilized  Taken 8/23/2023 0200 by Kylie Beyer RN  Infection Prevention: cohorting utilized  Taken 8/23/2023 0000 by Kylie Beyer RN  Infection Prevention: cohorting utilized  Taken 8/22/2023 2200 by Kylie Beyer RN  Infection Prevention: cohorting utilized  Taken 8/22/2023 2000 by Kylie Beyer RN  Infection Prevention: cohorting utilized  Goal: Optimal Comfort and Wellbeing  Outcome: Ongoing, Progressing  Intervention: Provide Person-Centered Care  Recent Flowsheet Documentation  Taken 8/23/2023 0400 by Kylie Beyer RN  Trust Relationship/Rapport:   care explained   choices provided   emotional support provided   empathic listening provided   questions answered   questions encouraged   reassurance provided   thoughts/feelings acknowledged  Taken 8/23/2023 0000 by Kylie Beyer  RN  Trust Relationship/Rapport:   care explained   choices provided   emotional support provided   empathic listening provided   questions answered   questions encouraged   reassurance provided   thoughts/feelings acknowledged  Taken 8/22/2023 2000 by Kylie Beyer RN  Trust Relationship/Rapport:   choices provided   care explained   empathic listening provided   emotional support provided   questions answered   questions encouraged   reassurance provided   thoughts/feelings acknowledged  Goal: Readiness for Transition of Care  Outcome: Ongoing, Progressing     Problem: Fall Injury Risk  Goal: Absence of Fall and Fall-Related Injury  Outcome: Ongoing, Progressing  Intervention: Identify and Manage Contributors  Recent Flowsheet Documentation  Taken 8/23/2023 0600 by Kylie Beyer RN  Medication Review/Management: medications reviewed  Self-Care Promotion: independence encouraged  Taken 8/23/2023 0400 by Kylie Beyer RN  Medication Review/Management: medications reviewed  Self-Care Promotion: independence encouraged  Taken 8/23/2023 0200 by Kylie Beyer RN  Medication Review/Management: medications reviewed  Self-Care Promotion: independence encouraged  Taken 8/23/2023 0000 by Kylie Beyer RN  Medication Review/Management: medications reviewed  Self-Care Promotion: independence encouraged  Taken 8/22/2023 2200 by Kylie Beyer RN  Medication Review/Management: medications reviewed  Self-Care Promotion: independence encouraged  Taken 8/22/2023 2000 by Kylie Beyer RN  Medication Review/Management: medications reviewed  Self-Care Promotion: independence encouraged  Intervention: Promote Injury-Free Environment  Recent Flowsheet Documentation  Taken 8/23/2023 0600 by Kylie Beyer RN  Safety Promotion/Fall Prevention: safety round/check completed  Taken 8/23/2023 0400 by Kylie Beyer RN  Safety Promotion/Fall Prevention: safety round/check completed  Taken 8/23/2023 0200 by Kylie Beyer  RN  Safety Promotion/Fall Prevention:   activity supervised   assistive device/personal items within reach   clutter free environment maintained   safety round/check completed  Taken 8/23/2023 0000 by Kylie Beyer RN  Safety Promotion/Fall Prevention:   activity supervised   assistive device/personal items within reach   clutter free environment maintained   safety round/check completed  Taken 8/22/2023 2200 by Kylie Beyer RN  Safety Promotion/Fall Prevention:   activity supervised   assistive device/personal items within reach   clutter free environment maintained   safety round/check completed  Taken 8/22/2023 2000 by Kylie Beyer RN  Safety Promotion/Fall Prevention: safety round/check completed     Problem: Skin Injury Risk Increased  Goal: Skin Health and Integrity  Outcome: Ongoing, Progressing  Intervention: Optimize Skin Protection  Recent Flowsheet Documentation  Taken 8/23/2023 0600 by Kylie Beyer RN  Head of Bed (HOB) Positioning:   HOB elevated   HOB at 30 degrees  Taken 8/23/2023 0400 by Kylie Beyer RN  Pressure Reduction Techniques: weight shift assistance provided  Head of Bed (HOB) Positioning:   HOB elevated   HOB at 30 degrees  Pressure Reduction Devices: pressure-redistributing mattress utilized  Skin Protection: tubing/devices free from skin contact  Taken 8/23/2023 0200 by Kylie Beyer RN  Head of Bed (HOB) Positioning:   HOB elevated   HOB at 30-45 degrees  Taken 8/23/2023 0000 by Kylie Beyer RN  Pressure Reduction Techniques: weight shift assistance provided  Head of Bed (HOB) Positioning:   HOB elevated   HOB at 30 degrees  Pressure Reduction Devices: positioning supports utilized  Skin Protection: tubing/devices free from skin contact  Taken 8/22/2023 2200 by Kylie Beyer RN  Head of Bed (HOB) Positioning:   HOB elevated   HOB at 30 degrees  Taken 8/22/2023 2000 by Kylie Beyer RN  Pressure Reduction Techniques: weight shift assistance provided  Head of Bed  (HOB) Positioning:   HOB elevated   HOB at 30 degrees  Pressure Reduction Devices: positioning supports utilized  Skin Protection: tubing/devices free from skin contact     Problem: Malnutrition  Goal: Improved Nutritional Intake  Outcome: Ongoing, Progressing   Goal Outcome Evaluation:  Plan of Care Reviewed With: patient        Progress: no change     Patient has been stable throughout shift, patient on room air, no family at bedside to update on events, patient has been off levophed all night, patient is able to swallow pills with water.

## 2023-08-23 NOTE — PLAN OF CARE
"Assessment: Daquan Xiong presents with functional mobility impairments which indicate the need for skilled intervention. Pt completed bed mobility with min a and increased time/effort. Pt then completed static/dynamic standing with STC including min reaching tasks in various plane while performing ADL tasks. Pt does reports he is unable to reach OH due to decreased balance and posterior LOB. Pt amb within facility on level surfaces with CGA/min a for stability. Mild instability noted but no overt LOB noted. Tolerating session today without incident. Will continue to follow and progress as tolerated.     Plan/Recommendations:   Low Intensity Therapy recommended post-acute care - This is recommended as therapy feels this patient would require 2-3 visits per week. OP or HH would be the best option depending on patient's home bound status. Consider, if the patient has other  \"skilled\" needs such as wounds, IV antibiotics, etc. Combined with \"low intensity\" could also equate to a SNF. If patient is medically complex, consider LTAC.. Pt requires no DME at discharge.     Pt desires Home with Home Health and Home with family assist at discharge. Pt cooperative; agreeable to therapeutic recommendations and plan of care.   "

## 2023-08-23 NOTE — PROGRESS NOTES
LOS: 4 days   Patient Care Team:  Wolf Carvalho MD as PCP - General (Geriatric Medicine)  Fatou Waldron MD as Consulting Physician (Nephrology)      Subjective     Subjective: Patient with no major changes overnight.  Eating well and reports having bowel movements.  No nausea/vomiting.  Urinating well.      ROS:   Review of Systems   Constitutional:  Negative for chills and fever.   Respiratory:  Negative for cough and shortness of breath.    Cardiovascular:  Negative for chest pain.   Gastrointestinal:  Negative for abdominal pain, blood in stool, nausea and vomiting.   Neurological:  Positive for weakness.   Psychiatric/Behavioral:  Negative for agitation and confusion.       Medication Review:   Scheduled Meds:cefTRIAXone, 2,000 mg, Intravenous, Q24H  febuxostat, 40 mg, Oral, Daily  lactulose, 20 g, Oral, TID  metoclopramide, 10 mg, Intravenous, Q6H  midodrine, 20 mg, Oral, Q8H  octreotide, 100 mcg, Intravenous, TID  oxyCODONE, 5 mg, Oral, Once  pantoprazole, 40 mg, Intravenous, Q12H  riFAXIMin, 550 mg, Oral, BID  sodium chloride, 10 mL, Intravenous, Q12H  sucralfate, 1 g, Oral, TID AC      Continuous Infusions:   PRN Meds:.  albumin human    albumin human    ALPRAZolam    calcium carbonate    lactated ringers    lidocaine    lidocaine    lidocaine    Magnesium Standard Dose Replacement - Follow Nurse / BPA Driven Protocol    melatonin    nitroglycerin    ondansetron **OR** ondansetron    oxyCODONE    Phosphorus Replacement - Follow Nurse / BPA Driven Protocol    Potassium Replacement - Follow Nurse / BPA Driven Protocol    [COMPLETED] Insert Peripheral IV **AND** sodium chloride    sodium chloride    sodium chloride      Objective     Vital Signs  Temp:  [96.5 øF (35.8 øC)-98.1 øF (36.7 øC)] 97.7 øF (36.5 øC)  Heart Rate:  [49-67] 53  Resp:  [13-18] 14  BP: ()/(34-63) 90/50    Physical Exam:    General Appearance:    Awake and alert, in no acute distress   Head:    Normocephalic, without  obvious abnormality   Eyes:          Conjunctivae normal, icteric sclera   Ears:    Hearing intact   Throat:   No oral lesions, no thrush, oral mucosa moist   Neck:   No adenopathy, supple, no JVD   Lungs:     Respirations regular, even and unlabored       Abdomen:     Soft, non-tender, no rebound or guarding, non-distended   Rectal:     Deferred   Extremities:   No edema, no cyanosis, no redness   Skin:   No bleeding, bruising or rash, + jaundice   Neurologic:   Sensation intact        Results Review:    CBC  Results from last 7 days   Lab Units 08/23/23  0326 08/22/23  0432 08/21/23  0452 08/20/23  2027 08/20/23  1234 08/20/23  0602 08/19/23  0404 08/18/23  2315   RBC 10*6/mm3 2.44* 2.29* 2.28*  --   --  2.01* 2.39* 2.87*   WBC 10*3/mm3 10.00 5.10 7.50  --   --  6.70 10.70 9.50   HEMOGLOBIN g/dL 8.3* 7.8* 7.7* 6.4* 7.1* 7.0* 8.4* 10.1*   PLATELETS 10*3/mm3 60* 59* 67*  --   --  98* 122* 116*       CMP  Results from last 7 days   Lab Units 08/23/23  0326 08/22/23  1656 08/22/23  0432 08/21/23  2130 08/21/23  0452 08/20/23  1317 08/20/23  1234 08/20/23  0602 08/20/23  0022 08/19/23  1734 08/19/23  0938 08/19/23  0233 08/18/23  2315 08/18/23  2202   SODIUM mmol/L 139  --  142  --  140  --  139 136 136 137   < > 127*  --  130*   POTASSIUM mmol/L 3.6 3.7 3.2* 3.3* 3.0*  --  3.6 3.6 3.9 4.4   < > 5.1  --  5.9*   CHLORIDE mmol/L 95*  --  94*  --  94*  --  96* 95* 98 100   < > 95*  --  98   CO2 mmol/L 29.0  --  31.0*  --  32.0*  --  26.0 26.0 24.0 20.0*   < > 20.0*  --  18.0*   BUN mg/dL 38*  --  35*  --  40*  --  47* 52* 58* 62*   < > 69*  --  74*   CREATININE mg/dL 1.89*  --  2.14*  --  2.41*  --  2.71* 3.03* 2.96* 3.27*   < > 4.04*  --  3.91*   GLUCOSE mg/dL 145*  --  138*  --  145*  --  149* 170* 151* 146*   < > 143*  --  86   ALBUMIN g/dL 4.3  --  4.7  --  4.6  --   --  4.2  --   --   --  2.5*  --  2.3*   BILIRUBIN mg/dL 4.1*  --  4.7*  --  4.7*  --   --  4.2*  --   --   --  4.7*  --  4.8*   ALK PHOS U/L 108  --  103   --  116  --   --  152*  --   --   --  245*  --  232*   AST (SGOT) U/L 113*  --  54*  --  44*  --   --  56*  --   --   --  84*  --  77*   ALT (SGPT) U/L 38  --  13  --  15  --   --  18  --   --   --  28  --  30   AMMONIA umol/L  --   --  54  --  74* 91*  --   --   --   --   --   --  52  --     < > = values in this interval not displayed.       Amylase and Lipase  Results from last 7 days   Lab Units 08/18/23  2111   LIPASE U/L 68*       CRP         Imaging Results (Last 24 Hours)       ** No results found for the last 24 hours. **              Assessment & Plan   37-year-old female admitted 8/18/2023 with elevated creatinine at 3 as outpatient.  Has alcohol hepatitis/cirrhosis that has been complicated by ascites, nonbleeding esophageal varices, and hepatic encephalopathy.  Sober from alcohol since July 2022.     -ETOH Cirrhosis complicated by ascites, HE, and esophageal varices  -Acute renal failure on chronic kidney disease  -Hypotension  -Elevated LFTs  -Elevated ammonia   -Macrocytic anemia  -Belching/GERD     Status post EGD 8/21/2023 by Dr. Padilla -grade D erosive esophagitis, small F1 esophageal varices without bleeding, moderate portal hypertensive gastropathy, and retained undigested food in the stomach.     PLAN:   Patient with no major GI complaints today.  His acid reflux symptoms have improved.  He reports eating well.  Hemoglobin 8.3 today.  Creatinine improved to 1.89.  Total bilirubin slightly down to 4.1.  He continues on octreotide and midodrine.  Also on PPI, Reglan, and Carafate.  Continued to encourage nutrition with 2 g sodium diet as well as protein supplements.  He remains on lactulose and Xifaxan.  Continue to monitor labs with hopes for discharge so he can make his appointment with liver transplant team on 8/28/2023.  MELD sodium score is 24.  AFP is 4.98 on 6/25/2023.  Right upper quadrant ultrasound done on 6/25/2023 no signs of focal lesions.      Maria C Villela,  MONICA  08/23/23  10:01 EDT

## 2023-08-23 NOTE — PROGRESS NOTES
[facility] Medicine Services   Daily Progress Note    Patient Name: Daquan Xiong  : 1985  MRN: [id number]  Primary Care Physician:  Wolf Carvalho MD  Date of admission: 2023  Date and Time of Service:  at       Subjective      Chief Complaint:     No bowel movements today.  Refusing lactulose because he is saying he does not have a toilet that he can go to.  Denies any abdominal pain no nausea or vomiting fevers or chills.  Worked with therapy.  No chills sweats.  No cough or shortness of breath      Objective      Vitals:   Temp:  [96.5 øF (35.8 øC)-98.1 øF (36.7 øC)] 97.7 øF (36.5 øC)  Heart Rate:  [49-67] 53  Resp:  [13-18] 14  BP: ()/(34-63) 90/50    Physical Exam  General: No acute distress  HEENT: Neck supple, normal oral mucosa, no masses, no lymphadenopathy  Lungs: diminished breath sounds bilateral bases, no wheezing, no crackles, no rhonchi. Equal excursions.   CV - Normal S1/S2, no murmur, regular rate and rhythm   Abdomen - Soft, nontender, + mildly distended, normal bowel sounds  Extremities - + bilateral LE pitting edema, no erythema  Neuro - No focal weakness, normal sensation, generalized weakness.  Psych - Alert and oriented x3  Skin - no wounds or lesions.     Result Review    Result Review:  I have personally reviewed the results from the time of this admission to 2023 11:42 EDT and agree with these findings:  [x]  Laboratory  [x]  Microbiology  [x]  Radiology  [x]  EKG/Telemetry   [x]  Cardiology/Vascular   []  Pathology  [x]  Old records  []  Other:  Most notable findings include:     Wounds (last 24 hours)       LDA Wound       Row Name 23 0400 23 0000 23 2000       Wound 23 1551 Left gluteal Pressure Injury    Wound - Properties Group Placement Date: 23  -ER Placement Time: 1551  -ER Present on Hospital Admission: N  -ER Side: Left  -ER Location: gluteal  -ER Primary Wound Type: Pressure inj  -ER    Dressing Appearance -- --  open to air  -LL    Closure None  -LL None  -LL None  -LL    Base blanchable  -LL blanchable  -LL blanchable  -LL    Retired Wound - Properties Group Placement Date: 08/21/23  -ER Placement Time: 1551  -ER Present on Hospital Admission: N  -ER Side: Left  -ER Location: gluteal  -ER Primary Wound Type: Pressure inj  -ER    Retired Wound - Properties Group Date first assessed: 08/21/23  -ER Time first assessed: 1551  -ER Present on Hospital Admission: N  -ER Side: Left  -ER Location: gluteal  -ER Primary Wound Type: Pressure inj  -ER      Row Name 08/22/23 1600 08/22/23 1542          Wound 08/21/23 1551 Left gluteal Pressure Injury    Wound - Properties Group Placement Date: 08/21/23  -ER Placement Time: 1551  -ER Present on Hospital Admission: N  -ER Side: Left  -ER Location: gluteal  -ER Primary Wound Type: Pressure inj  -ER    Dressing Appearance open to air  -ER open to air  -ER     Closure None  -ER None  -ER     Base blanchable  -ER blanchable  -ER     Care, Wound pressure removed  -ER --     Retired Wound - Properties Group Placement Date: 08/21/23  -ER Placement Time: 1551  -ER Present on Hospital Admission: N  -ER Side: Left  -ER Location: gluteal  -ER Primary Wound Type: Pressure inj  -ER    Retired Wound - Properties Group Date first assessed: 08/21/23  -ER Time first assessed: 1551  -ER Present on Hospital Admission: N  -ER Side: Left  -ER Location: gluteal  -ER Primary Wound Type: Pressure inj  -ER              User Key  (r) = Recorded By, (t) = Taken By, (c) = Cosigned By      Initials Name Provider Type    Kylie Irby, RN Registered Nurse    Emilie Lyn RN Registered Nurse                      Assessment & Plan      Brief Patient Summary:  Daquan Xiong is a 37 y.o. male who       cefTRIAXone, 2,000 mg, Intravenous, Q24H  febuxostat, 40 mg, Oral, Daily  lactulose, 20 g, Oral, TID  metoclopramide, 10 mg, Intravenous, Q6H  midodrine, 20 mg, Oral, Q8H  octreotide, 100 mcg, Intravenous,  TID  oxyCODONE, 5 mg, Oral, Once  pantoprazole, 40 mg, Intravenous, Q12H  riFAXIMin, 550 mg, Oral, BID  sodium chloride, 10 mL, Intravenous, Q12H  sucralfate, 1 g, Oral, TID AC             Active Hospital Problems:  Active Hospital Problems    Diagnosis     **URIEL (acute kidney injury)     Anemia     Severe malnutrition     GERD (gastroesophageal reflux disease)     Tobacco use disorder     Alcoholic cirrhosis      Plan:       Septic shock  - likely secondary to bacterial Pneumonia  - Continue Rocephin for 5 days.   - s/p Vasopressin  - on Midodrine.   - Hydrocortisone discontinued. Monitor off hydrocortisone      URIEL on CKD III  - Hyperkalemia - Resolved.   - creatinine trending down.   - at some point might benefit of diuresis/aldactone if kidney function tolerates. Defer to Nephrology      Bilateral Pleural effusions  - likely due to Ascites  - s/p paracentesis.      Acute on chronic anemia  - s/p EGD 8/21 - with nonbleeding esophageal varices and esophagitis  - s/p 1 unit PRBC since admission   - On Octreotide, PPI IV Q12 and Sucralfate.  - GI on consult.   - Nodalol on hold due to hypotension      Alcoholic liver cirrhosis with ascites  - s/p Paracentesis 5L - 8/20  - awaiting liver [facility].      Hyperammonemia  - continue Rifaximin and lactulose.   - patient refusing his lactulose.      Thrombocytopenia  - trending down. Continue to monitor closely. Likely      Coagulopathy - s/p Vitamin K     Elevated bilirubin     Severe malnutrition      Hyperuricemia - Allopurinol     H/o Alcohol abuse - Sober since July 2022      Hopefully home in next 1-2 days to make his appointment on 8/28/2023 with Liver team.     DVT prophylaxis:  Mechanical DVT prophylaxis orders are present.    CODE STATUS:    Code Status (Patient has no pulse and is not breathing): CPR (Attempt to Resuscitate)  Medical Interventions (Patient has pulse or is breathing): Full Support      Disposition:  I expect patient to be  discharged in next 1-2 days home with home health .    This patient has been and discussed with . 08/23/23      Electronically signed by Philly Johnson MD, 08/23/23, 11:42 EDT.  [facility] Hospitalist Team

## 2023-08-23 NOTE — THERAPY TREATMENT NOTE
"Subjective: Pt agreeable to therapeutic plan of care.    Objective:     Bed mobility - CGA and Min-A  Transfers - CGA and Min-A with STC   Ambulation - 75 feet CGA and Min-A with STC     Vitals: WNL    Pain: 5 VAS   Location: abdomen, BLEs  Intervention for pain: Repositioned and Increased Activity    Education: Provided education on the importance of mobility in the acute care setting, Transfer Training, and Energy conservation strategies    Assessment: Daquan Xiong presents with functional mobility impairments which indicate the need for skilled intervention. Pt completed bed mobility with min a and increased time/effort. Pt then completed static/dynamic standing with STC including min reaching tasks in various plane while performing ADL tasks. Pt does reports he is unable to reach OH due to decreased balance and posterior LOB. Pt amb within facility on level surfaces with CGA/min a for stability. Mild instability noted but no overt LOB noted. Tolerating session today without incident. Will continue to follow and progress as tolerated.     Plan/Recommendations:   Low Intensity Therapy recommended post-acute care - This is recommended as therapy feels this patient would require 2-3 visits per week. OP or HH would be the best option depending on patient's home bound status. Consider, if the patient has other  \"skilled\" needs such as wounds, IV antibiotics, etc. Combined with \"low intensity\" could also equate to a SNF. If patient is medically complex, consider LTAC.. Pt requires no DME at discharge.     Pt desires Home with Home Health and Home with family assist at discharge. Pt cooperative; agreeable to therapeutic recommendations and plan of care.         Basic Mobility 6-click:  Rollin = Total, A lot = 2, A little = 3; 4 = None  Supine>Sit:   1 = Total, A lot = 2, A little = 3; 4 = None   Sit>Stand with arms:  1 = Total, A lot = 2, A little = 3; 4 = None  Bed>Chair:   1 = Total, A lot = 2, A little = " 3; 4 = None  Ambulate in room:  1 = Total, A lot = 2, A little = 3; 4 = None  3-5 Steps with railin = Total, A lot = 2, A little = 3; 4 = None  Score: 16    Modified [place]: N/A = No pre-op stroke/TIA    Post-Tx Position: Up in Chair, Staff Present, Alarms activated, and Call light and personal items within reach  PPE: gloves

## 2023-08-23 NOTE — PLAN OF CARE
Goal Outcome Evaluation:           Progress: no change  Outcome Evaluation: Pt downgraded from ICU, no issues since arriving. VSS

## 2023-08-24 ENCOUNTER — INPATIENT HOSPITAL (OUTPATIENT)
Dept: URBAN - METROPOLITAN AREA HOSPITAL 84 | Facility: HOSPITAL | Age: 38
End: 2023-08-24
Payer: COMMERCIAL

## 2023-08-24 ENCOUNTER — READMISSION MANAGEMENT (OUTPATIENT)
Dept: CALL CENTER | Facility: HOSPITAL | Age: 38
End: 2023-08-24
Payer: COMMERCIAL

## 2023-08-24 VITALS
WEIGHT: 182.1 LBS | OXYGEN SATURATION: 92 % | HEIGHT: 73 IN | BODY MASS INDEX: 24.13 KG/M2 | RESPIRATION RATE: 16 BRPM | TEMPERATURE: 98 F | HEART RATE: 56 BPM | SYSTOLIC BLOOD PRESSURE: 98 MMHG | DIASTOLIC BLOOD PRESSURE: 50 MMHG

## 2023-08-24 DIAGNOSIS — R74.01 ELEVATION OF LEVELS OF LIVER TRANSAMINASE LEVELS: ICD-10-CM

## 2023-08-24 DIAGNOSIS — I85.00 ESOPHAGEAL VARICES WITHOUT BLEEDING: ICD-10-CM

## 2023-08-24 DIAGNOSIS — D53.9 NUTRITIONAL ANEMIA, UNSPECIFIED: ICD-10-CM

## 2023-08-24 DIAGNOSIS — K70.31 ALCOHOLIC CIRRHOSIS OF LIVER WITH ASCITES: ICD-10-CM

## 2023-08-24 DIAGNOSIS — K76.82 HEPATIC ENCEPHALOPATHY: ICD-10-CM

## 2023-08-24 DIAGNOSIS — K21.9 GASTRO-ESOPHAGEAL REFLUX DISEASE WITHOUT ESOPHAGITIS: ICD-10-CM

## 2023-08-24 LAB
ALBUMIN SERPL-MCNC: 3.6 G/DL (ref 3.5–5.2)
ALBUMIN/GLOB SERPL: 3 G/DL
ALP SERPL-CCNC: 115 U/L (ref 39–117)
ALT SERPL W P-5'-P-CCNC: 62 U/L (ref 1–41)
ANION GAP SERPL CALCULATED.3IONS-SCNC: 11 MMOL/L (ref 5–15)
AST SERPL-CCNC: 150 U/L (ref 1–40)
BACTERIA FLD CULT: NORMAL
BASOPHILS # BLD AUTO: 0 10*3/MM3 (ref 0–0.2)
BASOPHILS NFR BLD AUTO: 0.3 % (ref 0–1.5)
BILIRUB SERPL-MCNC: 2.6 MG/DL (ref 0–1.2)
BUN SERPL-MCNC: 34 MG/DL (ref 6–20)
BUN/CREAT SERPL: 22.4 (ref 7–25)
CALCIUM SPEC-SCNC: 9.1 MG/DL (ref 8.6–10.5)
CHLORIDE SERPL-SCNC: 94 MMOL/L (ref 98–107)
CO2 SERPL-SCNC: 31 MMOL/L (ref 22–29)
CREAT SERPL-MCNC: 1.52 MG/DL (ref 0.76–1.27)
DEPRECATED RDW RBC AUTO: 59.1 FL (ref 37–54)
EGFRCR SERPLBLD CKD-EPI 2021: 59.8 ML/MIN/1.73
EOSINOPHIL # BLD AUTO: 0 10*3/MM3 (ref 0–0.4)
EOSINOPHIL NFR BLD AUTO: 0 % (ref 0.3–6.2)
ERYTHROCYTE [DISTWIDTH] IN BLOOD BY AUTOMATED COUNT: 16.6 % (ref 12.3–15.4)
GLOBULIN UR ELPH-MCNC: 1.2 GM/DL
GLUCOSE SERPL-MCNC: 208 MG/DL (ref 65–99)
GRAM STN SPEC: NORMAL
GRAM STN SPEC: NORMAL
HCT VFR BLD AUTO: 25.3 % (ref 37.5–51)
HGB BLD-MCNC: 8.2 G/DL (ref 13–17.7)
INR PPP: 1.73 (ref 0.93–1.1)
LYMPHOCYTES # BLD AUTO: 1.2 10*3/MM3 (ref 0.7–3.1)
LYMPHOCYTES NFR BLD AUTO: 9.9 % (ref 19.6–45.3)
MCH RBC QN AUTO: 33.6 PG (ref 26.6–33)
MCHC RBC AUTO-ENTMCNC: 32.5 G/DL (ref 31.5–35.7)
MCV RBC AUTO: 103.4 FL (ref 79–97)
MONOCYTES # BLD AUTO: 1.2 10*3/MM3 (ref 0.1–0.9)
MONOCYTES NFR BLD AUTO: 10.1 % (ref 5–12)
NEUTROPHILS NFR BLD AUTO: 79.7 % (ref 42.7–76)
NEUTROPHILS NFR BLD AUTO: 9.6 10*3/MM3 (ref 1.7–7)
NRBC BLD AUTO-RTO: 0 /100 WBC (ref 0–0.2)
PLATELET # BLD AUTO: 56 10*3/MM3 (ref 140–450)
PMV BLD AUTO: 10.7 FL (ref 6–12)
POTASSIUM SERPL-SCNC: 3.4 MMOL/L (ref 3.5–5.2)
PROT SERPL-MCNC: 4.8 G/DL (ref 6–8.5)
PROTHROMBIN TIME: 17.9 SECONDS (ref 9.6–11.7)
RBC # BLD AUTO: 2.45 10*6/MM3 (ref 4.14–5.8)
SODIUM SERPL-SCNC: 136 MMOL/L (ref 136–145)
WBC NRBC COR # BLD: 12 10*3/MM3 (ref 3.4–10.8)

## 2023-08-24 PROCEDURE — 85025 COMPLETE CBC W/AUTO DIFF WBC: CPT | Performed by: NURSE PRACTITIONER

## 2023-08-24 PROCEDURE — 25010000002 METOCLOPRAMIDE PER 10 MG: Performed by: INTERNAL MEDICINE

## 2023-08-24 PROCEDURE — 25010000002 CEFTRIAXONE PER 250 MG: Performed by: INTERNAL MEDICINE

## 2023-08-24 PROCEDURE — 85610 PROTHROMBIN TIME: CPT | Performed by: NURSE PRACTITIONER

## 2023-08-24 PROCEDURE — 80053 COMPREHEN METABOLIC PANEL: CPT | Performed by: INTERNAL MEDICINE

## 2023-08-24 PROCEDURE — 99232 SBSQ HOSP IP/OBS MODERATE 35: CPT | Performed by: NURSE PRACTITIONER

## 2023-08-24 RX ORDER — FEBUXOSTAT 40 MG/1
40 TABLET, FILM COATED ORAL DAILY
Qty: 30 TABLET | Refills: 0 | Status: SHIPPED | OUTPATIENT
Start: 2023-08-25 | End: 2023-09-24

## 2023-08-24 RX ORDER — DIAPER,BRIEF,INFANT-TODD,DISP
1 EACH MISCELLANEOUS EVERY 12 HOURS SCHEDULED
Status: DISCONTINUED | OUTPATIENT
Start: 2023-08-24 | End: 2023-08-24

## 2023-08-24 RX ORDER — LACTULOSE 10 G/15ML
20 SOLUTION ORAL 3 TIMES DAILY
Qty: 473 ML | Refills: 0 | Status: SHIPPED | OUTPATIENT
Start: 2023-08-24

## 2023-08-24 RX ORDER — SUCRALFATE 1 G/1
1 TABLET ORAL
Qty: 90 TABLET | Refills: 0 | Status: SHIPPED | OUTPATIENT
Start: 2023-08-24 | End: 2023-09-23

## 2023-08-24 RX ORDER — DIAPER,BRIEF,INFANT-TODD,DISP
1 EACH MISCELLANEOUS EVERY 12 HOURS SCHEDULED
Status: DISCONTINUED | OUTPATIENT
Start: 2023-08-24 | End: 2023-08-24 | Stop reason: HOSPADM

## 2023-08-24 RX ORDER — PANTOPRAZOLE SODIUM 40 MG/1
40 TABLET, DELAYED RELEASE ORAL
Qty: 60 TABLET | Refills: 0 | Status: SHIPPED | OUTPATIENT
Start: 2023-08-24 | End: 2023-09-23

## 2023-08-24 RX ORDER — POTASSIUM CHLORIDE 20 MEQ/1
40 TABLET, EXTENDED RELEASE ORAL EVERY 4 HOURS
Status: COMPLETED | OUTPATIENT
Start: 2023-08-24 | End: 2023-08-24

## 2023-08-24 RX ORDER — PANTOPRAZOLE SODIUM 40 MG/1
40 TABLET, DELAYED RELEASE ORAL
Status: DISCONTINUED | OUTPATIENT
Start: 2023-08-24 | End: 2023-08-24 | Stop reason: HOSPADM

## 2023-08-24 RX ADMIN — BACITRACIN 0.9 G: 500 OINTMENT TOPICAL at 16:21

## 2023-08-24 RX ADMIN — METOCLOPRAMIDE 10 MG: 5 INJECTION, SOLUTION INTRAMUSCULAR; INTRAVENOUS at 05:09

## 2023-08-24 RX ADMIN — RIFAXIMIN 550 MG: 550 TABLET ORAL at 10:15

## 2023-08-24 RX ADMIN — OXYCODONE HYDROCHLORIDE 10 MG: 5 TABLET ORAL at 14:41

## 2023-08-24 RX ADMIN — ALPRAZOLAM 1 MG: 1 TABLET ORAL at 00:17

## 2023-08-24 RX ADMIN — MIDODRINE HYDROCHLORIDE 20 MG: 5 TABLET ORAL at 14:06

## 2023-08-24 RX ADMIN — CEFTRIAXONE 2000 MG: 2 INJECTION, POWDER, FOR SOLUTION INTRAMUSCULAR; INTRAVENOUS at 10:15

## 2023-08-24 RX ADMIN — Medication 10 ML: at 10:22

## 2023-08-24 RX ADMIN — MIDODRINE HYDROCHLORIDE 20 MG: 5 TABLET ORAL at 05:09

## 2023-08-24 RX ADMIN — ALPRAZOLAM 1 MG: 1 TABLET ORAL at 14:41

## 2023-08-24 RX ADMIN — PANTOPRAZOLE SODIUM 40 MG: 40 TABLET, DELAYED RELEASE ORAL at 10:14

## 2023-08-24 RX ADMIN — FEBUXOSTAT 40 MG: 40 TABLET, FILM COATED ORAL at 10:15

## 2023-08-24 RX ADMIN — POTASSIUM CHLORIDE 40 MEQ: 1500 TABLET, EXTENDED RELEASE ORAL at 12:38

## 2023-08-24 RX ADMIN — OXYCODONE HYDROCHLORIDE 10 MG: 5 TABLET ORAL at 05:12

## 2023-08-24 RX ADMIN — SUCRALFATE 1 G: 1 TABLET ORAL at 10:50

## 2023-08-24 RX ADMIN — POTASSIUM CHLORIDE 40 MEQ: 1500 TABLET, EXTENDED RELEASE ORAL at 10:15

## 2023-08-24 NOTE — CASE MANAGEMENT/SOCIAL WORK
Case Management Discharge Note           Provided Post Acute Provider List?: Yes  Provided Post Acute Provider Quality & Resource List?: Yes  Post Acute Provider Quality and Resource List: Home Health, Inpatient Rehab, Nursing Home  Delivered To: Patient  Method of Delivery: In person  Transportation Services  Private: Car    Final Discharge Disposition Code: 01 - home or self-care      Continued Stay Note   Nathan     Patient Name: Daquan Xiong  MRN: [id number]  Today's Date: 8/24/2023    Admit Date: 8/18/2023    Plan: from home with spouse.  OP PT [facility].  Requested orders, need orders faxed.     Discharge Plan       Row Name 08/25/23 1600       Plan    Plan Comments VM from OP PT [facility] with fax number and direct number.   OP PT orders faxed to [facility] OP PT.  (fax [phone], phone [phone]) Spoke with wife Lana on phone ([phone])  Notified she should get a phone call monday setting up appoinment. Instructed if no phone call recieved, call their direct number.  If any issue, CM direct number given for follow up.               Discharge Plan       Row Name 08/24/23 1618       Plan    Plan from home with spouse.  OP PT [facility].  Requested orders, need orders faxed.    Plan Comments Spoke with patient's wife regarding HH.   Chose OP PT at [facility] ([phone]).   Voicemail left with [facility] OP PT for return call to set up OP PT.  Notified wife of status of OP PT set up.                  Expected Discharge Date and Time       Expected Discharge Date Expected Discharge Time    Aug 24, 2023             Phone communication or documentation only - no physical contact with patient or family.   Lexi Sunshine RN     Office Phone [phone]  Office Cell [phone]

## 2023-08-24 NOTE — DISCHARGE SUMMARY
[facility] Medicine Services  Discharge Summary    Date of Service: 23    Patient Name: Daquan Xiong  : 1985  MRN: [id number]    Date of Admission: 2023  Discharge Diagnosis: Kidney injury, anemia, alcoholic liver cirrhosis  Date of Discharge:  23    Primary Care Physician: Wolf Carvalho MD      Presenting Problem:   URIEL (acute kidney injury) [N17.9]  Acute kidney injury [N17.9]  Hypotension, unspecified hypotension type [I95.9]    Active and Resolved Hospital Problems:  Active Hospital Problems    Diagnosis POA    **URIEL (acute kidney injury) [N17.9] Yes    Anemia [D64.9] Unknown    Severe malnutrition [E43] Yes    GERD (gastroesophageal reflux disease) [K21.9] Yes    Tobacco use disorder [F17.200] Yes    Alcoholic cirrhosis [K70.30] Yes      Resolved Hospital Problems   No resolved problems to display.         Hospital Course     Hospital Course:  Daquan Xiong is a 37 y.o. male with history of alcoholic liver disease who presented to the ED with complaints of nausea, vomiting and abnormal lab.  Patient was found to have acute kidney injury at his primary GI office and was sent to the ED.  Patient found to have a creatinine of 3.91.  Patient also was hypotensive.  He was admitted and managed as follows:    Septic shock-POA  - likely secondary to bacterial Pneumonia, unspecified  - placed on Rocephin for 5 days.   - s/p Vasopressin  - on Midodrine.   - Hydrocortisone discontinued. Monitored off hydrocortisone      URIEL on CKD III  Hyperkalemia - Resolved.   - creatinine trending down.   - Nephrology on board     Bilateral Pleural effusions  - likely due to Ascites  - s/p thoracentesis     Acute on chronic anemia  - s/p EGD  - with nonbleeding esophageal varices and esophagitis  - s/p 1 unit PRBC since admission   - On Octreotide, PPI IV Q12 and Sucralfate.  - GI on consult.   - Nodalol on hold due to hypotension      Alcoholic liver cirrhosis with ascites  - s/p  Paracentesis 5L - 8/20  - awaiting liver transplant at [facility].      Hyperammonemia  - continue Rifaximin and lactulose.   - patient refusing his lactulose.      Thrombocytopenia  - trending down. Continue to monitor closely. Likely      Coagulopathy - s/p Vitamin K     Elevated bilirubin     Severe malnutrition      Hyperuricemia - Allopurinol     H/o Alcohol abuse - Sober since July 2022       DISCHARGE Follow Up Recommendations for labs and diagnostics: Follow-up with transplant team as scheduled      Reasons For Change In Medications and Indications for New Medications:      Day of Discharge     Vital Signs:  Temp:  [97.3 øF (36.3 øC)-98 øF (36.7 øC)] 97.9 øF (36.6 øC)  Heart Rate:  [50-62] 52  Resp:  [11-18] 18  BP: ()/(38-59) 93/52    Physical Exam:  Physical Exam   General Appearance: AOO x 4, cooperative, no distress, appropriate for age  Head:  Normocephalic, without obvious abnormality  Eyes:  PERRL, EOM's intact, conjunctivae and cornea clear  Nose:  Nares symmetrical, septum midline, mucosa pink  Throat:  Lips, tongue, and mucosa are moist, pink, and intact  Neck:  Supple; symmetrical, trachea midline, no adenopathy  Back:  Symmetrical, ROM normal, no CVA tenderness  Lungs: Respirations unlabored, no audible wheeze  Heart: Regular rate & rhythm, S1 and S2 normal  Abdomen: Moderately distended, bowel sounds active all four quadrants  Musculoskeletal: Tone and strength strong and symmetrical, all extremities; no joint pain or edema         Skin/Hair/Nails:  Skin warm, dry and intact, no rashes or abnormal dyspigmentation       Pertinent  and/or Most Recent Results     LAB RESULTS:      Lab 08/24/23  0549 08/23/23  0326 08/22/23  0432 08/21/23  0452 08/20/23 2027 08/20/23  1234 08/20/23  0602 08/19/23  1237 08/19/23  0938 08/19/23  0404 08/19/23  0012   WBC 12.00* 10.00 5.10 7.50  --   --  6.70  --   --    < >  --    HEMOGLOBIN 8.2* 8.3* 7.8* 7.7* 6.4* 7.1* 7.0*  --   --    < >  --    HEMATOCRIT  25.3* 25.1* 23.3* 22.8* 19.4* 21.4* 20.9*  --   --    < >  --    PLATELETS 56* 60* 59* 67*  --   --  98*  --   --    < >  --    NEUTROS ABS 9.60* 8.10* 3.80 4.50  --   --  4.30  --   --    < >  --    LYMPHS ABS 1.20 0.80 0.70 1.70  --   --  1.20  --   --    < >  --    MONOS ABS 1.20* 1.00* 0.70 1.20*  --   --  1.10*  --   --    < >  --    EOS ABS 0.00 0.00 0.00 0.20  --   --  0.10  --   --    < >  --    .4* 102.9* 102.0* 99.7*  --   --  103.9*  --   --    < >  --    LACTATE  --   --   --   --   --  4.1* 3.9* 1.9 2.1*  --  1.3   PROTIME 17.9* 17.4* 18.5* 18.7*  --   --  17.2*  --   --   --   --     < > = values in this interval not displayed.         Lab 08/24/23  0549 08/23/23  0326 08/22/23  1656 08/22/23  0432 08/21/23  2130 08/21/23  0452 08/20/23  1234 08/20/23  0602 08/19/23  0938 08/19/23  0404 08/19/23  0233 08/18/23  2202   SODIUM 136 139  --  142  --  140 139 136   < >  --    < > 130*   POTASSIUM 3.4* 3.6 3.7 3.2* 3.3* 3.0* 3.6 3.6   < >  --    < > 5.9*   CHLORIDE 94* 95*  --  94*  --  94* 96* 95*   < >  --    < > 98   CO2 31.0* 29.0  --  31.0*  --  32.0* 26.0 26.0   < >  --    < > 18.0*   ANION GAP 11.0 15.0  --  17.0*  --  14.0 17.0* 15.0   < >  --    < > 14.0   BUN 34* 38*  --  35*  --  40* 47* 52*   < >  --    < > 74*   CREATININE 1.52* 1.89*  --  2.14*  --  2.41* 2.71* 3.03*   < >  --    < > 3.91*   EGFR 59.8* 46.0*  --  39.7*  --  34.4* 29.9* 26.1*   < >  --    < > 19.2*   GLUCOSE 208* 145*  --  138*  --  145* 149* 170*   < >  --    < > 86   CALCIUM 9.1 9.5  --  9.8  --  9.9 9.8 9.6   < >  --    < > 8.7   IONIZED CALCIUM  --   --   --   --   --  1.12*  --  1.14*  --  1.16*  --   --    MAGNESIUM  --  1.8  --  1.8  --  1.9  --  2.1  --   --   --  2.5   PHOSPHORUS  --  2.8  --  3.1  --  3.2  --   --   --   --   --   --    TSH  --   --   --   --   --   --   --   --   --  2.930  --   --     < > = values in this interval not displayed.         Lab 08/24/23  0549 08/23/23  0326 08/22/23  0432  08/21/23  0452 08/20/23  0602 08/18/23  2202 08/18/23  2111   TOTAL PROTEIN 4.8* 5.4* 5.7* 5.5* 5.5*   < >  --    ALBUMIN 3.6 4.3 4.7 4.6 4.2   < >  --    GLOBULIN 1.2 1.1 1.0 0.9 1.3   < >  --    ALT (SGPT) 62* 38 13 15 18   < >  --    AST (SGOT) 150* 113* 54* 44* 56*   < >  --    BILIRUBIN 2.6* 4.1* 4.7* 4.7* 4.2*   < >  --    ALK PHOS 115 108 103 116 152*   < >  --    LIPASE  --   --   --   --   --   --  68*    < > = values in this interval not displayed.         Lab 08/24/23  0549 08/23/23  0326 08/22/23  0432 08/21/23  0452 08/20/23  0602   PROTIME 17.9* 17.4* 18.5* 18.7* 17.2*   INR 1.73* 1.67* 1.79* 1.81* 1.65*             Lab 08/20/23  2204 08/19/23  0404   IRON  --  73   ABO TYPING A  --    RH TYPING Positive  --    ANTIBODY SCREEN Negative  --          Lab 08/19/23  0406   PH, ARTERIAL 7.460*   PCO2, ARTERIAL 26.8*   PO2 .8*   O2 SATURATION ART 98.7*   FIO2 21   HCO3 ART 19.0*   BASE EXCESS ART -4.1*     Brief Urine Lab Results  (Last result in the past 365 days)        Color   Clarity   Blood   Leuk Est   Nitrite   Protein   CREAT   Urine HCG        08/19/23 0545             62.2         08/19/23 0545 Yellow   Clear   Negative   Negative   Negative   Negative                 Microbiology Results (last 10 days)       Procedure Component Value - Date/Time    Body Fluid Culture - Body Fluid, Peritoneum [[id number]] Collected: 08/20/23 1530    Lab Status: Final result Specimen: Body Fluid from Peritoneum Updated: 08/24/23 0809     Body Fluid Culture No growth at 3 days     Gram Stain Rare (1+) WBCs per low power field      No organisms seen    Eosinophil Smear - Urine, Urine, Clean Catch [[id number]]  (Abnormal) Collected: 08/19/23 0545    Lab Status: Final result Specimen: Urine, Clean Catch Updated: 08/19/23 1009     Eosinophil Smear 1 % EOS/100 Cells     Blood Culture - Blood, Arm, Left [[id number]]  (Normal) Collected: 08/18/23 1881    Lab Status: Final result Specimen: Blood from Arm, Left Updated:  08/23/23 2330     Blood Culture No growth at 5 days    Narrative:      Less than seven (7) mL's of blood was collected.  Insufficient quantity may yield false negative results.            CT Abdomen Pelvis Without Contrast    Result Date: 8/20/2023  Impression: Impression: 1. Negative for retroperitoneal fluid collection. 2. Large volume ascites with peritoneal thickening suspicious for a nonspecific peritonitis. Consider paracentesis. 3. Findings of volume overload/third spacing progressing from prior exam including moderate bilateral pleural effusions, large volume ascites and worsening anasarca. 4. Multifocal tree-in-bud nodular opacities concerning for endobronchial infectious/inflammatory process, possibly early aspiration in the setting of refluxed fluid into a patulous distal esophagus. Consider nasogastric decompression and aspiration risk precaution. 5. Morphologic changes of chronic liver disease. Electronically Signed: Marko Dimas MD  8/20/2023 1:21 PM EDT  Workstation ID: [id number]    XR Chest 1 View    Result Date: 8/19/2023  Impression: Impression: Cardiomegaly with mild pulmonary vascular prominence. Right IJ central line is in place with the tip at the cavoatrial junction. There is no pneumothorax. Electronically Signed: Deven Machado MD  8/19/2023 6:54 AM EDT  Workstation ID: [id number]    US Renal Bilateral    Result Date: 8/19/2023  Impression: Impression: 1. Normal-sized kidneys with mild dilation of the bilateral renal pelves. 2. Large volume ascites. Electronically Signed: Ulices Valadez  8/19/2023 6:40 PM EDT  Workstation ID: [id number]    Therapeutic Paracentesis With Radiology    Result Date: 8/11/2023  Impression: Impression: Technically successful US Guided Paracentesis. Please see Ambulatory nursing notes for intra-procedure vitals and information. Electronically Signed: Dick Wiggins MD  8/11/2023 2:12 PM EDT  Workstation ID: [id number]    Therapeutic Paracentesis With Radiology    Result  Date: 7/27/2023  Impression: Impression: Technically successful US Guided Paracentesis. Please see Ambulatory nursing notes for intra-procedure vitals and information. Electronically Signed: Dick Feliciano  7/27/2023 10:28 AM EDT  Workstation ID: [id number]             Results for orders placed during the hospital encounter of 06/23/23    Adult Transthoracic Echo Complete W/ Cont if Necessary Per Protocol    Interpretation Summary    Left ventricular systolic function is normal. Calculated left ventricular EF = 62% Left ventricular ejection fraction appears to be 56 - 60%.    Left ventricular diastolic function was normal.    Estimated right ventricular systolic pressure from tricuspid regurgitation is normal (<35 mmHg).      Labs Pending at Discharge:      Procedures Performed  Procedure(s):  ESOPHAGOGASTRODUODENOSCOPY  08/21 1352 UPPER GI ENDOSCOPY      Consults:   Consults       Date and Time Order Name Status Description    8/22/2023 11:32 AM Inpatient Hospitalist Consult Completed     8/20/2023 10:59 AM Inpatient Gastroenterology Consult      8/19/2023  3:58 AM Inpatient Intensivist Consult      8/19/2023  3:43 AM Inpatient Nephrology Consult Completed     8/19/2023 12:26 AM Hospitalist (on-call MD unless specified)                Discharge Details        Discharge Medications        New Medications        Instructions Start Date   febuxostat 40 MG tablet  Commonly known as: ULORIC   40 mg, Oral, Daily   Start Date: August 25, 2023     lactulose 10 GM/15ML solution  Commonly known as: CHRONULAC   20 g, Oral, 3 Times Daily      sucralfate 1 g tablet  Commonly known as: CARAFATE   1 g, Oral, 3 Times Daily Before Meals             Changes to Medications        Instructions Start Date   pantoprazole 40 MG EC tablet  Commonly known as: PROTONIX  What changed: when to take this   40 mg, Oral, 2 Times Daily Before Meals             Continue These Medications        Instructions Start Date   baclofen 10 MG tablet  Commonly  known as: LIORESAL   5-10 mg, Oral, 2 Times Daily PRN      cyclobenzaprine 10 MG tablet  Commonly known as: FLEXERIL   5-10 mg, Oral, 3 Times Daily PRN      melatonin 5 MG tablet tablet   5 mg, Oral, Nightly PRN      midodrine 10 MG tablet  Commonly known as: PROAMATINE   Take 2 tablets (20 mg) by mouth 3 (Three) Times a Day Before Meals      nicotine 14 MG/24HR patch  Commonly known as: NICODERM CQ   1 patch, Transdermal, Daily PRN      nicotine polacrilex 2 MG gum  Commonly known as: NICORETTE   2 mg, Mouth/Throat, As Needed      oxyCODONE 10 MG tablet  Commonly known as: ROXICODONE   10 mg, Oral, Every 4 to 6 Hours PRN      riFAXIMin 550 MG tablet  Commonly known as: XIFAXAN   550 mg, Oral, 2 Times Daily             Stop These Medications      allopurinol 100 MG tablet  Commonly known as: ZYLOPRIM     nadolol 40 MG tablet  Commonly known as: CORGARD              Allergies   Allergen Reactions    Bee Venom Anaphylaxis    Scopolamine Anaphylaxis         Discharge Disposition:   Home-Health Care [facility]    Diet:  Hospital:  Diet Order   Procedures    Diet: Cardiac Diets; Low Sodium (2g); Texture: Regular Texture (IDDSI 7); Fluid Consistency: Thin (IDDSI 0)         Discharge Activity:     As tolerated    CODE STATUS:  Code Status and Medical Interventions:   Ordered at: 08/19/23 0048     Code Status (Patient has no pulse and is not breathing):    CPR (Attempt to Resuscitate)     Medical Interventions (Patient has pulse or is breathing):    Full Support         Future Appointments   Date Time Provider Department Center   8/29/2023  7:15 AM ROOM [address] [place] [address] ACU [facility] ACU None           Time spent on Discharge including face to face service:  40 minutes        Signature: Electronically signed by Dolores Bates MD, 08/24/23, 12:42 EDT.  [facility] Hospitalist Team

## 2023-08-24 NOTE — PROGRESS NOTES
"NEPHROLOGY PROGRESS NOTE------[facility]    [facility]  [phone]  Saji Waldron MD      Patient Care Team:  Wolf Carvalho MD as PCP - General (Geriatric Medicine)  Fatou Waldron MD as Consulting Physician (Nephrology)      Provider:  Saji Waldron MD  Patient Name: Daquan Xiong  :  1985    SUBJECTIVE:    F/U URIEL/CKD/ACIDOSIS    Feeling fine. No SOB, CP, palpitations, cramping, dysuria.     Medication:  cefTRIAXone, 2,000 mg, Intravenous, Q24H  febuxostat, 40 mg, Oral, Daily  lactulose, 20 g, Oral, TID  midodrine, 20 mg, Oral, Q8H  octreotide, 100 mcg, Intravenous, TID  oxyCODONE, 5 mg, Oral, Once  pantoprazole, 40 mg, Oral, BID AC  potassium chloride ER, 40 mEq, Oral, Q4H  riFAXIMin, 550 mg, Oral, BID  sodium chloride, 10 mL, Intravenous, Q12H  sucralfate, 1 g, Oral, TID AC             OBJECTIVE    Vital Sign Min/Max for last 24 hours  Temp  Min: 97.3 øF (36.3 øC)  Max: 98 øF (36.7 øC)   BP  Min: 90/50  Max: 109/38   Pulse  Min: 44  Max: 62   Resp  Min: 11  Max: 16   SpO2  Min: 0 %  Max: 98 %   No data recorded   Weight  Min: 82.6 kg (182 lb 1.6 oz)  Max: 82.6 kg (182 lb 1.6 oz)     Flowsheet Rows      Flowsheet Row First Filed Value   Admission Height 185.4 cm (73\") Documented at 2023   Admission Weight 72.6 kg (160 lb) Documented at 2023            No intake/output data recorded.  I/O last 3 completed shifts:  In: 600 [P.O.:600]  Out: 800 [Urine:800]    Physical Exam:  General Appearance: alert, appears stated age and cooperative  Head: normocephalic, without obvious abnormality and atraumatic +DRY OP   Eyes: conjunctivae and sclerae normal and no icterus  Neck: supple and no JVD  Lungs: clear to auscultation and respirations regular  Heart: regular rhythm & normal rate and normal S1, S2 +BETHANY  Chest Wall: no abnormalities observed  Abdomen: normal bowel sounds and  soft without major ascites " at present  Extremities: moves extremities well, +TR BILAT LE EDEMA, no cyanosis  Skin: +SCATTERED SCABS, ABRASIONS, ECCHYMOSIS  Neurologic: Alert, and oriented x 3. No focal deficits    Labs:    WBC WBC   Date Value Ref Range Status   08/24/2023 12.00 (H) 3.40 - 10.80 10*3/mm3 Final   08/23/2023 10.00 3.40 - 10.80 10*3/mm3 Final   08/22/2023 5.10 3.40 - 10.80 10*3/mm3 Final      HGB Hemoglobin   Date Value Ref Range Status   08/24/2023 8.2 (L) 13.0 - 17.7 g/dL Final   08/23/2023 8.3 (L) 13.0 - 17.7 g/dL Final   08/22/2023 7.8 (L) 13.0 - 17.7 g/dL Final      HCT Hematocrit   Date Value Ref Range Status   08/24/2023 25.3 (L) 37.5 - 51.0 % Final   08/23/2023 25.1 (L) 37.5 - 51.0 % Final   08/22/2023 23.3 (L) 37.5 - 51.0 % Final      Platelets No results found for: LABPLAT   MCV MCV   Date Value Ref Range Status   08/24/2023 103.4 (H) 79.0 - 97.0 fL Final   08/23/2023 102.9 (H) 79.0 - 97.0 fL Final   08/22/2023 102.0 (H) 79.0 - 97.0 fL Final          Sodium Sodium   Date Value Ref Range Status   08/24/2023 136 136 - 145 mmol/L Final   08/23/2023 139 136 - 145 mmol/L Final   08/22/2023 142 136 - 145 mmol/L Final      Potassium Potassium   Date Value Ref Range Status   08/24/2023 3.4 (L) 3.5 - 5.2 mmol/L Final   08/23/2023 3.6 3.5 - 5.2 mmol/L Final   08/22/2023 3.7 3.5 - 5.2 mmol/L Final   08/22/2023 3.2 (L) 3.5 - 5.2 mmol/L Final   08/21/2023 3.3 (L) 3.5 - 5.2 mmol/L Final      Chloride Chloride   Date Value Ref Range Status   08/24/2023 94 (L) 98 - 107 mmol/L Final   08/23/2023 95 (L) 98 - 107 mmol/L Final   08/22/2023 94 (L) 98 - 107 mmol/L Final      CO2 CO2   Date Value Ref Range Status   08/24/2023 31.0 (H) 22.0 - 29.0 mmol/L Final   08/23/2023 29.0 22.0 - 29.0 mmol/L Final   08/22/2023 31.0 (H) 22.0 - 29.0 mmol/L Final      BUN BUN   Date Value Ref Range Status   08/24/2023 34 (H) 6 - 20 mg/dL Final   08/23/2023 38 (H) 6 - 20 mg/dL Final   08/22/2023 35 (H) 6 - 20 mg/dL Final      Creatinine Creatinine   Date  Value Ref Range Status   08/24/2023 1.52 (H) 0.76 - 1.27 mg/dL Final   08/23/2023 1.89 (H) 0.76 - 1.27 mg/dL Final   08/22/2023 2.14 (H) 0.76 - 1.27 mg/dL Final      Calcium Calcium   Date Value Ref Range Status   08/24/2023 9.1 8.6 - 10.5 mg/dL Final   08/23/2023 9.5 8.6 - 10.5 mg/dL Final   08/22/2023 9.8 8.6 - 10.5 mg/dL Final      PO4 No components found for: PO4   Albumin Albumin   Date Value Ref Range Status   08/24/2023 3.6 3.5 - 5.2 g/dL Final   08/23/2023 4.3 3.5 - 5.2 g/dL Final   08/22/2023 4.7 3.5 - 5.2 g/dL Final      Magnesium Magnesium   Date Value Ref Range Status   08/23/2023 1.8 1.6 - 2.6 mg/dL Final   08/22/2023 1.8 1.6 - 2.6 mg/dL Final      Uric Acid No components found for: URIC ACID     Imaging Results (Last 72 Hours)       ** No results found for the last 72 hours. **            Results for orders placed during the hospital encounter of 08/18/23    XR Chest 1 View    Narrative  XR CHEST 1 VW    Date of Exam: 8/19/2023 6:40 AM EDT    Indication: central line placement    Comparison: July 3, 2023    Findings:  There is a right internal jugular central line with the tip at the cavoatrial junction. The heart size is borderline enlarged. There is mild pulmonary vascular congestion. There are no focal infiltrates to suggest pneumonia.    Impression  Impression:  Cardiomegaly with mild pulmonary vascular prominence. Right IJ central line is in place with the tip at the cavoatrial junction. There is no pneumothorax.      Electronically Signed: Deven Machado MD  8/19/2023 6:54 AM EDT  Workstation ID: [id number]      Results for orders placed during the hospital encounter of 07/03/23    XR Chest 1 View    Narrative  XR CHEST 1 VW    Date of Exam: 7/3/2023 4:13 PM EDT    Indication: AMS    Comparison: AP portable chest 6/20/2023    Findings:  There is some bandlike atelectasis in the right lower lobe, similar to the prior examination. The previously described patchy left basilar airspace disease appears  improved. No new airspace disease is identified. The heart size is normal. No pleural  effusion or pneumothorax is identified.    Impression  Impression:  Improving bibasilar atelectasis or pneumonia since 6/28/2023. No new airspace disease is appreciated.      Electronically Signed: Chanda Clark  7/3/2023 4:27 PM EDT  Workstation ID: [id number]      Results for orders placed during the hospital encounter of 06/23/23    XR Chest 1 View    Narrative  XR CHEST 1 VW    Date of Exam: 6/28/2023 11:45 AM EDT    Indication: eval for pneumonia    Comparison: None available.    Findings:  The lung volumes are low. Patchy airspace disease is seen within the lung bases bilaterally. Probable small bilateral pleural effusions are present. The pulmonary vasculature appears unremarkable. No pneumothorax. No acute osseous abnormality identified.    Impression  Impression:  Low lung volumes with patchy airspace disease seen within the lung bases bilaterally, left greater than right with probable small bilateral pleural effusions, likely related to pneumonia and/or atelectasis.      Electronically Signed: Brynn Higuera  6/28/2023 12:00 PM EDT  Workstation ID: [id number]            ASSESSMENT / PLAN      URIEL (acute kidney injury)    GERD (gastroesophageal reflux disease)    Tobacco use disorder    Alcoholic cirrhosis    Severe malnutrition    Anemia      1.URIEL/CKD ---------Nonoliguric. BUN/Cr down. +Recurrent ARF/URIEL on top of known CRF/CKD STG 3A with a baseline serum creatinine of about 1.5. CRF/CKD STG 3A secondary to HTN NS. +ARF/URIEL is secondary to ATN from hypotension and prerenal state/intravascular volume depletion. Avoid hypotension.  No NSAIDs or IV dye. Dose meds for CrCl less than 10 cc/min until ARF/URIEL is resolved     2. HYPERKALEMIA--------Resolved. Secondary to RF and acidosis. Med Rx. Telemetry. K+ restrict diet and follow     3. ETOH CIRRHOSIS/HEPATIC ENCEPHALOPATHY/ASCITES-----Restarted Lactulose. Follow Ammonia. Likely  needs Paracentesis. S/P IV Albumin and on Midodrine     4. HYPOMAGNESEMIA--------Follow levels     5. HYPERURICEMIA------Uloric     6. HYPOCALCEMIA-------Replaced     7. ANEMIA-------H/H stable.       8. HYPOALBUMINEMIA-----Albumin normal. S/P IV Albumin to temporize     9. ELEVATED INR------Related to cirrhosis     10. HYPOTENSION------On Midodrine     11. BIPOLAR DISEASE     12. BASILIO     13. POLYSUBSTANCE ABUSE/TOBACCO USE      14. KETONURIA------Clinically resolved     15. HYPOTENSION-----Midodrine     16. ACIDOSIS-------- resolved     17. N/V/GERD/PUD PROPHYLAXIS------IV PPI     18. DVT PROPHYLAXIS------SCDs    19. HYPOKALEMIA-------Replace po         Saji Waldron MD  [facility]  [phone]  08/24/23  07:54 EDT

## 2023-08-24 NOTE — SIGNIFICANT NOTE
08/24/23 1138   OTHER   Discipline occupational therapist   Rehab Time/Intention   Session Not Performed other (see comments)  (Per NSG, pt anticipated to d/c this date. OT will follow up if pt remains admitted)   Therapy Assessment/Plan (PT)   Criteria for Skilled Interventions Met (PT) yes;meets criteria;skilled treatment is necessary   Recommendation   OT - Next Appointment 08/25/23

## 2023-08-24 NOTE — PROGRESS NOTES
LOS: 5 days   Patient Care Team:  Wolf Carvalho MD as PCP - General (Geriatric Medicine)  Fatou Waldron MD as Consulting Physician (Nephrology)      Subjective     Subjective: Patient doing well today.  He denies abdominal pain or nausea/vomiting.  Eating well.      ROS:   Review of Systems   Constitutional:  Negative for chills and fever.   Respiratory:  Negative for cough and shortness of breath.    Cardiovascular:  Negative for chest pain.   Gastrointestinal:  Negative for abdominal pain, blood in stool, diarrhea, nausea and vomiting.   Psychiatric/Behavioral:  Negative for agitation and confusion.       Medication Review:   Scheduled Meds:febuxostat, 40 mg, Oral, Daily  lactulose, 20 g, Oral, TID  midodrine, 20 mg, Oral, Q8H  pantoprazole, 40 mg, Oral, BID AC  riFAXIMin, 550 mg, Oral, BID  sodium chloride, 10 mL, Intravenous, Q12H  sucralfate, 1 g, Oral, TID AC      Continuous Infusions:   PRN Meds:.  albumin human    albumin human    ALPRAZolam    calcium carbonate    lactated ringers    lidocaine    lidocaine    lidocaine    Magnesium Standard Dose Replacement - Follow Nurse / BPA Driven Protocol    melatonin    nitroglycerin    ondansetron **OR** ondansetron    oxyCODONE    Phosphorus Replacement - Follow Nurse / BPA Driven Protocol    Potassium Replacement - Follow Nurse / BPA Driven Protocol    [COMPLETED] Insert Peripheral IV **AND** sodium chloride    sodium chloride    sodium chloride      Objective     Vital Signs  Temp:  [97.3 øF (36.3 øC)-98 øF (36.7 øC)] 97.9 øF (36.6 øC)  Heart Rate:  [50-62] 52  Resp:  [11-18] 18  BP: ()/(38-59) 93/52    Physical Exam:    General Appearance:    Awake and alert, in no acute distress   Head:    Normocephalic, without obvious abnormality   Eyes:          Conjunctivae normal, icteric sclera   Ears:    Hearing intact   Throat:   No oral lesions, no thrush, oral mucosa moist       Lungs:     Respirations regular, even and unlabored       Abdomen:      Soft, non-tender, no rebound or guarding, non-distended   Rectal:     Deferred   Extremities:   No edema, no cyanosis, no redness   Skin:   No bleeding, bruising or rash, + jaundice   Neurologic:   Sensation intact        Results Review:    CBC  Results from last 7 days   Lab Units 08/24/23  0549 08/23/23  0326 08/22/23  0432 08/21/23  0452 08/20/23  2027 08/20/23  1234 08/20/23  0602 08/19/23  0404 08/18/23  2315   RBC 10*6/mm3 2.45* 2.44* 2.29* 2.28*  --   --  2.01* 2.39* 2.87*   WBC 10*3/mm3 12.00* 10.00 5.10 7.50  --   --  6.70 10.70 9.50   HEMOGLOBIN g/dL 8.2* 8.3* 7.8* 7.7* 6.4* 7.1* 7.0* 8.4* 10.1*   PLATELETS 10*3/mm3 56* 60* 59* 67*  --   --  98* 122* 116*       CMP  Results from last 7 days   Lab Units 08/24/23  0549 08/23/23  0326 08/22/23  1656 08/22/23  0432 08/21/23  2130 08/21/23  0452 08/20/23  1317 08/20/23  1234 08/20/23  0602 08/20/23  0022 08/19/23  0938 08/19/23  0233 08/18/23  2315 08/18/23  2202   SODIUM mmol/L 136 139  --  142  --  140  --  139 136 136   < > 127*  --  130*   POTASSIUM mmol/L 3.4* 3.6 3.7 3.2* 3.3* 3.0*  --  3.6 3.6 3.9   < > 5.1  --  5.9*   CHLORIDE mmol/L 94* 95*  --  94*  --  94*  --  96* 95* 98   < > 95*  --  98   CO2 mmol/L 31.0* 29.0  --  31.0*  --  32.0*  --  26.0 26.0 24.0   < > 20.0*  --  18.0*   BUN mg/dL 34* 38*  --  35*  --  40*  --  47* 52* 58*   < > 69*  --  74*   CREATININE mg/dL 1.52* 1.89*  --  2.14*  --  2.41*  --  2.71* 3.03* 2.96*   < > 4.04*  --  3.91*   GLUCOSE mg/dL 208* 145*  --  138*  --  145*  --  149* 170* 151*   < > 143*  --  86   ALBUMIN g/dL 3.6 4.3  --  4.7  --  4.6  --   --  4.2  --   --  2.5*  --  2.3*   BILIRUBIN mg/dL 2.6* 4.1*  --  4.7*  --  4.7*  --   --  4.2*  --   --  4.7*  --  4.8*   ALK PHOS U/L 115 108  --  103  --  116  --   --  152*  --   --  245*  --  232*   AST (SGOT) U/L 150* 113*  --  54*  --  44*  --   --  56*  --   --  84*  --  77*   ALT (SGPT) U/L 62* 38  --  13  --  15  --   --  18  --   --  28  --  30   AMMONIA umol/L  --    --   --  54  --  74* 91*  --   --   --   --   --  52  --     < > = values in this interval not displayed.       Amylase and Lipase  Results from last 7 days   Lab Units 08/18/23  2111   LIPASE U/L 68*       CRP         Imaging Results (Last 24 Hours)       ** No results found for the last 24 hours. **              Assessment & Plan   37-year-old female admitted 8/18/2023 with elevated creatinine at 3 as outpatient.  Has alcohol hepatitis/cirrhosis that has been complicated by ascites, nonbleeding esophageal varices, and hepatic encephalopathy.  Sober from alcohol since July 2022.     -ETOH Cirrhosis complicated by ascites, HE, and esophageal varices  -Acute renal failure on chronic kidney disease  -Hypotension  -Elevated LFTs  -Elevated ammonia   -Macrocytic anemia  -Belching/GERD     Status post EGD 8/21/2023 by Dr. Padilla -grade D erosive esophagitis, small F1 esophageal varices without bleeding, moderate portal hypertensive gastropathy, and retained undigested food in the stomach.     PLAN:   Patient continues to feel well.  He is eating well.  Renal function continues to improve with creatinine 1.52.  WBC 12.  Platelets 56.  Hemoglobin 8.2.   and ALT 62.  Alkaline phosphatase is normal and total bilirubin is down to 2.6.  We will discontinue octreotide.  He can be discharged home today from GI standpoint and he is planning for appointment with liver transplant team on 8/28/2023.  He should continue on PPI as well as lactulose and Xifaxan.  Continue 2 g sodium diet.  MELD sodium score is 21.  AFP is 4.98 on 6/25/2023.  Right upper quadrant ultrasound done on 6/25/2023 no signs of focal lesions.    Maria C Villela, APRN  08/24/23  13:00 EDT     no

## 2023-08-24 NOTE — PAYOR COMM NOTE
"Clinical update for case# [id number]     ===========  EXTENDED AUTHORIZATION PENDING:   PLEASE FAX OR CALL DETERMINATION TO CONTACT BELOW:       THANK YOU,    BLADE Carter, RN  Utilization Review  [facility]  Phone: [phone]  Fax: [phone]      NPI: [id number]  Tax ID: [id number]        Daquan Xiong (37 y.o. Male)       Date of Birth   1985    Social Security Number       Address   [address] [place] [address]    Home Phone   [phone]    MRN   [id number]       [facility]    Marital Status                               Admission Date   8/18/23    Admission Type   Emergency    Admitting Provider   Aly Zayas MD    Attending Provider   Dolores Bates MD    Department, Room/Bed   [facility] PROGRESS CARE, 2101/1       Discharge Date       Discharge Disposition       Discharge Destination                                 Attending Provider: Dolores Bates MD    Allergies: Bee Venom, Scopolamine    Isolation: None   Infection: None   Code Status: CPR    Ht: 185.4 cm (73\")   Wt: 82.6 kg (182 lb 1.6 oz)    Admission Cmt: None   Principal Problem: URIEL (acute kidney injury) [N17.9]                   Active Insurance as of 8/18/2023       Primary Coverage       Payor Plan Insurance Group Employer/Plan Group    ANTHEM BLUE CROSS ANTHEM BLUE CROSS BLUE SHIELD PPO [id number]       Payor Plan Address Payor Plan Phone Number Payor Plan Fax Number Effective Dates    [address] [phone]  1/1/2023 - None Entered    [place] [address]         Subscriber Name Subscriber Birth Date Member ID       ANA XIONG 3/25/1986 [id number]               Secondary Coverage       Payor Plan Insurance Group Employer/Plan Group    [employer]HE[employer] MEDICA[employer] [place] - [facility] [id number]       Payor Plan Address Payor Plan Phone Number Payor Plan Fax Number Effective Dates    MAIL STOP:   1/15/2023 - None Entered    [address] [address]       [facility] [address]    "      Subscriber Name Subscriber Birth Date Member ID       AMISH XIONG 1985 [id number]                     Emergency Contacts        (Rel.) Home Phone Work Phone Mobile Phone    Lana Xiong (Spouse) [phone] -- [phone]    Alejandra Xiong (Daughter) -- -- [phone]    Lizz Wiggins (Mother) -- -- [phone]              Vital Signs (last day)       Date/Time Temp Temp src Pulse Resp BP Patient Position SpO2    08/24/23 0927 97.9 (36.6) Axillary 52 18 93/52 -- 96    08/24/23 0509 -- -- 62 -- 98/48 -- --    08/24/23 0500 98 (36.7) Oral 55 16 98/49 Lying 94    08/24/23 0430 -- -- 60 -- 96/50 -- --    08/24/23 0055 97.7 (36.5) Oral 52 11 98/53 Lying 95    08/23/23 2207 97.5 (36.4) Oral 56 14 109/38 Lying 98    08/23/23 2116 -- -- 56 -- 98/54 -- --    08/23/23 1816 -- -- 52 -- 103/59 -- --    08/23/23 1600 97.3 (36.3) Oral 50 16 98/53 Sitting 98    08/23/23 1457 -- -- 53 12 98/57 Sitting 95    08/23/23 1158 97.4 (36.3) Oral -- -- -- -- --    08/23/23 1100 -- -- 57 -- 97/58 -- 97    08/23/23 1000 -- -- 44 -- 95/51 -- 0    08/23/23 0900 -- -- 53 -- 90/50 -- 97    08/23/23 0800 97.7 (36.5) Oral 53 -- 92/57 -- 96    08/23/23 0700 -- -- 58 -- 97/54 -- 96    08/23/23 0600 -- -- 55 -- 100/47 -- 91    08/23/23 0500 -- -- 51 -- 97/47 -- 91    08/23/23 0400 98.1 (36.7) Oral 56 -- 90/49 -- 96    08/23/23 0300 -- -- 49 -- 97/53 -- 97    08/23/23 0200 -- -- 50 -- 96/54 -- 96    08/23/23 0100 -- -- 51 -- 102/56 -- 97    08/23/23 0000 -- -- 54 -- 94/53 -- 96          Current Facility-Administered Medications   Medication Dose Route Frequency Provider Last Rate Last Admin    albumin human 25 % IV SOLN 12.5 g  12.5 g Intravenous Daily PRN Mamie Ayers APRN        albumin human 25 % IV SOLN 25 g  25 g Intravenous Daily PRN Mamie Ayers APRN        ALPRAZolam (XANAX) tablet 1 mg  1 mg Oral Q8H PRN Juan Padilla MD   1 mg at 08/24/23 0017    calcium carbonate (TUMS) chewable tablet  500 mg (200 mg elemental)  2 tablet Oral Q8H PRN Juan Padilla MD   2 tablet at 08/20/23 1628    febuxostat (ULORIC) tablet 40 mg  40 mg Oral Daily Juan Padilla MD   40 mg at 08/24/23 1015    lactated ringers bolus 250 mL  250 mL Intravenous Daily PRN Mamie Ayers, APRCARIE        lactulose (CHRONULAC) 10 GM/15ML solution 20 g  20 g Oral TID Juan Padilla MD   20 g at 08/23/23 2116    lidocaine (XYLOCAINE) 1 % injection 10 mL  10 mL Injection PRN Mamie Ayers, APRCARIE        lidocaine (XYLOCAINE) 1 % injection 10 mL  10 mL Injection PRN Celsa Denis APRN        lidocaine (XYLOCAINE) 2% injection 10 mL  10 mL Injection PRN Mamie Ayers, APRN        Magnesium Standard Dose Replacement - Follow Nurse / BPA Driven Protocol   Does not apply PRN Juan Padilla MD        melatonin tablet 5 mg  5 mg Oral Nightly PRN Juan Padilla MD        midodrine (PROAMATINE) tablet 20 mg  20 mg Oral Q8H Juan Padilla MD   20 mg at 08/24/23 0509    nitroglycerin (NITROSTAT) SL tablet 0.4 mg  0.4 mg Sublingual Q5 Min PRN Juan Padilla MD        ondansetron (ZOFRAN) tablet 4 mg  4 mg Oral Q6H PRN Juan Padilla MD   4 mg at 08/23/23 0043    Or    ondansetron (ZOFRAN) injection 4 mg  4 mg Intravenous Q6H PRN Juan Padilla MD        oxyCODONE (ROXICODONE) immediate release tablet 10 mg  10 mg Oral Q6H PRN Kellee Graves APRN   10 mg at 08/24/23 0512    pantoprazole (PROTONIX) EC tablet 40 mg  40 mg Oral BID AC Juan Padilla MD   40 mg at 08/24/23 1014    Phosphorus Replacement - Follow Nurse / BPA Driven Protocol   Does not apply PRN Juan Padilla MD        potassium chloride (K-DUR,KLOR-CON) CR tablet 40 mEq  40 mEq Oral Q4H Philly Johnson MD   40 mEq at 08/24/23 1015    Potassium Replacement - Follow Nurse / BPA Driven Protocol   Does not apply PRN Juan Padilla MD         riFAXIMin (XIFAXAN) tablet 550 mg  550 mg Oral BID Juan Padilla MD   550 mg at 08/24/23 1015    sodium chloride 0.9 % flush 10 mL  10 mL Intravenous PRN Juan Padilla MD        sodium chloride 0.9 % flush 10 mL  10 mL Intravenous Q12H Juan Padilla MD   10 mL at 08/24/23 1022    sodium chloride 0.9 % flush 10 mL  10 mL Intravenous PRN Juan Padilla MD        sodium chloride 0.9 % infusion 40 mL  40 mL Intravenous PRN Juan Padilla MD        sucralfate (CARAFATE) tablet 1 g  1 g Oral TID Maria C Craft APRN   1 g at 08/24/23 1050     Lab Results (last 24 hours)       Procedure Component Value Units Date/Time    Body Fluid Culture - Body Fluid, Peritoneum [[id number]] Collected: 08/20/23 1530    Specimen: Body Fluid from Peritoneum Updated: 08/24/23 0809     Body Fluid Culture No growth at 3 days     Gram Stain Rare (1+) WBCs per low power field      No organisms seen    Comprehensive Metabolic Panel [[id number]]  (Abnormal) Collected: 08/24/23 0549    Specimen: Blood Updated: 08/24/23 0616     Glucose 208 mg/dL      BUN 34 mg/dL      Creatinine 1.52 mg/dL      Sodium 136 mmol/L      Potassium 3.4 mmol/L      Chloride 94 mmol/L      CO2 31.0 mmol/L      Calcium 9.1 mg/dL      Total Protein 4.8 g/dL      Albumin 3.6 g/dL      ALT (SGPT) 62 U/L      AST (SGOT) 150 U/L      Alkaline Phosphatase 115 U/L      Total Bilirubin 2.6 mg/dL      Globulin 1.2 gm/dL      A/G Ratio 3.0 g/dL      BUN/Creatinine Ratio 22.4     Anion Gap 11.0 mmol/L      eGFR 59.8 mL/min/1.73     Narrative:      GFR Normal >60  Chronic Kidney Disease <60  Kidney Failure <15      Protime-INR [[id number]]  (Abnormal) Collected: 08/24/23 0549    Specimen: Blood Updated: 08/24/23 0605     Protime 17.9 Seconds      INR 1.73    CBC & Differential [[id number]]  (Abnormal) Collected: 08/24/23 0549    Specimen: Blood Updated: 08/24/23 0555    Narrative:      The following orders were  created for panel order CBC & Differential.  Procedure                               Abnormality         Status                     ---------                               -----------         ------                     CBC Auto Differential[[id number]]        Abnormal            Final result                 Please view results for these tests on the individual orders.    CBC Auto Differential [[id number]]  (Abnormal) Collected: 23 0549    Specimen: Blood Updated: 23 0555     WBC 12.00 10*3/mm3      RBC 2.45 10*6/mm3      Hemoglobin 8.2 g/dL      Hematocrit 25.3 %      .4 fL      MCH 33.6 pg      MCHC 32.5 g/dL      RDW 16.6 %      RDW-SD 59.1 fl      MPV 10.7 fL      Platelets 56 10*3/mm3      Neutrophil % 79.7 %      Lymphocyte % 9.9 %      Monocyte % 10.1 %      Eosinophil % 0.0 %      Basophil % 0.3 %      Neutrophils, Absolute 9.60 10*3/mm3      Lymphocytes, Absolute 1.20 10*3/mm3      Monocytes, Absolute 1.20 10*3/mm3      Eosinophils, Absolute 0.00 10*3/mm3      Basophils, Absolute 0.00 10*3/mm3      nRBC 0.0 /100 WBC     Blood Culture - Blood, Arm, Left [[id number]]  (Normal) Collected: 23 2315    Specimen: Blood from Arm, Left Updated: 23 2330     Blood Culture No growth at 5 days    Narrative:      Less than seven (7) mL's of blood was collected.  Insufficient quantity may yield false negative results.          Imaging Results (Last 24 Hours)       ** No results found for the last 24 hours. **             Physician Progress Notes (last 48 hours)        Fatou Waldron MD at 23 0754          NEPHROLOGY PROGRESS NOTE------[facility]    [facility]  [phone]  Saji Waldron MD      Patient Care Team:  Wolf Carvalho MD as PCP - General (Geriatric Medicine)  Fatou Waldron MD as Consulting Physician (Nephrology)      Provider:  Saji Waldron MD  Patient Name: Daquan Xiong  :   "1985    SUBJECTIVE:    F/U URIEL/CKD/ACIDOSIS    Feeling fine. No SOB, CP, palpitations, cramping, dysuria.     Medication:  cefTRIAXone, 2,000 mg, Intravenous, Q24H  febuxostat, 40 mg, Oral, Daily  lactulose, 20 g, Oral, TID  midodrine, 20 mg, Oral, Q8H  octreotide, 100 mcg, Intravenous, TID  oxyCODONE, 5 mg, Oral, Once  pantoprazole, 40 mg, Oral, BID AC  potassium chloride ER, 40 mEq, Oral, Q4H  riFAXIMin, 550 mg, Oral, BID  sodium chloride, 10 mL, Intravenous, Q12H  sucralfate, 1 g, Oral, TID AC             OBJECTIVE    Vital Sign Min/Max for last 24 hours  Temp  Min: 97.3 øF (36.3 øC)  Max: 98 øF (36.7 øC)   BP  Min: 90/50  Max: 109/38   Pulse  Min: 44  Max: 62   Resp  Min: 11  Max: 16   SpO2  Min: 0 %  Max: 98 %   No data recorded   Weight  Min: 82.6 kg (182 lb 1.6 oz)  Max: 82.6 kg (182 lb 1.6 oz)     Flowsheet Rows      Flowsheet Row First Filed Value   Admission Height 185.4 cm (73\") Documented at 08/18/2023 2025   Admission Weight 72.6 kg (160 lb) Documented at 08/18/2023 2025            No intake/output data recorded.  I/O last 3 completed shifts:  In: 600 [P.O.:600]  Out: 800 [Urine:800]    Physical Exam:  General Appearance: alert, appears stated age and cooperative  Head: normocephalic, without obvious abnormality and atraumatic +DRY OP   Eyes: conjunctivae and sclerae normal and no icterus  Neck: supple and no JVD  Lungs: clear to auscultation and respirations regular  Heart: regular rhythm & normal rate and normal S1, S2 +BETHANY  Chest Wall: no abnormalities observed  Abdomen: normal bowel sounds and  soft without major ascites at present  Extremities: moves extremities well, +TR BILAT LE EDEMA, no cyanosis  Skin: +SCATTERED SCABS, ABRASIONS, ECCHYMOSIS  Neurologic: Alert, and oriented x 3. No focal deficits    Labs:    WBC WBC   Date Value Ref Range Status   08/24/2023 12.00 (H) 3.40 - 10.80 10*3/mm3 Final   08/23/2023 10.00 3.40 - 10.80 10*3/mm3 Final   08/22/2023 5.10 3.40 - 10.80 10*3/mm3 Final "      HGB Hemoglobin   Date Value Ref Range Status   08/24/2023 8.2 (L) 13.0 - 17.7 g/dL Final   08/23/2023 8.3 (L) 13.0 - 17.7 g/dL Final   08/22/2023 7.8 (L) 13.0 - 17.7 g/dL Final      HCT Hematocrit   Date Value Ref Range Status   08/24/2023 25.3 (L) 37.5 - 51.0 % Final   08/23/2023 25.1 (L) 37.5 - 51.0 % Final   08/22/2023 23.3 (L) 37.5 - 51.0 % Final      Platelets No results found for: LABPLAT   MCV MCV   Date Value Ref Range Status   08/24/2023 103.4 (H) 79.0 - 97.0 fL Final   08/23/2023 102.9 (H) 79.0 - 97.0 fL Final   08/22/2023 102.0 (H) 79.0 - 97.0 fL Final          Sodium Sodium   Date Value Ref Range Status   08/24/2023 136 136 - 145 mmol/L Final   08/23/2023 139 136 - 145 mmol/L Final   08/22/2023 142 136 - 145 mmol/L Final      Potassium Potassium   Date Value Ref Range Status   08/24/2023 3.4 (L) 3.5 - 5.2 mmol/L Final   08/23/2023 3.6 3.5 - 5.2 mmol/L Final   08/22/2023 3.7 3.5 - 5.2 mmol/L Final   08/22/2023 3.2 (L) 3.5 - 5.2 mmol/L Final   08/21/2023 3.3 (L) 3.5 - 5.2 mmol/L Final      Chloride Chloride   Date Value Ref Range Status   08/24/2023 94 (L) 98 - 107 mmol/L Final   08/23/2023 95 (L) 98 - 107 mmol/L Final   08/22/2023 94 (L) 98 - 107 mmol/L Final      CO2 CO2   Date Value Ref Range Status   08/24/2023 31.0 (H) 22.0 - 29.0 mmol/L Final   08/23/2023 29.0 22.0 - 29.0 mmol/L Final   08/22/2023 31.0 (H) 22.0 - 29.0 mmol/L Final      BUN BUN   Date Value Ref Range Status   08/24/2023 34 (H) 6 - 20 mg/dL Final   08/23/2023 38 (H) 6 - 20 mg/dL Final   08/22/2023 35 (H) 6 - 20 mg/dL Final      Creatinine Creatinine   Date Value Ref Range Status   08/24/2023 1.52 (H) 0.76 - 1.27 mg/dL Final   08/23/2023 1.89 (H) 0.76 - 1.27 mg/dL Final   08/22/2023 2.14 (H) 0.76 - 1.27 mg/dL Final      Calcium Calcium   Date Value Ref Range Status   08/24/2023 9.1 8.6 - 10.5 mg/dL Final   08/23/2023 9.5 8.6 - 10.5 mg/dL Final   08/22/2023 9.8 8.6 - 10.5 mg/dL Final      PO4 No components found for: PO4   Albumin  Albumin   Date Value Ref Range Status   08/24/2023 3.6 3.5 - 5.2 g/dL Final   08/23/2023 4.3 3.5 - 5.2 g/dL Final   08/22/2023 4.7 3.5 - 5.2 g/dL Final      Magnesium Magnesium   Date Value Ref Range Status   08/23/2023 1.8 1.6 - 2.6 mg/dL Final   08/22/2023 1.8 1.6 - 2.6 mg/dL Final      Uric Acid No components found for: URIC ACID     Imaging Results (Last 72 Hours)       ** No results found for the last 72 hours. **            Results for orders placed during the hospital encounter of 08/18/23    XR Chest 1 View    Narrative  XR CHEST 1 VW    Date of Exam: 8/19/2023 6:40 AM EDT    Indication: central line placement    Comparison: July 3, 2023    Findings:  There is a right internal jugular central line with the tip at the cavoatrial junction. The heart size is borderline enlarged. There is mild pulmonary vascular congestion. There are no focal infiltrates to suggest pneumonia.    Impression  Impression:  Cardiomegaly with mild pulmonary vascular prominence. Right IJ central line is in place with the tip at the cavoatrial junction. There is no pneumothorax.      Electronically Signed: Deven Machado MD  8/19/2023 6:54 AM EDT  Workstation ID: [id number]      Results for orders placed during the hospital encounter of 07/03/23    XR Chest 1 View    Narrative  XR CHEST 1 VW    Date of Exam: 7/3/2023 4:13 PM EDT    Indication: AMS    Comparison: AP portable chest 6/20/2023    Findings:  There is some bandlike atelectasis in the right lower lobe, similar to the prior examination. The previously described patchy left basilar airspace disease appears improved. No new airspace disease is identified. The heart size is normal. No pleural  effusion or pneumothorax is identified.    Impression  Impression:  Improving bibasilar atelectasis or pneumonia since 6/28/2023. No new airspace disease is appreciated.      Electronically Signed: Chanda Clark  7/3/2023 4:27 PM EDT  Workstation ID: [id number]      Results for orders placed  during the hospital encounter of 06/23/23    XR Chest 1 View    Narrative  XR CHEST 1 VW    Date of Exam: 6/28/2023 11:45 AM EDT    Indication: eval for pneumonia    Comparison: None available.    Findings:  The lung volumes are low. Patchy airspace disease is seen within the lung bases bilaterally. Probable small bilateral pleural effusions are present. The pulmonary vasculature appears unremarkable. No pneumothorax. No acute osseous abnormality identified.    Impression  Impression:  Low lung volumes with patchy airspace disease seen within the lung bases bilaterally, left greater than right with probable small bilateral pleural effusions, likely related to pneumonia and/or atelectasis.      Electronically Signed: Brynn Higuera  6/28/2023 12:00 PM EDT  Workstation ID: [id number]            ASSESSMENT / PLAN      URIEL (acute kidney injury)    GERD (gastroesophageal reflux disease)    Tobacco use disorder    Alcoholic cirrhosis    Severe malnutrition    Anemia      1.URIEL/CKD ---------Nonoliguric. BUN/Cr down. +Recurrent ARF/URIEL on top of known CRF/CKD STG 3A with a baseline serum creatinine of about 1.5. CRF/CKD STG 3A secondary to HTN NS. +ARF/URIEL is secondary to ATN from hypotension and prerenal state/intravascular volume depletion. Avoid hypotension.  No NSAIDs or IV dye. Dose meds for CrCl less than 10 cc/min until ARF/URIEL is resolved     2. HYPERKALEMIA--------Resolved. Secondary to RF and acidosis. Med Rx. Telemetry. K+ restrict diet and follow     3. ETOH CIRRHOSIS/HEPATIC ENCEPHALOPATHY/ASCITES-----Restarted Lactulose. Follow Ammonia. Likely needs Paracentesis. S/P IV Albumin and on Midodrine     4. HYPOMAGNESEMIA--------Follow levels     5. HYPERURICEMIA------Uloric     6. HYPOCALCEMIA-------Replaced     7. ANEMIA-------H/H stable.       8. HYPOALBUMINEMIA-----Albumin normal. S/P IV Albumin to temporize     9. ELEVATED INR------Related to cirrhosis     10. HYPOTENSION------On Midodrine     11. BIPOLAR DISEASE      12. BASILIO     13. POLYSUBSTANCE ABUSE/TOBACCO USE      14. KETONURIA------Clinically resolved     15. HYPOTENSION-----Midodrine     16. ACIDOSIS-------- resolved     17. N/V/GERD/PUD PROPHYLAXIS------IV PPI     18. DVT PROPHYLAXIS------SCDs    19. HYPOKALEMIA-------Replace po         Saji Waldron MD  [facility]  [phone]  23  07:54 EDT      Electronically signed by Fatou Waldron MD at 23 0911       Philly Johnson MD at 23 1142              [facility] Medicine Services   Daily Progress Note    Patient Name: Daquan Xiong  : 1985  MRN: [id number]  Primary Care Physician:  Wolf Carvalho MD  Date of admission: 2023  Date and Time of Service:  at       Subjective      Chief Complaint:     No bowel movements today.  Refusing lactulose because he is saying he does not have a toilet that he can go to.  Denies any abdominal pain no nausea or vomiting fevers or chills.  Worked with therapy.  No chills sweats.  No cough or shortness of breath      Objective      Vitals:   Temp:  [96.5 øF (35.8 øC)-98.1 øF (36.7 øC)] 97.7 øF (36.5 øC)  Heart Rate:  [49-67] 53  Resp:  [13-18] 14  BP: ()/(34-63) 90/50    Physical Exam  General: No acute distress  HEENT: Neck supple, normal oral mucosa, no masses, no lymphadenopathy  Lungs: diminished breath sounds bilateral bases, no wheezing, no crackles, no rhonchi. Equal excursions.   CV - Normal S1/S2, no murmur, regular rate and rhythm   Abdomen - Soft, nontender, + mildly distended, normal bowel sounds  Extremities - + bilateral LE pitting edema, no erythema  Neuro - No focal weakness, normal sensation, generalized weakness.  Psych - Alert and oriented x3  Skin - no wounds or lesions.     Result Review    Result Review:  I have personally reviewed the results from the time of this admission to 2023 11:42 EDT and agree with these findings:  [x]  Laboratory  [x]  Microbiology  [x]   Radiology  [x]  EKG/Telemetry   [x]  Cardiology/Vascular   []  Pathology  [x]  Old records  []  Other:  Most notable findings include:     Wounds (last 24 hours)       LDA Wound       Row Name 08/23/23 0400 08/23/23 0000 08/22/23 2000       Wound 08/21/23 1551 Left gluteal Pressure Injury    Wound - Properties Group Placement Date: 08/21/23  -ER Placement Time: 1551  -ER Present on Hospital Admission: N  -ER Side: Left  -ER Location: gluteal  -ER Primary Wound Type: Pressure inj  -ER    Dressing Appearance -- -- open to air  -LL    Closure None  -LL None  -LL None  -LL    Base blanchable  -LL blanchable  -LL blanchable  -LL    Retired Wound - Properties Group Placement Date: 08/21/23  -ER Placement Time: 1551  -ER Present on Hospital Admission: N  -ER Side: Left  -ER Location: gluteal  -ER Primary Wound Type: Pressure inj  -ER    Retired Wound - Properties Group Date first assessed: 08/21/23  -ER Time first assessed: 1551  -ER Present on Hospital Admission: N  -ER Side: Left  -ER Location: gluteal  -ER Primary Wound Type: Pressure inj  -ER      Row Name 08/22/23 1600 08/22/23 1542          Wound 08/21/23 1551 Left gluteal Pressure Injury    Wound - Properties Group Placement Date: 08/21/23  -ER Placement Time: 1551  -ER Present on Hospital Admission: N  -ER Side: Left  -ER Location: gluteal  -ER Primary Wound Type: Pressure inj  -ER    Dressing Appearance open to air  -ER open to air  -ER     Closure None  -ER None  -ER     Base blanchable  -ER blanchable  -ER     Care, Wound pressure removed  -ER --     Retired Wound - Properties Group Placement Date: 08/21/23  -ER Placement Time: 1551  -ER Present on Hospital Admission: N  -ER Side: Left  -ER Location: gluteal  -ER Primary Wound Type: Pressure inj  -ER    Retired Wound - Properties Group Date first assessed: 08/21/23  -ER Time first assessed: 1551  -ER Present on Hospital Admission: N  -ER Side: Left  -ER Location: gluteal  -ER Primary Wound Type: Pressure inj   -ER              User Key  (r) = Recorded By, (t) = Taken By, (c) = Cosigned By      Initials Name Provider Type    Kylie Irby, RN Registered Nurse    Emilie Lyn RN Registered Nurse                      Assessment & Plan      Brief Patient Summary:  Daquan Xiong is a 37 y.o. male who       cefTRIAXone, 2,000 mg, Intravenous, Q24H  febuxostat, 40 mg, Oral, Daily  lactulose, 20 g, Oral, TID  metoclopramide, 10 mg, Intravenous, Q6H  midodrine, 20 mg, Oral, Q8H  octreotide, 100 mcg, Intravenous, TID  oxyCODONE, 5 mg, Oral, Once  pantoprazole, 40 mg, Intravenous, Q12H  riFAXIMin, 550 mg, Oral, BID  sodium chloride, 10 mL, Intravenous, Q12H  sucralfate, 1 g, Oral, TID AC             Active Hospital Problems:  Active Hospital Problems    Diagnosis     **URIEL (acute kidney injury)     Anemia     Severe malnutrition     GERD (gastroesophageal reflux disease)     Tobacco use disorder     Alcoholic cirrhosis      Plan:       Septic shock  - likely secondary to bacterial Pneumonia  - Continue Rocephin for 5 days.   - s/p Vasopressin  - on Midodrine.   - Hydrocortisone discontinued. Monitor off hydrocortisone      URIEL on CKD III  - Hyperkalemia - Resolved.   - creatinine trending down.   - at some point might benefit of diuresis/aldactone if kidney function tolerates. Defer to Nephrology      Bilateral Pleural effusions  - likely due to Ascites  - s/p paracentesis.      Acute on chronic anemia  - s/p EGD 8/21 - with nonbleeding esophageal varices and esophagitis  - s/p 1 unit PRBC since admission   - On Octreotide, PPI IV Q12 and Sucralfate.  - GI on consult.   - Nodalol on hold due to hypotension      Alcoholic liver cirrhosis with ascites  - s/p Paracentesis 5L - 8/20  - awaiting liver transplant at [facility].      Hyperammonemia  - continue Rifaximin and lactulose.   - patient refusing his lactulose.      Thrombocytopenia  - trending down. Continue to monitor closely. Likely      Coagulopathy - s/p Vitamin K      Elevated bilirubin     Severe malnutrition      Hyperuricemia - Allopurinol     H/o Alcohol abuse - Sober since 2022      Hopefully home in next 1-2 days to make his appointment on 2023 with Liver team.     DVT prophylaxis:  Mechanical DVT prophylaxis orders are present.    CODE STATUS:    Code Status (Patient has no pulse and is not breathing): CPR (Attempt to Resuscitate)  Medical Interventions (Patient has pulse or is breathing): Full Support      Disposition:  I expect patient to be discharged in next 1-2 days home with home health .    This patient has been and discussed with . 23      Electronically signed by Philly Johnson MD, 23, 11:42 EDT.  [facility] Hospitalist Team               Electronically signed by Philly Johnson MD at 23 1146       Lam Alexis MD at 23 1038          NEPHROLOGY PROGRESS NOTE------[facility]    [facility]  [phone]  Lam Alexis MD      Patient Care Team:  Wolf Carvalho MD as PCP - General (Geriatric Medicine)  Fatou Waldron MD as Consulting Physician (Nephrology)      Provider:  Lam Alexis MD  Patient Name: Daquan Xiong  :  1985    SUBJECTIVE:    F/U URIEL/CKD/acidosis  No chest pain or SOA    Medication:  cefTRIAXone, 2,000 mg, Intravenous, Q24H  febuxostat, 40 mg, Oral, Daily  lactulose, 20 g, Oral, TID  metoclopramide, 10 mg, Intravenous, Q6H  midodrine, 20 mg, Oral, Q8H  octreotide, 100 mcg, Intravenous, TID  oxyCODONE, 5 mg, Oral, Once  pantoprazole, 40 mg, Intravenous, Q12H  riFAXIMin, 550 mg, Oral, BID  sodium chloride, 10 mL, Intravenous, Q12H  sucralfate, 1 g, Oral, TID AC             OBJECTIVE    Vital Sign Min/Max for last 24 hours  Temp  Min: 96.5 øF (35.8 øC)  Max: 98.1 øF (36.7 øC)   BP  Min: 84/46  Max: 103/59   Pulse  Min: 49  Max: 67   Resp  Min: 13  Max: 18   SpO2  Min: 91 %  Max: 99 %   No data recorded   Weight  Min: 82.1 kg  "(181 lb)  Max: 82.1 kg (181 lb)     Flowsheet Rows      Flowsheet Row First Filed Value   Admission Height 185.4 cm (73\") Documented at 08/18/2023 2025   Admission Weight 72.6 kg (160 lb) Documented at 08/18/2023 2025            I/O this shift:  In: -   Out: 250 [Urine:250]  I/O last 3 completed shifts:  In: 1426 [P.O.:1320; I.V.:86; Blood:20]  Out: 1700 [Urine:1700]    Physical Exam:  General Appearance: alert, appears stated age and cooperative  Head: normocephalic, without obvious abnormality and atraumatic +DRY OP   Eyes: conjunctivae and sclerae normal and no icterus  Neck: supple and no JVD  Lungs: clear to auscultation and respirations regular  Heart: regular rhythm & normal rate and normal S1, S2 +BETHANY  Chest Wall: no abnormalities observed  Abdomen: normal bowel sounds and  +ASCITES/DISTENSION AND MILD BILAT LQ PAIN ON PALPATION  Extremities: moves extremities well, 1+ edema, no cyanosis  Skin: +SCATTERED SCABS, ABRASIONS, ECCHYMOSIS  Neurologic: Alert, and oriented x 3. No focal deficits    Labs:    WBC WBC   Date Value Ref Range Status   08/23/2023 10.00 3.40 - 10.80 10*3/mm3 Final   08/22/2023 5.10 3.40 - 10.80 10*3/mm3 Final   08/21/2023 7.50 3.40 - 10.80 10*3/mm3 Final      HGB Hemoglobin   Date Value Ref Range Status   08/23/2023 8.3 (L) 13.0 - 17.7 g/dL Final   08/22/2023 7.8 (L) 13.0 - 17.7 g/dL Final   08/21/2023 7.7 (L) 13.0 - 17.7 g/dL Final   08/20/2023 6.4 (C) 13.0 - 17.7 g/dL Final   08/20/2023 7.1 (L) 13.0 - 17.7 g/dL Final      HCT Hematocrit   Date Value Ref Range Status   08/23/2023 25.1 (L) 37.5 - 51.0 % Final   08/22/2023 23.3 (L) 37.5 - 51.0 % Final   08/21/2023 22.8 (L) 37.5 - 51.0 % Final   08/20/2023 19.4 (C) 37.5 - 51.0 % Final   08/20/2023 21.4 (L) 37.5 - 51.0 % Final      Platelets No results found for: LABPLAT   MCV MCV   Date Value Ref Range Status   08/23/2023 102.9 (H) 79.0 - 97.0 fL Final   08/22/2023 102.0 (H) 79.0 - 97.0 fL Final   08/21/2023 99.7 (H) 79.0 - 97.0 fL Final    "       Sodium Sodium   Date Value Ref Range Status   08/23/2023 139 136 - 145 mmol/L Final   08/22/2023 142 136 - 145 mmol/L Final   08/21/2023 140 136 - 145 mmol/L Final   08/20/2023 139 136 - 145 mmol/L Final      Potassium Potassium   Date Value Ref Range Status   08/23/2023 3.6 3.5 - 5.2 mmol/L Final   08/22/2023 3.7 3.5 - 5.2 mmol/L Final   08/22/2023 3.2 (L) 3.5 - 5.2 mmol/L Final   08/21/2023 3.3 (L) 3.5 - 5.2 mmol/L Final   08/21/2023 3.0 (L) 3.5 - 5.2 mmol/L Final   08/20/2023 3.6 3.5 - 5.2 mmol/L Final      Chloride Chloride   Date Value Ref Range Status   08/23/2023 95 (L) 98 - 107 mmol/L Final   08/22/2023 94 (L) 98 - 107 mmol/L Final   08/21/2023 94 (L) 98 - 107 mmol/L Final   08/20/2023 96 (L) 98 - 107 mmol/L Final      CO2 CO2   Date Value Ref Range Status   08/23/2023 29.0 22.0 - 29.0 mmol/L Final   08/22/2023 31.0 (H) 22.0 - 29.0 mmol/L Final   08/21/2023 32.0 (H) 22.0 - 29.0 mmol/L Final   08/20/2023 26.0 22.0 - 29.0 mmol/L Final      BUN BUN   Date Value Ref Range Status   08/23/2023 38 (H) 6 - 20 mg/dL Final   08/22/2023 35 (H) 6 - 20 mg/dL Final   08/21/2023 40 (H) 6 - 20 mg/dL Final   08/20/2023 47 (H) 6 - 20 mg/dL Final      Creatinine Creatinine   Date Value Ref Range Status   08/23/2023 1.89 (H) 0.76 - 1.27 mg/dL Final   08/22/2023 2.14 (H) 0.76 - 1.27 mg/dL Final   08/21/2023 2.41 (H) 0.76 - 1.27 mg/dL Final   08/20/2023 2.71 (H) 0.76 - 1.27 mg/dL Final      Calcium Calcium   Date Value Ref Range Status   08/23/2023 9.5 8.6 - 10.5 mg/dL Final   08/22/2023 9.8 8.6 - 10.5 mg/dL Final   08/21/2023 9.9 8.6 - 10.5 mg/dL Final   08/20/2023 9.8 8.6 - 10.5 mg/dL Final      PO4 No components found for: PO4   Albumin Albumin   Date Value Ref Range Status   08/23/2023 4.3 3.5 - 5.2 g/dL Final   08/22/2023 4.7 3.5 - 5.2 g/dL Final   08/21/2023 4.6 3.5 - 5.2 g/dL Final      Magnesium Magnesium   Date Value Ref Range Status   08/23/2023 1.8 1.6 - 2.6 mg/dL Final   08/22/2023 1.8 1.6 - 2.6 mg/dL Final    08/21/2023 1.9 1.6 - 2.6 mg/dL Final      Uric Acid No components found for: URIC ACID     Imaging Results (Last 72 Hours)       Procedure Component Value Units Date/Time    CT Abdomen Pelvis Without Contrast [[id number]] Collected: 08/20/23 1315     Updated: 08/20/23 1324    Narrative:      CT ABDOMEN PELVIS WO CONTRAST    Date of Exam: 8/20/2023 11:23 AM EDT    Indication: Retroperitoneal bleed suspected.    Comparison: CT abdomen pelvis 7/3/2023    Technique: Axial CT images were obtained of the abdomen and pelvis without the administration of contrast. Sagittal and coronal reconstructions were performed.  Automated exposure control and iterative reconstruction methods were used.      Findings:  Moderate low-density bilateral pleural effusions increasing from prior exam. Hypodense blood pooling suggestive of anemia. Bibasilar atelectasis. Superimposed tree-in-bud nodular opacities in the right middle lobe and lingula.    Small liver with morphologic changes of chronic underlying liver disease. Gallbladder unremarkable. No biliary dilation. No signs of active pancreatitis. Spleen grossly unremarkable. No suspicious adrenal nodule.    Urinary bladder partially collapsed around Rosario catheter with presumed instrumentation related intraluminal gas. No hydronephrosis. Symmetric renal size. Negative for nephrolithiasis. Prostate within normal limits in size.    Mild to moderate gastric distention with ingested fluid/debris refluxing into the distal esophagus which appears patulous. No signs of bowel obstruction.    Large volume ascites with apparent peritoneal thickening more conspicuous from prior exams. No organized collection or free air. Negative for retroperitoneal fluid collection.    Aortic atherosclerotic disease without aneurysm. Prominent central mesenteric and retroperitoneal nodes likely reactive and not significantly changed from prior exam.    High riding testicles located in the bilateral inguinal  canals. Diffuse body wall edema worsening from prior exam. Degenerative changes in the lumbar spine without acute fracture or aggressive bone lesion.      Impression:      Impression:  1. Negative for retroperitoneal fluid collection.  2. Large volume ascites with peritoneal thickening suspicious for a nonspecific peritonitis. Consider paracentesis.  3. Findings of volume overload/third spacing progressing from prior exam including moderate bilateral pleural effusions, large volume ascites and worsening anasarca.  4. Multifocal tree-in-bud nodular opacities concerning for endobronchial infectious/inflammatory process, possibly early aspiration in the setting of refluxed fluid into a patulous distal esophagus. Consider nasogastric decompression and aspiration risk   precaution.  5. Morphologic changes of chronic liver disease.            Electronically Signed: Marko Dimas MD    8/20/2023 1:21 PM EDT    Workstation ID: [id number]            Results for orders placed during the hospital encounter of 08/18/23    XR Chest 1 View    Narrative  XR CHEST 1 VW    Date of Exam: 8/19/2023 6:40 AM EDT    Indication: central line placement    Comparison: July 3, 2023    Findings:  There is a right internal jugular central line with the tip at the cavoatrial junction. The heart size is borderline enlarged. There is mild pulmonary vascular congestion. There are no focal infiltrates to suggest pneumonia.    Impression  Impression:  Cardiomegaly with mild pulmonary vascular prominence. Right IJ central line is in place with the tip at the cavoatrial junction. There is no pneumothorax.      Electronically Signed: Deven Machado MD  8/19/2023 6:54 AM EDT  Workstation ID: [id number]      Results for orders placed during the hospital encounter of 07/03/23    XR Chest 1 View    Narrative  XR CHEST 1 VW    Date of Exam: 7/3/2023 4:13 PM EDT    Indication: AMS    Comparison: AP portable chest 6/20/2023    Findings:  There is some bandlike  atelectasis in the right lower lobe, similar to the prior examination. The previously described patchy left basilar airspace disease appears improved. No new airspace disease is identified. The heart size is normal. No pleural  effusion or pneumothorax is identified.    Impression  Impression:  Improving bibasilar atelectasis or pneumonia since 6/28/2023. No new airspace disease is appreciated.      Electronically Signed: Chanda Charlieeliza  7/3/2023 4:27 PM EDT  Workstation ID: [id number]      Results for orders placed during the hospital encounter of 06/23/23    XR Chest 1 View    Narrative  XR CHEST 1 VW    Date of Exam: 6/28/2023 11:45 AM EDT    Indication: eval for pneumonia    Comparison: None available.    Findings:  The lung volumes are low. Patchy airspace disease is seen within the lung bases bilaterally. Probable small bilateral pleural effusions are present. The pulmonary vasculature appears unremarkable. No pneumothorax. No acute osseous abnormality identified.    Impression  Impression:  Low lung volumes with patchy airspace disease seen within the lung bases bilaterally, left greater than right with probable small bilateral pleural effusions, likely related to pneumonia and/or atelectasis.      Electronically Signed: Brynn Higuera  6/28/2023 12:00 PM EDT  Workstation ID: [id number]            ASSESSMENT / PLAN      URIEL (acute kidney injury)    GERD (gastroesophageal reflux disease)    Tobacco use disorder    Alcoholic cirrhosis    Severe malnutrition    Anemia      1.URIEL/CKD ---------. +Recurrent ARF/URIEL on top of known CRF/CKD STG 3A with a baseline serum creatinine of about 1.5. CRF/CKD STG 3A secondary to HTN NS. +ARF/URIEL is secondary to ATN from hypotension and prerenal state/intravascular volume depletion. Avoid hypotension.  No NSAIDs or IV dye. Dose meds for CrCl less than 10 cc/min until ARF/URIEL is resolved     2. HYPERKALEMIA--------Resolved. Secondary to RF and acidosis. Med Rx. Telemetry. K+ restrict  diet and follow     3. ETOH CIRRHOSIS/HEPATIC ENCEPHALOPATHY/ASCITES-----Restarted Lactulose. Follow Ammonia. Likely needs Paracentesis. S/P IV Albumin and on Midodrine     4. HYPOMAGNESEMIA--------Follow levels     5. HYPERURICEMIA------Uloric     6. HYPOCALCEMIA-------Replace IV     7. ANEMIA-------H/H stable.       8. HYPOALBUMINEMIA-----Albumin normal. S/P IV Albumin to temporize     9. ELEVATED INR------Related to cirrhosis     10. HYPOTENSION------On Midodrine     11. BIPOLAR DISEASE     12. BASILIO     13. POLYSUBSTANCE ABUSE/TOBACCO USE      14. KETONURIA------Secondary to intravascular volume depletion. IVFs     15. HYPOTENSION-----Levophed, IVFs, IV Albumin, Midodrine     16. ACIDOSIS-------- +Type 4 RTA and from stool losses related Lactulose use (when he actually takes it)     17. N/V/GERD/PUD PROPHYLAXIS------IV PPI     18. DVT PROPHYLAXIS------SCDs      Patient nonoliguric, making urine  Creatinine stable  Blood pressure still soft, on midodrine  Monitor renal function fluid status electrolytes       Lam Alexis MD  [facility]  [phone]  08/23/23  10:38 EDT      Electronically signed by Lam Alexis MD at 08/23/23 4951       Maria C Villela APRN at 08/23/23 1001       Attestation signed by Juan Padilla MD at 08/23/23 1342    The patient was seen and examined with an APRN. I personally performed the substantive portion of the history of presenting illness.  I also performed the entire physical exam and medical decision making.    Patient is eating well and having bowel movements.  No complaints.  On exam he has jaundice, sarcopenia, and abdominal distention with ascites as well as bilateral lower extremity edema  CR 1.89, TB 4.1, INR 1.67, Hgb 8.3, PLT 60  Impression:  Decompensated alcoholic cirrhosis with ascites, esophageal varices, and hepatic encephalopathy  Acute on chronic kidney disease continues to improve   gastroesophageal reflux disease  with grade D esophagitis  Delayed gastric emptying  Plan:  Continue twice daily PPI, Carafate suspension and metoclopramide  Continue octreotide and midodrine to help with renal blood flow  Encourage nutrition  Patient has follow-up with [facility] liver transplant next week    Electronically signed by Juan Padilla MD, 08/23/23, 1:41 PM EDT.                        LOS: 4 days   Patient Care Team:  Wolf Carvalho MD as PCP - General (Geriatric Medicine)  Fatou Waldron MD as Consulting Physician (Nephrology)      Subjective     Subjective: Patient with no major changes overnight.  Eating well and reports having bowel movements.  No nausea/vomiting.  Urinating well.      ROS:   Review of Systems   Constitutional:  Negative for chills and fever.   Respiratory:  Negative for cough and shortness of breath.    Cardiovascular:  Negative for chest pain.   Gastrointestinal:  Negative for abdominal pain, blood in stool, nausea and vomiting.   Neurological:  Positive for weakness.   Psychiatric/Behavioral:  Negative for agitation and confusion.       Medication Review:   Scheduled Meds:cefTRIAXone, 2,000 mg, Intravenous, Q24H  febuxostat, 40 mg, Oral, Daily  lactulose, 20 g, Oral, TID  metoclopramide, 10 mg, Intravenous, Q6H  midodrine, 20 mg, Oral, Q8H  octreotide, 100 mcg, Intravenous, TID  oxyCODONE, 5 mg, Oral, Once  pantoprazole, 40 mg, Intravenous, Q12H  riFAXIMin, 550 mg, Oral, BID  sodium chloride, 10 mL, Intravenous, Q12H  sucralfate, 1 g, Oral, TID AC      Continuous Infusions:   PRN Meds:.  albumin human    albumin human    ALPRAZolam    calcium carbonate    lactated ringers    lidocaine    lidocaine    lidocaine    Magnesium Standard Dose Replacement - Follow Nurse / BPA Driven Protocol    melatonin    nitroglycerin    ondansetron **OR** ondansetron    oxyCODONE    Phosphorus Replacement - Follow Nurse / BPA Driven Protocol    Potassium Replacement - Follow Nurse / BPA Driven  Protocol    [COMPLETED] Insert Peripheral IV **AND** sodium chloride    sodium chloride    sodium chloride      Objective     Vital Signs  Temp:  [96.5 øF (35.8 øC)-98.1 øF (36.7 øC)] 97.7 øF (36.5 øC)  Heart Rate:  [49-67] 53  Resp:  [13-18] 14  BP: ()/(34-63) 90/50    Physical Exam:    General Appearance:    Awake and alert, in no acute distress   Head:    Normocephalic, without obvious abnormality   Eyes:          Conjunctivae normal, icteric sclera   Ears:    Hearing intact   Throat:   No oral lesions, no thrush, oral mucosa moist   Neck:   No adenopathy, supple, no JVD   Lungs:     Respirations regular, even and unlabored       Abdomen:     Soft, non-tender, no rebound or guarding, non-distended   Rectal:     Deferred   Extremities:   No edema, no cyanosis, no redness   Skin:   No bleeding, bruising or rash, + jaundice   Neurologic:   Sensation intact        Results Review:    CBC  Results from last 7 days   Lab Units 08/23/23  0326 08/22/23  0432 08/21/23  0452 08/20/23  2027 08/20/23  1234 08/20/23  0602 08/19/23  0404 08/18/23  2315   RBC 10*6/mm3 2.44* 2.29* 2.28*  --   --  2.01* 2.39* 2.87*   WBC 10*3/mm3 10.00 5.10 7.50  --   --  6.70 10.70 9.50   HEMOGLOBIN g/dL 8.3* 7.8* 7.7* 6.4* 7.1* 7.0* 8.4* 10.1*   PLATELETS 10*3/mm3 60* 59* 67*  --   --  98* 122* 116*       CMP  Results from last 7 days   Lab Units 08/23/23  0326 08/22/23  1656 08/22/23  0432 08/21/23  2130 08/21/23  0452 08/20/23  1317 08/20/23  1234 08/20/23  0602 08/20/23  0022 08/19/23  1734 08/19/23  0938 08/19/23  0233 08/18/23  2315 08/18/23 2202   SODIUM mmol/L 139  --  142  --  140  --  139 136 136 137   < > 127*  --  130*   POTASSIUM mmol/L 3.6 3.7 3.2* 3.3* 3.0*  --  3.6 3.6 3.9 4.4   < > 5.1  --  5.9*   CHLORIDE mmol/L 95*  --  94*  --  94*  --  96* 95* 98 100   < > 95*  --  98   CO2 mmol/L 29.0  --  31.0*  --  32.0*  --  26.0 26.0 24.0 20.0*   < > 20.0*  --  18.0*   BUN mg/dL 38*  --  35*  --  40*  --  47* 52* 58* 62*   < > 69*   --  74*   CREATININE mg/dL 1.89*  --  2.14*  --  2.41*  --  2.71* 3.03* 2.96* 3.27*   < > 4.04*  --  3.91*   GLUCOSE mg/dL 145*  --  138*  --  145*  --  149* 170* 151* 146*   < > 143*  --  86   ALBUMIN g/dL 4.3  --  4.7  --  4.6  --   --  4.2  --   --   --  2.5*  --  2.3*   BILIRUBIN mg/dL 4.1*  --  4.7*  --  4.7*  --   --  4.2*  --   --   --  4.7*  --  4.8*   ALK PHOS U/L 108  --  103  --  116  --   --  152*  --   --   --  245*  --  232*   AST (SGOT) U/L 113*  --  54*  --  44*  --   --  56*  --   --   --  84*  --  77*   ALT (SGPT) U/L 38  --  13  --  15  --   --  18  --   --   --  28  --  30   AMMONIA umol/L  --   --  54  --  74* 91*  --   --   --   --   --   --  52  --     < > = values in this interval not displayed.       Amylase and Lipase  Results from last 7 days   Lab Units 08/18/23  2111   LIPASE U/L 68*       CRP         Imaging Results (Last 24 Hours)       ** No results found for the last 24 hours. **              Assessment & Plan   37-year-old female admitted 8/18/2023 with elevated creatinine at 3 as outpatient.  Has alcohol hepatitis/cirrhosis that has been complicated by ascites, nonbleeding esophageal varices, and hepatic encephalopathy.  Sober from alcohol since July 2022.     -ETOH Cirrhosis complicated by ascites, HE, and esophageal varices  -Acute renal failure on chronic kidney disease  -Hypotension  -Elevated LFTs  -Elevated ammonia   -Macrocytic anemia  -Belching/GERD     Status post EGD 8/21/2023 by Dr. Padilla -grade D erosive esophagitis, small F1 esophageal varices without bleeding, moderate portal hypertensive gastropathy, and retained undigested food in the stomach.     PLAN:   Patient with no major GI complaints today.  His acid reflux symptoms have improved.  He reports eating well.  Hemoglobin 8.3 today.  Creatinine improved to 1.89.  Total bilirubin slightly down to 4.1.  He continues on octreotide and midodrine.  Also on PPI, Reglan, and Carafate.  Continued to encourage  nutrition with 2 g sodium diet as well as protein supplements.  He remains on lactulose and Xifaxan.  Continue to monitor labs with hopes for discharge so he can make his appointment with liver transplant team on 8/28/2023.  MELD sodium score is 24.  AFP is 4.98 on 6/25/2023.  Right upper quadrant ultrasound done on 6/25/2023 no signs of focal lesions.      MONICA Zacarisa  08/23/23  10:01 EDT      Electronically signed by Juan Padilla MD at 08/23/23 1342       Maria C Villela APRN at 08/22/23 1308       Attestation signed by Juan Padilla MD at 08/22/23 1722    The patient was seen and examined with an APRN. I personally performed the substantive portion of the history of presenting illness.  I also performed the entire physical exam and medical decision making.    Patient is significantly weak but is trying to eat.  He is having bowel movements and no vomiting.  He was having reflux earlier today and was given Carafate which seems to have helped.  On exam he has jaundice and stigmata for chronic liver disease.  Abdomen is soft  CR 2.14, , TB 4.7, NH3 54, INR 1.79, Hgb 7.8, PLT 59  Impression:  Decompensated alcoholic cirrhosis with ascites, esophageal varices, and hepatic encephalopathy  Acute on chronic kidney disease a little better today  Gastroesophageal reflux disease with grade D esophagitis  Delayed gastric emptying  Plan:  Continue twice daily PPI, Carafate suspension and metoclopramide  Continue octreotide and midodrine to help with renal blood flow  Encourage nutrition  Patient has follow-up with [facility] liver transplant next week    Electronically signed by Juan Padilla MD, 08/22/23, 5:18 PM EDT.                        LOS: 3 days   Patient Care Team:  Wolf Carvalho MD as PCP - General (Geriatric Medicine)  Fatou Waldron MD as Consulting Physician (Nephrology)      Subjective     Subjective: Patient complains of belching and  acid reflux symptoms.  No nausea/vomiting or abdominal pain.  No bowel movement yet today.  He has been eating some.      ROS:   Review of Systems   Constitutional:  Negative for chills and fever.   Respiratory:  Negative for cough and shortness of breath.    Cardiovascular:  Negative for chest pain.   Gastrointestinal:  Negative for abdominal pain, blood in stool, nausea and vomiting.   Neurological:  Positive for weakness. Negative for dizziness.   Psychiatric/Behavioral:  Negative for agitation and confusion.       Medication Review:   Scheduled Meds:cefTRIAXone, 2,000 mg, Intravenous, Q24H  febuxostat, 40 mg, Oral, Daily  hydrocortisone sodium succinate, 50 mg, Intravenous, Q6H  lactulose, 20 g, Oral, TID  metoclopramide, 10 mg, Intravenous, Q6H  midodrine, 20 mg, Oral, Q8H  octreotide, 100 mcg, Intravenous, TID  oxyCODONE, 5 mg, Oral, Once  pantoprazole, 40 mg, Intravenous, Q12H  riFAXIMin, 550 mg, Oral, BID  sodium chloride, 10 mL, Intravenous, Q12H      Continuous Infusions:   PRN Meds:.  albumin human    albumin human    ALPRAZolam    calcium carbonate    lactated ringers    lidocaine    lidocaine    lidocaine    Magnesium Standard Dose Replacement - Follow Nurse / BPA Driven Protocol    melatonin    nitroglycerin    ondansetron **OR** ondansetron    oxyCODONE    Phosphorus Replacement - Follow Nurse / BPA Driven Protocol    Potassium Replacement - Follow Nurse / BPA Driven Protocol    [COMPLETED] Insert Peripheral IV **AND** sodium chloride    sodium chloride    sodium chloride      Objective     Vital Signs  Temp:  [97.7 øF (36.5 øC)-98.8 øF (37.1 øC)] 97.9 øF (36.6 øC)  Heart Rate:  [46-71] 54  Resp:  [11-16] 14  BP: ()/(39-65) 96/48    Physical Exam:    General Appearance:    Awake and alert, in no acute distress   Head:    Normocephalic, without obvious abnormality   Eyes:          Conjunctivae normal, icteric sclera   Ears:    Hearing intact   Throat:   No oral lesions, no thrush, oral mucosa  moist   Neck:   No adenopathy, supple, no JVD   Lungs:     Respirations regular, even and unlabored       Abdomen:     Soft, non-tender, no rebound or guarding, non-distended   Rectal:     Deferred   Extremities:   No edema, no cyanosis, no redness   Skin:   No bleeding, bruising or rash, +jaundice   Neurologic:   Sensation intact        Results Review:    CBC  Results from last 7 days   Lab Units 08/22/23  0432 08/21/23  0452 08/20/23  2027 08/20/23  1234 08/20/23  0602 08/19/23  0404 08/18/23  2315   RBC 10*6/mm3 2.29* 2.28*  --   --  2.01* 2.39* 2.87*   WBC 10*3/mm3 5.10 7.50  --   --  6.70 10.70 9.50   HEMOGLOBIN g/dL 7.8* 7.7* 6.4* 7.1* 7.0* 8.4* 10.1*   PLATELETS 10*3/mm3 59* 67*  --   --  98* 122* 116*       CMP  Results from last 7 days   Lab Units 08/22/23  0432 08/21/23  2130 08/21/23  0452 08/20/23  1317 08/20/23  1234 08/20/23  0602 08/20/23  0022 08/19/23  1734 08/19/23  0938 08/19/23  0233 08/18/23  2315 08/18/23  2202   SODIUM mmol/L 142  --  140  --  139 136 136 137 129* 127*  --  130*   POTASSIUM mmol/L 3.2* 3.3* 3.0*  --  3.6 3.6 3.9 4.4 4.6 5.1  --  5.9*   CHLORIDE mmol/L 94*  --  94*  --  96* 95* 98 100 96* 95*  --  98   CO2 mmol/L 31.0*  --  32.0*  --  26.0 26.0 24.0 20.0* 18.0* 20.0*  --  18.0*   BUN mg/dL 35*  --  40*  --  47* 52* 58* 62* 65* 69*  --  74*   CREATININE mg/dL 2.14*  --  2.41*  --  2.71* 3.03* 2.96* 3.27* 3.48* 4.04*  --  3.91*   GLUCOSE mg/dL 138*  --  145*  --  149* 170* 151* 146* 83 143*  --  86   ALBUMIN g/dL 4.7  --  4.6  --   --  4.2  --   --   --  2.5*  --  2.3*   BILIRUBIN mg/dL 4.7*  --  4.7*  --   --  4.2*  --   --   --  4.7*  --  4.8*   ALK PHOS U/L 103  --  116  --   --  152*  --   --   --  245*  --  232*   AST (SGOT) U/L 54*  --  44*  --   --  56*  --   --   --  84*  --  77*   ALT (SGPT) U/L 13  --  15  --   --  18  --   --   --  28  --  30   AMMONIA umol/L 54  --  74* 91*  --   --   --   --   --   --  52  --        Amylase and Lipase  Results from last 7 days   Lab  Units 08/18/23  2111   LIPASE U/L 68*       CRP         Imaging Results (Last 24 Hours)       ** No results found for the last 24 hours. **              Assessment & Plan   37-year-old female admitted 8/18/2023 with elevated creatinine at 3 as outpatient.  Has alcohol hepatitis/cirrhosis that has been complicated by ascites, nonbleeding esophageal varices, and hepatic encephalopathy.  Sober from alcohol since July 2022.    -ETOH Cirrhosis complicated by ascites, HE, and esophageal varices  -Acute renal failure on chronic kidney disease  -Hypotension  -Elevated LFTs  -Elevated ammonia   -Macrocytic anemia  -Belching/GERD    Status post EGD 8/21/2023 by Dr. Padilla -grade D erosive esophagitis, small F1 esophageal varices without bleeding, moderate portal hypertensive gastropathy, and retained undigested food in the stomach.    PLAN:   Patient reports feeling well today aside from frequent belching and acid reflux symptoms.  Underwent EGD yesterday with findings of grade D erosive esophagitis.  He is on IV PPI twice daily and Reglan was started yesterday.  Add Carafate suspension.  Hemoglobin 7.8.  Platelets 59.  WBC normal.  Creatinine slowly trending down at 2.14 today.  Bilirubin 4.7.  He continues on octreotide subcutaneous and midodrine.  He is being treated with lactulose and Xifaxan without confusion today.  Continue to encourage nutrition with 2 g sodium diet.  He has tried protein supplements but does not like these.  MELD sodium score is 26.  Continue to monitor H&H and transfuse as needed.  AFP is 4.98 on 6/25/2023.  Right upper quadrant ultrasound done on 6/25/2023 no signs of focal lesions.  He has an appointment with liver transplant team next week on 8/28/2023.    Maria C Villela, MONICA  08/22/23  13:09 EDT      Electronically signed by Juan Padilla MD at 08/22/23 1722          Consult Notes (last 48 hours)        Philly Johnson MD at 08/22/23 1323        Consult Orders    1. Inpatient  Hospitalist Consult [[id number]] ordered by Aly Zayas MD                     [facility] Medicine Services   Consult Note    Patient Name: Daquan Xiong  : 1985  MRN: [id number]  Primary Care Physician:  Wolf Carvalho MD  Referring Physician: No ref. provider found  Date of admission: 2023  Date and Time of Care:     Inpatient Hospitalist Consult  Consult performed by: Philly Johnson MD  Consult ordered by: Aly Zayas MD        Subjective      Reason for Consult/ Chief Complaint: nausea, vomiting    Consult Requested By: Dr. Zayas    History of Present Illness: Daquan Xiong is a 37 y.o. male Patient is a 37-year-old gentleman was brought in on 2023 chief complaint of abnormal labs with nausea and vomiting.  Patient was admitted in ICU treated for septic shock likely secondary to pneumonia was treated with antibiotics and vasopressin placed on midodrine hydrocortisone treated with fluids he had acute on chronic kidney disease was seen by nephrology with improvement of his kidney function.  Fernie also had acute on chronic anemia chronic transfusion but had an EGD that showed no bleeding but showed nonbleeding esophageal varices as well as esophagitis.  He also had a paracentesis for ascites.  Currently patient is feeling better denies any fevers chills or sweats.  No abdominal pain.  Still feels weak.  Denies any shortness of breath or cough.        Personal History     Past Medical History:   Diagnosis Date    Alcohol abuse     1/2-1 pint a day, sober since 2022    Alcoholic cirrhosis 2022    Arthritis     Bipolar affective disorder 2022    Cholecystitis 2022    Chronic constipation 2017    GERD (gastroesophageal reflux disease) 2022    Doing well with pepcid.    Hypertension     Osteoarthritis 2022    Polysubstance abuse 2022    Severe Malnutrition (HCC) 2023    Sleep apnea     Tobacco use disorder 2022        Past Surgical History:   Procedure Laterality Date    BACK SURGERY      WRIST SURGERY         Family History: family history includes Cirrhosis in his father; Diabetes in his maternal grandfather, maternal grandmother, paternal grandfather, and paternal grandmother; Heart disease in his maternal grandmother. Otherwise pertinent FHx was reviewed and not pertinent to current issue.    Social History:  reports that he has been smoking cigarettes. He has been smoking an average of .5 packs per day. He has never used smokeless tobacco. He reports that he does not currently use alcohol. He reports that he does not currently use drugs after having used the following drugs: Marijuana.    Home Medications:   allopurinol, baclofen, cyclobenzaprine, melatonin, midodrine, nadolol, nicotine, nicotine polacrilex, oxyCODONE, pantoprazole, and riFAXIMin    Allergies:  Allergies   Allergen Reactions    Bee Venom Anaphylaxis    Scopolamine Anaphylaxis         Objective      Vitals:  Temp:  [97.7 øF (36.5 øC)-98.8 øF (37.1 øC)] 97.9 øF (36.6 øC)  Heart Rate:  [46-71] 58  Resp:  [11-16] 14  BP: ()/(39-65) 89/47  Flow (L/min):  [2-5] 2    Physical Exam  General: No acute distress  HEENT: Neck supple, normal oral mucosa, no masses, no lymphadenopathy  Lungs: diminished breath sounds bilateral bases, no wheezing, no crackles, no rhonchi. Equal excursions.   CV - Normal S1/S2, no murmur, regular rate and rhythm   Abdomen - Soft, nontender, + mildly distended, normal bowel sounds  Extremities - + bilateral LE pitting edema, no erythema  Neuro - No focal weakness, normal sensation, generalized weakness.  Psych - Alert and oriented x3  Skin - no wounds or lesions.       Result Review    Result Review:  I have personally reviewed the results from the time of this admission to 8/22/2023 13:23 EDT and agree with these findings:  [x]  Laboratory  [x]  Microbiology  [x]  Radiology  [x]  EKG/Telemetry   [x]  Cardiology/Vascular   []   Pathology  [x]  Old records  []  Other:  Most notable findings include:       Assessment & Plan        Active Hospital Problems:  Active Hospital Problems    Diagnosis     **URIEL (acute kidney injury)     Anemia     Severe malnutrition     GERD (gastroesophageal reflux disease)     Tobacco use disorder     Alcoholic cirrhosis      Plan:       Septic shock  - likely secondary to bacterial Pneumonia  - Continue Rocephin for 5 days.   - s/p Vasopressin  - on Midodrine.   - wean off Hydrocortisone as tolerated     URIEL on CKD III  - Hyperkalemia - Resolved.   - creatinine trending down.     Bilateral Pleural effusions  - likely due to Ascites  - s/p paracentesis.     Acute on chronic anemia  - s/p EGD 8/21 - with nonbleeding esophageal varices and esophagitis  - s/p 1 unit PRBC since admission   - On Octreotide, PPI IV Q12 and Sucralfate.  - GI on consult.   - Nodalol on hold due to hypotension     Alcoholic liver cirrhosis with ascites  - s/p Paracentesis 5L - 8/20  - awaiting liver transplant at [facility].     Hyperammonemia  - continue Rifaximin and lactulose.      Thrombocytopenia  - trending down. Continue to monitor closely. Likely     Coagulopathy - s/p Vitamin K    Elevated bilirubin    Severe malnutrition     Hyperuricemia - Allopurinol    H/o Alcohol abuse - Sober since July 2022           This patient has been  and discussed with . 08/22/23      Signature: Electronically signed by Philly Johnson MD, 08/22/23, 13:23 EDT.  [facility] Hospitalist Team      Electronically signed by Philly Johnson MD at 08/22/23 4842

## 2023-08-24 NOTE — SIGNIFICANT NOTE
Post Fall Note    Patient: Daquan Xiong   Location: 2101/1    Time of fall: 1450    Date of fall: 08/24/2023    Location of the fall: Room bathroom    Describe the Fall: Pt was in bathroom getting dressed for dc, urinating and says hand slipped from rail and fell backward.    Interventions in place prior to fall: Falls Wrist Band Applied, Falls Gown Applied, Fall Risk Care Plan Active, Non-Skid Footwear, Items within Reach, and Adequate Lighting Provided    Was the fall witnessed? No    Where was the patient found?: On the ground in bathroom    Patient position when found?: sitting up on butt against the wall    If witnessed, what part of the body made contact with the floor or other object? NA    Injury: Yes     Is the patient having any pain?: No    Level of Consciousness: awake, alert, and oriented     Post fall assessment: completed    Physician notified: Yes    Name of physician: Jana    Family notified: Yes    Name of family member notified: Lana Xiong wife at bedside    Post fall precautions in place: Patient/ Family Educated, Call Light Within Reach, Encourage Use of Call Light, Fall Risk Care Plan Active, Non-Skid Footwear, Room Clutter Free, Items within Reach, and Adequate Lighting Provided    Teaching provided: yes          Electronically signed by Jailene Kirby RN, 08/24/23, 15:07 EDT.

## 2023-08-24 NOTE — PLAN OF CARE
Goal Outcome Evaluation:      Abd is still round, distended and firm. Encouraged to take all of lactulose but the patient took 75% of dose.Patient requested pain medication dosage increased to what he takes at home. Orders were put in last night reflecting increase. Needs assistance ambulating to use restroom. Will make needs known to nursing utilizing call light. Patient has call light within reach.

## 2023-08-25 ENCOUNTER — HOSPITAL ENCOUNTER (OUTPATIENT)
Dept: INFUSION THERAPY | Facility: HOSPITAL | Age: 38
Discharge: HOME | End: 2023-08-25

## 2023-08-25 ENCOUNTER — TELEPHONE (OUTPATIENT)
Dept: INFUSION THERAPY | Facility: HOSPITAL | Age: 38
End: 2023-08-25
Payer: COMMERCIAL

## 2023-08-25 NOTE — DISCHARGE PLACEMENT REQUEST
"Amish Xiong (37 y.o. Male)       Date of Birth   1985    Social Security Number       Address   [address] [place] [address]    Home Phone   [phone]    MRN   [id number]       Zoroastrianism   Caodaism    Marital Status                               Admission Date   8/18/23    Admission Type   Emergency    Admitting Provider   Aly Zayas MD    Attending Provider       Department, Room/Bed   [facility], 2101/1       Discharge Date   8/24/2023    Discharge Disposition   [facility] [place]    Discharge Destination                                 Attending Provider: (none)   Allergies: Bee Venom, Scopolamine    Isolation: None   Infection: None   Code Status: Prior    Ht: 185.4 cm (73\")   Wt: 82.6 kg (182 lb 1.6 oz)    Admission Cmt: None   Principal Problem: URIEL (acute kidney injury) [N17.9]                   Active Insurance as of 8/18/2023       Primary Coverage       Payor Plan Insurance Group Employer/Plan Group    ANTHEM BLUE CROSS ANTHEM BLUE CROSS BLUE SHIELD PPO [id number]       Payor Plan Address Payor Plan Phone Number Payor Plan Fax Number Effective Dates    [address] [phone]  1/1/2023 - None Entered    [facility] [id number]         Subscriber Name Subscriber Birth Date Member ID       ANA XIONG [birth year]/25/1986 [id number]               Secondary Coverage       Payor Plan Insurance Group Employer/Plan Group    ANTHEM MEDICAID [facility] [id number]       Payor Plan Address Payor Plan Phone Number Payor Plan Fax Number Effective Dates    MAIL STOP:   1/15/2023 - None Entered    [address] [address]       [facility] [address]         Subscriber Name Subscriber Birth Date Member ID       AMISH XIONG 1985 [id number]                     Emergency Contacts        (Rel.) Home Phone Work Phone Mobile Phone    Alec Xiongi (Spouse) [phone] -- [phone]    Alejandra Xiong (Daughter) -- -- [phone]    " Lizz Wiggins (Mother) -- -- [phone]            Ambulatory Referral to Physical Therapy Evaluate and treat [[id number]] (Order [id number])  Order  Date: 8/24/2023 Department: [facility] Ordering/Authorizing: Dolores Bates MD     Order History  Outpatient  Date/Time Action Taken User Additional Information   08/24/23 1656 Sign Lexi Sunshine RN Ordering Mode: Verbal with readback     Order Details    Frequency Duration Priority Order Class   None None Routine Outgoing Referral     Start Date/Time    Start Date   08/24/23     Order Information    Order Date Service Start Date Start Time   08/24/23 Medicine 08/24/23      Reference Links    Associated Reports   View Encounter     Order Questions    Question Answer   Specialty needed: Evaluate and treat   Follow-up needed: Yes            Verbal / Cosign Order Info    Action Created on Order Mode Entered by Responsible Provider Signed by Signed on   Ordering 08/24/23 1656 Verbal with readback Lexi Sunshine RN Chinasa, Margie U, MD       Source Order Set / Preference List    Preference List   [facility] REFERRALS [[id number]]             Clinical Indications     ICD-10-CM ICD-9-CM   Acute kidney injury  - Primary    N17.9 584.9   Hypotension, unspecified hypotension type    I95.9 458.9   Alcoholic cirrhosis, unspecified whether ascites present    K70.30 571.2                             Reprint Order Requisition    Ambulatory Referral to Physical Therapy Evaluate and treat (Order #[id number]) on 8/24/23         Encounter    View Encounter                Order Provider Info        Office phone Pager E-mail   Ordering User Lexi Sunshine RN -- -- --   Authorizing Provider Dolores Bates [phone] -- --   Entered By Lexi Sunshine RN -- -- --   Ordering Provider Dolores Bates [phone] -- --     Tracking Reports    Cosign Tracking Order Transmittal Tracking     Authorized by:  Dolores Bates MD  (NPI:  [id number])

## 2023-08-25 NOTE — PAYOR COMM NOTE
"Discharge notification for case# [id number]     =============    THANK YOU,    BLADE Carter, RN  Utilization Review  [facility]  Phone: [phone]  Fax: [phone]      NPI: [id number]  Tax ID: [id number]        Amish Xiong (37 y.o. Male)       Date of Birth   1985    Social Security Number       Address   [address] [place] [address]    Home Phone   [phone]    MRN   [id number]       [facility]    Marital Status                               Admission Date   8/18/23    Admission Type   Emergency    Admitting Provider   Aly Zayas MD    Attending Provider       Department, Room/Bed   [facility] CARE, 2101/1       Discharge Date   8/24/2023    Discharge Disposition   Home-Health Care [facility]    Discharge Destination                                 Attending Provider: (none)   Allergies: Bee Venom, Scopolamine    Isolation: None   Infection: None   Code Status: Prior    Ht: 185.4 cm (73\")   Wt: 82.6 kg (182 lb 1.6 oz)    Admission Cmt: None   Principal Problem: URIEL (acute kidney injury) [N17.9]                   Active Insurance as of 8/18/2023       Primary Coverage       Payor Plan Insurance Group Employer/Plan Group    ANTHEM BLUE CROSS ANTHEM BLUE CROSS BLUE SHIELD PPO [id number]       Payor Plan Address Payor Plan Phone Number Payor Plan Fax Number Effective Dates    [address] [phone]  1/1/2023 - None Entered    [facility] [address]         Subscriber Name Subscriber Birth Date Member ID       ANA XIONG [birth year]/1986 [id number]               Secondary Coverage       Payor Plan Insurance Group Employer/Plan Group    ANTHEM MEDICAID [facility] CONNECT - ANTH [id number]       Payor Plan Address Payor Plan Phone Number Payor Plan Fax Number Effective Dates    MAIL STOP:   1/15/2023 - None Entered    [address] [address]       [facility] [address]         Subscriber Name Subscriber Birth Date Member ID       AMISH XIONG ERMA 1985 " [id number]                     Emergency Contacts        (Rel.) Home Phone Work Phone Mobile Phone    Lana Xiong (Spouse) [phone] -- [phone]    Alejandra Xiong (Daughter) -- -- [phone]    Lizz Wiggins (Mother) -- -- [phone]                 Discharge Summary        Dolores Bates MD at 23 1241                       [facility] Medicine Services  Discharge Summary    Date of Service: 23    Patient Name: Daquan Xiong  : 1985  MRN: [id number]    Date of Admission: 2023  Discharge Diagnosis: Kidney injury, anemia, alcoholic liver cirrhosis  Date of Discharge:  23    Primary Care Physician: Wolf Carvalho MD      Presenting Problem:   URIEL (acute kidney injury) [N17.9]  Acute kidney injury [N17.9]  Hypotension, unspecified hypotension type [I95.9]    Active and Resolved Hospital Problems:  Active Hospital Problems    Diagnosis POA    **URIEL (acute kidney injury) [N17.9] Yes    Anemia [D64.9] Unknown    Severe malnutrition [E43] Yes    GERD (gastroesophageal reflux disease) [K21.9] Yes    Tobacco use disorder [F17.200] Yes    Alcoholic cirrhosis [K70.30] Yes      Resolved Hospital Problems   No resolved problems to display.         Hospital Course     Hospital Course:  Daquan Xiong is a 37 y.o. male with history of alcoholic liver disease who presented to the ED with complaints of nausea, vomiting and abnormal lab.  Patient was found to have acute kidney injury at his primary GI office and was sent to the ED.  Patient found to have a creatinine of 3.91.  Patient also was hypotensive.  He was admitted and managed as follows:    Septic shock  - likely secondary to bacterial Pneumonia  - placed on Rocephin for 5 days.   - s/p Vasopressin  - on Midodrine.   - Hydrocortisone discontinued. Monitored off hydrocortisone      URIEL on CKD III  Hyperkalemia - Resolved.   - creatinine trending down.   - Nephrology on board     Bilateral Pleural effusions  -  likely due to Ascites  - s/p thoracentesis     Acute on chronic anemia  - s/p EGD 8/21 - with nonbleeding esophageal varices and esophagitis  - s/p 1 unit PRBC since admission   - On Octreotide, PPI IV Q12 and Sucralfate.  - GI on consult.   - Nodalol on hold due to hypotension      Alcoholic liver cirrhosis with ascites  - s/p Paracentesis 5L - 8/20  - awaiting liver transplant at [facility].      Hyperammonemia  - continue Rifaximin and lactulose.   - patient refusing his lactulose.      Thrombocytopenia  - trending down. Continue to monitor closely. Likely      Coagulopathy - s/p Vitamin K     Elevated bilirubin     Severe malnutrition      Hyperuricemia - Allopurinol     H/o Alcohol abuse - Sober since July 2022       DISCHARGE Follow Up Recommendations for labs and diagnostics: Follow-up with transplant team as scheduled      Reasons For Change In Medications and Indications for New Medications:      Day of Discharge     Vital Signs:  Temp:  [97.3 øF (36.3 øC)-98 øF (36.7 øC)] 97.9 øF (36.6 øC)  Heart Rate:  [50-62] 52  Resp:  [11-18] 18  BP: ()/(38-59) 93/52    Physical Exam:  Physical Exam   General Appearance: AOO x 4, cooperative, no distress, appropriate for age  Head:  Normocephalic, without obvious abnormality  Eyes:  PERRL, EOM's intact, conjunctivae and cornea clear  Nose:  Nares symmetrical, septum midline, mucosa pink  Throat:  Lips, tongue, and mucosa are moist, pink, and intact  Neck:  Supple; symmetrical, trachea midline, no adenopathy  Back:  Symmetrical, ROM normal, no CVA tenderness  Lungs: Respirations unlabored, no audible wheeze  Heart: Regular rate & rhythm, S1 and S2 normal  Abdomen: Moderately distended, bowel sounds active all four quadrants  Musculoskeletal: Tone and strength strong and symmetrical, all extremities; no joint pain or edema         Skin/Hair/Nails:  Skin warm, dry and intact, no rashes or abnormal dyspigmentation       Pertinent  and/or Most Recent Results     LAB  RESULTS:      Lab 08/24/23  0549 08/23/23 0326 08/22/23 0432 08/21/23 0452 08/20/23 2027 08/20/23 1234 08/20/23 0602 08/19/23 1237 08/19/23 0938 08/19/23 0404 08/19/23  0012   WBC 12.00* 10.00 5.10 7.50  --   --  6.70  --   --    < >  --    HEMOGLOBIN 8.2* 8.3* 7.8* 7.7* 6.4* 7.1* 7.0*  --   --    < >  --    HEMATOCRIT 25.3* 25.1* 23.3* 22.8* 19.4* 21.4* 20.9*  --   --    < >  --    PLATELETS 56* 60* 59* 67*  --   --  98*  --   --    < >  --    NEUTROS ABS 9.60* 8.10* 3.80 4.50  --   --  4.30  --   --    < >  --    LYMPHS ABS 1.20 0.80 0.70 1.70  --   --  1.20  --   --    < >  --    MONOS ABS 1.20* 1.00* 0.70 1.20*  --   --  1.10*  --   --    < >  --    EOS ABS 0.00 0.00 0.00 0.20  --   --  0.10  --   --    < >  --    .4* 102.9* 102.0* 99.7*  --   --  103.9*  --   --    < >  --    LACTATE  --   --   --   --   --  4.1* 3.9* 1.9 2.1*  --  1.3   PROTIME 17.9* 17.4* 18.5* 18.7*  --   --  17.2*  --   --   --   --     < > = values in this interval not displayed.         Lab 08/24/23  0549 08/23/23 0326 08/22/23  1656 08/22/23 0432 08/21/23 2130 08/21/23 0452 08/20/23 1234 08/20/23  0602 08/19/23  0938 08/19/23  0404 08/19/23  0233 08/18/23  2202   SODIUM 136 139  --  142  --  140 139 136   < >  --    < > 130*   POTASSIUM 3.4* 3.6 3.7 3.2* 3.3* 3.0* 3.6 3.6   < >  --    < > 5.9*   CHLORIDE 94* 95*  --  94*  --  94* 96* 95*   < >  --    < > 98   CO2 31.0* 29.0  --  31.0*  --  32.0* 26.0 26.0   < >  --    < > 18.0*   ANION GAP 11.0 15.0  --  17.0*  --  14.0 17.0* 15.0   < >  --    < > 14.0   BUN 34* 38*  --  35*  --  40* 47* 52*   < >  --    < > 74*   CREATININE 1.52* 1.89*  --  2.14*  --  2.41* 2.71* 3.03*   < >  --    < > 3.91*   EGFR 59.8* 46.0*  --  39.7*  --  34.4* 29.9* 26.1*   < >  --    < > 19.2*   GLUCOSE 208* 145*  --  138*  --  145* 149* 170*   < >  --    < > 86   CALCIUM 9.1 9.5  --  9.8  --  9.9 9.8 9.6   < >  --    < > 8.7   IONIZED CALCIUM  --   --   --   --   --  1.12*  --  1.14*  --   1.16*  --   --    MAGNESIUM  --  1.8  --  1.8  --  1.9  --  2.1  --   --   --  2.5   PHOSPHORUS  --  2.8  --  3.1  --  3.2  --   --   --   --   --   --    TSH  --   --   --   --   --   --   --   --   --  2.930  --   --     < > = values in this interval not displayed.         Lab 08/24/23  0549 08/23/23  0326 08/22/23  0432 08/21/23  0452 08/20/23  0602 08/18/23 2202 08/18/23  2111   TOTAL PROTEIN 4.8* 5.4* 5.7* 5.5* 5.5*   < >  --    ALBUMIN 3.6 4.3 4.7 4.6 4.2   < >  --    GLOBULIN 1.2 1.1 1.0 0.9 1.3   < >  --    ALT (SGPT) 62* 38 13 15 18   < >  --    AST (SGOT) 150* 113* 54* 44* 56*   < >  --    BILIRUBIN 2.6* 4.1* 4.7* 4.7* 4.2*   < >  --    ALK PHOS 115 108 103 116 152*   < >  --    LIPASE  --   --   --   --   --   --  68*    < > = values in this interval not displayed.         Lab 08/24/23  0549 08/23/23  0326 08/22/23 0432 08/21/23 0452 08/20/23  0602   PROTIME 17.9* 17.4* 18.5* 18.7* 17.2*   INR 1.73* 1.67* 1.79* 1.81* 1.65*             Lab 08/20/23 2204 08/19/23  0404   IRON  --  73   ABO TYPING A  --    RH TYPING Positive  --    ANTIBODY SCREEN Negative  --          Lab 08/19/23  0406   PH, ARTERIAL 7.460*   PCO2, ARTERIAL 26.8*   PO2 .8*   O2 SATURATION ART 98.7*   FIO2 21   HCO3 ART 19.0*   BASE EXCESS ART -4.1*     Brief Urine Lab Results  (Last result in the past 365 days)        Color   Clarity   Blood   Leuk Est   Nitrite   Protein   CREAT   Urine HCG        08/19/23 0545             62.2         08/19/23 0545 Yellow   Clear   Negative   Negative   Negative   Negative                 Microbiology Results (last 10 days)       Procedure Component Value - Date/Time    Body Fluid Culture - Body Fluid, Peritoneum [[id number]] Collected: 08/20/23 1530    Lab Status: Final result Specimen: Body Fluid from Peritoneum Updated: 08/24/23 0809     Body Fluid Culture No growth at 3 days     Gram Stain Rare (1+) WBCs per low power field      No organisms seen    Eosinophil Smear - Urine, Urine, Clean  Catch [[id number]]  (Abnormal) Collected: 08/19/23 0545    Lab Status: Final result Specimen: Urine, Clean Catch Updated: 08/19/23 1009     Eosinophil Smear 1 % EOS/100 Cells     Blood Culture - Blood, Arm, Left [[id number]]  (Normal) Collected: 08/18/23 2315    Lab Status: Final result Specimen: Blood from Arm, Left Updated: 08/23/23 2330     Blood Culture No growth at 5 days    Narrative:      Less than seven (7) mL's of blood was collected.  Insufficient quantity may yield false negative results.            CT Abdomen Pelvis Without Contrast    Result Date: 8/20/2023  Impression: Impression: 1. Negative for retroperitoneal fluid collection. 2. Large volume ascites with peritoneal thickening suspicious for a nonspecific peritonitis. Consider paracentesis. 3. Findings of volume overload/third spacing progressing from prior exam including moderate bilateral pleural effusions, large volume ascites and worsening anasarca. 4. Multifocal tree-in-bud nodular opacities concerning for endobronchial infectious/inflammatory process, possibly early aspiration in the setting of refluxed fluid into a patulous distal esophagus. Consider nasogastric decompression and aspiration risk precaution. 5. Morphologic changes of chronic liver disease. Electronically Signed: Marko Dimas MD  8/20/2023 1:21 PM EDT  Workstation ID: [id number]    XR Chest 1 View    Result Date: 8/19/2023  Impression: Impression: Cardiomegaly with mild pulmonary vascular prominence. Right IJ central line is in place with the tip at the cavoatrial junction. There is no pneumothorax. Electronically Signed: Deven Machado MD  8/19/2023 6:54 AM EDT  Workstation ID: [id number]    US Renal Bilateral    Result Date: 8/19/2023  Impression: Impression: 1. Normal-sized kidneys with mild dilation of the bilateral renal pelves. 2. Large volume ascites. Electronically Signed: Ulices Valadez  8/19/2023 6:40 PM EDT  Workstation ID: [id number]    Therapeutic Paracentesis With  Radiology    Result Date: 8/11/2023  Impression: Impression: Technically successful US Guided Paracentesis. Please see Ambulatory nursing notes for intra-procedure vitals and information. Electronically Signed: Dick Wiggins MD  8/11/2023 2:12 PM EDT  Workstation ID: [id number]    Therapeutic Paracentesis With Radiology    Result Date: 7/27/2023  Impression: Impression: Technically successful US Guided Paracentesis. Please see Ambulatory nursing notes for intra-procedure vitals and information. Electronically Signed: Dick Wiggins  7/27/2023 10:28 AM EDT  Workstation ID: [id number]             Results for orders placed during the hospital encounter of 06/23/23    Adult Transthoracic Echo Complete W/ Cont if Necessary Per Protocol    Interpretation Summary    Left ventricular systolic function is normal. Calculated left ventricular EF = 62% Left ventricular ejection fraction appears to be 56 - 60%.    Left ventricular diastolic function was normal.    Estimated right ventricular systolic pressure from tricuspid regurgitation is normal (<35 mmHg).      Labs Pending at Discharge:      Procedures Performed  Procedure(s):  ESOPHAGOGASTRODUODENOSCOPY  08/21 1352 UPPER GI ENDOSCOPY      Consults:   Consults       Date and Time Order Name Status Description    8/22/2023 11:32 AM Inpatient Hospitalist Consult Completed     8/20/2023 10:59 AM Inpatient Gastroenterology Consult      8/19/2023  3:58 AM Inpatient Intensivist Consult      8/19/2023  3:43 AM Inpatient Nephrology Consult Completed     8/19/2023 12:26 AM Hospitalist (on-call MD unless specified)                Discharge Details        Discharge Medications        New Medications        Instructions Start Date   febuxostat 40 MG tablet  Commonly known as: ULORIC   40 mg, Oral, Daily   Start Date: August 25, 2023     lactulose 10 GM/15ML solution  Commonly known as: CHRONULAC   20 g, Oral, 3 Times Daily      sucralfate 1 g tablet  Commonly known as: CARAFATE   1 g, Oral, 3  Times Daily Before Meals             Changes to Medications        Instructions Start Date   pantoprazole 40 MG EC tablet  Commonly known as: PROTONIX  What changed: when to take this   40 mg, Oral, 2 Times Daily Before Meals             Continue These Medications        Instructions Start Date   baclofen 10 MG tablet  Commonly known as: LIORESAL   5-10 mg, Oral, 2 Times Daily PRN      cyclobenzaprine 10 MG tablet  Commonly known as: FLEXERIL   5-10 mg, Oral, 3 Times Daily PRN      melatonin 5 MG tablet tablet   5 mg, Oral, Nightly PRN      midodrine 10 MG tablet  Commonly known as: PROAMATINE   Take 2 tablets (20 mg) by mouth 3 (Three) Times a Day Before Meals      nicotine 14 MG/24HR patch  Commonly known as: NICODERM CQ   1 patch, Transdermal, Daily PRN      nicotine polacrilex 2 MG gum  Commonly known as: NICORETTE   2 mg, Mouth/Throat, As Needed      oxyCODONE 10 MG tablet  Commonly known as: ROXICODONE   10 mg, Oral, Every 4 to 6 Hours PRN      riFAXIMin 550 MG tablet  Commonly known as: XIFAXAN   550 mg, Oral, 2 Times Daily             Stop These Medications      allopurinol 100 MG tablet  Commonly known as: ZYLOPRIM     nadolol 40 MG tablet  Commonly known as: CORGARD              Allergies   Allergen Reactions    Bee Venom Anaphylaxis    Scopolamine Anaphylaxis         Discharge Disposition:   Home-Health Care c    Diet:  Hospital:  Diet Order   Procedures    Diet: Cardiac Diets; Low Sodium (2g); Texture: Regular Texture (IDDSI 7); Fluid Consistency: Thin (IDDSI 0)         Discharge Activity:     As tolerated    CODE STATUS:  Code Status and Medical Interventions:   Ordered at: 08/19/23 0048     Code Status (Patient has no pulse and is not breathing):    CPR (Attempt to Resuscitate)     Medical Interventions (Patient has pulse or is breathing):    Full Support         Future Appointments   Date Time Provider Department Center   8/29/2023  7:15 AM ROOM [address] [place] [address] [facility]  [facility] None           Time  spent on Discharge including face to face service:  40 minutes        Signature: Electronically signed by Brannon Weeks MD, 08/24/23, 12:42 EDT.  [facility] Hospitalist Team      Electronically signed by Brannon Weeks MD at 08/24/23 1358       Discharge Order (From admission, onward)       Start     Ordered    08/24/23 1235  Discharge patient  Once        Expected Discharge Date: 08/24/23   Discharge Disposition: Home-Health Care [facility]   Physician of Record for Attribution - Please select from Treatment Team: BRANNON WEEKS [[id number]]   Review needed by CMO to determine Physician of Record: No      Question Answer Comment   Physician of Record for Attribution - Please select from Treatment Team BRANNON WEEKS    Review needed by CMO to determine Physician of Record No        08/24/23 7158

## 2023-08-25 NOTE — OUTREACH NOTE
Prep Survey      Flowsheet Row Responses   [facility]   Is LACE score < 7 ? No   Eligibility Not Eligible   What are the reasons patient is not eligible? Other   Does the patient have one of the following disease processes/diagnoses(primary or secondary)? Other   Prep survey completed? Yes            Mei PAUL - Registered Nurse

## 2023-08-25 NOTE — TELEPHONE ENCOUNTER
Called to speak with patient about appointment on 8/29 but he requested to move appointment to a later date.

## 2023-08-28 DIAGNOSIS — R18.8 OTHER ASCITES: Primary | ICD-10-CM

## 2023-08-28 RX ORDER — LIDOCAINE HYDROCHLORIDE 20 MG/ML
10 INJECTION, SOLUTION INFILTRATION; PERINEURAL AS NEEDED
Status: CANCELLED | OUTPATIENT
Start: 2023-08-28

## 2023-08-28 RX ORDER — LIDOCAINE HYDROCHLORIDE 10 MG/ML
10 INJECTION, SOLUTION INFILTRATION; PERINEURAL AS NEEDED
OUTPATIENT
Start: 2023-08-28

## 2023-08-28 RX ORDER — ALBUMIN (HUMAN) 12.5 G/50ML
12.5 SOLUTION INTRAVENOUS DAILY PRN
Status: CANCELLED | OUTPATIENT
Start: 2023-08-28

## 2023-08-28 RX ORDER — ALBUMIN (HUMAN) 12.5 G/50ML
25 SOLUTION INTRAVENOUS DAILY PRN
Status: CANCELLED | OUTPATIENT
Start: 2023-08-28

## 2023-08-29 ENCOUNTER — HOSPITAL ENCOUNTER (OUTPATIENT)
Dept: INFUSION THERAPY | Facility: HOSPITAL | Age: 38
Discharge: HOME | End: 2023-08-29
Payer: COMMERCIAL

## 2023-08-29 VITALS
HEART RATE: 87 BPM | RESPIRATION RATE: 16 BRPM | TEMPERATURE: 98.1 F | SYSTOLIC BLOOD PRESSURE: 101 MMHG | DIASTOLIC BLOOD PRESSURE: 46 MMHG | OXYGEN SATURATION: 98 %

## 2023-08-29 DIAGNOSIS — R18.8 OTHER ASCITES: ICD-10-CM

## 2023-08-29 PROCEDURE — 76942 ECHO GUIDE FOR BIOPSY: CPT

## 2023-08-29 PROCEDURE — 25010000002 ALBUMIN HUMAN 25% PER 50 ML

## 2023-08-29 PROCEDURE — 96365 THER/PROPH/DIAG IV INF INIT: CPT

## 2023-08-29 PROCEDURE — 96366 THER/PROPH/DIAG IV INF ADDON: CPT

## 2023-08-29 PROCEDURE — P9047 ALBUMIN (HUMAN), 25%, 50ML: HCPCS

## 2023-08-29 RX ORDER — ALBUMIN (HUMAN) 12.5 G/50ML
25 SOLUTION INTRAVENOUS DAILY PRN
Status: DISCONTINUED | OUTPATIENT
Start: 2023-08-29 | End: 2023-08-31 | Stop reason: HOSPADM

## 2023-08-29 RX ORDER — ALBUMIN (HUMAN) 12.5 G/50ML
12.5 SOLUTION INTRAVENOUS DAILY PRN
Status: DISCONTINUED | OUTPATIENT
Start: 2023-08-29 | End: 2023-08-31 | Stop reason: HOSPADM

## 2023-08-29 RX ORDER — LIDOCAINE HYDROCHLORIDE 20 MG/ML
10 INJECTION, SOLUTION INFILTRATION; PERINEURAL AS NEEDED
Status: DISCONTINUED | OUTPATIENT
Start: 2023-08-29 | End: 2023-08-31 | Stop reason: HOSPADM

## 2023-08-29 RX ADMIN — ALBUMIN (HUMAN) 25 G: 0.25 INJECTION, SOLUTION INTRAVENOUS at 09:15

## 2023-08-29 RX ADMIN — ALBUMIN (HUMAN) 12.5 G: 0.25 INJECTION, SOLUTION INTRAVENOUS at 09:42

## 2023-08-29 RX ADMIN — LIDOCAINE HYDROCHLORIDE 10 ML: 20 INJECTION, SOLUTION EPIDURAL; INFILTRATION; INTRACAUDAL; PERINEURAL at 08:43

## 2023-08-29 NOTE — PROGRESS NOTES
PARACENTESIS    Procedure: Paracentesis    Time Out Completed: yes    Time Out: 0900    Prep Site 1: Right Lateral Abdomen      Prep: Chlorhexidine and Sterile drape    Procedure Position: Right Side and Semi Fowlers    Guidance: Ultrasound    Fluid Quality: Graham Yellow and Other: hazy    Procedure Note: Procedure start at 0905. 2% lidocaine given as local anesthetic. No complications noted. Will continue to monitor.    Post-Procedure Position: Supine and HOB Elevated    Dressing Site 1: 2x2, 4x4, and Sorbaview    Post Procedure Status:  Alert and Oriented, returned to baseline, Dressing Dry/ Intact, IV documented (see flowsheet), Stable Condition, and Dressing properly labeled    Specimen To Lab: no    Total Fluid Removed: 4.6 liters    Post Procedure Note:  Procedure complete at 0945. Patient received 37.5 grams of albumin per MD order. Discussed patient's low blood pressure with patient who stated he kept a close eye on it and would take his midodrine if necessary/as prescribed. Patient denies and symptoms of hypotension. Able to ambulate with walker at discharge.

## 2023-08-30 ENCOUNTER — DOCUMENTATION (OUTPATIENT)
Dept: GASTROENTEROLOGY | Facility: HOSPITAL | Age: 38
End: 2023-08-30
Payer: COMMERCIAL

## 2023-09-13 DIAGNOSIS — K74.69 OTHER CIRRHOSIS OF LIVER: ICD-10-CM

## 2023-09-13 DIAGNOSIS — R18.8 OTHER ASCITES: Primary | ICD-10-CM

## 2023-09-13 RX ORDER — ALBUMIN (HUMAN) 12.5 G/50ML
25 SOLUTION INTRAVENOUS DAILY PRN
OUTPATIENT
Start: 2023-09-13

## 2023-09-13 RX ORDER — LIDOCAINE HYDROCHLORIDE 20 MG/ML
10 INJECTION, SOLUTION INFILTRATION; PERINEURAL AS NEEDED
OUTPATIENT
Start: 2023-09-13

## 2023-09-13 RX ORDER — ALBUMIN (HUMAN) 12.5 G/50ML
12.5 SOLUTION INTRAVENOUS DAILY PRN
OUTPATIENT
Start: 2023-09-13

## 2023-09-14 ENCOUNTER — HOSPITAL ENCOUNTER (OUTPATIENT)
Dept: INFUSION THERAPY | Facility: HOSPITAL | Age: 38
Discharge: HOME | End: 2023-09-14

## 2023-09-14 DIAGNOSIS — K74.69 OTHER CIRRHOSIS OF LIVER: ICD-10-CM

## 2023-09-14 DIAGNOSIS — R18.8 OTHER ASCITES: ICD-10-CM

## 2024-10-12 NOTE — PROGRESS NOTES
"NEPHROLOGY PROGRESS NOTE------[facility]    [facility]  [phone]  Saji Waldron MD      Patient Care Team:  Wolf Carvalho MD as PCP - General (Geriatric Medicine)  Fatou Waldron MD as Consulting Physician (Nephrology)      Provider:  Saji Waldron MD  Patient Name: Daquan Xiong  :  1985    SUBJECTIVE:    F/U ARF/URIEL/CRF/CKD    Weak and malaised but alert and oriented today. No angina. No dysuria.     Medication:  albumin human, 50 g, Intravenous, QAM  heparin (porcine), 5,000 Units, Subcutaneous, Q8H  lactulose, 30 g, Oral, Q6H  midodrine, 20 mg, Oral, Q8H  octreotide, 100 mcg, Subcutaneous, TID  pantoprazole, 40 mg, Oral, Q AM  rifAXIMin, 550 mg, Oral, Q12H  sodium chloride, 500 mL, Intravenous, Once  sodium chloride, 10 mL, Intravenous, Q12H  sodium chloride, 10 mL, Intravenous, Q12H  sodium chloride, 10 mL, Intravenous, Q12H  sodium chloride, 10 mL, Intravenous, Q12H  thiamine, 100 mg, Oral, QAM      sodium chloride, 75 mL/hr, Last Rate: 75 mL/hr (23 1307)        OBJECTIVE    Vital Sign Min/Max for last 24 hours  Temp  Min: 97.6 °F (36.4 °C)  Max: 97.6 °F (36.4 °C)   BP  Min: 91/45  Max: 118/68   Pulse  Min: 59  Max: 105   Resp  Min: 18  Max: 18   SpO2  Min: 93 %  Max: 100 %   No data recorded   No data recorded     Flowsheet Rows      Flowsheet Row First Filed Value   Admission Height 182.9 cm (72\") Documented at 2023 1435   Admission Weight 83.1 kg (183 lb 4.8 oz) Documented at 2023 1443            No intake/output data recorded.  I/O last 3 completed shifts:  In: 3331 [P.O.:720; I.V.:2111; IV Piggyback:500]  Out: 6075 [Urine:1000; Other:5075]    Physical Exam:  General Appearance: alert, appears stated age and cooperative  Head: normocephalic, without obvious abnormality and atraumatic +DRY OP +LEFT FOREHEAD ABRASION    Eyes: conjunctivae and sclerae normal and no icterus  Neck: supple and no " JVD  Lungs: clear to auscultation and respirations regular  Heart: regular rhythm & normal rate and normal S1, S2 +BETHANY  Chest Wall: no abnormalities observed  Abdomen: normal bowel sounds and  +ASCITES/DISTENSION AND MILD BILAT LQ PAIN ON PALPATION  Extremities: moves extremities well, no edema, no cyanosis  Skin: +SCATTERED SCABS, ABRASIONS, ECCHYMOSIS  Neurologic: Alert, and oriented x 3. No focal deficits    Labs:    WBC WBC   Date Value Ref Range Status   07/05/2023 6.60 3.40 - 10.80 10*3/mm3 Final   07/04/2023 5.60 3.40 - 10.80 10*3/mm3 Final   07/03/2023 8.00 3.40 - 10.80 10*3/mm3 Final      HGB Hemoglobin   Date Value Ref Range Status   07/05/2023 9.1 (L) 13.0 - 17.7 g/dL Final   07/04/2023 9.1 (L) 13.0 - 17.7 g/dL Final   07/03/2023 10.5 (L) 13.0 - 17.7 g/dL Final      HCT Hematocrit   Date Value Ref Range Status   07/05/2023 27.0 (L) 37.5 - 51.0 % Final   07/04/2023 27.3 (L) 37.5 - 51.0 % Final   07/03/2023 31.3 (L) 37.5 - 51.0 % Final      Platelets No results found for: LABPLAT   MCV MCV   Date Value Ref Range Status   07/05/2023 101.8 (H) 79.0 - 97.0 fL Final   07/04/2023 101.8 (H) 79.0 - 97.0 fL Final   07/03/2023 104.5 (H) 79.0 - 97.0 fL Final          Sodium Sodium   Date Value Ref Range Status   07/05/2023 139 136 - 145 mmol/L Final   07/04/2023 139 136 - 145 mmol/L Final   07/03/2023 133 (L) 136 - 145 mmol/L Final      Potassium Potassium   Date Value Ref Range Status   07/05/2023 3.9 3.5 - 5.2 mmol/L Final   07/04/2023 4.0 3.5 - 5.2 mmol/L Final     Comment:     Slight hemolysis detected by analyzer. Results may be affected.   07/04/2023 3.5 3.5 - 5.2 mmol/L Final   07/03/2023 4.3 3.5 - 5.2 mmol/L Final     Comment:     Slight hemolysis detected by analyzer. Results may be affected.      Chloride Chloride   Date Value Ref Range Status   07/05/2023 103 98 - 107 mmol/L Final   07/04/2023 100 98 - 107 mmol/L Final   07/03/2023 92 (L) 98 - 107 mmol/L Final      CO2 CO2   Date Value Ref Range Status    07/05/2023 24.0 22.0 - 29.0 mmol/L Final   07/04/2023 25.0 22.0 - 29.0 mmol/L Final   07/03/2023 25.0 22.0 - 29.0 mmol/L Final      BUN BUN   Date Value Ref Range Status   07/05/2023 36 (H) 6 - 20 mg/dL Final   07/04/2023 48 (H) 6 - 20 mg/dL Final   07/03/2023 51 (H) 6 - 20 mg/dL Final      Creatinine Creatinine   Date Value Ref Range Status   07/05/2023 2.74 (H) 0.76 - 1.27 mg/dL Final   07/04/2023 3.86 (H) 0.76 - 1.27 mg/dL Final   07/03/2023 4.75 (H) 0.76 - 1.27 mg/dL Final      Calcium Calcium   Date Value Ref Range Status   07/05/2023 9.0 8.6 - 10.5 mg/dL Final   07/04/2023 8.2 (L) 8.6 - 10.5 mg/dL Final   07/03/2023 8.7 8.6 - 10.5 mg/dL Final      PO4 No components found for: PO4   Albumin Albumin   Date Value Ref Range Status   07/05/2023 3.8 3.5 - 5.2 g/dL Final   07/04/2023 3.3 (L) 3.5 - 5.2 g/dL Final   07/03/2023 3.3 (L) 3.5 - 5.2 g/dL Final      Magnesium Magnesium   Date Value Ref Range Status   07/05/2023 2.0 1.6 - 2.6 mg/dL Final   07/04/2023 1.9 1.6 - 2.6 mg/dL Final   07/03/2023 2.1 1.6 - 2.6 mg/dL Final      Uric Acid No components found for: URIC ACID     Imaging Results (Last 72 Hours)       Procedure Component Value Units Date/Time    XR Chest 1 View [[id number]] Collected: 07/03/23 1626     Updated: 07/03/23 1630    Narrative:      XR CHEST 1 VW    Date of Exam: 7/3/2023 4:13 PM EDT    Indication: AMS    Comparison: AP portable chest 6/20/2023    Findings:  There is some bandlike atelectasis in the right lower lobe, similar to the prior examination. The previously described patchy left basilar airspace disease appears improved. No new airspace disease is identified. The heart size is normal. No pleural   effusion or pneumothorax is identified.      Impression:      Impression:  Improving bibasilar atelectasis or pneumonia since 6/28/2023. No new airspace disease is appreciated.      Electronically Signed: Chanda Clark    7/3/2023 4:27 PM EDT    Workstation ID: [id number]    CT Head Without  Contrast [[id number]] Collected: 07/03/23 1540     Updated: 07/03/23 1547    Narrative:      CT HEAD WO CONTRAST, CT FACIAL BONES WO CONTRAST    Date of Exam: 7/3/2023 3:36 PM EDT    Indication: fall last night with head injury now AMS.    Comparison: None available.    Technique: Axial CT images were obtained of the head without contrast administration.  Coronal reconstructions were performed.  Automated exposure control and iterative reconstruction methods were used.    Findings:  Gray-white differentiation is maintained and there is no evidence of intracranial hemorrhage, mass or mass effect. The ventricles are normal in size and configuration. The orbits are normal. The paranasal sinuses are grossly clear. The calvarium is   intact. Additional dedicated evaluation of the facial bones demonstrates no evidence of acute fracture. The mastoid air cells are clear. Rightward nasal septal deviation is noted the lamina papyracea are intact.      Impression:      Impression:  No acute intracranial abnormality or acute facial bone fracture.      Electronically Signed: True Allen    7/3/2023 3:45 PM EDT    Workstation ID: [id number]    CT Facial Bones Without Contrast [[id number]] Collected: 07/03/23 1540     Updated: 07/03/23 1547    Narrative:      CT HEAD WO CONTRAST, CT FACIAL BONES WO CONTRAST    Date of Exam: 7/3/2023 3:36 PM EDT    Indication: fall last night with head injury now AMS.    Comparison: None available.    Technique: Axial CT images were obtained of the head without contrast administration.  Coronal reconstructions were performed.  Automated exposure control and iterative reconstruction methods were used.    Findings:  Gray-white differentiation is maintained and there is no evidence of intracranial hemorrhage, mass or mass effect. The ventricles are normal in size and configuration. The orbits are normal. The paranasal sinuses are grossly clear. The calvarium is   intact. Additional dedicated evaluation  of the facial bones demonstrates no evidence of acute fracture. The mastoid air cells are clear. Rightward nasal septal deviation is noted the lamina papyracea are intact.      Impression:      Impression:  No acute intracranial abnormality or acute facial bone fracture.      Electronically Signed: True Allen    7/3/2023 3:45 PM EDT    Workstation ID: [id number]    CT Cervical Spine Without Contrast [[id number]] Collected: 07/03/23 1541     Updated: 07/03/23 1546    Narrative:        CT CERVICAL SPINE WO CONTRAST    Date of Exam: 7/3/2023 3:36 PM EDT    Indication: fall last night with AMS.    Comparison: None available.    Technique: Axial CT images were obtained of the cervical spine without contrast administration.  Sagittal and coronal reconstructions were performed.  Automated exposure control and iterative reconstruction methods were used.      Findings:  No evidence of fracture or traumatic subluxation. Mild degenerative disc disease at C5-C6. No prevertebral soft tissue swelling. Minimal fluid in the left mastoid air cells. 1 cm groundglass opacity in the right lung apex      Impression:      Impression:    1. No evidence of cervical spine fracture  2. Minimal left mastoid effusion  3. 1 cm right apical groundglass opacity. Recommend follow-up chest CT in 6      Electronically Signed: Dio Murrell    7/3/2023 3:44 PM EDT    Workstation ID: [id number]    CT Abdomen Pelvis Without Contrast [[id number]] Collected: 07/03/23 1539     Updated: 07/03/23 1544    Narrative:      CT ABDOMEN PELVIS WO CONTRAST    Date of Exam: 7/3/2023 3:36 PM EDT    Indication: abdominal distention.    Comparison: Ultrasound-guided paracentesis 6/23/2023. No prior CT abdomen and pelvis for comparison at this institution. Liver ultrasound 6/25/2023.    Technique: Axial CT images were obtained of the abdomen and pelvis without the administration of contrast. Sagittal and coronal reconstructions were performed.  Automated exposure control  and iterative reconstruction methods were used.      Findings:  Large quantity abdominal pelvic ascites is present. There is mild to moderate generalized body wall edema. Small left, trace right basilar pleural effusions are present with mild posterior bibasilar atelectasis and mild to right middle lobe atelectasis.   There is mild right hemidiaphragm elevation.    The liver is diminutive in size. It demonstrates a subtle surface nodular contour, suggestive of cirrhosis. No focal liver lesion is identified on this noncontrast examination. The spleen size is within normal limits. There is a small esophageal hiatal   hernia which contains ascites fluid.    The gallbladder, pancreas, adrenals, and kidneys demonstrate a normal noncontrast appearance. Moderate to large stool burden is present in the ascending, transverse, and descending colon. The bowel does not appear abnormally dilated or inflamed. The   appendix is normal.    The urinary bladder, prostate, and rectum are normal.    No acute or suspicious osseous abnormalities are identified. There is moderate degenerative disc change at the L4-5 vertebral level.      Impression:      Impression:    1. Large volume abdominal pelvic ascites.  2. Cirrhotic liver morphology.  3. Moderate to large colonic stool burden. Correlate for constipation.  4. Scattered mild bibasilar atelectasis. Small left and trace right pleural effusions are present.            Electronically Signed: Chanda Clark    7/3/2023 3:42 PM EDT    Workstation ID: [id number]            Results for orders placed during the hospital encounter of 07/03/23    XR Chest 1 View    Narrative  XR CHEST 1 VW    Date of Exam: 7/3/2023 4:13 PM EDT    Indication: AMS    Comparison: AP portable chest 6/20/2023    Findings:  There is some bandlike atelectasis in the right lower lobe, similar to the prior examination. The previously described patchy left basilar airspace disease appears improved. No new airspace disease  is identified. The heart size is normal. No pleural  effusion or pneumothorax is identified.    Impression  Impression:  Improving bibasilar atelectasis or pneumonia since 6/28/2023. No new airspace disease is appreciated.      Electronically Signed: Chanda Sainznisreen  7/3/2023 4:27 PM EDT  Workstation ID: [id number]      Results for orders placed during the hospital encounter of 06/23/23    XR Chest 1 View    Narrative  XR CHEST 1 VW    Date of Exam: 6/28/2023 11:45 AM EDT    Indication: eval for pneumonia    Comparison: None available.    Findings:  The lung volumes are low. Patchy airspace disease is seen within the lung bases bilaterally. Probable small bilateral pleural effusions are present. The pulmonary vasculature appears unremarkable. No pneumothorax. No acute osseous abnormality identified.    Impression  Impression:  Low lung volumes with patchy airspace disease seen within the lung bases bilaterally, left greater than right with probable small bilateral pleural effusions, likely related to pneumonia and/or atelectasis.      Electronically Signed: Brynn Higuera  6/28/2023 12:00 PM EDT  Workstation ID: [id number]            ASSESSMENT / PLAN      Hepatic encephalopathy    Bipolar affective disorder    GERD (gastroesophageal reflux disease)    Osteoarthritis    Tobacco use disorder    Polysubstance abuse    Obstructive sleep apnea    URIEL (acute kidney injury)    Alcoholic cirrhosis    Chronic constipation    Severe Malnutrition (HCC)    Essential hypertension    CKD (chronic kidney disease) stage 3, GFR 30-59 ml/min      1.ARF/URIEL/CRF/CKD---------Nonoliguric. +Recurrent ARF/URIEL on top of known CRF/CKD STG 3A with a baseline serum creatinine of about 1.5. CRF/CKD STG 3A secondary to HTN NS. +ARF/URIEL is secondary to ATN from hypotension and prerenal state/intravascular volume depletion. Continue to hydrate with NS and given IV Albumin. Avoid hypotension.  No NSAIDs or IV dye. Dose meds for CrCl less than 10 cc/min  until ARF/URIEL is resolved     2. HYPOKALEMIA-------Replaced     3. ETOH CIRRHOSIS/HEPATIC ENCEPHALOPATHY/ASCITES-----Restarted Lactulose. Follow Ammonia. S/P IV Albumin     4. HYPOMAGNESEMIA--------Replaced IV. No po replacement given diarrhea from Lactulose     5. DELERIUM----------Improving. Follow ammonia     6. HYPOCALCEMIA-------Replaced     7. ANEMIA-------H/H stable. Check Fe     8. HYPOALBUMINEMIA-----S/P IV Albumin to temporize     9. ELEVATED INR------Related to cirrhosis     10. HYPOTENSION------On Midodrine     11. BIPOLAR DISEASE     12. BASILIO     13. POLYSUBSTANCE ABUSE/TOBACCO USE-----Drug screen positive for Benzodiazipine and Oxycodone     14. KETONURIA------Secondary to intravascular volume depletion. IVFs      Saji Waldron MD  [facility]  [phone]  07/05/23  07:50 EDT     Jayesh

## 2025-01-07 NOTE — CASE MANAGEMENT/SOCIAL WORK
Discharge Planning Assessment  [facility]     Patient Name: Daquan Xiong  MRN: [id number]  Today's Date: 8/21/2023    Admit Date: 8/18/2023    Plan: From home with spouse. DC plan pending progress with PT/OT   Discharge Needs Assessment       Row Name 08/21/23 1104       Living Environment    People in Home spouse    Current Living Arrangements home    Potentially Unsafe Housing Conditions none    Primary Care Provided by self    Provides Primary Care For no one, unable/limited ability to care for self    Family Caregiver if Needed spouse    Family Caregiver Names Lana    Quality of Family Relationships helpful    Able to Return to Prior Arrangements yes       Resource/Environmental Concerns    Resource/Environmental Concerns none    Transportation Concerns none       Transition Planning    Patient/Family Anticipates Transition to home with family    Patient/Family Anticipated Services at Transition none    Transportation Anticipated family or friend will provide       Discharge Needs Assessment    Equipment Currently Used at Home cane, straight;walker, rolling;pulse ox    Concerns to be Addressed discharge planning    Anticipated Changes Related to Illness inability to care for self    Equipment Needed After Discharge other (see comments)  to be determined    Discharge Facility/Level of Care Needs other (see comments)  to be determined    Provided Post Acute Provider List? Yes    Provided Post Acute Provider Quality & Resource List? Yes    Post Acute Provider Quality and Resource List Home Health;Inpatient Rehab;Nursing Home    Delivered To Patient    Method of Delivery In person                   Discharge Plan       Row Name 08/21/23 1106       Plan    Plan From home with spouse. DC plan pending progress with PT/OT    Patient/Family in Agreement with Plan yes    Plan Comments Spoke with patient at bedside. PCP and pharmcay verified. Barriers to dc: levo gtt, bicarb gtt, albumin, Iv abx                  Continued  Inpatient Urology Progress Note      Subjective:  No acute events overnight. Conn draining clear yellow urine.       Objective:  Vitals:    01/06/25 2041 01/07/25 0600 01/07/25 0614 01/07/25 0749   BP: 94/57  97/57 (!) 145/82   BP Location: LUE - Left upper extremity   LUE - Left upper extremity   Patient Position: Semi-Lara's   Semi-Lara's   Pulse: 74  84 75   Resp: 17   18   Temp: 98.7 °F (37.1 °C)   98.4 °F (36.9 °C)   TempSrc: Oral   Oral   SpO2: 98%   99%   Weight:  72.3 kg (159 lb 6.3 oz)     Height:             Labs:  Recent Labs   Lab 01/05/25  0921 01/03/25  0844 01/01/25  0522   WBC 9.7 9.0 9.1   HGB 9.7* 9.7* 8.8*   HCT 29.6* 28.9* 27.8*     Recent Labs   Lab 01/05/25  0921 01/03/25  0844 01/01/25  0522   SODIUM 136 136 136   POTASSIUM 4.0 3.8 3.6   CHLORIDE 100 101 104   CO2 31 31 30   BUN 19 20 23*   CREATININE 0.96 1.03 1.06   GLUCOSE 214* 219* 196*   PHOS 3.3 3.4 3.4         Exam:  General:  The patient is well developed, well nourished, in no acute distress, appears stated age.   Psychiatric:  Oriented to person, place, time; normal mood/affect.  Genitourinary: conn draining clear yellow urine        Medications:  Current Facility-Administered Medications   Medication Dose Route Frequency Provider Last Rate Last Admin    sodium chloride 0.9 % injection 10 mL  10 mL Injection 2 times per day Madhu Mathias DO   10 mL at 01/06/25 2121    insulin glargine (LANTUS) injection 7 Units  7 Units Subcutaneous Daily Madhu Mathias DO   7 Units at 01/06/25 0902    cefTRIAXone (ROCEPHIN) 2,000 mg in sterile water (preservative free) IV syringe  2,000 mg Intravenous Q Evening Toney Blount PA-C   2,000 mg at 01/06/25 1844    metroNIDAZOLE (FLAGYL) tablet 500 mg  500 mg Oral 2 times per day Toney Blount, SANTIAGO   500 mg at 01/06/25 2121    lidocaine (LIDOCARE) 4 % patch 1 patch  1 patch Transdermal Daily Arash Monet MD        midodrine (PROAMATINE) tablet 5 mg  5 mg Oral TID ROMINA Elaine,  Care and Services - Admitted Since 8/18/2023    Coordination has not been started for this encounter.       Selected Continued Care - Prior Encounters Includes continued care and service providers with selected services from prior encounters from 5/20/2023 to 8/21/2023      Discharged on 6/28/2023 Admission date: 6/23/2023 - Discharge disposition: Home-Health Care Svc      Home Medical Care       Service Provider Selected Services Address Phone Fax Patient Preferred    [facility] [address] [place] [address] [phone] --                          Expected Discharge Date and Time       Expected Discharge Date Expected Discharge Time    Aug 23, 2023            Demographic Summary       Row Name 08/21/23 1103       General Information    Admission Type inpatient    Arrived From emergency department    Referral Source admission list    Reason for Consult discharge planning    Preferred Language English                   Functional Status       Row Name 08/21/23 1103       Functional Status    Usual Activity Tolerance fair    Current Activity Tolerance fair       Functional Status, IADL    Medications independent    Meal Preparation independent    Housekeeping independent    Laundry independent    Shopping independent       Mental Status    General Appearance WDL WDL       Mental Status Summary    Recent Changes in Mental Status/Cognitive Functioning no changes                               Cathleen Hughes RN     Keerthi DESIR MD   5 mg at 01/07/25 0614    Potassium Standard Replacement Protocol (Levels 3.5 and lower)   Does not apply See Admin Instructions Brennen Garcias DO        Magnesium Standard Replacement Protocol   Does not apply See Admin Instructions Brennen Garcias DO        Phosphorus Standard Replacement Protocol   Does not apply See Admin Instructions Brennen Garcias DO        enoxaparin (LOVENOX) injection 40 mg  40 mg Subcutaneous Nightly Brennen Garcias DO   40 mg at 01/06/25 2121    insulin lispro (ADMELOG,HumaLOG) - Correction Dose   Subcutaneous Nightly Brennen Garcias DO   3 Units at 01/03/25 2203    insulin lispro (ADMELOG,HumaLOG) - Correction Dose   Subcutaneous TID Brennen Hampton DO   1 Units at 01/06/25 1844    insulin lispro (ADMELOG,HumaLOG) - Scheduled Mealtime Dose   Subcutaneous TID Brennen Hampton DO   5 Units at 01/06/25 1845    atorvastatin (LIPITOR) tablet 40 mg  40 mg Oral Nightly Brennen Garcias DO   40 mg at 01/06/25 2121    pantoprazole (PROTONIX) EC tablet 40 mg  40 mg Oral BID AC Brennen Garcias DO   40 mg at 01/07/25 0614    tamsulosin (FLOMAX) capsule 0.4 mg  0.4 mg Oral Daily after dinner Brennen Garcias DO   0.4 mg at 01/06/25 1844    cholecalciferol (VITAMIN D) tablet 50 mcg  50 mcg Oral Daily Brennen Garcias DO   50 mcg at 01/06/25 0857     Current Facility-Administered Medications   Medication Dose Route Frequency Provider Last Rate Last Admin    sodium chloride 0.9 % injection 10 mL  10 mL Injection PRN Madhu Mathias, DO        sodium chloride 0.9 % injection 20 mL  20 mL Injection PRN Madhu Mathias, DO        dextrose 50 % injection 25 g  25 g Intravenous PRN Brennen Garcias DO        dextrose 50 % injection 12.5 g  12.5 g Intravenous PRN Brennen Garcias DO        glucagon (GLUCAGEN) injection 1 mg  1 mg Intramuscular PRN Brennen Garcias DO        dextrose (GLUTOSE) 40 % gel 15 g  15 g Oral PRN Brennen Garcias DO        dextrose  (GLUTOSE) 40 % gel 30 g  30 g Oral PRN de Brennen Singh DO        acetaminophen (TYLENOL) tablet 1,000 mg  1,000 mg Oral Q6H PRN de Brennen Singh DO        polyethylene glycol (MIRALAX) packet 17 g  17 g Oral Daily PRN de Brennen Singh DO        bisacodyl (DULCOLAX) suppository 10 mg  10 mg Rectal Daily PRN de Brennen Singh DO        magnesium hydroxide (MILK OF MAGNESIA) 400 MG/5ML suspension 30 mL  30 mL Oral Daily PRN de Brennen Singh DO        aluminum-magnesium hydroxide-simethicone (MAALOX) 200-200-20 MG/5ML suspension 30 mL  30 mL Oral Q4H PRN de Brennen Singh DO        ondansetron (ZOFRAN ODT) disintegrating tablet 4 mg  4 mg Oral Q12H PRN de Brennen Singh DO        Or    ondansetron (ZOFRAN) injection 4 mg  4 mg Intravenous Q12H PRN de Brennen Singh DO        prochlorperazine (COMPAZINE) tablet 5 mg  5 mg Oral Q4H PRN de Brennen Singh DO        Or    prochlorperazine (COMPAZINE) injection 5 mg  5 mg Intravenous Q4H PRN de Brennen Singh DO        senna (SENOKOT) 17.2 mg  2 tablet Oral Nightly PRN de Brennen Singh DO        oxyCODONE (IMM REL) (ROXICODONE) tablet 5 mg  5 mg Oral Q4H PRN de Brennen Singh DO        Or    oxyCODONE (IMM REL) (ROXICODONE) tablet 10 mg  10 mg Oral Q4H PRN de Brennen Singh DO        baclofen (LIORESAL) tablet 5 mg  5 mg Oral Q12H PRN de Brennen Singh DO        morphine injection 2 mg  2 mg Intravenous Q2H PRN de Brennen Singh DO        Or    morphine injection 4 mg  4 mg Intravenous Q2H PRN de Brennen Singh DO         Imaging personally reviewed:  Renal ultrasound 02/08/2021:  IMPRESSION:  1.  No hydronephrosis.  2.  Markedly thickened bladder wall with Azul catheter in place.     CT abdomen pelvis 01/29/2020:  Massively distended bladder      Assessment/Plan:  49 year old male with atonic bladder/urinary retention requires urologic evaluation.     1) Atonic bladder/urinary retention:   -Patient has chronic urinary retention due to his atonic  bladder which is likely multifactorial from his diabetes and upper motor neuron injury  -Patient always has massively distended bladder and is able to Valsalva void at home and when he comes into the hospital and bladder scans are available, his chronic urinary retention is discovered  -A discussion was had with the patient that at some point patient will develop obstructive uropathy and potential loss of kidney function if he continues this way.  -Recommend that patient learn how to perform clean intermittent catheterization.  Again discussed this option with the patient, he declines. He refuses CIC and conn. We discussed the risks of upper tract injury and he still declines, very clearly understanding the risks of permanent loss of kidney function.   -Patient has been voiding like this for approximately 12 years and has not developed any upper tract injury yet but again it was discussed with patient that at some point he will most certainly will develop upper tract injury if he does not manage his bladder with clean intermittent catheterization  -Continue Conn catheter for now, ok to DC prior to discharge     Fidel Burleson MD

## (undated) DEVICE — PK ENDO GI 50

## (undated) DEVICE — BITEBLOCK ENDO W/STRAP 60F A/ LF DISP